# Patient Record
Sex: FEMALE | Race: BLACK OR AFRICAN AMERICAN | NOT HISPANIC OR LATINO | Employment: FULL TIME | URBAN - METROPOLITAN AREA
[De-identification: names, ages, dates, MRNs, and addresses within clinical notes are randomized per-mention and may not be internally consistent; named-entity substitution may affect disease eponyms.]

---

## 2017-01-26 ENCOUNTER — PATIENT MESSAGE (OUTPATIENT)
Dept: FAMILY MEDICINE | Facility: CLINIC | Age: 42
End: 2017-01-26

## 2017-01-27 ENCOUNTER — TELEPHONE (OUTPATIENT)
Dept: SPINE | Facility: CLINIC | Age: 42
End: 2017-01-27

## 2017-01-27 ENCOUNTER — OFFICE VISIT (OUTPATIENT)
Dept: FAMILY MEDICINE | Facility: CLINIC | Age: 42
End: 2017-01-27
Payer: COMMERCIAL

## 2017-01-27 ENCOUNTER — HOSPITAL ENCOUNTER (OUTPATIENT)
Dept: RADIOLOGY | Facility: OTHER | Age: 42
Discharge: HOME | End: 2017-01-27
Attending: ORTHOPAEDIC SURGERY
Payer: COMMERCIAL

## 2017-01-27 ENCOUNTER — OFFICE VISIT (OUTPATIENT)
Dept: SPINE | Facility: CLINIC | Age: 42
End: 2017-01-27
Payer: COMMERCIAL

## 2017-01-27 VITALS
SYSTOLIC BLOOD PRESSURE: 146 MMHG | WEIGHT: 185 LBS | DIASTOLIC BLOOD PRESSURE: 98 MMHG | HEART RATE: 100 BPM | HEIGHT: 64 IN | BODY MASS INDEX: 31.58 KG/M2

## 2017-01-27 VITALS
BODY MASS INDEX: 31.64 KG/M2 | SYSTOLIC BLOOD PRESSURE: 132 MMHG | HEIGHT: 64 IN | WEIGHT: 185.31 LBS | OXYGEN SATURATION: 99 % | DIASTOLIC BLOOD PRESSURE: 90 MMHG | TEMPERATURE: 99 F | HEART RATE: 81 BPM

## 2017-01-27 DIAGNOSIS — M54.50 LUMBAR SPINE PAIN: Primary | ICD-10-CM

## 2017-01-27 DIAGNOSIS — M54.50 LUMBAR SPINE PAIN: ICD-10-CM

## 2017-01-27 DIAGNOSIS — M51.37 DDD (DEGENERATIVE DISC DISEASE), LUMBOSACRAL: ICD-10-CM

## 2017-01-27 DIAGNOSIS — M54.14 THORACIC AND LUMBOSACRAL NEURITIS: ICD-10-CM

## 2017-01-27 DIAGNOSIS — M54.41 ACUTE BILATERAL LOW BACK PAIN WITH RIGHT-SIDED SCIATICA: ICD-10-CM

## 2017-01-27 DIAGNOSIS — M47.819 SPONDYLOSIS WITHOUT MYELOPATHY: ICD-10-CM

## 2017-01-27 DIAGNOSIS — M54.9 BACK PAIN, UNSPECIFIED BACK LOCATION, UNSPECIFIED BACK PAIN LATERALITY, UNSPECIFIED CHRONICITY: Primary | ICD-10-CM

## 2017-01-27 DIAGNOSIS — M54.17 THORACIC AND LUMBOSACRAL NEURITIS: ICD-10-CM

## 2017-01-27 PROCEDURE — 3080F DIAST BP >= 90 MM HG: CPT | Mod: S$GLB,,, | Performed by: NURSE PRACTITIONER

## 2017-01-27 PROCEDURE — 3075F SYST BP GE 130 - 139MM HG: CPT | Mod: S$GLB,,, | Performed by: NURSE PRACTITIONER

## 2017-01-27 PROCEDURE — 72100 X-RAY EXAM L-S SPINE 2/3 VWS: CPT | Mod: 26,,, | Performed by: RADIOLOGY

## 2017-01-27 PROCEDURE — 99999 PR PBB SHADOW E&M-EST. PATIENT-LVL IV: CPT | Mod: PBBFAC,,, | Performed by: PHYSICIAN ASSISTANT

## 2017-01-27 PROCEDURE — 1159F MED LIST DOCD IN RCRD: CPT | Mod: S$GLB,,, | Performed by: NURSE PRACTITIONER

## 2017-01-27 PROCEDURE — 99999 PR PBB SHADOW E&M-EST. PATIENT-LVL IV: CPT | Mod: PBBFAC,,, | Performed by: NURSE PRACTITIONER

## 2017-01-27 PROCEDURE — 99204 OFFICE O/P NEW MOD 45 MIN: CPT | Mod: S$GLB,,, | Performed by: PHYSICIAN ASSISTANT

## 2017-01-27 PROCEDURE — 72120 X-RAY BEND ONLY L-S SPINE: CPT | Mod: 26,,, | Performed by: RADIOLOGY

## 2017-01-27 PROCEDURE — 96372 THER/PROPH/DIAG INJ SC/IM: CPT | Mod: S$GLB,,, | Performed by: NURSE PRACTITIONER

## 2017-01-27 PROCEDURE — 72100 X-RAY EXAM L-S SPINE 2/3 VWS: CPT | Mod: TC

## 2017-01-27 PROCEDURE — 3074F SYST BP LT 130 MM HG: CPT | Mod: S$GLB,,, | Performed by: PHYSICIAN ASSISTANT

## 2017-01-27 PROCEDURE — 99214 OFFICE O/P EST MOD 30 MIN: CPT | Mod: 25,S$GLB,, | Performed by: NURSE PRACTITIONER

## 2017-01-27 PROCEDURE — 3080F DIAST BP >= 90 MM HG: CPT | Mod: S$GLB,,, | Performed by: PHYSICIAN ASSISTANT

## 2017-01-27 RX ORDER — METHYLPREDNISOLONE 4 MG/1
TABLET ORAL
Qty: 1 PACKAGE | Refills: 0 | Status: SHIPPED | OUTPATIENT
Start: 2017-01-27 | End: 2017-02-17

## 2017-01-27 RX ORDER — HYDROCODONE BITARTRATE AND ACETAMINOPHEN 10; 325 MG/1; MG/1
1 TABLET ORAL EVERY 12 HOURS PRN
Qty: 40 TABLET | Refills: 0 | Status: SHIPPED | OUTPATIENT
Start: 2017-01-27 | End: 2017-03-02 | Stop reason: SDUPTHER

## 2017-01-27 RX ORDER — KETOROLAC TROMETHAMINE 30 MG/ML
60 INJECTION, SOLUTION INTRAMUSCULAR; INTRAVENOUS ONCE
Status: COMPLETED | OUTPATIENT
Start: 2017-01-27 | End: 2017-01-27

## 2017-01-27 RX ORDER — DICLOFENAC SODIUM 75 MG/1
75 TABLET, DELAYED RELEASE ORAL 2 TIMES DAILY PRN
Qty: 180 TABLET | Refills: 0 | Status: SHIPPED | OUTPATIENT
Start: 2017-01-27 | End: 2017-10-30

## 2017-01-27 RX ORDER — CYCLOBENZAPRINE HCL 10 MG
TABLET ORAL
Refills: 0 | COMMUNITY
Start: 2017-01-24 | End: 2017-05-09 | Stop reason: ALTCHOICE

## 2017-01-27 RX ORDER — KETOROLAC TROMETHAMINE 10 MG/1
TABLET, FILM COATED ORAL
Refills: 0 | COMMUNITY
Start: 2017-01-24 | End: 2017-10-30

## 2017-01-27 RX ORDER — METHOCARBAMOL 750 MG/1
750 TABLET, FILM COATED ORAL 3 TIMES DAILY PRN
Qty: 270 TABLET | Refills: 0 | Status: SHIPPED | OUTPATIENT
Start: 2017-01-27 | End: 2017-05-02 | Stop reason: SDUPTHER

## 2017-01-27 RX ORDER — HYDROCODONE BITARTRATE AND ACETAMINOPHEN 5; 325 MG/1; MG/1
TABLET ORAL
Refills: 0 | COMMUNITY
Start: 2017-01-24 | End: 2017-02-01 | Stop reason: DRUGHIGH

## 2017-01-27 RX ADMIN — KETOROLAC TROMETHAMINE 60 MG: 30 INJECTION, SOLUTION INTRAMUSCULAR; INTRAVENOUS at 10:01

## 2017-01-27 NOTE — LETTER
January 27, 2017      Ira Marvin, FNP-C  [address]  [place] [address]           [facility] - Spine Services  [address]  [address]  [facility] [address]  Phone: [phone]  Fax: [phone]          Patient: Alberto Frye   MR Number: [id number]   YOB: 1975   Date of Visit: 1/27/2017       Dear Ira Marvin:    Thank you for referring Alberto Frye to me for evaluation. Attached you will find relevant portions of my assessment and plan of care.    If you have questions, please do not hesitate to call me. I look forward to following Alberto Frye along with you.    Sincerely,    Ester Sparks PA-C    Enclosure  CC:  No Recipients    If you would like to receive this communication electronically, please contact [email or web address] or [phone] to request more information on [employer] access.    For providers and/or their staff who would like to refer a patient to [facility], please contact us through our one-stop-shop provider referral line, [facility], at [phone].    If you feel you have received this communication in error or would no longer like to receive these types of communications, please e-mail [email or web address]

## 2017-01-27 NOTE — PROGRESS NOTES
Subjective:     Patient ID:  Alberto Frye is a 42 y.o. female.    Patient referred by Ms. Yon NP    Chief Complaint: Back and right leg pain    HPI    Alberto Frye is a 42 y.o. female who presents with the above CC.  Pain started about four days ago when she went to get from a sitting to standing position.    Pain is in the middle to right low back region that is constant rated 10/10 that is worse with sitting and better with laying down with a heating pad.  Pain radiates down the right posterior leg to the bottom of the foot.  Pain also is in the bottom of the left foot.  Leg pain is rated 10/10 and comes and goes.  No left leg pain or paresthesias.    The back pain bothers her the most at this time.    Patient had PT in the past which helped.  No ESIs.  No spine surgery.  Patient is currently taking Flexeril, Norco, and Toradol which she got from an Urgent Care.    Patient denies any recent accidents or trauma, no saddle anesthesias, and no bowel or bladder incontinence.      Review of Systems:  Please refer to page three of the spine center intake form for a complete review of systems.    Past Medical History   Diagnosis Date    Anxiety     Hypertension      Past Surgical History   Procedure Laterality Date    Hysterectomy       lapban surgery  2009     Current Outpatient Prescriptions on File Prior to Visit   Medication Sig Dispense Refill    clonazePAM (KLONOPIN) 0.5 MG tablet Take 1 tablet (0.5 mg total) by mouth 2 (two) times daily as needed. 60 tablet 0    cyclobenzaprine (FLEXERIL) 10 MG tablet TK 1 T PO Q 8 H PRN. NO DRIVING ON MEDICINE  0    hydrocodone-acetaminophen 5-325mg (NORCO) 5-325 mg per tablet TK 1 T PO Q 6 H PRN. NO DRIVING ON MEDICINE  0    hydrOXYzine HCl (ATARAX) 25 MG tablet Take 1 tablet (25 mg total) by mouth 3 (three) times daily as needed for Anxiety. 30 tablet 0    ketorolac (TORADOL) 10 mg tablet TK 1 T PO Q 6 H PRN. DO NOT EXCEED 3 DAYS. START ON  "01/25/2017  0    etodolac (LODINE) 400 MG tablet Take 1 tablet (400 mg total) by mouth 2 (two) times daily. 60 tablet 0    hydrocodone-acetaminophen 7.5-325mg (NORCO) 7.5-325 mg per tablet Take 1 tablet by mouth every 6 (six) hours as needed. 35 tablet 0    torsemide (DEMADEX) 10 MG Tab Take 2 tablets (20 mg total) by mouth once daily. 60 tablet 2    [DISCONTINUED] celecoxib (CELEBREX) 200 MG capsule Take 1 capsule (200 mg total) by mouth 2 (two) times daily. 60 capsule 0     Current Facility-Administered Medications on File Prior to Visit   Medication Dose Route Frequency Provider Last Rate Last Dose    [COMPLETED] ketorolac injection 60 mg  60 mg Intramuscular Once Ira Marvin FNP-C   60 mg at 01/27/17 1041     Review of patient's allergies indicates:   Allergen Reactions    Pcn [penicillins] Other (See Comments)     Was told from childhood she couldn't take it    Latex, natural rubber Rash     Social History     Social History    Marital status: Single     Spouse name: N/A    Number of children: N/A    Years of education: N/A     Occupational History    Not on file.     Social History Main Topics    Smoking status: Never Smoker    Smokeless tobacco: Not on file    Alcohol use 0.0 oz/week     0 Standard drinks or equivalent per week    Drug use: No    Sexual activity: Yes     Partners: Male     Other Topics Concern    Not on file     Social History Narrative     Family History   Problem Relation Age of Onset    Diabetes Mother     Cancer Mother     Hypertension Mother        Objective:      Vitals:    01/27/17 1356   BP: (!) 146/98   Pulse: 100   Weight: 83.9 kg (185 lb)   Height: 5' 4" (1.626 m)   PainSc:   8   PainLoc: Back         Physical Exam:    General:  Alberto Frye is well-developed, well-nourished, appears stated age, in no acute distress, alert and oriented to person, place, and time.    Musculoskeletal:    Patient arises from a sitting to standing position without " difficulty.  Patient walks to the door without evidence of limp, pain, or abnormality of gait.     Exam limited by pain and she could not get on the exam table.    Lumbar ROM:   Pain in lumbar flexion, extension, and left lateral bending.  No pain in right lateral bending.    Lumbar Spine Inspection:  Scar noted but not from spine surgery.    Lumbar Spine Palpation:  Severe tenderness to low back palpation.    SI Joint Palpation:  Severe tenderness to SI Joint palpation.    Straight Leg Raise:  Cannot assess    Neurological: (limited by pain)    Muscle strength against resistance:     Right Left   Hip flexion  5 / 5 5 / 5   Hip extension 5 / 5 5 / 5   Hip abduction 5 / 5 5 / 5   Hip adduction  5 / 5 5 / 5   Knee extension  5 / 5 5 / 5   Knee flexion 5 / 5 5 / 5   Dorsiflexion  5 / 5 5 / 5   EHL  5 / 5 5 / 5   Plantar flexion  5 / 5 5 / 5   Inversion of the feet 5 / 5 5 / 5   Eversion of the feet  5 / 5 5 / 5     Reflexes:     Right Left   Patellar 2+ 2+   Achilles 2+ 2+     Clonus:  Negative bilaterally    On gross examination of the bilateral upper extremities, patient has full painfree ROM with no signs of clubbing, cyanosis, edema, or weakness.     XRAY Interpretation:     Lumbar spine ap/lateral/flexion/extension xrays were personally reviewed today.  No fractures.  No movement on flexion and extension.      Assessment:          1. Spondylosis without myelopathy    2. DDD (degenerative disc disease), lumbosacral    3. Thoracic and lumbosacral neuritis    4. Acute bilateral low back pain with right-sided sciatica             Plan:          Orders Placed This Encounter    methylPREDNISolone (MEDROL, KIMBERLEE,) 4 mg tablet    methocarbamol (ROBAXIN) 750 MG Tab    diclofenac (VOLTAREN) 75 MG EC tablet    hydrocodone-acetaminophen 10-325mg (NORCO)  mg Tab     Patient describing an acute right S1 radiculopathy    -Medrol pack after the toradol is done with food  -Diclofenac PRN with food once the pack is  done  -Robaxin PRN now  -Norco PRN.  She was told that this is a one time prescription and she verbalized understading  -She will be off work for the next week due to her lumbar spine condition.  Go back on February 6th.  A letter was given for this.  -She was told to call back in 1-2 weeks if the pain gets worse and if so I will get a MRI lumbar spine      Follow-Up:  Return if symptoms worsen or fail to improve. If there are any questions prior to this, the patient was instructed to contact the office.       Ester Sparks [facility], PA-C  Neurosurgery  [facility] Sp[facility]  [facility]

## 2017-01-27 NOTE — MR AVS SNAPSHOT
[facility]  [address]  [place] [address]  Phone: [phone]  Fax: [phone]                  Alberto Frye   2017 10:40 AM   Office Visit    Description:  Female : 1975   Provider:  BONG Lowe   Department:  [facility]           Reason for Visit     Back Pain           Diagnoses this Visit        Comments    Back pain, unspecified back location, unspecified back pain laterality, unspecified chronicity    -  Primary            To Do List           Future Appointments        Provider Department Dept Phone    2017 11:30 AM Ester Sparks PA-C [facility] [phone]    2017 7:20 AM Candace Martin MD [facility] [phone]      Goals (5 Years of Data)     None      [facility]     [facility] Nurse Care Line -  Assistance  Registered nurses in the [facility] Center provide clinical advisement, health education, appointment booking, and other advisory services.  Call for this free service at [phone].             Medications           These medications were administered today        Dose Freq    ketorolac injection 60 mg 60 mg Once    Sig: Inject 2 mLs (60 mg total) into the muscle once.    Class: Normal    Route: Intramuscular      STOP taking these medications     celecoxib (CELEBREX) 200 MG capsule Take 1 capsule (200 mg total) by mouth 2 (two) times daily.           Verify that the below list of medications is an accurate representation of the medications you are currently taking.  If none reported, the list may be blank. If incorrect, please contact your healthcare provider. Carry this list with you in case of emergency.           Current Medications     clonazePAM (KLONOPIN) 0.5 MG tablet Take 1 tablet (0.5 mg total) by mouth 2 (two) times daily as needed.    cyclobenzaprine (FLEXERIL) 10 MG tablet TK 1 T PO Q 8 H PRN. NO DRIVING ON MEDICINE    etodolac  "(LODINE) 400 MG tablet Take 1 tablet (400 mg total) by mouth 2 (two) times daily.    hydrocodone-acetaminophen 5-325mg (NORCO) 5-325 mg per tablet TK 1 T PO Q 6 H PRN. NO DRIVING ON MEDICINE    hydrocodone-acetaminophen 7.5-325mg (NORCO) 7.5-325 mg per tablet Take 1 tablet by mouth every 6 (six) hours as needed.    hydrOXYzine HCl (ATARAX) 25 MG tablet Take 1 tablet (25 mg total) by mouth 3 (three) times daily as needed for Anxiety.    ketorolac (TORADOL) 10 mg tablet TK 1 T PO Q 6 H PRN. DO NOT EXCEED 3 DAYS. START ON 01/25/2017    torsemide (DEMADEX) 10 MG Tab Take 2 tablets (20 mg total) by mouth once daily.           Clinical Reference Information           Vital Signs - Last Recorded  Most recent update: 1/27/2017 10:19 AM by Priscilla Pacheco    BP Pulse Temp Ht Wt SpO2    (!) 132/90 (BP Location: Right arm, Patient Position: Sitting, BP Method: Manual) 81 98.6 °F (37 °C) (Oral) 5' 4" (1.626 m) 84.1 kg (185 lb 4.8 oz) 99%    BMI                31.81 kg/m2          Blood Pressure          Most Recent Value    BP  (!)  132/90      Allergies as of 1/27/2017     Pcn [Penicillins]    Latex, Natural Rubber      Immunizations Administered on Date of Encounter - 1/27/2017     None      Orders Placed During Today's Visit      Normal Orders This Visit    Ambulatory Consult to Back & Spine Clinic       "

## 2017-01-27 NOTE — PROGRESS NOTES
Subjective:       Patient ID: Alberto Frye is a 42 y.o. female.    Chief Complaint: Back Pain (lower back. patient went urgent care tuesday.)    HPI Comments: 42-year-old female presents to the clinic today with lower back pain that started on 1/24/2016.  She states it just started while she was at work went from a sitting to standing position.  She states she is having pain, weakness, numbness, and tingling to right leg.  She denies any urinary or bowel incontinence.  She was seen in urgent care on 1/24/2016.  She was prescribed Flexeril, Norco, and Toradol.  She states her medications just microsleep but does not control the pain.  She also has some mild left trapezius muscle pain.  She denies any left shoulder or left arm pain.  Presently her pain is a 10 out of 10.  She is crying and appears to be in a lot of discomfort.    Past Medical History   Diagnosis Date    Anxiety     Hypertension      Past Surgical History   Procedure Laterality Date    Hysterectomy       lapban surgery  2009      reports that she has never smoked. She does not have any smokeless tobacco history on file. She reports that she drinks alcohol. She reports that she does not use illicit drugs.  Review of Systems   Respiratory: Negative for cough, shortness of breath and wheezing.    Cardiovascular: Negative for chest pain, palpitations and leg swelling.   Gastrointestinal: Negative for abdominal pain, diarrhea, nausea and vomiting.   Musculoskeletal: Positive for back pain. Negative for gait problem.        Right leg pain and left trapezius muscle pain    Neurological: Positive for weakness and numbness. Negative for dizziness, light-headedness and headaches.        Pain, weakness, numbness and tingling to right leg        Objective:      Physical Exam   Constitutional: She is oriented to person, place, and time. She appears well-developed and well-nourished. No distress.   Crying and appears very uncomfortable    Eyes:  Conjunctivae and EOM are normal. Pupils are equal, round, and reactive to light. Right eye exhibits no discharge. Left eye exhibits no discharge. No scleral icterus.   Neck: Normal range of motion. Neck supple.   Neck non-tender to palpation tenderness over left trapezius muscle    Cardiovascular: Normal rate, regular rhythm and normal heart sounds.  Exam reveals no gallop and no friction rub.    No murmur heard.  Pulmonary/Chest: Effort normal and breath sounds normal. No respiratory distress. She has no wheezes. She has no rales.   Abdominal: Soft. Bowel sounds are normal. There is no tenderness.   Musculoskeletal: She exhibits tenderness.   Tenderness L1-L4 and right paraspinal muscles positive right leg raise    Neurological: She is alert and oriented to person, place, and time. She has normal reflexes.   Skin: Skin is warm and dry. She is not diaphoretic.   Psychiatric: She has a normal mood and affect.       Assessment:       1. Back pain, unspecified back location, unspecified back pain laterality, unspecified chronicity        Plan:         Back pain, unspecified back location, unspecified back pain laterality, unspecified chronicity  -     ketorolac injection 60 mg; Inject 2 mLs (60 mg total) into the muscle once.  -     Ambulatory Consult to Back & Spine Clinic      To Back and Spine now for further evaluation at 11:30 today       Patient was given to Toradol injection in the office and she took a Flexeril and Norco before she left

## 2017-02-01 ENCOUNTER — OFFICE VISIT (OUTPATIENT)
Dept: FAMILY MEDICINE | Facility: CLINIC | Age: 42
End: 2017-02-01
Payer: COMMERCIAL

## 2017-02-01 VITALS
BODY MASS INDEX: 31.99 KG/M2 | OXYGEN SATURATION: 97 % | TEMPERATURE: 98 F | HEART RATE: 91 BPM | DIASTOLIC BLOOD PRESSURE: 106 MMHG | WEIGHT: 187.38 LBS | SYSTOLIC BLOOD PRESSURE: 158 MMHG | HEIGHT: 64 IN

## 2017-02-01 DIAGNOSIS — M54.41 ACUTE RIGHT-SIDED LOW BACK PAIN WITH RIGHT-SIDED SCIATICA: Primary | ICD-10-CM

## 2017-02-01 DIAGNOSIS — F41.9 ANXIETY: ICD-10-CM

## 2017-02-01 PROCEDURE — 3080F DIAST BP >= 90 MM HG: CPT | Mod: S$GLB,,, | Performed by: FAMILY MEDICINE

## 2017-02-01 PROCEDURE — 99213 OFFICE O/P EST LOW 20 MIN: CPT | Mod: S$GLB,,, | Performed by: FAMILY MEDICINE

## 2017-02-01 PROCEDURE — 99999 PR PBB SHADOW E&M-EST. PATIENT-LVL III: CPT | Mod: PBBFAC,,, | Performed by: FAMILY MEDICINE

## 2017-02-01 PROCEDURE — 3077F SYST BP >= 140 MM HG: CPT | Mod: S$GLB,,, | Performed by: FAMILY MEDICINE

## 2017-02-01 RX ORDER — HYDROXYZINE HYDROCHLORIDE 25 MG/1
25 TABLET, FILM COATED ORAL 3 TIMES DAILY PRN
Qty: 30 TABLET | Refills: 3 | Status: SHIPPED | OUTPATIENT
Start: 2017-02-01 | End: 2017-05-02

## 2017-02-01 RX ORDER — CLONAZEPAM 0.5 MG/1
0.5 TABLET ORAL 2 TIMES DAILY PRN
Qty: 60 TABLET | Refills: 0 | Status: SHIPPED | OUTPATIENT
Start: 2017-02-01 | End: 2017-03-02 | Stop reason: SDUPTHER

## 2017-02-01 NOTE — PROGRESS NOTES
Office Visit    Patient Name: Alberto Frye    : 1975  MRN: [id number]    Subjective:  Alberto is a 42 y.o. female who presents today for:    Pain      This patient has multiple medical diagnoses as noted below.  This patient is known to me and to this clinic. She reports that she has had increased pain in her lower back.  Pain radiates to the leg and feet.  Her neck and shoulders had increased pain.  She was evaluated in an urgent care and prescribed several medications.  She was evaluated by ortho and NP on the same day.  She was diagnosed with a pinch nerve in her back.  She is trying to avoid pain medications, but the pain will return once the medication wears off.  She has quit smoking.  She has increased exercise.  She denies any recent injuries nor heavy lifting.  She reports that she has pain that was sitting on the location.        Active Problem List  Patient Active Problem List   Diagnosis    Essential hypertension    Seasonal allergies    Anxiety       Past Surgical History  Past Surgical History   Procedure Laterality Date    Hysterectomy       lapban surgery         Family History  Family History   Problem Relation Age of Onset    Diabetes Mother     Cancer Mother     Hypertension Mother        Social History  Social History     Social History    Marital status: Single     Spouse name: N/A    Number of children: N/A    Years of education: N/A     Occupational History    Not on file.     Social History Main Topics    Smoking status: Former Smoker    Smokeless tobacco: Not on file      Comment: quit in 2016    Alcohol use 0.0 oz/week     0 Standard drinks or equivalent per week    Drug use: No    Sexual activity: Yes     Partners: Male     Other Topics Concern    Not on file     Social History Narrative    No narrative on file       Current Medications  Medications reviewed and updated.     Allergies   Review of patient's allergies indicates:   Allergen  "Reactions    Pcn [penicillins] Other (See Comments)     Was told from childhood she couldn't take it    Latex, natural rubber Rash       Review of Systems (Pertinent positives)  Review of Systems   Constitutional: Negative for activity change, appetite change, fatigue, fever and unexpected weight change.   HENT: Negative.  Negative for ear discharge, ear pain, rhinorrhea and sore throat.    Eyes: Negative.    Respiratory: Negative for apnea, cough, chest tightness, shortness of breath and wheezing.    Cardiovascular: Negative for chest pain, palpitations and leg swelling.   Gastrointestinal: Negative for abdominal distention, abdominal pain, constipation, diarrhea and vomiting.   Endocrine: Negative for cold intolerance, heat intolerance, polydipsia and polyuria.   Genitourinary: Negative for decreased urine volume, menstrual problem, urgency, vaginal bleeding, vaginal discharge and vaginal pain.   Musculoskeletal: Positive for arthralgias, back pain, neck pain and neck stiffness.   Skin: Negative for rash.   Neurological: Negative for dizziness and headaches.   Hematological: Does not bruise/bleed easily.   Psychiatric/Behavioral: Negative for agitation, sleep disturbance and suicidal ideas.       Visit Vitals    BP (!) 158/106 (BP Location: Left arm, Patient Position: Sitting, BP Method: Manual)    Pulse 91    Temp 98.1 °F (36.7 °C) (Oral)    Ht 5' 4" (1.626 m)    Wt 85 kg (187 lb 6.3 oz)    SpO2 97%    BMI 32.17 kg/m2       Physical Exam   Constitutional: She is oriented to person, place, and time. She appears well-developed and well-nourished.   HENT:   Head: Normocephalic and atraumatic.   Eyes: Conjunctivae and EOM are normal. Pupils are equal, round, and reactive to light.   Neck: Normal range of motion. No JVD present. No thyromegaly present.   Musculoskeletal:        Back:         Legs:  Lymphadenopathy:     She has no cervical adenopathy.   Neurological: She is alert and oriented to person, place, " and time.   Skin: Skin is warm and dry.   Psychiatric: She has a normal mood and affect. Her behavior is normal.   Vitals reviewed.      Health Maintenance  Health Maintenance Topics with due status: Not Due       Topic Last Completion Date    TETANUS VACCINE 09/30/2015    Mammogram 12/28/2015    Pap Smear 06/13/2016       Assessment/Plan:  Alberto Frye is a 42 y.o. female who presents today for :    Alberto was seen today for pain.    Diagnoses and all orders for this visit:    Acute right-sided low back pain with right-sided sciatica  -  Consider referral to pain clinic   -  Conservative management   -     Anxiety  -     hydrOXYzine HCl (ATARAX) 25 MG tablet; Take 1 tablet (25 mg total) by mouth 3 (three) times daily as needed for Anxiety.  -     clonazePAM (KLONOPIN) 0.5 MG tablet; Take 1 tablet (0.5 mg total) by mouth 2 (two) times daily as needed.  -   I have discussed the common side effects of this medication with the patient and answered all of the questions they had at the time of this visit regarding this medication.  -   RTC if symptoms worsen or persist.        No Follow-up on file.

## 2017-03-02 ENCOUNTER — PATIENT MESSAGE (OUTPATIENT)
Dept: FAMILY MEDICINE | Facility: CLINIC | Age: 42
End: 2017-03-02

## 2017-03-02 DIAGNOSIS — F41.9 ANXIETY: ICD-10-CM

## 2017-03-02 DIAGNOSIS — M54.14 THORACIC AND LUMBOSACRAL NEURITIS: ICD-10-CM

## 2017-03-02 DIAGNOSIS — M47.819 SPONDYLOSIS WITHOUT MYELOPATHY: ICD-10-CM

## 2017-03-02 DIAGNOSIS — M54.41 ACUTE BILATERAL LOW BACK PAIN WITH RIGHT-SIDED SCIATICA: ICD-10-CM

## 2017-03-02 DIAGNOSIS — M54.17 THORACIC AND LUMBOSACRAL NEURITIS: ICD-10-CM

## 2017-03-02 DIAGNOSIS — M51.37 DDD (DEGENERATIVE DISC DISEASE), LUMBOSACRAL: ICD-10-CM

## 2017-03-02 RX ORDER — HYDROCODONE BITARTRATE AND ACETAMINOPHEN 10; 325 MG/1; MG/1
1 TABLET ORAL EVERY 12 HOURS PRN
Qty: 40 TABLET | Refills: 0 | Status: SHIPPED | OUTPATIENT
Start: 2017-03-02 | End: 2017-05-02 | Stop reason: SDUPTHER

## 2017-03-02 RX ORDER — CLONAZEPAM 0.5 MG/1
0.5 TABLET ORAL 2 TIMES DAILY PRN
Qty: 60 TABLET | Refills: 0 | Status: SHIPPED | OUTPATIENT
Start: 2017-03-02 | End: 2017-04-06 | Stop reason: SDUPTHER

## 2017-04-06 DIAGNOSIS — F41.9 ANXIETY: ICD-10-CM

## 2017-04-06 RX ORDER — CLONAZEPAM 0.5 MG/1
0.5 TABLET ORAL 2 TIMES DAILY PRN
Qty: 60 TABLET | Refills: 0 | Status: SHIPPED | OUTPATIENT
Start: 2017-04-06 | End: 2017-05-02 | Stop reason: SDUPTHER

## 2017-05-02 ENCOUNTER — OFFICE VISIT (OUTPATIENT)
Dept: FAMILY MEDICINE | Facility: CLINIC | Age: 42
End: 2017-05-02
Payer: COMMERCIAL

## 2017-05-02 ENCOUNTER — HOSPITAL ENCOUNTER (OUTPATIENT)
Dept: RADIOLOGY | Facility: HOSPITAL | Age: 42
Discharge: HOME | End: 2017-05-02
Attending: FAMILY MEDICINE
Payer: COMMERCIAL

## 2017-05-02 VITALS
SYSTOLIC BLOOD PRESSURE: 120 MMHG | TEMPERATURE: 99 F | HEART RATE: 84 BPM | BODY MASS INDEX: 31.99 KG/M2 | HEIGHT: 64 IN | WEIGHT: 187.38 LBS | OXYGEN SATURATION: 99 % | DIASTOLIC BLOOD PRESSURE: 90 MMHG

## 2017-05-02 DIAGNOSIS — M47.819 SPONDYLOSIS WITHOUT MYELOPATHY: ICD-10-CM

## 2017-05-02 DIAGNOSIS — S16.1XXA NECK STRAIN, INITIAL ENCOUNTER: ICD-10-CM

## 2017-05-02 DIAGNOSIS — M54.17 THORACIC AND LUMBOSACRAL NEURITIS: ICD-10-CM

## 2017-05-02 DIAGNOSIS — M54.14 THORACIC AND LUMBOSACRAL NEURITIS: ICD-10-CM

## 2017-05-02 DIAGNOSIS — F41.9 ANXIETY: ICD-10-CM

## 2017-05-02 DIAGNOSIS — S16.1XXA NECK STRAIN, INITIAL ENCOUNTER: Primary | ICD-10-CM

## 2017-05-02 PROCEDURE — 99999 PR PBB SHADOW E&M-EST. PATIENT-LVL IV: CPT | Mod: PBBFAC,,, | Performed by: FAMILY MEDICINE

## 2017-05-02 PROCEDURE — 72040 X-RAY EXAM NECK SPINE 2-3 VW: CPT | Mod: TC,PO

## 2017-05-02 PROCEDURE — 1160F RVW MEDS BY RX/DR IN RCRD: CPT | Mod: S$GLB,,, | Performed by: FAMILY MEDICINE

## 2017-05-02 PROCEDURE — 96372 THER/PROPH/DIAG INJ SC/IM: CPT | Mod: S$GLB,,, | Performed by: FAMILY MEDICINE

## 2017-05-02 PROCEDURE — 99214 OFFICE O/P EST MOD 30 MIN: CPT | Mod: 25,S$GLB,, | Performed by: FAMILY MEDICINE

## 2017-05-02 PROCEDURE — 3080F DIAST BP >= 90 MM HG: CPT | Mod: S$GLB,,, | Performed by: FAMILY MEDICINE

## 2017-05-02 PROCEDURE — 3074F SYST BP LT 130 MM HG: CPT | Mod: S$GLB,,, | Performed by: FAMILY MEDICINE

## 2017-05-02 PROCEDURE — 72040 X-RAY EXAM NECK SPINE 2-3 VW: CPT | Mod: 26,,, | Performed by: RADIOLOGY

## 2017-05-02 RX ORDER — METHOCARBAMOL 500 MG/1
500 TABLET, FILM COATED ORAL 3 TIMES DAILY PRN
Qty: 30 TABLET | Refills: 0 | Status: SHIPPED | OUTPATIENT
Start: 2017-05-02 | End: 2017-05-09 | Stop reason: ALTCHOICE

## 2017-05-02 RX ORDER — CYCLOBENZAPRINE HCL 10 MG
TABLET ORAL
Refills: 0 | Status: CANCELLED | OUTPATIENT
Start: 2017-05-02

## 2017-05-02 RX ORDER — TRIAMCINOLONE ACETONIDE 40 MG/ML
40 INJECTION, SUSPENSION INTRA-ARTICULAR; INTRAMUSCULAR
Status: COMPLETED | OUTPATIENT
Start: 2017-05-02 | End: 2017-05-02

## 2017-05-02 RX ORDER — CLONAZEPAM 0.5 MG/1
0.5 TABLET ORAL 2 TIMES DAILY PRN
Qty: 60 TABLET | Refills: 0 | Status: SHIPPED | OUTPATIENT
Start: 2017-05-02 | End: 2017-06-07 | Stop reason: SDUPTHER

## 2017-05-02 RX ORDER — HYDROCODONE BITARTRATE AND ACETAMINOPHEN 10; 325 MG/1; MG/1
1 TABLET ORAL EVERY 12 HOURS PRN
Qty: 40 TABLET | Refills: 0 | Status: SHIPPED | OUTPATIENT
Start: 2017-05-02 | End: 2017-07-07 | Stop reason: SDUPTHER

## 2017-05-02 RX ADMIN — TRIAMCINOLONE ACETONIDE 40 MG: 40 INJECTION, SUSPENSION INTRA-ARTICULAR; INTRAMUSCULAR at 03:05

## 2017-05-02 NOTE — PROGRESS NOTES
Office Visit    Patient Name: Alberto Frye    : 1975  MRN: [id number]    Subjective:  Alberto is a 42 y.o. female who presents today for:    Shoulder Pain (left) and Arm Pain (left)      This patient has multiple medical diagnoses as noted below.  This patient is known to me and to this clinic. She reports increased neck pain that radiates down her neck to her shoulder.  She did use medications for neck pain.  She reports a nerve that is jumping in the back.  She has used a heating pad.  The heat will improve the pain.  She had increased pressure in there neck.  Pain was severe enough.  She has used tylenol for arthritis.  She reports that she has used voltaren that did not help with her pain.  She denies any injuries to her neck.       Active Problem List  Patient Active Problem List   Diagnosis    Essential hypertension    Seasonal allergies    Anxiety       Past Surgical History  Past Surgical History:   Procedure Laterality Date     lapban surgery      HYSTERECTOMY         Family History  Family History   Problem Relation Age of Onset    Diabetes Mother     Cancer Mother     Hypertension Mother        Social History  Social History     Social History    Marital status: Single     Spouse name: N/A    Number of children: N/A    Years of education: N/A     Occupational History    Not on file.     Social History Main Topics    Smoking status: Former Smoker    Smokeless tobacco: Not on file      Comment: quit in 2016    Alcohol use 0.0 oz/week     0 Standard drinks or equivalent per week    Drug use: No    Sexual activity: Yes     Partners: Male     Other Topics Concern    Not on file     Social History Narrative       Current Medications  Medications reviewed and updated.     Allergies   Review of patient's allergies indicates:   Allergen Reactions    Pcn [penicillins] Other (See Comments)     Was told from childhood she couldn't take it    Latex, natural rubber Rash  "      Review of Systems (Pertinent positives)  Review of Systems   Constitutional: Negative for activity change, appetite change, fatigue, fever and unexpected weight change.   HENT: Negative.  Negative for ear discharge, ear pain, rhinorrhea and sore throat.    Eyes: Negative.    Respiratory: Negative for apnea, cough, chest tightness, shortness of breath and wheezing.    Cardiovascular: Negative for chest pain, palpitations and leg swelling.   Gastrointestinal: Negative for abdominal distention, abdominal pain, constipation, diarrhea and vomiting.   Endocrine: Negative for cold intolerance, heat intolerance, polydipsia and polyuria.   Genitourinary: Negative for decreased urine volume, menstrual problem, urgency, vaginal bleeding, vaginal discharge and vaginal pain.   Musculoskeletal: Positive for arthralgias and neck pain.   Skin: Negative for rash.   Neurological: Negative for dizziness and headaches.   Hematological: Does not bruise/bleed easily.   Psychiatric/Behavioral: Negative for agitation, sleep disturbance and suicidal ideas.       BP (!) 120/90 (BP Location: Right arm, Patient Position: Sitting, BP Method: Manual)  Pulse 84  Temp 98.5 °F (36.9 °C) (Oral)   Ht 5' 4" (1.626 m)  Wt 85 kg (187 lb 6.3 oz)  SpO2 99%  BMI 32.17 kg/m2    Physical Exam   Constitutional: She is oriented to person, place, and time. She appears well-developed and well-nourished.   HENT:   Head: Normocephalic and atraumatic.   Eyes: Conjunctivae and EOM are normal. Pupils are equal, round, and reactive to light.   Musculoskeletal:        Cervical back: She exhibits decreased range of motion and tenderness.        Back:    Neurological: She is alert and oriented to person, place, and time.   Skin: Skin is warm and dry.   Psychiatric: She has a normal mood and affect. Her behavior is normal.   Vitals reviewed.      Health Maintenance  Health Maintenance Topics with due status: Not Due       Topic Last Completion Date    TETANUS " VACCINE 09/30/2015    Mammogram 12/28/2015    Pap Smear 06/13/2016    Influenza Vaccine 12/13/2016       Assessment/Plan:  Alberto Frye is a 42 y.o. female who presents today for :    Alberto was seen today for shoulder pain and arm pain.    Diagnoses and all orders for this visit:    Neck strain, initial encounter  -     hydrocodone-acetaminophen 10-325mg (NORCO)  mg Tab; Take 1 tablet by mouth every 12 (twelve) hours as needed for Pain.  -     Discontinue: methocarbamol (ROBAXIN) 500 MG Tab; Take 1 tablet (500 mg total) by mouth 3 (three) times daily as needed.  -     Ambulatory Referral to [facility]  -     Ambulatory Referral to Physical/Occupational Therapy  -     X-Ray Cervical Spine AP And Lateral; Future  -     triamcinolone acetonide injection 40 mg; Inject 1 mL (40 mg total) into the muscle one time.    Anxiety  -     clonazePAM (KLONOPIN) 0.5 MG tablet; Take 1 tablet (0.5 mg total) by mouth 2 (two) times daily as needed.    Spondylosis without myelopathy  -     hydrocodone-acetaminophen 10-325mg (NORCO)  mg Tab; Take 1 tablet by mouth every 12 (twelve) hours as needed for Pain.  -     Discontinue: methocarbamol (ROBAXIN) 500 MG Tab; Take 1 tablet (500 mg total) by mouth 3 (three) times daily as needed.    Thoracic and lumbosacral neuritis  -     hydrocodone-acetaminophen 10-325mg (NORCO)  mg Tab; Take 1 tablet by mouth every 12 (twelve) hours as needed for Pain.  -     Discontinue: methocarbamol (ROBAXIN) 500 MG Tab; Take 1 tablet (500 mg total) by mouth 3 (three) times daily as needed.    Other orders  -     Cancel: cyclobenzaprine (FLEXERIL) 10 MG tablet; TK 1 T PO Q 8 H PRN. NO DRIVING ON MEDICINE        No Follow-up on file.

## 2017-05-05 ENCOUNTER — PATIENT MESSAGE (OUTPATIENT)
Dept: FAMILY MEDICINE | Facility: CLINIC | Age: 42
End: 2017-05-05

## 2017-05-05 ENCOUNTER — TELEPHONE (OUTPATIENT)
Dept: NEUROSURGERY | Facility: CLINIC | Age: 42
End: 2017-05-05

## 2017-05-05 NOTE — TELEPHONE ENCOUNTER
Left voicemail message for pt. Appointment scheduled for 5/15/2017 with Dr. Sharpe scheduled incorrectly. Pt is already an established pt with Ester Sparks PA-C, at the [facility]. Appointment rescheduled to 5/11/2017 with Ester Sparks. Advised pt to call back with any questions or concerns. Appointment slip in the mail. Clinic phone number provided.

## 2017-05-08 ENCOUNTER — TELEPHONE (OUTPATIENT)
Dept: SPINE | Facility: CLINIC | Age: 42
End: 2017-05-08

## 2017-05-08 DIAGNOSIS — M54.2 CERVICALGIA: Primary | ICD-10-CM

## 2017-05-09 ENCOUNTER — OFFICE VISIT (OUTPATIENT)
Dept: SPINE | Facility: CLINIC | Age: 42
End: 2017-05-09
Payer: COMMERCIAL

## 2017-05-09 ENCOUNTER — HOSPITAL ENCOUNTER (OUTPATIENT)
Dept: RADIOLOGY | Facility: OTHER | Age: 42
Discharge: HOME | End: 2017-05-09
Attending: ORTHOPAEDIC SURGERY
Payer: COMMERCIAL

## 2017-05-09 VITALS
HEIGHT: 64 IN | DIASTOLIC BLOOD PRESSURE: 96 MMHG | HEART RATE: 86 BPM | BODY MASS INDEX: 31.92 KG/M2 | SYSTOLIC BLOOD PRESSURE: 140 MMHG | WEIGHT: 187 LBS

## 2017-05-09 DIAGNOSIS — M47.812 CERVICAL SPONDYLOSIS WITHOUT MYELOPATHY: ICD-10-CM

## 2017-05-09 DIAGNOSIS — M54.2 NECK PAIN: ICD-10-CM

## 2017-05-09 DIAGNOSIS — M50.30 DEGENERATION OF CERVICAL INTERVERTEBRAL DISC: ICD-10-CM

## 2017-05-09 DIAGNOSIS — M54.2 CERVICALGIA: ICD-10-CM

## 2017-05-09 DIAGNOSIS — M54.12 CERVICAL RADICULITIS: ICD-10-CM

## 2017-05-09 PROCEDURE — 72040 X-RAY EXAM NECK SPINE 2-3 VW: CPT | Mod: 26,,, | Performed by: RADIOLOGY

## 2017-05-09 PROCEDURE — 99999 PR PBB SHADOW E&M-EST. PATIENT-LVL IV: CPT | Mod: PBBFAC,,, | Performed by: PHYSICIAN ASSISTANT

## 2017-05-09 PROCEDURE — 1160F RVW MEDS BY RX/DR IN RCRD: CPT | Mod: S$GLB,,, | Performed by: PHYSICIAN ASSISTANT

## 2017-05-09 PROCEDURE — 3077F SYST BP >= 140 MM HG: CPT | Mod: S$GLB,,, | Performed by: PHYSICIAN ASSISTANT

## 2017-05-09 PROCEDURE — 3080F DIAST BP >= 90 MM HG: CPT | Mod: S$GLB,,, | Performed by: PHYSICIAN ASSISTANT

## 2017-05-09 PROCEDURE — 99215 OFFICE O/P EST HI 40 MIN: CPT | Mod: S$GLB,,, | Performed by: PHYSICIAN ASSISTANT

## 2017-05-09 RX ORDER — CYCLOBENZAPRINE HCL 10 MG
10 TABLET ORAL 3 TIMES DAILY PRN
Qty: 90 TABLET | Refills: 1 | Status: SHIPPED | OUTPATIENT
Start: 2017-05-09 | End: 2017-05-19

## 2017-05-09 RX ORDER — GABAPENTIN 300 MG/1
CAPSULE ORAL
Qty: 90 CAPSULE | Refills: 2 | Status: SHIPPED | OUTPATIENT
Start: 2017-05-09 | End: 2017-10-30

## 2017-05-09 RX ORDER — METHYLPREDNISOLONE 4 MG/1
TABLET ORAL
Qty: 1 PACKAGE | Refills: 0 | Status: SHIPPED | OUTPATIENT
Start: 2017-05-09 | End: 2017-05-30

## 2017-05-09 NOTE — MR AVS SNAPSHOT
[facility]  [address], [address]  [facility] [address]  Phone: [phone]  Fax: [phone]                  Alberto Frye   2017 9:30 AM   Office Visit    Description:  Female : 1975   Provider:  Ester Sparks PA-C   Department:  [facility]           Reason for Visit     Follow-up           Diagnoses this Visit        Comments    Cervical radiculitis         Cervical spondylosis without myelopathy         Degeneration of cervical intervertebral disc         Neck pain                To Do List           Future Appointments        Provider Department Dept Phone    2017 8:00 AM Ester Sparks PA-C [facility] [phone]      Goals (5 Years of Data)     None      Follow-Up and Disposition     Return in about 2 months (around 2017).       These Medications        Disp Refills Start End    gabapentin (NEURONTIN) 300 MG capsule 90 capsule 2 2017     Take one cap PO QHS for 7 days, then one cap PO BID for the next 7 days, and then take one cap PO TID and continue    Pharmacy: [facility] [address] [place] [address] [address] Ph #: [phone]       methylPREDNISolone (MEDROL, KIMBERLEE,) 4 mg tablet 1 Package 0 2017    use as directed    Pharmacy: [facility] [address] [place] [address] [address] Ph #: [phone]       cyclobenzaprine (FLEXERIL) 10 MG tablet 90 tablet 1 2017    Take 1 tablet (10 mg total) by mouth 3 (three) times daily as needed for Muscle spasms. - Oral    Pharmacy: [facility] [address] [place] [address] [address] Ph #: [phone]         [facility] On Call     [facility] Nurse Care Line -  Assistance  Unless otherwise directed by your provider, please contact [facility], our nurse care line that is available for  assistance.     Registered nurses in the [facility] provide: appointment scheduling, clinical  advisement, health education, and other advisory services.  Call: [phone] (toll free)               Medications           START taking these NEW medications        Refills    gabapentin (NEURONTIN) 300 MG capsule 2    Sig: Take one cap PO QHS for 7 days, then one cap PO BID for the next 7 days, and then take one cap PO TID and continue    Class: Normal    methylPREDNISolone (MEDROL, KIMBERLEE,) 4 mg tablet 0    Sig: use as directed    Class: Normal    cyclobenzaprine (FLEXERIL) 10 MG tablet 1    Sig: Take 1 tablet (10 mg total) by mouth 3 (three) times daily as needed for Muscle spasms.    Class: Normal    Route: Oral      STOP taking these medications     methocarbamol (ROBAXIN) 500 MG Tab Take 1 tablet (500 mg total) by mouth 3 (three) times daily as needed.           Verify that the below list of medications is an accurate representation of the medications you are currently taking.  If none reported, the list may be blank. If incorrect, please contact your healthcare provider. Carry this list with you in case of emergency.           Current Medications     clonazePAM (KLONOPIN) 0.5 MG tablet Take 1 tablet (0.5 mg total) by mouth 2 (two) times daily as needed.    diclofenac (VOLTAREN) 75 MG EC tablet Take 1 tablet (75 mg total) by mouth 2 (two) times daily as needed.    etodolac (LODINE) 400 MG tablet Take 1 tablet (400 mg total) by mouth 2 (two) times daily.    hydrocodone-acetaminophen 10-325mg (NORCO)  mg Tab Take 1 tablet by mouth every 12 (twelve) hours as needed for Pain.    ketorolac (TORADOL) 10 mg tablet TK 1 T PO Q 6 H PRN. DO NOT EXCEED 3 DAYS. START ON 01/25/2017    torsemide (DEMADEX) 10 MG Tab Take 2 tablets (20 mg total) by mouth once daily.    cyclobenzaprine (FLEXERIL) 10 MG tablet Take 1 tablet (10 mg total) by mouth 3 (three) times daily as needed for Muscle spasms.    gabapentin (NEURONTIN) 300 MG capsule Take one cap PO QHS for 7 days, then one cap PO BID for the next 7 days, and then  "take one cap PO TID and continue    methylPREDNISolone (MEDROL, KIMBERLEE,) 4 mg tablet use as directed           Clinical Reference Information           Your Vitals Were     BP Pulse Height Weight BMI    140/96 86 5' 4" (1.626 m) 84.8 kg (187 lb) 32.1 kg/m2      Blood Pressure          Most Recent Value    BP  (!)  140/96      Allergies as of 5/9/2017     Pcn [Penicillins]    Latex, Natural Rubber      Immunizations Administered on Date of Encounter - 5/9/2017     None      Orders Placed During Today's Visit      Normal Orders This Visit    Ambulatory referral to Physical Therapy - Cervical       Language Assistance Services     ATTENTION: Language assistance services are available, free of charge. Please call [phone].      ATENCIÓN: Si vicla mira, tiene a chavarria disposición servicios gratuitos de asistencia lingüística. Llame al [phone].     CHÚ Ý: N?u b?n nói Ti?ng Vi?t, có các d?ch v? h? tr? ngôn ng? mi?n phí dành cho b?n. G?i s? [phone].         [facility] complies with applicable Federal civil rights laws and does not discriminate on the basis of race, color, national origin, age, disability, or sex.        "

## 2017-05-09 NOTE — PROGRESS NOTES
"Subjective:     Patient ID:  Alberto Frye is a 42 y.o. female.      Chief Complaint: Neck and left arm pain    HPI    Alberto Frye is a 42 y.o. female who presents for follow up.  I saw her previously for her lumbar spine and she is here today for a new complaint her neck and left arm.    Neck and left arm pain started one month ago that has gotten worse in the last week.  Pain is in the left side of the neck with radiation down the left arm to the whole hand.  Pain is constant and worse with neck flexion or with bending over to pick something up and better with keeping her neck straight.  No right arm pain.    Pain today rated 8/10.    Patient has not had PT or ESIs for her neck.  No spine surgery.  Patient is currently taking flexeril, robaxin, diclofenac, lodine, norco, toradol.    Family history of RA.  Tested positive for lupus but no symptoms and never any treatment.  States PCP following this.    Patient denies any recent accidents or trauma, no saddle anesthesias, and no bowel or bladder incontinence.    Patient denies any difficulty with balance or gait, no difficulty tying shoes or buttoning clothes, is not dropping things, does not have difficulty opening containers with the right hand but does with the left hand, and has had no change in handwriting.    Review of Systems:  Constitution: Negative for chills, fever, night sweats and weight loss.   Musculoskeletal: Negative for falls.   Gastrointestinal: Negative for bowel incontinence, nausea and vomiting.   Genitourinary: Negative for bladder incontinence.   Neurological: Negative for disturbances in coordination and loss of balance.     Objective:      Vitals:    05/09/17 0854   BP: (!) 140/96   Pulse: 86   Weight: 84.8 kg (187 lb)   Height: 5' 4" (1.626 m)   PainSc:   8   PainLoc: Neck         Physical Exam:    General:  Alberto Frye is well-developed, well-nourished, appears stated age, in no acute distress, alert and oriented " to person, place, and time.    Pulmonary/Chest:  Respiratory effort normal  Abdominal: Exhibits no distension  Psychiatric:  Normal mood and affect.  Behavior is normal.  Judgement and thought content normal    Musculoskeletal:    Patient arises from a sitting to standing position without difficulty.  Patient walks to the door without evidence of limp, pain, or abnormality of gait. Patient is able to walk heel to toe at a slow pace.    Cervical ROM:   No pain in cervical spine extension, left lateral bending, right rotation.  Pain in flexion, left rotation and right lateral bending.    Cervical Spine Inspection:  Normal with no surgical scars with no visible rashes.    Cervical Spine Palpation:  No tenderness to cervical spine palpation.    Neurological:    Muscle strength against resistance:     Right Left   Deltoid  5 / 5 5 / 5   Biceps  5 / 5 5 / 5        Triceps 5 / 5 4 / 5   Wrist flexion  5 / 5 5 / 5   Wrist extension 5 / 5 4 / 5   Finger abduction 4 / 5 4 / 5   Thumb opposition 5 / 5 5 / 5   Handgrip 5 / 5 4 / 5   Hip flexion  5 / 5 5 / 5   Hip extension 5 / 5 5 / 5   Hip abduction 5 / 5 5 / 5   Hip adduction 5/ 5 5 / 5   Knee extension  5 / 5 5 / 5   Knee flexion  5 / 5 5 / 5   Dorsiflexion  5 / 5 5 / 5   EHL  5 / 5 5 / 5   Plantar flexion  5 / 5 5 / 5   Inversion of the feet 5 / 5 5 / 5   Eversion of the feet 5 / 5 5 / 5     Reflexes:     Right Left   Triceps 2+ 1+   Biceps 2+ 1+   Brachioradialis 2+ 2+   Patellar 2+ 2+   Achilles 2+ 2+     Babinski: Negative bilaterally  Clonus:  Negative bilaterally  Mills: Negative bilaterally    On gross examination of the bilateral lower extremities, patient has full painfree ROM with no signs of clubbing, cyanosis, edema, and weakness.    XRAY Interpretation:     Cervical spine ap/lateral/flexion/extension xrays were personally reviewed today.  No fractures.  No movement on flexion and extension.    Assessment:          1. Cervical radiculitis    2. Cervical  spondylosis without myelopathy    3. Degeneration of cervical intervertebral disc    4. Neck pain             Plan:          Orders Placed This Encounter    Ambulatory referral to Physical Therapy - Cervical    gabapentin (NEURONTIN) 300 MG capsule    methylPREDNISolone (MEDROL, KIMBERLEE,) 4 mg tablet    cyclobenzaprine (FLEXERIL) 10 MG tablet       Patient with acute cervical radiculopathy of the left arm.  Not myelopathic    -PT outside of [facility] with cervical traction  -Medrol pack now with food  -Neurontin now  -Refilled Flexeril  -DC Robaxin  -Don't take diclofenac, toradol, and lodine the same time as medrol pack and only take one of those per day  -FU in two months and if no improvement would get MRI cervical spine to assess for spinal injections    Follow-Up:  Return in about 2 months (around 7/9/2017). If there are any questions prior to this, the patient was instructed to contact the office.       LAURA Cancino, PA-C  Neurosurgery  [facility]  [facility]

## 2017-05-09 NOTE — LETTER
May 9, 2017      Candace Martin MD  [address]  [place] [address]           [facility]  [address], [address]  [facility] [address]  Phone: [phone]  Fax: [phone]          Patient: Alberto Frye   MR Number: [id number]   YOB: 1975   Date of Visit: 5/9/2017       Dear Dr. Candace Martin:    Thank you for referring Alberto Frye to me for evaluation. Attached you will find relevant portions of my assessment and plan of care.    If you have questions, please do not hesitate to call me. I look forward to following Alberto Frye along with you.    Sincerely,    Ester Sparks PA-C    Enclosure  CC:  No Recipients    If you would like to receive this communication electronically, please contact [email or web address] or [phone] to request more information on [employer] Link access.    For providers and/or their staff who would like to refer a patient to [facility], please contact us through our one-stop-shop provider referral line, [facility], at [phone].    If you feel you have received this communication in error or would no longer like to receive these types of communications, please e-mail [email or web address]

## 2017-05-16 DIAGNOSIS — F41.9 ANXIETY: ICD-10-CM

## 2017-05-16 RX ORDER — CLONAZEPAM 0.5 MG/1
TABLET ORAL
Qty: 60 TABLET | Refills: 0 | OUTPATIENT
Start: 2017-05-16

## 2017-06-07 ENCOUNTER — PATIENT MESSAGE (OUTPATIENT)
Dept: FAMILY MEDICINE | Facility: CLINIC | Age: 42
End: 2017-06-07

## 2017-06-07 DIAGNOSIS — F41.9 ANXIETY: ICD-10-CM

## 2017-06-07 RX ORDER — HYDROXYZINE HYDROCHLORIDE 25 MG/1
25 TABLET, FILM COATED ORAL 3 TIMES DAILY PRN
Qty: 30 TABLET | Refills: 3 | Status: SHIPPED | OUTPATIENT
Start: 2017-06-07 | End: 2017-09-08 | Stop reason: SDUPTHER

## 2017-06-07 RX ORDER — CLONAZEPAM 0.5 MG/1
0.5 TABLET ORAL 2 TIMES DAILY PRN
Qty: 60 TABLET | Refills: 0 | Status: SHIPPED | OUTPATIENT
Start: 2017-06-07 | End: 2017-07-07 | Stop reason: SDUPTHER

## 2017-07-07 ENCOUNTER — PATIENT MESSAGE (OUTPATIENT)
Dept: FAMILY MEDICINE | Facility: CLINIC | Age: 42
End: 2017-07-07

## 2017-07-07 DIAGNOSIS — M54.14 THORACIC AND LUMBOSACRAL NEURITIS: ICD-10-CM

## 2017-07-07 DIAGNOSIS — M47.819 SPONDYLOSIS WITHOUT MYELOPATHY: ICD-10-CM

## 2017-07-07 DIAGNOSIS — F41.9 ANXIETY: ICD-10-CM

## 2017-07-07 DIAGNOSIS — S16.1XXA NECK STRAIN, INITIAL ENCOUNTER: ICD-10-CM

## 2017-07-07 DIAGNOSIS — M54.17 THORACIC AND LUMBOSACRAL NEURITIS: ICD-10-CM

## 2017-07-07 RX ORDER — CLONAZEPAM 0.5 MG/1
0.5 TABLET ORAL 2 TIMES DAILY PRN
Qty: 60 TABLET | Refills: 0 | Status: SHIPPED | OUTPATIENT
Start: 2017-07-07 | End: 2017-08-08 | Stop reason: SDUPTHER

## 2017-07-07 RX ORDER — HYDROCODONE BITARTRATE AND ACETAMINOPHEN 10; 325 MG/1; MG/1
1 TABLET ORAL EVERY 12 HOURS PRN
Qty: 40 TABLET | Refills: 0 | Status: SHIPPED | OUTPATIENT
Start: 2017-07-07 | End: 2017-09-18 | Stop reason: SDUPTHER

## 2017-08-08 DIAGNOSIS — F41.9 ANXIETY: ICD-10-CM

## 2017-08-08 RX ORDER — CLONAZEPAM 0.5 MG/1
0.5 TABLET ORAL 2 TIMES DAILY PRN
Qty: 60 TABLET | Refills: 0 | Status: SHIPPED | OUTPATIENT
Start: 2017-08-08 | End: 2017-09-08 | Stop reason: SDUPTHER

## 2017-08-09 ENCOUNTER — PATIENT MESSAGE (OUTPATIENT)
Dept: FAMILY MEDICINE | Facility: CLINIC | Age: 42
End: 2017-08-09

## 2017-09-08 DIAGNOSIS — F41.9 ANXIETY: ICD-10-CM

## 2017-09-08 RX ORDER — HYDROXYZINE HYDROCHLORIDE 25 MG/1
25 TABLET, FILM COATED ORAL 3 TIMES DAILY PRN
Qty: 30 TABLET | Refills: 3 | Status: SHIPPED | OUTPATIENT
Start: 2017-09-08 | End: 2017-10-09 | Stop reason: SDUPTHER

## 2017-09-08 RX ORDER — CLONAZEPAM 0.5 MG/1
0.5 TABLET ORAL 2 TIMES DAILY PRN
Qty: 60 TABLET | Refills: 0 | Status: SHIPPED | OUTPATIENT
Start: 2017-09-08 | End: 2017-10-09 | Stop reason: SDUPTHER

## 2017-09-18 ENCOUNTER — PATIENT MESSAGE (OUTPATIENT)
Dept: FAMILY MEDICINE | Facility: CLINIC | Age: 42
End: 2017-09-18

## 2017-09-18 DIAGNOSIS — M54.14 THORACIC AND LUMBOSACRAL NEURITIS: ICD-10-CM

## 2017-09-18 DIAGNOSIS — M47.819 SPONDYLOSIS WITHOUT MYELOPATHY: ICD-10-CM

## 2017-09-18 DIAGNOSIS — S16.1XXA NECK STRAIN, INITIAL ENCOUNTER: ICD-10-CM

## 2017-09-18 DIAGNOSIS — M54.17 THORACIC AND LUMBOSACRAL NEURITIS: ICD-10-CM

## 2017-09-18 RX ORDER — HYDROCODONE BITARTRATE AND ACETAMINOPHEN 10; 325 MG/1; MG/1
1 TABLET ORAL EVERY 12 HOURS PRN
Qty: 40 TABLET | Refills: 0 | Status: SHIPPED | OUTPATIENT
Start: 2017-09-18 | End: 2017-11-28 | Stop reason: SDUPTHER

## 2017-09-18 RX ORDER — HYDROCODONE BITARTRATE AND ACETAMINOPHEN 10; 325 MG/1; MG/1
1 TABLET ORAL EVERY 12 HOURS PRN
Qty: 40 TABLET | Refills: 0 | OUTPATIENT
Start: 2017-09-18

## 2017-10-09 DIAGNOSIS — F41.9 ANXIETY: ICD-10-CM

## 2017-10-09 RX ORDER — CLONAZEPAM 0.5 MG/1
0.5 TABLET ORAL 2 TIMES DAILY PRN
Qty: 60 TABLET | Refills: 0 | Status: SHIPPED | OUTPATIENT
Start: 2017-10-09 | End: 2017-11-08 | Stop reason: SDUPTHER

## 2017-10-09 RX ORDER — HYDROXYZINE HYDROCHLORIDE 25 MG/1
25 TABLET, FILM COATED ORAL 3 TIMES DAILY PRN
Qty: 30 TABLET | Refills: 3 | Status: SHIPPED | OUTPATIENT
Start: 2017-10-09 | End: 2018-03-05 | Stop reason: SDUPTHER

## 2017-10-10 ENCOUNTER — PATIENT MESSAGE (OUTPATIENT)
Dept: FAMILY MEDICINE | Facility: CLINIC | Age: 42
End: 2017-10-10

## 2017-10-10 DIAGNOSIS — Z00.00 ANNUAL PHYSICAL EXAM: Primary | ICD-10-CM

## 2017-10-10 DIAGNOSIS — I10 ESSENTIAL HYPERTENSION: ICD-10-CM

## 2017-10-13 ENCOUNTER — LAB VISIT (OUTPATIENT)
Dept: LAB | Facility: HOSPITAL | Age: 42
End: 2017-10-13
Attending: FAMILY MEDICINE
Payer: COMMERCIAL

## 2017-10-13 DIAGNOSIS — I10 ESSENTIAL HYPERTENSION: ICD-10-CM

## 2017-10-13 DIAGNOSIS — Z00.00 ANNUAL PHYSICAL EXAM: ICD-10-CM

## 2017-10-13 LAB
ALBUMIN SERPL BCP-MCNC: 3.7 G/DL
ALP SERPL-CCNC: 72 U/L
ALT SERPL W/O P-5'-P-CCNC: 18 U/L
ANION GAP SERPL CALC-SCNC: 9 MMOL/L
AST SERPL-CCNC: 27 U/L
BASOPHILS # BLD AUTO: 0.01 K/UL
BASOPHILS NFR BLD: 0.3 %
BILIRUB SERPL-MCNC: 0.8 MG/DL
BUN SERPL-MCNC: 6 MG/DL
CALCIUM SERPL-MCNC: 9.2 MG/DL
CHLORIDE SERPL-SCNC: 108 MMOL/L
CHOLEST SERPL-MCNC: 176 MG/DL
CHOLEST/HDLC SERPL: 3 {RATIO}
CO2 SERPL-SCNC: 29 MMOL/L
CREAT SERPL-MCNC: 0.8 MG/DL
CRP SERPL-MCNC: 5.7 MG/L
DIFFERENTIAL METHOD: ABNORMAL
EOSINOPHIL # BLD AUTO: 0.2 K/UL
EOSINOPHIL NFR BLD: 3.9 %
ERYTHROCYTE [DISTWIDTH] IN BLOOD BY AUTOMATED COUNT: 13.2 %
ERYTHROCYTE [SEDIMENTATION RATE] IN BLOOD BY WESTERGREN METHOD: 18 MM/HR
EST. GFR  (AFRICAN AMERICAN): >60 ML/MIN/1.73 M^2
EST. GFR  (NON AFRICAN AMERICAN): >60 ML/MIN/1.73 M^2
ESTIMATED AVG GLUCOSE: 91 MG/DL
GLUCOSE SERPL-MCNC: 94 MG/DL
HBA1C MFR BLD HPLC: 4.8 %
HCT VFR BLD AUTO: 40.7 %
HDLC SERPL-MCNC: 59 MG/DL
HDLC SERPL: 33.5 %
HGB BLD-MCNC: 13.6 G/DL
HIV 1+2 AB+HIV1 P24 AG SERPL QL IA: NEGATIVE
LDLC SERPL CALC-MCNC: 99.8 MG/DL
LYMPHOCYTES # BLD AUTO: 1.8 K/UL
LYMPHOCYTES NFR BLD: 48.2 %
MCH RBC QN AUTO: 30.8 PG
MCHC RBC AUTO-ENTMCNC: 33.4 G/DL
MCV RBC AUTO: 92 FL
MONOCYTES # BLD AUTO: 0.3 K/UL
MONOCYTES NFR BLD: 8.4 %
NEUTROPHILS # BLD AUTO: 1.5 K/UL
NEUTROPHILS NFR BLD: 39.2 %
NONHDLC SERPL-MCNC: 117 MG/DL
PLATELET # BLD AUTO: 232 K/UL
PMV BLD AUTO: 11.1 FL
POTASSIUM SERPL-SCNC: 3.2 MMOL/L
PROT SERPL-MCNC: 7.4 G/DL
RBC # BLD AUTO: 4.41 M/UL
SODIUM SERPL-SCNC: 146 MMOL/L
TRIGL SERPL-MCNC: 86 MG/DL
WBC # BLD AUTO: 3.82 K/UL

## 2017-10-13 PROCEDURE — 85651 RBC SED RATE NONAUTOMATED: CPT

## 2017-10-13 PROCEDURE — 36415 COLL VENOUS BLD VENIPUNCTURE: CPT | Mod: PO

## 2017-10-13 PROCEDURE — 80061 LIPID PANEL: CPT

## 2017-10-13 PROCEDURE — 85025 COMPLETE CBC W/AUTO DIFF WBC: CPT

## 2017-10-13 PROCEDURE — 86140 C-REACTIVE PROTEIN: CPT

## 2017-10-13 PROCEDURE — 86225 DNA ANTIBODY NATIVE: CPT

## 2017-10-13 PROCEDURE — 86703 HIV-1/HIV-2 1 RESULT ANTBDY: CPT

## 2017-10-13 PROCEDURE — 86235 NUCLEAR ANTIGEN ANTIBODY: CPT | Mod: 59

## 2017-10-13 PROCEDURE — 86592 SYPHILIS TEST NON-TREP QUAL: CPT

## 2017-10-13 PROCEDURE — 86039 ANTINUCLEAR ANTIBODIES (ANA): CPT

## 2017-10-13 PROCEDURE — 83036 HEMOGLOBIN GLYCOSYLATED A1C: CPT

## 2017-10-13 PROCEDURE — 86038 ANTINUCLEAR ANTIBODIES: CPT

## 2017-10-13 PROCEDURE — 80053 COMPREHEN METABOLIC PANEL: CPT

## 2017-10-14 LAB — RPR SER QL: NORMAL

## 2017-10-16 LAB
ANA SER QL IF: POSITIVE
ANA TITR SER IF: NORMAL {TITER}

## 2017-10-17 LAB
ANTI SM ANTIBODY: 0.96 EU
ANTI SM/RNP ANTIBODY: 3.96 EU
ANTI-SM INTERPRETATION: NEGATIVE
ANTI-SM/RNP INTERPRETATION: NEGATIVE
ANTI-SSA ANTIBODY: 55.4 EU
ANTI-SSA INTERPRETATION: POSITIVE
ANTI-SSB ANTIBODY: 3.31 EU
ANTI-SSB INTERPRETATION: NEGATIVE
DSDNA AB SER-ACNC: ABNORMAL [IU]/ML

## 2017-10-30 ENCOUNTER — OFFICE VISIT (OUTPATIENT)
Dept: FAMILY MEDICINE | Facility: CLINIC | Age: 42
End: 2017-10-30
Payer: COMMERCIAL

## 2017-10-30 VITALS
HEART RATE: 92 BPM | DIASTOLIC BLOOD PRESSURE: 90 MMHG | TEMPERATURE: 99 F | HEIGHT: 64 IN | BODY MASS INDEX: 31.69 KG/M2 | SYSTOLIC BLOOD PRESSURE: 110 MMHG | WEIGHT: 185.63 LBS | OXYGEN SATURATION: 98 %

## 2017-10-30 DIAGNOSIS — Z00.00 ANNUAL PHYSICAL EXAM: Primary | ICD-10-CM

## 2017-10-30 DIAGNOSIS — R76.8 POSITIVE ANA (ANTINUCLEAR ANTIBODY): ICD-10-CM

## 2017-10-30 DIAGNOSIS — R52 BODY ACHES: ICD-10-CM

## 2017-10-30 PROCEDURE — 99999 PR PBB SHADOW E&M-EST. PATIENT-LVL IV: CPT | Mod: PBBFAC,,, | Performed by: FAMILY MEDICINE

## 2017-10-30 PROCEDURE — 99396 PREV VISIT EST AGE 40-64: CPT | Mod: S$GLB,,, | Performed by: FAMILY MEDICINE

## 2017-10-30 RX ORDER — METRONIDAZOLE 500 MG/1
500 TABLET ORAL
COMMUNITY
End: 2018-03-05 | Stop reason: ALTCHOICE

## 2017-10-30 NOTE — PROGRESS NOTES
Office Visit    Patient Name: Alberto Frye    : 1975  MRN: [id number]    Subjective:  Alberto is a 42 y.o. female who presents today for:    Annual Exam      This patient has multiple medical diagnoses as noted below.  This patient is known to me and to this clinic. Patient denies any new symptoms including chest pain, SOB, blurry vision, N/V, diarrhea.   She has noted changes in mood.  She would like to get back to the gym and increase her exercise.  She would exercise 3 times a week previously.  She has increased back pain and her GIOVANNY has been positive in the past.  She has a sister with RA.  She has otherwise been symptom free.    She has noted low potassium, but will take potassium in an over the counter vitamin form.     Patient Active Problem List   Diagnosis    Essential hypertension    Seasonal allergies    Anxiety       Past Surgical History:   Procedure Laterality Date     lapban surgery  2009    HYSTERECTOMY         Family History   Problem Relation Age of Onset    Diabetes Mother     Cancer Mother     Hypertension Mother        Social History     Social History    Marital status: Single     Spouse name: N/A    Number of children: N/A    Years of education: N/A     Occupational History    Not on file.     Social History Main Topics    Smoking status: Former Smoker    Smokeless tobacco: Not on file      Comment: quit in 2016    Alcohol use 0.0 oz/week    Drug use: No    Sexual activity: Yes     Partners: Male     Other Topics Concern    Not on file     Social History Narrative    No narrative on file       Current Medications  Medications reviewed and updated.     Allergies   Review of patient's allergies indicates:   Allergen Reactions    Pcn [penicillins] Other (See Comments)     Was told from childhood she couldn't take it    Latex, natural rubber Rash         Labs  Lab Results   Component Value Date    HGBA1C 4.8 10/13/2017     Lab Results   Component Value  "Date     (H) 10/13/2017    K 3.2 (L) 10/13/2017     10/13/2017    CO2 29 10/13/2017    BUN 6 10/13/2017    CREATININE 0.8 10/13/2017    CALCIUM 9.2 10/13/2017    ANIONGAP 9 10/13/2017    ESTGFRAFRICA >60.0 10/13/2017    EGFRNONAA >60.0 10/13/2017     Lab Results   Component Value Date    CHOL 176 10/13/2017    CHOL 171 05/19/2016     Lab Results   Component Value Date    HDL 59 10/13/2017    HDL 76 (H) 05/19/2016     Lab Results   Component Value Date    LDLCALC 99.8 10/13/2017    LDLCALC 81.0 05/19/2016     Lab Results   Component Value Date    TRIG 86 10/13/2017    TRIG 70 05/19/2016     Lab Results   Component Value Date    CHOLHDL 33.5 10/13/2017    CHOLHDL 44.4 05/19/2016     Last set of blood work has been reviewed as noted above.    Review of Systems   Constitutional: Negative for activity change and unexpected weight change.   HENT: Negative for hearing loss, rhinorrhea and trouble swallowing.    Eyes: Negative for discharge and visual disturbance.   Respiratory: Negative for chest tightness and wheezing.    Cardiovascular: Positive for palpitations. Negative for chest pain.   Gastrointestinal: Positive for constipation. Negative for blood in stool, diarrhea and vomiting.   Endocrine: Negative for polydipsia and polyuria.   Genitourinary: Negative for difficulty urinating, dysuria, hematuria and menstrual problem.   Musculoskeletal: Positive for arthralgias. Negative for joint swelling and neck pain.   Neurological: Positive for headaches. Negative for weakness.   Psychiatric/Behavioral: Positive for dysphoric mood. Negative for confusion.       BP (!) 110/90 (BP Location: Left arm, Patient Position: Sitting, BP Method: Large (Manual))   Pulse 92   Temp 99 °F (37.2 °C) (Oral)   Ht 5' 4" (1.626 m)   Wt 84.2 kg (185 lb 10 oz)   SpO2 98%   BMI 31.86 kg/m²      Physical Exam   Constitutional: She is oriented to person, place, and time. She appears well-developed and well-nourished.   HENT: "   Head: Normocephalic and atraumatic.   Right Ear: External ear normal.   Left Ear: External ear normal.   Nose: Nose normal.   Mouth/Throat: Oropharynx is clear and moist.   Eyes: Conjunctivae and EOM are normal. Pupils are equal, round, and reactive to light.   Neck: Normal range of motion. No JVD present. No thyromegaly present.   Cardiovascular: Normal rate, regular rhythm and normal heart sounds.    Pulmonary/Chest: Effort normal and breath sounds normal. She has no wheezes.   Abdominal: Soft. Bowel sounds are normal. She exhibits no distension. There is no tenderness.   Musculoskeletal: Normal range of motion.   Lymphadenopathy:     She has no cervical adenopathy.   Neurological: She is alert and oriented to person, place, and time. She has normal reflexes.   Skin: Skin is warm and dry.   Psychiatric: She has a normal mood and affect. Her behavior is normal. Judgment and thought content normal.   Vitals reviewed.      Health Maintenance  Health Maintenance       Date Due Completion Date    Influenza Vaccine 08/01/2017 12/13/2016 (Done)    Override on 12/13/2016: Done (done at work)    Mammogram 12/28/2017 12/28/2015 (Done)    Override on 12/28/2015: Done ([facility] Claud[facility]- normal)    TETANUS VACCINE 09/30/2025 9/30/2015 (Done)    Override on 9/30/2015: Done ([facility])          Assessment/Plan:  Alberto Frye is a 42 y.o. female who presents today for :    1. Annual physical exam    2. Positive GIOVANNY (antinuclear antibody)    3. Body aches        Problem List Items Addressed This Visit     None      Visit Diagnoses     Annual physical exam    -  Primary  -   I addressed all major concerns as it related to health maintenance.  All were ordered and scheduled based on the patients wishes.  Any additional health maintenance will be readdressed at the next physical if declined or deferred by the patient.      Positive GIOVANNY (antinuclear antibody)        Relevant Orders     Ambulatory referral to Rheumatology    Body aches      -  Will need to see rheumatology   -            Return in about 1 year (around 10/30/2018).     This note was created by combination of typed  and Dragon dictation.  Transcription errors may be present.  If there are any questions, please contact me.

## 2017-10-30 NOTE — LETTER
October 30, 2017                   [facility]  Family Medicine  [address]  [place] [address]  Phone: [phone]  Fax: [phone]   October 30, 2017     Patient: Alberto Frye   YOB: 1975   Date of Visit: 10/30/2017       To Whom it May Concern:    Alberto Frye was seen in my clinic on 10/30/2017. She may return to work on 10/31/17.    If you have any questions or concerns, please don't hesitate to call.    Sincerely,         Candace Martin MD

## 2017-10-31 ENCOUNTER — TELEPHONE (OUTPATIENT)
Dept: FAMILY MEDICINE | Facility: CLINIC | Age: 42
End: 2017-10-31

## 2017-11-08 DIAGNOSIS — F41.9 ANXIETY: ICD-10-CM

## 2017-11-09 ENCOUNTER — PATIENT MESSAGE (OUTPATIENT)
Dept: FAMILY MEDICINE | Facility: CLINIC | Age: 42
End: 2017-11-09

## 2017-11-09 RX ORDER — CLONAZEPAM 0.5 MG/1
0.5 TABLET ORAL 2 TIMES DAILY PRN
Qty: 60 TABLET | Refills: 2 | Status: SHIPPED | OUTPATIENT
Start: 2017-11-09 | End: 2018-03-05 | Stop reason: SDUPTHER

## 2017-11-28 DIAGNOSIS — M54.17 THORACIC AND LUMBOSACRAL NEURITIS: ICD-10-CM

## 2017-11-28 DIAGNOSIS — S16.1XXA NECK STRAIN, INITIAL ENCOUNTER: ICD-10-CM

## 2017-11-28 DIAGNOSIS — M54.14 THORACIC AND LUMBOSACRAL NEURITIS: ICD-10-CM

## 2017-11-28 DIAGNOSIS — M47.819 SPONDYLOSIS WITHOUT MYELOPATHY: ICD-10-CM

## 2017-11-29 RX ORDER — HYDROCODONE BITARTRATE AND ACETAMINOPHEN 10; 325 MG/1; MG/1
1 TABLET ORAL EVERY 12 HOURS PRN
Qty: 40 TABLET | Refills: 0 | Status: SHIPPED | OUTPATIENT
Start: 2017-11-29 | End: 2018-03-26 | Stop reason: SDUPTHER

## 2017-12-11 ENCOUNTER — PATIENT MESSAGE (OUTPATIENT)
Dept: FAMILY MEDICINE | Facility: CLINIC | Age: 42
End: 2017-12-11

## 2017-12-11 ENCOUNTER — OFFICE VISIT (OUTPATIENT)
Dept: FAMILY MEDICINE | Facility: CLINIC | Age: 42
End: 2017-12-11
Payer: COMMERCIAL

## 2017-12-11 VITALS
TEMPERATURE: 98 F | WEIGHT: 186.5 LBS | OXYGEN SATURATION: 97 % | BODY MASS INDEX: 31.84 KG/M2 | HEART RATE: 92 BPM | HEIGHT: 64 IN | DIASTOLIC BLOOD PRESSURE: 96 MMHG | SYSTOLIC BLOOD PRESSURE: 148 MMHG

## 2017-12-11 DIAGNOSIS — J01.90 ACUTE SINUSITIS WITH SYMPTOMS GREATER THAN 10 DAYS: Primary | ICD-10-CM

## 2017-12-11 DIAGNOSIS — I10 ESSENTIAL HYPERTENSION: ICD-10-CM

## 2017-12-11 DIAGNOSIS — H66.001 ACUTE SUPPURATIVE OTITIS MEDIA OF RIGHT EAR WITHOUT SPONTANEOUS RUPTURE OF TYMPANIC MEMBRANE, RECURRENCE NOT SPECIFIED: ICD-10-CM

## 2017-12-11 PROCEDURE — 99214 OFFICE O/P EST MOD 30 MIN: CPT | Mod: S$GLB,,, | Performed by: NURSE PRACTITIONER

## 2017-12-11 PROCEDURE — 99999 PR PBB SHADOW E&M-EST. PATIENT-LVL IV: CPT | Mod: PBBFAC,,, | Performed by: NURSE PRACTITIONER

## 2017-12-11 RX ORDER — LEVOCETIRIZINE DIHYDROCHLORIDE 5 MG/1
5 TABLET, FILM COATED ORAL NIGHTLY
Qty: 30 TABLET | Refills: 0 | Status: SHIPPED | OUTPATIENT
Start: 2017-12-11 | End: 2017-12-29 | Stop reason: SDUPTHER

## 2017-12-11 RX ORDER — DOXYCYCLINE 100 MG/1
100 CAPSULE ORAL 2 TIMES DAILY
Qty: 20 CAPSULE | Refills: 0 | Status: SHIPPED | OUTPATIENT
Start: 2017-12-11 | End: 2017-12-21

## 2017-12-11 RX ORDER — FLUTICASONE PROPIONATE 50 MCG
1 SPRAY, SUSPENSION (ML) NASAL DAILY PRN
COMMUNITY
End: 2022-04-05 | Stop reason: ALTCHOICE

## 2017-12-11 RX ORDER — PROMETHAZINE HYDROCHLORIDE AND DEXTROMETHORPHAN HYDROBROMIDE 6.25; 15 MG/5ML; MG/5ML
5 SYRUP ORAL NIGHTLY
Qty: 180 ML | Refills: 0 | Status: SHIPPED | OUTPATIENT
Start: 2017-12-11 | End: 2017-12-21

## 2017-12-11 NOTE — PROGRESS NOTES
"History of Present Illness   Alberto Frye is a 42 y.o. woman with medical history as listed below who presents today for evaluation of sinusitis and ear pain for >1 week. She complains of nasal congestion, post nasal drip, and sore throat. She has sinus pressure, facial pain, and headaches. She has right ear pain that is constant and throbbing. She also has a dry hacking cough.She denies fever or chills. She has tried Claritin-D, Tylenol, Thera-flu, Mucinex-D, and hydrocodone with no relief. She has no additional complaints and is otherwise healthy on today's visit.    Past Medical History:   Diagnosis Date    Anxiety     Hypertension        Past Surgical History:   Procedure Laterality Date     lapban surgery  2009    HYSTERECTOMY         Social History     Social History    Marital status: Single     Spouse name: N/A    Number of children: N/A    Years of education: N/A     Social History Main Topics    Smoking status: Former Smoker    Smokeless tobacco: None      Comment: quit in october 2016    Alcohol use 0.0 oz/week    Drug use: No    Sexual activity: Yes     Partners: Male     Other Topics Concern    None     Social History Narrative    None       Family History   Problem Relation Age of Onset    Diabetes Mother     Cancer Mother     Hypertension Mother        Review of Systems  Review of Systems   Constitutional: Negative for fever.   HENT: Positive for congestion, ear pain, hearing loss, sinus pain and sore throat. Negative for ear discharge.    Respiratory: Positive for cough. Negative for sputum production.    Gastrointestinal: Negative for abdominal pain, diarrhea and vomiting.   Musculoskeletal: Positive for neck pain.   Neurological: Positive for headaches.     A complete review of systems was otherwise negative.    Physical Exam  BP (!) 148/96   Pulse 92   Temp 98.3 °F (36.8 °C)   Ht 5' 4" (1.626 m)   Wt 84.6 kg (186 lb 8.2 oz)   SpO2 97%   BMI 32.01 kg/m²   General " appearance: alert, appears stated age, cooperative and no distress  Eyes: negative findings: lids and lashes normal and conjunctivae and sclerae normal  Ears: normal TM and external ear canal left ear, abnormal external canal right ear - erythematous and abnormal TM right ear - erythematous, bulging and purulent middle ear fluid  Nose: green discharge, moderate congestion, turbinates red, swollen, sinus tenderness bilateral  Throat: lips, mucosa, and tongue normal; teeth and gums normal and moderate oropharyngeal erythema and edema with clear post nasal drainage  Lungs: clear to auscultation bilaterally  Heart: regular rate and rhythm, S1, S2 normal, no murmur, click, rub or gallop  Lymph nodes: Cervical adenopathy: anterior  Neurologic: Grossly normal    Assessment/Plan  Acute sinusitis with symptoms greater than 10 days  PCN allergy, treat with Doxycycline.  Nighttime cough syrup as needed.  Resume Flonase, add Xyzal.  Tylenol/Ibuprofen for fever and pain.  Warm compresses to ear.  Rest and increase fluid intake.  RTC PRN for persistent or worsening symptoms.  -     levocetirizine (XYZAL) 5 MG tablet; Take 1 tablet (5 mg total) by mouth every evening.  Dispense: 30 tablet; Refill: 0  -     doxycycline (VIBRAMYCIN) 100 MG Cap; Take 1 capsule (100 mg total) by mouth 2 (two) times daily.  Dispense: 20 capsule; Refill: 0  -     promethazine-dextromethorphan (PROMETHAZINE-DM) 6.25-15 mg/5 mL Syrp; Take 5 mLs by mouth every evening.  Dispense: 180 mL; Refill: 0    Acute suppurative otitis media of right ear without spontaneous rupture of tympanic membrane, recurrence not specified  As above.  -     doxycycline (VIBRAMYCIN) 100 MG Cap; Take 1 capsule (100 mg total) by mouth 2 (two) times daily.  Dispense: 20 capsule; Refill: 0    She has verbalized understanding and is in agreement with plan of care.    Return if symptoms worsen or fail to improve.  Answers for HPI/ROS submitted by the patient on 12/11/2017   Ear  pain  Affected ear: right  Chronicity: new  Onset: yesterday  Progression since onset: gradually worsening  Frequency: constantly  Fever: no fever  Pain - numeric: 9/10  rhinorrhea: Yes  drainage: Yes  Treatments tried: NSAIDs, acetaminophen  Improvement on treatment: mild  Pain severity: severe  chronic ear infection: No  hearing loss: No  tympanostomy tube: No

## 2017-12-11 NOTE — PATIENT INSTRUCTIONS

## 2017-12-11 NOTE — LETTER
December 11, 2017      Alberto Frye   [address]  [address]  [place] [address]             [facility]  [address]  [place] [address]  Phone: [phone]  Fax: [phone] Alberto Frye    Was treated here on 12/11/2017    May Return to work/school on 12/13/2017    No Restrictions              Esperanza Quevedo NP

## 2017-12-27 ENCOUNTER — PATIENT MESSAGE (OUTPATIENT)
Dept: FAMILY MEDICINE | Facility: CLINIC | Age: 42
End: 2017-12-27

## 2017-12-27 DIAGNOSIS — H60.311 ACUTE DIFFUSE OTITIS EXTERNA OF RIGHT EAR: Primary | ICD-10-CM

## 2017-12-27 DIAGNOSIS — H92.01 OTALGIA OF RIGHT EAR: Primary | ICD-10-CM

## 2017-12-27 RX ORDER — ETODOLAC 200 MG/1
200 CAPSULE ORAL 3 TIMES DAILY
Qty: 30 CAPSULE | Refills: 0 | Status: SHIPPED | OUTPATIENT
Start: 2017-12-27 | End: 2018-01-06

## 2017-12-27 RX ORDER — CIPROFLOXACIN AND DEXAMETHASONE 3; 1 MG/ML; MG/ML
4 SUSPENSION/ DROPS AURICULAR (OTIC) 2 TIMES DAILY
Qty: 7.5 ML | Refills: 0 | Status: SHIPPED | OUTPATIENT
Start: 2017-12-27 | End: 2018-03-05 | Stop reason: ALTCHOICE

## 2017-12-29 ENCOUNTER — OFFICE VISIT (OUTPATIENT)
Dept: FAMILY MEDICINE | Facility: CLINIC | Age: 42
End: 2017-12-29
Payer: COMMERCIAL

## 2017-12-29 VITALS
HEIGHT: 64 IN | BODY MASS INDEX: 31.76 KG/M2 | HEART RATE: 80 BPM | OXYGEN SATURATION: 99 % | DIASTOLIC BLOOD PRESSURE: 80 MMHG | SYSTOLIC BLOOD PRESSURE: 130 MMHG | TEMPERATURE: 98 F | WEIGHT: 186 LBS

## 2017-12-29 DIAGNOSIS — M62.838 CERVICAL PARASPINAL MUSCLE SPASM: ICD-10-CM

## 2017-12-29 DIAGNOSIS — M54.2 NECK PAIN, ACUTE: Primary | ICD-10-CM

## 2017-12-29 DIAGNOSIS — J30.1 CHRONIC SEASONAL ALLERGIC RHINITIS DUE TO POLLEN: ICD-10-CM

## 2017-12-29 PROCEDURE — 99999 PR PBB SHADOW E&M-EST. PATIENT-LVL IV: CPT | Mod: PBBFAC,,, | Performed by: NURSE PRACTITIONER

## 2017-12-29 PROCEDURE — 99214 OFFICE O/P EST MOD 30 MIN: CPT | Mod: S$GLB,,, | Performed by: NURSE PRACTITIONER

## 2017-12-29 RX ORDER — LEVOCETIRIZINE DIHYDROCHLORIDE 5 MG/1
5 TABLET, FILM COATED ORAL NIGHTLY
Qty: 30 TABLET | Refills: 2 | Status: ON HOLD | OUTPATIENT
Start: 2017-12-29 | End: 2020-08-12

## 2017-12-29 RX ORDER — CYCLOBENZAPRINE HCL 10 MG
10 TABLET ORAL 3 TIMES DAILY PRN
Qty: 30 TABLET | Refills: 0 | Status: SHIPPED | OUTPATIENT
Start: 2017-12-29 | End: 2018-01-08

## 2017-12-29 NOTE — PATIENT INSTRUCTIONS
Muscle Spasm  A muscle spasm is a sudden tightening of the muscle you cant control. This may be caused by strain, overworking the muscle, or injury. It can also be caused by dehydration, electrolyte imbalance, diabetes, alcohol use, and certain medicines. If it goes on long enough the muscle spasm causes pain. Common areas for muscle spasm are the legs, neck, and back.  Home care  · Heat, massage, and stretching will help relax muscle spasm.  · When the spasm is in your arm or leg, stretch the muscle passively. To do this, have someone bend or straighten the joint above or below the muscle until you feel the stretch on the sore muscle. You can stretch the muscle actively by moving the affected body part. This will stretch the muscle that is in spasm. For example, if the spasm is in your calf, bend the ankle so your toes point upward toward your knee. This will stretch your calf muscle.  · You may use over-the-counter pain medicine to control pain, unless another medicine was prescribed. If you have chronic liver or kidney disease or ever had a stomach ulcer or GI bleeding, talk with your healthcare provider before using these medicines.  Follow-up care  Follow up with your healthcare provider, or as advised.    When to seek medical advice  Call your healthcare provider right away if any of the following occur:  · Fingers or toes become swollen, cold, blue, numb, or tingly  · You develop weakness in the affected arm or leg  · Pain increases and is not controlled by the above measures  Date Last Reviewed: 11/21/2015  © 8552-9320 [employer]. [address] [place] [address], [place] [address]. All rights reserved. This information is not intended as a substitute for professional medical care. Always follow your healthcare professional's instructions.

## 2017-12-30 NOTE — PROGRESS NOTES
History of Present Illness   Alberto Frye is a 42 y.o. woman with medical history as listed below who presents today for evaluation of right sided ear/neck pain. She was seen by myself on 12/11/2017 for with sinusitis and ear pain. She was treated for otitis media with Doxycycline. She completed all of the antibiotics and is still taking Xyzal and Flonase. She is still having pain behind her ear. She has right sided neck pain that is worse with moving and with palpation of the area. Her sinusitis symptoms are resolving. She still has minimal congestion and sinus pressure. She has no fever or chills. She has no additional complaints and is otherwise healthy on today's visit.    Past Medical History:   Diagnosis Date    Anxiety     Hypertension        Past Surgical History:   Procedure Laterality Date     lapban surgery  2009    HYSTERECTOMY         Social History     Social History    Marital status: Single     Spouse name: N/A    Number of children: N/A    Years of education: N/A     Social History Main Topics    Smoking status: Former Smoker    Smokeless tobacco: Never Used      Comment: quit in october 2016    Alcohol use 0.0 oz/week    Drug use: No    Sexual activity: Yes     Partners: Male     Other Topics Concern    None     Social History Narrative    None       Family History   Problem Relation Age of Onset    Diabetes Mother     Cancer Mother     Hypertension Mother        Review of Systems  Review of Systems   Constitutional: Negative for chills and fever.   HENT: Positive for congestion and ear pain. Negative for ear discharge, sinus pain and sore throat.    Respiratory: Negative for cough.    Musculoskeletal: Positive for neck pain.   Skin: Negative for rash.   Neurological: Positive for headaches.     A complete review of systems was otherwise negative.    Physical Exam  /80 (BP Location: Right arm, Patient Position: Sitting, BP Method: X-Large (Manual))   Pulse 80   Temp  "98.3 °F (36.8 °C)   Ht 5' 4" (1.626 m)   Wt 84.4 kg (186 lb)   SpO2 99%   BMI 31.93 kg/m²   General appearance: alert, appears stated age, cooperative and no distress  Eyes: negative findings: lids and lashes normal and conjunctivae and sclerae normal  Ears: normal TM's and external ear canals both ears  Nose: clear discharge, mild congestion, turbinates red, swollen, no sinus tenderness  Throat: lips, mucosa, and tongue normal; teeth and gums normal  Neck: no adenopathy, supple, symmetrical, trachea midline and right sided paraspinal cervical TTP with muscle spasm  Lungs: clear to auscultation bilaterally  Heart: regular rate and rhythm, S1, S2 normal, no murmur, click, rub or gallop  Lymph nodes: Cervical, supraclavicular, and axillary nodes normal.  Neurologic: Grossly normal    Assessment/Plan  Neck pain, acute  Pain actually appears to be musculoskeletal.  Add on Flexeril at bedtime with Lodine during the day.  Try heat to the area.  Rest and avoid strenuous activity or heavy lifting.  -     cyclobenzaprine (FLEXERIL) 10 MG tablet; Take 1 tablet (10 mg total) by mouth 3 (three) times daily as needed for Muscle spasms.  Dispense: 30 tablet; Refill: 0    Cervical paraspinal muscle spasm  As above.  -     cyclobenzaprine (FLEXERIL) 10 MG tablet; Take 1 tablet (10 mg total) by mouth 3 (three) times daily as needed for Muscle spasms.  Dispense: 30 tablet; Refill: 0    Chronic seasonal allergic rhinitis due to pollen  The current medical regimen is effective;  continue present plan and medications.  Xyzal working well, refill today.  -     levocetirizine (XYZAL) 5 MG tablet; Take 1 tablet (5 mg total) by mouth every evening.  Dispense: 30 tablet; Refill: 2    She has verbalized understanding and is in agreement with plan of care.    Return if symptoms worsen or fail to improve.  "

## 2018-02-05 RX ORDER — TORSEMIDE 10 MG/1
20 TABLET ORAL DAILY
Qty: 60 TABLET | Refills: 2 | Status: SHIPPED | OUTPATIENT
Start: 2018-02-05 | End: 2019-01-29 | Stop reason: SDUPTHER

## 2018-03-05 ENCOUNTER — INITIAL CONSULT (OUTPATIENT)
Dept: RHEUMATOLOGY | Facility: CLINIC | Age: 43
End: 2018-03-05
Payer: COMMERCIAL

## 2018-03-05 ENCOUNTER — LAB VISIT (OUTPATIENT)
Dept: LAB | Facility: HOSPITAL | Age: 43
End: 2018-03-05
Attending: INTERNAL MEDICINE
Payer: COMMERCIAL

## 2018-03-05 VITALS
HEART RATE: 72 BPM | DIASTOLIC BLOOD PRESSURE: 88 MMHG | SYSTOLIC BLOOD PRESSURE: 123 MMHG | BODY MASS INDEX: 31.76 KG/M2 | WEIGHT: 185 LBS

## 2018-03-05 DIAGNOSIS — F41.9 ANXIETY: ICD-10-CM

## 2018-03-05 DIAGNOSIS — M35.01 SJOGREN'S SYNDROME WITH KERATOCONJUNCTIVITIS SICCA: ICD-10-CM

## 2018-03-05 DIAGNOSIS — M35.01 SJOGREN'S SYNDROME WITH KERATOCONJUNCTIVITIS SICCA: Primary | ICD-10-CM

## 2018-03-05 DIAGNOSIS — M47.817 SPONDYLOSIS OF LUMBOSACRAL REGION WITHOUT MYELOPATHY OR RADICULOPATHY: ICD-10-CM

## 2018-03-05 LAB
BACTERIA #/AREA URNS AUTO: NORMAL /HPF
BILIRUB UR QL STRIP: NEGATIVE
CLARITY UR REFRACT.AUTO: ABNORMAL
COLOR UR AUTO: ABNORMAL
GLUCOSE UR QL STRIP: NEGATIVE
HGB UR QL STRIP: NEGATIVE
HYALINE CASTS UR QL AUTO: 0 /LPF
KETONES UR QL STRIP: NEGATIVE
LEUKOCYTE ESTERASE UR QL STRIP: NEGATIVE
MICROSCOPIC COMMENT: NORMAL
NITRITE UR QL STRIP: NEGATIVE
PH UR STRIP: 5 [PH] (ref 5–8)
PROT UR QL STRIP: ABNORMAL
RBC #/AREA URNS AUTO: 1 /HPF (ref 0–4)
SP GR UR STRIP: 1.03 (ref 1–1.03)
SQUAMOUS #/AREA URNS AUTO: 10 /HPF
URN SPEC COLLECT METH UR: ABNORMAL
UROBILINOGEN UR STRIP-ACNC: 2 EU/DL
WBC #/AREA URNS AUTO: 5 /HPF (ref 0–5)

## 2018-03-05 PROCEDURE — 81001 URINALYSIS AUTO W/SCOPE: CPT

## 2018-03-05 PROCEDURE — 99244 OFF/OP CNSLTJ NEW/EST MOD 40: CPT | Mod: S$GLB,,, | Performed by: INTERNAL MEDICINE

## 2018-03-05 PROCEDURE — 99999 PR PBB SHADOW E&M-EST. PATIENT-LVL III: CPT | Mod: PBBFAC,,, | Performed by: INTERNAL MEDICINE

## 2018-03-05 RX ORDER — CLONAZEPAM 0.5 MG/1
0.5 TABLET ORAL 2 TIMES DAILY PRN
Qty: 60 TABLET | Refills: 2 | Status: SHIPPED | OUTPATIENT
Start: 2018-03-05 | End: 2018-06-12 | Stop reason: SDUPTHER

## 2018-03-05 RX ORDER — HYDROXYZINE HYDROCHLORIDE 25 MG/1
25 TABLET, FILM COATED ORAL 3 TIMES DAILY PRN
Qty: 30 TABLET | Refills: 3 | Status: SHIPPED | OUTPATIENT
Start: 2018-03-05 | End: 2018-09-17

## 2018-03-05 NOTE — PROGRESS NOTES
History of present illness: 43-year-old female has a seven-year history of aches and pains.  It started initially in the lower back.  That has been her worst area.  The pain is been intermittent.  It is worse for the past year.  More recently she's developed pain and swelling in her knees.  She had one episode of locking in the right second finger.  She has no history of antecedent trauma.  The pain is bad in the morning.  She has 30 minutes of morning stiffness.  She has some increased pain with activity.  It occasionally wakes her up at night.  As part of the workup she was found to have a positive GIOVANNY and is referred for that reason.  She had actually known about the positive GIOVANNY for several years but this is the first time she was referred to rheumatology.    She has been using heat with some relief.  She has not tried topical medications.  Aleve does not help much.  She takes hydrocodone on occasion.  She had had therapy in the past but this actually made her symptoms worse.    She has had no unexplained fevers.  She has frontal headaches which she relates to sinus problems.  She has had no rash, conjunctivitis, oral ulcers.  She complains of dry eye, mouth, nose.  She has no dysphagia.  She has been using artificial tears.  She has no Raynaud's phenomena, pleurisy, vaginal discharge or ulcers, chronic or bloody diarrhea.  She had postsurgical DVT and has a stent.  She is a  3 para 3.  She has a sister with rheumatoid arthritis.  Her mother had some type of arthritis.    Systems review:  Gen.: Weight has been stable after a 90 pound weight loss after gastric banding  GI: No abdominal pain or peptic ulcer disease.  No liver problems.  : No kidney or bladder problems    Physical examination:  Skin: No rashes  ENT: Decreased tears and saliva  Neck: No adenopathy  Chest: Clear to auscultation and percussion  Cardiac: No murmurs, gallops, rubs  Abdomen: No organomegaly or masses.  No tenderness to  palpation  Extremities: No sclerodactyly  Musculoskeletal: Cervical spine, shoulders, elbows, wrist are unremarkable.  She has no synovitis or tenderness in the hand.  She has no tendon nodules.  She has negative Tinel sign.  She is tender in the lower lumbar spine.  She has full range of motion the spine.  Hips have full range of motion without pain on range of motion.  She has tenderness in the right knee.  She has pain on range of motion of the left knee.  She has no effusion.  Ankles and feet are unremarkable.  Laboratory: GIOVANNY +1:160, speckled pattern with a positive SSA.  She had normal sedimentation rate and CRP.  Radiology: X-ray of the lumbar spine reveals early degenerative changes    Assessment:  1.  She seems to be developing primary Sjogren syndrome  2.  She has early osteoarthritis.  I find no evidence to suggest inflammatory arthritis.    Plans: I explained to her the nature of the problem.  I recommend she be evaluated by her ophthalmologist.  I told her the treatment is purely symptomatic.  I did order further laboratory studies to rule out other connective tissue disease.  If they are negative, I will see her in follow-up in 6 months.

## 2018-03-05 NOTE — LETTER
March 5, 2018      Candace Martin MD  [address]  [place] [address]           [facility] Rheumatology  [address]  [place] [address]  Phone: [phone]  Fax: [phone]          Patient: Alberto Frye   MR Number: [id number]   YOB: 1975   Date of Visit: 3/5/2018       Dear Dr. Candace Martin:    Thank you for referring Alberto Frye to me for evaluation. Attached you will find relevant portions of my assessment and plan of care.    If you have questions, please do not hesitate to call me. I look forward to following Alberto Frye along with you.    Sincerely,    Aayush Morin MD    Enclosure  CC:  No Recipients    If you would like to receive this communication electronically, please contact [email or web address] or [phone] to request more information on [employer] access.    For providers and/or their staff who would like to refer a patient to [facility], please contact us through our one-stop-shop provider referral line, [facility], at [phone].    If you feel you have received this communication in error or would no longer like to receive these types of communications, please e-mail [email or web address]

## 2018-03-06 ENCOUNTER — PATIENT MESSAGE (OUTPATIENT)
Dept: FAMILY MEDICINE | Facility: CLINIC | Age: 43
End: 2018-03-06

## 2018-03-11 ENCOUNTER — PATIENT MESSAGE (OUTPATIENT)
Dept: FAMILY MEDICINE | Facility: CLINIC | Age: 43
End: 2018-03-11

## 2018-03-13 ENCOUNTER — OFFICE VISIT (OUTPATIENT)
Dept: FAMILY MEDICINE | Facility: CLINIC | Age: 43
End: 2018-03-13
Payer: COMMERCIAL

## 2018-03-13 VITALS
WEIGHT: 187.19 LBS | OXYGEN SATURATION: 99 % | BODY MASS INDEX: 31.96 KG/M2 | DIASTOLIC BLOOD PRESSURE: 84 MMHG | TEMPERATURE: 98 F | SYSTOLIC BLOOD PRESSURE: 120 MMHG | HEART RATE: 88 BPM | HEIGHT: 64 IN

## 2018-03-13 DIAGNOSIS — N30.01 ACUTE CYSTITIS WITH HEMATURIA: Primary | ICD-10-CM

## 2018-03-13 LAB
BACTERIA #/AREA URNS AUTO: ABNORMAL /HPF
BILIRUB SERPL-MCNC: NORMAL MG/DL
BILIRUB UR QL STRIP: NEGATIVE
BLOOD URINE, POC: NORMAL
CLARITY UR REFRACT.AUTO: ABNORMAL
COLOR UR AUTO: YELLOW
COLOR, POC UA: NORMAL
GLUCOSE UR QL STRIP: 50
GLUCOSE UR QL STRIP: NEGATIVE
HGB UR QL STRIP: NEGATIVE
KETONES UR QL STRIP: NEGATIVE
KETONES UR QL STRIP: NORMAL
LEUKOCYTE ESTERASE UR QL STRIP: ABNORMAL
LEUKOCYTE ESTERASE URINE, POC: NORMAL
MICROSCOPIC COMMENT: ABNORMAL
NITRITE UR QL STRIP: POSITIVE
NITRITE, POC UA: NORMAL
PH UR STRIP: 5 [PH] (ref 5–8)
PH, POC UA: 5
PROT UR QL STRIP: NEGATIVE
PROTEIN, POC: NORMAL
RBC #/AREA URNS AUTO: 1 /HPF (ref 0–4)
SP GR UR STRIP: 1 (ref 1–1.03)
SPECIFIC GRAVITY, POC UA: 1005
SQUAMOUS #/AREA URNS AUTO: 1 /HPF
URN SPEC COLLECT METH UR: ABNORMAL
UROBILINOGEN UR STRIP-ACNC: NEGATIVE EU/DL
UROBILINOGEN, POC UA: NORMAL
WBC #/AREA URNS AUTO: 21 /HPF (ref 0–5)

## 2018-03-13 PROCEDURE — 87088 URINE BACTERIA CULTURE: CPT

## 2018-03-13 PROCEDURE — 99214 OFFICE O/P EST MOD 30 MIN: CPT | Mod: S$GLB,,, | Performed by: FAMILY MEDICINE

## 2018-03-13 PROCEDURE — 81001 URINALYSIS AUTO W/SCOPE: CPT

## 2018-03-13 PROCEDURE — 87086 URINE CULTURE/COLONY COUNT: CPT

## 2018-03-13 PROCEDURE — 99999 PR PBB SHADOW E&M-EST. PATIENT-LVL III: CPT | Mod: PBBFAC,,, | Performed by: FAMILY MEDICINE

## 2018-03-13 PROCEDURE — 87077 CULTURE AEROBIC IDENTIFY: CPT

## 2018-03-13 PROCEDURE — 81002 URINALYSIS NONAUTO W/O SCOPE: CPT | Mod: S$GLB,,, | Performed by: FAMILY MEDICINE

## 2018-03-13 PROCEDURE — 87186 SC STD MICRODIL/AGAR DIL: CPT

## 2018-03-13 RX ORDER — PHENAZOPYRIDINE HYDROCHLORIDE 100 MG/1
100 TABLET, FILM COATED ORAL 3 TIMES DAILY PRN
Qty: 9 TABLET | Refills: 0 | Status: SHIPPED | OUTPATIENT
Start: 2018-03-13 | End: 2018-03-23

## 2018-03-13 RX ORDER — CIPROFLOXACIN 500 MG/1
500 TABLET ORAL 2 TIMES DAILY
Qty: 6 TABLET | Refills: 0 | Status: SHIPPED | OUTPATIENT
Start: 2018-03-13 | End: 2018-03-16

## 2018-03-13 NOTE — LETTER
March 13, 2018                   [facility]  Family Medicine  [address]  [place] [address]  Phone: [phone]  Fax: [phone]   March 13, 2018     Patient: Alberto Frye   YOB: 1975   Date of Visit: 3/13/2018       To Whom it May Concern:    Alberto Frye was seen in my clinic on 3/13/2018. She  may return to work on 3/13/18.    If you have any questions or concerns, please don't hesitate to call.    Sincerely,         Candace Martin MD

## 2018-03-13 NOTE — PROGRESS NOTES
Office Visit    Patient Name: Alberto Frye    : 1975  MRN: [id number]    Subjective:  Alberto is a 43 y.o. female who presents today for:    Urinary Tract Infection      This patient has multiple medical diagnoses as noted below.  This patient is known to me and to this clinic. She reports increased urination and burning sensation.  She has used otc intervention with some relief of symptoms.  Patient denies any new symptoms including chest pain, SOB, blurry vision, N/V, diarrhea.        Patient Active Problem List   Diagnosis    Essential hypertension    Seasonal allergies    Anxiety       Past Surgical History:   Procedure Laterality Date     lapban surgery  2009    HYSTERECTOMY         Family History   Problem Relation Age of Onset    Diabetes Mother     Cancer Mother     Hypertension Mother        Social History     Social History    Marital status: Single     Spouse name: N/A    Number of children: N/A    Years of education: N/A     Occupational History    Not on file.     Social History Main Topics    Smoking status: Former Smoker    Smokeless tobacco: Never Used      Comment: quit in 2016    Alcohol use 0.0 oz/week    Drug use: No    Sexual activity: Yes     Partners: Male     Other Topics Concern    Not on file     Social History Narrative    No narrative on file       Current Medications  Medications reviewed and updated.     Allergies   Review of patient's allergies indicates:   Allergen Reactions    Pcn [penicillins] Other (See Comments)     Was told from childhood she couldn't take it    Latex, natural rubber Rash         Labs  Lab Results   Component Value Date    HGBA1C 4.8 10/13/2017     Lab Results   Component Value Date     (H) 10/13/2017    K 3.2 (L) 10/13/2017     10/13/2017    CO2 29 10/13/2017    BUN 6 10/13/2017    CREATININE 0.8 10/13/2017    CALCIUM 9.2 10/13/2017    ANIONGAP 9 10/13/2017    ESTGFRAFRICA >60.0 10/13/2017    EGFRNONAA  ">60.0 10/13/2017     Lab Results   Component Value Date    CHOL 176 10/13/2017    CHOL 171 05/19/2016     Lab Results   Component Value Date    HDL 59 10/13/2017    HDL 76 (H) 05/19/2016     Lab Results   Component Value Date    LDLCALC 99.8 10/13/2017    LDLCALC 81.0 05/19/2016     Lab Results   Component Value Date    TRIG 86 10/13/2017    TRIG 70 05/19/2016     Lab Results   Component Value Date    CHOLHDL 33.5 10/13/2017    CHOLHDL 44.4 05/19/2016     Last set of blood work has been reviewed as noted above.    Review of Systems   Constitutional: Negative for activity change, appetite change, fatigue, fever and unexpected weight change.   HENT: Negative.  Negative for ear discharge, ear pain, rhinorrhea and sore throat.    Eyes: Negative.    Respiratory: Negative for apnea, cough, chest tightness, shortness of breath and wheezing.    Cardiovascular: Negative for chest pain, palpitations and leg swelling.   Gastrointestinal: Negative for abdominal distention, abdominal pain, constipation, diarrhea and vomiting.   Endocrine: Negative for cold intolerance, heat intolerance, polydipsia and polyuria.   Genitourinary: Positive for frequency and urgency. Negative for decreased urine volume, menstrual problem, vaginal bleeding, vaginal discharge and vaginal pain.   Musculoskeletal: Negative.    Skin: Negative for rash.   Neurological: Negative for dizziness and headaches.   Hematological: Does not bruise/bleed easily.   Psychiatric/Behavioral: Negative for agitation, sleep disturbance and suicidal ideas.       /84 (BP Location: Left arm, Patient Position: Sitting, BP Method: Large (Manual))   Pulse 88   Temp 98.4 °F (36.9 °C) (Oral)   Ht 5' 4" (1.626 m)   Wt 84.9 kg (187 lb 2.7 oz)   SpO2 99%   BMI 32.13 kg/m²      Physical Exam   Constitutional: She is oriented to person, place, and time. She appears well-developed and well-nourished.   HENT:   Head: Normocephalic.   Right Ear: External ear normal.   Left " Ear: External ear normal.   Nose: Nose normal.   Mouth/Throat: Oropharynx is clear and moist.   Eyes: Conjunctivae and EOM are normal. Pupils are equal, round, and reactive to light.   Cardiovascular: Normal rate, regular rhythm and normal heart sounds.    Pulmonary/Chest: Effort normal and breath sounds normal.   Neurological: She is alert and oriented to person, place, and time.   Skin: Skin is warm and dry.   Vitals reviewed.      Health Maintenance  Health Maintenance       Date Due Completion Date    Influenza Vaccine 08/01/2017 12/13/2016 (Done)    Override on 12/13/2016: Done (done at work)    Mammogram 12/28/2017 12/28/2015 (Done)    Override on 12/28/2015: Done ([facility] Claudia [facility]- normal)    Lipid Panel 10/13/2022 10/13/2017    TETANUS VACCINE 09/30/2025 9/30/2015 (Done)    Override on 9/30/2015: Done ([facility])          Assessment/Plan:  Alberto Frye is a 43 y.o. female who presents today for :    1. Acute cystitis with hematuria        Problem List Items Addressed This Visit     None      Visit Diagnoses     Acute cystitis with hematuria    -  Primary    Relevant Medications    ciprofloxacin HCl (CIPRO) 500 MG tablet    phenazopyridine (PYRIDIUM) 100 MG tablet    Other Relevant Orders    Urine culture    Urinalysis       I have discussed the common side effects of this medication with the patient and answered all of the questions they had at the time of this visit regarding this medication.  -  RTC if symptoms worsen or persist.      No Follow-up on file.     This note was created by combination of typed  and Dragon dictation.  Transcription errors may be present.  If there are any questions, please contact me.

## 2018-03-15 LAB — BACTERIA UR CULT: NORMAL

## 2018-03-16 ENCOUNTER — PATIENT MESSAGE (OUTPATIENT)
Dept: FAMILY MEDICINE | Facility: CLINIC | Age: 43
End: 2018-03-16

## 2018-03-26 DIAGNOSIS — M47.819 SPONDYLOSIS WITHOUT MYELOPATHY: ICD-10-CM

## 2018-03-26 DIAGNOSIS — S16.1XXA NECK STRAIN, INITIAL ENCOUNTER: ICD-10-CM

## 2018-03-26 DIAGNOSIS — M54.17 THORACIC AND LUMBOSACRAL NEURITIS: ICD-10-CM

## 2018-03-26 DIAGNOSIS — M54.14 THORACIC AND LUMBOSACRAL NEURITIS: ICD-10-CM

## 2018-03-26 RX ORDER — HYDROCODONE BITARTRATE AND ACETAMINOPHEN 10; 325 MG/1; MG/1
1 TABLET ORAL EVERY 12 HOURS PRN
Qty: 40 TABLET | Refills: 0 | Status: SHIPPED | OUTPATIENT
Start: 2018-03-26 | End: 2018-06-12 | Stop reason: SDUPTHER

## 2018-06-11 ENCOUNTER — TELEPHONE (OUTPATIENT)
Dept: ADMINISTRATIVE | Facility: HOSPITAL | Age: 43
End: 2018-06-11

## 2018-06-12 DIAGNOSIS — F41.9 ANXIETY: ICD-10-CM

## 2018-06-12 DIAGNOSIS — M54.17 THORACIC AND LUMBOSACRAL NEURITIS: ICD-10-CM

## 2018-06-12 DIAGNOSIS — S16.1XXA NECK STRAIN, INITIAL ENCOUNTER: ICD-10-CM

## 2018-06-12 DIAGNOSIS — M54.14 THORACIC AND LUMBOSACRAL NEURITIS: ICD-10-CM

## 2018-06-12 DIAGNOSIS — M47.819 SPONDYLOSIS WITHOUT MYELOPATHY: ICD-10-CM

## 2018-06-13 ENCOUNTER — PATIENT MESSAGE (OUTPATIENT)
Dept: FAMILY MEDICINE | Facility: CLINIC | Age: 43
End: 2018-06-13

## 2018-06-13 RX ORDER — CLONAZEPAM 0.5 MG/1
0.5 TABLET ORAL 2 TIMES DAILY PRN
Qty: 60 TABLET | Refills: 2 | Status: SHIPPED | OUTPATIENT
Start: 2018-06-13 | End: 2018-10-11 | Stop reason: SDUPTHER

## 2018-06-13 RX ORDER — HYDROCODONE BITARTRATE AND ACETAMINOPHEN 10; 325 MG/1; MG/1
1 TABLET ORAL EVERY 12 HOURS PRN
Qty: 40 TABLET | Refills: 0 | Status: SHIPPED | OUTPATIENT
Start: 2018-06-13 | End: 2018-08-02 | Stop reason: SDUPTHER

## 2018-06-18 ENCOUNTER — TELEPHONE (OUTPATIENT)
Dept: FAMILY MEDICINE | Facility: CLINIC | Age: 43
End: 2018-06-18

## 2018-06-18 NOTE — TELEPHONE ENCOUNTER
Klonopin 0.5 mg,and hydrocodone-acetaminophen  prescriptions verified with [facility] pharmacy on 06/18/18.

## 2018-06-18 NOTE — TELEPHONE ENCOUNTER
----- Message from Tabby Perkins sent at 6/18/2018  8:10 AM CDT -----  Contact: [facility] [phone]  Carine calling to verify RX for Klonopin and Norcos. Says [facility]'s policy is changing which is why she needs to verify. Thank you.

## 2018-07-13 ENCOUNTER — PATIENT MESSAGE (OUTPATIENT)
Dept: FAMILY MEDICINE | Facility: CLINIC | Age: 43
End: 2018-07-13

## 2018-07-13 DIAGNOSIS — R30.0 DYSURIA: Primary | ICD-10-CM

## 2018-07-14 ENCOUNTER — PATIENT MESSAGE (OUTPATIENT)
Dept: FAMILY MEDICINE | Facility: CLINIC | Age: 43
End: 2018-07-14

## 2018-07-16 RX ORDER — PHENAZOPYRIDINE HYDROCHLORIDE 100 MG/1
100 TABLET, FILM COATED ORAL 3 TIMES DAILY PRN
Qty: 10 TABLET | Refills: 0 | Status: SHIPPED | OUTPATIENT
Start: 2018-07-16 | End: 2018-07-26

## 2018-07-17 ENCOUNTER — PATIENT MESSAGE (OUTPATIENT)
Dept: FAMILY MEDICINE | Facility: CLINIC | Age: 43
End: 2018-07-17

## 2018-07-17 ENCOUNTER — LAB VISIT (OUTPATIENT)
Dept: LAB | Facility: HOSPITAL | Age: 43
End: 2018-07-17
Attending: FAMILY MEDICINE
Payer: COMMERCIAL

## 2018-07-17 DIAGNOSIS — R30.0 DYSURIA: ICD-10-CM

## 2018-07-17 DIAGNOSIS — N30.01 ACUTE CYSTITIS WITH HEMATURIA: Primary | ICD-10-CM

## 2018-07-17 LAB
BACTERIA #/AREA URNS AUTO: ABNORMAL /HPF
BILIRUB UR QL STRIP: NEGATIVE
CLARITY UR REFRACT.AUTO: ABNORMAL
COLOR UR AUTO: ABNORMAL
GLUCOSE UR QL STRIP: NEGATIVE
HGB UR QL STRIP: ABNORMAL
KETONES UR QL STRIP: NEGATIVE
LEUKOCYTE ESTERASE UR QL STRIP: ABNORMAL
MICROSCOPIC COMMENT: ABNORMAL
NITRITE UR QL STRIP: ABNORMAL
NON-SQ EPI CELLS #/AREA URNS AUTO: 1 /HPF
PH UR STRIP: 5 [PH] (ref 5–8)
PROT UR QL STRIP: NEGATIVE
RBC #/AREA URNS AUTO: 5 /HPF (ref 0–4)
SP GR UR STRIP: 1.01 (ref 1–1.03)
SQUAMOUS #/AREA URNS AUTO: 6 /HPF
URN SPEC COLLECT METH UR: ABNORMAL
UROBILINOGEN UR STRIP-ACNC: ABNORMAL EU/DL
WBC #/AREA URNS AUTO: 79 /HPF (ref 0–5)
WBC CLUMPS UR QL AUTO: ABNORMAL

## 2018-07-17 PROCEDURE — 87086 URINE CULTURE/COLONY COUNT: CPT

## 2018-07-17 PROCEDURE — 87186 SC STD MICRODIL/AGAR DIL: CPT

## 2018-07-17 PROCEDURE — 87077 CULTURE AEROBIC IDENTIFY: CPT

## 2018-07-17 PROCEDURE — 81001 URINALYSIS AUTO W/SCOPE: CPT

## 2018-07-17 PROCEDURE — 87088 URINE BACTERIA CULTURE: CPT

## 2018-07-17 RX ORDER — SULFAMETHOXAZOLE AND TRIMETHOPRIM 800; 160 MG/1; MG/1
1 TABLET ORAL 2 TIMES DAILY
Qty: 6 TABLET | Refills: 0 | Status: SHIPPED | OUTPATIENT
Start: 2018-07-17 | End: 2018-07-20

## 2018-07-19 LAB — BACTERIA UR CULT: NORMAL

## 2018-08-02 DIAGNOSIS — M47.819 SPONDYLOSIS WITHOUT MYELOPATHY: ICD-10-CM

## 2018-08-02 DIAGNOSIS — S16.1XXA NECK STRAIN, INITIAL ENCOUNTER: ICD-10-CM

## 2018-08-02 DIAGNOSIS — M54.14 THORACIC AND LUMBOSACRAL NEURITIS: ICD-10-CM

## 2018-08-02 DIAGNOSIS — M54.17 THORACIC AND LUMBOSACRAL NEURITIS: ICD-10-CM

## 2018-08-02 RX ORDER — HYDROCODONE BITARTRATE AND ACETAMINOPHEN 10; 325 MG/1; MG/1
1 TABLET ORAL EVERY 12 HOURS PRN
Qty: 40 TABLET | Refills: 0 | Status: SHIPPED | OUTPATIENT
Start: 2018-08-02 | End: 2018-10-23 | Stop reason: SDUPTHER

## 2018-09-13 ENCOUNTER — OFFICE VISIT (OUTPATIENT)
Dept: FAMILY MEDICINE | Facility: CLINIC | Age: 43
End: 2018-09-13
Payer: COMMERCIAL

## 2018-09-13 VITALS
BODY MASS INDEX: 30.22 KG/M2 | DIASTOLIC BLOOD PRESSURE: 108 MMHG | HEART RATE: 81 BPM | TEMPERATURE: 98 F | SYSTOLIC BLOOD PRESSURE: 156 MMHG | HEIGHT: 64 IN | OXYGEN SATURATION: 98 % | WEIGHT: 177 LBS

## 2018-09-13 DIAGNOSIS — B96.89 ACUTE BACTERIAL SINUSITIS: Primary | ICD-10-CM

## 2018-09-13 DIAGNOSIS — R30.9 PAIN WITH URINATION: ICD-10-CM

## 2018-09-13 DIAGNOSIS — J01.90 ACUTE BACTERIAL SINUSITIS: Primary | ICD-10-CM

## 2018-09-13 PROCEDURE — 87086 URINE CULTURE/COLONY COUNT: CPT

## 2018-09-13 PROCEDURE — 87186 SC STD MICRODIL/AGAR DIL: CPT

## 2018-09-13 PROCEDURE — 87077 CULTURE AEROBIC IDENTIFY: CPT

## 2018-09-13 PROCEDURE — 87088 URINE BACTERIA CULTURE: CPT

## 2018-09-13 PROCEDURE — 99214 OFFICE O/P EST MOD 30 MIN: CPT | Mod: S$GLB,,, | Performed by: NURSE PRACTITIONER

## 2018-09-13 PROCEDURE — 99999 PR PBB SHADOW E&M-EST. PATIENT-LVL V: CPT | Mod: PBBFAC,,, | Performed by: NURSE PRACTITIONER

## 2018-09-13 RX ORDER — CEPHALEXIN 500 MG/1
500 CAPSULE ORAL EVERY 8 HOURS
Qty: 30 CAPSULE | Refills: 0 | Status: SHIPPED | OUTPATIENT
Start: 2018-09-13 | End: 2018-09-23

## 2018-09-13 NOTE — PATIENT INSTRUCTIONS

## 2018-09-13 NOTE — LETTER
September 13, 2018      [facility]  [address]  [place] [address]  Phone: [phone]  Fax: [phone]       Patient: Alberto Frye   YOB: 1975  Date of Visit: 09/13/2018    To Whom It May Concern:    Trixie Frye  was at [facility] on 09/13/2018. She may return to work/school on 09/17/2018 with no restrictions. If you have any questions or concerns, or if I can be of further assistance, please do not hesitate to contact me.    Sincerely,      Esperanza Quveedo NP

## 2018-09-13 NOTE — PROGRESS NOTES
History of Present Illness   Albetro Frye is a 43 y.o. woman with medical history as listed below who presents today for evaluation of sinusitis symptoms for five days and possible UTI for one day. She reports congestion, post nasal drip, ear fullness, sinus pressure, and facial pain. She has some hoarseness but denies sore throat, cough, fever or chills. She is taking Xyzal, Flonase, and Tylenol sinus with no relief; she also received a Solumedrol injection per one of her coworkers. She also reports urinary frequency and urgency that began last night with painful urination that began this morning. She has no flank pain, nausea, vomiting, or other urinary complaints. She has no additional complaints and is otherwise healthy on today's visit.      Past Medical History:   Diagnosis Date    Anxiety     Hypertension        Past Surgical History:   Procedure Laterality Date     lapban surgery  2009    HYSTERECTOMY         Social History     Socioeconomic History    Marital status: Single     Spouse name: None    Number of children: None    Years of education: None    Highest education level: None   Social Needs    Financial resource strain: None    Food insecurity - worry: None    Food insecurity - inability: None    Transportation needs - medical: None    Transportation needs - non-medical: None   Occupational History    None   Tobacco Use    Smoking status: Former Smoker    Smokeless tobacco: Never Used    Tobacco comment: quit in october 2016   Substance and Sexual Activity    Alcohol use: Yes     Alcohol/week: 0.0 oz    Drug use: No    Sexual activity: Yes     Partners: Male   Other Topics Concern    None   Social History Narrative    None       Family History   Problem Relation Age of Onset    Diabetes Mother     Cancer Mother     Hypertension Mother        Review of Systems  Review of Systems   Constitutional: Negative for chills and fever.   HENT: Positive for congestion, ear pain,  "hearing loss and sinus pain. Negative for sore throat.    Respiratory: Negative for cough.    Gastrointestinal: Positive for vomiting (post-tussive).   Genitourinary: Positive for dysuria and frequency. Negative for flank pain and hematuria.   Neurological: Positive for headaches. Negative for weakness.     A complete review of systems was otherwise negative.    Physical Exam  BP (!) 156/108 (BP Location: Right arm, Patient Position: Sitting, BP Method: Medium (Manual))   Pulse 81   Temp 98.1 °F (36.7 °C) (Oral)   Ht 5' 4" (1.626 m)   Wt 80.3 kg (177 lb)   SpO2 98%   BMI 30.38 kg/m²   General appearance: alert, appears stated age, cooperative and no distress  Eyes: negative findings: lids and lashes normal and conjunctivae and sclerae normal  Ears: normal TM's and external ear canals both ears and bilateral middle ear effusion  Nose: clear discharge, moderate congestion, turbinates red, swollen, grade 4 out of 4, sinus tenderness bilateral  Throat: lips, mucosa, and tongue normal; teeth and gums normal and moderate oropharyngeal erythema with clear post nasal drainage  Lungs: clear to auscultation bilaterally  Heart: regular rate and rhythm, S1, S2 normal, no murmur, click, rub or gallop  Lymph nodes: Cervical, supraclavicular, and axillary nodes normal.  Neurologic: Grossly normal    Assessment/Plan  Acute bacterial sinusitis  Continue the Xyzal and Flonase.  Add Keflex, take until complete.  Rest and drink plenty of fluids.  RTC PRN.  -     cephALEXin (KEFLEX) 500 MG capsule; Take 1 capsule (500 mg total) by mouth every 8 (eight) hours. for 10 days  Dispense: 30 capsule; Refill: 0    Pain with urination  Should culture return positive, likely covered with Keflex as above.  Increase water intake.  RTC PRN.  -     Urine culture    She has verbalized understanding and is in agreement with plan of care.    Follow-up if symptoms worsen or fail to improve.  "

## 2018-09-15 DIAGNOSIS — F41.9 ANXIETY: ICD-10-CM

## 2018-09-17 LAB — BACTERIA UR CULT: NORMAL

## 2018-09-17 RX ORDER — HYDROXYZINE HYDROCHLORIDE 25 MG/1
TABLET, FILM COATED ORAL
Qty: 30 TABLET | Refills: 0 | Status: SHIPPED | OUTPATIENT
Start: 2018-09-17 | End: 2018-10-23

## 2018-10-11 DIAGNOSIS — F41.9 ANXIETY: ICD-10-CM

## 2018-10-12 ENCOUNTER — PATIENT MESSAGE (OUTPATIENT)
Dept: FAMILY MEDICINE | Facility: CLINIC | Age: 43
End: 2018-10-12

## 2018-10-12 RX ORDER — CLONAZEPAM 0.5 MG/1
0.5 TABLET ORAL 2 TIMES DAILY PRN
Qty: 60 TABLET | Refills: 2 | Status: SHIPPED | OUTPATIENT
Start: 2018-10-12 | End: 2019-01-29 | Stop reason: SDUPTHER

## 2018-10-23 ENCOUNTER — OFFICE VISIT (OUTPATIENT)
Dept: FAMILY MEDICINE | Facility: CLINIC | Age: 43
End: 2018-10-23
Payer: COMMERCIAL

## 2018-10-23 ENCOUNTER — PATIENT MESSAGE (OUTPATIENT)
Dept: FAMILY MEDICINE | Facility: CLINIC | Age: 43
End: 2018-10-23

## 2018-10-23 ENCOUNTER — NURSE TRIAGE (OUTPATIENT)
Dept: ADMINISTRATIVE | Facility: CLINIC | Age: 43
End: 2018-10-23

## 2018-10-23 VITALS
SYSTOLIC BLOOD PRESSURE: 124 MMHG | OXYGEN SATURATION: 99 % | TEMPERATURE: 98 F | WEIGHT: 178 LBS | HEART RATE: 96 BPM | DIASTOLIC BLOOD PRESSURE: 82 MMHG | HEIGHT: 64 IN | BODY MASS INDEX: 30.39 KG/M2

## 2018-10-23 DIAGNOSIS — S16.1XXA NECK STRAIN, INITIAL ENCOUNTER: ICD-10-CM

## 2018-10-23 DIAGNOSIS — M54.17 THORACIC AND LUMBOSACRAL NEURITIS: ICD-10-CM
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
## 2018-10-23 DIAGNOSIS — M47.819 SPONDYLOSIS WITHOUT MYELOPATHY: ICD-10-CM

## 2018-10-23 DIAGNOSIS — M54.14 THORACIC AND LUMBOSACRAL NEURITIS: ICD-10-CM

## 2018-10-23 DIAGNOSIS — I10 ESSENTIAL HYPERTENSION: ICD-10-CM

## 2018-10-23 PROCEDURE — 99999 PR PBB SHADOW E&M-EST. PATIENT-LVL IV: CPT | Mod: PBBFAC,,, | Performed by: NURSE PRACTITIONER

## 2018-10-23 PROCEDURE — 99214 OFFICE O/P EST MOD 30 MIN: CPT | Mod: S$GLB,,, | Performed by: NURSE PRACTITIONER

## 2018-10-23 RX ORDER — HYDROCODONE BITARTRATE AND ACETAMINOPHEN 10; 325 MG/1; MG/1
1 TABLET ORAL EVERY 12 HOURS PRN
Qty: 40 TABLET | Refills: 0 | Status: SHIPPED | OUTPATIENT
Start: 2018-10-23 | End: 2019-01-29 | Stop reason: SDUPTHER
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
## 2018-10-23 NOTE — LETTER
October 23, 2018      [facility]  [address] [facility]  [place] [address]  Phone: [phone]  Fax: [phone]       Patient: Alberto Frye   YOB: 1975  Date of Visit: 10/23/2018    Please excuse 10/22/2018-10/26/2018    To Whom It May Concern:    Trixie Frye  was at [facility] on 10/23/2018. She may return to work/school on 10/26/2018 with no restrictions. If you have any questions or concerns, or if I can be of further assistance, please do not hesitate to contact me.    Sincerely,      Esperanza Quevedo NP
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
## 2018-10-24 ENCOUNTER — PATIENT MESSAGE (OUTPATIENT)
Dept: FAMILY MEDICINE | Facility: CLINIC | Age: 43
End: 2018-10-24

## 2018-10-26 ENCOUNTER — PATIENT MESSAGE (OUTPATIENT)
Dept: FAMILY MEDICINE | Facility: CLINIC | Age: 43
End: 2018-10-26

## 2018-10-26 NOTE — LETTER
"                                          October 26, 2018    Alberto Frye  [address]  [address]  [place] [address]      [facility]  [address] [facility] [address] [facility] [address]  Phone: [phone]  Fax: [phone] Alberto Frye       October 23, 2018       [facility]  [address] [facility] [address]  Phone: [phone]  Fax: [phone]         Patient: Alberto Frye   YOB: 1975  Date of Visit: 10/23/2018     Please excuse 10/22/2018-10/28/2018  "Trixie Frye  was at [facility] on 10/23/2018. She may return to work/school on 10/29/2018 with no restrictions. If you have any questions or concerns, or if I can be of further assistance, please do not hesitate to contact me.     Sincerely,         No Restrictions        "

## 2018-10-26 NOTE — TELEPHONE ENCOUNTER
Note was incorrect, needs to say return on Monday. Okay to reprint and leave for pick-up or fax if patient provides a number.    Thanks!  SHAKIR Moser

## 2018-10-31 ENCOUNTER — PATIENT MESSAGE (OUTPATIENT)
Dept: FAMILY MEDICINE | Facility: CLINIC | Age: 43
End: 2018-10-31

## 2018-11-15 DIAGNOSIS — F41.9 ANXIETY: ICD-10-CM

## 2018-11-15 RX ORDER — CLONAZEPAM 0.5 MG/1
0.5 TABLET ORAL 2 TIMES DAILY PRN
Qty: 60 TABLET | Refills: 2 | OUTPATIENT
Start: 2018-11-15

## 2019-01-15 ENCOUNTER — PATIENT OUTREACH (OUTPATIENT)
Dept: ADMINISTRATIVE | Facility: HOSPITAL | Age: 44
End: 2019-01-15

## 2019-01-29 ENCOUNTER — OFFICE VISIT (OUTPATIENT)
Dept: FAMILY MEDICINE | Facility: CLINIC | Age: 44
End: 2019-01-29
Payer: COMMERCIAL

## 2019-01-29 VITALS
WEIGHT: 181.44 LBS | TEMPERATURE: 99 F | HEIGHT: 64 IN | SYSTOLIC BLOOD PRESSURE: 112 MMHG | BODY MASS INDEX: 30.98 KG/M2 | HEART RATE: 98 BPM | DIASTOLIC BLOOD PRESSURE: 80 MMHG | OXYGEN SATURATION: 97 %

## 2019-01-29 DIAGNOSIS — M54.14 THORACIC AND LUMBOSACRAL NEURITIS: ICD-10-CM

## 2019-01-29 DIAGNOSIS — M54.17 THORACIC AND LUMBOSACRAL NEURITIS: ICD-10-CM

## 2019-01-29 DIAGNOSIS — F41.9 ANXIETY: ICD-10-CM

## 2019-01-29 DIAGNOSIS — R30.0 DYSURIA: ICD-10-CM

## 2019-01-29 DIAGNOSIS — S16.1XXA NECK STRAIN, INITIAL ENCOUNTER: ICD-10-CM

## 2019-01-29 DIAGNOSIS — M47.819 SPONDYLOSIS WITHOUT MYELOPATHY: ICD-10-CM

## 2019-01-29 DIAGNOSIS — M79.3 PANNICULITIS: Primary | ICD-10-CM

## 2019-01-29 LAB
BILIRUB UR QL STRIP: NEGATIVE
CLARITY UR REFRACT.AUTO: CLEAR
COLOR UR AUTO: YELLOW
GLUCOSE UR QL STRIP: NEGATIVE
HGB UR QL STRIP: NEGATIVE
KETONES UR QL STRIP: NEGATIVE
LEUKOCYTE ESTERASE UR QL STRIP: NEGATIVE
NITRITE UR QL STRIP: NEGATIVE
PH UR STRIP: 7 [PH] (ref 5–8)
PROT UR QL STRIP: NEGATIVE
SP GR UR STRIP: 1.01 (ref 1–1.03)
URN SPEC COLLECT METH UR: NORMAL

## 2019-01-29 PROCEDURE — 99214 PR OFFICE/OUTPT VISIT, EST, LEVL IV, 30-39 MIN: ICD-10-PCS | Mod: S$GLB,,, | Performed by: FAMILY MEDICINE

## 2019-01-29 PROCEDURE — 99999 PR PBB SHADOW E&M-EST. PATIENT-LVL V: CPT | Mod: PBBFAC,,, | Performed by: FAMILY MEDICINE

## 2019-01-29 PROCEDURE — 99999 PR PBB SHADOW E&M-EST. PATIENT-LVL V: ICD-10-PCS | Mod: PBBFAC,,, | Performed by: FAMILY MEDICINE

## 2019-01-29 PROCEDURE — 87086 URINE CULTURE/COLONY COUNT: CPT

## 2019-01-29 PROCEDURE — 87088 URINE BACTERIA CULTURE: CPT

## 2019-01-29 PROCEDURE — 87077 CULTURE AEROBIC IDENTIFY: CPT

## 2019-01-29 PROCEDURE — 99214 OFFICE O/P EST MOD 30 MIN: CPT | Mod: S$GLB,,, | Performed by: FAMILY MEDICINE

## 2019-01-29 PROCEDURE — 87186 SC STD MICRODIL/AGAR DIL: CPT

## 2019-01-29 PROCEDURE — 81003 URINALYSIS AUTO W/O SCOPE: CPT

## 2019-01-29 RX ORDER — CLONAZEPAM 0.5 MG/1
0.5 TABLET ORAL 2 TIMES DAILY PRN
Qty: 60 TABLET | Refills: 2 | Status: SHIPPED | OUTPATIENT
Start: 2019-01-29 | End: 2019-06-26

## 2019-01-29 RX ORDER — HYDROCODONE BITARTRATE AND ACETAMINOPHEN 10; 325 MG/1; MG/1
1 TABLET ORAL EVERY 12 HOURS PRN
Qty: 40 TABLET | Refills: 0 | Status: SHIPPED | OUTPATIENT
Start: 2019-01-29 | End: 2019-07-23 | Stop reason: SDUPTHER

## 2019-01-29 RX ORDER — TORSEMIDE 10 MG/1
20 TABLET ORAL DAILY
Qty: 60 TABLET | Refills: 2 | Status: SHIPPED | OUTPATIENT
Start: 2019-01-29 | End: 2019-07-16 | Stop reason: SDUPTHER

## 2019-01-29 NOTE — PROGRESS NOTES
Assessment & Plan  Problem List Items Addressed This Visit        Psychiatric    Anxiety    Relevant Medications    clonazePAM (KLONOPIN) 0.5 MG tablet    Other Relevant Orders    Ambulatory referral to Psychiatry      Other Visit Diagnoses     Panniculitis    -  Primary    Relevant Orders    Ambulatory referral to Plastic Surgery    Spondylosis without myelopathy        Relevant Medications    HYDROcodone-acetaminophen (NORCO)  mg per tablet    Thoracic and lumbosacral neuritis        Relevant Medications    HYDROcodone-acetaminophen (NORCO)  mg per tablet    Neck strain, initial encounter        Relevant Medications    HYDROcodone-acetaminophen (NORCO)  mg per tablet    Dysuria        Relevant Orders    Urinalysis    Urine culture            Health Maintenance reviewed.    Follow-up: No Follow-up on file.    ______________________________________________________________________    Chief Complaint  Chief Complaint   Patient presents with    Back Pain    Anxiety    Hearing Problem       HPI  Alberto Frye is a 44 y.o. female with multiple medical diagnoses as listed in the medical history and problem list that presents for anxiety, rashes and back pain.  Pt is known to me with last appointment 3/13/2018.      She reports frequent rashes in the area of her panus.  She states that it is frequent rashes that she has to use topical intervention with temporary relief.  She states that the excess skin will hold moisture and she will have frequent rashes with associated itching.  This has interfered with her quality of life due to the frequency of rashes.      She reports increased back pain and worsening anxiety.  She would like to see a psychiatrist.       PAST MEDICAL HISTORY:  Past Medical History:   Diagnosis Date    Anxiety     Hypertension        PAST SURGICAL HISTORY:  Past Surgical History:   Procedure Laterality Date     lapban surgery  2009    HYSTERECTOMY         SOCIAL  HISTORY:  Social History     Socioeconomic History    Marital status: Single     Spouse name: Not on file    Number of children: Not on file    Years of education: Not on file    Highest education level: Not on file   Social Needs    Financial resource strain: Not on file    Food insecurity - worry: Not on file    Food insecurity - inability: Not on file    Transportation needs - medical: Not on file    Transportation needs - non-medical: Not on file   Occupational History    Not on file   Tobacco Use    Smoking status: Former Smoker    Smokeless tobacco: Never Used    Tobacco comment: quit in october 2016   Substance and Sexual Activity    Alcohol use: Yes     Alcohol/week: 0.0 oz    Drug use: No    Sexual activity: Yes     Partners: Male   Other Topics Concern    Not on file   Social History Narrative    Not on file       FAMILY HISTORY:  Family History   Problem Relation Age of Onset    Diabetes Mother     Cancer Mother     Hypertension Mother        ALLERGIES AND MEDICATIONS: updated and reviewed.  Review of patient's allergies indicates:   Allergen Reactions    Pcn [penicillins] Other (See Comments)     Was told from childhood she couldn't take it    Sulfa (sulfonamide antibiotics) Nausea And Vomiting    Latex, natural rubber Rash     Current Outpatient Medications   Medication Sig Dispense Refill    clonazePAM (KLONOPIN) 0.5 MG tablet Take 1 tablet (0.5 mg total) by mouth 2 (two) times daily as needed. 60 tablet 2    fluticasone (FLONASE) 50 mcg/actuation nasal spray 1 spray by Each Nare route once daily.      HYDROcodone-acetaminophen (NORCO)  mg per tablet Take 1 tablet by mouth every 12 (twelve) hours as needed for Pain. 40 tablet 0    levocetirizine (XYZAL) 5 MG tablet Take 1 tablet (5 mg total) by mouth every evening. 30 tablet 2    torsemide (DEMADEX) 10 MG Tab Take 2 tablets (20 mg total) by mouth once daily. 60 tablet 2     No current facility-administered medications  "for this visit.          ROS  Review of Systems   Constitutional: Negative for activity change and unexpected weight change.   HENT: Positive for hearing loss. Negative for rhinorrhea and trouble swallowing.    Eyes: Positive for visual disturbance. Negative for discharge.   Respiratory: Negative for chest tightness and wheezing.    Cardiovascular: Positive for palpitations. Negative for chest pain.   Gastrointestinal: Negative for blood in stool, constipation, diarrhea and vomiting.   Endocrine: Negative for polydipsia and polyuria.   Genitourinary: Negative for difficulty urinating, dysuria, hematuria and menstrual problem.   Musculoskeletal: Positive for arthralgias. Negative for joint swelling and neck pain.   Neurological: Negative for weakness and headaches.   Psychiatric/Behavioral: Negative for confusion and dysphoric mood.           Physical Exam  Vitals:    01/29/19 0749 01/29/19 0814   BP: (!) 120/90 112/80   BP Location: Left arm    Patient Position: Sitting    BP Method: Medium (Manual)    Pulse: 98    Temp: 98.5 °F (36.9 °C)    TempSrc: Oral    SpO2: 97%    Weight: 82.3 kg (181 lb 7 oz)    Height: 5' 4" (1.626 m)     Body mass index is 31.14 kg/m².  Weight: 82.3 kg (181 lb 7 oz)   Height: 5' 4" (162.6 cm)   Physical Exam   Constitutional: She is oriented to person, place, and time. She appears well-developed and well-nourished.   HENT:   Head: Normocephalic.   Right Ear: External ear normal.   Left Ear: External ear normal.   Nose: Nose normal.   Mouth/Throat: Oropharynx is clear and moist.   Eyes: Conjunctivae and EOM are normal. Pupils are equal, round, and reactive to light.   Cardiovascular: Normal rate, regular rhythm and normal heart sounds.   Pulmonary/Chest: Effort normal and breath sounds normal.   Neurological: She is alert and oriented to person, place, and time.   Skin: Skin is warm and dry.   Vitals reviewed.    Health Maintenance       Date Due Completion Date    Mammogram 03/16/2020 " 3/16/2018    Override on 12/28/2015: Done (Dr. Claudia Harkins[facility]- normal)    Lipid Panel 10/13/2022 10/13/2017    TETANUS VACCINE 09/30/2025 9/30/2015 (Done)    Override on 9/30/2015: Done ([facility])              Patient note was created using [employer].  Any errors in syntax or even information may not have been identified and edited on initial review prior to signing this note.

## 2019-01-29 NOTE — LETTER
January 29, 2019                 [facility]  [facility]  [address]  [place] [address]  Phone: [phone]  Fax: [phone]   January 29, 2019     Patient: Alberto Frye   YOB: 1975   Date of Visit: 1/29/2019       To Whom it May Concern:    Alberto Frye was seen in my clinic on 1/29/2019. She may return with no restrictions.    If you have any questions or concerns, please don't hesitate to call.    Sincerely,         Candace Martin MD

## 2019-01-31 ENCOUNTER — PATIENT MESSAGE (OUTPATIENT)
Dept: FAMILY MEDICINE | Facility: CLINIC | Age: 44
End: 2019-01-31

## 2019-01-31 ENCOUNTER — TELEPHONE (OUTPATIENT)
Dept: FAMILY MEDICINE | Facility: CLINIC | Age: 44
End: 2019-01-31

## 2019-01-31 DIAGNOSIS — B96.89 ACUTE BACTERIAL SINUSITIS: Primary | ICD-10-CM

## 2019-01-31 DIAGNOSIS — N30.00 ACUTE CYSTITIS WITHOUT HEMATURIA: ICD-10-CM

## 2019-01-31 DIAGNOSIS — J01.90 ACUTE BACTERIAL SINUSITIS: Primary | ICD-10-CM

## 2019-01-31 LAB — BACTERIA UR CULT: NORMAL

## 2019-01-31 RX ORDER — CIPROFLOXACIN 500 MG/1
500 TABLET ORAL EVERY 12 HOURS
Qty: 20 TABLET | Refills: 0 | Status: SHIPPED | OUTPATIENT
Start: 2019-01-31 | End: 2019-02-10

## 2019-02-01 ENCOUNTER — TELEPHONE (OUTPATIENT)
Dept: PLASTIC SURGERY | Facility: CLINIC | Age: 44
End: 2019-02-01

## 2019-02-01 NOTE — TELEPHONE ENCOUNTER
Called pt to remind them of their scheduled appt on 2/4 . Pt didnt answer i left detailed vm for pt with our office number.

## 2019-02-04 ENCOUNTER — OFFICE VISIT (OUTPATIENT)
Dept: PLASTIC SURGERY | Facility: CLINIC | Age: 44
End: 2019-02-04
Payer: COMMERCIAL

## 2019-02-04 VITALS
SYSTOLIC BLOOD PRESSURE: 125 MMHG | WEIGHT: 182.63 LBS | HEART RATE: 97 BPM | BODY MASS INDEX: 31.35 KG/M2 | DIASTOLIC BLOOD PRESSURE: 89 MMHG

## 2019-02-04 DIAGNOSIS — N39.0 RECURRENT UTI: ICD-10-CM

## 2019-02-04 DIAGNOSIS — L29.8 CHRONIC PRURITIC RASH IN ADULT: ICD-10-CM

## 2019-02-04 DIAGNOSIS — E65 ABDOMINAL PANNUS: Primary | ICD-10-CM

## 2019-02-04 PROCEDURE — 99999 PR PBB SHADOW E&M-EST. PATIENT-LVL IV: CPT | Mod: PBBFAC,,, | Performed by: SURGERY

## 2019-02-04 PROCEDURE — 99204 PR OFFICE/OUTPT VISIT, NEW, LEVL IV, 45-59 MIN: ICD-10-PCS | Mod: S$GLB,,, | Performed by: SURGERY

## 2019-02-04 PROCEDURE — 99204 OFFICE O/P NEW MOD 45 MIN: CPT | Mod: S$GLB,,, | Performed by: SURGERY

## 2019-02-04 PROCEDURE — 99999 PR PBB SHADOW E&M-EST. PATIENT-LVL IV: ICD-10-PCS | Mod: PBBFAC,,, | Performed by: SURGERY

## 2019-02-04 NOTE — LETTER
February 5, 2019      Candace Martin MD  [address]  [place] [address]           [facility]  [address]  [facility] [address]  Phone: [phone]  Fax: [phone]          Patient: Alberto Frye   MR Number: [id number]   YOB: 1975   Date of Visit: 2/4/2019       Dear Dr. Candace Martin:    Thank you for referring Alberto Frye to me for evaluation. Attached you will find relevant portions of my assessment and plan of care.    If you have questions, please do not hesitate to call me. I look forward to following Alberto Frye along with you.    Sincerely,    Frankie Gibson MD    Enclosure  CC:  No Recipients    If you would like to receive this communication electronically, please contact [email or web address] or [phone] to request more information on [employer] access.    For providers and/or their staff who would like to refer a patient to [facility], please contact us through our one-stop-shop provider referral line, [facility], at [phone].    If you feel you have received this communication in error or would no longer like to receive these types of communications, please e-mail [email or web address]

## 2019-02-04 NOTE — LETTER
February 4, 2019      [facility]  [address]  [facility] [address]  Phone: [phone]  Fax: [phone]       Patient: Alberto Frye   YOB: 1975  Date of Visit: 02/04/2019    To Whom It May Concern:    Trixie Frye  was at [facility] on 02/04/2019. She may return to work/school on 2/4/19 with no restrictions. If you have any questions or concerns, or if I can be of further assistance, please do not hesitate to contact me.    Sincerely,    Cammy Lyon PA-C

## 2019-02-04 NOTE — PROGRESS NOTES
Plastic and Reconstructive Surgery  Referring: Candace Martin    CC: abdominal panniculus after massive weight loss    HPI: This is a 44 y.o. female with a history of pannus that has been symptomatic since . Patient reports she had a lap ban surgery in . She reports the pannus results in chronic rashes and dry skin. Patient uses cream, lotion and benadryl for the itching. Denies any improvement or relief with product use.  She will scratch the area so much the site will bleed since she is insensate around the area after having a  and hysterectomy. Patient reports she commonly experiences UTI's since she has difficulty keeping the area clean and dry after urinating.     History of asthma. Well controlled with albuterol emergency inhaler. Denies history of bleeding disorders, adverse reactions to anesthesia. Patient is allergic to PCN, sulfa, and latex.      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 for 10 days, Disp: 20 tablet, Rfl: 0    clonazePAM (KLONOPIN) 0.5 MG tablet, Take 1 tablet (0.5 mg total) by mouth 2 (two)  times daily as needed., Disp: 60 tablet, Rfl: 2    fluticasone (FLONASE) 50 mcg/actuation nasal spray, 1 spray by Each Nare route once daily., Disp: , Rfl:     HYDROcodone-acetaminophen (NORCO)  mg per tablet, Take 1 tablet by mouth every 12 (twelve) hours as needed for Pain., Disp: 40 tablet, Rfl: 0    torsemide (DEMADEX) 10 MG Tab, Take 2 tablets (20 mg total) by mouth once daily., Disp: 60 tablet, Rfl: 2    levocetirizine (XYZAL) 5 MG tablet, Take 1 tablet (5 mg total) by mouth every evening., Disp: 30 tablet, Rfl: 2    Review of patient's allergies indicates:   Allergen Reactions    Pcn [penicillins] Other (See Comments)     Was told from childhood she couldn't take it    Sulfa (sulfonamide antibiotics) Nausea And Vomiting    Latex, natural rubber Rash       Family History   Problem Relation Age of Onset    Diabetes Mother     Cancer Mother     Hypertension Mother        ROS  Review of Systems   Constitutional: Negative.  Negative for activity change, appetite change and unexpected weight change.   HENT: Negative.  Negative for congestion and facial swelling.    Eyes: Negative.  Negative for discharge, redness and itching.   Respiratory: Negative.  Negative for apnea, shortness of breath and wheezing.         No asthma or chronic disease    Cardiovascular: Positive for palpitations. Negative for chest pain.        Denies congenital abnormalities    Gastrointestinal: Negative for abdominal distention and abdominal pain.        Lap band sx in 2009   Genitourinary: Positive for dysuria. Vaginal discharge: anxiety related, well controlled with klonopin.        Hx c- section and hysterectomy  Recurrent UTI's due to pannus    Musculoskeletal: Negative.  Negative for arthralgias, gait problem, joint swelling and myalgias.   Skin: Positive for rash (Chronic rash: pannus ). Negative for color change and pallor.   Allergic/Immunologic: Negative.    Neurological: Negative for dizziness and facial asymmetry.    Psychiatric/Behavioral: Negative.          PE    Physical Exam   Constitutional: She is oriented to person, place, and time. She appears well-developed and well-nourished.   HENT:   Head: Normocephalic and atraumatic. Head is without abrasion, without contusion and without laceration.   Right Ear: External ear normal.   Left Ear: External ear normal.   Nose: Nose normal.   Mouth/Throat: Oropharynx is clear and moist.   Eyes: Conjunctivae, EOM and lids are normal. Pupils are equal, round, and reactive to light.   Neck: Trachea normal, full passive range of motion without pain and phonation normal. Neck supple.   Cardiovascular: Normal rate, regular rhythm and normal heart sounds.   Pulmonary/Chest: Effort normal and breath sounds normal. No stridor. No respiratory distress.   Musculoskeletal: Normal range of motion.   Neurological: She is alert and oriented to person, place, and time.   Skin: Skin is warm, dry and intact. Capillary refill takes less than 2 seconds. Rash noted. No abrasion, no bruising, no burn, no ecchymosis, no laceration and no lesion noted. No erythema.        Psychiatric: She has a normal mood and affect. Her speech is normal and behavior is normal. Judgment and thought content normal. Cognition and memory are normal.   Nursing note and vitals reviewed.    Lab Results   Component Value Date    WBC 3.82 (L) 10/13/2017    HGB 13.6 10/13/2017    HCT 40.7 10/13/2017    MCV 92 10/13/2017     10/13/2017     Lab Results   Component Value Date    HGBA1C 4.8 10/13/2017     CMP  Sodium   Date Value Ref Range Status   10/13/2017 146 (H) 136 - 145 mmol/L Final     Potassium   Date Value Ref Range Status   10/13/2017 3.2 (L) 3.5 - 5.1 mmol/L Final     Chloride   Date Value Ref Range Status   10/13/2017 108 95 - 110 mmol/L Final     CO2   Date Value Ref Range Status   10/13/2017 29 23 - 29 mmol/L Final     Glucose   Date Value Ref Range Status   10/13/2017 94 70 - 110 mg/dL Final     BUN, Bld   Date  Value Ref Range Status   10/13/2017 6 6 - 20 mg/dL Final     Creatinine   Date Value Ref Range Status   10/13/2017 0.8 0.5 - 1.4 mg/dL Final     Calcium   Date Value Ref Range Status   10/13/2017 9.2 8.7 - 10.5 mg/dL Final     Total Protein   Date Value Ref Range Status   10/13/2017 7.4 6.0 - 8.4 g/dL Final     Albumin   Date Value Ref Range Status   10/13/2017 3.7 3.5 - 5.2 g/dL Final     Total Bilirubin   Date Value Ref Range Status   10/13/2017 0.8 0.1 - 1.0 mg/dL Final     Comment:     For infants and newborns, interpretation of results should be based  on gestational age, weight and in agreement with clinical  observations.  Premature Infant recommended reference ranges:  Up to 24 hours.............<8.0 mg/dL  Up to 48 hours............<12.0 mg/dL  3-5 days..................<15.0 mg/dL  6-29 days.................<15.0 mg/dL       Alkaline Phosphatase   Date Value Ref Range Status   10/13/2017 72 55 - 135 U/L Final     AST   Date Value Ref Range Status   10/13/2017 27 10 - 40 U/L Final     ALT   Date Value Ref Range Status   10/13/2017 18 10 - 44 U/L Final     Anion Gap   Date Value Ref Range Status   10/13/2017 9 8 - 16 mmol/L Final     eGFR if    Date Value Ref Range Status   10/13/2017 >60.0 >60 mL/min/1.73 m^2 Final     eGFR if non    Date Value Ref Range Status   10/13/2017 >60.0 >60 mL/min/1.73 m^2 Final     Comment:     Calculation used to obtain the estimated glomerular filtration  rate (eGFR) is the CKD-EPI equation. Since race is unknown   in our information system, the eGFR values for   -American and Non--American patients are given   for each creatinine result.              Imaging Studies  None available    Assessment:  Assessment   1. Symptomatic pannus  2. Chronic rash: pannus  3. Recurrent urinary tract infections         Plan  Plan   1. Medical photography ordered. Will plan for panniculectomy on 3/22/19.  2. Continue medication management of chronic  rash  3. Continue to follow PCP medication recommendation for UTI treatment    Will submit for insurance verification.  History of pain, ulcerative rashes and over the counter creams and powders for rashes under her pannus.  Her only surgeries were a C section through an infraumbilical scar.  Her weight loss surgery was approximately 10 years ago.  She says she has been at a stable weight for the last 2 years.  Discussed umbilical transposition, possibility of umbilicus necrosis with her.  Discussed that she can lose her umbilicus after this surgery.  In some cases it is transected and she is ok with this possibility.  Other risks of the surgery include bleeding, infection, hematoma, seroma, paresthesias in and around her abdominal incisions, aesthetic deformity, hypertrophic scarring, delayed wound healing.      She will need PCP, PAT clearance.  Needs CBC and CMP and any other labs that are indicated.    Needs pre-op visit.    Tentative date of surgery    45 minutes of face to face time, of which greater than fifty percent of the total visit was  counseling/coordinating care      Plastic & Reconstructive Surgery  Microsurgery  [facility]  c/o Frankie Gibson M.D.  [facility]  [address]  [address] [place] [address]    Work [phone]  Toll free [phone]  If no answer [phone]

## 2019-02-05 ENCOUNTER — TELEPHONE (OUTPATIENT)
Dept: PLASTIC SURGERY | Facility: CLINIC | Age: 44
End: 2019-02-05

## 2019-02-05 NOTE — TELEPHONE ENCOUNTER
Spoke with pt and made her aware of her PCP appt and Labs appt . Pt verbalized understanding of times dates and locations of appts.           ----- Message from Frankie Gibson MD sent at 2/4/2019  6:29 PM CST -----  Tentative Date: 3/22  Needs PAT, PCP clearance  PCP is Candace Martin ([facility])  Needs CBC and CMP scheduled from my standpoint.  I ordered this.

## 2019-02-13 ENCOUNTER — PATIENT MESSAGE (OUTPATIENT)
Dept: SURGERY | Facility: HOSPITAL | Age: 44
End: 2019-02-13

## 2019-02-19 ENCOUNTER — OFFICE VISIT (OUTPATIENT)
Dept: PSYCHIATRY | Facility: CLINIC | Age: 44
End: 2019-02-19
Payer: COMMERCIAL

## 2019-02-19 ENCOUNTER — LAB VISIT (OUTPATIENT)
Dept: LAB | Facility: HOSPITAL | Age: 44
End: 2019-02-19
Attending: SURGERY
Payer: COMMERCIAL

## 2019-02-19 VITALS
WEIGHT: 187.19 LBS | SYSTOLIC BLOOD PRESSURE: 142 MMHG | HEIGHT: 64 IN | DIASTOLIC BLOOD PRESSURE: 94 MMHG | BODY MASS INDEX: 31.96 KG/M2 | HEART RATE: 74 BPM

## 2019-02-19 DIAGNOSIS — F39 MOOD INSOMNIA: ICD-10-CM

## 2019-02-19 DIAGNOSIS — F31.5 BIPOLAR I DISORDER, CURRENT OR MOST RECENT EPISODE DEPRESSED, WITH PSYCHOTIC FEATURES WITH ANXIOUS DISTRESS: Primary | ICD-10-CM

## 2019-02-19 DIAGNOSIS — Z79.899 ENCOUNTER FOR LONG-TERM (CURRENT) USE OF MEDICATIONS: ICD-10-CM

## 2019-02-19 DIAGNOSIS — E65 ABDOMINAL PANNUS: ICD-10-CM

## 2019-02-19 DIAGNOSIS — F51.05 MOOD INSOMNIA: ICD-10-CM

## 2019-02-19 LAB
ALBUMIN SERPL BCP-MCNC: 3.8 G/DL
ALP SERPL-CCNC: 89 U/L
ALT SERPL W/O P-5'-P-CCNC: 16 U/L
ANION GAP SERPL CALC-SCNC: 7 MMOL/L
AST SERPL-CCNC: 23 U/L
BASOPHILS # BLD AUTO: 0.01 K/UL
BASOPHILS NFR BLD: 0.3 %
BILIRUB SERPL-MCNC: 1.3 MG/DL
BUN SERPL-MCNC: 9 MG/DL
CALCIUM SERPL-MCNC: 9.1 MG/DL
CHLORIDE SERPL-SCNC: 107 MMOL/L
CO2 SERPL-SCNC: 30 MMOL/L
CREAT SERPL-MCNC: 0.8 MG/DL
DIFFERENTIAL METHOD: ABNORMAL
EOSINOPHIL # BLD AUTO: 0.1 K/UL
EOSINOPHIL NFR BLD: 3.6 %
ERYTHROCYTE [DISTWIDTH] IN BLOOD BY AUTOMATED COUNT: 13.1 %
EST. GFR  (AFRICAN AMERICAN): >60 ML/MIN/1.73 M^2
EST. GFR  (NON AFRICAN AMERICAN): >60 ML/MIN/1.73 M^2
GLUCOSE SERPL-MCNC: 90 MG/DL
HCT VFR BLD AUTO: 43 %
HGB BLD-MCNC: 14 G/DL
LYMPHOCYTES # BLD AUTO: 2.2 K/UL
LYMPHOCYTES NFR BLD: 55.3 %
MCH RBC QN AUTO: 30.6 PG
MCHC RBC AUTO-ENTMCNC: 32.6 G/DL
MCV RBC AUTO: 94 FL
MONOCYTES # BLD AUTO: 0.2 K/UL
MONOCYTES NFR BLD: 6.2 %
NEUTROPHILS # BLD AUTO: 1.4 K/UL
NEUTROPHILS NFR BLD: 34.6 %
PLATELET # BLD AUTO: 215 K/UL
PMV BLD AUTO: 9.9 FL
POTASSIUM SERPL-SCNC: 3.5 MMOL/L
PROT SERPL-MCNC: 7.3 G/DL
RBC # BLD AUTO: 4.57 M/UL
SODIUM SERPL-SCNC: 144 MMOL/L
WBC # BLD AUTO: 3.89 K/UL

## 2019-02-19 PROCEDURE — 80053 COMPREHEN METABOLIC PANEL: CPT

## 2019-02-19 PROCEDURE — 99204 PR OFFICE/OUTPT VISIT, NEW, LEVL IV, 45-59 MIN: ICD-10-PCS | Mod: S$GLB,,, | Performed by: NURSE PRACTITIONER

## 2019-02-19 PROCEDURE — 99999 PR PBB SHADOW E&M-EST. PATIENT-LVL III: ICD-10-PCS | Mod: PBBFAC,,, | Performed by: NURSE PRACTITIONER

## 2019-02-19 PROCEDURE — 99204 OFFICE O/P NEW MOD 45 MIN: CPT | Mod: S$GLB,,, | Performed by: NURSE PRACTITIONER

## 2019-02-19 PROCEDURE — 85025 COMPLETE CBC W/AUTO DIFF WBC: CPT

## 2019-02-19 PROCEDURE — 99999 PR PBB SHADOW E&M-EST. PATIENT-LVL III: CPT | Mod: PBBFAC,,, | Performed by: NURSE PRACTITIONER

## 2019-02-19 RX ORDER — HYDROXYZINE HYDROCHLORIDE 25 MG/1
25 TABLET, FILM COATED ORAL 2 TIMES DAILY PRN
Qty: 60 TABLET | Refills: 0 | Status: SHIPPED | OUTPATIENT
Start: 2019-02-19 | End: 2019-02-26 | Stop reason: SDUPTHER

## 2019-02-19 RX ORDER — OLANZAPINE 5 MG/1
5 TABLET ORAL NIGHTLY
Qty: 30 TABLET | Refills: 0 | Status: SHIPPED | OUTPATIENT
Start: 2019-02-19 | End: 2019-02-26 | Stop reason: SDUPTHER

## 2019-02-19 NOTE — Clinical Note
Good afternoon,Thank you for referring this patient to me.  She was seen by me today in the clinic.  During the visit it was noted that you are prescribing Klonopin 0.5 mg by mouth b.i.d. p.r.n..  The patient reported to me that she is actually taking this medication 3 times a day.  She is almost out of her prescription which was filled on January 30th.  I advised her to speak with you regarding continuing or discontinuing the medication.  However I did advised her that I would recommend that she be tapered off of this medication due to the addictive properties and rebound anxiety.  I did start her on hydroxyzine Hcl twice a day as needed and olanzapine at bedtime. Both of these medications should help with her anxiety.Once again thank you for this referral.Eligio Ramsey, PhD, APRN-BC, PMHNP-BCOchsner [facility] Department of [phone]
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
## 2019-02-19 NOTE — PATIENT INSTRUCTIONS
[facility] - DEPARTMENT OF PSYCHIATRY   NEW PATIENT ORIENTATION INFORMATION  OUTPATIENT SERVICES COUNSELING CONTRACT    We appreciate the opportunity to participate in your medical care and hope the following protocols will make it easier for you to receive quality treatment in our department.    1. PUNCTUALITY: Your appointment is scheduled for a fixed amount of time reserved especially for you.  To get the benefit of your appointment, please arrive early enough to allow time for parking and registration.  If you are late for your appointment, your clinician is not able to offer additional time.  Please make every effort to be on time.      2. PAYMENT FOR SERVICES:   Payments are expected at the time of service.  Please contact [phone] if you need to resolve issues involving your account at [facility] or to set up a payment plan.    3. CANCELLATION / MISSED APPOINTMENTS:   In order to receive quality care, all appointments must be kept.  Appointment may be cancelled, ONLY by talking with an  at phone number [phone], between 8:00 a.m. and 5:00 p.m., Monday through Friday, at least 24 hours before your appointment time.  Your clinician reserves this time specifically for you, and if you will be unable to use it, it is necessary that you cancel in a timely manner.  If you do not give at least 24-hour notice of cancellation a full fee will be assessed.  Please note that insurance does not cover no-show charges, so you will be billed directly.  If you are consistently late, cancel, or do not show for your appointments, our department reserves the right to terminate treatment    4. CALLING THE DEPARTMENT:   MESSAGES, SCHEDULE OR CANCEL APPOINTMENTS- In general you can reach the department by calling [phone], between 8:00 a.m. and 5:00 p.m., Monday through Friday, to schedule or cancel appointments or leave a message for your clinician.  It is advisable to schedule your visits  far in advance to obtain the most convenient times for your appointments.   AFTER HOURS, WEEKEND OR HOLIDAYS- For urgent questions after hours, weekends and holidays, calling the department number [phone] will connect you to the nursing staff on the Psychiatry Inpatient Unit.  The nursing staff will speak with you and contact the On Call psychiatrist if necessary.   EMERGENCY-  In case of a crisis when there is a concern of harm to self or others, call 911 or the office [phone] between 8:00 a.m. and 5:00 p.m., Monday through Friday.  After hours, weekends or holidays, please call 911 or go to the Emergency Department where you can be thoroughly evaluated by the On Call psychiatrist.  If possible, contact the psychiatrist on call at [phone] prior to leaving for the [facility] Emergency Department.    5. TEAM APPROACH:  Most patients receive therapy through our team system.  In the team system, your primary therapist will be a , psychologist, psychiatry resident or psychiatrist.  If your therapist is a , psychologist or psychiatry resident, please contact your primary therapist first in matters other than medications or acute medical problems.    6. PRESCRIPTION REFILLS:  Prescription refills must be done at your physician office visit.  You will be given a sufficient number of refills to last one extra month beyond your next appointment.  No additional refills will be approved beyond the original treatment plan.  After hours, sufficient medication may be approved to last until the next scheduled appointment. After hours requests for refills on controlled substances may be declined by the psychiatrist on call, as he or she may not be familiar with your case  Please work closely with your doctor so that you have sufficient medication until your next appointment.  Again, please note that no additional prescriptions will be approved per patient request over the phone.   No  "additional refills will be approved beyond the original treatment plan.   In certain exceptional situations, a phone consult appointment may be arranged, with appropriate charge, for the review and approval of prescription refills to last until the next scheduled appointment.    7. FOLLOW UP APPOINTMENTS:  Follow-up appointments can be made in person at the Psychiatry Appointment Desk, [place] [address], or by calling [phone], from 8am to 5pm, between Monday and Friday.  It is advisable to schedule your office visits far enough in advance to obtain the most convenient times for your appointments.    Revised Feb. 23, 2010 - F/OA1/Newpatient    You have been provided with a certain amount of medication with a specified number of refills.  Please follow up within an adequate time before you run out of medications.    REFILLS FOR CONTROLLED SUBSTANCES WILL NOT BE GIVEN WITHOUT AN APPOINTMENT.  I will not honor or fill automated refill requests from pharmacies.  You must come in for an appointment to get refills.    Please book your next appointment for myself or therapist by phone by calling our office at [phone].      PLEASE BE AT LEAST 15 MINUTES EARLY FOR YOUR NEXT APPOINTMENT.  PLEASE, DO NOT BE LATE OR YOU WILL BE TURNED AWAY AND ASKED TO RESCHEDULE.  YOU MUST COME EARLY TO ALLOW TIME FOR CHECK-IN AS WELL AS GET YOUR VITAL SIGNS AND GO OVER YOUR MEDICATIONS.  Tardiness is not fair to the patients who present after you and are on time for their appointments.  It causes a delay in the appointments for patients and staff.  IF YOU ARE LATE, THERE IS A POSSIBILITY THAT YOU WILL BE CHARGED FOR THE APPOINTMENT TIME PERSONALLY AND IT WILL NOT GO TO YOUR INSURANCE.  YOU MAY ALSO BE DISCHARGED FROM CLINIC with multiple "No Show" appointments.  -----------------------------------------------------------------------------------------------------------------  IF YOU FEEL SUICIDAL OR HAVING THOUGHTS OR PLANS TO HURT " YOURSELF OR OTHERS, CALL 911 OR REPORT TO THE NEAREST EMERGENCY ROOM.  YOU CAN ALSO ACCESS THE FOLLOWING HOTLINE(S):    National Suicide Hotline Number 6-312-416-TALK (7201)     ([facility] Mobile Crisis, [phone]'   [facility] [facility] Crisis Line, [phone]; [facility], 24 hours / 7 days, [phone] ([phone]), [phone] ([phone]))       INSTRUCTIONS:  Discuss Andres with Dr. Mratin.

## 2019-02-22 ENCOUNTER — PATIENT MESSAGE (OUTPATIENT)
Dept: SURGERY | Facility: HOSPITAL | Age: 44
End: 2019-02-22

## 2019-02-26 ENCOUNTER — OFFICE VISIT (OUTPATIENT)
Dept: PSYCHIATRY | Facility: CLINIC | Age: 44
End: 2019-02-26
Payer: COMMERCIAL

## 2019-02-26 VITALS
HEIGHT: 64 IN | DIASTOLIC BLOOD PRESSURE: 80 MMHG | WEIGHT: 184 LBS | BODY MASS INDEX: 31.41 KG/M2 | HEART RATE: 88 BPM | SYSTOLIC BLOOD PRESSURE: 132 MMHG

## 2019-02-26 DIAGNOSIS — F31.5 BIPOLAR I DISORDER, CURRENT OR MOST RECENT EPISODE DEPRESSED, WITH PSYCHOTIC FEATURES WITH ANXIOUS DISTRESS: Primary | ICD-10-CM

## 2019-02-26 DIAGNOSIS — F39 MOOD INSOMNIA: ICD-10-CM

## 2019-02-26 DIAGNOSIS — F51.05 MOOD INSOMNIA: ICD-10-CM

## 2019-02-26 DIAGNOSIS — F60.5 OBSESSIVE COMPULSIVE PERSONALITY DISORDER: ICD-10-CM

## 2019-02-26 PROCEDURE — 99214 OFFICE O/P EST MOD 30 MIN: CPT | Mod: S$GLB,,, | Performed by: NURSE PRACTITIONER

## 2019-02-26 PROCEDURE — 99999 PR PBB SHADOW E&M-EST. PATIENT-LVL III: ICD-10-PCS | Mod: PBBFAC,,, | Performed by: NURSE PRACTITIONER

## 2019-02-26 PROCEDURE — 99999 PR PBB SHADOW E&M-EST. PATIENT-LVL III: CPT | Mod: PBBFAC,,, | Performed by: NURSE PRACTITIONER

## 2019-02-26 PROCEDURE — 99214 PR OFFICE/OUTPT VISIT, EST, LEVL IV, 30-39 MIN: ICD-10-PCS | Mod: S$GLB,,, | Performed by: NURSE PRACTITIONER

## 2019-02-26 RX ORDER — OLANZAPINE 5 MG/1
5 TABLET ORAL NIGHTLY
Qty: 30 TABLET | Refills: 0 | Status: SHIPPED | OUTPATIENT
Start: 2019-02-26 | End: 2019-03-26 | Stop reason: SDUPTHER

## 2019-02-26 RX ORDER — FLUOXETINE 10 MG/1
10 CAPSULE ORAL DAILY
Qty: 30 CAPSULE | Refills: 0 | Status: SHIPPED | OUTPATIENT
Start: 2019-02-26 | End: 2019-03-26 | Stop reason: SDUPTHER

## 2019-02-26 RX ORDER — HYDROXYZINE HYDROCHLORIDE 25 MG/1
25 TABLET, FILM COATED ORAL 2 TIMES DAILY PRN
Qty: 60 TABLET | Refills: 0 | Status: SHIPPED | OUTPATIENT
Start: 2019-02-26 | End: 2019-03-26 | Stop reason: SDUPTHER

## 2019-02-26 NOTE — PROGRESS NOTES
"Outpatient Psychiatry Follow-Up Visit (MD/NP)    2/26/2019    Clinical Status of Patient:  Outpatient (Ambulatory)    Chief Complaint:  Alberto Frye is a 44 y.o. female who presents today for follow-up of mood disorder, anxiety, psychosis and insomnia.  Met with patient.      Interval History and Content of Current Session:  Interim Events/Subjective Report/Content of Current Session: Alberto  was last seen by me on 02/19/2019 for an initial psychiatric evaluation. Alberto was diagnosed with Bipolar I disorder, current or most recent episode depressed, with psychotic features with anxious distress and mood insomnia and a plan of care was devised to include:    1. Safety: Call 911 or Crisis Line or go to ER for suicidal ideation, adverse effects of medication or any other emergency  2. Labs: CMP, CBC (previously ordered by Dr. Martin), Lipid profile, TSH, Free T3, Free T4, Hemoglobin A1C, Urine Drug Screening.  3. Start Olanzapine 5 mg po qhs.  4. Start Hydroxyzine 25 mg po BID PRN anxiety/sleep.  5. Return to clinic in one week or sooner prn.   6. Discuss klonopin taper with Dr. Martin.    Today Alberto is seen face to face. Her mood has improved. Her psychotic features are gone. Her anxiety has decreased. It has improved "definitely" since the last time. She is a little anxious today about her job and work schedule. Her sleep is good and she is very pleased about this. Her appetite is okay but it his never high because of the lap band and her energy level is good. She did not discuss the klonopin taper with Dr. Martin. She plans to do this when she sees on on Friday. She thinks she is OCD. She says things have to be in a certain order. For example, things have to be set in a certain way. She was exhausted last night but could not go to bed until she cleaned her clean house. She keeps rechecking to make sure that the doors are locked.     Her labs are reviewed with her today.  Lab Visit on " 02/19/2019   Component Date Value Ref Range Status    TSH 02/19/2019 2.327  0.400 - 4.000 uIU/mL Final    T3, Free 02/19/2019 2.7  2.3 - 4.2 pg/mL Final    Free T4 02/19/2019 1.06  0.71 - 1.51 ng/dL Final    Alcohol, Urine 02/19/2019 <10  <10 mg/dL Final    Benzodiazepines 02/19/2019 Negative   Final    Methadone metabolites 02/19/2019 Negative   Final    Cocaine (Metab.) 02/19/2019 Negative   Final    Opiate Scrn, Ur 02/19/2019 Presumptive Positive   Final    Barbiturate Screen, Ur 02/19/2019 Negative   Final    Amphetamine Screen, Ur 02/19/2019 Negative   Final    THC 02/19/2019 Presumptive Positive   Final    Phencyclidine 02/19/2019 Negative   Final    Creatinine, Random Ur 02/19/2019 43.0  15.0 - 325.0 mg/dL Final    Comment: The random urine reference ranges provided were established   for 24 hour urine collections.  No reference ranges exist for  random urine specimens.  Correlate clinically.      Toxicology Information 02/19/2019 SEE COMMENT   Final    Comment: This screen includes the following classes of drugs at the   listed cut-off:  Benzodiazepines                  200 ng/ml  Methadone                        300 ng/ml  Cocaine metabolite               300 ng/ml  Opiates                          300 ng/ml  Barbiturates                     200 ng/ml  Amphetamines                    1000 ng/ml  Marijuana metabs (THC)            50 ng/ml  Phencyclidine (PCP)               25 ng/ml  High concentrations of Diphenhydramine may cross-react with  Phencyclidine PCP screening immunoassay giving a false   positive result.  High concentrations of Methylenedioxymethamphetamine (MDMA aka  Ectasy) and other structurally similar compounds may cross-   react with the Amphetamine/Methamphetamine screening   immunoassay giving a false positive result.  A metabolite of the anti-HIV drug Sustiva () may cause  false positive results in the Marijuana metabolite (THC)   screening assay.  Note: This  exception list includes only more common   interferants i                           n toxicology screen testing.  Because of many   cross-reactantspositive results on toxicology drug screens   should be confirmed whenever results do not correlate with   clinical presentation.  This report is intended for use in clinical monitoring and  management of patients. It is not intended for use in   employment related drug testing.  Because of any cross-reactants, positive results on toxicology  drug screens should be confirmed whenever results do not  correlate with clinical presentation.  Presumptive positive results are unconfirmed and may be used   only for medical purposes.      Cholesterol 02/19/2019 192  120 - 199 mg/dL Final    Comment: The National Cholesterol Education Program (NCEP) has set the  following guidelines (reference ranges) for Cholesterol:  Optimal.....................<200 mg/dL  Borderline High.............200-239 mg/dL  High........................> or = 240 mg/dL      Triglycerides 02/19/2019 93  30 - 150 mg/dL Final    Comment: The National Cholesterol Education Program (NCEP) has set the  following guidelines (reference values) for triglycerides:  Normal......................<150 mg/dL  Borderline High.............150-199 mg/dL  High........................200-499 mg/dL      HDL 02/19/2019 58  40 - 75 mg/dL Final    Comment: The National Cholesterol Education Program (NCEP) has set the  following guidelines (reference values) for HDL Cholesterol:  Low...............<40 mg/dL  Optimal...........>60 mg/dL      LDL Cholesterol 02/19/2019 115.4  63.0 - 159.0 mg/dL Final    Comment: The National Cholesterol Education Program (NCEP) has set the  following guidelines (reference values) for LDL Cholesterol:  Optimal.......................<130 mg/dL  Borderline High...............130-159 mg/dL  High..........................160-189 mg/dL  Very High.....................>190 mg/dL      HDL/Chol Ratio  02/19/2019 30.2  20.0 - 50.0 % Final    Total Cholesterol/HDL Ratio 02/19/2019 3.3  2.0 - 5.0 Final    Non-HDL Cholesterol 02/19/2019 134  mg/dL Final    Comment: Risk category and Non-HDL cholesterol goals:  Coronary heart disease (CHD)or equivalent (10-year risk of CHD >20%):  Non-HDL cholesterol goal     <130 mg/dL  Two or more CHD risk factors and 10-year risk of CHD <= 20%:  Non-HDL cholesterol goal     <160 mg/dL  0 to 1 CHD risk factor:  Non-HDL cholesterol goal     <190 mg/dL      Hemoglobin A1C 02/19/2019 5.0  4.0 - 5.6 % Final    Comment: ADA Screening Guidelines:  5.7-6.4%  Consistent with prediabetes  >or=6.5%  Consistent with diabetes  High levels of fetal hemoglobin interfere with the HbA1C  assay. Heterozygous hemoglobin variants (HbS, HgC, etc)do  not significantly interfere with this assay.   However, presence of multiple variants may affect accuracy.      Estimated Avg Glucose 02/19/2019 97  68 - 131 mg/dL Final   Lab Visit on 02/19/2019   Component Date Value Ref Range Status    WBC 02/19/2019 3.89* 3.90 - 12.70 K/uL Final    RBC 02/19/2019 4.57  4.00 - 5.40 M/uL Final    Hemoglobin 02/19/2019 14.0  12.0 - 16.0 g/dL Final    Hematocrit 02/19/2019 43.0  37.0 - 48.5 % Final    MCV 02/19/2019 94  82 - 98 fL Final    MCH 02/19/2019 30.6  27.0 - 31.0 pg Final    MCHC 02/19/2019 32.6  32.0 - 36.0 g/dL Final    RDW 02/19/2019 13.1  11.5 - 14.5 % Final    Platelets 02/19/2019 215  150 - 350 K/uL Final    MPV 02/19/2019 9.9  9.2 - 12.9 fL Final    Gran # (ANC) 02/19/2019 1.4* 1.8 - 7.7 K/uL Final    Lymph # 02/19/2019 2.2  1.0 - 4.8 K/uL Final    Mono # 02/19/2019 0.2* 0.3 - 1.0 K/uL Final    Eos # 02/19/2019 0.1  0.0 - 0.5 K/uL Final    Baso # 02/19/2019 0.01  0.00 - 0.20 K/uL Final    Gran% 02/19/2019 34.6* 38.0 - 73.0 % Final    Lymph% 02/19/2019 55.3* 18.0 - 48.0 % Final    Mono% 02/19/2019 6.2  4.0 - 15.0 % Final    Eosinophil% 02/19/2019 3.6  0.0 - 8.0 % Final    Basophil%  02/19/2019 0.3  0.0 - 1.9 % Final    Differential Method 02/19/2019 Automated   Final    Sodium 02/19/2019 144  136 - 145 mmol/L Final    Potassium 02/19/2019 3.5  3.5 - 5.1 mmol/L Final    Chloride 02/19/2019 107  95 - 110 mmol/L Final    CO2 02/19/2019 30* 23 - 29 mmol/L Final    Glucose 02/19/2019 90  70 - 110 mg/dL Final    BUN, Bld 02/19/2019 9  6 - 20 mg/dL Final    Creatinine 02/19/2019 0.8  0.5 - 1.4 mg/dL Final    Calcium 02/19/2019 9.1  8.7 - 10.5 mg/dL Final    Total Protein 02/19/2019 7.3  6.0 - 8.4 g/dL Final    Albumin 02/19/2019 3.8  3.5 - 5.2 g/dL Final    Total Bilirubin 02/19/2019 1.3* 0.1 - 1.0 mg/dL Final    Comment: For infants and newborns, interpretation of results should be based  on gestational age, weight and in agreement with clinical  observations.  Premature Infant recommended reference ranges:  Up to 24 hours.............<8.0 mg/dL  Up to 48 hours............<12.0 mg/dL  3-5 days..................<15.0 mg/dL  6-29 days.................<15.0 mg/dL      Alkaline Phosphatase 02/19/2019 89  55 - 135 U/L Final    AST 02/19/2019 23  10 - 40 U/L Final    ALT 02/19/2019 16  10 - 44 U/L Final    Anion Gap 02/19/2019 7* 8 - 16 mmol/L Final    eGFR if African American 02/19/2019 >60  >60 mL/min/1.73 m^2 Final    eGFR if non African American 02/19/2019 >60  >60 mL/min/1.73 m^2 Final    Comment: Calculation used to obtain the estimated glomerular filtration  rate (eGFR) is the CKD-EPI equation.      Office Visit on 01/29/2019   Component Date Value Ref Range Status    Specimen UA 01/29/2019 Urine, Clean Catch   Final    Color, UA 01/29/2019 Yellow  Yellow, Straw, Shameka Final    Appearance, UA 01/29/2019 Clear  Clear Final    pH, UA 01/29/2019 7.0  5.0 - 8.0 Final    Specific Gravity, UA 01/29/2019 1.010  1.005 - 1.030 Final    Protein, UA 01/29/2019 Negative  Negative Final    Comment: Recommend a 24 hour urine protein or a urine   protein/creatinine ratio if globulin induced  proteinuria is  clinically suspected.      Glucose, UA 01/29/2019 Negative  Negative Final    Ketones, UA 01/29/2019 Negative  Negative Final    Bilirubin (UA) 01/29/2019 Negative  Negative Final    Occult Blood UA 01/29/2019 Negative  Negative Final    Nitrite, UA 01/29/2019 Negative  Negative Final    Leukocytes, UA 01/29/2019 Negative  Negative Final    Urine Culture, Routine 01/29/2019    Final                    Value:ESCHERICHIA COLI ESBL  >100,000 cfu/ml     ]    Psychotherapy:  · Target symptoms: anxiety , mood disorder, psychosis, poor sleep  · Why chosen therapy is appropriate versus another modality: relevant to diagnosis, evidence based practice  · Outcome monitoring methods: self-report, observation  · Therapeutic intervention type: supportive psychotherapy  · Topics discussed/themes: work stress, avoiding isolation, improvement in symptoms  · The patient's response to the intervention is accepting. The patient's progress toward treatment goals is good.   · Duration of intervention: 19 minutes.    Review of Systems   PSYCHIATRIC: Pertinant items are noted in the narrative.   GENERAL:  Well developed   SKIN:  No rashes or lacerations  HEAD:  No headache  EYES:  No exophthalmos, jaundice or blindness  EARS:  No hearing loss diagnosed  MOUTH & THROAT:  No dyskinetic movements or obvious goiter  CHEST:  No shortness of breath  CARDIOVASCULAR:  No chest pain  ABDOMEN:  No nausea, vomiting, pain, constipation or diarrhea  URINARY:  No dysuria or sexual dysfunction  MUSCULOSKELETAL:  No tremor, no tic  NEUROLOGIC:  No abnormal movements    Past Medical, Family and Social History: The patient's past medical, family and social history have been reviewed and updated as appropriate within the electronic medical record - see encounter notes.    Compliance: yes    Side effects: None    Risk Parameters:  Patient reports no suicidal ideation  Patient reports no homicidal ideation  Patient reports no  "self-injurious behavior  Patient reports no violent behavior    Exam (detailed: at least 9 elements; comprehensive: all 15 elements)   Constitutional  Vitals:  Most recent vital signs, dated less than 90 days prior to this appointment, were reviewed.   Vitals:    02/26/19 0945   BP: 132/80   Pulse: 88   Weight: 83.4 kg (183 lb 15.6 oz)   Height: 5' 4" (1.626 m)        General:  unremarkable, age appropriate     Musculoskeletal  Muscle Strength/Tone:  no tremor, no tic   Gait & Station:  non-ataxic     Psychiatric  Speech:  no latency; no press   Mood & Affect:  "stable."  congruent and appropriate   Thought Process:  normal and logical   Associations:  intact   Thought Content:  normal, no suicidality, no homicidality, delusions, or paranoia   Insight:  has awareness of illness   Judgement: behavior is adequate to circumstances   Orientation:  grossly intact   Memory: intact for content of interview   Language: grossly intact   Attention Span & Concentration:  able to focus   Fund of Knowledge:  intact and appropriate to age and level of education     Assessment and Diagnosis   Status/Progress: Based on the examination today, the patient's problem(s) is/are improved.  New problems have been presented today.  Diagnosis of OCD personality is added. Lack of compliance are not complicating management of the primary condition.  There are no active rule-out diagnoses for this patient at this time.     General Impression: She has improved.       ICD-10-CM ICD-9-CM   1. Bipolar I disorder, current or most recent episode depressed, with psychotic features with anxious distress F31.5 296.54   2. Mood insomnia F51.05 [id number]    F39    3. 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  · The treatment plan and follow up plan were reviewed with the patient.   1. Safety: Call 911 or " Crisis Line or go to ER for suicidal ideation, adverse effects of medication or any other emergency        2. Olanzapine 5 mg po qhs.        3. Hydroxyzine 25 mg po BID PRN anxiety/sleep.        4. Return to clinic in one month or sooner prn.         5. Discuss klonopin taper with Dr. Martin. Has appt. On March 1, 2019.         6. Start Prozac 10 mg po qd mood disorder and OCD.         7. Add new diagnosis of OCD.       Patient agrees with POC.    INSTRUCTIONS  Instructed to call 911 or Crisis Line or go to ER for suicidal ideation, adverse effects of medication or any other emergency. Verbalizes understanding and plan to comply.    Instructed that THCA can negatively effect her symptoms. Verbalizes understanding.     Instructed on uses, effects, side effects, adverse reactions and benefits vs risks of Prozac with emphasis on risks for suicidality, aidan, serotonin syndrome and delay in feeling effects of medication and instructed on when to seek 911/ER. Verbalizes understanding and plans to comply.      Return to Clinic: 1 month, as needed

## 2019-02-26 NOTE — PATIENT INSTRUCTIONS
"You have been provided with a certain amount of medication with a specified number of refills.  Please follow up within an adequate time before you run out of medications.    REFILLS FOR CONTROLLED SUBSTANCES WILL NOT BE GIVEN WITHOUT AN APPOINTMENT.  I will not honor or fill automated refill requests from pharmacies.  You must come in for an appointment to get refills.    Please book your next appointment for myself or therapist by phone by calling our office at [phone].      PLEASE BE AT LEAST 15 MINUTES EARLY FOR YOUR NEXT APPOINTMENT.  PLEASE, DO NOT BE LATE OR YOU WILL BE TURNED AWAY AND ASKED TO RESCHEDULE.  YOU MUST COME EARLY TO ALLOW TIME FOR CHECK-IN AS WELL AS GET YOUR VITAL SIGNS AND GO OVER YOUR MEDICATIONS.  Tardiness is not fair to the patients who present after you and are on time for their appointments.  It causes a delay in the appointments for patients and staff.  IF YOU ARE LATE, THERE IS A POSSIBILITY THAT YOU WILL BE CHARGED FOR THE APPOINTMENT TIME PERSONALLY AND IT WILL NOT GO TO YOUR INSURANCE.  YOU MAY ALSO BE DISCHARGED FROM CLINIC with multiple "No Show" appointments.  -----------------------------------------------------------------------------------------------------------------  IF YOU FEEL SUICIDAL OR HAVING THOUGHTS OR PLANS TO HURT YOURSELF OR OTHERS, CALL 911 OR REPORT TO THE NEAREST EMERGENCY ROOM.  YOU CAN ALSO ACCESS THE FOLLOWING HOTLINE(S):    National Suicide Hotline Number 6-316-958-TALK (0105)     ([facility] Mobile Crisis, [phone]'   [facility], [phone]; [facility], 24 hours / 7 days, [phone] ([phone]), [phone] ([phone]))     "

## 2019-03-01 ENCOUNTER — OFFICE VISIT (OUTPATIENT)
Dept: FAMILY MEDICINE | Facility: CLINIC | Age: 44
End: 2019-03-01
Payer: COMMERCIAL

## 2019-03-01 ENCOUNTER — PATIENT MESSAGE (OUTPATIENT)
Dept: PLASTIC SURGERY | Facility: CLINIC | Age: 44
End: 2019-03-01

## 2019-03-01 VITALS
SYSTOLIC BLOOD PRESSURE: 128 MMHG | OXYGEN SATURATION: 97 % | HEART RATE: 73 BPM | DIASTOLIC BLOOD PRESSURE: 86 MMHG | HEIGHT: 64 IN | WEIGHT: 187.81 LBS | BODY MASS INDEX: 32.06 KG/M2 | TEMPERATURE: 99 F

## 2019-03-01 DIAGNOSIS — Z01.818 PRE-OP EXAMINATION: Primary | ICD-10-CM

## 2019-03-01 DIAGNOSIS — I10 ESSENTIAL HYPERTENSION: ICD-10-CM

## 2019-03-01 DIAGNOSIS — M79.3 PANNICULITIS: ICD-10-CM

## 2019-03-01 PROCEDURE — 99999 PR PBB SHADOW E&M-EST. PATIENT-LVL IV: ICD-10-PCS | Mod: PBBFAC,,, | Performed by: FAMILY MEDICINE

## 2019-03-01 PROCEDURE — 99214 OFFICE O/P EST MOD 30 MIN: CPT | Mod: S$GLB,,, | Performed by: FAMILY MEDICINE

## 2019-03-01 PROCEDURE — 99999 PR PBB SHADOW E&M-EST. PATIENT-LVL IV: CPT | Mod: PBBFAC,,, | Performed by: FAMILY MEDICINE

## 2019-03-01 PROCEDURE — 93000 ELECTROCARDIOGRAM COMPLETE: CPT | Mod: S$GLB,,, | Performed by: INTERNAL MEDICINE

## 2019-03-01 PROCEDURE — 99214 PR OFFICE/OUTPT VISIT, EST, LEVL IV, 30-39 MIN: ICD-10-PCS | Mod: S$GLB,,, | Performed by: FAMILY MEDICINE

## 2019-03-01 PROCEDURE — 93000 EKG 12-LEAD: ICD-10-PCS | Mod: S$GLB,,, | Performed by: INTERNAL MEDICINE

## 2019-03-01 NOTE — PROGRESS NOTES
Assessment & Plan  Problem List Items Addressed This Visit        Cardiac/Vascular    Essential hypertension    Current Assessment & Plan     Pt is currently stable on medication regimen.  Continue current therapy as scheduled.  Contact office with any questions about adjustments on medications.              Other Visit Diagnoses     Pre-op examination    -  Primary    Relevant Orders    EKG 12-lead    Panniculitis           Patient is medically optimized at this time in order to proceed with surgery.  She may proceed with her surgery.  Patient is stable on her medication regimen.  We will begin her weaning process from clonazepam. Discussed appropriate wean of benzo.  Patient expressed understanding.  Will be off the medication within the next 3 months.  Last two refills should cover the next 3 months.       Health Maintenance reviewed  Follow-up: No Follow-up on file.    ______________________________________________________________________    Chief Complaint  Chief Complaint   Patient presents with    Pre-op Exam       HPI  Albertojose miguel Frye is a 44 y.o. female with multiple medical diagnoses as listed in the medical history and problem list that presents for pre op examination.  Pt is known to me with last appointment 1/29/2019.      Patient denies any chest pain, shortness of breath, dizziness, blurry vision.  Patient presents in the office in order to get evaluated for surgery.  Patient will have a abdominoplasty.      We discussed the weaning process for clonazepam.  She is open to getting off of this medication.    She will begin weaning with her current refill.    PAST MEDICAL HISTORY:  Past Medical History:   Diagnosis Date    Alcohol abuse     stopped heavy drinking about 10 years ago; was drinking 3 glasses of vodka/tequilla,rum/whiskey per day    Anxiety     Depression     Hallucination     Hx of psychiatric care     Hypertension     Caro     unplanned trips, energy without sleep for 2  days (reading, cleaning), feelings that she can do multiple tasks at one time, feelings of overconfidence at times    Psychiatric problem     Sleep difficulties     Therapy     Withdrawal symptoms, drug or narcotic     racing heart, restlessness       PAST SURGICAL HISTORY:  Past Surgical History:   Procedure Laterality Date     lapban surgery  2009    HYSTERECTOMY         SOCIAL HISTORY:  Social History     Socioeconomic History    Marital status:      Spouse name: Not on file    Number of children: 3    Years of education: Not on file    Highest education level: Not on file   Social Needs    Financial resource strain: Not on file    Food insecurity - worry: Not on file    Food insecurity - inability: Not on file    Transportation needs - medical: Not on file    Transportation needs - non-medical: Not on file   Occupational History    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[employer]   Tobacco Use    Smoking status: Former Smoker    Smokeless tobacco: Never Used    Tobacco comment: quit in october 2016   Substance and Sexual Activity    Alcohol use: Yes     Alcohol/week: 0.0 oz     Comment: social presently, excessive 10 years ago    Drug use: Yes     Types: Marijuana     Comment: uses THCA weekly    Sexual activity: Yes     Partners: Male     Birth control/protection: See Surgical Hx   Other Topics Concern    Patient feels they ought to cut down on drinking/drug use No    Patient annoyed by others criticizing their drinking/drug use No    Patient has felt bad or guilty about drinking/drug use No    Patient has had a drink/used drugs as an eye opener in the AM No   Social History Narrative    Has 3 grown sons.    Lives alone.    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       FAMILY HISTORY:  Family History   Problem Relation Age of Onset    Diabetes Mother     Cancer Mother     Hypertension Mother        ALLERGIES AND MEDICATIONS: updated and reviewed.  Review of patient's  allergies indicates:   Allergen Reactions    Morphine Itching and Hallucinations    Pcn [penicillins] Other (See Comments)     Was told from childhood she couldn't take it    Sulfa (sulfonamide antibiotics) Nausea And Vomiting    Latex, natural rubber Rash     Current Outpatient Medications   Medication Sig Dispense Refill    clonazePAM (KLONOPIN) 0.5 MG tablet Take 1 tablet (0.5 mg total) by mouth 2 (two) times daily as needed. 60 tablet 2    FLUoxetine 10 MG capsule Take 1 capsule (10 mg total) by mouth once daily. 30 capsule 0    fluticasone (FLONASE) 50 mcg/actuation nasal spray 1 spray by Each Nare route once daily.      HYDROcodone-acetaminophen (NORCO)  mg per tablet Take 1 tablet by mouth every 12 (twelve) hours as needed for Pain. 40 tablet 0    hydrOXYzine HCl (ATARAX) 25 MG tablet Take 1 tablet (25 mg total) by mouth 2 (two) times daily as needed for Anxiety (sleep). 60 tablet 0    levocetirizine (XYZAL) 5 MG tablet Take 1 tablet (5 mg total) by mouth every evening. 30 tablet 2    OLANZapine (ZYPREXA) 5 MG tablet Take 1 tablet (5 mg total) by mouth every evening. 30 tablet 0    torsemide (DEMADEX) 10 MG Tab Take 2 tablets (20 mg total) by mouth once daily. 60 tablet 2     No current facility-administered medications for this visit.          ROS  Review of Systems   Constitutional: Negative for activity change and unexpected weight change.   HENT: Negative for hearing loss, rhinorrhea and trouble swallowing.    Eyes: Negative for discharge and visual disturbance.   Respiratory: Negative for chest tightness and wheezing.    Cardiovascular: Negative for chest pain and palpitations.   Gastrointestinal: Negative for blood in stool, constipation, diarrhea and vomiting.   Endocrine: Negative for polydipsia and polyuria.   Genitourinary: Negative for difficulty urinating, dysuria, hematuria and menstrual problem.   Musculoskeletal: Positive for arthralgias. Negative for joint swelling and neck  "pain.   Neurological: Negative for weakness and headaches.   Psychiatric/Behavioral: Positive for dysphoric mood. Negative for confusion.           Physical Exam  Vitals:    03/01/19 1116   BP: (!) 142/110   BP Location: Left arm   Patient Position: Sitting   BP Method: Medium (Manual)   Pulse: 73   Temp: 98.9 °F (37.2 °C)   TempSrc: Oral   SpO2: 97%   Weight: 85.2 kg (187 lb 13.3 oz)   Height: 5' 4" (1.626 m)    Body mass index is 32.24 kg/m².  Weight: 85.2 kg (187 lb 13.3 oz)   Height: 5' 4" (162.6 cm)   Physical Exam   Constitutional: She is oriented to person, place, and time. She appears well-developed and well-nourished.   HENT:   Head: Normocephalic and atraumatic.   Right Ear: External ear normal.   Left Ear: External ear normal.   Nose: Nose normal.   Mouth/Throat: Oropharynx is clear and moist.   Eyes: Conjunctivae and EOM are normal. Pupils are equal, round, and reactive to light.   Neck: Normal range of motion. No JVD present. No thyromegaly present.   Cardiovascular: Normal rate, regular rhythm and normal heart sounds.   Pulmonary/Chest: Effort normal and breath sounds normal. She has no wheezes.   Abdominal: Soft. Bowel sounds are normal. She exhibits no distension. There is no tenderness.   Musculoskeletal: Normal range of motion.   Lymphadenopathy:     She has no cervical adenopathy.   Neurological: She is alert and oriented to person, place, and time. She has normal reflexes.   Skin: Skin is warm and dry.   Psychiatric: She has a normal mood and affect. Her behavior is normal. Judgment and thought content normal.   Vitals reviewed.        Health Maintenance       Date Due Completion Date    Mammogram 03/16/2020 3/16/2018    Override on 12/28/2015: Done (Dr[facility] Claudia Harkins[facility]- normal)    Lipid Panel 02/19/2024 2/19/2019    TETANUS VACCINE 09/30/2025 9/30/2015 (Done)    Override on 9/30/2015: Done ([facility])              Patient note was created using MModal.  " Any errors in syntax or even information may not have been identified and edited on initial review prior to signing this note.

## 2019-03-04 ENCOUNTER — TELEPHONE (OUTPATIENT)
Dept: SURGERY | Facility: CLINIC | Age: 44
End: 2019-03-04

## 2019-03-04 NOTE — TELEPHONE ENCOUNTER
Left  for call back regarding her patient advice message.        Plastic & Reconstructive Surgery  Microsurgery  [facility]  c/o Frankie Gibson M.D.  [facility]  [address]  [address] [place] [address]    Work [phone]  Toll free [phone]  If no answer [phone]

## 2019-03-07 ENCOUNTER — TELEPHONE (OUTPATIENT)
Dept: SURGERY | Facility: CLINIC | Age: 44
End: 2019-03-07

## 2019-03-07 ENCOUNTER — PATIENT MESSAGE (OUTPATIENT)
Dept: PLASTIC SURGERY | Facility: CLINIC | Age: 44
End: 2019-03-07

## 2019-03-07 NOTE — TELEPHONE ENCOUNTER
Discussed with patient that we are awaiting insurance authorization.  Explained that her case can be scheduled once approval for panniculectomy is obtained. Her surgery date of 3/22 was cancelled.  I explained that her case will be booked after pre-determination is made.      Plastic & Reconstructive Surgery  Microsurgery  [facility]  c/o Frankie Gibson M.D.  [facility]  [address]  [address] [place] [address]    Work [phone]  Toll free [phone]  If no answer [phone]

## 2019-03-12 ENCOUNTER — TELEPHONE (OUTPATIENT)
Dept: PLASTIC SURGERY | Facility: CLINIC | Age: 44
End: 2019-03-12

## 2019-03-12 NOTE — TELEPHONE ENCOUNTER
spoke with patient and assured pt that as soon as we know something regarding insurance , we would let her know. pt verbalized understanding,

## 2019-03-14 ENCOUNTER — PATIENT MESSAGE (OUTPATIENT)
Dept: FAMILY MEDICINE | Facility: CLINIC | Age: 44
End: 2019-03-14

## 2019-03-14 DIAGNOSIS — M25.50 CHRONIC PAIN OF MULTIPLE JOINTS: Primary | ICD-10-CM

## 2019-03-14 DIAGNOSIS — G89.29 CHRONIC PAIN OF MULTIPLE JOINTS: Primary | ICD-10-CM

## 2019-03-26 ENCOUNTER — OFFICE VISIT (OUTPATIENT)
Dept: PSYCHIATRY | Facility: CLINIC | Age: 44
End: 2019-03-26
Payer: COMMERCIAL

## 2019-03-26 VITALS
HEIGHT: 64 IN | HEART RATE: 67 BPM | BODY MASS INDEX: 31.94 KG/M2 | SYSTOLIC BLOOD PRESSURE: 134 MMHG | WEIGHT: 187.06 LBS | DIASTOLIC BLOOD PRESSURE: 84 MMHG

## 2019-03-26 DIAGNOSIS — F51.05 MOOD INSOMNIA: ICD-10-CM

## 2019-03-26 DIAGNOSIS — F39 MOOD INSOMNIA: ICD-10-CM

## 2019-03-26 DIAGNOSIS — F60.5 OBSESSIVE COMPULSIVE PERSONALITY DISORDER: ICD-10-CM

## 2019-03-26 DIAGNOSIS — F31.5 BIPOLAR I DISORDER, CURRENT OR MOST RECENT EPISODE DEPRESSED, WITH PSYCHOTIC FEATURES WITH ANXIOUS DISTRESS: Primary | ICD-10-CM

## 2019-03-26 PROCEDURE — 99214 PR OFFICE/OUTPT VISIT, EST, LEVL IV, 30-39 MIN: ICD-10-PCS | Mod: S$GLB,,, | Performed by: NURSE PRACTITIONER

## 2019-03-26 PROCEDURE — 99999 PR PBB SHADOW E&M-EST. PATIENT-LVL III: CPT | Mod: PBBFAC,,, | Performed by: NURSE PRACTITIONER

## 2019-03-26 PROCEDURE — 99999 PR PBB SHADOW E&M-EST. PATIENT-LVL III: ICD-10-PCS | Mod: PBBFAC,,, | Performed by: NURSE PRACTITIONER

## 2019-03-26 PROCEDURE — 99214 OFFICE O/P EST MOD 30 MIN: CPT | Mod: S$GLB,,, | Performed by: NURSE PRACTITIONER

## 2019-03-26 RX ORDER — FLUOXETINE HYDROCHLORIDE 20 MG/1
20 CAPSULE ORAL DAILY
Qty: 90 CAPSULE | Refills: 0 | Status: SHIPPED | OUTPATIENT
Start: 2019-03-26 | End: 2019-06-26 | Stop reason: SDUPTHER

## 2019-03-26 RX ORDER — OLANZAPINE 5 MG/1
5 TABLET ORAL NIGHTLY
Qty: 90 TABLET | Refills: 0 | Status: SHIPPED | OUTPATIENT
Start: 2019-03-26 | End: 2019-08-06 | Stop reason: SDUPTHER

## 2019-03-26 RX ORDER — HYDROXYZINE HYDROCHLORIDE 25 MG/1
25 TABLET, FILM COATED ORAL 2 TIMES DAILY PRN
Qty: 180 TABLET | Refills: 0 | Status: SHIPPED | OUTPATIENT
Start: 2019-03-26 | End: 2019-06-26 | Stop reason: SDUPTHER

## 2019-03-26 NOTE — PATIENT INSTRUCTIONS
"You have been provided with a certain amount of medication with a specified number of refills.  Please follow up within an adequate time before you run out of medications.    REFILLS FOR CONTROLLED SUBSTANCES WILL NOT BE GIVEN WITHOUT AN APPOINTMENT.  I will not honor or fill automated refill requests from pharmacies.  You must come in for an appointment to get refills.    Please book your next appointment for myself or therapist by phone by calling our office at [phone].      PLEASE BE AT LEAST 15 MINUTES EARLY FOR YOUR NEXT APPOINTMENT.  PLEASE, DO NOT BE LATE OR YOU WILL BE TURNED AWAY AND ASKED TO RESCHEDULE.  YOU MUST COME EARLY TO ALLOW TIME FOR CHECK-IN AS WELL AS GET YOUR VITAL SIGNS AND GO OVER YOUR MEDICATIONS.  [facility]iness is not fair to the patients who present after you and are on time for their appointments.  It causes a delay in the appointments for patients and staff.  IF YOU ARE LATE, THERE IS A POSSIBILITY THAT YOU WILL BE CHARGED FOR THE APPOINTMENT TIME PERSONALLY AND IT WILL NOT GO TO YOUR INSURANCE.  YOU MAY ALSO BE DISCHARGED FROM CLINIC with multiple "No Show" appointments.  -----------------------------------------------------------------------------------------------------------------  IF YOU FEEL SUICIDAL OR HAVING THOUGHTS OR PLANS TO HURT YOURSELF OR OTHERS, CALL 911 OR REPORT TO THE NEAREST EMERGENCY ROOM.  YOU CAN ALSO ACCESS THE FOLLOWING HOTLINE(S):    National Suicide Hotline Number 1-230-896-TALK (3439)     ([facility] Mobile Crisis, 159.473.1476'   [facility], (251) 457-2402; [place], 24 hours / 7 days, (639) 185-COPE (3610), 1-523-330-COPE (8096))     "

## 2019-03-26 NOTE — PROGRESS NOTES
Outpatient Psychiatry Follow-Up Visit (MD/NP)    3/26/2019    Clinical Status of Patient:  Outpatient (Ambulatory)    Chief Complaint:  Alberto Frye is a 44 y.o. female who presents today for follow-up of mood disorder, anxiety, psychosis and insomnia.  Met with patient.      Interval History and Content of Current Session:  Interim Events/Subjective Report/Content of Current Session: Alberto  was last seen by me on 02/26/2019 for a follow up visit. Alberto was diagnosed with Bipolar I disorder, current or most recent episode depressed, with psychotic features with anxious distress, mood insomnia and OCD. A plan of care was devised to include:    1. Safety: Call 911 or Crisis Line or go to ER for suicidal ideation, adverse effects of medication or any other emergency        2. Olanzapine 5 mg po qhs.        3. Hydroxyzine 25 mg po BID PRN anxiety/sleep.        4. Return to clinic in one month or sooner prn.         5. Discuss klonopin taper with Dr. Martin. Has appt. On March 1, 2019.         6. Start Prozac 10 mg po qd mood disorder and OCD.         7. Add new diagnosis of OCD.     Today Alberto is seen face to face. Her mood has improved. Her psychotic features are gone. Her anxiety has lessened. She is still anxious about 3 days out of the week. She recognizes the places that gives her anxiety. She has identified that her mother's home is a place that makes her anxious. She has never had a close relationship with her mother. She goes on to discuss their complicated relationship.  Her sleep is good. Her appetite is okay but it is never high because of the lap band. Her energy level is good. Her symptoms of OCD have decreased. She states this has gotten much better. She did discuss the klonopin taper with Dr. Martin. She was told to take 1/2 a tab as needed and she will taper off this way. She has been taking the Klonopin about every other day at this point.      Psychotherapy:  · Target symptoms:  "anxiety , mood disorder, psychosis, poor sleep  · Why chosen therapy is appropriate versus another modality: relevant to diagnosis, evidence based practice  · Outcome monitoring methods: self-report, observation  · Therapeutic intervention type: supportive psychotherapy  · Topics discussed/themes: relationships difficulties with mother,   · The patient's response to the intervention is accepting. The patient's progress toward treatment goals is good.   · Duration of intervention: 21 minutes.    Review of Systems   PSYCHIATRIC: Pertinant items are noted in the narrative.   GENERAL:  Well developed   SKIN:  No rashes or lacerations  HEAD:  No headache  EYES:  No exophthalmos, jaundice or blindness  EARS:  No hearing loss diagnosed  MOUTH & THROAT:  No dyskinetic movements or obvious goiter  CHEST:  No shortness of breath  CARDIOVASCULAR:  No chest pain  ABDOMEN:  No nausea, vomiting, pain, constipation or diarrhea  URINARY:  No dysuria or sexual dysfunction  MUSCULOSKELETAL:  No tremor, no tic  NEUROLOGIC:  No abnormal movements    Past Medical, Family and Social History: The patient's past medical, family and social history have been reviewed and updated as appropriate within the electronic medical record - see encounter notes.    Compliance: yes    Side effects: None    Risk Parameters:  Patient reports no suicidal ideation  Patient reports no homicidal ideation  Patient reports no self-injurious behavior  Patient reports no violent behavior    Exam (detailed: at least 9 elements; comprehensive: all 15 elements)   Constitutional  Vitals:  Most recent vital signs, dated less than 90 days prior to this appointment, were reviewed.   Vitals:    03/26/19 0949   BP: 134/84   Pulse: 67   Weight: 84.8 kg (187 lb 1 oz)   Height: 5' 4" (1.626 m)        General:  unremarkable, age appropriate     Musculoskeletal  Muscle Strength/Tone:  no tremor, no tic   Gait & Station:  non-ataxic     Psychiatric  Speech:  no latency; no " "press   Mood & Affect:  "much better."  congruent and appropriate   Thought Process:  normal and logical   Associations:  intact   Thought Content:  normal, no suicidality, no homicidality, delusions, or paranoia   Insight:  has awareness of illness   Judgement: behavior is adequate to circumstances   Orientation:  grossly intact   Memory: intact for content of interview   Language: grossly intact   Attention Span & Concentration:  able to focus   Fund of Knowledge:  intact and appropriate to age and level of education     Assessment and Diagnosis   Status/Progress: Based on the examination today, the patient's problem(s) is/are improved.  New problems have not been presented today. Lack of compliance are not complicating management of the primary condition.  There are no active rule-out diagnoses for this patient at this time.     General Impression: She is doing well.       ICD-10-CM ICD-9-CM   1. Bipolar I disorder, current or most recent episode depressed, with psychotic features with anxious distress F31.5 296.54   2. Mood insomnia F51.05 [id number]    F39    3. 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  · The treatment plan and follow up plan were reviewed with the patient.   1. Safety: Call 911 or Crisis Line or go to ER for suicidal ideation, adverse effects of medication or any other emergency        2. Olanzapine 5 mg po qhs.        3. Hydroxyzine 25 mg po BID PRN anxiety/sleep.        4. Return to clinic in 3 months or sooner prn.         5. Increase Prozac to 20 mg po qd mood disorder, anxiety and OCD.     Patient agrees with POC.    INSTRUCTIONS  Instructed to call 911 or Crisis Line or go to ER for suicidal ideation, adverse effects of medication or any other emergency. Verbalizes understanding and plan to comply.      Return to Clinic: 3 months, as " needed

## 2019-03-27 ENCOUNTER — TELEPHONE (OUTPATIENT)
Dept: FAMILY MEDICINE | Facility: CLINIC | Age: 44
End: 2019-03-27

## 2019-03-27 NOTE — LETTER
March 27, 2019    Alberto Frye  [address]  [address]  [place] [address]             [facility]  [address]  [place] [address]  Phone: [phone]  Fax: [phone] Dear Ms. Frye:    I have been unable to reach you by phone for your appointment to Rheumatology .  Please call me at the clinic [phone] to book your appointment.      If you have any questions or concerns, please don't hesitate to call.    Sincerely,        Yamel Owens MA

## 2019-05-22 ENCOUNTER — PATIENT MESSAGE (OUTPATIENT)
Dept: PSYCHIATRY | Facility: CLINIC | Age: 44
End: 2019-05-22

## 2019-06-26 ENCOUNTER — OFFICE VISIT (OUTPATIENT)
Dept: PSYCHIATRY | Facility: CLINIC | Age: 44
End: 2019-06-26
Payer: COMMERCIAL

## 2019-06-26 VITALS
DIASTOLIC BLOOD PRESSURE: 80 MMHG | SYSTOLIC BLOOD PRESSURE: 132 MMHG | BODY MASS INDEX: 35.66 KG/M2 | WEIGHT: 208.88 LBS | HEART RATE: 86 BPM | HEIGHT: 64 IN

## 2019-06-26 DIAGNOSIS — F39 MOOD INSOMNIA: ICD-10-CM

## 2019-06-26 DIAGNOSIS — F51.05 MOOD INSOMNIA: ICD-10-CM

## 2019-06-26 DIAGNOSIS — F60.5 OBSESSIVE COMPULSIVE PERSONALITY DISORDER: ICD-10-CM

## 2019-06-26 DIAGNOSIS — F31.5 BIPOLAR I DISORDER, CURRENT OR MOST RECENT EPISODE DEPRESSED, WITH PSYCHOTIC FEATURES WITH ANXIOUS DISTRESS: Primary | ICD-10-CM

## 2019-06-26 DIAGNOSIS — Z91.148 NONCOMPLIANCE WITH MEDICATIONS: ICD-10-CM

## 2019-06-26 PROCEDURE — 90833 PR PSYCHOTHERAPY W/PATIENT W/E&M, 30 MIN (ADD ON): ICD-10-PCS | Mod: S$GLB,,, | Performed by: NURSE PRACTITIONER

## 2019-06-26 PROCEDURE — 90833 PSYTX W PT W E/M 30 MIN: CPT | Mod: S$GLB,,, | Performed by: NURSE PRACTITIONER

## 2019-06-26 PROCEDURE — 99999 PR PBB SHADOW E&M-EST. PATIENT-LVL III: ICD-10-PCS | Mod: PBBFAC,,, | Performed by: NURSE PRACTITIONER

## 2019-06-26 PROCEDURE — 99213 OFFICE O/P EST LOW 20 MIN: CPT | Mod: S$GLB,,, | Performed by: NURSE PRACTITIONER

## 2019-06-26 PROCEDURE — 99999 PR PBB SHADOW E&M-EST. PATIENT-LVL III: CPT | Mod: PBBFAC,,, | Performed by: NURSE PRACTITIONER

## 2019-06-26 PROCEDURE — 99213 PR OFFICE/OUTPT VISIT, EST, LEVL III, 20-29 MIN: ICD-10-PCS | Mod: S$GLB,,, | Performed by: NURSE PRACTITIONER

## 2019-06-26 RX ORDER — FLUOXETINE HYDROCHLORIDE 20 MG/1
20 CAPSULE ORAL DAILY
Qty: 30 CAPSULE | Refills: 0 | Status: SHIPPED | OUTPATIENT
Start: 2019-06-26 | End: 2019-08-06 | Stop reason: SDUPTHER

## 2019-06-26 RX ORDER — HYDROXYZINE HYDROCHLORIDE 25 MG/1
25 TABLET, FILM COATED ORAL 2 TIMES DAILY PRN
Qty: 60 TABLET | Refills: 0 | Status: SHIPPED | OUTPATIENT
Start: 2019-06-26 | End: 2019-08-06 | Stop reason: SDUPTHER
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
## 2019-06-26 NOTE — LETTER
June 26, 2019    [address], [address]  [place] [address]  Phone: [phone]  Fax: [phone]         Alberto Alberto Uday  [address]  [address]  [place] [address]        Alberto Frye    Was treated here on 06/26/2019    May Return to work/school on 06/26/2019    No Restrictions        Sincerely,        Eligio Ramsey, ADAMA-C

## 2019-06-26 NOTE — PATIENT INSTRUCTIONS
"You have been provided with a certain amount of medication with a specified number of refills.  Please follow up within an adequate time before you run out of medications.    REFILLS FOR CONTROLLED SUBSTANCES WILL NOT BE GIVEN WITHOUT AN APPOINTMENT.  I will not honor or fill automated refill requests from pharmacies.  You must come in for an appointment to get refills.    Please book your next appointment for myself or therapist by phone by calling our office at [phone].      PLEASE BE AT LEAST 15 MINUTES EARLY FOR YOUR NEXT APPOINTMENT.  PLEASE, DO NOT BE LATE OR YOU WILL BE TURNED AWAY AND ASKED TO RESCHEDULE.  YOU MUST COME EARLY TO ALLOW TIME FOR CHECK-IN AS WELL AS GET YOUR VITAL SIGNS AND GO OVER YOUR MEDICATIONS.  [facility] is not fair to the patients who present after you and are on time for their appointments.  It causes a delay in the appointments for patients and staff.  IF YOU ARE LATE, THERE IS A POSSIBILITY THAT YOU WILL BE CHARGED FOR THE APPOINTMENT TIME PERSONALLY AND IT WILL NOT GO TO YOUR INSURANCE.  YOU MAY ALSO BE DISCHARGED FROM CLINIC with multiple "No Show" appointments.  -----------------------------------------------------------------------------------------------------------------  IF YOU FEEL SUICIDAL OR HAVING THOUGHTS OR PLANS TO HURT YOURSELF OR OTHERS, CALL 911 OR REPORT TO THE NEAREST EMERGENCY ROOM.  YOU CAN ALSO ACCESS THE FOLLOWING HOTLINE(S):    National Suicide Hotline Number 1-818-845-TALK (0051)     ([facility] Mobile Crisis, [phone]'   [place] [facility] Crisis Line, [phone] [phone]; [place]/[facility], 24 hours / 7 days, (770) 853-COPE (4078), 9-918-666-COPE (3747))     TIPS FOR GETTING YOUR PRESCRIPTIONS:    You can always ask your pharmacist the cost of your medications without the use of your insurance. Sometimes the medication will be cheaper if you do not use your insurance.     If you decide you want to have your prescriptions filled at " a different pharmacy, you can always go to the new pharmacy of your choice and have them call the pharmacy where your prescription was sent and they can have your prescription transferred to the new pharmacy.

## 2019-07-16 RX ORDER — TORSEMIDE 10 MG/1
20 TABLET ORAL DAILY
Qty: 60 TABLET | Refills: 2 | Status: ON HOLD | OUTPATIENT
Start: 2019-07-16 | End: 2020-08-12

## 2019-07-23 ENCOUNTER — OFFICE VISIT (OUTPATIENT)
Dept: FAMILY MEDICINE | Facility: CLINIC | Age: 44
End: 2019-07-23
Payer: COMMERCIAL

## 2019-07-23 VITALS
SYSTOLIC BLOOD PRESSURE: 130 MMHG | OXYGEN SATURATION: 99 % | WEIGHT: 210.31 LBS | TEMPERATURE: 98 F | BODY MASS INDEX: 35.9 KG/M2 | HEART RATE: 92 BPM | HEIGHT: 64 IN | DIASTOLIC BLOOD PRESSURE: 82 MMHG

## 2019-07-23 DIAGNOSIS — M54.14 THORACIC AND LUMBOSACRAL NEURITIS: ICD-10-CM

## 2019-07-23 DIAGNOSIS — M54.17 THORACIC AND LUMBOSACRAL NEURITIS: ICD-10-CM

## 2019-07-23 DIAGNOSIS — M47.819 SPONDYLOSIS WITHOUT MYELOPATHY: ICD-10-CM

## 2019-07-23 DIAGNOSIS — S16.1XXA NECK STRAIN, INITIAL ENCOUNTER: ICD-10-CM

## 2019-07-23 PROCEDURE — 99214 OFFICE O/P EST MOD 30 MIN: CPT | Mod: S$GLB,,, | Performed by: FAMILY MEDICINE

## 2019-07-23 PROCEDURE — 99999 PR PBB SHADOW E&M-EST. PATIENT-LVL IV: CPT | Mod: PBBFAC,,, | Performed by: FAMILY MEDICINE

## 2019-07-23 PROCEDURE — 99999 PR PBB SHADOW E&M-EST. PATIENT-LVL IV: ICD-10-PCS | Mod: PBBFAC,,, | Performed by: FAMILY MEDICINE

## 2019-07-23 PROCEDURE — 99214 PR OFFICE/OUTPT VISIT, EST, LEVL IV, 30-39 MIN: ICD-10-PCS | Mod: S$GLB,,, | Performed by: FAMILY MEDICINE

## 2019-07-23 RX ORDER — HYDROCODONE BITARTRATE AND ACETAMINOPHEN 10; 325 MG/1; MG/1
1 TABLET ORAL EVERY 12 HOURS PRN
Qty: 40 TABLET | Refills: 0 | Status: SHIPPED | OUTPATIENT
Start: 2019-07-23 | End: 2019-08-16 | Stop reason: SDUPTHER

## 2019-07-23 RX ORDER — GABAPENTIN 600 MG/1
600 TABLET ORAL NIGHTLY PRN
Qty: 30 TABLET | Refills: 0 | Status: SHIPPED | OUTPATIENT
Start: 2019-07-23 | End: 2019-09-10 | Stop reason: SDUPTHER

## 2019-07-23 RX ORDER — CYCLOBENZAPRINE HCL 10 MG
10 TABLET ORAL 3 TIMES DAILY PRN
Qty: 30 TABLET | Refills: 0 | Status: SHIPPED | OUTPATIENT
Start: 2019-07-23 | End: 2019-08-02

## 2019-07-23 NOTE — PROGRESS NOTES
Assessment & Plan  Problem List Items Addressed This Visit     None      Visit Diagnoses     Spondylosis without myelopathy        Relevant Medications    HYDROcodone-acetaminophen (NORCO)  mg per tablet    gabapentin (NEURONTIN) 600 MG tablet    cyclobenzaprine (FLEXERIL) 10 MG tablet    Thoracic and lumbosacral neuritis        Relevant Medications    HYDROcodone-acetaminophen (NORCO)  mg per tablet    gabapentin (NEURONTIN) 600 MG tablet    cyclobenzaprine (FLEXERIL) 10 MG tablet    Neck strain, initial encounter        Relevant Medications    HYDROcodone-acetaminophen (NORCO)  mg per tablet            Health Maintenance reviewed.    Follow-up: Follow up in about 2 weeks (around 8/6/2019).    ______________________________________________________________________    Chief Complaint  Chief Complaint   Patient presents with    Back Pain     about three weeks       HPI  Alberto Frye is a 44 y.o. female with multiple medical diagnoses as listed in the medical history and problem list that presents for back pain for 3 weeks.  Pt is known to me with last appointment 3/1/2019.      Answers for HPI/ROS submitted by the patient on 7/22/2019   Back pain  Chronicity: recurrent  Onset: 1 to 4 weeks ago  Frequency: constantly  Progression since onset: waxing and waning  Pain location: gluteal, sacro-iliac  Pain quality: aching, burning, shooting, stabbing  Radiates to: left thigh, right thigh  Pain - numeric: 10/10  Pain is: the same all the time  Aggravated by: lying down, sitting, standing  Stiffness is present: all day  bladder incontinence: No  bowel incontinence: No  leg pain: Yes  paresis: No  paresthesias: Yes  perianal numbness: No  tingling: No  weight loss: No  genital pain: No  Pain severity: severe  Treatments tried: analgesics, NSAIDs, bed rest, heat, home exercises, muscle relaxant, walking  Improvement on treatment: mild  3 weeks ago that pain is located to the mid portion of her  back.  It does radiate down her leg.  She has used flexeril and acetaminophen.        PAST MEDICAL HISTORY:  Past Medical History:   Diagnosis Date    Alcohol abuse     stopped heavy drinking about 10 years ago; was drinking 3 glasses of vodka/tequilla,rum/whiskey per day    Anxiety     Depression     Hallucination     Hx of psychiatric care     Hypertension     Caro     unplanned trips, energy without sleep for 2 days (reading, cleaning), feelings that she can do multiple tasks at one time, feelings of overconfidence at times    Psychiatric problem     Sleep difficulties     Therapy     Withdrawal symptoms, drug or narcotic     racing heart, restlessness       PAST SURGICAL HISTORY:  Past Surgical History:   Procedure Laterality Date     lapban surgery  2009    HYSTERECTOMY         SOCIAL HISTORY:  Social History     Socioeconomic History    Marital status:      Spouse name: Not on file    Number of children: 3    Years of education: Not on file    Highest education level: Not on file   Occupational History    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[employer]   Social Needs    Financial resource strain: Not on file    Food insecurity:     Worry: Not on file     Inability: Not on file    Transportation needs:     Medical: Not on file     Non-medical: Not on file   Tobacco Use    Smoking status: Former Smoker    Smokeless tobacco: Never Used    Tobacco comment: quit in october 2016   Substance and Sexual Activity    Alcohol use: Yes     Alcohol/week: 0.0 oz     Comment: social presently, excessive 10 years ago    Drug use: Yes     Types: Marijuana     Comment: uses THCA weekly    Sexual activity: Yes     Partners: Male     Birth control/protection: See Surgical Hx   Lifestyle    Physical activity:     Days per week: 3 days     Minutes per session: 60 min    Stress: To some extent   Relationships    Social connections:     Talks on phone: Three times a week     Gets  together: Patient refused     Attends Gnosticism service: Not on file     Active member of club or organization: No     Attends meetings of clubs or organizations: Patient refused     Relationship status: Patient refused   Other Topics Concern    Patient feels they ought to cut down on drinking/drug use No    Patient annoyed by others criticizing their drinking/drug use No    Patient has felt bad or guilty about drinking/drug use No    Patient has had a drink/used drugs as an eye opener in the AM No   Social History Narrative    Has 3 grown sons.    Lives alone.    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       FAMILY HISTORY:  Family History   Problem Relation Age of Onset    Diabetes Mother     Cancer Mother     Hypertension Mother        ALLERGIES AND MEDICATIONS: updated and reviewed.  Review of patient's allergies indicates:   Allergen Reactions    Morphine Itching and Hallucinations    Pcn [penicillins] Other (See Comments)     Was told from childhood she couldn't take it    Sulfa (sulfonamide antibiotics) Nausea And Vomiting    Latex, natural rubber Rash     Current Outpatient Medications   Medication Sig Dispense Refill    cyclobenzaprine (FLEXERIL) 10 MG tablet Take 1 tablet (10 mg total) by mouth 3 (three) times daily as needed for Muscle spasms. 30 tablet 0    FLUoxetine 20 MG capsule Take 1 capsule (20 mg total) by mouth once daily. 30 capsule 0    fluticasone (FLONASE) 50 mcg/actuation nasal spray 1 spray by Each Nare route once daily.      gabapentin (NEURONTIN) 600 MG tablet Take 1 tablet (600 mg total) by mouth nightly as needed. 30 tablet 0    HYDROcodone-acetaminophen (NORCO)  mg per tablet Take 1 tablet by mouth every 12 (twelve) hours as needed for Pain. 40 tablet 0    hydrOXYzine HCl (ATARAX) 25 MG tablet Take 1 tablet (25 mg total) by mouth 2 (two) times daily as needed for Anxiety (sleep). 60 tablet 0    levocetirizine (XYZAL) 5 MG tablet Take 1 tablet (5 mg total) by mouth  "every evening. 30 tablet 2    OLANZapine (ZYPREXA) 5 MG tablet Take 1 tablet (5 mg total) by mouth every evening. 90 tablet 0    torsemide (DEMADEX) 10 MG Tab Take 2 tablets (20 mg total) by mouth once daily. 60 tablet 2     No current facility-administered medications for this visit.          ROS  Review of Systems   Constitutional: Negative for fever.   Cardiovascular: Negative for chest pain.   Gastrointestinal: Negative for abdominal pain.   Genitourinary: Negative for dysuria, hematuria and pelvic pain.   Musculoskeletal: Positive for back pain.   Neurological: Positive for weakness. Negative for numbness and headaches.           Physical Exam  Vitals:    07/23/19 1453   BP: 130/82   BP Location: Left arm   Patient Position: Sitting   BP Method: Large (Manual)   Pulse: 92   Temp: 98.1 °F (36.7 °C)   TempSrc: Oral   SpO2: 99%   Weight: 95.4 kg (210 lb 5.1 oz)   Height: 5' 4" (1.626 m)    Body mass index is 36.1 kg/m².  Weight: 95.4 kg (210 lb 5.1 oz)   Height: 5' 4" (162.6 cm)   Physical Exam   Constitutional: She is oriented to person, place, and time. She appears well-developed and well-nourished.   HENT:   Head: Normocephalic and atraumatic.   Right Ear: External ear normal.   Left Ear: External ear normal.   Nose: Nose normal.   Mouth/Throat: Oropharynx is clear and moist.   Eyes: Pupils are equal, round, and reactive to light. Conjunctivae and EOM are normal.   Musculoskeletal:   Straight leg negative bilaterally.  Pain with extension of the hip on left and right.     Neurological: She is alert and oriented to person, place, and time.   Skin: Skin is warm and dry.   Vitals reviewed.        Health Maintenance       Date Due Completion Date    Influenza Vaccine 08/01/2019 10/8/2018    Override on 12/13/2016: Done (done at work)    Mammogram 03/16/2020 3/16/2018    Override on 12/28/2015: Done (Dr[facility] Claudia H[facility]ins[facility]- normal)    Lipid Panel 02/19/2024 2/19/2019    TETANUS VACCINE " 09/30/2025 9/30/2015 (Done)    Override on 9/30/2015: Done ([facility])              Patient note was created using [employer].  Any errors in syntax or even information may not have been identified and edited on initial review prior to signing this note.

## 2019-07-24 ENCOUNTER — PATIENT MESSAGE (OUTPATIENT)
Dept: FAMILY MEDICINE | Facility: CLINIC | Age: 44
End: 2019-07-24

## 2019-07-24 NOTE — TELEPHONE ENCOUNTER
Pharmacist/ Soraya said that she needed to clarify that Patient was not still taking Clonazepam before she could refill her Norco.  I verified that the Clonazepam has been discontinued.  She verbalized understanding.

## 2019-07-29 DIAGNOSIS — F60.5 OBSESSIVE COMPULSIVE PERSONALITY DISORDER: ICD-10-CM

## 2019-07-29 DIAGNOSIS — F31.5 BIPOLAR I DISORDER, CURRENT OR MOST RECENT EPISODE DEPRESSED, WITH PSYCHOTIC FEATURES WITH ANXIOUS DISTRESS: ICD-10-CM

## 2019-07-29 RX ORDER — FLUOXETINE HYDROCHLORIDE 20 MG/1
CAPSULE ORAL
Qty: 30 CAPSULE | Refills: 0 | OUTPATIENT
Start: 2019-07-29

## 2019-08-06 ENCOUNTER — OFFICE VISIT (OUTPATIENT)
Dept: PSYCHIATRY | Facility: CLINIC | Age: 44
End: 2019-08-06
Payer: COMMERCIAL

## 2019-08-06 VITALS
DIASTOLIC BLOOD PRESSURE: 92 MMHG | HEIGHT: 64 IN | BODY MASS INDEX: 36.17 KG/M2 | HEART RATE: 82 BPM | WEIGHT: 211.88 LBS | SYSTOLIC BLOOD PRESSURE: 146 MMHG

## 2019-08-06 DIAGNOSIS — F31.5 BIPOLAR I DISORDER, CURRENT OR MOST RECENT EPISODE DEPRESSED, WITH PSYCHOTIC FEATURES WITH ANXIOUS DISTRESS: ICD-10-CM

## 2019-08-06 DIAGNOSIS — F39 MOOD INSOMNIA: ICD-10-CM

## 2019-08-06 DIAGNOSIS — F60.5 OBSESSIVE COMPULSIVE PERSONALITY DISORDER: ICD-10-CM

## 2019-08-06 DIAGNOSIS — F51.05 MOOD INSOMNIA: ICD-10-CM

## 2019-08-06 PROCEDURE — 99213 PR OFFICE/OUTPT VISIT, EST, LEVL III, 20-29 MIN: ICD-10-PCS | Mod: S$GLB,,, | Performed by: NURSE PRACTITIONER

## 2019-08-06 PROCEDURE — 99999 PR PBB SHADOW E&M-EST. PATIENT-LVL III: ICD-10-PCS | Mod: PBBFAC,,, | Performed by: NURSE PRACTITIONER

## 2019-08-06 PROCEDURE — 99213 OFFICE O/P EST LOW 20 MIN: CPT | Mod: S$GLB,,, | Performed by: NURSE PRACTITIONER

## 2019-08-06 PROCEDURE — 99999 PR PBB SHADOW E&M-EST. PATIENT-LVL III: CPT | Mod: PBBFAC,,, | Performed by: NURSE PRACTITIONER

## 2019-08-06 RX ORDER — HYDROXYZINE HYDROCHLORIDE 25 MG/1
25 TABLET, FILM COATED ORAL 2 TIMES DAILY PRN
Qty: 180 TABLET | Refills: 0 | Status: SHIPPED | OUTPATIENT
Start: 2019-08-06 | End: 2019-12-10 | Stop reason: SDUPTHER

## 2019-08-06 RX ORDER — FLUOXETINE HYDROCHLORIDE 20 MG/1
20 CAPSULE ORAL DAILY
Qty: 90 CAPSULE | Refills: 0 | Status: SHIPPED | OUTPATIENT
Start: 2019-08-06 | End: 2019-12-10 | Stop reason: SDUPTHER

## 2019-08-06 RX ORDER — OLANZAPINE 5 MG/1
5 TABLET ORAL NIGHTLY
Qty: 90 TABLET | Refills: 0 | Status: SHIPPED | OUTPATIENT
Start: 2019-08-06 | End: 2019-12-10 | Stop reason: SDUPTHER

## 2019-08-06 NOTE — PATIENT INSTRUCTIONS
"You have been provided with a certain amount of medication with a specified number of refills.  Please follow up within an adequate time before you run out of medications.    REFILLS FOR CONTROLLED SUBSTANCES WILL NOT BE GIVEN WITHOUT AN APPOINTMENT.  I will not honor or fill automated refill requests from pharmacies.  You must come in for an appointment to get refills.    Please book your next appointment for myself or therapist by phone by calling our office at [phone].      PLEASE BE AT LEAST 15 MINUTES EARLY FOR YOUR NEXT APPOINTMENT.  PLEASE, DO NOT BE LATE OR YOU WILL BE TURNED AWAY AND ASKED TO RESCHEDULE.  YOU MUST COME EARLY TO ALLOW TIME FOR CHECK-IN AS WELL AS GET YOUR VITAL SIGNS AND GO OVER YOUR MEDICATIONS.  [facility] is not fair to the patients who present after you and are on time for their appointments.  It causes a delay in the appointments for patients and staff.  IF YOU ARE LATE, THERE IS A POSSIBILITY THAT YOU WILL BE CHARGED FOR THE APPOINTMENT TIME PERSONALLY AND IT WILL NOT GO TO YOUR INSURANCE.  YOU MAY ALSO BE DISCHARGED FROM CLINIC with multiple "No Show" appointments.  -----------------------------------------------------------------------------------------------------------------  IF YOU FEEL SUICIDAL OR HAVING THOUGHTS OR PLANS TO HURT YOURSELF OR OTHERS, CALL 911 OR REPORT TO THE NEAREST EMERGENCY ROOM.  YOU CAN ALSO ACCESS THE FOLLOWING HOTLINE(S):    National Suicide Hotline Number 5-631-259-TALK (2657)     ([facility] Mobile Crisis, [phone]'   [place] [facility] Crisis Line, [phone]) [phone]; [place]/[facility], 24 hours / 7 days, (114) 733-COPE (0575), [phone]-COPE ([phone]))     TIPS FOR GETTING YOUR PRESCRIPTIONS:    You can always ask your pharmacist the cost of your medications without the use of your insurance. Sometimes the medication will be cheaper if you do not use your insurance.     If you decide you want to have your prescriptions filled at " a different pharmacy, you can always go to the new pharmacy of your choice and have them call the pharmacy where your prescription was sent and they can have your prescription transferred to the new pharmacy.

## 2019-08-06 NOTE — PROGRESS NOTES
Outpatient Psychiatry Follow-Up Visit (MD/NP)    8/6/2019    Clinical Status of Patient:  Outpatient (Ambulatory)    Chief Complaint:  Alberto Frye is a 44 y.o. female who presents today for follow-up of mood disorder, anxiety, psychosis and insomnia.  Met with patient.      Interval History and Content of Current Session:  Interim Events/Subjective Report/Content of Current Session: Alberto  was last seen by me on 06/26/2019 for a follow up visit. Alberto was diagnosed with Bipolar I disorder, current or most recent episode depressed, with psychotic features with anxious distress, mood insomnia and OCD. She was noncompliant and symptomatic. A plan of care was devised to include:    1. Safety: Call 911 or Crisis Line or go to ER for suicidal ideation, adverse effects of medication or any other emergency        2. Olanzapine 5 mg po qhs.         3. Hydroxyzine HCL 25 mg po BID PRN anxiety/sleep.        4. Return to clinic in 2 weeks or sooner prn.         5. Prozac 20 mg po qd mood disorder, anxiety and OCD.     Today Alberto is seen face to face. She states she feels better but her back is bothering her again. She did see her primary care and was put on gabapentin and flexeril and was told she might have to get an MRI if it doesn't feel better. She states that the pain is disrupting her sleep. Her appetite is okay but she has an appointment to have her lapband evaluated because she can keep more food down than she use to keep down. Her energy level has improved since she started exercising. She goes 4 times a week now. Her mood is negatively influenced by her back pain. It is better overall. She has a new car and loves it. She perseverates about her back pain during the entire interview. She denies psychotic features. She states she feels anxious at times but states she believes its from caffeine. She drinks one cup of coffee a day and feels nervous when she drinks it. She has stopped drinking the  "Sprite. She is drinking more water. Her OCD symptoms have improved.        Psychotherapy:  · Target symptoms: anxiety , mood disorder, psychosis, poor sleep  · Why chosen therapy is appropriate versus another modality: relevant to diagnosis, evidence based practice  · Outcome monitoring methods: self-report, observation  · Therapeutic intervention type: supportive psychotherapy  · Topics discussed/themes: diet, exercise, medication compliance, symptom recognition   · The patient's response to the intervention is accepting. The patient's progress toward treatment goals is good.  · Duration of intervention: 20 minutes.    Review of Systems   PSYCHIATRIC: Pertinant items are noted in the narrative.   GENERAL:  Well developed   SKIN:  No rashes or lacerations  HEAD:  No headache  EYES:  No exophthalmos, jaundice or blindness  EARS:  No hearing loss diagnosed  MOUTH & THROAT:  No dyskinetic movements or obvious goiter  CHEST:  No shortness of breath  CARDIOVASCULAR:  No chest pain  ABDOMEN:  No nausea, vomiting, pain, constipation or diarrhea  URINARY:  No dysuria   MUSCULOSKELETAL:  No tremor, no tic  NEUROLOGIC:  No abnormal movements    Past Medical, Family and Social History: The patient's past medical, family and social history have been reviewed and updated as appropriate within the electronic medical record - see encounter notes.    Compliance: yes    Side effects: None    Risk Parameters:  Patient reports no suicidal ideation  Patient reports no homicidal ideation  Patient reports no self-injurious behavior  Patient reports no violent behavior    Exam (detailed: at least 9 elements; comprehensive: all 15 elements)   Constitutional  Vitals:  Most recent vital signs, dated less than 90 days prior to this appointment, were reviewed.   Vitals:    08/06/19 1037   BP: (!) 146/92   Pulse: 82   Weight: 96.1 kg (211 lb 13.8 oz)   Height: 5' 4" (1.626 m)        General:  unremarkable, age appropriate " "    Musculoskeletal  Muscle Strength/Tone:  no tremor, no tic   Gait & Station:  non-ataxic     Psychiatric  Speech:  no latency; no press   Mood & Affect:  "better."  congruent and appropriate   Thought Process:  normal and logical   Associations:  intact   Thought Content:  normal, no suicidality, no homicidality, delusions, or paranoia   Insight:  has awareness of illness   Judgement: behavior is adequate to circumstances   Orientation:  grossly intact   Memory: intact for content of interview   Language: grossly intact   Attention Span & Concentration:  able to focus   Fund of Knowledge:  intact and appropriate to age and level of education     Assessment and Diagnosis   Status/Progress: Based on the examination today, the patient's problem(s) is/are improved.  New problems have not been presented today. Lack of compliance are not complicating management of the primary condition.  There are no active rule-out diagnoses for this patient at this time.     General Impression: She has improved.      ICD-10-CM ICD-9-CM   1. Bipolar I disorder, current or most recent episode depressed, with psychotic features with anxious distress F31.5 296.54   2. Mood insomnia F51.05 [id number]    F39    3. 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  · The treatment plan and follow up plan were reviewed with the patient.   1. Safety: Call 911 or Crisis Line or go to ER for suicidal ideation, adverse effects of medication or any other emergency        2. Olanzapine 5 mg po qhs.         3. Hydroxyzine HCL 25 mg po BID PRN anxiety/sleep.        4. Return to clinic in 3 months or sooner prn.         5. Prozac 20 mg po qd mood disorder, anxiety and OCD.     Patient agrees with POC.    INSTRUCTIONS  Instructed to call 911 or Crisis Line or go to ER for suicidal ideation, adverse effects of " medication or any other emergency. Verbalizes understanding and plan to comply.      Return to Clinic: 3 months, as needed

## 2019-08-14 ENCOUNTER — PATIENT MESSAGE (OUTPATIENT)
Dept: FAMILY MEDICINE | Facility: CLINIC | Age: 44
End: 2019-08-14

## 2019-08-14 DIAGNOSIS — M54.14 THORACIC AND LUMBOSACRAL NEURITIS: ICD-10-CM

## 2019-08-14 DIAGNOSIS — M47.819 SPONDYLOSIS WITHOUT MYELOPATHY: ICD-10-CM

## 2019-08-14 DIAGNOSIS — M54.17 THORACIC AND LUMBOSACRAL NEURITIS: ICD-10-CM

## 2019-08-14 DIAGNOSIS — S16.1XXA NECK STRAIN, INITIAL ENCOUNTER: ICD-10-CM

## 2019-08-14 NOTE — TELEPHONE ENCOUNTER
Patient last office visit was 07/23/2019     Please advise patient medication refill request.      Thanks,  Lara

## 2019-08-16 RX ORDER — HYDROCODONE BITARTRATE AND ACETAMINOPHEN 10; 325 MG/1; MG/1
1 TABLET ORAL EVERY 12 HOURS PRN
Qty: 40 TABLET | Refills: 0 | Status: SHIPPED | OUTPATIENT
Start: 2019-08-16 | End: 2020-04-22 | Stop reason: SDUPTHER

## 2019-08-16 NOTE — TELEPHONE ENCOUNTER
Alberto Frye Nichole T., MD 2 days ago         Good morning I am requesting a refill of my [place]. [facility] only filled the rx for 14 pills. I had explained their protocol to you. Even though you had written the rx for 40, they only could dispense 14 due to the rx not being filled for some time. Now I will be able to get however many you prescribe. Thank you.

## 2019-09-10 DIAGNOSIS — M54.17 THORACIC AND LUMBOSACRAL NEURITIS: ICD-10-CM

## 2019-09-10 DIAGNOSIS — M54.14 THORACIC AND LUMBOSACRAL NEURITIS: ICD-10-CM

## 2019-09-10 DIAGNOSIS — M47.819 SPONDYLOSIS WITHOUT MYELOPATHY: ICD-10-CM

## 2019-09-10 RX ORDER — GABAPENTIN 600 MG/1
600 TABLET ORAL NIGHTLY PRN
Qty: 30 TABLET | Refills: 0 | Status: SHIPPED | OUTPATIENT
Start: 2019-09-10 | End: 2020-01-16 | Stop reason: SDUPTHER

## 2019-09-20 ENCOUNTER — TELEPHONE (OUTPATIENT)
Dept: PSYCHIATRY | Facility: CLINIC | Age: 44
End: 2019-09-20

## 2019-09-20 NOTE — TELEPHONE ENCOUNTER
Called patient to reschedule her appointment with Eliigo Ramsey NP on 11-6-19 as provider will be out that day. LVM to let patient know that we will cancel the appointment, but asked that she please call us back to reschedule or if easier for her she can reschedule through patient portal. We have called patient several times and left messages regarding this appointment.

## 2019-11-05 ENCOUNTER — PATIENT MESSAGE (OUTPATIENT)
Dept: ADMINISTRATIVE | Facility: OTHER | Age: 44
End: 2019-11-05

## 2019-11-08 DIAGNOSIS — F60.5 OBSESSIVE COMPULSIVE PERSONALITY DISORDER: ICD-10-CM

## 2019-11-08 DIAGNOSIS — F31.5 BIPOLAR I DISORDER, CURRENT OR MOST RECENT EPISODE DEPRESSED, WITH PSYCHOTIC FEATURES WITH ANXIOUS DISTRESS: ICD-10-CM

## 2019-11-08 RX ORDER — FLUOXETINE HYDROCHLORIDE 20 MG/1
CAPSULE ORAL
Qty: 90 CAPSULE | Refills: 0 | OUTPATIENT
Start: 2019-11-08

## 2019-12-09 NOTE — PROGRESS NOTES
"Outpatient Psychiatry Follow-Up Visit (MD/NP)    12/10/2019    Clinical Status of Patient:  Outpatient (Ambulatory)    Chief Complaint:  Alberto Frye is a 44 y.o. female who presents today for follow-up of mood disorder, anxiety, psychosis and insomnia.  Met with patient.      Interval History and Content of Current Session:  Interim Events/Subjective Report/Content of Current Session: Alberto  was last seen by me on 08/06/2019 for a follow up visit. Alberto was diagnosed with Bipolar I disorder, current or most recent episode depressed, with psychotic features with anxious distress, mood insomnia and OCD. She had improved. A plan of care was devised to include:    1. Safety: Call 911 or Crisis Line or go to ER for suicidal ideation, adverse effects of medication or any other emergency        2. Olanzapine 5 mg po qhs.         3. Hydroxyzine HCL 25 mg po BID PRN anxiety/sleep.        4. Return to clinic in 3 months or sooner prn.         5. Prozac 20 mg po qd mood disorder, anxiety and OCD.      Today Alberto is seen face to face. Her mood is okay. She is busy at work today but she states she has been at peace. Her psychotic features are mostly gone. However three days ago she heard "hello" and she was alone. Her anxiety continues but she states she is out of her medication and attributes the anxiety to this as it was under control when she was taking her medication. She has been out of her medication for a week or week and a half now. Her OCD symptoms are under control. Her appetite is okay. She just got her lapband readjusted. She is not drinking sodas and is not eating chips anymore. She has a lapband follow up Wednesday. She is exercising three times a week on the treadmill for an hour each day. Her energy level is good. She has restarted her MVI and B12. Her sleep is good though she takes the hydroxyzine to help.       Psychotherapy:  · Target symptoms: anxiety , mood disorder, psychosis, poor " "sleep  · Why chosen therapy is appropriate versus another modality: relevant to diagnosis, evidence based practice  · Outcome monitoring methods: self-report, observation  · Therapeutic intervention type: supportive psychotherapy  · Topics discussed/themes: diet, exercise, medication compliance, symptom recognition and improvement  · The patient's response to the intervention is accepting. The patient's progress toward treatment goals is good  · Duration of intervention: 18minutes.    Review of Systems   PSYCHIATRIC: Pertinant items are noted in the narrative.   GENERAL:  Well developed   SKIN:  No rashes or lacerations  HEAD:  No headache  EYES:  No exophthalmos, jaundice or blindness  EARS:  No hearing loss diagnosed  MOUTH & THROAT:  No dyskinetic movements or obvious goiter  CHEST:  No shortness of breath  CARDIOVASCULAR:  No chest pain  ABDOMEN:  No nausea, vomiting, pain, constipation or diarrhea  URINARY:  No dysuria   MUSCULOSKELETAL:  No tremor, no tic  NEUROLOGIC:  No abnormal movements    Past Medical, Family and Social History: The patient's past medical, family and social history have been reviewed and updated as appropriate within the electronic medical record - see encounter notes.    Compliance: yes until she ran out of medication    Side effects: None    Risk Parameters:  Patient reports no suicidal ideation  Patient reports no homicidal ideation  Patient reports no self-injurious behavior  Patient reports no violent behavior    Exam (detailed: at least 9 elements; comprehensive: all 15 elements)   Constitutional  Vitals:  Most recent vital signs, dated less than 90 days prior to this appointment, were reviewed.   Vitals:    12/10/19 1017   BP: 126/78   Pulse: 78   Weight: 106.1 kg (233 lb 12.8 oz)   Height: 5' 4" (1.626 m)        General:  unremarkable, age appropriate     Musculoskeletal  Muscle Strength/Tone:  no tremor, no tic   Gait & Station:  non-ataxic     Psychiatric  Speech:  no latency; " "no press   Mood & Affect:  "good."  congruent and appropriate   Thought Process:  normal and logical   Associations:  intact   Thought Content:  normal, no suicidality, no homicidality, delusions, or paranoia, AH heard three days ago after being off of medication for a few days   Insight:  has awareness of illness   Judgement: behavior is adequate to circumstances   Orientation:  grossly intact   Memory: intact for content of interview   Language: grossly intact   Attention Span & Concentration:  able to focus   Fund of Knowledge:  intact and appropriate to age and level of education     Assessment and Diagnosis   Status/Progress: Based on the examination today, the patient's problem(s) is/are improved.  New problems have not been presented today. Lack of compliance due to running out of medication is complicating management of the primary condition.  There are no active rule-out diagnoses for this patient at this time.     General Impression: she is doing well but has been out of her medication for a week or so and is having some mild symptoms.       ICD-10-CM ICD-9-CM   1. Bipolar I disorder, current or most recent episode depressed, with psychotic features with anxious distress F31.5 296.54   2. Mood insomnia F51.05 [id number]    F39    3. 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  · The treatment plan and follow up plan were reviewed with the patient.   1. Safety: Call 911 or Crisis Line or go to ER for suicidal ideation, adverse effects of medication or any other emergency        2. Continue Olanzapine 5 mg po qhs to target symptoms of mood disorder, anxiety and poor sleep.         3. Continue Hydroxyzine HCL 25 mg po BID PRN anxiety/sleep.        4. Return to clinic in 6 months or sooner prn.         5. Continue Prozac 20 mg po qd mood disorder, anxiety and OCD. "     Patient agrees with POC.    INSTRUCTIONS  Instructed to call 911 or Crisis Line or go to ER for suicidal ideation, adverse effects of medication or any other emergency. Verbalizes understanding and plan to comply.    Instructed to contact provider either through her [facility] account or by calling [phone] prior to running out of her medication. Verbalizes understanding and plan to comply.    Return to Clinic: 6 months, as needed

## 2019-12-10 ENCOUNTER — OFFICE VISIT (OUTPATIENT)
Dept: PSYCHIATRY | Facility: CLINIC | Age: 44
End: 2019-12-10
Payer: COMMERCIAL

## 2019-12-10 VITALS
HEART RATE: 78 BPM | DIASTOLIC BLOOD PRESSURE: 78 MMHG | HEIGHT: 64 IN | SYSTOLIC BLOOD PRESSURE: 126 MMHG | WEIGHT: 233.81 LBS | BODY MASS INDEX: 39.91 KG/M2

## 2019-12-10 DIAGNOSIS — F60.5 OBSESSIVE COMPULSIVE PERSONALITY DISORDER: ICD-10-CM

## 2019-12-10 DIAGNOSIS — F39 MOOD INSOMNIA: ICD-10-CM

## 2019-12-10 DIAGNOSIS — F51.05 MOOD INSOMNIA: ICD-10-CM

## 2019-12-10 DIAGNOSIS — F31.5 BIPOLAR I DISORDER, CURRENT OR MOST RECENT EPISODE DEPRESSED, WITH PSYCHOTIC FEATURES WITH ANXIOUS DISTRESS: Primary | ICD-10-CM

## 2019-12-10 PROCEDURE — 99999 PR PBB SHADOW E&M-EST. PATIENT-LVL III: CPT | Mod: PBBFAC,,, | Performed by: NURSE PRACTITIONER

## 2019-12-10 PROCEDURE — 99999 PR PBB SHADOW E&M-EST. PATIENT-LVL III: ICD-10-PCS | Mod: PBBFAC,,, | Performed by: NURSE PRACTITIONER

## 2019-12-10 PROCEDURE — 99213 OFFICE O/P EST LOW 20 MIN: CPT | Mod: S$GLB,,, | Performed by: NURSE PRACTITIONER

## 2019-12-10 PROCEDURE — 99213 PR OFFICE/OUTPT VISIT, EST, LEVL III, 20-29 MIN: ICD-10-PCS | Mod: S$GLB,,, | Performed by: NURSE PRACTITIONER

## 2019-12-10 RX ORDER — OLANZAPINE 5 MG/1
5 TABLET ORAL NIGHTLY
Qty: 90 TABLET | Refills: 1 | Status: ON HOLD | OUTPATIENT
Start: 2019-12-10 | End: 2020-08-12

## 2019-12-10 RX ORDER — HYDROXYZINE HYDROCHLORIDE 50 MG/1
50 TABLET, FILM COATED ORAL DAILY PRN
Qty: 90 TABLET | Refills: 1 | Status: SHIPPED | OUTPATIENT
Start: 2019-12-10 | End: 2020-08-11 | Stop reason: SDUPTHER

## 2019-12-10 RX ORDER — FLUOXETINE HYDROCHLORIDE 20 MG/1
20 CAPSULE ORAL DAILY
Qty: 90 CAPSULE | Refills: 1 | Status: SHIPPED | OUTPATIENT
Start: 2019-12-10 | End: 2020-03-27 | Stop reason: SDUPTHER

## 2019-12-10 RX ORDER — LANOLIN ALCOHOL/MO/W.PET/CERES
100 CREAM (GRAM) TOPICAL DAILY
COMMUNITY
End: 2022-11-01

## 2019-12-10 NOTE — PATIENT INSTRUCTIONS
"You have been provided with a certain amount of medication with a specified number of refills.  Please follow up within an adequate time before you run out of medications.    REFILLS FOR CONTROLLED SUBSTANCES WILL NOT BE GIVEN WITHOUT AN APPOINTMENT.  I will not honor or fill automated refill requests from pharmacies.  You must come in for an appointment to get refills.    Please book your next appointment for myself or therapist by phone by calling our office at [phone].      PLEASE BE AT LEAST 15 MINUTES EARLY FOR YOUR NEXT APPOINTMENT.  PLEASE, DO NOT BE LATE OR YOU WILL BE TURNED AWAY AND ASKED TO RESCHEDULE.  YOU MUST COME EARLY TO ALLOW TIME FOR CHECK-IN AS WELL AS GET YOUR VITAL SIGNS AND GO OVER YOUR MEDICATIONS.  [facility] is not fair to the patients who present after you and are on time for their appointments.  It causes a delay in the appointments for patients and staff.  IF YOU ARE LATE, THERE IS A POSSIBILITY THAT YOU WILL BE CHARGED FOR THE APPOINTMENT TIME PERSONALLY AND IT WILL NOT GO TO YOUR INSURANCE.  YOU MAY ALSO BE DISCHARGED FROM CLINIC with multiple "No Show" appointments.  -----------------------------------------------------------------------------------------------------------------  IF YOU FEEL SUICIDAL OR HAVING THOUGHTS OR PLANS TO HURT YOURSELF OR OTHERS, CALL 911 OR REPORT TO THE NEAREST EMERGENCY ROOM.  YOU CAN ALSO ACCESS THE FOLLOWING HOTLINE(S):    National Suicide Hotline Number 8-672-749-TALK (4689)     ([facility] Mobile Crisis, [phone]'   [place] Crisis Line, [phone]) [phone]; [place], 24 hours / 7 days, [phone]) 531-COPE (2036), 9-460-941-COPE (4975))     TIPS FOR GETTING YOUR PRESCRIPTIONS:    You can always ask your pharmacist the cost of your medications without the use of your insurance. Sometimes the medication will be cheaper if you do not use your insurance.     If you decide you want to have your prescriptions filled at " a different pharmacy, you can always go to the new pharmacy of your choice and have them call the pharmacy where your prescription was sent and they can have your prescription transferred to the new pharmacy.

## 2020-01-10 ENCOUNTER — PATIENT MESSAGE (OUTPATIENT)
Dept: FAMILY MEDICINE | Facility: CLINIC | Age: 45
End: 2020-01-10

## 2020-01-10 DIAGNOSIS — Z00.00 ANNUAL PHYSICAL EXAM: Primary | ICD-10-CM

## 2020-01-10 DIAGNOSIS — I10 ESSENTIAL HYPERTENSION: ICD-10-CM

## 2020-01-16 ENCOUNTER — OFFICE VISIT (OUTPATIENT)
Dept: FAMILY MEDICINE | Facility: CLINIC | Age: 45
End: 2020-01-16
Payer: COMMERCIAL

## 2020-01-16 ENCOUNTER — LAB VISIT (OUTPATIENT)
Dept: LAB | Facility: HOSPITAL | Age: 45
End: 2020-01-16
Attending: FAMILY MEDICINE
Payer: COMMERCIAL

## 2020-01-16 VITALS
DIASTOLIC BLOOD PRESSURE: 75 MMHG | WEIGHT: 230.63 LBS | BODY MASS INDEX: 39.37 KG/M2 | SYSTOLIC BLOOD PRESSURE: 120 MMHG | OXYGEN SATURATION: 98 % | HEART RATE: 85 BPM | TEMPERATURE: 99 F | HEIGHT: 64 IN

## 2020-01-16 DIAGNOSIS — Z00.00 ANNUAL PHYSICAL EXAM: Primary | ICD-10-CM

## 2020-01-16 DIAGNOSIS — M54.14 THORACIC AND LUMBOSACRAL NEURITIS: ICD-10-CM

## 2020-01-16 DIAGNOSIS — Z00.00 ANNUAL PHYSICAL EXAM: ICD-10-CM

## 2020-01-16 DIAGNOSIS — I10 ESSENTIAL HYPERTENSION: ICD-10-CM

## 2020-01-16 DIAGNOSIS — M47.819 SPONDYLOSIS WITHOUT MYELOPATHY: ICD-10-CM

## 2020-01-16 DIAGNOSIS — M54.17 THORACIC AND LUMBOSACRAL NEURITIS: ICD-10-CM

## 2020-01-16 LAB
ALBUMIN SERPL BCP-MCNC: 3.6 G/DL (ref 3.5–5.2)
ALP SERPL-CCNC: 69 U/L (ref 55–135)
ALT SERPL W/O P-5'-P-CCNC: 15 U/L (ref 10–44)
ANION GAP SERPL CALC-SCNC: 10 MMOL/L (ref 8–16)
AST SERPL-CCNC: 24 U/L (ref 10–40)
BASOPHILS # BLD AUTO: 0.03 K/UL (ref 0–0.2)
BASOPHILS NFR BLD: 0.6 % (ref 0–1.9)
BILIRUB SERPL-MCNC: 0.6 MG/DL (ref 0.1–1)
BUN SERPL-MCNC: 14 MG/DL (ref 6–20)
CALCIUM SERPL-MCNC: 8.9 MG/DL (ref 8.7–10.5)
CHLORIDE SERPL-SCNC: 110 MMOL/L (ref 95–110)
CHOLEST SERPL-MCNC: 185 MG/DL (ref 120–199)
CHOLEST/HDLC SERPL: 3.7 {RATIO} (ref 2–5)
CO2 SERPL-SCNC: 22 MMOL/L (ref 23–29)
CREAT SERPL-MCNC: 0.8 MG/DL (ref 0.5–1.4)
DIFFERENTIAL METHOD: ABNORMAL
EOSINOPHIL # BLD AUTO: 0.2 K/UL (ref 0–0.5)
EOSINOPHIL NFR BLD: 3.5 % (ref 0–8)
ERYTHROCYTE [DISTWIDTH] IN BLOOD BY AUTOMATED COUNT: 13.6 % (ref 11.5–14.5)
EST. GFR  (AFRICAN AMERICAN): >60 ML/MIN/1.73 M^2
EST. GFR  (NON AFRICAN AMERICAN): >60 ML/MIN/1.73 M^2
ESTIMATED AVG GLUCOSE: 105 MG/DL (ref 68–131)
GLUCOSE SERPL-MCNC: 87 MG/DL (ref 70–110)
HBA1C MFR BLD HPLC: 5.3 % (ref 4–5.6)
HCT VFR BLD AUTO: 41.9 % (ref 37–48.5)
HDLC SERPL-MCNC: 50 MG/DL (ref 40–75)
HDLC SERPL: 27 % (ref 20–50)
HGB BLD-MCNC: 13.3 G/DL (ref 12–16)
IMM GRANULOCYTES # BLD AUTO: 0.01 K/UL (ref 0–0.04)
IMM GRANULOCYTES NFR BLD AUTO: 0.2 % (ref 0–0.5)
LDLC SERPL CALC-MCNC: 119.8 MG/DL (ref 63–159)
LYMPHOCYTES # BLD AUTO: 1.9 K/UL (ref 1–4.8)
LYMPHOCYTES NFR BLD: 40 % (ref 18–48)
MCH RBC QN AUTO: 29.2 PG (ref 27–31)
MCHC RBC AUTO-ENTMCNC: 31.7 G/DL (ref 32–36)
MCV RBC AUTO: 92 FL (ref 82–98)
MONOCYTES # BLD AUTO: 0.4 K/UL (ref 0.3–1)
MONOCYTES NFR BLD: 8.6 % (ref 4–15)
NEUTROPHILS # BLD AUTO: 2.2 K/UL (ref 1.8–7.7)
NEUTROPHILS NFR BLD: 47.1 % (ref 38–73)
NONHDLC SERPL-MCNC: 135 MG/DL
NRBC BLD-RTO: 0 /100 WBC
PLATELET # BLD AUTO: 201 K/UL (ref 150–350)
PMV BLD AUTO: 10.8 FL (ref 9.2–12.9)
POTASSIUM SERPL-SCNC: 3.8 MMOL/L (ref 3.5–5.1)
PROT SERPL-MCNC: 6.8 G/DL (ref 6–8.4)
RBC # BLD AUTO: 4.55 M/UL (ref 4–5.4)
SODIUM SERPL-SCNC: 142 MMOL/L (ref 136–145)
TRIGL SERPL-MCNC: 76 MG/DL (ref 30–150)
TSH SERPL DL<=0.005 MIU/L-ACNC: 3.09 UIU/ML (ref 0.4–4)
WBC # BLD AUTO: 4.63 K/UL (ref 3.9–12.7)

## 2020-01-16 PROCEDURE — 3074F SYST BP LT 130 MM HG: CPT | Mod: CPTII,S$GLB,, | Performed by: FAMILY MEDICINE

## 2020-01-16 PROCEDURE — 3078F DIAST BP <80 MM HG: CPT | Mod: CPTII,S$GLB,, | Performed by: FAMILY MEDICINE

## 2020-01-16 PROCEDURE — 99396 PREV VISIT EST AGE 40-64: CPT | Mod: S$GLB,,, | Performed by: FAMILY MEDICINE

## 2020-01-16 PROCEDURE — 99999 PR PBB SHADOW E&M-EST. PATIENT-LVL IV: ICD-10-PCS | Mod: PBBFAC,,, | Performed by: FAMILY MEDICINE

## 2020-01-16 PROCEDURE — 84443 ASSAY THYROID STIM HORMONE: CPT

## 2020-01-16 PROCEDURE — 3074F PR MOST RECENT SYSTOLIC BLOOD PRESSURE < 130 MM HG: ICD-10-PCS | Mod: CPTII,S$GLB,, | Performed by: FAMILY MEDICINE

## 2020-01-16 PROCEDURE — 99396 PR PREVENTIVE VISIT,EST,40-64: ICD-10-PCS | Mod: S$GLB,,, | Performed by: FAMILY MEDICINE

## 2020-01-16 PROCEDURE — 36415 COLL VENOUS BLD VENIPUNCTURE: CPT | Mod: PO

## 2020-01-16 PROCEDURE — 80061 LIPID PANEL: CPT

## 2020-01-16 PROCEDURE — 99999 PR PBB SHADOW E&M-EST. PATIENT-LVL IV: CPT | Mod: PBBFAC,,, | Performed by: FAMILY MEDICINE

## 2020-01-16 PROCEDURE — 3078F PR MOST RECENT DIASTOLIC BLOOD PRESSURE < 80 MM HG: ICD-10-PCS | Mod: CPTII,S$GLB,, | Performed by: FAMILY MEDICINE

## 2020-01-16 PROCEDURE — 85025 COMPLETE CBC W/AUTO DIFF WBC: CPT

## 2020-01-16 PROCEDURE — 80053 COMPREHEN METABOLIC PANEL: CPT

## 2020-01-16 PROCEDURE — 83036 HEMOGLOBIN GLYCOSYLATED A1C: CPT

## 2020-01-16 RX ORDER — GABAPENTIN 600 MG/1
600 TABLET ORAL NIGHTLY PRN
Qty: 30 TABLET | Refills: 4 | Status: SHIPPED | OUTPATIENT
Start: 2020-01-16 | End: 2020-08-03 | Stop reason: SDUPTHER

## 2020-01-16 NOTE — PROGRESS NOTES
Assessment & Plan  Problem List Items Addressed This Visit     None      Visit Diagnoses     Annual physical exam    -  Primary    Spondylosis without myelopathy        Relevant Medications    gabapentin (NEURONTIN) 600 MG tablet    Thoracic and lumbosacral neuritis        Relevant Medications    gabapentin (NEURONTIN) 600 MG tablet       I addressed all major concerns as it related to health maintenance.  All were ordered and scheduled based on the patients wishes.  Any additional health maintenance will be readdressed at the next physical if declined or deferred by the patient.      Health Maintenance reviewed.    Follow-up: Follow up in about 1 year (around 1/16/2021) for annual exam.    ______________________________________________________________________    Chief Complaint  Chief Complaint   Patient presents with    Annual Exam       HPI  Alberto Frye is a 45 y.o. female with multiple medical diagnoses as listed in the medical history and problem list that presents for annual exam.  Pt is known to me with last appointment 7/23/2019.    Patient denies any new symptoms including chest pain, SOB, blurry vision, N/V, diarrhea.        PAST MEDICAL HISTORY:  Past Medical History:   Diagnosis Date    Alcohol abuse     stopped heavy drinking about 10 years ago; was drinking 3 glasses of vodka/tequilla,rum/whiskey per day    Anxiety     Depression     Hallucination     Hx of psychiatric care     Hypertension     Caro     unplanned trips, energy without sleep for 2 days (reading, cleaning), feelings that she can do multiple tasks at one time, feelings of overconfidence at times    Psychiatric problem     Sleep difficulties     Therapy     Withdrawal symptoms, drug or narcotic     racing heart, restlessness       PAST SURGICAL HISTORY:  Past Surgical History:   Procedure Laterality Date     lapban surgery  2009    HYSTERECTOMY         SOCIAL HISTORY:  Social History     Socioeconomic History     Marital status:      Spouse name: Not on file    Number of children: 3    Years of education: Not on file    Highest education level: Not on file   Occupational History    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[employer]   Social Needs    Financial resource strain: Not on file    Food insecurity:     Worry: Not on file     Inability: Not on file    Transportation needs:     Medical: Not on file     Non-medical: Not on file   Tobacco Use    Smoking status: Former Smoker    Smokeless tobacco: Never Used    Tobacco comment: quit in october 2016   Substance and Sexual Activity    Alcohol use: Yes     Alcohol/week: 0.0 standard drinks     Comment: social presently, excessive 10 years ago    Drug use: Yes     Types: Marijuana     Comment: uses THCA weekly    Sexual activity: Yes     Partners: Male     Birth control/protection: See Surgical Hx   Lifestyle    Physical activity:     Days per week: 3 days     Minutes per session: 60 min    Stress: To some extent   Relationships    Social connections:     Talks on phone: Three times a week     Gets together: Patient refused     Attends Hoahaoism service: Not on file     Active member of club or organization: No     Attends meetings of clubs or organizations: Patient refused     Relationship status: Patient refused   Other Topics Concern    Patient feels they ought to cut down on drinking/drug use No    Patient annoyed by others criticizing their drinking/drug use No    Patient has felt bad or guilty about drinking/drug use No    Patient has had a drink/used drugs as an eye opener in the AM No   Social History Narrative    Has 3 grown sons.    Lives alone.    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       FAMILY HISTORY:  Family History   Problem Relation Age of Onset    Diabetes Mother     Cancer Mother     Hypertension Mother        ALLERGIES AND MEDICATIONS: updated and reviewed.  Review of patient's allergies indicates:   Allergen Reactions     Morphine Itching and Hallucinations    Pcn [penicillins] Other (See Comments)     Was told from childhood she couldn't take it    Sulfa (sulfonamide antibiotics) Nausea And Vomiting    Latex, natural rubber Rash     Current Outpatient Medications   Medication Sig Dispense Refill    cyanocobalamin (VITAMIN B-12) 1000 MCG tablet Take 100 mcg by mouth once daily.      FLUoxetine 20 MG capsule Take 1 capsule (20 mg total) by mouth once daily. 90 capsule 1    fluticasone (FLONASE) 50 mcg/actuation nasal spray 1 spray by Each Nare route once daily.      gabapentin (NEURONTIN) 600 MG tablet Take 1 tablet (600 mg total) by mouth nightly as needed. 30 tablet 4    HYDROcodone-acetaminophen (NORCO)  mg per tablet Take 1 tablet by mouth every 12 (twelve) hours as needed for Pain. MORE THAN A 7 DAY SUPPLY OF THE OPIATE IS MEDICALLY NECESSARY 40 tablet 0    hydrOXYzine HCl (ATARAX) 50 MG tablet Take 1 tablet (50 mg total) by mouth daily as needed for Anxiety (sleep). 90 tablet 1    levocetirizine (XYZAL) 5 MG tablet Take 1 tablet (5 mg total) by mouth every evening. 30 tablet 2    multivitamin with minerals tablet Take 1 tablet by mouth once daily.      OLANZapine (ZYPREXA) 5 MG tablet Take 1 tablet (5 mg total) by mouth every evening. 90 tablet 1    torsemide (DEMADEX) 10 MG Tab Take 2 tablets (20 mg total) by mouth once daily. 60 tablet 2     No current facility-administered medications for this visit.          ROS  Review of Systems   Constitutional: Positive for unexpected weight change. Negative for activity change.   HENT: Negative for hearing loss, rhinorrhea and trouble swallowing.    Eyes: Negative for discharge and visual disturbance.   Respiratory: Negative for chest tightness and wheezing.    Cardiovascular: Negative for chest pain and palpitations.   Gastrointestinal: Negative for blood in stool, constipation, diarrhea and vomiting.   Endocrine: Negative for polydipsia and polyuria.  "  Genitourinary: Negative for difficulty urinating, dysuria, hematuria and menstrual problem.   Musculoskeletal: Positive for arthralgias. Negative for joint swelling and neck pain.   Neurological: Negative for weakness and headaches.   Psychiatric/Behavioral: Negative for confusion and dysphoric mood.           Physical Exam  Vitals:    01/16/20 0752 01/16/20 0813   BP: (!) 120/90 120/75   BP Location: Left arm    Patient Position: Sitting    BP Method: Large (Manual)    Pulse: 85    Temp: 98.7 °F (37.1 °C)    TempSrc: Oral    SpO2: 98%    Weight: 104.6 kg (230 lb 9.6 oz)    Height: 5' 4" (1.626 m)     Body mass index is 39.58 kg/m².  Weight: 104.6 kg (230 lb 9.6 oz)   Height: 5' 4" (162.6 cm)   Physical Exam   Constitutional: She is oriented to person, place, and time. She appears well-developed and well-nourished.   HENT:   Head: Normocephalic and atraumatic.   Right Ear: External ear normal.   Left Ear: External ear normal.   Nose: Nose normal.   Mouth/Throat: Oropharynx is clear and moist.   Eyes: Pupils are equal, round, and reactive to light. Conjunctivae and EOM are normal.   Cardiovascular: Normal rate, regular rhythm and normal heart sounds.   Pulmonary/Chest: Effort normal and breath sounds normal.   Neurological: She is alert and oriented to person, place, and time.   Skin: Skin is warm and dry.   Vitals reviewed.        Health Maintenance       Date Due Completion Date    Mammogram 06/13/2021 6/13/2019    Override on 12/28/2015: Done (Dr[facility] Claudia Harkins[facility]- normal)    Lipid Panel 02/19/2024 2/19/2019    TETANUS VACCINE 09/30/2025 9/30/2015 (Done)    Override on 9/30/2015: Done ([facility])              Patient note was created using [email or web address].  Any errors in syntax or even information may not have been identified and edited on initial review prior to signing this note.  "

## 2020-01-17 NOTE — PROGRESS NOTES
Your blood work is excellent.  Keep up the great work.  Your kidney and liver function are normal.  There is no indication of diabetes.

## 2020-03-11 ENCOUNTER — PATIENT OUTREACH (OUTPATIENT)
Dept: ADMINISTRATIVE | Facility: HOSPITAL | Age: 45
End: 2020-03-11

## 2020-03-27 ENCOUNTER — PATIENT MESSAGE (OUTPATIENT)
Dept: FAMILY MEDICINE | Facility: CLINIC | Age: 45
End: 2020-03-27

## 2020-03-27 DIAGNOSIS — F31.5 BIPOLAR I DISORDER, CURRENT OR MOST RECENT EPISODE DEPRESSED, WITH PSYCHOTIC FEATURES WITH ANXIOUS DISTRESS: ICD-10-CM

## 2020-03-27 DIAGNOSIS — F60.5 OBSESSIVE COMPULSIVE PERSONALITY DISORDER: ICD-10-CM

## 2020-03-27 RX ORDER — FLUOXETINE HYDROCHLORIDE 40 MG/1
40 CAPSULE ORAL DAILY
Qty: 90 CAPSULE | Refills: 1 | Status: SHIPPED | OUTPATIENT
Start: 2020-03-27 | End: 2020-10-05 | Stop reason: SDUPTHER

## 2020-04-12 ENCOUNTER — PATIENT OUTREACH (OUTPATIENT)
Dept: ADMINISTRATIVE | Facility: HOSPITAL | Age: 45
End: 2020-04-12

## 2020-04-12 NOTE — PROGRESS NOTES
Immunizations reviewed. Legacy reviewed. Care Everywhere reviewed. Chart review completed.        [facility] report  Chart scrubbed 04.12.20

## 2020-04-19 ENCOUNTER — PATIENT MESSAGE (OUTPATIENT)
Dept: FAMILY MEDICINE | Facility: CLINIC | Age: 45
End: 2020-04-19

## 2020-04-20 NOTE — TELEPHONE ENCOUNTER
Please call patient to schedule an inperson appointment. If she does not want to come to the clinic, she will need to go to an ER

## 2020-04-22 ENCOUNTER — OFFICE VISIT (OUTPATIENT)
Dept: FAMILY MEDICINE | Facility: CLINIC | Age: 45
End: 2020-04-22
Payer: COMMERCIAL

## 2020-04-22 VITALS
OXYGEN SATURATION: 96 % | RESPIRATION RATE: 18 BRPM | HEIGHT: 64 IN | HEART RATE: 96 BPM | DIASTOLIC BLOOD PRESSURE: 87 MMHG | BODY MASS INDEX: 38.24 KG/M2 | TEMPERATURE: 98 F | WEIGHT: 224 LBS | SYSTOLIC BLOOD PRESSURE: 126 MMHG

## 2020-04-22 DIAGNOSIS — S16.1XXA NECK STRAIN, INITIAL ENCOUNTER: ICD-10-CM

## 2020-04-22 DIAGNOSIS — K57.92 DIVERTICULITIS: Primary | ICD-10-CM

## 2020-04-22 DIAGNOSIS — M54.14 THORACIC AND LUMBOSACRAL NEURITIS: ICD-10-CM

## 2020-04-22 DIAGNOSIS — M54.17 THORACIC AND LUMBOSACRAL NEURITIS: ICD-10-CM

## 2020-04-22 DIAGNOSIS — M47.819 SPONDYLOSIS WITHOUT MYELOPATHY: ICD-10-CM

## 2020-04-22 PROCEDURE — 99999 PR PBB SHADOW E&M-EST. PATIENT-LVL IV: ICD-10-PCS | Mod: PBBFAC,,, | Performed by: FAMILY MEDICINE

## 2020-04-22 PROCEDURE — 3079F PR MOST RECENT DIASTOLIC BLOOD PRESSURE 80-89 MM HG: ICD-10-PCS | Mod: CPTII,S$GLB,, | Performed by: FAMILY MEDICINE

## 2020-04-22 PROCEDURE — 3074F SYST BP LT 130 MM HG: CPT | Mod: CPTII,S$GLB,, | Performed by: FAMILY MEDICINE

## 2020-04-22 PROCEDURE — 3079F DIAST BP 80-89 MM HG: CPT | Mod: CPTII,S$GLB,, | Performed by: FAMILY MEDICINE

## 2020-04-22 PROCEDURE — 3074F PR MOST RECENT SYSTOLIC BLOOD PRESSURE < 130 MM HG: ICD-10-PCS | Mod: CPTII,S$GLB,, | Performed by: FAMILY MEDICINE

## 2020-04-22 PROCEDURE — 99214 OFFICE O/P EST MOD 30 MIN: CPT | Mod: S$GLB,,, | Performed by: FAMILY MEDICINE

## 2020-04-22 PROCEDURE — 3008F PR BODY MASS INDEX (BMI) DOCUMENTED: ICD-10-PCS | Mod: CPTII,S$GLB,, | Performed by: FAMILY MEDICINE

## 2020-04-22 PROCEDURE — 3008F BODY MASS INDEX DOCD: CPT | Mod: CPTII,S$GLB,, | Performed by: FAMILY MEDICINE

## 2020-04-22 PROCEDURE — 99214 PR OFFICE/OUTPT VISIT, EST, LEVL IV, 30-39 MIN: ICD-10-PCS | Mod: S$GLB,,, | Performed by: FAMILY MEDICINE

## 2020-04-22 PROCEDURE — 99999 PR PBB SHADOW E&M-EST. PATIENT-LVL IV: CPT | Mod: PBBFAC,,, | Performed by: FAMILY MEDICINE

## 2020-04-22 RX ORDER — METRONIDAZOLE 500 MG/1
500 TABLET ORAL EVERY 6 HOURS
Qty: 40 TABLET | Refills: 0 | Status: SHIPPED | OUTPATIENT
Start: 2020-04-22 | End: 2020-05-02

## 2020-04-22 RX ORDER — HYDROCODONE BITARTRATE AND ACETAMINOPHEN 10; 325 MG/1; MG/1
1 TABLET ORAL EVERY 12 HOURS PRN
Qty: 40 TABLET | Refills: 0 | Status: ON HOLD | OUTPATIENT
Start: 2020-04-22 | End: 2020-08-12

## 2020-04-22 RX ORDER — CIPROFLOXACIN 750 MG/1
750 TABLET, FILM COATED ORAL 2 TIMES DAILY
Qty: 20 TABLET | Refills: 0 | Status: SHIPPED | OUTPATIENT
Start: 2020-04-22 | End: 2020-05-02

## 2020-04-22 NOTE — PATIENT INSTRUCTIONS
"  [place] Diet  Your healthcare provider may recommend a bland diet if you have an upset stomach. It consists of foods that are mild and easy to digest. It is better to eat small frequent meals rather than 3 large meals a day.    Beverages  OK: Fruit juices, non-caffeinated teas and coffee, non-carbonated sanon  Avoid: Carbonated beverage, caffeinated tea and coffee, all alcoholic beverages  Bread  OK: Refined white, wheat or rye bread, rey or soda crackers, Delmy toast, plain rolls, bagels  Avoid: Whole-grain bread  Cereal  OK: Refined cereals: cooked or ready to eat  Avoid: Whole-grain cereals and granola, or those containing bran, seeds or nuts  Desserts  OK: Peanut butter and all others except those to "avoid"  Avoid: Chocolate, cocoa, coconut, popcorn, nuts, seeds, jam, marmalade  Fruits  OK: Canned, cooked, frozen or fresh fruits without seeds or tough skin  Avoid: Olives, skin and seeds of fruit  Meats  OK: All fresh or preserved meat, fish and fowl  Avoid: Any that are prepared with those spices to "avoid"  Cheese and eggs  OK: Eggs, cottage cheese, cream cheese, other cheeses  Avoid: All cheeses made with those spices to "avoid"  Potatoes and pasta  OK: Potato, rice, macaroni, noodles, spaghetti  Avoid: None  Soups  OK: All soups without heavy seasoning  Avoid: Soups made with those spices to "avoid"  Vegetables  OK: Canned, cooked, fresh or frozen mildly flavored vegetables without seeds, skins or coarse fiber  Avoid: Vegetables prepared with those spices to "avoid"; skin and seeds of vegetables and those with coarse fiber  Spices  OK: Salt, lemon and lime juice, vinegar, all extracts, maria del carmen, cinnamon, thyme, mace, allspice, paprika  Avoid: Chili powder, cloves, pepper, seed spices, garlic, gravy pickles, highly seasoned salad dressings  Date Last Reviewed: 11/20/2015  © 5793-9323 [employer]. [address] [place] [address]. All rights reserved. This information is not intended as " a substitute for professional medical care. Always follow your healthcare professional's instructions.

## 2020-04-22 NOTE — PROGRESS NOTES
Assessment & Plan  Problem List Items Addressed This Visit     None      Visit Diagnoses     Diverticulitis    -  Primary    Relevant Medications    metroNIDAZOLE (FLAGYL) 500 MG tablet    ciprofloxacin HCl (CIPRO) 750 MG tablet    HYDROcodone-acetaminophen (NORCO)  mg per tablet    Spondylosis without myelopathy        Relevant Medications    HYDROcodone-acetaminophen (NORCO)  mg per tablet    Thoracic and lumbosacral neuritis        Relevant Medications    HYDROcodone-acetaminophen (NORCO)  mg per tablet    Neck strain, initial encounter        Relevant Medications    HYDROcodone-acetaminophen (NORCO)  mg per tablet            Health Maintenance reviewed,.    Follow-up: No follow-ups on file.    ______________________________________________________________________    Chief Complaint  Chief Complaint   Patient presents with    Abdominal Pain       HPI  Alberto Frye is a 45 y.o. female with multiple medical diagnoses as listed in the medical history and problem list that presents for abdominal pain.  Pt is known to me with last appointment 1/16/2020.    Patient denies any new symptoms including chest pain, SOB, blurry vision, vomiting.    Answers for HPI/ROS submitted by the patient on 4/20/2020   Abdominal pain  Chronicity: new  Onset: 1 to 4 weeks ago  Onset quality: sudden  Frequency: constantly  Episode duration: 24 hours  Progression since onset: waxing and waning  Pain location: LUQ, left flank  Pain - numeric: 7/10  Pain quality: aching, sharp, tearing  anorexia: No  belching: No  flatus: No  hematochezia: No  melena: No  weight loss: No  Aggravated by: deep breathing, eating, movement  Relieved by: palpation, recumbency  Diagnostic workup: lower endoscopy  Pain severity: moderate  Treatments tried: acetaminophen  Improvement on treatment: no relief  abdominal surgery: No  colon cancer: No  Crohn's disease: No  gallstones: No  GERD: No  irritable bowel syndrome: No  kidney  stones: No  pancreatitis: No  PUD: No  ulcerative colitis: No  UTI: Yes  + nausea with some diarrhea.   Gabapentin and tylenol without relief.  No previous pain.  No fever.  No chills or night sweats.  Pain with eating.  May occur 20-30 minutes after eating.  Pain does not move.      PAST MEDICAL HISTORY:  Past Medical History:   Diagnosis Date    Alcohol abuse     stopped heavy drinking about 10 years ago; was drinking 3 glasses of vodka/tequilla,rum/whiskey per day    Anxiety     Depression     Hallucination     Hx of psychiatric care     Hypertension     Caro     unplanned trips, energy without sleep for 2 days (reading, cleaning), feelings that she can do multiple tasks at one time, feelings of overconfidence at times    Psychiatric problem     Sleep difficulties     Therapy     Withdrawal symptoms, drug or narcotic     racing heart, restlessness       PAST SURGICAL HISTORY:  Past Surgical History:   Procedure Laterality Date     lapban surgery  2009    HYSTERECTOMY         SOCIAL HISTORY:  Social History     Socioeconomic History    Marital status:      Spouse name: Not on file    Number of children: 3    Years of education: Not on file    Highest education level: Not on file   Occupational History    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[employer]   Social Needs    Financial resource strain: Not on file    Food insecurity:     Worry: Not on file     Inability: Not on file    Transportation needs:     Medical: Not on file     Non-medical: Not on file   Tobacco Use    Smoking status: Former Smoker    Smokeless tobacco: Never Used    Tobacco comment: quit in october 2016   Substance and Sexual Activity    Alcohol use: Yes     Alcohol/week: 0.0 standard drinks     Comment: social presently, excessive 10 years ago    Drug use: Yes     Types: Marijuana     Comment: uses THCA weekly    Sexual activity: Yes     Partners: Male     Birth control/protection: See Surgical Hx    Lifestyle    Physical activity:     Days per week: 3 days     Minutes per session: 60 min    Stress: To some extent   Relationships    Social connections:     Talks on phone: Three times a week     Gets together: Patient refused     Attends Yarsanism service: Not on file     Active member of club or organization: No     Attends meetings of clubs or organizations: Patient refused     Relationship status: Patient refused   Other Topics Concern    Patient feels they ought to cut down on drinking/drug use No    Patient annoyed by others criticizing their drinking/drug use No    Patient has felt bad or guilty about drinking/drug use No    Patient has had a drink/used drugs as an eye opener in the AM No   Social History Narrative    Has 3 grown sons.    Lives alone.    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       FAMILY HISTORY:  Family History   Problem Relation Age of Onset    Diabetes Mother     Cancer Mother     Hypertension Mother        ALLERGIES AND MEDICATIONS: updated and reviewed.  Review of patient's allergies indicates:   Allergen Reactions    Morphine Itching and Hallucinations    Pcn [penicillins] Other (See Comments)     Was told from childhood she couldn't take it    Sulfa (sulfonamide antibiotics) Nausea And Vomiting    Latex, natural rubber Rash     Current Outpatient Medications   Medication Sig Dispense Refill    cyanocobalamin (VITAMIN B-12) 1000 MCG tablet Take 100 mcg by mouth once daily.      FLUoxetine 40 MG capsule Take 1 capsule (40 mg total) by mouth once daily. 90 capsule 1    fluticasone (FLONASE) 50 mcg/actuation nasal spray 1 spray by Each Nare route once daily.      gabapentin (NEURONTIN) 600 MG tablet Take 1 tablet (600 mg total) by mouth nightly as needed. 30 tablet 4    HYDROcodone-acetaminophen (NORCO)  mg per tablet Take 1 tablet by mouth every 12 (twelve) hours as needed for Pain. MORE THAN A 7 DAY SUPPLY OF THE OPIATE IS MEDICALLY NECESSARY 40 tablet 0     hydrOXYzine HCl (ATARAX) 50 MG tablet Take 1 tablet (50 mg total) by mouth daily as needed for Anxiety (sleep). 90 tablet 1    multivitamin with minerals tablet Take 1 tablet by mouth once daily.      OLANZapine (ZYPREXA) 5 MG tablet Take 1 tablet (5 mg total) by mouth every evening. 90 tablet 1    torsemide (DEMADEX) 10 MG Tab Take 2 tablets (20 mg total) by mouth once daily. 60 tablet 2    ciprofloxacin HCl (CIPRO) 750 MG tablet Take 1 tablet (750 mg total) by mouth 2 (two) times daily. for 10 days 20 tablet 0    levocetirizine (XYZAL) 5 MG tablet Take 1 tablet (5 mg total) by mouth every evening. 30 tablet 2    metroNIDAZOLE (FLAGYL) 500 MG tablet Take 1 tablet (500 mg total) by mouth every 6 (six) hours. for 10 days 40 tablet 0     No current facility-administered medications for this visit.          ROS  Review of Systems   Constitutional: Negative for activity change, appetite change, fatigue, fever and unexpected weight change.   HENT: Negative.  Negative for ear discharge, ear pain, rhinorrhea and sore throat.    Eyes: Negative.    Respiratory: Negative for apnea, cough, chest tightness, shortness of breath and wheezing.    Cardiovascular: Negative for chest pain, palpitations and leg swelling.   Gastrointestinal: Positive for abdominal distention, abdominal pain, diarrhea and nausea. Negative for constipation and vomiting.   Endocrine: Negative for cold intolerance, heat intolerance, polydipsia and polyuria.   Genitourinary: Negative for decreased urine volume, dysuria, frequency, hematuria and urgency.   Musculoskeletal: Negative.  Negative for arthralgias and myalgias.   Skin: Negative for rash.   Neurological: Negative for dizziness and headaches.   Hematological: Does not bruise/bleed easily.   Psychiatric/Behavioral: Negative for agitation, sleep disturbance and suicidal ideas.           Physical Exam  Vitals:    04/22/20 0855   BP: 126/87   Pulse: 96   Resp: 18   Temp: 98.4 °F (36.9 °C)   SpO2:  "96%   Weight: 101.6 kg (224 lb)   Height: 5' 4" (1.626 m)    Body mass index is 38.45 kg/m².  Weight: 101.6 kg (224 lb)   Height: 5' 4" (162.6 cm)   Physical Exam   Constitutional: She is oriented to person, place, and time. She appears well-developed and well-nourished.   HENT:   Head: Normocephalic and atraumatic.   Right Ear: External ear normal.   Left Ear: External ear normal.   Nose: Nose normal.   Mouth/Throat: Oropharynx is clear and moist.   Eyes: Pupils are equal, round, and reactive to light. Conjunctivae and EOM are normal.   Cardiovascular: Normal rate, regular rhythm and normal heart sounds.   Pulmonary/Chest: Effort normal and breath sounds normal.   Abdominal: Soft. Bowel sounds are normal. There is no hepatosplenomegaly. There is tenderness in the left upper quadrant. There is rebound and guarding. There is no tenderness at McBurney's point and negative Talley's sign.   Neurological: She is alert and oriented to person, place, and time.   Skin: Skin is warm and dry.   Vitals reviewed.        Health Maintenance       Date Due Completion Date    Mammogram 06/13/2020 6/13/2019    Override on 12/28/2015: Done ([facility] Claudia [facility]- normal)    Lipid Panel 01/16/2025 1/16/2020    TETANUS VACCINE 09/30/2025 9/30/2015 (Done)    Override on 9/30/2015: Done ([facility])              Patient note was created using [employer].  Any errors in syntax or even information may not have been identified and edited on initial review prior to signing this note.  "

## 2020-04-22 NOTE — LETTER
April 22, 2020    Alberto Frye  [address]  [address]  [place] [address]             [facility]  [address]  [place] [address]  Phone: [phone]  Fax: [phone]   April 22, 2020     Patient: Alberto Frye   YOB: 1975   Date of Visit: 4/22/2020       To Whom it May Concern:    Alberto Frye was seen in my clinic on 4/22/2020. She may return to work on 4/29/2020.    Please excuse her from any classes or work missed.    If you have any questions or concerns, please don't hesitate to call.    Sincerely,         Candace Martin MD

## 2020-04-24 ENCOUNTER — PATIENT MESSAGE (OUTPATIENT)
Dept: FAMILY MEDICINE | Facility: CLINIC | Age: 45
End: 2020-04-24

## 2020-04-27 ENCOUNTER — PATIENT MESSAGE (OUTPATIENT)
Dept: FAMILY MEDICINE | Facility: CLINIC | Age: 45
End: 2020-04-27

## 2020-04-28 ENCOUNTER — PATIENT MESSAGE (OUTPATIENT)
Dept: FAMILY MEDICINE | Facility: CLINIC | Age: 45
End: 2020-04-28

## 2020-04-29 ENCOUNTER — PATIENT MESSAGE (OUTPATIENT)
Dept: FAMILY MEDICINE | Facility: CLINIC | Age: 45
End: 2020-04-29

## 2020-04-29 NOTE — TELEPHONE ENCOUNTER
Called and spoke with pharmacy and they state they were only able to provide a 7 day supply due to patient not being on this medication for over 6 months and also they stated that it also has something to do with her insurance. Please advise.

## 2020-04-29 NOTE — TELEPHONE ENCOUNTER
Please notify patient that you spoke with the pharmacy that this is related to her insurance and I cannot change this regulation.

## 2020-05-04 ENCOUNTER — PATIENT MESSAGE (OUTPATIENT)
Dept: FAMILY MEDICINE | Facility: CLINIC | Age: 45
End: 2020-05-04

## 2020-05-11 ENCOUNTER — PATIENT MESSAGE (OUTPATIENT)
Dept: FAMILY MEDICINE | Facility: CLINIC | Age: 45
End: 2020-05-11

## 2020-05-11 DIAGNOSIS — R10.9 ABDOMINAL PAIN, UNSPECIFIED ABDOMINAL LOCATION: Primary | ICD-10-CM

## 2020-05-26 ENCOUNTER — OFFICE VISIT (OUTPATIENT)
Dept: GASTROENTEROLOGY | Facility: CLINIC | Age: 45
End: 2020-05-26
Payer: COMMERCIAL

## 2020-05-26 ENCOUNTER — LAB VISIT (OUTPATIENT)
Dept: LAB | Facility: HOSPITAL | Age: 45
End: 2020-05-26
Attending: INTERNAL MEDICINE
Payer: COMMERCIAL

## 2020-05-26 VITALS
DIASTOLIC BLOOD PRESSURE: 106 MMHG | BODY MASS INDEX: 38.96 KG/M2 | SYSTOLIC BLOOD PRESSURE: 157 MMHG | HEIGHT: 64 IN | WEIGHT: 228.19 LBS | HEART RATE: 87 BPM

## 2020-05-26 DIAGNOSIS — Z98.84 LAP-BAND SURGERY STATUS: Primary | ICD-10-CM

## 2020-05-26 DIAGNOSIS — R10.9 ABDOMINAL PAIN, UNSPECIFIED ABDOMINAL LOCATION: ICD-10-CM

## 2020-05-26 DIAGNOSIS — R35.0 URINARY FREQUENCY: ICD-10-CM

## 2020-05-26 DIAGNOSIS — Z98.84 LAP-BAND SURGERY STATUS: ICD-10-CM

## 2020-05-26 DIAGNOSIS — R10.84 GENERALIZED ABDOMINAL PAIN: ICD-10-CM

## 2020-05-26 LAB
ALBUMIN SERPL BCP-MCNC: 3.8 G/DL (ref 3.5–5.2)
ALP SERPL-CCNC: 82 U/L (ref 55–135)
ALT SERPL W/O P-5'-P-CCNC: 21 U/L (ref 10–44)
ANION GAP SERPL CALC-SCNC: 9 MMOL/L (ref 8–16)
AST SERPL-CCNC: 24 U/L (ref 10–40)
BASOPHILS # BLD AUTO: 0.02 K/UL (ref 0–0.2)
BASOPHILS NFR BLD: 0.4 % (ref 0–1.9)
BILIRUB DIRECT SERPL-MCNC: 0.2 MG/DL (ref 0.1–0.3)
BILIRUB SERPL-MCNC: 0.6 MG/DL (ref 0.1–1)
BUN SERPL-MCNC: 13 MG/DL (ref 6–20)
CALCIUM SERPL-MCNC: 8.8 MG/DL (ref 8.7–10.5)
CHLORIDE SERPL-SCNC: 106 MMOL/L (ref 95–110)
CO2 SERPL-SCNC: 26 MMOL/L (ref 23–29)
CREAT SERPL-MCNC: 0.7 MG/DL (ref 0.5–1.4)
DIFFERENTIAL METHOD: NORMAL
EOSINOPHIL # BLD AUTO: 0.1 K/UL (ref 0–0.5)
EOSINOPHIL NFR BLD: 2.8 % (ref 0–8)
ERYTHROCYTE [DISTWIDTH] IN BLOOD BY AUTOMATED COUNT: 13.4 % (ref 11.5–14.5)
EST. GFR  (AFRICAN AMERICAN): >60 ML/MIN/1.73 M^2
EST. GFR  (NON AFRICAN AMERICAN): >60 ML/MIN/1.73 M^2
FERRITIN SERPL-MCNC: 152 NG/ML (ref 20–300)
GLUCOSE SERPL-MCNC: 82 MG/DL (ref 70–110)
HAV IGM SERPL QL IA: NEGATIVE
HBV CORE AB SERPL QL IA: NEGATIVE
HBV CORE IGM SERPL QL IA: NEGATIVE
HBV SURFACE AG SERPL QL IA: NEGATIVE
HCG INTACT+B SERPL-ACNC: 2.9 MIU/ML
HCT VFR BLD AUTO: 43.7 % (ref 37–48.5)
HCV AB SERPL QL IA: NEGATIVE
HEPATITIS A ANTIBODY, IGG: NEGATIVE
HGB BLD-MCNC: 14 G/DL (ref 12–16)
IGA SERPL-MCNC: 158 MG/DL (ref 40–350)
IMM GRANULOCYTES # BLD AUTO: 0.01 K/UL (ref 0–0.04)
IMM GRANULOCYTES NFR BLD AUTO: 0.2 % (ref 0–0.5)
IRON SERPL-MCNC: 59 UG/DL (ref 30–160)
LIPASE SERPL-CCNC: 14 U/L (ref 4–60)
LYMPHOCYTES # BLD AUTO: 2.1 K/UL (ref 1–4.8)
LYMPHOCYTES NFR BLD: 41.4 % (ref 18–48)
MCH RBC QN AUTO: 29.8 PG (ref 27–31)
MCHC RBC AUTO-ENTMCNC: 32 G/DL (ref 32–36)
MCV RBC AUTO: 93 FL (ref 82–98)
MONOCYTES # BLD AUTO: 0.3 K/UL (ref 0.3–1)
MONOCYTES NFR BLD: 6.6 % (ref 4–15)
NEUTROPHILS # BLD AUTO: 2.4 K/UL (ref 1.8–7.7)
NEUTROPHILS NFR BLD: 48.6 % (ref 38–73)
NRBC BLD-RTO: 0 /100 WBC
PLATELET # BLD AUTO: 194 K/UL (ref 150–350)
PMV BLD AUTO: 10.6 FL (ref 9.2–12.9)
POTASSIUM SERPL-SCNC: 3.5 MMOL/L (ref 3.5–5.1)
PROT SERPL-MCNC: 7.6 G/DL (ref 6–8.4)
RBC # BLD AUTO: 4.7 M/UL (ref 4–5.4)
SATURATED IRON: 24 % (ref 20–50)
SODIUM SERPL-SCNC: 141 MMOL/L (ref 136–145)
TOTAL IRON BINDING CAPACITY: 249 UG/DL (ref 250–450)
TRANSFERRIN SERPL-MCNC: 168 MG/DL (ref 200–375)
WBC # BLD AUTO: 5 K/UL (ref 3.9–12.7)

## 2020-05-26 PROCEDURE — 3008F PR BODY MASS INDEX (BMI) DOCUMENTED: ICD-10-PCS | Mod: CPTII,S$GLB,, | Performed by: INTERNAL MEDICINE

## 2020-05-26 PROCEDURE — 82784 ASSAY IGA/IGD/IGG/IGM EACH: CPT

## 2020-05-26 PROCEDURE — 86704 HEP B CORE ANTIBODY TOTAL: CPT

## 2020-05-26 PROCEDURE — 85025 COMPLETE CBC W/AUTO DIFF WBC: CPT

## 2020-05-26 PROCEDURE — 86790 VIRUS ANTIBODY NOS: CPT

## 2020-05-26 PROCEDURE — 3008F BODY MASS INDEX DOCD: CPT | Mod: CPTII,S$GLB,, | Performed by: INTERNAL MEDICINE

## 2020-05-26 PROCEDURE — 99999 PR PBB SHADOW E&M-EST. PATIENT-LVL V: CPT | Mod: PBBFAC,,, | Performed by: INTERNAL MEDICINE

## 2020-05-26 PROCEDURE — 80048 BASIC METABOLIC PNL TOTAL CA: CPT

## 2020-05-26 PROCEDURE — 84702 CHORIONIC GONADOTROPIN TEST: CPT

## 2020-05-26 PROCEDURE — 3077F SYST BP >= 140 MM HG: CPT | Mod: CPTII,S$GLB,, | Performed by: INTERNAL MEDICINE

## 2020-05-26 PROCEDURE — 82728 ASSAY OF FERRITIN: CPT

## 2020-05-26 PROCEDURE — 86706 HEP B SURFACE ANTIBODY: CPT

## 2020-05-26 PROCEDURE — 99204 PR OFFICE/OUTPT VISIT, NEW, LEVL IV, 45-59 MIN: ICD-10-PCS | Mod: S$GLB,,, | Performed by: INTERNAL MEDICINE

## 2020-05-26 PROCEDURE — 83540 ASSAY OF IRON: CPT

## 2020-05-26 PROCEDURE — 99204 OFFICE O/P NEW MOD 45 MIN: CPT | Mod: S$GLB,,, | Performed by: INTERNAL MEDICINE

## 2020-05-26 PROCEDURE — 80076 HEPATIC FUNCTION PANEL: CPT

## 2020-05-26 PROCEDURE — 83516 IMMUNOASSAY NONANTIBODY: CPT

## 2020-05-26 PROCEDURE — 80074 ACUTE HEPATITIS PANEL: CPT

## 2020-05-26 PROCEDURE — 83690 ASSAY OF LIPASE: CPT

## 2020-05-26 PROCEDURE — 99999 PR PBB SHADOW E&M-EST. PATIENT-LVL V: ICD-10-PCS | Mod: PBBFAC,,, | Performed by: INTERNAL MEDICINE

## 2020-05-26 PROCEDURE — 3080F DIAST BP >= 90 MM HG: CPT | Mod: CPTII,S$GLB,, | Performed by: INTERNAL MEDICINE

## 2020-05-26 PROCEDURE — 3077F PR MOST RECENT SYSTOLIC BLOOD PRESSURE >= 140 MM HG: ICD-10-PCS | Mod: CPTII,S$GLB,, | Performed by: INTERNAL MEDICINE

## 2020-05-26 PROCEDURE — 80321 ALCOHOLS BIOMARKERS 1OR 2: CPT

## 2020-05-26 PROCEDURE — 3080F PR MOST RECENT DIASTOLIC BLOOD PRESSURE >= 90 MM HG: ICD-10-PCS | Mod: CPTII,S$GLB,, | Performed by: INTERNAL MEDICINE

## 2020-05-26 NOTE — Clinical Note
No please schedule her for telemedicine video visit follow-up in 4 weeks.Fasting lab work today orders placedCT scan of the abdomen pelvis orders placedEGD orders placedPlease have her sign medical release for her colonoscopy report from [facility] which was done at the end of last year 2019.

## 2020-05-26 NOTE — LETTER
May 26, 2020      Candace Martin MD  [address]  [place] [address]           [facility]  [address]  [place] [address]  Phone: [phone]  Fax: [phone]          Patient: Alberto Frye   MR Number: [id number]   YOB: 1975   Date of Visit: 5/26/2020       Dear Dr. Candace Martin:    Thank you for referring Alberto Frye to me for evaluation. Attached you will find relevant portions of my assessment and plan of care.    If you have questions, please do not hesitate to call me. I look forward to following Alberto Frye along with you.    Sincerely,    Johan Grover MD    Enclosure  CC:  No Recipients    If you would like to receive this communication electronically, please contact [email or web address] or [phone] to request more information on [facility] access.    For providers and/or their staff who would like to refer a patient to [facility], please contact us through our one-stop-shop provider referral line, [facility], at [phone].    If you feel you have received this communication in error or would no longer like to receive these types of communications, please e-mail [email or web address]

## 2020-05-26 NOTE — PROGRESS NOTES
[facility] Consultation Note    Reason for Consult:  The primary encounter diagnosis was LAP-BAND surgery status. Diagnoses of Abdominal pain, unspecified abdominal location, Urinary frequency, and Generalized abdominal pain were also pertinent to this visit.    PCP:   Candace Martin   [address] / [place] [address]    Referring MD:  Candace Martin Md  [address] [place] [address]    Initial History of Present Illness (HPI):  This is a 45 y.o. female here for evaluation of chronic abdominal pain for the last 4 weeks.  Her pain is mid abdomen just to the left of her umbilicus.  No history of trauma no fevers no chills no nausea or vomiting no diarrhea no blood in her stools no change in bowel habits.  Patient states she had a gastric lap band placed in 2009 by Dr. Antonio Galindo.  She says it is disconnected now.  She states several weeks ago she will you saw her primary care doctor who treated her for suspected diverticulitis without a CT scan treated her with Cipro and Flagyl for 2 weeks.  Her said her pain got much better but still has it thought it would go away completely by now but it has not.  Patient has urinary frequency but no dysuria.  She states she has had her appendix out and a hysterectomy.  She says she rarely drinks alcohol maybe a 6 pack of beer will last her several weeks she only drinks on Fridays and Saturdays.  She still uses marijuana occasionally she says.  She has no family history of GI malignancies she said she had a colonoscopy at [facility] November 2019 it was complete no polyps just had some internal hemorrhoids she says she was told to have another 1 in 10 years she says.  No family history of colon cancer no family history of advanced colon polyps no FAP no attenuated FAP no MA P no Foster syndrome nobody with celiac sprue or inflammatory bowel disease nobody with Foster cancers she does have a maternal uncle who had alcoholic  cirrhosis.  And a female grandparent who had breast cancer.  Patient states she has 2 well-formed brown bowel movements a day no blood nothing makes her abdominal pain better other than laying down she says.  Food in bowel movements do not make it better or worse.  She is not taking any NSAIDs she says.  She says her pain is been constant since April.  She says her pain never goes away but had been made better with Cipro and Flagyl but has not gone away completely.  She says she is not follow-up with her bariatric surgeon and we do recommend she discuss her abdominal pain with him.  She has not had a CT scan for evaluation.  She says she is not allergic to contrast dye and she has 2 kidneys not a diabetic cannot on hypertensive meds she says.    Abdominal pain - as above  Reflux - no  Dysphagia - no   Bowel habits - normal  GI bleeding - none  NSAID usage - none    Interval HPI 05/26/2020:  The patient's last visit with me was on Visit date not found.      ROS:  Constitutional: No fevers, chills, No weight loss  ENT:  No heartburn no dysphagia no odynophagia no hoarseness  CV: No chest pain, no palpitation  Pulm: No cough, No shortness of breath, no wheezing  Ophtho: No vision changes  GI: see HPI  Derm: No rash, no itching  Heme: No lymphadenopathy, No easy bruising  MSK: No significant arthritis requiring NSAID she does have chronic back pain she says.  : No dysuria, No hematuria, she does have urinary frequency.  Endo: No hot or cold intolerance  Neuro: No syncope, No seizure, no strokes  Psych: No uncontrolled anxiety, No uncontrolled depression    Medical History:  has a past medical history of Alcohol abuse, Anxiety, Depression, Hallucination, psychiatric care, Hypertension, Caro, Psychiatric problem, Sleep difficulties, Therapy, and Withdrawal symptoms, drug or narcotic.    Surgical History:  has a past surgical history that includes Hysterectomy and  lapban surgery (2009).  She says she has had an  appendectomy.  And a lap band placed in 2009 but it has been disconnected.    Family History: family history includes Cancer in her mother; Cirrhosis in her maternal uncle; Diabetes in her mother; Hypertension in her mother..     Social History:  reports that she has quit smoking. She has never used smokeless tobacco. She reports that she drinks about 2.0 standard drinks of alcohol per week. She reports that she has current or past drug history. Drug: Marijuana.    Review of patient's allergies indicates:   Allergen Reactions    Morphine Itching and Hallucinations    Pcn [penicillins] Other (See Comments)     Was told from childhood she couldn't take it    Sulfa (sulfonamide antibiotics) Nausea And Vomiting    Latex, natural rubber Rash       Medication List with Changes/Refills   Current Medications    CYANOCOBALAMIN (VITAMIN B-12) 1000 MCG TABLET    Take 100 mcg by mouth once daily.    FLUOXETINE 40 MG CAPSULE    Take 1 capsule (40 mg total) by mouth once daily.    FLUTICASONE (FLONASE) 50 MCG/ACTUATION NASAL SPRAY    1 spray by Each Nare route once daily.    GABAPENTIN (NEURONTIN) 600 MG TABLET    Take 1 tablet (600 mg total) by mouth nightly as needed.    HYDROCODONE-ACETAMINOPHEN (NORCO)  MG PER TABLET    Take 1 tablet by mouth every 12 (twelve) hours as needed for Pain. MORE THAN A 7 DAY SUPPLY OF THE OPIATE IS MEDICALLY NECESSARY    HYDROXYZINE HCL (ATARAX) 50 MG TABLET    Take 1 tablet (50 mg total) by mouth daily as needed for Anxiety (sleep).    LEVOCETIRIZINE (XYZAL) 5 MG TABLET    Take 1 tablet (5 mg total) by mouth every evening.    MULTIVITAMIN WITH MINERALS TABLET    Take 1 tablet by mouth once daily.    OLANZAPINE (ZYPREXA) 5 MG TABLET    Take 1 tablet (5 mg total) by mouth every evening.    TORSEMIDE (DEMADEX) 10 MG TAB    Take 2 tablets (20 mg total) by mouth once daily.         Objective Findings:    Vital Signs:  BP (!) 157/106 (BP Location: Left arm, Patient Position: Sitting)   Pulse  "87   Ht 5' 4" (1.626 m)   Wt 103.5 kg (228 lb 2.8 oz)   BMI 39.17 kg/m²   Body mass index is 39.17 kg/m².    Physical Exam:  General Appearance: Well appearing in no acute distress  Eyes:    No scleral icterus  ENT:  No lesions or masses   Lungs: CTA bilaterally, no wheezes, no rhonchi, no rales  Heart:  S1, S2 normal, no murmurs heard  Abdomen:  Non distended, soft, no guarding, no rebound, left side periumbilical tenderness, no appreciated ascites, no bruits, no hepatosplenomegaly,  No CVA tenderness, no appreciated hernias  Musculoskeletal:  No major joint deformities  Skin: No petechiae or rash on exposed skin areas  Neurologic:  Alert and oriented x4  Psychiatric:  Normal speech mentation and affect    Labs:  Lab Results   Component Value Date    WBC 4.63 01/16/2020    HGB 13.3 01/16/2020    HCT 41.9 01/16/2020     01/16/2020    CHOL 185 01/16/2020    TRIG 76 01/16/2020    HDL 50 01/16/2020    ALT 15 01/16/2020    AST 24 01/16/2020     01/16/2020    K 3.8 01/16/2020     01/16/2020    CREATININE 0.8 01/16/2020    BUN 14 01/16/2020    CO2 22 (L) 01/16/2020    TSH 3.092 01/16/2020    INR 1.0 11/16/2016    HGBA1C 5.3 01/16/2020               Medical Decision Making:  Request patient give us a copy of her last colonoscopy will happy to review  Lab work talk given  Clean-catch mid stream UA talk given  Recommend she follow up with her bariatric surgeon for evaluation  Since her abdominal pain is close to her lap band port site  CT scan abdomen pelvis contrast talk given.  EGD talk given  Alcohol marijuana talk given  Patient appears to be on chronic pain meds  Assessment:  1. LAP-BAND surgery status    2. Abdominal pain, unspecified abdominal location    3. Urinary frequency    4. Generalized abdominal pain         Recommendations:  1.  Fasting lab work today CT scan of the abdomen pelvis with contrast for further evaluation of abdominal pain.  Recommend patient follow up with her bariatric " surgeon for evaluation of her new onset pain for last 4 weeks  Since her pain is very close to her port site of her gastric band.  Patient will call for CT scan results day after scan.  2.  EGD for further evaluation  3.  Patient to follow up with her bariatric surgeon soon as possible.  4.  Return GI clinic in 4 weeks okay for telemedicine video visit.      Follow up in about 4 weeks (around 6/23/2020).      Order summary:  Orders Placed This Encounter    Urine culture    CT Abdomen Pelvis W Wo Contrast    Lipase    Phosphatidylethanol (PETH)    CBC auto differential    Basic metabolic panel    Hepatic function panel    TISSUE TRANSGLUTAMINASE (TTG), IGA    IgA    Hepatitis Panel, Acute    Hepatitis B Surface Antibody, Qual/Quant    Hepatitis B Core Antibody, Total    HEPATITIS A ANTIBODY, IGG    Urinalysis    Iron and TIBC    Ferritin    hCG, quantitative    Case request GI: EGD (ESOPHAGOGASTRODUODENOSCOPY)         Thank you so much for allowing me to participate in the care of Alberto Grover MD

## 2020-05-27 ENCOUNTER — PATIENT MESSAGE (OUTPATIENT)
Dept: FAMILY MEDICINE | Facility: CLINIC | Age: 45
End: 2020-05-27

## 2020-05-27 DIAGNOSIS — Z01.818 PRE-OP TESTING: Primary | ICD-10-CM

## 2020-05-28 LAB
HBV SURFACE AB SER QL IA: NEGATIVE
HBV SURFACE AB SERPL IA-ACNC: 4 MIU/ML

## 2020-05-29 LAB — TTG IGA SER-ACNC: 3 UNITS

## 2020-05-31 DIAGNOSIS — R82.90 ABNORMAL URINE: Primary | ICD-10-CM

## 2020-05-31 DIAGNOSIS — B95.2 UTI (URINARY TRACT INFECTION) DUE TO ENTEROCOCCUS: Primary | ICD-10-CM

## 2020-05-31 DIAGNOSIS — N39.0 UTI (URINARY TRACT INFECTION) DUE TO ENTEROCOCCUS: Primary | ICD-10-CM

## 2020-05-31 RX ORDER — NITROFURANTOIN (MACROCRYSTALS) 100 MG/1
100 CAPSULE ORAL EVERY 6 HOURS
Qty: 20 CAPSULE | Refills: 0 | Status: SHIPPED | OUTPATIENT
Start: 2020-05-31 | End: 2020-06-05

## 2020-05-31 RX ORDER — NITROFURANTOIN (MACROCRYSTALS) 100 MG/1
100 CAPSULE ORAL EVERY 12 HOURS
Qty: 10 CAPSULE | Refills: 0 | Status: SHIPPED | OUTPATIENT
Start: 2020-05-31 | End: 2020-05-31 | Stop reason: CLARIF

## 2020-06-01 ENCOUNTER — PATIENT MESSAGE (OUTPATIENT)
Dept: ENDOSCOPY | Facility: HOSPITAL | Age: 45
End: 2020-06-01

## 2020-06-01 LAB — PHOSPHATIDYLETHANOL (PETH): 265 NG/ML

## 2020-06-02 ENCOUNTER — LAB VISIT (OUTPATIENT)
Dept: FAMILY MEDICINE | Facility: CLINIC | Age: 45
End: 2020-06-02
Payer: COMMERCIAL

## 2020-06-02 ENCOUNTER — HOSPITAL ENCOUNTER (OUTPATIENT)
Dept: RADIOLOGY | Facility: HOSPITAL | Age: 45
Discharge: HOME | End: 2020-06-02
Attending: INTERNAL MEDICINE
Payer: COMMERCIAL

## 2020-06-02 ENCOUNTER — TELEPHONE (OUTPATIENT)
Dept: GASTROENTEROLOGY | Facility: CLINIC | Age: 45
End: 2020-06-02

## 2020-06-02 DIAGNOSIS — R10.84 GENERALIZED ABDOMINAL PAIN: ICD-10-CM

## 2020-06-02 DIAGNOSIS — Z01.818 PRE-OP TESTING: ICD-10-CM

## 2020-06-02 PROCEDURE — 74177 CT ABDOMEN PELVIS WITH CONTRAST: ICD-10-PCS | Mod: 26,,, | Performed by: RADIOLOGY

## 2020-06-02 PROCEDURE — 74177 CT ABD & PELVIS W/CONTRAST: CPT | Mod: TC

## 2020-06-02 PROCEDURE — 25500020 PHARM REV CODE 255: Performed by: INTERNAL MEDICINE

## 2020-06-02 PROCEDURE — 74177 CT ABD & PELVIS W/CONTRAST: CPT | Mod: 26,,, | Performed by: RADIOLOGY

## 2020-06-02 PROCEDURE — U0003 INFECTIOUS AGENT DETECTION BY NUCLEIC ACID (DNA OR RNA); SEVERE ACUTE RESPIRATORY SYNDROME CORONAVIRUS 2 (SARS-COV-2) (CORONAVIRUS DISEASE [COVID-19]), AMPLIFIED PROBE TECHNIQUE, MAKING USE OF HIGH THROUGHPUT TECHNOLOGIES AS DESCRIBED BY CMS-2020-01-R: HCPCS

## 2020-06-02 RX ADMIN — IOHEXOL 15 ML: 300 INJECTION, SOLUTION INTRAVENOUS at 10:06

## 2020-06-02 RX ADMIN — IOHEXOL 85 ML: 350 INJECTION, SOLUTION INTRAVENOUS at 10:06

## 2020-06-02 NOTE — TELEPHONE ENCOUNTER
Called and spoke to Koko. Orders need to be changed and message sent to nurse to put through for orders.

## 2020-06-02 NOTE — TELEPHONE ENCOUNTER
----- Message from Jami Gaitan sent at 6/2/2020  9:28 AM CDT -----  [facility] is calling stated a nurse can not sign an order, they need a doctor signature.  Pt is waiting.  Call [facility]

## 2020-06-02 NOTE — TELEPHONE ENCOUNTER
----- Message from Jami Gaitan sent at 6/2/2020  8:21 AM CDT -----  Koko w/ [facility] called want to know if you can  change orders to CT ABD PEL w/contrast it will be covered by ins.phone # [phone]    Pt is waiting

## 2020-06-03 ENCOUNTER — HOSPITAL ENCOUNTER (OUTPATIENT)
Facility: HOSPITAL | Age: 45
Discharge: HOME | End: 2020-06-03
Attending: INTERNAL MEDICINE | Admitting: INTERNAL MEDICINE
Payer: COMMERCIAL

## 2020-06-03 ENCOUNTER — TELEPHONE (OUTPATIENT)
Dept: GASTROENTEROLOGY | Facility: CLINIC | Age: 45
End: 2020-06-03

## 2020-06-03 ENCOUNTER — ANESTHESIA (OUTPATIENT)
Dept: ENDOSCOPY | Facility: HOSPITAL | Age: 45
End: 2020-06-03
Payer: COMMERCIAL

## 2020-06-03 ENCOUNTER — ANESTHESIA EVENT (OUTPATIENT)
Dept: ENDOSCOPY | Facility: HOSPITAL | Age: 45
End: 2020-06-03
Payer: COMMERCIAL

## 2020-06-03 VITALS
WEIGHT: 227 LBS | HEIGHT: 64 IN | TEMPERATURE: 97 F | DIASTOLIC BLOOD PRESSURE: 82 MMHG | HEART RATE: 81 BPM | SYSTOLIC BLOOD PRESSURE: 146 MMHG | BODY MASS INDEX: 38.76 KG/M2 | OXYGEN SATURATION: 99 % | RESPIRATION RATE: 17 BRPM

## 2020-06-03 DIAGNOSIS — Z12.11 ENCOUNTER FOR SCREENING COLONOSCOPY: Primary | ICD-10-CM

## 2020-06-03 DIAGNOSIS — R10.9 ABDOMINAL PAIN, UNSPECIFIED ABDOMINAL LOCATION: ICD-10-CM

## 2020-06-03 LAB — SARS-COV-2 RNA RESP QL NAA+PROBE: NOT DETECTED

## 2020-06-03 PROCEDURE — E9220 PRA ENDO ANESTHESIA: ICD-10-PCS | Mod: ,,, | Performed by: NURSE ANESTHETIST, CERTIFIED REGISTERED

## 2020-06-03 PROCEDURE — 88305 TISSUE EXAM BY PATHOLOGIST: CPT | Mod: 26,,, | Performed by: PATHOLOGY

## 2020-06-03 PROCEDURE — E9220 PRA ENDO ANESTHESIA: HCPCS | Mod: ,,, | Performed by: NURSE ANESTHETIST, CERTIFIED REGISTERED

## 2020-06-03 PROCEDURE — 37000008 HC ANESTHESIA 1ST 15 MINUTES: Performed by: INTERNAL MEDICINE

## 2020-06-03 PROCEDURE — 43239 PR EGD, FLEX, W/BIOPSY, SGL/MULTI: ICD-10-PCS | Mod: ,,, | Performed by: INTERNAL MEDICINE

## 2020-06-03 PROCEDURE — 88305 TISSUE EXAM BY PATHOLOGIST: CPT | Performed by: PATHOLOGY

## 2020-06-03 PROCEDURE — 43239 EGD BIOPSY SINGLE/MULTIPLE: CPT | Mod: ,,, | Performed by: INTERNAL MEDICINE

## 2020-06-03 PROCEDURE — 27201012 HC FORCEPS, HOT/COLD, DISP: Performed by: INTERNAL MEDICINE

## 2020-06-03 PROCEDURE — 37000009 HC ANESTHESIA EA ADD 15 MINS: Performed by: INTERNAL MEDICINE

## 2020-06-03 PROCEDURE — 63600175 PHARM REV CODE 636 W HCPCS: Performed by: NURSE ANESTHETIST, CERTIFIED REGISTERED

## 2020-06-03 PROCEDURE — 43239 EGD BIOPSY SINGLE/MULTIPLE: CPT | Performed by: INTERNAL MEDICINE

## 2020-06-03 PROCEDURE — 25000003 PHARM REV CODE 250: Performed by: INTERNAL MEDICINE

## 2020-06-03 PROCEDURE — 25000003 PHARM REV CODE 250: Performed by: NURSE ANESTHETIST, CERTIFIED REGISTERED

## 2020-06-03 PROCEDURE — 88305 TISSUE EXAM BY PATHOLOGIST: ICD-10-PCS | Mod: 26,,, | Performed by: PATHOLOGY

## 2020-06-03 RX ORDER — PROPOFOL 10 MG/ML
VIAL (ML) INTRAVENOUS
Status: DISCONTINUED | OUTPATIENT
Start: 2020-06-03 | End: 2020-06-03

## 2020-06-03 RX ORDER — SODIUM CHLORIDE 9 MG/ML
INJECTION, SOLUTION INTRAVENOUS CONTINUOUS
Status: DISCONTINUED | OUTPATIENT
Start: 2020-06-03 | End: 2020-06-03 | Stop reason: HOSPADM

## 2020-06-03 RX ORDER — GLYCOPYRROLATE 0.2 MG/ML
INJECTION INTRAMUSCULAR; INTRAVENOUS
Status: DISCONTINUED | OUTPATIENT
Start: 2020-06-03 | End: 2020-06-03

## 2020-06-03 RX ORDER — LIDOCAINE HCL/PF 100 MG/5ML
SYRINGE (ML) INTRAVENOUS
Status: DISCONTINUED | OUTPATIENT
Start: 2020-06-03 | End: 2020-06-03

## 2020-06-03 RX ORDER — PROPOFOL 10 MG/ML
VIAL (ML) INTRAVENOUS CONTINUOUS PRN
Status: DISCONTINUED | OUTPATIENT
Start: 2020-06-03 | End: 2020-06-03

## 2020-06-03 RX ADMIN — Medication 100 MG: at 01:06

## 2020-06-03 RX ADMIN — SODIUM CHLORIDE: 0.9 INJECTION, SOLUTION INTRAVENOUS at 12:06

## 2020-06-03 RX ADMIN — GLYCOPYRROLATE 0.2 MG: 0.2 INJECTION, SOLUTION INTRAMUSCULAR; INTRAVENOUS at 01:06

## 2020-06-03 RX ADMIN — PROPOFOL 100 MG: 10 INJECTION, EMULSION INTRAVENOUS at 01:06

## 2020-06-03 RX ADMIN — PROPOFOL 200 MCG/KG/MIN: 10 INJECTION, EMULSION INTRAVENOUS at 01:06

## 2020-06-03 NOTE — ANESTHESIA POSTPROCEDURE EVALUATION
Anesthesia Post Evaluation    Patient: Alberto Frye    Procedure(s) Performed: Procedure(s) (LRB):  EGD (ESOPHAGOGASTRODUODENOSCOPY) (N/A)    Final Anesthesia Type: general    Patient location during evaluation: PACU  Patient participation: Yes- Able to Participate  Level of consciousness: awake and alert  Post-procedure vital signs: reviewed and stable  Pain management: adequate  Airway patency: patent    PONV status at discharge: No PONV  Anesthetic complications: no      Cardiovascular status: blood pressure returned to baseline  Respiratory status: spontaneous ventilation and room air  Hydration status: euvolemic  Follow-up not needed.          Vitals Value Taken Time   /82 6/3/2020  1:35 PM   Temp 36.2 °C (97.1 °F) 6/3/2020  1:20 PM   Pulse 81 6/3/2020  1:35 PM   Resp 17 6/3/2020  1:35 PM   SpO2 99 % 6/3/2020  1:35 PM         No case tracking events are documented in the log.      Pain/Natalya Score: Natalya Score: 10 (6/3/2020  1:20 PM)

## 2020-06-03 NOTE — TELEPHONE ENCOUNTER
----- Message from Johan Grover MD sent at 5/31/2020 11:02 AM CDT -----  No please refer patient to Urology for further evaluation of the abnormal urine.  Referral placed    Please tell patient I sent a prescription for 5 days of antibiotics for treatment of the bacteria growing out of her urine.    nitrofurantoin (MACRODANTIN) 100 MG capsule Take 1 capsule (100 mg total) by mouth every 6 hours hours. for 5 days

## 2020-06-03 NOTE — H&P
[facility]  History & Physical    Subjective:      Chief Complaint/Reason for Admission:    EGD    Alberto Frye is a 45 y.o. female.    Past Medical History:   Diagnosis Date    Alcohol abuse     stopped heavy drinking about 10 years ago; was drinking 3 glasses of vodka/tequilla,rum/whiskey per day    Anxiety     Depression     Hallucination     Hx of psychiatric care     Hypertension     Caro     unplanned trips, energy without sleep for 2 days (reading, cleaning), feelings that she can do multiple tasks at one time, feelings of overconfidence at times    Psychiatric problem     Sleep difficulties     Therapy     Withdrawal symptoms, drug or narcotic     racing heart, restlessness     Past Surgical History:   Procedure Laterality Date     lapban surgery  2009    HYSTERECTOMY       Family History   Problem Relation Age of Onset    Diabetes Mother     Cancer Mother     Hypertension Mother     Cirrhosis Maternal Uncle         ETOH    Celiac disease Neg Hx     Colon cancer Neg Hx     Colon polyps Neg Hx     Crohn's disease Neg Hx     Esophageal cancer Neg Hx     Inflammatory bowel disease Neg Hx     Liver cancer Neg Hx     Liver disease Neg Hx     Rectal cancer Neg Hx     Stomach cancer Neg Hx     Ulcerative colitis Neg Hx      Social History     Tobacco Use    Smoking status: Former Smoker    Smokeless tobacco: Never Used    Tobacco comment: quit in october 2016   Substance Use Topics    Alcohol use: Yes     Alcohol/week: 2.0 standard drinks     Types: 2 Cans of beer per week     Comment: social presently, excessive 10 years ago    Drug use: Yes     Types: Marijuana     Comment: uses THCA weekly       PTA Medications   Medication Sig    cyanocobalamin (VITAMIN B-12) 1000 MCG tablet Take 100 mcg by mouth once daily.    FLUoxetine 40 MG capsule Take 1 capsule (40 mg total) by mouth once daily.    hydrOXYzine HCl (ATARAX) 50 MG tablet Take 1 tablet (50 mg  total) by mouth daily as needed for Anxiety (sleep).    multivitamin with minerals tablet Take 1 tablet by mouth once daily.    nitrofurantoin (MACRODANTIN) 100 MG capsule Take 1 capsule (100 mg total) by mouth every 6 (six) hours. for 5 days    OLANZapine (ZYPREXA) 5 MG tablet Take 1 tablet (5 mg total) by mouth every evening.    fluticasone (FLONASE) 50 mcg/actuation nasal spray 1 spray by Each Nare route once daily.    gabapentin (NEURONTIN) 600 MG tablet Take 1 tablet (600 mg total) by mouth nightly as needed.    HYDROcodone-acetaminophen (NORCO)  mg per tablet Take 1 tablet by mouth every 12 (twelve) hours as needed for Pain. MORE THAN A 7 DAY SUPPLY OF THE OPIATE IS MEDICALLY NECESSARY    levocetirizine (XYZAL) 5 MG tablet Take 1 tablet (5 mg total) by mouth every evening.    torsemide (DEMADEX) 10 MG Tab Take 2 tablets (20 mg total) by mouth once daily.     Review of patient's allergies indicates:   Allergen Reactions    Morphine Itching and Hallucinations    Pcn [penicillins] Other (See Comments)     Was told from childhood she couldn't take it    Sulfa (sulfonamide antibiotics) Nausea And Vomiting    Latex, natural rubber Rash        Review of Systems   Constitutional: Negative for chills, fever and weight loss.   Respiratory: Negative for shortness of breath and wheezing.    Cardiovascular: Negative for chest pain.   Gastrointestinal: Positive for abdominal pain.       Objective:      Vital Signs (Most Recent)       Vital Signs Range (Last 24H):       Physical Exam   Constitutional: She is oriented to person, place, and time. She appears well-developed and well-nourished.   Cardiovascular: Normal rate.   Pulmonary/Chest: Effort normal.   Abdominal: Soft.   Neurological: She is alert and oriented to person, place, and time.   Skin: Skin is warm and dry.   Psychiatric: She has a normal mood and affect. Her behavior is normal. Judgment and thought content normal.       .     Assessment:       Active Hospital Problems    Diagnosis  POA    Abdominal pain [R10.9]  Yes      Resolved Hospital Problems   No resolved problems to display.       Plan:    EGD for abdominal pain

## 2020-06-03 NOTE — DISCHARGE INSTRUCTIONS

## 2020-06-03 NOTE — TRANSFER OF CARE
"Anesthesia Transfer of Care Note    Patient: Alberto Frye    Procedure(s) Performed: Procedure(s) (LRB):  EGD (ESOPHAGOGASTRODUODENOSCOPY) (N/A)    Patient location: GI    Anesthesia Type: general    Transport from OR: Transported from OR on room air with adequate spontaneous ventilation    Post pain: adequate analgesia    Post assessment: no apparent anesthetic complications and tolerated procedure well    Post vital signs: stable    Level of consciousness: awake and alert    Nausea/Vomiting: no nausea/vomiting    Complications: none    Transfer of care protocol was followed      Last vitals:   Visit Vitals  BP (!) 145/83   Pulse 99   Temp 36.2 °C (97.1 °F)   Resp 16   Ht 5' 4" (1.626 m)   Wt 103 kg (227 lb)   SpO2 99%   Breastfeeding? No   BMI 38.96 kg/m²     "

## 2020-06-03 NOTE — ANESTHESIA PREPROCEDURE EVALUATION
06/03/2020  Alberto Frye is a 45 y.o., female.    Anesthesia Evaluation    I have reviewed the Patient Summary Reports.      I have reviewed the Medications.     Review of Systems  Anesthesia Hx:  History of prior surgery of interest to airway management or planning:  Denies Personal Hx of Anesthesia complications.   Social:  Former Smoker    Cardiovascular:   Hypertension Pt denies   Hepatic/GI:   H/o lap band   Psych:   Psychiatric History depression          Physical Exam  General:  Well nourished    Airway/Jaw/Neck:  Airway Findings: Mouth Opening: Normal Tongue: Normal  General Airway Assessment: Adult  Mallampati: II  TM Distance: Normal, at least 6 cm  Jaw/Neck Findings:  Neck ROM: Normal ROM  Neck Findings:     Eyes/Ears/Nose:  EYES/EARS/NOSE FINDINGS: Normal   Dental:  Dental Findings: In tact   Chest/Lungs:  Chest/Lungs Findings: Clear to auscultation, Normal Respiratory Rate     Heart/Vascular:  Heart Findings: Rate: Normal  Rhythm: Regular Rhythm  Sounds: Normal        Mental Status:  Mental Status Findings:  Cooperative, Alert and Oriented         Anesthesia Plan  Type of Anesthesia, risks & benefits discussed:  Anesthesia Type:  general  Patient's Preference: GA  Intra-op Monitoring Plan: standard ASA monitors  Intra-op Monitoring Plan Comments:   Post Op Pain Control Plan: IV/PO Opioids PRN  Post Op Pain Control Plan Comments:   Induction:   IV  Beta Blocker:  Patient is not currently on a Beta-Blocker (No further documentation required).       Informed Consent: Patient understands risks and agrees with Anesthesia plan.  Questions answered. Anesthesia consent signed with patient.  ASA Score: 2     Day of Surgery Review of History & Physical: I have interviewed and examined the patient. I have reviewed the patient's H&P dated:  There are no significant changes.  H&P update referred to  the provider.         Ready For Surgery From Anesthesia Perspective.

## 2020-06-03 NOTE — [employer] PATIENT INSTRUCTIONS
Discharge Summary/Instructions after an Endoscopic Procedure  Patient Name: Alberto Frye  Patient MRN: [id number]  Patient YOB: 1975  Wednesday, Desire 3, 2020  Johan Grover MD  RESTRICTIONS:  During your procedure today, you received medications for sedation.  These   medications may affect your judgment, balance and coordination.  Therefore,   for 24 hours, you have the following restrictions:   - DO NOT drive a car, operate machinery, make legal/financial decisions,   sign important papers or drink alcohol.    ACTIVITY:  Today: no heavy lifting, straining or running due to procedural   sedation/anesthesia.  The following day: return to full activity including work.  DIET:  Eat and drink normally unless instructed otherwise.     TREATMENT FOR COMMON SIDE EFFECTS:  - Mild abdominal pain, nausea, belching, bloating or excessive gas:  rest,   eat lightly and use a heating pad.  - Sore Throat: treat with throat lozenges and/or gargle with warm salt   water.  - Because air was used during the procedure, expelling large amounts of air   from your rectum or belching is normal.  - If a bowel prep was taken, you may not have a bowel movement for 1-3 days.    This is normal.  SYMPTOMS TO WATCH FOR AND REPORT TO YOUR PHYSICIAN:  1. Abdominal pain or bloating, other than gas cramps.  2. Chest pain.  3. Back pain.  4. Signs of infection such as: chills or fever occurring within 24 hours   after the procedure.  5. Rectal bleeding, which would show as bright red, maroon, or black stools.   (A tablespoon of blood from the rectum is not serious, especially if   hemorrhoids are present.)  6. Vomiting.  7. Weakness or dizziness.  GO DIRECTLY TO THE NEAREST EMERGENCY ROOM IF YOU HAVE ANY OF THE FOLLOWING:      Difficulty breathing              Chills and/or fever over 101 F   Persistent vomiting and/or vomiting blood   Severe abdominal pain   Severe chest pain   Black, tarry stools   Bleeding- more than one  tablespoon   Any other symptom or condition that you feel may need urgent attention  Your doctor recommends these additional instructions:  If any biopsies were taken, your doctors clinic will contact you in 1 to 2   weeks with any results.  - Discharge patient to home.   - Follow an antireflux regimen.   - Await pathology results.   - Telephone endoscopist for pathology results in 2 weeks.   - Repeat upper endoscopy in 3 years for surveillance based on pathology   results.   - Return to GI clinic at the next available appointment.   - RECOMMEND YOU RETURN to /FLLOW-UP WITH YOUR BARIATRIC SURGEON ABOUT YOUR   ABDOMINAL PAIN AND LAPBAND.  - The findings and recommendations were discussed with the patient.  For questions, problems or results please call your physician - Johan Grover MD at Work:  [phone].  [facility], EMERGENCY ROOM PHONE NUMBER: [phone]  IF A COMPLICATION OR EMERGENCY SITUATION ARISES AND YOU ARE UNABLE TO REACH   YOUR PHYSICIAN - GO DIRECTLY TO THE EMERGENCY ROOM.  Johan Grover MD  6/3/2020 1:22:36 PM  This report has been verified and signed electronically.  [employer]

## 2020-06-03 NOTE — TELEPHONE ENCOUNTER
Called and spoke to pt.  Pt says she is talking to nurse now and asked to call her back.  Pt appreciated the call.

## 2020-06-03 NOTE — PLAN OF CARE
Pt verbalized understanding of pre-procedure instructions. Safety bands applied. No other complaints. BP elevated in pre-op, 175/111, Dr. Oneal aware.

## 2020-06-05 ENCOUNTER — TELEPHONE (OUTPATIENT)
Dept: GASTROENTEROLOGY | Facility: CLINIC | Age: 45
End: 2020-06-05

## 2020-06-05 DIAGNOSIS — Z79.899 ENCOUNTER FOR MONITORING LONG-TERM PROTON PUMP INHIBITOR THERAPY: ICD-10-CM

## 2020-06-05 DIAGNOSIS — Z51.81 ENCOUNTER FOR MONITORING LONG-TERM PROTON PUMP INHIBITOR THERAPY: ICD-10-CM

## 2020-06-05 DIAGNOSIS — K21.9 GASTROESOPHAGEAL REFLUX DISEASE, ESOPHAGITIS PRESENCE NOT SPECIFIED: Primary | ICD-10-CM

## 2020-06-05 DIAGNOSIS — R10.9 ABDOMINAL PAIN, UNSPECIFIED ABDOMINAL LOCATION: Primary | ICD-10-CM

## 2020-06-05 LAB
FINAL PATHOLOGIC DIAGNOSIS: NORMAL
GROSS: NORMAL

## 2020-06-05 RX ORDER — PANTOPRAZOLE SODIUM 40 MG/1
40 TABLET, DELAYED RELEASE ORAL
Qty: 90 TABLET | Refills: 3 | Status: SHIPPED | OUTPATIENT
Start: 2020-06-05 | End: 2021-10-11 | Stop reason: ALTCHOICE

## 2020-06-05 NOTE — TELEPHONE ENCOUNTER
Called and spoke to pt.  Reviewed message with pt and all has been take care of.  Pt appreciated the call.

## 2020-06-17 ENCOUNTER — PATIENT MESSAGE (OUTPATIENT)
Dept: GASTROENTEROLOGY | Facility: CLINIC | Age: 45
End: 2020-06-17

## 2020-06-18 ENCOUNTER — PATIENT MESSAGE (OUTPATIENT)
Dept: GASTROENTEROLOGY | Facility: CLINIC | Age: 45
End: 2020-06-18

## 2020-06-19 NOTE — TELEPHONE ENCOUNTER
Spoke with patient. Informed her that her Hepatitis A, B, and C labs were negative but that she has no immunity. Patient agreed to Hepatitis A and B injections, scheduled for 6/23/20. Informed patient that egd biopsies were benign but distal esophagus showed mild chronic reflux. PPI sent to pharmacy. Patient reports she has started the medication. PPI labs scheduled for 6/23/20. Patient verbalized understanding and had no questions.

## 2020-06-23 ENCOUNTER — CLINICAL SUPPORT (OUTPATIENT)
Dept: INFECTIOUS DISEASES | Facility: CLINIC | Age: 45
End: 2020-06-23
Payer: COMMERCIAL

## 2020-06-23 DIAGNOSIS — R10.9 ABDOMINAL PAIN, UNSPECIFIED ABDOMINAL LOCATION: ICD-10-CM

## 2020-06-23 PROCEDURE — 90471 IMMUNIZATION ADMIN: CPT | Mod: S$GLB,,, | Performed by: INTERNAL MEDICINE

## 2020-06-23 PROCEDURE — 99999 PR PBB SHADOW E&M-EST. PATIENT-LVL I: ICD-10-PCS | Mod: PBBFAC,,,

## 2020-06-23 PROCEDURE — 90739 HEPB VACC 2/4 DOSE ADULT IM: CPT | Mod: S$GLB,,, | Performed by: INTERNAL MEDICINE

## 2020-06-23 PROCEDURE — 99999 PR PBB SHADOW E&M-EST. PATIENT-LVL I: CPT | Mod: PBBFAC,,,

## 2020-06-23 PROCEDURE — 90632 HEPA VACCINE ADULT IM: CPT | Mod: S$GLB,,, | Performed by: INTERNAL MEDICINE

## 2020-06-23 PROCEDURE — 90472 IMMUNIZATION ADMIN EACH ADD: CPT | Mod: S$GLB,,, | Performed by: INTERNAL MEDICINE

## 2020-06-23 PROCEDURE — 90739 HEPATITIS B (RECOMBINANT) ADJUVANTED, 2 DOSE: ICD-10-PCS | Mod: S$GLB,,, | Performed by: INTERNAL MEDICINE

## 2020-06-23 PROCEDURE — 90471 HEPATITIS B (RECOMBINANT) ADJUVANTED, 2 DOSE: ICD-10-PCS | Mod: S$GLB,,, | Performed by: INTERNAL MEDICINE

## 2020-06-23 PROCEDURE — 90472 HEPATITIS A VACCINE ADULT IM: ICD-10-PCS | Mod: S$GLB,,, | Performed by: INTERNAL MEDICINE

## 2020-06-23 PROCEDURE — 90632 HEPATITIS A VACCINE ADULT IM: ICD-10-PCS | Mod: S$GLB,,, | Performed by: INTERNAL MEDICINE

## 2020-06-23 NOTE — PROGRESS NOTES
Ms. Frye received Hepatitis A and Heplisav B vaccine   Tolerated well  Left unit in NAD  Return appt scheduled

## 2020-07-05 DIAGNOSIS — E55.9 VITAMIN D INSUFFICIENCY: Primary | ICD-10-CM

## 2020-07-05 RX ORDER — ERGOCALCIFEROL 1.25 MG/1
50000 CAPSULE ORAL
Qty: 12 CAPSULE | Refills: 0 | Status: SHIPPED | OUTPATIENT
Start: 2020-07-05 | End: 2020-09-21

## 2020-07-05 RX ORDER — ACETAMINOPHEN 500 MG
1 TABLET ORAL DAILY
Qty: 90 CAPSULE | Refills: 3 | Status: SHIPPED | OUTPATIENT
Start: 2020-07-05 | End: 2021-07-05

## 2020-07-09 ENCOUNTER — HOSPITAL ENCOUNTER (EMERGENCY)
Facility: HOSPITAL | Age: 45
Discharge: HOME | End: 2020-07-09
Attending: EMERGENCY MEDICINE
Payer: COMMERCIAL

## 2020-07-09 ENCOUNTER — PATIENT MESSAGE (OUTPATIENT)
Dept: FAMILY MEDICINE | Facility: CLINIC | Age: 45
End: 2020-07-09

## 2020-07-09 VITALS
RESPIRATION RATE: 18 BRPM | HEIGHT: 64 IN | TEMPERATURE: 98 F | WEIGHT: 237 LBS | SYSTOLIC BLOOD PRESSURE: 153 MMHG | HEART RATE: 76 BPM | BODY MASS INDEX: 40.46 KG/M2 | DIASTOLIC BLOOD PRESSURE: 93 MMHG | OXYGEN SATURATION: 97 %

## 2020-07-09 DIAGNOSIS — I10 UNCONTROLLED HYPERTENSION: ICD-10-CM

## 2020-07-09 DIAGNOSIS — R51.9 GENERALIZED HEADACHE: Primary | ICD-10-CM

## 2020-07-09 LAB
ANION GAP SERPL CALC-SCNC: 16 MMOL/L (ref 8–16)
BUN SERPL-MCNC: 8 MG/DL (ref 6–30)
CHLORIDE SERPL-SCNC: 103 MMOL/L (ref 95–110)
CREAT SERPL-MCNC: 0.6 MG/DL (ref 0.5–1.4)
GLUCOSE SERPL-MCNC: 100 MG/DL (ref 70–110)
HCT VFR BLD CALC: 44 %PCV (ref 36–54)
POC IONIZED CALCIUM: 1.22 MMOL/L (ref 1.06–1.42)
POC TCO2 (MEASURED): 26 MMOL/L (ref 23–29)
POTASSIUM BLD-SCNC: 3.8 MMOL/L (ref 3.5–5.1)
SAMPLE: NORMAL
SODIUM BLD-SCNC: 141 MMOL/L (ref 136–145)

## 2020-07-09 PROCEDURE — 84295 ASSAY OF SERUM SODIUM: CPT

## 2020-07-09 PROCEDURE — 63600175 PHARM REV CODE 636 W HCPCS: Performed by: EMERGENCY MEDICINE

## 2020-07-09 PROCEDURE — 84132 ASSAY OF SERUM POTASSIUM: CPT

## 2020-07-09 PROCEDURE — 99284 EMERGENCY DEPT VISIT MOD MDM: CPT | Mod: 25

## 2020-07-09 PROCEDURE — 99900035 HC TECH TIME PER 15 MIN (STAT)

## 2020-07-09 PROCEDURE — 96375 TX/PRO/DX INJ NEW DRUG ADDON: CPT

## 2020-07-09 PROCEDURE — 80047 BASIC METABLC PNL IONIZED CA: CPT

## 2020-07-09 PROCEDURE — 96374 THER/PROPH/DIAG INJ IV PUSH: CPT

## 2020-07-09 PROCEDURE — 82565 ASSAY OF CREATININE: CPT

## 2020-07-09 PROCEDURE — 25000003 PHARM REV CODE 250: Performed by: EMERGENCY MEDICINE

## 2020-07-09 PROCEDURE — 85014 HEMATOCRIT: CPT

## 2020-07-09 PROCEDURE — 82330 ASSAY OF CALCIUM: CPT

## 2020-07-09 RX ORDER — IBUPROFEN 400 MG/1
400 TABLET ORAL EVERY 6 HOURS PRN
Qty: 20 TABLET | Refills: 0 | Status: SHIPPED | OUTPATIENT
Start: 2020-07-09 | End: 2022-02-01

## 2020-07-09 RX ORDER — BUTALBITAL, ACETAMINOPHEN AND CAFFEINE 50; 325; 40 MG/1; MG/1; MG/1
2 TABLET ORAL
Status: COMPLETED | OUTPATIENT
Start: 2020-07-09 | End: 2020-07-09

## 2020-07-09 RX ORDER — METHYLPREDNISOLONE SOD SUCC 125 MG
125 VIAL (EA) INJECTION
Status: COMPLETED | OUTPATIENT
Start: 2020-07-09 | End: 2020-07-09

## 2020-07-09 RX ORDER — DIPHENHYDRAMINE HYDROCHLORIDE 50 MG/ML
50 INJECTION INTRAMUSCULAR; INTRAVENOUS
Status: COMPLETED | OUTPATIENT
Start: 2020-07-09 | End: 2020-07-09

## 2020-07-09 RX ORDER — BUTALBITAL, ACETAMINOPHEN AND CAFFEINE 50; 325; 40 MG/1; MG/1; MG/1
1 TABLET ORAL EVERY 4 HOURS PRN
Qty: 20 TABLET | Refills: 0 | Status: SHIPPED | OUTPATIENT
Start: 2020-07-09 | End: 2020-08-08

## 2020-07-09 RX ORDER — CLONIDINE 0.2 MG/24H
1 PATCH, EXTENDED RELEASE TRANSDERMAL
Status: DISCONTINUED | OUTPATIENT
Start: 2020-07-09 | End: 2020-07-09

## 2020-07-09 RX ORDER — KETOROLAC TROMETHAMINE 30 MG/ML
30 INJECTION, SOLUTION INTRAMUSCULAR; INTRAVENOUS
Status: COMPLETED | OUTPATIENT
Start: 2020-07-09 | End: 2020-07-09

## 2020-07-09 RX ORDER — HYDRALAZINE HYDROCHLORIDE 20 MG/ML
10 INJECTION INTRAMUSCULAR; INTRAVENOUS
Status: COMPLETED | OUTPATIENT
Start: 2020-07-09 | End: 2020-07-09

## 2020-07-09 RX ORDER — CLONIDINE 0.2 MG/24H
1 PATCH, EXTENDED RELEASE TRANSDERMAL
Qty: 4 PATCH | Refills: 2 | Status: SHIPPED | OUTPATIENT
Start: 2020-07-09 | End: 2020-09-21 | Stop reason: SDUPTHER

## 2020-07-09 RX ORDER — LORAZEPAM 2 MG/ML
1 INJECTION INTRAMUSCULAR
Status: COMPLETED | OUTPATIENT
Start: 2020-07-09 | End: 2020-07-09

## 2020-07-09 RX ORDER — CLONIDINE HYDROCHLORIDE 0.1 MG/1
0.1 TABLET ORAL
Status: COMPLETED | OUTPATIENT
Start: 2020-07-09 | End: 2020-07-09

## 2020-07-09 RX ORDER — PROCHLORPERAZINE EDISYLATE 5 MG/ML
10 INJECTION INTRAMUSCULAR; INTRAVENOUS ONCE
Status: COMPLETED | OUTPATIENT
Start: 2020-07-09 | End: 2020-07-09

## 2020-07-09 RX ADMIN — DIPHENHYDRAMINE HYDROCHLORIDE 50 MG: 50 INJECTION INTRAMUSCULAR; INTRAVENOUS at 04:07

## 2020-07-09 RX ADMIN — METHYLPREDNISOLONE SODIUM SUCCINATE 125 MG: 125 INJECTION, POWDER, FOR SOLUTION INTRAMUSCULAR; INTRAVENOUS at 08:07

## 2020-07-09 RX ADMIN — HYDRALAZINE HYDROCHLORIDE 10 MG: 20 INJECTION INTRAMUSCULAR; INTRAVENOUS at 06:07

## 2020-07-09 RX ADMIN — CLONIDINE HYDROCHLORIDE 0.1 MG: 0.1 TABLET ORAL at 05:07

## 2020-07-09 RX ADMIN — BUTALBITAL, ACETAMINOPHEN, AND CAFFEINE 2 TABLET: 50; 325; 40 TABLET ORAL at 05:07

## 2020-07-09 RX ADMIN — PROCHLORPERAZINE EDISYLATE 10 MG: 5 INJECTION INTRAMUSCULAR; INTRAVENOUS at 04:07

## 2020-07-09 RX ADMIN — LORAZEPAM 1 MG: 2 INJECTION INTRAMUSCULAR; INTRAVENOUS at 05:07

## 2020-07-09 RX ADMIN — CLONIDINE 1 PATCH: 0.2 PATCH TRANSDERMAL at 06:07

## 2020-07-09 RX ADMIN — KETOROLAC TROMETHAMINE 30 MG: 30 INJECTION, SOLUTION INTRAMUSCULAR at 05:07

## 2020-07-09 NOTE — PROVIDER PROGRESS NOTES - EMERGENCY DEPT.
Emergency Department TeleTRIAGE Encounter Note      CHIEF COMPLAINT    Chief Complaint   Patient presents with    Hypertension     states pressure been running high since yesterday.    Headache     yesterday pressure behind left eye.  today whole head hurting, throbbing.   denies n/v/d or fever.       VITAL SIGNS   Initial Vitals [07/09/20 1432]   BP Pulse Resp Temp SpO2   (!) 186/129 92 18 98.1 °F (36.7 °C) 99 %      MAP       --            ALLERGIES    Review of patient's allergies indicates:   Allergen Reactions    Morphine Itching and Hallucinations    Pcn [penicillins] Other (See Comments)     Was told from childhood she couldn't take it    Sulfa (sulfonamide antibiotics) Nausea And Vomiting    Latex, natural rubber Rash       PROVIDER TRIAGE NOTE  Patient with past medical history hypertension presents to the ED for evaluation of headache.  Patient states yesterday she started feeling pressure behind her left eye.  She denies vision changes, numbness, tingling, weakness.  Today she states her headache is worse.  She denies nausea, vomiting, diarrhea, fever, neck pain.    Blood pressure yesterday while at work was 167/109.  Patient spoke with her PCP who called in a prescription for amlodipine.  She started that medication last night.      ORDERS  Labs Reviewed - No data to display    ED Orders (720h ago, onward)    None            Virtual Visit Note: The provider triage portion of this emergency department evaluation and documentation was performed via [employer], a HIPAA-compliant telemedicine application, in concert with a tele-presenter in the room. A face to face patient evaluation with one of my colleagues will occur once the patient is placed in an emergency department room.      DISCLAIMER: This note was prepared with [employer] voice recognition transcription software. Garbled syntax, mangled pronouns, and other bizarre constructions may be attributed to that software system.

## 2020-07-09 NOTE — ED TRIAGE NOTES
"Pt c/o of HA and HTN. Generalized HA x1 hour PTA. Pt not on HTN meds for 7 years now per MD order. PT also c/o "bilateral ears popping." Today, pt took amlodipine 5mg today prior to arrival. Pt AAOx4. In NAD. Anxious.   "

## 2020-07-09 NOTE — DISCHARGE INSTRUCTIONS
Please return immediately if you get worse or if new problems develop.  Medicines as directed.  Please follow-up with your primary care doctor this week.  Rest.

## 2020-07-09 NOTE — ED PROVIDER NOTES
Encounter Date: 7/9/2020    SCRIBE #1 NOTE: I, Ivania Mahoney, am scribing for, and in the presence of,  Evelio Pepe MD. I have scribed the following portions of the note - Other sections scribed: HPI, ROS.       History     Chief Complaint   Patient presents with    Hypertension     states pressure been running high since yesterday.    Headache     yesterday pressure behind left eye.  today whole head hurting, throbbing.   denies n/v/d or fever.     This is a 45 y.o female who has HTN, Anxiety, Depression presents to the ED for an evaluation for acute onset, constant generalized headache that began 1 hour ago.  She reports yesterday, she had headache in which resulted in her checking her blood pressure.  She reports receiving an elevated blood pressure reading of 165/100.  She denies fever, chills, cough, rhinorrhea, rash, back pain, or any other associated symptoms.  No prior tx.  No alleviating factors.    The history is provided by the patient.     Review of patient's allergies indicates:   Allergen Reactions    Morphine Itching and Hallucinations    Pcn [penicillins] Other (See Comments)     Was told from childhood she couldn't take it    Sulfa (sulfonamide antibiotics) Nausea And Vomiting    Latex, natural rubber Rash     Past Medical History:   Diagnosis Date    Alcohol abuse     stopped heavy drinking about 10 years ago; was drinking 3 glasses of vodka/tequilla,rum/whiskey per day    Anxiety     Depression     Hallucination     Hx of psychiatric care     Hypertension     Caro     unplanned trips, energy without sleep for 2 days (reading, cleaning), feelings that she can do multiple tasks at one time, feelings of overconfidence at times    Psychiatric problem     Sleep difficulties     Therapy     Withdrawal symptoms, drug or narcotic     racing heart, restlessness     Past Surgical History:   Procedure Laterality Date     lapban surgery  2009    ESOPHAGOGASTRODUODENOSCOPY N/A 6/3/2020     Procedure: EGD (ESOPHAGOGASTRODUODENOSCOPY);  Surgeon: Johan Grover MD;  Location: [facility] ([address] [place] [address]);  Service: Endoscopy;  Laterality: N/A;  covid 6/2-[facility]    HYSTERECTOMY       Family History   Problem Relation Age of Onset    Diabetes Mother     Cancer Mother     Hypertension Mother     Cirrhosis Maternal Uncle         ETOH    Celiac disease Neg Hx     Colon cancer Neg Hx     Colon polyps Neg Hx     Crohn's disease Neg Hx     Esophageal cancer Neg Hx     Inflammatory bowel disease Neg Hx     Liver cancer Neg Hx     Liver disease Neg Hx     Rectal cancer Neg Hx     Stomach cancer Neg Hx     Ulcerative colitis Neg Hx      Social History     Tobacco Use    Smoking status: Former Smoker    Smokeless tobacco: Never Used    Tobacco comment: quit in october 2016   Substance Use Topics    Alcohol use: Yes     Alcohol/week: 2.0 standard drinks     Types: 2 Cans of beer per week     Comment: social presently, excessive 10 years ago    Drug use: Yes     Types: Marijuana     Comment: uses THCA weekly     Review of Systems   Constitutional: Negative for chills and fever.   HENT: Negative for congestion, ear pain, rhinorrhea and sore throat.    Eyes: Negative for pain and visual disturbance.   Respiratory: Negative for cough and shortness of breath.    Cardiovascular: Negative for chest pain.   Gastrointestinal: Negative for abdominal pain, diarrhea, nausea and vomiting.   Genitourinary: Negative for dysuria.   Musculoskeletal: Negative for back pain and neck pain.   Skin: Negative for rash.   Neurological: Positive for headaches.       Physical Exam     Initial Vitals [07/09/20 1432]   BP Pulse Resp Temp SpO2   (!) 186/129 92 18 98.1 °F (36.7 °C) 99 %      MAP       --         Physical Exam  The patient was examined specifically for the following:   General:No significant distress, Good color, Warm and dry. Head and neck:Scalp atraumatic, Neck supple. Neurological:Appropriate  conversation, Gross motor deficits. Eyes:Conjugate gaze, Clear corneas. ENT: No epistaxis. Cardiac: Regular rate and rhythm, Grossly normal heart tones. Pulmonary: Wheezing, Rales. Gastrointestinal: Abdominal tenderness, Abdominal distention. Musculoskeletal: Extremity deformity, Apparent pain with range of motion of the joints. Skin: Rash.   The findings on examination were normal except for the following:  Patient's blood pressure was 186/129 are arrival.  Patient does have an exacerbation of her headache with anterior flexion of her neck.  Mental status examination, cranial nerves, motor and sensory examination are normal.  The lungs are clear.  The heart tones are normal.  The abdomen is soft.  The patient is moderately obese.  ED Course   Procedures  Labs Reviewed   ISTAT PROCEDURE   ISTAT CHEM8          Imaging Results          CT Head Without Contrast (Final result)  Result time 07/09/20 16:18:22    Final result by Tanvir Hutchinson MD (07/09/20 16:18:22)                 Impression:      No evidence of recent hemorrhage or other acute intracranial pathology.    Mild chronic microvascular ischemic change in supratentorial white matter.    Mild mucosal thickening throughout the partially visualized paranasal sinuses.      Electronically signed by: Tanvir Hutchinson MD  Date:    07/09/2020  Time:    16:18             Narrative:    EXAMINATION:  CT HEAD WITHOUT CONTRAST    CLINICAL HISTORY:  Headache, intracranial hemorrhage suspected; Essential (primary) hypertension    TECHNIQUE:  Low dose axial CT images obtained throughout the head without the use of intravenous contrast.  Axial, sagittal and coronal reconstructions were performed.    COMPARISON:  None.    FINDINGS:  Intracranial compartment:    Ventricles and sulci are normal in size for age without evidence of hydrocephalus.    Mild patchy hypoattenuation in the supratentorial white matter suggestive of microvascular ischemic disease..  No parenchymal mass,  hemorrhage, edema or major vascular distribution infarct.    No extra-axial blood or fluid collections.    Skull/extracranial contents (limited evaluation):    No fracture.    Mastoid air cells are clear. Mild patchy mucosal thickening in the paranasal sinuses.                              Medical decision making:  Given the above, this patient presents with a pounding generalized headache.  Her neck is also affected.  The headache began 2 hr ago.  CT is negative for subarachnoid hemorrhage in intracranial bleeding.  I doubt subarachnoid hemorrhage in meningitis.  Patient does have some uncontrolled blood pressure.  It is not very high.  I will treat her with clonidine.  I treated the patient with Benadryl and Compazine for head pain.  Her head pain persists.  I will try Toradol, Fioricet and Ativan.  I will sign this patient out to the next ER physician.  The patient has an improved headache at 5:40 p.m.  It still persists however.  Patient has no neurologic deficits fever.  Her blood pressure is high, but only slightly so.                Scribe Attestation:   Scribe #1: I performed the above scribed service and the documentation accurately describes the services I performed. I attest to the accuracy of the note.                          Clinical Impression:       ICD-10-CM ICD-9-CM   1. Generalized headache  R51 784.0   2. Uncontrolled hypertension  I10 401.9                       I personally performed the services described in this documentation.  All medical record  entries made by the scribe are at my direction and in my presence.  Signed, Dr. Afshin Pepe MD  07/09/20 [id number]

## 2020-07-10 ENCOUNTER — PATIENT MESSAGE (OUTPATIENT)
Dept: ADMINISTRATIVE | Facility: OTHER | Age: 45
End: 2020-07-10

## 2020-07-10 ENCOUNTER — PATIENT OUTREACH (OUTPATIENT)
Dept: ADMINISTRATIVE | Facility: OTHER | Age: 45
End: 2020-07-10

## 2020-07-10 DIAGNOSIS — Z12.31 BREAST CANCER SCREENING BY MAMMOGRAM: Primary | ICD-10-CM

## 2020-07-10 NOTE — ED NOTES
Report received from Samanta SCANLON. Pt asleep in [facility]. VSS. Will continue to monitor.

## 2020-07-10 NOTE — ED PROVIDER NOTES
Duong Malik  1800 - assumed care of patient from Dr. Pepe.  45-year-old female with history of headaches presenting with migrainous headache, intermittent the few days.  Denies nausea, vomiting, neck pain or stiffness.  CT head negative, atraumatic, gradual onset symptoms, not severe or thunderclap.  Denies fevers or chills.  On exam well-appearing in no acute distress.  Blood pressure improving.  Patient has received Compazine, Toradol.  Patient remains with headache though hypertension improved.  Patient given Solu-Medrol.  On re-evaluation, the patient reports her headache is substantially improved.  She is feeling better.  Her vitals are improved, systolic 164.  She has prescriptions for antihypertensives by Dr. Pepe.  I believe she is safe for discharge home.  Discussed return precautions as well as need for primary care follow-up.     Duong Malik MD  07/09/20 7899

## 2020-07-13 ENCOUNTER — TELEPHONE (OUTPATIENT)
Dept: FAMILY MEDICINE | Facility: CLINIC | Age: 45
End: 2020-07-13

## 2020-07-13 ENCOUNTER — PATIENT MESSAGE (OUTPATIENT)
Dept: ADMINISTRATIVE | Facility: OTHER | Age: 45
End: 2020-07-13

## 2020-07-13 NOTE — TELEPHONE ENCOUNTER
----- Message from Candace Martin MD sent at 7/13/2020  1:09 PM CDT -----  Regarding: FW: Mammogram order  Okay to fax order to [facility]     Dr. Martni   ----- Message -----  From: Ting Cheek MA  Sent: 7/10/2020   2:11 PM CDT  To: Candace Martin MD  Subject: FW: Mammogram order                                ----- Message -----  From: Rosina Bird MA  Sent: 7/10/2020   1:49 PM CDT  To: Veronica Garcia Staff  Subject: Mammogram order                                  Patient is requesting to have mammogram order faxed to [facility].

## 2020-07-14 ENCOUNTER — OFFICE VISIT (OUTPATIENT)
Dept: UROLOGY | Facility: CLINIC | Age: 45
End: 2020-07-14
Payer: COMMERCIAL

## 2020-07-14 VITALS — HEIGHT: 64 IN | BODY MASS INDEX: 40.39 KG/M2 | WEIGHT: 236.56 LBS | TEMPERATURE: 98 F

## 2020-07-14 DIAGNOSIS — R82.90 ABNORMAL URINE: ICD-10-CM

## 2020-07-14 DIAGNOSIS — R35.0 URINARY FREQUENCY: ICD-10-CM

## 2020-07-14 DIAGNOSIS — N39.0 RECURRENT UTI: Primary | ICD-10-CM

## 2020-07-14 LAB
BILIRUB SERPL-MCNC: NORMAL MG/DL
BLOOD URINE, POC: NORMAL
COLOR, POC UA: YELLOW
GLUCOSE UR QL STRIP: NORMAL
KETONES UR QL STRIP: NORMAL
LEUKOCYTE ESTERASE URINE, POC: NORMAL
NITRITE, POC UA: NORMAL
PH, POC UA: 7
PROTEIN, POC: NORMAL
SPECIFIC GRAVITY, POC UA: 1000
UROBILINOGEN, POC UA: NORMAL

## 2020-07-14 PROCEDURE — 99999 PR PBB SHADOW E&M-EST. PATIENT-LVL V: CPT | Mod: PBBFAC,,, | Performed by: UROLOGY

## 2020-07-14 PROCEDURE — 99214 OFFICE O/P EST MOD 30 MIN: CPT | Mod: 25,S$GLB,, | Performed by: UROLOGY

## 2020-07-14 PROCEDURE — 99214 PR OFFICE/OUTPT VISIT, EST, LEVL IV, 30-39 MIN: ICD-10-PCS | Mod: 25,S$GLB,, | Performed by: UROLOGY

## 2020-07-14 PROCEDURE — 99999 PR PBB SHADOW E&M-EST. PATIENT-LVL V: ICD-10-PCS | Mod: PBBFAC,,, | Performed by: UROLOGY

## 2020-07-14 PROCEDURE — 81001 URINALYSIS AUTO W/SCOPE: CPT | Mod: S$GLB,,, | Performed by: UROLOGY

## 2020-07-14 PROCEDURE — 87086 URINE CULTURE/COLONY COUNT: CPT

## 2020-07-14 PROCEDURE — 81001 POCT URINALYSIS, DIPSTICK OR TABLET REAGENT, AUTOMATED, WITH MICROSCOP: ICD-10-PCS | Mod: S$GLB,,, | Performed by: UROLOGY

## 2020-07-14 RX ORDER — CIPROFLOXACIN 500 MG/1
500 TABLET ORAL ONCE
Qty: 1 TABLET | Refills: 0 | Status: SHIPPED | OUTPATIENT
Start: 2020-07-14 | End: 2020-07-14

## 2020-07-14 NOTE — LETTER
July 14, 2020      Johan Grover MD  [address] [facility] [address]           [facility]  [address]  [place] [address]  Phone: [phone]  Fax: [phone]          Patient: Alberto Frye   MR Number: [id number]   YOB: 1975   Date of Visit: 7/14/2020       Dear Dr. Johan Grover:    Thank you for referring Alberto Frye to me for evaluation. Attached you will find relevant portions of my assessment and plan of care.    If you have questions, please do not hesitate to call me. I look forward to following Alberto Frye along with you.    Sincerely,    TIM Pierce MD    Enclosure  CC:  No Recipients    If you would like to receive this communication electronically, please contact [email or web address] or [phone] to request more information on [employer] Link access.    For providers and/or their staff who would like to refer a patient to [facility], please contact us through our one-stop-shop provider referral line, [facility], at [phone].    If you feel you have received this communication in error or would no longer like to receive these types of communications, please e-mail [email or web address]

## 2020-07-14 NOTE — PROGRESS NOTES
Subjective:       Patient ID: Alberto Frye is a 45 y.o. female who was referred by Johan Grover MD    Chief Complaint:   Chief Complaint   Patient presents with    Urinary Tract Infection     new pt coming in for recurrent uti        Recurrent UTI  Alberto Frye is a 45 y.o. woman with recurrent UTI.  She has had about 3 in the past year.  She describes them has frequency, pressure, and small volume voids.  She denies gross hematuria or pneumaturia.  She had a positive culture in May 2020 but was asymptomatic.  She is not sexually active.  She has 1-2 bowel movements a day, which is an improvement since her colonoscopy.  She had a hysterectomy in her 20's for benign reasons.  She does not have ovaries.  She developed a DVT after her hysterectomy.  She does not take blood thinners.    ACTIVE MEDICAL ISSUES:  Patient Active Problem List   Diagnosis    Essential hypertension    Seasonal allergies    Anxiety    Abdominal pain       PAST MEDICAL HISTORY  Past Medical History:   Diagnosis Date    Alcohol abuse     stopped heavy drinking about 10 years ago; was drinking 3 glasses of vodka/tequilla,rum/whiskey per day    Anxiety     Depression     Hallucination     Hx of psychiatric care     Hypertension     Caro     unplanned trips, energy without sleep for 2 days (reading, cleaning), feelings that she can do multiple tasks at one time, feelings of overconfidence at times    Psychiatric problem     Sleep difficulties     Therapy     Withdrawal symptoms, drug or narcotic     racing heart, restlessness       PAST SURGICAL HISTORY:  Past Surgical History:   Procedure Laterality Date     lapban surgery  2009    ESOPHAGOGASTRODUODENOSCOPY N/A 6/3/2020    Procedure: EGD (ESOPHAGOGASTRODUODENOSCOPY);  Surgeon: Johan Grover MD;  Location: [address];  Service: Endoscopy;  Laterality: N/A;  covid 6/2-[facility]    HYSTERECTOMY         SOCIAL HISTORY:  Social  History     Tobacco Use    Smoking status: Former Smoker    Smokeless tobacco: Never Used    Tobacco comment: quit in october 2016   Substance Use Topics    Alcohol use: Yes     Alcohol/week: 2.0 standard drinks     Types: 2 Cans of beer per week     Comment: social presently, excessive 10 years ago    Drug use: Yes     Types: Marijuana     Comment: uses [employer] weekly       FAMILY HISTORY:  Family History   Problem Relation Age of Onset    Diabetes Mother     Cancer Mother     Hypertension Mother     Cirrhosis Maternal Uncle         ETOH    Celiac disease Neg Hx     Colon cancer Neg Hx     Colon polyps Neg Hx     Crohn's disease Neg Hx     Esophageal cancer Neg Hx     Inflammatory bowel disease Neg Hx     Liver cancer Neg Hx     Liver disease Neg Hx     Rectal cancer Neg Hx     Stomach cancer Neg Hx     Ulcerative colitis Neg Hx        ALLERGIES AND MEDICATIONS: updated and reviewed.  Review of patient's allergies indicates:   Allergen Reactions    Morphine Itching and Hallucinations    Pcn [penicillins] Other (See Comments)     Was told from childhood she couldn't take it    Sulfa (sulfonamide antibiotics) Nausea And Vomiting    Latex, natural rubber Rash     Current Outpatient Medications   Medication Sig    amLODIPine (NORVASC) 5 MG tablet Take 1 tablet (5 mg total) by mouth once daily.    butalbital-acetaminophen-caffeine -40 mg (FIORICET, ESGIC) -40 mg per tablet Take 1 tablet by mouth every 4 (four) hours as needed for Pain.    cholecalciferol, vitamin D3, (VITAMIN D3) 50 mcg (2,000 unit) Cap Take 1 capsule (2,000 Units total) by mouth once daily.    cloNIDine 0.2 mg/24 hr td ptwk (CATAPRES) 0.2 mg/24 hr Place 1 patch onto the skin every 7 days.    cyanocobalamin (VITAMIN B-12) 1000 MCG tablet Take 100 mcg by mouth once daily.    ergocalciferol (ERGOCALCIFEROL) 50,000 unit Cap Take 1 capsule (50,000 Units total) by mouth every 7 days. for 12 doses    FLUoxetine 40 MG  "capsule Take 1 capsule (40 mg total) by mouth once daily.    fluticasone (FLONASE) 50 mcg/actuation nasal spray 1 spray by Each Nare route once daily.    gabapentin (NEURONTIN) 600 MG tablet Take 1 tablet (600 mg total) by mouth nightly as needed.    HYDROcodone-acetaminophen (NORCO)  mg per tablet Take 1 tablet by mouth every 12 (twelve) hours as needed for Pain. MORE THAN A 7 DAY SUPPLY OF THE OPIATE IS MEDICALLY NECESSARY    hydrOXYzine HCl (ATARAX) 50 MG tablet Take 1 tablet (50 mg total) by mouth daily as needed for Anxiety (sleep).    ibuprofen (ADVIL,MOTRIN) 400 MG tablet Take 1 tablet (400 mg total) by mouth every 6 (six) hours as needed.    multivitamin with minerals tablet Take 1 tablet by mouth once daily.    OLANZapine (ZYPREXA) 5 MG tablet Take 1 tablet (5 mg total) by mouth every evening.    pantoprazole (PROTONIX) 40 MG tablet Take 1 tablet (40 mg total) by mouth before breakfast. Take one pill every morning 45 minutes before breakfast in the morning.    torsemide (DEMADEX) 10 MG Tab Take 2 tablets (20 mg total) by mouth once daily.    ciprofloxacin HCl (CIPRO) 500 MG tablet Take 1 tablet (500 mg total) by mouth once. for 1 dose    levocetirizine (XYZAL) 5 MG tablet Take 1 tablet (5 mg total) by mouth every evening.     No current facility-administered medications for this visit.        Review of Systems    Objective:      Vitals:    07/14/20 1533   Temp: 97.8 °F (36.6 °C)   Weight: 107.3 kg (236 lb 8.9 oz)   Height: 5' 4" (1.626 m)     Physical Exam    Urine dipstick shows negative for all components.  Micro exam: negative for WBC's or RBC's.    CT Abdomen Pelvis With Contrast  Order: [id number]  Status:  Final result   Visible to patient:  Yes (Patient Portal)   Next appt:  07/21/2020 at 08:30 AM in Infectious Diseases (INJECTION, INFECTIOUS DISEASES)   Dx:  Generalized abdominal pain  Details    Reading Physician Reading Date Result Priority   Jose D Aguilar, " MD  [phone]  [phone] 6/2/2020 Routine      Narrative & Impression     EXAMINATION:  CT ABDOMEN PELVIS WITH CONTRAST     CLINICAL HISTORY:  Abdominal pain, unspecified; Generalized abdominal pain     TECHNIQUE:  Low dose axial images, sagittal and coronal reformations were obtained from the lung bases to the pubic symphysis following the IV administration of 85 mL of Omnipaque 350     FINDINGS:  The visualized portion of the base of the lungs, visualized portion of the heart, and spleen are unremarkable.  There is a gastric band in place.  The stomach is otherwise unremarkable.  The pancreas, liver, gallbladder, adrenal glands, and kidneys are unremarkable.  There is an IVC filter in place.  The visualized portion of the aorta is unremarkable.  The bladder is decompressed and unremarkable.  The uterus is absent or atrophic.  The bowel is unremarkable.  The appendix is not visualized.  The osseous structures demonstrate degenerative change.     Impression:     No acute findings.                     Assessment:       1. Recurrent UTI    2. Abnormal urine    3. Urinary frequency          Plan:       1. Recurrent UTI  We talked about preventative antibiotics or estrogen cream.  If she wants to try estrogen cream, I would want clearance from hematology given her DVT history.  More water  Void after intercourse  Do not douche    - POCT urinalysis, dipstick or tablet reag  - Urine culture  - Cystoscopy; Future  - ciprofloxacin HCl (CIPRO) 500 MG tablet; Take 1 tablet (500 mg total) by mouth once. for 1 dose  Dispense: 1 tablet; Refill: 0    2. Abnormal urine    - Ambulatory referral/consult to Urology    3. Urinary frequency  monitor            Follow up for cystoscopy.

## 2020-07-15 ENCOUNTER — PATIENT MESSAGE (OUTPATIENT)
Dept: FAMILY MEDICINE | Facility: CLINIC | Age: 45
End: 2020-07-15

## 2020-07-15 DIAGNOSIS — I10 ESSENTIAL HYPERTENSION: Primary | ICD-10-CM

## 2020-07-15 RX ORDER — VALSARTAN AND HYDROCHLOROTHIAZIDE 160; 25 MG/1; MG/1
1 TABLET ORAL DAILY
Qty: 90 TABLET | Refills: 3 | Status: ON HOLD | OUTPATIENT
Start: 2020-07-15 | End: 2020-08-15 | Stop reason: HOSPADM

## 2020-07-15 NOTE — TELEPHONE ENCOUNTER
Patient last office visit was 04/22/2020.    Please address the patient concerns.    Thanks,  Lara

## 2020-07-16 LAB — BACTERIA UR CULT: NORMAL

## 2020-07-21 ENCOUNTER — OFFICE VISIT (OUTPATIENT)
Dept: GASTROENTEROLOGY | Facility: CLINIC | Age: 45
End: 2020-07-21
Payer: COMMERCIAL

## 2020-07-21 ENCOUNTER — CLINICAL SUPPORT (OUTPATIENT)
Dept: INFECTIOUS DISEASES | Facility: CLINIC | Age: 45
End: 2020-07-21
Payer: COMMERCIAL

## 2020-07-21 VITALS — HEIGHT: 64 IN | WEIGHT: 235 LBS | BODY MASS INDEX: 40.12 KG/M2

## 2020-07-21 DIAGNOSIS — R22.2 SUBCUTANEOUS NODULE OF ABDOMINAL WALL: Primary | ICD-10-CM

## 2020-07-21 DIAGNOSIS — B95.2 UTI (URINARY TRACT INFECTION) DUE TO ENTEROCOCCUS: ICD-10-CM

## 2020-07-21 DIAGNOSIS — R10.9 ABDOMINAL PAIN, UNSPECIFIED ABDOMINAL LOCATION: ICD-10-CM

## 2020-07-21 DIAGNOSIS — N39.0 UTI (URINARY TRACT INFECTION) DUE TO ENTEROCOCCUS: ICD-10-CM

## 2020-07-21 DIAGNOSIS — Z98.84 LAP-BAND SURGERY STATUS: ICD-10-CM

## 2020-07-21 PROCEDURE — 3008F PR BODY MASS INDEX (BMI) DOCUMENTED: ICD-10-PCS | Mod: CPTII,,, | Performed by: INTERNAL MEDICINE

## 2020-07-21 PROCEDURE — 99999 PR PBB SHADOW E&M-EST. PATIENT-LVL I: ICD-10-PCS | Mod: PBBFAC,,,

## 2020-07-21 PROCEDURE — 90471 IMMUNIZATION ADMIN: CPT | Mod: S$GLB,,, | Performed by: INTERNAL MEDICINE

## 2020-07-21 PROCEDURE — 99999 PR PBB SHADOW E&M-EST. PATIENT-LVL I: CPT | Mod: PBBFAC,,,

## 2020-07-21 PROCEDURE — 90739 HEPB VACC 2/4 DOSE ADULT IM: CPT | Mod: S$GLB,,, | Performed by: INTERNAL MEDICINE

## 2020-07-21 PROCEDURE — 90739 HEPATITIS B (RECOMBINANT) ADJUVANTED, 2 DOSE: ICD-10-PCS | Mod: S$GLB,,, | Performed by: INTERNAL MEDICINE

## 2020-07-21 PROCEDURE — 3008F BODY MASS INDEX DOCD: CPT | Mod: CPTII,,, | Performed by: INTERNAL MEDICINE

## 2020-07-21 PROCEDURE — 99214 OFFICE O/P EST MOD 30 MIN: CPT | Mod: 95,,, | Performed by: INTERNAL MEDICINE

## 2020-07-21 PROCEDURE — 90471 HEPATITIS B (RECOMBINANT) ADJUVANTED, 2 DOSE: ICD-10-PCS | Mod: S$GLB,,, | Performed by: INTERNAL MEDICINE

## 2020-07-21 PROCEDURE — 99214 PR OFFICE/OUTPT VISIT, EST, LEVL IV, 30-39 MIN: ICD-10-PCS | Mod: 95,,, | Performed by: INTERNAL MEDICINE

## 2020-07-21 NOTE — Clinical Note
It looks like she has a general surgery apt tomorrow at 10am with Dr. Kyle if your can activate my referral.

## 2020-07-21 NOTE — PROGRESS NOTES
The patient location is:  at work  The chief complaint leading to consultation is:  Follow-up of abdominal pain urinary frequency and urinary tract infection    Visit type:  Telemedicine video visit    Face to Face time with patient:25 minutes of total time spent on the encounter, which includes face to face time and non-face to face time preparing to see the patient (eg, review of tests), Obtaining and/or reviewing separately obtained history, Documenting clinical information in the electronic or other health record, Independently interpreting results (not separately reported) and communicating results to the patient/family/caregiver, or Care coordination (not separately reported).         Each patient to whom he or she provides medical services by telemedicine is:  (1) informed of the relationship between the physician and patient and the respective role of any other health care provider with respect to management of the patient; and (2) notified that he or she may decline to receive medical services by telemedicine and may withdraw from such care at any time.    Notes:         [facility] Consultation Note    Reason for Consult:  The primary encounter diagnosis was Subcutaneous nodule of abdominal wall. Diagnoses of UTI (urinary tract infection) due to Enterococcus and LAP-BAND surgery status were also pertinent to this visit.    PCP:   Candace Martin       Referring MD:  No referring provider defined for this encounter.    Initial History of Present Illness (HPI):  This is a 45 y.o. female here for follow-up of urinary frequency urine grew out Enterococcus faecalis she was treated with antibiotics repeat urine culture has been negative she is followed up with Urology says they have cystoscope plan.  Patient's abdominal pain urinary frequency has resolved however Saturday July 18th 2020 on the right upper abdomen she started having this painful skin growth that started getting bigger and  bigger she has an appointment with her primary care doctor to evaluate that in for poorly controlled hypertension but has not seen anybody for this.  She denies any trauma no fever no chills no shortness of breath no drainage no crepitance necrosis she says it is painful it is similar to an abscess as she had when she was maybe a teenager.  She does not remember a bug by 6 or any skin trauma to that area.  She denies fever chills shortness of breath chest pain no change in bowel habits. Patient states she had a gastric lap band placed in 2009 by Dr. Antonio Galindo.  She says it is disconnected now. She has no family history of GI malignancies she said she had a colonoscopy at [facility] 2019 it was complete no polyps just had some internal hemorrhoids she says she was told to have another 1 in 10 years she says.  No family history of colon cancer no family history of advanced colon polyps no FAP no attenuated FAP no MA P no Foster syndrome nobody with celiac sprue or inflammatory bowel disease nobody with Foster cancers she does have a maternal uncle who had alcoholic cirrhosis.  And a female grandparent who had breast cancer.  Patient states she has 2 well-formed brown bowel movements a day no blood.  She had a recent EGD which is normal pathology shows no evidence of celiac sprue recent CT scan shows no acute findings.  But this was prior to this skin lesion appearing few days ago.  Patient has cut down on her alcohol intake.    Abdominal pain - as above  Reflux - no  Dysphagia - no   Bowel habits - normal  GI bleeding - none  NSAID usage - occasional    Interval HPI 07/21/2020:  The patient's last visit with me was on 5/26/2020.      ROS:  Constitutional: No fevers, chills, No weight loss  ENT:  No heartburn no dysphagia no odynophagia no hoarseness  CV: No chest pain, no palpitation  Pulm: No cough, No shortness of breath, no wheezing  Ophtho: No vision changes  GI: see HPI  Derm: No rash, no  itching.  Right upper abdomen skin lesion as above.  Since Saturday July 18th.  Heme: No lymphadenopathy, No easy bruising  MSK: No significant arthritis requiring NSAID she does have chronic back pain she says.  : No dysuria, No hematuria, she does have urinary frequency.  Endo: No hot or cold intolerance  Neuro: No syncope, No seizure, no strokes  Psych: No uncontrolled anxiety, No uncontrolled depression    Medical History:  has a past medical history of Alcohol abuse, Anxiety, Depression, Hallucination, psychiatric care, Hypertension, Caro, Psychiatric problem, Sleep difficulties, Therapy, and Withdrawal symptoms, drug or narcotic.    Surgical History:  has a past surgical history that includes Hysterectomy;  lapban surgery (2009); and Esophagogastroduodenoscopy (N/A, 6/3/2020).    Family History: family history includes Cancer in her mother; Cirrhosis in her maternal uncle; Diabetes in her mother; Hypertension in her mother..     Social History:  reports that she has quit smoking. She has never used smokeless tobacco. She reports current alcohol use of about 2.0 standard drinks of alcohol per week. She reports current drug use. Drug: Marijuana.    Review of patient's allergies indicates:   Allergen Reactions    Morphine Itching and Hallucinations    Pcn [penicillins] Other (See Comments)     Was told from childhood she couldn't take it    Sulfa (sulfonamide antibiotics) Nausea And Vomiting    Latex, natural rubber Rash       Medication List with Changes/Refills   Current Medications    BUTALBITAL-ACETAMINOPHEN-CAFFEINE -40 MG (FIORICET, ESGIC) -40 MG PER TABLET    Take 1 tablet by mouth every 4 (four) hours as needed for Pain.    CHOLECALCIFEROL, VITAMIN D3, (VITAMIN D3) 50 MCG (2,000 UNIT) CAP    Take 1 capsule (2,000 Units total) by mouth once daily.    CLONIDINE 0.2 MG/24 HR TD PTWK (CATAPRES) 0.2 MG/24 HR    Place 1 patch onto the skin every 7 days.    CYANOCOBALAMIN (VITAMIN B-12) 1000  "MCG TABLET    Take 100 mcg by mouth once daily.    ERGOCALCIFEROL (ERGOCALCIFEROL) 50,000 UNIT CAP    Take 1 capsule (50,000 Units total) by mouth every 7 days. for 12 doses    FLUOXETINE 40 MG CAPSULE    Take 1 capsule (40 mg total) by mouth once daily.    FLUTICASONE (FLONASE) 50 MCG/ACTUATION NASAL SPRAY    1 spray by Each Nare route once daily.    GABAPENTIN (NEURONTIN) 600 MG TABLET    Take 1 tablet (600 mg total) by mouth nightly as needed.    HYDROCODONE-ACETAMINOPHEN (NORCO)  MG PER TABLET    Take 1 tablet by mouth every 12 (twelve) hours as needed for Pain. MORE THAN A 7 DAY SUPPLY OF THE OPIATE IS MEDICALLY NECESSARY    HYDROXYZINE HCL (ATARAX) 50 MG TABLET    Take 1 tablet (50 mg total) by mouth daily as needed for Anxiety (sleep).    IBUPROFEN (ADVIL,MOTRIN) 400 MG TABLET    Take 1 tablet (400 mg total) by mouth every 6 (six) hours as needed.    LEVOCETIRIZINE (XYZAL) 5 MG TABLET    Take 1 tablet (5 mg total) by mouth every evening.    MULTIVITAMIN WITH MINERALS TABLET    Take 1 tablet by mouth once daily.    OLANZAPINE (ZYPREXA) 5 MG TABLET    Take 1 tablet (5 mg total) by mouth every evening.    PANTOPRAZOLE (PROTONIX) 40 MG TABLET    Take 1 tablet (40 mg total) by mouth before breakfast. Take one pill every morning 45 minutes before breakfast in the morning.    TORSEMIDE (DEMADEX) 10 MG TAB    Take 2 tablets (20 mg total) by mouth once daily.    VALSARTAN-HYDROCHLOROTHIAZIDE (DIOVAN-HCT) 160-25 MG PER TABLET    Take 1 tablet by mouth once daily.   Discontinued Medications    AMLODIPINE (NORVASC) 5 MG TABLET    Take 1 tablet (5 mg total) by mouth once daily.         Objective Findings:    Vital Signs:  Ht 5' 4" (1.626 m)   Wt 106.6 kg (235 lb)   BMI 40.34 kg/m²   Body mass index is 40.34 kg/m².    Physical Exam:  Telemedicine video visit  General Appearance: Well appearing in no acute distress  Skin:  Right upper abdomen around the rib cage area appears to have a skin erythema but no necrosis " no drainage resolution of the video optic is not good enough  To make a clinical diagnosis.  Neurologic:  Alert and oriented x4  Psychiatric:  Normal speech mentation and affect    Labs:  Lab Results   Component Value Date    WBC 5.00 05/26/2020    HGB 14.0 05/26/2020    HCT 44 07/09/2020     05/26/2020    CHOL 185 01/16/2020    TRIG 76 01/16/2020    HDL 50 01/16/2020    ALT 21 05/26/2020    AST 24 05/26/2020     05/26/2020    K 3.5 05/26/2020     05/26/2020    CREATININE 0.7 05/26/2020    BUN 13 05/26/2020    CO2 26 05/26/2020    TSH 3.092 01/16/2020    INR 1.0 11/16/2016    HGBA1C 5.3 01/16/2020             Medical Decision Making:  CT scan of the abdomen pelvis images personally reviewed by myself  EGD report and images and path reviewed labs reviewed  I call the general surgery clinic to arrange for general surgery clinic appointment tomorrow for patient for evaluate of her skin lesion  Specially to rule out abscess  Follow-up UA culture negative      Assessment:  1. Subcutaneous nodule of abdominal wall    2. UTI (urinary tract infection) due to Enterococcus    3. LAP-BAND surgery status         Recommendations:   1.  Abdominal pain and urinary frequency has resolved no acute findings on CT scan.  2.  A new onset since Saturday July 18, 2020 right upper abdomen skin lesion which patient thinks  Is very similar to an abscess she had when she was a teenager will refer to General surgery Clinic  Tomorrow at 10:00 a.m. with Dr. Salomon Kyle.  Patient knows show up and advance to not be late.  3.  History of enterococcus faecalis in her urine she is followed up with repeat urine culture it is now negative after antibiotic she is being evaluated  By Urology with the cystoscope.  4.  Status post lap band.  5.  GERD responded well to PPI  6.  Return GI clinic in 6 months for follow-up.    Follow up if symptoms worsen or fail to improve.      Order summary:  Orders Placed This Encounter     Ambulatory referral/consult to General Surgery         Thank you so much for allowing me to participate in the care of Alberto Grover MD

## 2020-07-21 NOTE — Clinical Note
Starla - Please see if patient can be schedule for General Surgery tomorrow for painful upper abdomen skin lesion suspected by patient to be an abscess. Referral placed.

## 2020-07-21 NOTE — PATIENT INSTRUCTIONS
MS. HARRIS  I was able to get you a general surgery appointment with Salomon Kyle Jr., MD, one of our best general surgeons, for tomorrow July 22nd Wednesday at 10:00 a.mBekah hopkins.  Please show up about 15-20 minutes early.    Thank you

## 2020-07-22 ENCOUNTER — DOCUMENTATION ONLY (OUTPATIENT)
Dept: GASTROENTEROLOGY | Facility: CLINIC | Age: 45
End: 2020-07-22

## 2020-07-22 ENCOUNTER — OFFICE VISIT (OUTPATIENT)
Dept: SURGERY | Facility: CLINIC | Age: 45
End: 2020-07-22
Payer: COMMERCIAL

## 2020-07-22 VITALS
WEIGHT: 224 LBS | SYSTOLIC BLOOD PRESSURE: 135 MMHG | DIASTOLIC BLOOD PRESSURE: 90 MMHG | HEIGHT: 64 IN | HEART RATE: 78 BPM | BODY MASS INDEX: 38.24 KG/M2

## 2020-07-22 DIAGNOSIS — L08.9 INFECTED SEBACEOUS CYST: ICD-10-CM

## 2020-07-22 DIAGNOSIS — L72.3 INFECTED SEBACEOUS CYST: ICD-10-CM

## 2020-07-22 PROCEDURE — 3008F PR BODY MASS INDEX (BMI) DOCUMENTED: ICD-10-PCS | Mod: CPTII,S$GLB,, | Performed by: SURGERY

## 2020-07-22 PROCEDURE — 3080F DIAST BP >= 90 MM HG: CPT | Mod: CPTII,S$GLB,, | Performed by: SURGERY

## 2020-07-22 PROCEDURE — 3080F PR MOST RECENT DIASTOLIC BLOOD PRESSURE >= 90 MM HG: ICD-10-PCS | Mod: CPTII,S$GLB,, | Performed by: SURGERY

## 2020-07-22 PROCEDURE — 99999 PR PBB SHADOW E&M-EST. PATIENT-LVL IV: CPT | Mod: PBBFAC,,, | Performed by: SURGERY

## 2020-07-22 PROCEDURE — 3075F PR MOST RECENT SYSTOLIC BLOOD PRESS GE 130-139MM HG: ICD-10-PCS | Mod: CPTII,S$GLB,, | Performed by: SURGERY

## 2020-07-22 PROCEDURE — 10060 PR DRAIN SKIN ABSCESS SIMPLE: ICD-10-PCS | Mod: S$GLB,,, | Performed by: SURGERY

## 2020-07-22 PROCEDURE — 99204 PR OFFICE/OUTPT VISIT, NEW, LEVL IV, 45-59 MIN: ICD-10-PCS | Mod: 25,S$GLB,, | Performed by: SURGERY

## 2020-07-22 PROCEDURE — 3008F BODY MASS INDEX DOCD: CPT | Mod: CPTII,S$GLB,, | Performed by: SURGERY

## 2020-07-22 PROCEDURE — 10060 I&D ABSCESS SIMPLE/SINGLE: CPT | Mod: S$GLB,,, | Performed by: SURGERY

## 2020-07-22 PROCEDURE — 99999 PR PBB SHADOW E&M-EST. PATIENT-LVL IV: ICD-10-PCS | Mod: PBBFAC,,, | Performed by: SURGERY

## 2020-07-22 PROCEDURE — 99204 OFFICE O/P NEW MOD 45 MIN: CPT | Mod: 25,S$GLB,, | Performed by: SURGERY

## 2020-07-22 PROCEDURE — 3075F SYST BP GE 130 - 139MM HG: CPT | Mod: CPTII,S$GLB,, | Performed by: SURGERY

## 2020-07-22 NOTE — LETTER
July 22, 2020      Johan Grover MD  [address]  [place] [address]           [facility] - General Surgery  [address]  [place] [address]  Phone: [phone]          Patient: Alberto Frye   MR Number: [id number]   YOB: 1975   Date of Visit: 7/22/2020       Dear Dr. Johan Grover:    Thank you for referring Alberto Frye to me for evaluation. Attached you will find relevant portions of my assessment and plan of care.    If you have questions, please do not hesitate to call me. I look forward to following Alberto Frye along with you.    Sincerely,    Salomon Kyle Jr., MD    Enclosure  CC:  No Recipients    If you would like to receive this communication electronically, please contact [email or web address] or [phone] to request more information on [employer] Link access.    For providers and/or their staff who would like to refer a patient to [facility], please contact us through our one-stop-shop provider referral line, [facility], at [phone].    If you feel you have received this communication in error or would no longer like to receive these types of communications, please e-mail [email or web address]

## 2020-07-22 NOTE — PROGRESS NOTES
"No Lacey  You 1 hour ago (2:22 PM)     FYI    Message text       Alberto Harris  You 2 hours ago (12:45 PM)        Good afternoon Dr. Grover, I just want to say "Thank you" for all you have done and continue to do for me. Dr. Kyle and his team were awesome this morning. I had a cyst that was drained and packed. The only painful part of the procedure was injecting the needle in the wound to deaden the skin. Other than that, all went well. I'm sort of sore but I'm feeling better than I had felt yesterday. Once again, Thank you for all you have done! In case you were not aware, I am a Referrals Coordinator, so needlessly to say, I will be sending you more patients! Take Care.            You  Alberto Harris 23 hours ago (3:55 PM)        MS. HARRIS  I was able to get you a general surgery appointment with Salomon Kyle Jr., MD, one of our best general surgeons, for tomorrow July 22nd Wednesday at 10:00 a.m. sandeep.  Please show up about 15-20 minutes early.     Thank you         "

## 2020-07-22 NOTE — PROGRESS NOTES
GENERAL SURGERY CLINIC  HISTORY AND PHYSICAL    CC:  Right costal margin mass    HPI:  Alberto Frye is a 45 y.o. female with Hx of HTN who presents to clinic for mass that is painful (10/10) and itchy. Not warm to the touch, but is erythematous. Patient denies discharge. Pain is exacerbated by bending over or turning on her left side while laying down. First noticed on Saturday night while resting. Patient notes it is similar to a boil that was removed from [facility] approximately 10 years ago because of the pain level and exacerbation by leaning over. Patient denies any nausea, vomiting, diarrhea, constipation, fever, or chills.    ROS:   Review of Systems   Constitutional: Negative for chills, fever and weight loss.   HENT: Negative for congestion, sinus pain and sore throat.    Eyes: Negative for blurred vision and double vision.   Respiratory: Negative for cough and shortness of breath.    Cardiovascular: Negative for chest pain and palpitations.   Gastrointestinal: Negative for constipation, diarrhea, heartburn, nausea and vomiting.   Genitourinary: Negative for dysuria, frequency and urgency.   Musculoskeletal: Positive for back pain. Negative for neck pain.   Neurological: Positive for headaches. Negative for dizziness, tremors and seizures.        Past Medical History:   Diagnosis Date    Alcohol abuse     stopped heavy drinking about 10 years ago; was drinking 3 glasses of vodka/tequilla,rum/whiskey per day    Anxiety     Depression     Hallucination     Hx of psychiatric care     Hypertension     Caro     unplanned trips, energy without sleep for 2 days (reading, cleaning), feelings that she can do multiple tasks at one time, feelings of overconfidence at times    Psychiatric problem     Sleep difficulties     Therapy     Withdrawal symptoms, drug or narcotic     racing heart, restlessness       Past Surgical History:   Procedure Laterality Date     lapban surgery  2009     ESOPHAGOGASTRODUODENOSCOPY N/A 6/3/2020    Procedure: EGD (ESOPHAGOGASTRODUODENOSCOPY);  Surgeon: Johan rGover MD;  Location: [address]);  Service: Endoscopy;  Laterality: N/A;  covid 6/2-[facility]    HYSTERECTOMY         Social History     Socioeconomic History    Marital status:      Spouse name: Not on file    Number of children: 3    Years of education: Not on file    Highest education level: Not on file   Occupational History    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[employer]   Social Needs    Financial resource strain: Not on file    Food insecurity     Worry: Not on file     Inability: Not on file    Transportation needs     Medical: Not on file     Non-medical: Not on file   Tobacco Use    Smoking status: Former Smoker    Smokeless tobacco: Never Used    Tobacco comment: quit in october 2016   Substance and Sexual Activity    Alcohol use: Yes     Alcohol/week: 2.0 standard drinks     Types: 2 Cans of beer per week     Comment: social presently, excessive 10 years ago    Drug use: Yes     Types: Marijuana     Comment: uses [employer]CA weekly    Sexual activity: Yes     Partners: Male     Birth control/protection: See Surgical Hx   Lifestyle    Physical activity     Days per week: 3 days     Minutes per session: 60 min    Stress: To some extent   Relationships    Social connections     Talks on phone: Three times a week     Gets together: Patient refused     Attends [employer] service: Not on file     Active member of club or organization: No     Attends meetings of clubs or organizations: Patient refused     Relationship status: Patient refused   Other Topics Concern    Patient feels they ought to cut down on drinking/drug use No    Patient annoyed by others criticizing their drinking/drug use No    Patient has felt bad or guilty about drinking/drug use No    Patient has had a drink/used drugs as an eye opener in the AM No   Social History Narrative     Has 3 healthy grown sons (1990, 1993, 1998) Lives alone.    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   in 2010.    Has Male partner since 2014 who is currently disabled.       Review of patient's allergies indicates:   Allergen Reactions    Morphine Itching and Hallucinations    Pcn [penicillins] Other (See Comments)     Was told from childhood she couldn't take it    Sulfa (sulfonamide antibiotics) Nausea And Vomiting    Latex, natural rubber Rash         PHYSICAL EXAM:  Vitals:    07/22/20 1007   BP: (!) 135/90   Pulse: 78       General: NAD  Neuro: AAOx3  Cardio: RRR  Resp: Breathing even and unlabored  Abd: Soft, ND, BS+  Ext: Warm and well perfused      PERTINENT LABS:  Reviewed.       PERTINENT IMAGING:  Reviewed.       ASSESSMENT/PLAN:  Alberto Frye is a 45 y.o. female who presents to clinic complaining of pain near the right costal margin.     Infected Sebaceous Cyst  Plan for I and D in clinic

## 2020-07-22 NOTE — PROCEDURES
"Incision & Drainage    Date/Time: 7/22/2020 10:00 AM  Performed by: Becca Reid MD  Authorized by: Becca Reid MD     Time out: Immediately prior to procedure a "time out" was called to verify the correct patient, procedure, equipment, support staff and site/side marked as required.    Consent Done?:  Yes (Verbal)    Type:  Abscess  Body area:  Trunk  Location details:  Chest  Anesthesia:  Local infiltration  Local anesthetic: lidocaine 1% with epinephrine  Anesthetic total (ml):  3  Scalpel size:  11  Incision type:  Single straight  Complexity:  Simple  Drainage:  Pus  Drainage amount:  Moderate  Wound treatment:  Wound left open  Packing material:  1/4 in gauze  Patient tolerance:  Patient tolerated the procedure well with no immediate complications      "

## 2020-07-23 ENCOUNTER — TELEPHONE (OUTPATIENT)
Dept: SURGERY | Facility: CLINIC | Age: 45
End: 2020-07-23

## 2020-07-23 DIAGNOSIS — L02.91 ABSCESS: Primary | ICD-10-CM

## 2020-07-23 RX ORDER — HYDROCODONE BITARTRATE AND ACETAMINOPHEN 5; 325 MG/1; MG/1
1 TABLET ORAL EVERY 6 HOURS PRN
Qty: 10 TABLET | Refills: 0 | Status: ON HOLD | OUTPATIENT
Start: 2020-07-23 | End: 2020-08-12

## 2020-07-23 NOTE — TELEPHONE ENCOUNTER
----- Message from Adelia Hatch sent at 7/23/2020  1:26 PM CDT -----  Pt is calling stating that she is in a lot of pain and can not change her dressing on the wound and would like for some pain medication. Pt is on the phone crying. Pt would like for the nurse to give her a call back please

## 2020-07-23 NOTE — TELEPHONE ENCOUNTER
Pt calling crying and states she is in pain and unable to do the dressing changes d/t the amount of pain.  She has taken ibuprofen up to 800mg without any relief.  Pt requesting pain medication to help with the pain during the dressing changes.  Informed pt that I would discuss with Dr. Kyle and would get back with her.

## 2020-08-05 ENCOUNTER — OFFICE VISIT (OUTPATIENT)
Dept: SURGERY | Facility: CLINIC | Age: 45
End: 2020-08-05
Payer: COMMERCIAL

## 2020-08-05 VITALS
HEIGHT: 64 IN | WEIGHT: 233.69 LBS | BODY MASS INDEX: 39.9 KG/M2 | HEART RATE: 77 BPM | DIASTOLIC BLOOD PRESSURE: 83 MMHG | SYSTOLIC BLOOD PRESSURE: 134 MMHG

## 2020-08-05 DIAGNOSIS — Z09 POSTOP CHECK: Primary | ICD-10-CM

## 2020-08-05 PROCEDURE — 99024 POSTOP FOLLOW-UP VISIT: CPT | Mod: S$GLB,,, | Performed by: SURGERY

## 2020-08-05 PROCEDURE — 99024 PR POST-OP FOLLOW-UP VISIT: ICD-10-PCS | Mod: S$GLB,,, | Performed by: SURGERY

## 2020-08-05 PROCEDURE — 99999 PR PBB SHADOW E&M-EST. PATIENT-LVL IV: CPT | Mod: PBBFAC,,, | Performed by: SURGERY

## 2020-08-05 PROCEDURE — 99999 PR PBB SHADOW E&M-EST. PATIENT-LVL IV: ICD-10-PCS | Mod: PBBFAC,,, | Performed by: SURGERY

## 2020-08-05 NOTE — PROGRESS NOTES
GENERAL SURGERY CLINIC  HISTORY AND PHYSICAL    CC:  Right costal margin mass    HPI:  Alberto Frye is a 45 y.o. female with Hx of HTN who presents to clinic for mass at her right costal margin that is painful (10/10) and itchy. Not warm to the touch, but is erythematous. Patient denies discharge. Pain is exacerbated by bending over or turning on her left side while laying down. First noticed on Saturday night while resting. Patient notes it is similar to a boil that was removed from [facility] approximately 10 years ago because of the pain level and exacerbation by leaning over. Patient denies any nausea, vomiting, diarrhea, constipation, fever, or chills.    Interval History 8/5/20:  2 week f/u s/p incision and drainage. She is doing well since her last visit and very happy with the result. She reports no issue with the incision site - it has closed nicely. Denies fevers, chills, SOB. No pain at all.     ROS:   Review of Systems   Constitutional: Negative for chills, fever and weight loss.   HENT: Negative for congestion, sinus pain and sore throat.    Eyes: Negative for blurred vision and double vision.   Respiratory: Negative for cough and shortness of breath.    Cardiovascular: Negative for chest pain and palpitations.   Gastrointestinal: Negative for constipation, diarrhea, heartburn, nausea and vomiting.   Genitourinary: Negative for dysuria, frequency and urgency.   Musculoskeletal: Positive for back pain. Negative for neck pain.   Neurological: Positive for headaches. Negative for dizziness, tremors and seizures.        Past Medical History:   Diagnosis Date    Alcohol abuse     stopped heavy drinking about 10 years ago; was drinking 3 glasses of vodka/tequilla,rum/whiskey per day    Anxiety     Depression     Hallucination     Hx of psychiatric care     Hypertension     Caro     unplanned trips, energy without sleep for 2 days (reading, cleaning), feelings that she can do multiple tasks at  one time, feelings of overconfidence at times    Psychiatric problem     Sleep difficulties     Therapy     Withdrawal symptoms, drug or narcotic     racing heart, restlessness       Past Surgical History:   Procedure Laterality Date     lapban surgery  2009    ESOPHAGOGASTRODUODENOSCOPY N/A 6/3/2020    Procedure: EGD (ESOPHAGOGASTRODUODENOSCOPY);  Surgeon: Johan Grover MD;  Location: [address]);  Service: Endoscopy;  Laterality: N/A;  covid 6/2-[facility]    HYSTERECTOMY         Social History     Socioeconomic History    Marital status:      Spouse name: Not on file    Number of children: 3    Years of education: Not on file    Highest education level: Not on file   Occupational History    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[employer] [place]   Social Needs    Financial resource strain: Not on file    Food insecurity     Worry: Not on file     Inability: Not on file    Transportation needs     Medical: Not on file     Non-medical: Not on file   Tobacco Use    Smoking status: Former Smoker    Smokeless tobacco: Never Used    Tobacco comment: quit in october 2016   Substance and Sexual Activity    Alcohol use: Yes     Alcohol/week: 2.0 standard drinks     Types: 2 Cans of beer per week     Comment: social presently, excessive 10 years ago    Drug use: Yes     Types: Marijuana     Comment: uses [employer] weekly    Sexual activity: Yes     Partners: Male     Birth control/protection: See Surgical Hx   Lifestyle    Physical activity     Days per week: 3 days     Minutes per session: 60 min    Stress: To some extent   Relationships    Social connections     Talks on phone: Three times a week     Gets together: Patient refused     Attends Quaker service: Not on file     Active member of club or organization: No     Attends meetings of clubs or organizations: Patient refused     Relationship status: Patient refused   Other Topics Concern    Patient feels they  ought to cut down on drinking/drug use No    Patient annoyed by others criticizing their drinking/drug use No    Patient has felt bad or guilty about drinking/drug use No    Patient has had a drink/used drugs as an eye opener in the AM No   Social History Narrative    Has 3 healthy grown sons (1990, 1993, 1998) Lives alone.    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   in 2010.    Has Male partner since 2014 who is currently disabled.       Review of patient's allergies indicates:   Allergen Reactions    Morphine Itching and Hallucinations    Pcn [penicillins] Other (See Comments)     Was told from childhood she couldn't take it    Sulfa (sulfonamide antibiotics) Nausea And Vomiting    Latex, natural rubber Rash         PHYSICAL EXAM:  Vitals:    08/05/20 0931   BP: 134/83   Pulse: 77       Physical Exam   Constitutional: She is oriented to person, place, and time and well-developed, well-nourished, and in no distress.   Cardiovascular: Normal rate.   Pulmonary/Chest: Effort normal. No respiratory distress.   Abdominal: Soft. Bowel sounds are normal. She exhibits no distension. There is no abdominal tenderness.       Incision at right costal margin well healed, soft, no erythema, swelling, drainage.    Neurological: She is alert and oriented to person, place, and time.   Skin: Skin is warm and dry.   Psychiatric: Affect and judgment normal.       PERTINENT LABS:  None      PERTINENT IMAGING:  None      ASSESSMENT/PLAN:  Alberto Frye is a 45 y.o. female who presents to clinic for 2 wk f/u after I&D of right costal margin cyst in clinic.   - Incision is healing well. May call with any further concerns.       I have personally taken the history and examined this patient and agree with the resident's note as stated above.         Salomon Kyle MD

## 2020-08-07 ENCOUNTER — PATIENT MESSAGE (OUTPATIENT)
Dept: FAMILY MEDICINE | Facility: CLINIC | Age: 45
End: 2020-08-07

## 2020-08-10 ENCOUNTER — PATIENT MESSAGE (OUTPATIENT)
Dept: FAMILY MEDICINE | Facility: CLINIC | Age: 45
End: 2020-08-10

## 2020-08-10 ENCOUNTER — PATIENT OUTREACH (OUTPATIENT)
Dept: ADMINISTRATIVE | Facility: HOSPITAL | Age: 45
End: 2020-08-10

## 2020-08-10 DIAGNOSIS — Z95.828 PRESENCE OF IVC FILTER: Primary | ICD-10-CM

## 2020-08-11 ENCOUNTER — PATIENT MESSAGE (OUTPATIENT)
Dept: GASTROENTEROLOGY | Facility: CLINIC | Age: 45
End: 2020-08-11

## 2020-08-11 ENCOUNTER — HOSPITAL ENCOUNTER (INPATIENT)
Facility: HOSPITAL | Age: 45
LOS: 4 days | Discharge: HOME | DRG: 271 | End: 2020-08-15
Attending: EMERGENCY MEDICINE | Admitting: HOSPITALIST
Payer: COMMERCIAL

## 2020-08-11 ENCOUNTER — ANESTHESIA EVENT (OUTPATIENT)
Dept: SURGERY | Facility: HOSPITAL | Age: 45
DRG: 271 | End: 2020-08-11
Payer: COMMERCIAL

## 2020-08-11 DIAGNOSIS — I82.422 ACUTE DEEP VEIN THROMBOSIS OF LEFT ILIAC VEIN: ICD-10-CM

## 2020-08-11 DIAGNOSIS — I82.4Y2 ACUTE DEEP VEIN THROMBOSIS (DVT) OF PROXIMAL VEIN OF LEFT LOWER EXTREMITY: ICD-10-CM

## 2020-08-11 DIAGNOSIS — I82.412 ACUTE DEEP VEIN THROMBOSIS (DVT) OF FEMORAL VEIN OF LEFT LOWER EXTREMITY: ICD-10-CM

## 2020-08-11 DIAGNOSIS — I82.422 ACUTE DEEP VEIN THROMBOSIS (DVT) OF ILIAC VEIN OF LEFT LOWER EXTREMITY: Primary | ICD-10-CM

## 2020-08-11 DIAGNOSIS — M79.89 LEG SWELLING: ICD-10-CM

## 2020-08-11 DIAGNOSIS — I50.9 HEART FAILURE: ICD-10-CM

## 2020-08-11 DIAGNOSIS — R60.9 SWELLING: ICD-10-CM

## 2020-08-11 DIAGNOSIS — I82.412 DVT OF DEEP FEMORAL VEIN, LEFT: Primary | ICD-10-CM

## 2020-08-11 PROBLEM — G62.9 NEUROPATHY: Status: ACTIVE | Noted: 2020-08-11

## 2020-08-11 LAB
ALBUMIN SERPL BCP-MCNC: 3.9 G/DL (ref 3.5–5.2)
ALP SERPL-CCNC: 74 U/L (ref 55–135)
ALT SERPL W/O P-5'-P-CCNC: 12 U/L (ref 10–44)
ANION GAP SERPL CALC-SCNC: 11 MMOL/L (ref 8–16)
AORTIC ROOT ANNULUS: 2.88 CM
AORTIC VALVE CUSP SEPERATION: 1.92 CM
APTT BLDCRRT: 127 SEC (ref 21–32)
APTT BLDCRRT: 26.8 SEC (ref 21–32)
APTT BLDCRRT: 29.8 SEC (ref 21–32)
ASCENDING AORTA: 2.61 CM
AST SERPL-CCNC: 17 U/L (ref 10–40)
AV INDEX (PROSTH): 0.77
AV MEAN GRADIENT: 3 MMHG
AV PEAK GRADIENT: 5 MMHG
AV VALVE AREA: 1.86 CM2
AV VELOCITY RATIO: 0.71
BASOPHILS # BLD AUTO: 0.03 K/UL (ref 0–0.2)
BASOPHILS NFR BLD: 0.4 % (ref 0–1.9)
BILIRUB SERPL-MCNC: 0.6 MG/DL (ref 0.1–1)
BILIRUB UR QL STRIP: NEGATIVE
BNP SERPL-MCNC: <10 PG/ML (ref 0–99)
BSA FOR ECHO PROCEDURE: 2.19 M2
BUN SERPL-MCNC: 20 MG/DL (ref 6–20)
CALCIUM SERPL-MCNC: 8.5 MG/DL (ref 8.7–10.5)
CHLORIDE SERPL-SCNC: 104 MMOL/L (ref 95–110)
CLARITY UR: CLEAR
CO2 SERPL-SCNC: 23 MMOL/L (ref 23–29)
COLOR UR: YELLOW
CREAT SERPL-MCNC: 0.9 MG/DL (ref 0.5–1.4)
CV ECHO LV RWT: 0.46 CM
DIFFERENTIAL METHOD: NORMAL
DOP CALC AO PEAK VEL: 1.17 M/S
DOP CALC AO VTI: 20.03 CM
DOP CALC LVOT AREA: 2.4 CM2
DOP CALC LVOT DIAMETER: 1.76 CM
DOP CALC LVOT PEAK VEL: 0.83 M/S
DOP CALC LVOT STROKE VOLUME: 37.3 CM3
DOP CALCLVOT PEAK VEL VTI: 15.34 CM
E WAVE DECELERATION TIME: 213.78 MSEC
E/A RATIO: 0.99
ECHO LV POSTERIOR WALL: 0.98 CM (ref 0.6–1.1)
EOSINOPHIL # BLD AUTO: 0.3 K/UL (ref 0–0.5)
EOSINOPHIL NFR BLD: 3.2 % (ref 0–8)
ERYTHROCYTE [DISTWIDTH] IN BLOOD BY AUTOMATED COUNT: 13 % (ref 11.5–14.5)
EST. GFR  (AFRICAN AMERICAN): >60 ML/MIN/1.73 M^2
EST. GFR  (NON AFRICAN AMERICAN): >60 ML/MIN/1.73 M^2
FRACTIONAL SHORTENING: 31 % (ref 28–44)
GLUCOSE SERPL-MCNC: 122 MG/DL (ref 70–110)
GLUCOSE UR QL STRIP: NEGATIVE
HCT VFR BLD AUTO: 41.8 % (ref 37–48.5)
HGB BLD-MCNC: 13.9 G/DL (ref 12–16)
HGB UR QL STRIP: NEGATIVE
IMM GRANULOCYTES # BLD AUTO: 0.03 K/UL (ref 0–0.04)
IMM GRANULOCYTES NFR BLD AUTO: 0.4 % (ref 0–0.5)
INR PPP: 0.9 (ref 0.8–1.2)
INTERVENTRICULAR SEPTUM: 0.99 CM (ref 0.6–1.1)
IVRT: 99.19 MSEC
KETONES UR QL STRIP: NEGATIVE
LA MAJOR: 4.56 CM
LA MINOR: 4.35 CM
LA WIDTH: 2.54 CM
LEFT ATRIUM SIZE: 3.24 CM
LEFT ATRIUM VOLUME INDEX: 14.9 ML/M2
LEFT ATRIUM VOLUME: 31.15 CM3
LEFT INTERNAL DIMENSION IN SYSTOLE: 2.95 CM (ref 2.1–4)
LEFT VENTRICLE DIASTOLIC VOLUME INDEX: 39.14 ML/M2
LEFT VENTRICLE DIASTOLIC VOLUME: 81.89 ML
LEFT VENTRICLE MASS INDEX: 66 G/M2
LEFT VENTRICLE SYSTOLIC VOLUME INDEX: 16 ML/M2
LEFT VENTRICLE SYSTOLIC VOLUME: 33.48 ML
LEFT VENTRICULAR INTERNAL DIMENSION IN DIASTOLE: 4.27 CM (ref 3.5–6)
LEFT VENTRICULAR MASS: 137.98 G
LEUKOCYTE ESTERASE UR QL STRIP: NEGATIVE
LYMPHOCYTES # BLD AUTO: 3 K/UL (ref 1–4.8)
LYMPHOCYTES NFR BLD: 36.8 % (ref 18–48)
MCH RBC QN AUTO: 30.1 PG (ref 27–31)
MCHC RBC AUTO-ENTMCNC: 33.3 G/DL (ref 32–36)
MCV RBC AUTO: 91 FL (ref 82–98)
MONOCYTES # BLD AUTO: 0.6 K/UL (ref 0.3–1)
MONOCYTES NFR BLD: 7.1 % (ref 4–15)
MV PEAK A VEL: 0.72 M/S
MV PEAK E VEL: 0.71 M/S
MV STENOSIS PRESSURE HALF TIME: 62 MS
MV VALVE AREA P 1/2 METHOD: 3.55 CM2
NEUTROPHILS # BLD AUTO: 4.2 K/UL (ref 1.8–7.7)
NEUTROPHILS NFR BLD: 52.1 % (ref 38–73)
NITRITE UR QL STRIP: NEGATIVE
NRBC BLD-RTO: 0 /100 WBC
PH UR STRIP: 5 [PH] (ref 5–8)
PISA TR MAX VEL: 2.21 M/S
PLATELET # BLD AUTO: 161 K/UL (ref 150–350)
PMV BLD AUTO: 10.1 FL (ref 9.2–12.9)
POTASSIUM SERPL-SCNC: 3.4 MMOL/L (ref 3.5–5.1)
PROT SERPL-MCNC: 7.7 G/DL (ref 6–8.4)
PROT UR QL STRIP: NEGATIVE
PROTHROMBIN TIME: 10.3 SEC (ref 9–12.5)
PULM VEIN S/D RATIO: 1.38
PV PEAK D VEL: 0.39 M/S
PV PEAK S VEL: 0.54 M/S
PV PEAK VELOCITY: 1.12 CM/S
RA MAJOR: 3.41 CM
RA PRESSURE: 3 MMHG
RA WIDTH: 2.63 CM
RBC # BLD AUTO: 4.62 M/UL (ref 4–5.4)
RIGHT VENTRICULAR END-DIASTOLIC DIMENSION: 3.12 CM
RV TISSUE DOPPLER FREE WALL SYSTOLIC VELOCITY 1 (APICAL 4 CHAMBER VIEW): 10.01 CM/S
SARS-COV-2 RDRP RESP QL NAA+PROBE: NEGATIVE
SINUS: 2.65 CM
SODIUM SERPL-SCNC: 138 MMOL/L (ref 136–145)
SP GR UR STRIP: 1.02 (ref 1–1.03)
STJ: 2.04 CM
TR MAX PG: 20 MMHG
TRICUSPID ANNULAR PLANE SYSTOLIC EXCURSION: 1.73 CM
TROPONIN I SERPL DL<=0.01 NG/ML-MCNC: <0.006 NG/ML (ref 0–0.03)
TV REST PULMONARY ARTERY PRESSURE: 23 MMHG
URN SPEC COLLECT METH UR: NORMAL
UROBILINOGEN UR STRIP-ACNC: NEGATIVE EU/DL
WBC # BLD AUTO: 8.04 K/UL (ref 3.9–12.7)

## 2020-08-11 PROCEDURE — 85730 THROMBOPLASTIN TIME PARTIAL: CPT | Mod: 91

## 2020-08-11 PROCEDURE — 25000003 PHARM REV CODE 250: Performed by: NURSE PRACTITIONER

## 2020-08-11 PROCEDURE — 63600175 PHARM REV CODE 636 W HCPCS: Performed by: NURSE PRACTITIONER

## 2020-08-11 PROCEDURE — 85025 COMPLETE CBC W/AUTO DIFF WBC: CPT

## 2020-08-11 PROCEDURE — 21400001 HC TELEMETRY ROOM

## 2020-08-11 PROCEDURE — 85730 THROMBOPLASTIN TIME PARTIAL: CPT

## 2020-08-11 PROCEDURE — 81003 URINALYSIS AUTO W/O SCOPE: CPT

## 2020-08-11 PROCEDURE — 93010 ELECTROCARDIOGRAM REPORT: CPT | Mod: ,,, | Performed by: INTERNAL MEDICINE

## 2020-08-11 PROCEDURE — 99223 1ST HOSP IP/OBS HIGH 75: CPT | Mod: ,,, | Performed by: SURGERY

## 2020-08-11 PROCEDURE — 36415 COLL VENOUS BLD VENIPUNCTURE: CPT

## 2020-08-11 PROCEDURE — 63600175 PHARM REV CODE 636 W HCPCS: Performed by: EMERGENCY MEDICINE

## 2020-08-11 PROCEDURE — U0002 COVID-19 LAB TEST NON-CDC: HCPCS

## 2020-08-11 PROCEDURE — 99223 PR INITIAL HOSPITAL CARE,LEVL III: ICD-10-PCS | Mod: ,,, | Performed by: SURGERY

## 2020-08-11 PROCEDURE — 96375 TX/PRO/DX INJ NEW DRUG ADDON: CPT

## 2020-08-11 PROCEDURE — 99285 EMERGENCY DEPT VISIT HI MDM: CPT | Mod: 25

## 2020-08-11 PROCEDURE — 93010 EKG 12-LEAD: ICD-10-PCS | Mod: ,,, | Performed by: INTERNAL MEDICINE

## 2020-08-11 PROCEDURE — 93005 ELECTROCARDIOGRAM TRACING: CPT

## 2020-08-11 PROCEDURE — 96374 THER/PROPH/DIAG INJ IV PUSH: CPT

## 2020-08-11 PROCEDURE — 25000003 PHARM REV CODE 250: Performed by: HOSPITALIST

## 2020-08-11 PROCEDURE — 84484 ASSAY OF TROPONIN QUANT: CPT

## 2020-08-11 PROCEDURE — 83880 ASSAY OF NATRIURETIC PEPTIDE: CPT

## 2020-08-11 PROCEDURE — 96372 THER/PROPH/DIAG INJ SC/IM: CPT

## 2020-08-11 PROCEDURE — 85610 PROTHROMBIN TIME: CPT

## 2020-08-11 PROCEDURE — 25000003 PHARM REV CODE 250: Performed by: EMERGENCY MEDICINE

## 2020-08-11 PROCEDURE — 80053 COMPREHEN METABOLIC PANEL: CPT

## 2020-08-11 RX ORDER — OXYCODONE AND ACETAMINOPHEN 5; 325 MG/1; MG/1
1 TABLET ORAL ONCE
Status: COMPLETED | OUTPATIENT
Start: 2020-08-11 | End: 2020-08-11

## 2020-08-11 RX ORDER — POTASSIUM CHLORIDE 1.5 G/1.58G
40 POWDER, FOR SOLUTION ORAL
Status: COMPLETED | OUTPATIENT
Start: 2020-08-11 | End: 2020-08-11

## 2020-08-11 RX ORDER — ENOXAPARIN SODIUM 100 MG/ML
1 INJECTION SUBCUTANEOUS
Status: DISCONTINUED | OUTPATIENT
Start: 2020-08-11 | End: 2020-08-11

## 2020-08-11 RX ORDER — SODIUM CHLORIDE 0.9 % (FLUSH) 0.9 %
10 SYRINGE (ML) INJECTION
Status: DISCONTINUED | OUTPATIENT
Start: 2020-08-11 | End: 2020-08-15 | Stop reason: HOSPADM

## 2020-08-11 RX ORDER — HEPARIN SODIUM,PORCINE/D5W 25000/250
18 INTRAVENOUS SOLUTION INTRAVENOUS CONTINUOUS
Status: DISCONTINUED | OUTPATIENT
Start: 2020-08-11 | End: 2020-08-12

## 2020-08-11 RX ORDER — LANOLIN ALCOHOL/MO/W.PET/CERES
400 CREAM (GRAM) TOPICAL ONCE
Status: COMPLETED | OUTPATIENT
Start: 2020-08-11 | End: 2020-08-11

## 2020-08-11 RX ORDER — POTASSIUM CHLORIDE 20 MEQ/1
40 TABLET, EXTENDED RELEASE ORAL
Status: COMPLETED | OUTPATIENT
Start: 2020-08-11 | End: 2020-08-11

## 2020-08-11 RX ORDER — KETOROLAC TROMETHAMINE 30 MG/ML
15 INJECTION, SOLUTION INTRAMUSCULAR; INTRAVENOUS
Status: COMPLETED | OUTPATIENT
Start: 2020-08-11 | End: 2020-08-11

## 2020-08-11 RX ADMIN — OXYCODONE HYDROCHLORIDE AND ACETAMINOPHEN 1 TABLET: 5; 325 TABLET ORAL at 08:08

## 2020-08-11 RX ADMIN — IBUPROFEN 600 MG: 600 TABLET, FILM COATED ORAL at 03:08

## 2020-08-11 RX ADMIN — ENOXAPARIN SODIUM 110 MG: 100 INJECTION SUBCUTANEOUS at 07:08

## 2020-08-11 RX ADMIN — Medication 400 MG: at 07:08

## 2020-08-11 RX ADMIN — HEPARIN SODIUM AND DEXTROSE 14 UNITS/KG/HR: 10000; 5 INJECTION INTRAVENOUS at 10:08

## 2020-08-11 RX ADMIN — POTASSIUM CHLORIDE 40 MEQ: 1500 TABLET, EXTENDED RELEASE ORAL at 07:08

## 2020-08-11 RX ADMIN — HEPARIN SODIUM AND DEXTROSE 18 UNITS/KG/HR: 10000; 5 INJECTION INTRAVENOUS at 04:08

## 2020-08-11 RX ADMIN — IBUPROFEN 600 MG: 600 TABLET, FILM COATED ORAL at 10:08

## 2020-08-11 RX ADMIN — KETOROLAC TROMETHAMINE 15 MG: 30 INJECTION, SOLUTION INTRAMUSCULAR at 06:08

## 2020-08-11 RX ADMIN — POTASSIUM CHLORIDE 40 MEQ: 1.5 POWDER, FOR SOLUTION ORAL at 07:08

## 2020-08-11 NOTE — ASSESSMENT & PLAN NOTE
US LE shows Left thigh vein occlusive thrombus involving the common femoral, popliteal, and upper greater saphenous veins with minimal flow  - Start full dose Lovenox - was on warfarin in the past (over 10 years ago)  -Consult to vascular neurology for opinion given minimal flow to extremity and the abruptness of the onset  -Pain control  -Evaluate for outpatient apixaban/xeralto/warfarin for discharge planning - case management    Patient has been counseled on the risk and benefits of anticoagulation therapy for DVT and given choices including no therapy at all, coumadin therapy, Eliquis, and Xarelto. He has been instructed on a) coumadin dosing, therapeutic ranges for INR, availability of reversal agent for emergencies, monitoring, and diet restrictions b) Eliquis availability of reversal agent - twice a day dosing and the need for close relationship and follow up with MD outpatient and c) Xarelto - availability of reversal agent - dosing explained as well as, the daily and need for close relationship and follow up with MD outpatient as well as, need to continue the medication until Following MD discontinues.

## 2020-08-11 NOTE — ASSESSMENT & PLAN NOTE
-L iliac US obtained showing extension of acute DVT into IVC distally and L iliac veins - Recommend pharmachomechanical thrombectomy with possible angioplasty/stenting.  Risks and benefits d/w pt who states understanding and desires to proceed with surgery to prevent her severity of post thrombotic syndrome.  No phlegmasia currently.  Cont anticoagulation with Heparin gtt.  Recommend compression with Rx stockings, elevation, dietary changes associated with water and sodium intake discussed at length with patient.  Will continue to follow closely.  NPO at midnight.  Hydrate appropriately to prevent contrast induced nephrotoxicity with normal saline.

## 2020-08-11 NOTE — CONSULTS
[facility] [place]  Vascular Surgery  Consult Note    Inpatient consult to Vascular Surgery  Consult performed by: Miguelangel Goldman MD  Consult ordered by: REAGAN Ortiz        Subjective:     Chief Complaint/Reason for Admission: LLE DVT    History of Present Illness:             Miguelangel Goldman MD RPVI                       [facility]                         08/11/2020    HPI:  Alberto Frye is a 45 y.o. female with   Patient Active Problem List   Diagnosis    Essential hypertension    Seasonal allergies    Anxiety    Abdominal pain    Acute deep vein thrombosis of left iliac vein    Neuropathy    Congestive heart failure    being managed by PCP and specialists who is here today for evaluation of LLE DVT.  Patient states location is LLE occurring for 1 day.  Has had a left lower extremity DVT in the past after her hysterectomy which was determined to be provoked she was managed with anticoagulation for a few months.  She also had a Mary Grace filter placed at [facility].  She has not had any other problems since that time 11 years ago.  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  Developed left lower extremity pain and swelling yesterday and took over-the-counter pain medication and this did not improve so she presented to the emergency room early this morning.  Associated signs and symptoms include edema and pain.  Quality is sharp and severity is 6/10.  Symptoms began yesterday.  Alleviating factors include elevation.  Worsening factors include dependency and pressure.  Denies trauma and any other long period of immobilization or recent surgery.  No family history or personal history of hypercoagulable disorders.    no MI  no Stroke  Tobacco use: denies    Past Medical History:   Diagnosis Date    Alcohol abuse     stopped heavy drinking about 10 years ago; was drinking 3  glasses of vodka/tequilla,rum/whiskey per day    Anxiety     Congestive heart failure 8/11/2020    Depression     Hallucination     Hx of psychiatric care     Hypertension     Caro     unplanned trips, energy without sleep for 2 days (reading, cleaning), feelings that she can do multiple tasks at one time, feelings of overconfidence at times    Psychiatric problem     Sleep difficulties     Therapy     Withdrawal symptoms, drug or narcotic     racing heart, restlessness     Past Surgical History:   Procedure Laterality Date     lapban surgery  2009    ESOPHAGOGASTRODUODENOSCOPY N/A 6/3/2020    Procedure: EGD (ESOPHAGOGASTRODUODENOSCOPY);  Surgeon: Johan Grover MD;  Location: [facility] ([address] [place] [address]);  Service: Endoscopy;  Laterality: N/A;  covid 6/2-[employer]-tb    HYSTERECTOMY       Family History   Problem Relation Age of Onset    Diabetes Mother     Cancer Mother     Hypertension Mother     Cirrhosis Maternal Uncle         ETOH    Celiac disease Neg Hx     Colon cancer Neg Hx     Colon polyps Neg Hx     Crohn's disease Neg Hx     Esophageal cancer Neg Hx     Inflammatory bowel disease Neg Hx     Liver cancer Neg Hx     Liver disease Neg Hx     Rectal cancer Neg Hx     Stomach cancer Neg Hx     Ulcerative colitis Neg Hx      Social History     Socioeconomic History    Marital status:      Spouse name: Not on file    Number of children: 3    Years of education: Not on file    Highest education level: Not on file   Occupational History    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[employer]   Social Needs    Financial resource strain: Not on file    Food insecurity     Worry: Not on file     Inability: Not on file    Transportation needs     Medical: Not on file     Non-medical: Not on file   Tobacco Use    Smoking status: Former Smoker    Smokeless tobacco: Never Used    Tobacco comment: quit in october 2016   Substance and Sexual Activity    Alcohol  use: Yes     Alcohol/week: 2.0 standard drinks     Types: 2 Cans of beer per week     Comment: social presently, excessive 10 years ago    Drug use: Yes     Types: Marijuana     Comment: uses THCA weekly    Sexual activity: Not Currently     Partners: Male     Birth control/protection: See Surgical Hx   Lifestyle    Physical activity     Days per week: 3 days     Minutes per session: 60 min    Stress: To some extent   Relationships    Social connections     Talks on phone: Three times a week     Gets together: Patient refused     Attends [employer] service: Not on file     Active member of club or organization: No     Attends meetings of clubs or organizations: Patient refused     Relationship status: Patient refused   Other Topics Concern    Patient feels they ought to cut down on drinking/drug use No    Patient annoyed by others criticizing their drinking/drug use No    Patient has felt bad or guilty about drinking/drug use No    Patient has had a drink/used drugs as an eye opener in the AM No   Social History Narrative    Has 3 healthy grown sons (1990, 1993, 1998) Lives alone.    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   in 2010.    Has Male partner since 2014 who is currently disabled.       Current Facility-Administered Medications:     heparin 25,000 units in dextrose 5% (100 units/ml) IV bolus from bag - ADDITIONAL PRN BOLUS - 30 units/kg, 30 Units/kg (Adjusted), Intravenous, PRN, Deepti V. Guzman, FNP    heparin 25,000 units in dextrose 5% (100 units/ml) IV bolus from bag - ADDITIONAL PRN BOLUS - 60 units/kg, 60 Units/kg (Adjusted), Intravenous, PRN, Deepti V. Guzman, FNP    heparin 25,000 units in dextrose 5% (100 units/ml) IV bolus from bag INITIAL BOLUS, 80 Units/kg (Adjusted), Intravenous, Once, Deepti V. Guzman, FNP    heparin 25,000 units in dextrose 5% 250 mL (100 units/mL) infusion HIGH INTENSITY nomogram -  [facility], 18 Units/kg/hr (Adjusted), Intravenous, Continuous, Deepti GERA Guzman, FNP    ibuprofen tablet 600 mg, 600 mg, Oral, Q6H PRN, Deepti GERA Guzman, FNP, 600 mg at 08/11/20 1542    sodium chloride 0.9% flush 10 mL, 10 mL, Intravenous, PRN, Deepti GERA Guzman, FNP      Medications Prior to Admission   Medication Sig Dispense Refill Last Dose    cholecalciferol, vitamin D3, (VITAMIN D3) 50 mcg (2,000 unit) Cap Take 1 capsule (2,000 Units total) by mouth once daily. 90 capsule 3 8/10/2020 at Unknown time    cloNIDine 0.2 mg/24 hr td ptwk (CATAPRES) 0.2 mg/24 hr Place 1 patch onto the skin every 7 days. 4 patch 2 8/10/2020 at Unknown time    cyanocobalamin (VITAMIN B-12) 1000 MCG tablet Take 100 mcg by mouth once daily.   8/10/2020 at Unknown time    FLUoxetine 40 MG capsule Take 1 capsule (40 mg total) by mouth once daily. 90 capsule 1 8/10/2020 at Unknown time    fluticasone (FLONASE) 50 mcg/actuation nasal spray 1 spray by Each Nare route once daily.   Past Month at Unknown time    gabapentin (NEURONTIN) 600 MG tablet Take 1 tablet (600 mg total) by mouth nightly as needed. 30 tablet 4 Past Week at Unknown time    hydrOXYzine (ATARAX) 50 MG tablet Take 1 tablet (50 mg total) by mouth daily as needed for Anxiety (sleep). 90 tablet 1 8/10/2020 at Unknown time    ibuprofen (ADVIL,MOTRIN) 400 MG tablet Take 1 tablet (400 mg total) by mouth every 6 (six) hours as needed. 20 tablet 0 8/10/2020 at Unknown time    multivitamin with minerals tablet Take 1 tablet by mouth once daily.   8/10/2020 at Unknown time    OLANZapine (ZYPREXA) 5 MG tablet Take 1 tablet (5 mg total) by mouth every evening. 90 tablet 1 8/10/2020 at Unknown time    pantoprazole (PROTONIX) 40 MG tablet Take 1 tablet (40 mg total) by mouth before breakfast. Take one pill every morning 45 minutes before breakfast in the morning. 90 tablet 3 8/10/2020 at Unknown time    torsemide (DEMADEX) 10 MG Tab Take 2 tablets (20 mg total) by mouth once daily.  60 tablet 2 Past Month at Unknown time    valsartan-hydrochlorothiazide (DIOVAN-HCT) 160-25 mg per tablet Take 1 tablet by mouth once daily. 90 tablet 3 8/11/2020 at Unknown time    ergocalciferol (ERGOCALCIFEROL) 50,000 unit Cap Take 1 capsule (50,000 Units total) by mouth every 7 days. for 12 doses 12 capsule 0     HYDROcodone-acetaminophen (NORCO)  mg per tablet Take 1 tablet by mouth every 12 (twelve) hours as needed for Pain. MORE THAN A 7 DAY SUPPLY OF THE OPIATE IS MEDICALLY NECESSARY 40 tablet 0 More than a month at Unknown time    HYDROcodone-acetaminophen (NORCO) 5-325 mg per tablet Take 1 tablet by mouth every 6 (six) hours as needed for Pain. 10 tablet 0 More than a month at Unknown time    levocetirizine (XYZAL) 5 MG tablet Take 1 tablet (5 mg total) by mouth every evening. 30 tablet 2        Review of patient's allergies indicates:   Allergen Reactions    Morphine Itching and Hallucinations    Pcn [penicillins] Other (See Comments)     Was told from childhood she couldn't take it    Sulfa (sulfonamide antibiotics) Nausea And Vomiting    Latex, natural rubber Rash       Past Medical History:   Diagnosis Date    Alcohol abuse     stopped heavy drinking about 10 years ago; was drinking 3 glasses of vodka/tequilla,rum/whiskey per day    Anxiety     Congestive heart failure 8/11/2020    Depression     Hallucination     Hx of psychiatric care     Hypertension     Caro     unplanned trips, energy without sleep for 2 days (reading, cleaning), feelings that she can do multiple tasks at one time, feelings of overconfidence at times    Psychiatric problem     Sleep difficulties     Therapy     Withdrawal symptoms, drug or narcotic     racing heart, restlessness     Past Surgical History:   Procedure Laterality Date     lapban surgery  2009    ESOPHAGOGASTRODUODENOSCOPY N/A 6/3/2020    Procedure: EGD (ESOPHAGOGASTRODUODENOSCOPY);  Surgeon: Johan Grover MD;  Location: [facility]  (4TH FLR);  Service: Endoscopy;  Laterality: N/A;  covid 6/2-[employer]-tb    HYSTERECTOMY       Family History     Problem Relation (Age of Onset)    Cancer Mother    Cirrhosis Maternal Uncle    Diabetes Mother    Hypertension Mother        Tobacco Use    Smoking status: Former Smoker    Smokeless tobacco: Never Used    Tobacco comment: quit in october 2016   Substance and Sexual Activity    Alcohol use: Yes     Alcohol/week: 2.0 standard drinks     Types: 2 Cans of beer per week     Comment: social presently, excessive 10 years ago    Drug use: Yes     Types: Marijuana     Comment: uses THCA weekly    Sexual activity: Not Currently     Partners: Male     Birth control/protection: See Surgical Hx     Review of Systems   Constitutional: Negative for chills and fever.   HENT: Negative for congestion.    Eyes: Negative for visual disturbance.   Respiratory: Negative for chest tightness and shortness of breath.    Cardiovascular: Negative for chest pain.   Gastrointestinal: Negative for abdominal distention.   Endocrine: Negative for cold intolerance.   Genitourinary: Negative for flank pain.   Musculoskeletal: Negative for back pain.   Skin: Negative for color change, pallor, rash and wound.   Allergic/Immunologic: Negative for immunocompromised state.   Neurological: Positive for weakness. Negative for dizziness, tremors, seizures, syncope, numbness and headaches.   Hematological: Does not bruise/bleed easily.   Psychiatric/Behavioral: Negative for agitation.     Objective:     Vital Signs (Most Recent):  Temp: 97.7 °F (36.5 °C) (08/11/20 1535)  Pulse: 93 (08/11/20 1535)  Resp: 16 (08/11/20 1535)  BP: 117/72 (08/11/20 1535)  SpO2: 99 % (08/11/20 1535) Vital Signs (24h Range):  Temp:  [97.7 °F (36.5 °C)-98.2 °F (36.8 °C)] 97.7 °F (36.5 °C)  Pulse:  [] 93  Resp:  [15-20] 16  SpO2:  [97 %-100 %] 99 %  BP: (117-137)/(72-88) 117/72     Weight: 106.3 kg (234 lb 5.6 oz)  Body mass index is 40.23  kg/m².    Physical Exam  Vitals signs reviewed.   Constitutional:       General: She is not in acute distress.     Appearance: She is well-developed. She is not diaphoretic.   HENT:      Head: Normocephalic and atraumatic.   Eyes:      Conjunctiva/sclera: Conjunctivae normal.   Neck:      Musculoskeletal: Neck supple.   Cardiovascular:      Rate and Rhythm: Normal rate.      Pulses:           Dorsalis pedis pulses are 2+ on the right side and 1+ on the left side.   Pulmonary:      Effort: Pulmonary effort is normal. No respiratory distress.   Abdominal:      General: There is no distension.      Palpations: Abdomen is soft. There is no mass.      Tenderness: There is no abdominal tenderness. There is no guarding or rebound.      Hernia: No hernia is present.   Musculoskeletal: Normal range of motion.         General: No deformity.      Right lower leg: No edema.      Left lower leg: Edema present.   Skin:     General: Skin is warm and dry.      Capillary Refill: Capillary refill takes less than 2 seconds.      Coloration: Skin is not pale.      Findings: No erythema or rash.   Neurological:      General: No focal deficit present.      Mental Status: She is alert and oriented to person, place, and time.   Psychiatric:         Mood and Affect: Mood normal.         Significant Labs:  All pertinent labs from the last 24 hours have been reviewed.    Significant Diagnostics:  I have reviewed all pertinent imaging results/findings within the past 24 hours.    Assessment/Plan:     * Acute deep vein thrombosis of left iliac vein  -L iliac US obtained showing extension of acute DVT into IVC distally and L iliac veins - Recommend pharmachomechanical thrombectomy with possible angioplasty/stenting.  Risks and benefits d/w pt who states understanding and desires to proceed with surgery to prevent her severity of post thrombotic syndrome.  No phlegmasia currently.  Cont anticoagulation with Heparin gtt.  Recommend compression  with Rx stockings, elevation, dietary changes associated with water and sodium intake discussed at length with patient.  Will continue to follow closely.  NPO at midnight.  Hydrate appropriately to prevent contrast induced nephrotoxicity with normal saline.          Thank you for your consult. I will follow-up with patient. Please contact us if you have any additional questions.    Miguelangel Goldman MD  Vascular Surgery  [facility]

## 2020-08-11 NOTE — CARE UPDATE
"45 y.o. AAF patient with history of DVT in LLE (10years ago/took Warfarin) presented with complaint of acute onset severe LLE pain that began in the evening prior to presentation 8/11/20. LE US revealed "LLE DVT occlusive thrombus involving the common femoral, femoral, popliteal, and upper greater saphenous veins, also noting that the veins are not compressible and have minimal flow". Patient was started on full dose Lovenox and Vascular Neurology consulted for opinion given pain and extensive clot burden. LE US was repeated by vascular to evaluate concern for the Left iliac vein. Repeat imaging showed that the the "left iliac vein was totally occluded, measuring 1.5 cm" and suggestive of "extensive thrombus in the inferior vena cava distally"    Vascular recommending high risk throbolectomy - request transfer to [facility] with vascular surgery consult Dr. Christianson    -Vascular neurology following  -Start heparin infusion  -Pain support  -BP support    Present on Admission:   Acute deep vein thrombosis of left iliac vein   Essential hypertension   Neuropathy   Anxiety   Congestive heart failure          ANILA Ortiz, FNP-C  Hospitalist - Department of Hospital Medicine  [address], [place] [address]  Office [phone]; Pager [phone]    "

## 2020-08-11 NOTE — PLAN OF CARE
08/11/20 1313   Discharge Assessment   Assessment Type Discharge Planning Assessment   Assessment information obtained from? Medical Record   Prior to hospitilization cognitive status: Alert/Oriented   Prior to hospitalization functional status: Independent   Current cognitive status: Alert/Oriented   Current Functional Status: Independent   Facility Arrived From: home   Lives With other (see comments)   Able to Return to Prior Arrangements yes   Is patient able to care for self after discharge? Yes   Who are your caregiver(s) and their phone number(s)? Reema- [phone]   Patient's perception of discharge disposition home or selfcare   Readmission Within the Last 30 Days no previous admission in last 30 days   Patient currently being followed by outpatient case management? No   Patient currently receives any other outside agency services? No   Equipment Currently Used at Home none   Do you have any problems affording any of your prescribed medications? TBD   Is the patient taking medications as prescribed? yes   Does the patient have transportation home? Yes   Transportation Anticipated family or friend will provide   Does the patient receive services at the Coumadin Clinic? No   Discharge Plan A Home with family  (with follow up)   DME Needed Upon Discharge  none   Patient/Family in Agreement with Plan yes     [id number] - [place] - [address]  [address]  [place] [address]  Phone: [phone] Fax: [phone]

## 2020-08-11 NOTE — ED TRIAGE NOTES
Pt with a hx of DVTs is reporting L lower extremity swelling since last night. Pt says she spends most of her days sitting down with legs in dependent position. Denies chest pain, sob, fever

## 2020-08-11 NOTE — ASSESSMENT & PLAN NOTE
Unclear if patient has heart failure however she is on Demadex, HCTZ, valsartan which appears to be the treatment for heart failure.  Will obtain a 2D echo and monitor closely, no evidence of acute decompensation although there is significant swelling lower extremity likely related to DVT  Strict I&Os  Daily weight

## 2020-08-11 NOTE — SUBJECTIVE & OBJECTIVE
Past Medical History:   Diagnosis Date    Alcohol abuse     stopped heavy drinking about 10 years ago; was drinking 3 glasses of vodka/tequilla,rum/whiskey per day    Anxiety     Depression     Hallucination     Hx of psychiatric care     Hypertension     Caro     unplanned trips, energy without sleep for 2 days (reading, cleaning), feelings that she can do multiple tasks at one time, feelings of overconfidence at times    Psychiatric problem     Sleep difficulties     Therapy     Withdrawal symptoms, drug or narcotic     racing heart, restlessness       Past Surgical History:   Procedure Laterality Date     lapban surgery  2009    ESOPHAGOGASTRODUODENOSCOPY N/A 6/3/2020    Procedure: EGD (ESOPHAGOGASTRODUODENOSCOPY);  Surgeon: Johan Grover MD;  Location: [facility] ([address] [place] [address]);  Service: Endoscopy;  Laterality: N/A;  covid 6/2-[facility]-tb    HYSTERECTOMY         Review of patient's allergies indicates:   Allergen Reactions    Morphine Itching and Hallucinations    Pcn [penicillins] Other (See Comments)     Was told from childhood she couldn't take it    Sulfa (sulfonamide antibiotics) Nausea And Vomiting    Latex, natural rubber Rash       No current facility-administered medications on file prior to encounter.      Current Outpatient Medications on File Prior to Encounter   Medication Sig    cholecalciferol, vitamin D3, (VITAMIN D3) 50 mcg (2,000 unit) Cap Take 1 capsule (2,000 Units total) by mouth once daily.    cloNIDine 0.2 mg/24 hr td ptwk (CATAPRES) 0.2 mg/24 hr Place 1 patch onto the skin every 7 days.    cyanocobalamin (VITAMIN B-12) 1000 MCG tablet Take 100 mcg by mouth once daily.    ergocalciferol (ERGOCALCIFEROL) 50,000 unit Cap Take 1 capsule (50,000 Units total) by mouth every 7 days. for 12 doses    FLUoxetine 40 MG capsule Take 1 capsule (40 mg total) by mouth once daily.    fluticasone (FLONASE) 50 mcg/actuation nasal spray 1 spray by Each Nare route once  daily.    gabapentin (NEURONTIN) 600 MG tablet Take 1 tablet (600 mg total) by mouth nightly as needed.    HYDROcodone-acetaminophen (NORCO)  mg per tablet Take 1 tablet by mouth every 12 (twelve) hours as needed for Pain. MORE THAN A 7 DAY SUPPLY OF THE OPIATE IS MEDICALLY NECESSARY    HYDROcodone-acetaminophen (NORCO) 5-325 mg per tablet Take 1 tablet by mouth every 6 (six) hours as needed for Pain.    hydrOXYzine (ATARAX) 50 MG tablet Take 1 tablet (50 mg total) by mouth daily as needed for Anxiety (sleep).    ibuprofen (ADVIL,MOTRIN) 400 MG tablet Take 1 tablet (400 mg total) by mouth every 6 (six) hours as needed.    levocetirizine (XYZAL) 5 MG tablet Take 1 tablet (5 mg total) by mouth every evening.    multivitamin with minerals tablet Take 1 tablet by mouth once daily.    OLANZapine (ZYPREXA) 5 MG tablet Take 1 tablet (5 mg total) by mouth every evening.    pantoprazole (PROTONIX) 40 MG tablet Take 1 tablet (40 mg total) by mouth before breakfast. Take one pill every morning 45 minutes before breakfast in the morning.    torsemide (DEMADEX) 10 MG Tab Take 2 tablets (20 mg total) by mouth once daily.    valsartan-hydrochlorothiazide (DIOVAN-HCT) 160-25 mg per tablet Take 1 tablet by mouth once daily.    [DISCONTINUED] hydrOXYzine HCl (ATARAX) 50 MG tablet Take 1 tablet (50 mg total) by mouth daily as needed for Anxiety (sleep).     Family History     Problem Relation (Age of Onset)    Cancer Mother    Cirrhosis Maternal Uncle    Diabetes Mother    Hypertension Mother        Tobacco Use    Smoking status: Former Smoker    Smokeless tobacco: Never Used    Tobacco comment: quit in october 2016   Substance and Sexual Activity    Alcohol use: Yes     Alcohol/week: 2.0 standard drinks     Types: 2 Cans of beer per week     Comment: social presently, excessive 10 years ago    Drug use: Yes     Types: Marijuana     Comment: uses THCA weekly    Sexual activity: Yes     Partners: Male     Birth  control/protection: See Surgical Hx     Review of Systems   Constitutional: Negative for appetite change, chills, diaphoresis and fever.   HENT: Negative for congestion, hearing loss, sore throat, tinnitus and trouble swallowing.    Eyes: Negative for photophobia, discharge, itching and visual disturbance.   Respiratory: Negative for apnea, cough, wheezing and stridor.    Cardiovascular: Negative for chest pain, palpitations and leg swelling.   Gastrointestinal: Negative for abdominal distention, abdominal pain, blood in stool, constipation, diarrhea and nausea.   Endocrine: Negative for polydipsia, polyphagia and polyuria.   Genitourinary: Negative for difficulty urinating, dysuria, flank pain and frequency.   Musculoskeletal: Positive for gait problem (leg swelling). Negative for arthralgias, joint swelling and neck stiffness.   Skin: Negative for color change, rash and wound.   Neurological: Negative for dizziness, tremors, seizures, light-headedness, numbness and headaches.   Hematological: Negative for adenopathy.   Psychiatric/Behavioral: Negative for hallucinations and self-injury.     Objective:     Vital Signs (Most Recent):  Temp: 97.8 °F (36.6 °C) (08/11/20 0537)  Pulse: 97 (08/11/20 0831)  Resp: 17 (08/11/20 0743)  BP: 137/87 (08/11/20 0831)  SpO2: 98 % (08/11/20 0831) Vital Signs (24h Range):  Temp:  [97.8 °F (36.6 °C)] 97.8 °F (36.6 °C)  Pulse:  [] 97  Resp:  [15-20] 17  SpO2:  [97 %-100 %] 98 %  BP: (121-137)/(73-87) 137/87     Weight: 105.7 kg (233 lb)  Body mass index is 39.99 kg/m².    Physical Exam  Constitutional:       Appearance: She is well-developed.   HENT:      Head: Normocephalic and atraumatic.   Eyes:      General: Lids are normal.      Conjunctiva/sclera: Conjunctivae normal.   Neck:      Musculoskeletal: Full passive range of motion without pain and neck supple. No edema.      Thyroid: No thyroid mass.      Vascular: No JVD.   Cardiovascular:      Heart sounds: S1 normal and S2  normal. No murmur.   Abdominal:      General: Bowel sounds are normal. There is no distension or abdominal bruit.      Palpations: Abdomen is soft. There is no hepatomegaly or splenomegaly.      Tenderness: There is no abdominal tenderness.   Musculoskeletal:         General: Swelling and tenderness present.   Lymphadenopathy:      Cervical: No cervical adenopathy.   Skin:     General: Skin is warm and dry.   Neurological:      Mental Status: She is alert.      Motor: No tremor or seizure activity.      Deep Tendon Reflexes: Reflexes are normal and symmetric.   Psychiatric:         Speech: Speech normal.         Behavior: Behavior is cooperative.         Thought Content: Thought content normal.             Significant Labs:   CBC:   Recent Labs   Lab 08/11/20  0601   WBC 8.04   HGB 13.9   HCT 41.8        CMP:   Recent Labs   Lab 08/11/20  0601      K 3.4*      CO2 23   *   BUN 20   CREATININE 0.9   CALCIUM 8.5*   PROT 7.7   ALBUMIN 3.9   BILITOT 0.6   ALKPHOS 74   AST 17   ALT 12   ANIONGAP 11   EGFRNONAA >60       Significant Imaging:   Imaging Results          US Lower Extremity Veins Bilateral (Final result)  Result time 08/11/20 07:25:14    Final result by George Pablo MD (08/11/20 07:25:14)                 Impression:      1.  Extensive acute thrombosis involving the left thigh and calf veins, as described.    2.  No evidence of right lower extremity deep venous thrombosis.    Red Alert: Acute left thigh and calf DVT    This critical information above was relayed by myself by telephone to Dr. Sevilla On 8/11/2020 at 7:24.    CRITICAL FINDINGS:    RECOMMENDATIONS:      Electronically signed by: George Pablo  Date:    08/11/2020  Time:    07:25             Narrative:    EXAMINATION:  US LOWER EXTREMITY VEINS BILATERAL    CLINICAL HISTORY:  Edema, unspecified    TECHNIQUE:  Duplex and color flow Doppler and dynamic compression was performed of the bilateral lower extremity veins  was performed.    COMPARISON:  None    FINDINGS:  Right thigh veins: The common femoral, femoral, popliteal, upper greater saphenous, and deep femoral veins are patent and free of thrombus. The veins are normally compressible and have normal phasic flow and augmentation response.    Right calf veins: The visualized calf veins are patent.    Left thigh veins: There is occlusive thrombus involving the common femoral, femoral, popliteal, and upper greater saphenous veins.  Veins are not compressible and have minimal flow.    Left calf veins: The posterior tibial and peroneal veins are thrombosed as well.  The anterior tibial vein appears patent.    Miscellaneous: None

## 2020-08-11 NOTE — H&P
[facility] Hospital Medicine  History & Physical    Patient Name: Alberto Frye  MRN: [id number]  Admission Date: 8/11/2020  Attending Physician: Kelly Wade MD   Primary Care Provider: Candace Martin MD         Patient information was obtained from patient and ER records.     Subjective:     Principal Problem:Acute deep vein thrombosis (DVT) of proximal vein of left lower extremity    Chief Complaint:   Chief Complaint   Patient presents with    Leg Pain     since yesterday after getting off work, from L inner thigh to ankle, swelling present per pt, hx of blood clots        HPI: Alberto Frye is a 45 y.o. AAF with PMHx including Alcohol abuse, HTN, Psych, and thrombus (10 years ago/IVC filter). She presented for evaluation of 1Day history of L inner thigh to ankle swelling. Per patient she has sedentary lifestyle and sits all day for clerical work. She tells me that she was diagnosed with a DVT in the same leg years ago after a hysterectomy. She was placed on Warfarin at that time and has since been discontinued. No recent long travel, no trauma, denies hormone use, CP, SOB, HA, focal deficits or numbness other than LLE swelling and pain.    In ED was found to have occlusive thrombus involving the common femoral, femoral, popliteal, and upper greater saphenous veins, also noteing that the veins are not compressible and have minimal flow. Started on full dose Lovenox for anticoagulation and placed in observation for further evaluation and treatment.    Past Medical History:   Diagnosis Date    Alcohol abuse     stopped heavy drinking about 10 years ago; was drinking 3 glasses of vodka/tequilla,rum/whiskey per day    Anxiety     Depression     Hallucination     Hx of psychiatric care     Hypertension     Caro     unplanned trips, energy without sleep for 2 days (reading, cleaning), feelings that she can do multiple tasks at one time, feelings of overconfidence at times     Psychiatric problem     Sleep difficulties     Therapy     Withdrawal symptoms, drug or narcotic     racing heart, restlessness       Past Surgical History:   Procedure Laterality Date     lapban surgery  2009    ESOPHAGOGASTRODUODENOSCOPY N/A 6/3/2020    Procedure: EGD (ESOPHAGOGASTRODUODENOSCOPY);  Surgeon: Johan Grover MD;  Location: [facility] ([address] [place] [address]);  Service: Endoscopy;  Laterality: N/A;  covid 6/2-[facility]-tb    HYSTERECTOMY         Review of patient's allergies indicates:   Allergen Reactions    Morphine Itching and Hallucinations    Pcn [penicillins] Other (See Comments)     Was told from childhood she couldn't take it    Sulfa (sulfonamide antibiotics) Nausea And Vomiting    Latex, natural rubber Rash       No current facility-administered medications on file prior to encounter.      Current Outpatient Medications on File Prior to Encounter   Medication Sig    cholecalciferol, vitamin D3, (VITAMIN D3) 50 mcg (2,000 unit) Cap Take 1 capsule (2,000 Units total) by mouth once daily.    cloNIDine 0.2 mg/24 hr td ptwk (CATAPRES) 0.2 mg/24 hr Place 1 patch onto the skin every 7 days.    cyanocobalamin (VITAMIN B-12) 1000 MCG tablet Take 100 mcg by mouth once daily.    ergocalciferol (ERGOCALCIFEROL) 50,000 unit Cap Take 1 capsule (50,000 Units total) by mouth every 7 days. for 12 doses    FLUoxetine 40 MG capsule Take 1 capsule (40 mg total) by mouth once daily.    fluticasone (FLONASE) 50 mcg/actuation nasal spray 1 spray by Each Nare route once daily.    gabapentin (NEURONTIN) 600 MG tablet Take 1 tablet (600 mg total) by mouth nightly as needed.    HYDROcodone-acetaminophen (NORCO)  mg per tablet Take 1 tablet by mouth every 12 (twelve) hours as needed for Pain. MORE THAN A 7 DAY SUPPLY OF THE OPIATE IS MEDICALLY NECESSARY    HYDROcodone-acetaminophen (NORCO) 5-325 mg per tablet Take 1 tablet by mouth every 6 (six) hours as needed for Pain.    hydrOXYzine  (ATARAX) 50 MG tablet Take 1 tablet (50 mg total) by mouth daily as needed for Anxiety (sleep).    ibuprofen (ADVIL,MOTRIN) 400 MG tablet Take 1 tablet (400 mg total) by mouth every 6 (six) hours as needed.    levocetirizine (XYZAL) 5 MG tablet Take 1 tablet (5 mg total) by mouth every evening.    multivitamin with minerals tablet Take 1 tablet by mouth once daily.    OLANZapine (ZYPREXA) 5 MG tablet Take 1 tablet (5 mg total) by mouth every evening.    pantoprazole (PROTONIX) 40 MG tablet Take 1 tablet (40 mg total) by mouth before breakfast. Take one pill every morning 45 minutes before breakfast in the morning.    torsemide (DEMADEX) 10 MG Tab Take 2 tablets (20 mg total) by mouth once daily.    valsartan-hydrochlorothiazide (DIOVAN-HCT) 160-25 mg per tablet Take 1 tablet by mouth once daily.    [DISCONTINUED] hydrOXYzine HCl (ATARAX) 50 MG tablet Take 1 tablet (50 mg total) by mouth daily as needed for Anxiety (sleep).     Family History     Problem Relation (Age of Onset)    Cancer Mother    Cirrhosis Maternal Uncle    Diabetes Mother    Hypertension Mother        Tobacco Use    Smoking status: Former Smoker    Smokeless tobacco: Never Used    Tobacco comment: quit in october 2016   Substance and Sexual Activity    Alcohol use: Yes     Alcohol/week: 2.0 standard drinks     Types: 2 Cans of beer per week     Comment: social presently, excessive 10 years ago    Drug use: Yes     Types: Marijuana     Comment: uses THCA weekly    Sexual activity: Yes     Partners: Male     Birth control/protection: See Surgical Hx     Review of Systems   Constitutional: Negative for appetite change, chills, diaphoresis and fever.   HENT: Negative for congestion, hearing loss, sore throat, tinnitus and trouble swallowing.    Eyes: Negative for photophobia, discharge, itching and visual disturbance.   Respiratory: Negative for apnea, cough, wheezing and stridor.    Cardiovascular: Negative for chest pain, palpitations  and leg swelling.   Gastrointestinal: Negative for abdominal distention, abdominal pain, blood in stool, constipation, diarrhea and nausea.   Endocrine: Negative for polydipsia, polyphagia and polyuria.   Genitourinary: Negative for difficulty urinating, dysuria, flank pain and frequency.   Musculoskeletal: Positive for gait problem (leg swelling). Negative for arthralgias, joint swelling and neck stiffness.   Skin: Negative for color change, rash and wound.   Neurological: Negative for dizziness, tremors, seizures, light-headedness, numbness and headaches.   Hematological: Negative for adenopathy.   Psychiatric/Behavioral: Negative for hallucinations and self-injury.     Objective:     Vital Signs (Most Recent):  Temp: 97.8 °F (36.6 °C) (08/11/20 0537)  Pulse: 97 (08/11/20 0831)  Resp: 17 (08/11/20 0743)  BP: 137/87 (08/11/20 0831)  SpO2: 98 % (08/11/20 0831) Vital Signs (24h Range):  Temp:  [97.8 °F (36.6 °C)] 97.8 °F (36.6 °C)  Pulse:  [] 97  Resp:  [15-20] 17  SpO2:  [97 %-100 %] 98 %  BP: (121-137)/(73-87) 137/87     Weight: 105.7 kg (233 lb)  Body mass index is 39.99 kg/m².    Physical Exam  Constitutional:       Appearance: She is well-developed.   HENT:      Head: Normocephalic and atraumatic.   Eyes:      General: Lids are normal.      Conjunctiva/sclera: Conjunctivae normal.   Neck:      Musculoskeletal: Full passive range of motion without pain and neck supple. No edema.      Thyroid: No thyroid mass.      Vascular: No JVD.   Cardiovascular:      Heart sounds: S1 normal and S2 normal. No murmur.   Abdominal:      General: Bowel sounds are normal. There is no distension or abdominal bruit.      Palpations: Abdomen is soft. There is no hepatomegaly or splenomegaly.      Tenderness: There is no abdominal tenderness.   Musculoskeletal:         General: Swelling and tenderness present.   Lymphadenopathy:      Cervical: No cervical adenopathy.   Skin:     General: Skin is warm and dry.   Neurological:       Mental Status: She is alert.      Motor: No tremor or seizure activity.      Deep Tendon Reflexes: Reflexes are normal and symmetric.   Psychiatric:         Speech: Speech normal.         Behavior: Behavior is cooperative.         Thought Content: Thought content normal.             Significant Labs:   CBC:   Recent Labs   Lab 08/11/20  0601   WBC 8.04   HGB 13.9   HCT 41.8        CMP:   Recent Labs   Lab 08/11/20  0601      K 3.4*      CO2 23   *   BUN 20   CREATININE 0.9   CALCIUM 8.5*   PROT 7.7   ALBUMIN 3.9   BILITOT 0.6   ALKPHOS 74   AST 17   ALT 12   ANIONGAP 11   EGFRNONAA >60       Significant Imaging:   Imaging Results          US Lower Extremity Veins Bilateral (Final result)  Result time 08/11/20 07:25:14    Final result by George Pablo MD (08/11/20 07:25:14)                 Impression:      1.  Extensive acute thrombosis involving the left thigh and calf veins, as described.    2.  No evidence of right lower extremity deep venous thrombosis.    Red Alert: Acute left thigh and calf DVT    This critical information above was relayed by myself by telephone to Dr. Sevilla On 8/11/2020 at 7:24.    CRITICAL FINDINGS:    RECOMMENDATIONS:      Electronically signed by: George Pablo  Date:    08/11/2020  Time:    07:25             Narrative:    EXAMINATION:  US LOWER EXTREMITY VEINS BILATERAL    CLINICAL HISTORY:  Edema, unspecified    TECHNIQUE:  Duplex and color flow Doppler and dynamic compression was performed of the bilateral lower extremity veins was performed.    COMPARISON:  None    FINDINGS:  Right thigh veins: The common femoral, femoral, popliteal, upper greater saphenous, and deep femoral veins are patent and free of thrombus. The veins are normally compressible and have normal phasic flow and augmentation response.    Right calf veins: The visualized calf veins are patent.    Left thigh veins: There is occlusive thrombus involving the common femoral, femoral,  popliteal, and upper greater saphenous veins.  Veins are not compressible and have minimal flow.    Left calf veins: The posterior tibial and peroneal veins are thrombosed as well.  The anterior tibial vein appears patent.    Miscellaneous: None                                  Assessment/Plan:     * Acute deep vein thrombosis (DVT) of proximal vein of left lower extremity  US LE shows Left thigh vein occlusive thrombus involving the common femoral, popliteal, and upper greater saphenous veins with minimal flow  - Start full dose Lovenox - was on warfarin in the past (over 10 years ago)  -Consult to vascular neurology for opinion given minimal flow to extremity and the abruptness of the onset  -Pain control  -Evaluate for outpatient apixaban/xeralto/warfarin for discharge planning - case management    Patient has been counseled on the risk and benefits of anticoagulation therapy for DVT and given choices including no therapy at all, coumadin therapy, Eliquis, and Xarelto. He has been instructed on a) coumadin dosing, therapeutic ranges for INR, availability of reversal agent for emergencies, monitoring, and diet restrictions b) Eliquis availability of reversal agent - twice a day dosing and the need for close relationship and follow up with MD outpatient and c) Xarelto - availability of reversal agent - dosing explained as well as, the daily and need for close relationship and follow up with MD outpatient as well as, need to continue the medication until Following MD discontinues.        Neuropathy  Appears that the patient has some chronicity of neuropathy on gabapentin at home - will monitor for now; vascular following      Essential hypertension  Decent control she is noted to be On multiple blood pressure meds at home including clonidine 0.2 acute 24 hr patch, dowel van (valsartan/HCTZ) 160-25 mg daily, Demadex 20 mg daily--monitor and reintroduce as warranted    Congestive heart failure  Unclear if patient has  heart failure however she is on Demadex, HCTZ, valsartan which appears to be the treatment for heart failure.  Will obtain a 2D echo and monitor closely, no evidence of acute decompensation although there is significant swelling lower extremity likely related to DVT  Strict I&Os  Daily weight      Anxiety  Zyprexa 5 mg, fluoxetine 40 mg daily and Atarax p.r.n. continue     Continue full-dose Lovenox until after evaluated by vascular apixaban at discharge  VTE Risk Mitigation (From admission, onward)         Ordered     apixaban tablet 10 mg  2 times daily      08/11/20 0906     enoxaparin injection 110 mg  Every 12 hours (non-standard times)      08/11/20 0657                   ANILA Ortiz, FNP-C  Hospitalist - Department of Hospital Medicine  [address], [place] [address]  Office [phone]; Pager [phone]

## 2020-08-11 NOTE — ASSESSMENT & PLAN NOTE
Appears that the patient has some chronicity of neuropathy on gabapentin at home - will monitor for now; vascular following     98

## 2020-08-11 NOTE — ASSESSMENT & PLAN NOTE
Decent control she is noted to be On multiple blood pressure meds at home including clonidine 0.2 acute 24 hr patch, dowel van (valsartan/HCTZ) 160-25 mg daily, Demadex 20 mg daily--monitor and reintroduce as warranted

## 2020-08-11 NOTE — HPI
Miguelangel Goldman MD VI                       [facility]                         08/11/2020    HPI:  Alberto Frye is a 45 y.o. female with   Patient Active Problem List   Diagnosis    Essential hypertension    Seasonal allergies    Anxiety    Abdominal pain    Acute deep vein thrombosis of left iliac vein    Neuropathy    Congestive heart failure    being managed by PCP and specialists who is here today for evaluation of LLE DVT.  Patient states location is LLE occurring for 1 day.  Has had a left lower extremity DVT in the past after her hysterectomy which was determined to be provoked she was managed with anticoagulation for a few months.  She also had a [place] filter placed at [facility].  She has not had any other problems since that time 11 years ago.  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  Developed left lower extremity pain and swelling yesterday and took over-the-counter pain medication and this did not improve so she presented to the emergency room early this morning.  Associated signs and symptoms include edema and pain.  Quality is sharp and severity is 6/10.  Symptoms began yesterday.  Alleviating factors include elevation.  Worsening factors include dependency and pressure.  Denies trauma and any other long period of immobilization or recent surgery.  No family history or personal history of hypercoagulable disorders.    no MI  no Stroke  Tobacco use: denies    Past Medical History:   Diagnosis Date    Alcohol abuse     stopped heavy drinking about 10 years ago; was drinking 3 glasses of vodka/tequilla,rum/whiskey per day    Anxiety     Congestive heart failure 8/11/2020    Depression     Hallucination     Hx of psychiatric care     Hypertension     Caro     unplanned trips, energy without sleep for 2 days (reading, cleaning), feelings that she can do multiple  tasks at one time, feelings of overconfidence at times    Psychiatric problem     Sleep difficulties     Therapy     Withdrawal symptoms, drug or narcotic     racing heart, restlessness     Past Surgical History:   Procedure Laterality Date     lapban surgery  2009    ESOPHAGOGASTRODUODENOSCOPY N/A 6/3/2020    Procedure: EGD (ESOPHAGOGASTRODUODENOSCOPY);  Surgeon: Johan Grover MD;  Location: [address]);  Service: Endoscopy;  Laterality: N/A;  covid 6/2-[facility]    HYSTERECTOMY       Family History   Problem Relation Age of Onset    Diabetes Mother     Cancer Mother     Hypertension Mother     Cirrhosis Maternal Uncle         ETOH    Celiac disease Neg Hx     Colon cancer Neg Hx     Colon polyps Neg Hx     Crohn's disease Neg Hx     Esophageal cancer Neg Hx     Inflammatory bowel disease Neg Hx     Liver cancer Neg Hx     Liver disease Neg Hx     Rectal cancer Neg Hx     Stomach cancer Neg Hx     Ulcerative colitis Neg Hx      Social History     Socioeconomic History    Marital status:      Spouse name: Not on file    Number of children: 3    Years of education: Not on file    Highest education level: Not on file   Occupational History    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[employer]   Social Needs    Financial resource strain: Not on file    Food insecurity     Worry: Not on file     Inability: Not on file    Transportation needs     Medical: Not on file     Non-medical: Not on file   Tobacco Use    Smoking status: Former Smoker    Smokeless tobacco: Never Used    Tobacco comment: quit in october 2016   Substance and Sexual Activity    Alcohol use: Yes     Alcohol/week: 2.0 standard drinks     Types: 2 Cans of beer per week     Comment: social presently, excessive 10 years ago    Drug use: Yes     Types: Marijuana     Comment: uses THCA weekly    Sexual activity: Not Currently     Partners: Male     Birth control/protection: See  Surgical Hx   Lifestyle    Physical activity     Days per week: 3 days     Minutes per session: 60 min    Stress: To some extent   Relationships    Social connections     Talks on phone: Three times a week     Gets together: Patient refused     Attends [employer] service: Not on file     Active member of club or organization: No     Attends meetings of clubs or organizations: Patient refused     Relationship status: Patient refused   Other Topics Concern    Patient feels they ought to cut down on drinking/drug use No    Patient annoyed by others criticizing their drinking/drug use No    Patient has felt bad or guilty about drinking/drug use No    Patient has had a drink/used drugs as an eye opener in the AM No   Social History Narrative    Has 3 healthy grown sons (1990, 1993, 1998) Lives alone.    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   in 2010.    Has Male partner since 2014 who is currently disabled.       Current Facility-Administered Medications:     heparin 25,000 units in dextrose 5% (100 units/ml) IV bolus from bag - ADDITIONAL PRN BOLUS - 30 units/kg, 30 Units/kg (Adjusted), Intravenous, PRN, Deepti V. Guzman, FNP    heparin 25,000 units in dextrose 5% (100 units/ml) IV bolus from bag - ADDITIONAL PRN BOLUS - 60 units/kg, 60 Units/kg (Adjusted), Intravenous, PRN, Deepti V. Guzman, FNP    heparin 25,000 units in dextrose 5% (100 units/ml) IV bolus from bag INITIAL BOLUS, 80 Units/kg (Adjusted), Intravenous, Once, Deepti V. Guzman, FNP    heparin 25,000 units in dextrose 5% 250 mL (100 units/mL) infusion HIGH INTENSITY nomogram - [facility], 18 Units/kg/hr (Adjusted), Intravenous, Continuous, Deepti V. Guzman, FNP    ibuprofen tablet 600 mg, 600 mg, Oral, Q6H PRN, Deepti V. Guzman, FNP, 600 mg at 08/11/20 1542    sodium chloride 0.9% flush 10 mL, 10 mL, Intravenous, PRN, Deepti V. Guzman, FNP

## 2020-08-11 NOTE — ED PROVIDER NOTES
Encounter Date: 8/11/2020       History     Chief Complaint   Patient presents with    Leg Pain     since yesterday after getting off work, from L inner thigh to ankle, swelling present per pt, hx of blood clots     45 y.o. female. Past Medical History:  No date: Alcohol abuse      Comment:  stopped heavy drinking about 10 years ago; was drinking                3 glasses of vodka/tequilla,rum/whiskey per day  No date: Anxiety  No date: Depression  No date: Hallucination  No date: Hx of psychiatric care  No date: Hypertension  No date: Caro      Comment:  unplanned trips, energy without sleep for 2 days                (reading, cleaning), feelings that she can do multiple                tasks at one time, feelings of overconfidence at times  No date: Psychiatric problem  No date: Sleep difficulties  No date: Therapy  No date: Withdrawal symptoms, drug or narcotic      Comment:  racing heart, restlessness       Notes hx of prior dvt 10 years ago after hysterectomy, not on anticoag, has ivc filter in place, here for evaluation of spontaneous LLE swelling/pain, notes that she sits for approx 8 hours a day at work, does get up and move around, noted swelling in last hour of shift yesterday. Is concerned for clot recurrence.        Review of patient's allergies indicates:   Allergen Reactions    Morphine Itching and Hallucinations    Pcn [penicillins] Other (See Comments)     Was told from childhood she couldn't take it    Sulfa (sulfonamide antibiotics) Nausea And Vomiting    Latex, natural rubber Rash     Past Medical History:   Diagnosis Date    Alcohol abuse     stopped heavy drinking about 10 years ago; was drinking 3 glasses of vodka/tequilla,rum/whiskey per day    Anxiety     Depression     Hallucination     Hx of psychiatric care     Hypertension     Caro     unplanned trips, energy without sleep for 2 days (reading, cleaning), feelings that she can do multiple tasks at one time, feelings of  overconfidence at times    Psychiatric problem     Sleep difficulties     Therapy     Withdrawal symptoms, drug or narcotic     racing heart, restlessness     Past Surgical History:   Procedure Laterality Date     lapban surgery  2009    ESOPHAGOGASTRODUODENOSCOPY N/A 6/3/2020    Procedure: EGD (ESOPHAGOGASTRODUODENOSCOPY);  Surgeon: Johan Grover MD;  Location: [address]);  Service: Endoscopy;  Laterality: N/A;  covid 6/2-[facility]    HYSTERECTOMY       Family History   Problem Relation Age of Onset    Diabetes Mother     Cancer Mother     Hypertension Mother     Cirrhosis Maternal Uncle         ETOH    Celiac disease Neg Hx     Colon cancer Neg Hx     Colon polyps Neg Hx     Crohn's disease Neg Hx     Esophageal cancer Neg Hx     Inflammatory bowel disease Neg Hx     Liver cancer Neg Hx     Liver disease Neg Hx     Rectal cancer Neg Hx     Stomach cancer Neg Hx     Ulcerative colitis Neg Hx      Social History     Tobacco Use    Smoking status: Former Smoker    Smokeless tobacco: Never Used    Tobacco comment: quit in october 2016   Substance Use Topics    Alcohol use: Yes     Alcohol/week: 2.0 standard drinks     Types: 2 Cans of beer per week     Comment: social presently, excessive 10 years ago    Drug use: Yes     Types: Marijuana     Comment: uses THCA weekly     Review of Systems   Constitutional: Negative.    HENT: Negative.    Eyes: Negative.    Respiratory: Negative.    Cardiovascular: Negative.    Gastrointestinal: Negative.    Endocrine: Negative.    Genitourinary: Negative.    Musculoskeletal: Negative.    Skin: Negative.    Allergic/Immunologic: Negative.    Hematological: Negative.    Psychiatric/Behavioral: Negative.    All other systems reviewed and are negative.      Physical Exam     Initial Vitals [08/11/20 0537]   BP Pulse Resp Temp SpO2   123/73 (!) 124 20 97.8 °F (36.6 °C) 100 %      MAP       --         Physical Exam    Nursing note and  vitals reviewed.  Constitutional: She appears well-developed and well-nourished.   HENT:   Head: Normocephalic and atraumatic.   Eyes: Conjunctivae and EOM are normal. Pupils are equal, round, and reactive to light.   Neck: Normal range of motion.   Cardiovascular: Regular rhythm.   Pulmonary/Chest: Breath sounds normal. No respiratory distress.   Abdominal: She exhibits no distension.   Musculoskeletal: Normal range of motion.   Neurological: She is alert. No cranial nerve deficit. GCS score is 15. GCS eye subscore is 4. GCS verbal subscore is 5. GCS motor subscore is 6.   Skin: Skin is warm and dry.   Psychiatric: She has a normal mood and affect. Thought content normal.       LLE 2+ dp pulse  L>R LE edema  ED Course   Procedures  Labs Reviewed   CBC W/ AUTO DIFFERENTIAL   COMPREHENSIVE METABOLIC PANEL   PROTIME-INR   APTT     EKG Readings: (Independently Interpreted)   Hr 94, sinus, short pr, no kirill, twi AVF, v3-6 which are new compared to prior ekg 3/1/19 twi III unchanged from prior.       Imaging Results    None                               I have ordered screening labs, ua, ekg, trop and US      ekg with non specific st changes    Labs Reviewed   COMPREHENSIVE METABOLIC PANEL - Abnormal; Notable for the following components:       Result Value    Potassium 3.4 (*)     Glucose 122 (*)     Calcium 8.5 (*)     All other components within normal limits   CBC W/ AUTO DIFFERENTIAL   PROTIME-INR   APTT   URINALYSIS, REFLEX TO URINE CULTURE    Narrative:     Specimen Source->Urine   SARS-COV-2 RNA AMPLIFICATION, QUAL   B-TYPE NATRIURETIC PEPTIDE   TROPONIN I       US Lower Extremity Veins Bilateral   Final Result      1.  Extensive acute thrombosis involving the left thigh and calf veins, as described.      2.  No evidence of right lower extremity deep venous thrombosis.      Red Alert: Acute left thigh and calf DVT      This critical information above was relayed by myself by telephone to Dr. Sevilla On 8/11/2020 at  7:24.      CRITICAL FINDINGS:      RECOMMENDATIONS:         Electronically signed by: George Pablo   Date:    08/11/2020   Time:    07:25          Will make pt obs status to facilitate anticoagulation      Clinical Impression:       ICD-10-CM ICD-9-CM   1. Acute deep vein thrombosis (DVT) of proximal vein of left lower extremity  I82.4Y2 453.41   2. Leg swelling  M79.89 729.81   3. Swelling  R60.9 782.3                                Karly Sevilla MD  08/11/20 0859

## 2020-08-11 NOTE — HPI
Ms. Alberto Frye is a 45 y.o. female with HTN, anxiety, alcohol abuse, DVT (10 years ago, treated with IVC filter, warfarin) who was transferred to [facility] for thrombectomy/stent placement of left lower limb thrombus.  Patient originally presented to [facility] yesterday with one day of left thigh pain and leg swelling.  Patient states that this pain is in the same leg and pain is same as previous DVT diagnosed 10 years ago while in hospital for hysterectomy.  DVT at that time was treated with IVC filter and pt was on warfarin for a few months.  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  Denies any recent long distance travel, trauma, OCP/hormone replacement use.  Patient denies any fevers, chills, night sweats, chest pain, SOB, palpitations.  Patient is a former smoker, 10-15y of ~1pack/month, quit in 2016.  Patient states that she drinks socially.      Left lower limb ultrasound on 8/11 showed occlusive thrombus involving left common femoral, popliteal, upper greater saphenous, left iliac vein and distal inferior vena cava.  Patient started on lovenox.  Patient was seen by vascular surgery (Dr Goldman) who decided to transfer patient to [facility] for thrombectomy/stent placement.

## 2020-08-11 NOTE — PLAN OF CARE
Patient seen and examined.  Left iliocaval and iliofemoral acute DVT.  Recommend pharmachomechanical thrombectomy with possible angioplasty/stenting at [facility] for higher level of care.  No phlegmasia currently.  Recommend compression with Rx stockings, elevation, dietary changes associated with water and sodium intake discussed at length with patient.  Will continue to follow closely.  Full consult note to follow.

## 2020-08-11 NOTE — SUBJECTIVE & OBJECTIVE
Medications Prior to Admission   Medication Sig Dispense Refill Last Dose    cholecalciferol, vitamin D3, (VITAMIN D3) 50 mcg (2,000 unit) Cap Take 1 capsule (2,000 Units total) by mouth once daily. 90 capsule 3 8/10/2020 at Unknown time    cloNIDine 0.2 mg/24 hr td ptwk (CATAPRES) 0.2 mg/24 hr Place 1 patch onto the skin every 7 days. 4 patch 2 8/10/2020 at Unknown time    cyanocobalamin (VITAMIN B-12) 1000 MCG tablet Take 100 mcg by mouth once daily.   8/10/2020 at Unknown time    FLUoxetine 40 MG capsule Take 1 capsule (40 mg total) by mouth once daily. 90 capsule 1 8/10/2020 at Unknown time    fluticasone (FLONASE) 50 mcg/actuation nasal spray 1 spray by Each Nare route once daily.   Past Month at Unknown time    gabapentin (NEURONTIN) 600 MG tablet Take 1 tablet (600 mg total) by mouth nightly as needed. 30 tablet 4 Past Week at Unknown time    hydrOXYzine (ATARAX) 50 MG tablet Take 1 tablet (50 mg total) by mouth daily as needed for Anxiety (sleep). 90 tablet 1 8/10/2020 at Unknown time    ibuprofen (ADVIL,MOTRIN) 400 MG tablet Take 1 tablet (400 mg total) by mouth every 6 (six) hours as needed. 20 tablet 0 8/10/2020 at Unknown time    multivitamin with minerals tablet Take 1 tablet by mouth once daily.   8/10/2020 at Unknown time    OLANZapine (ZYPREXA) 5 MG tablet Take 1 tablet (5 mg total) by mouth every evening. 90 tablet 1 8/10/2020 at Unknown time    pantoprazole (PROTONIX) 40 MG tablet Take 1 tablet (40 mg total) by mouth before breakfast. Take one pill every morning 45 minutes before breakfast in the morning. 90 tablet 3 8/10/2020 at Unknown time    torsemide (DEMADEX) 10 MG Tab Take 2 tablets (20 mg total) by mouth once daily. 60 tablet 2 Past Month at Unknown time    valsartan-hydrochlorothiazide (DIOVAN-HCT) 160-25 mg per tablet Take 1 tablet by mouth once daily. 90 tablet 3 8/11/2020 at Unknown time    ergocalciferol (ERGOCALCIFEROL) 50,000 unit Cap Take 1 capsule (50,000 Units total)  by mouth every 7 days. for 12 doses 12 capsule 0     HYDROcodone-acetaminophen (NORCO)  mg per tablet Take 1 tablet by mouth every 12 (twelve) hours as needed for Pain. MORE THAN A 7 DAY SUPPLY OF THE OPIATE IS MEDICALLY NECESSARY 40 tablet 0 More than a month at Unknown time    HYDROcodone-acetaminophen (NORCO) 5-325 mg per tablet Take 1 tablet by mouth every 6 (six) hours as needed for Pain. 10 tablet 0 More than a month at Unknown time    levocetirizine (XYZAL) 5 MG tablet Take 1 tablet (5 mg total) by mouth every evening. 30 tablet 2        Review of patient's allergies indicates:   Allergen Reactions    Morphine Itching and Hallucinations    Pcn [penicillins] Other (See Comments)     Was told from childhood she couldn't take it    Sulfa (sulfonamide antibiotics) Nausea And Vomiting    Latex, natural rubber Rash       Past Medical History:   Diagnosis Date    Alcohol abuse     stopped heavy drinking about 10 years ago; was drinking 3 glasses of vodka/tequilla,rum/whiskey per day    Anxiety     Congestive heart failure 8/11/2020    Depression     Hallucination     Hx of psychiatric care     Hypertension     Caro     unplanned trips, energy without sleep for 2 days (reading, cleaning), feelings that she can do multiple tasks at one time, feelings of overconfidence at times    Psychiatric problem     Sleep difficulties     Therapy     Withdrawal symptoms, drug or narcotic     racing heart, restlessness     Past Surgical History:   Procedure Laterality Date     lapban surgery  2009    ESOPHAGOGASTRODUODENOSCOPY N/A 6/3/2020    Procedure: EGD (ESOPHAGOGASTRODUODENOSCOPY);  Surgeon: Johan Grover MD;  Location: [address];  Service: Endoscopy;  Laterality: N/A;  [place]    HYSTERECTOMY       Family History     Problem Relation (Age of Onset)    Cancer Mother    Cirrhosis Maternal Uncle    Diabetes Mother    Hypertension Mother        Tobacco Use     Smoking status: Former Smoker    Smokeless tobacco: Never Used    Tobacco comment: quit in october 2016   Substance and Sexual Activity    Alcohol use: Yes     Alcohol/week: 2.0 standard drinks     Types: 2 Cans of beer per week     Comment: social presently, excessive 10 years ago    Drug use: Yes     Types: Marijuana     Comment: uses THCA weekly    Sexual activity: Not Currently     Partners: Male     Birth control/protection: See Surgical Hx     Review of Systems   Constitutional: Negative for chills and fever.   HENT: Negative for congestion.    Eyes: Negative for visual disturbance.   Respiratory: Negative for chest tightness and shortness of breath.    Cardiovascular: Negative for chest pain.   Gastrointestinal: Negative for abdominal distention.   Endocrine: Negative for cold intolerance.   Genitourinary: Negative for flank pain.   Musculoskeletal: Negative for back pain.   Skin: Negative for color change, pallor, rash and wound.   Allergic/Immunologic: Negative for immunocompromised state.   Neurological: Positive for weakness. Negative for dizziness, tremors, seizures, syncope, numbness and headaches.   Hematological: Does not bruise/bleed easily.   Psychiatric/Behavioral: Negative for agitation.     Objective:     Vital Signs (Most Recent):  Temp: 97.7 °F (36.5 °C) (08/11/20 1535)  Pulse: 93 (08/11/20 1535)  Resp: 16 (08/11/20 1535)  BP: 117/72 (08/11/20 1535)  SpO2: 99 % (08/11/20 1535) Vital Signs (24h Range):  Temp:  [97.7 °F (36.5 °C)-98.2 °F (36.8 °C)] 97.7 °F (36.5 °C)  Pulse:  [] 93  Resp:  [15-20] 16  SpO2:  [97 %-100 %] 99 %  BP: (117-137)/(72-88) 117/72     Weight: 106.3 kg (234 lb 5.6 oz)  Body mass index is 40.23 kg/m².    Physical Exam  Vitals signs reviewed.   Constitutional:       General: She is not in acute distress.     Appearance: She is well-developed. She is not diaphoretic.   HENT:      Head: Normocephalic and atraumatic.   Eyes:      Conjunctiva/sclera: Conjunctivae  normal.   Neck:      Musculoskeletal: Neck supple.   Cardiovascular:      Rate and Rhythm: Normal rate.      Pulses:           Dorsalis pedis pulses are 2+ on the right side and 1+ on the left side.   Pulmonary:      Effort: Pulmonary effort is normal. No respiratory distress.   Abdominal:      General: There is no distension.      Palpations: Abdomen is soft. There is no mass.      Tenderness: There is no abdominal tenderness. There is no guarding or rebound.      Hernia: No hernia is present.   Musculoskeletal: Normal range of motion.         General: No deformity.      Right lower leg: No edema.      Left lower leg: Edema present.   Skin:     General: Skin is warm and dry.      Capillary Refill: Capillary refill takes less than 2 seconds.      Coloration: Skin is not pale.      Findings: No erythema or rash.   Neurological:      General: No focal deficit present.      Mental Status: She is alert and oriented to person, place, and time.   Psychiatric:         Mood and Affect: Mood normal.         Significant Labs:  All pertinent labs from the last 24 hours have been reviewed.    Significant Diagnostics:  I have reviewed all pertinent imaging results/findings within the past 24 hours.

## 2020-08-12 ENCOUNTER — TELEPHONE (OUTPATIENT)
Dept: FAMILY MEDICINE | Facility: CLINIC | Age: 45
End: 2020-08-12

## 2020-08-12 ENCOUNTER — ANESTHESIA (OUTPATIENT)
Dept: SURGERY | Facility: HOSPITAL | Age: 45
DRG: 271 | End: 2020-08-12
Payer: COMMERCIAL

## 2020-08-12 VITALS — DIASTOLIC BLOOD PRESSURE: 53 MMHG | SYSTOLIC BLOOD PRESSURE: 79 MMHG

## 2020-08-12 LAB
APTT BLDCRRT: 64.1 SEC (ref 21–32)
BASOPHILS # BLD AUTO: 0.03 K/UL (ref 0–0.2)
BASOPHILS NFR BLD: 0.4 % (ref 0–1.9)
DIFFERENTIAL METHOD: ABNORMAL
EOSINOPHIL # BLD AUTO: 0.4 K/UL (ref 0–0.5)
EOSINOPHIL NFR BLD: 5.2 % (ref 0–8)
ERYTHROCYTE [DISTWIDTH] IN BLOOD BY AUTOMATED COUNT: 13.3 % (ref 11.5–14.5)
HCT VFR BLD AUTO: 39.3 % (ref 37–48.5)
HGB BLD-MCNC: 12.7 G/DL (ref 12–16)
IMM GRANULOCYTES # BLD AUTO: 0.04 K/UL (ref 0–0.04)
IMM GRANULOCYTES NFR BLD AUTO: 0.6 % (ref 0–0.5)
LYMPHOCYTES # BLD AUTO: 2.8 K/UL (ref 1–4.8)
LYMPHOCYTES NFR BLD: 41 % (ref 18–48)
MCH RBC QN AUTO: 30.5 PG (ref 27–31)
MCHC RBC AUTO-ENTMCNC: 32.3 G/DL (ref 32–36)
MCV RBC AUTO: 94 FL (ref 82–98)
MONOCYTES # BLD AUTO: 0.5 K/UL (ref 0.3–1)
MONOCYTES NFR BLD: 7.1 % (ref 4–15)
NEUTROPHILS # BLD AUTO: 3.1 K/UL (ref 1.8–7.7)
NEUTROPHILS NFR BLD: 45.7 % (ref 38–73)
NRBC BLD-RTO: 0 /100 WBC
PLATELET # BLD AUTO: 140 K/UL (ref 150–350)
PMV BLD AUTO: 11 FL (ref 9.2–12.9)
RBC # BLD AUTO: 4.17 M/UL (ref 4–5.4)
WBC # BLD AUTO: 6.75 K/UL (ref 3.9–12.7)

## 2020-08-12 PROCEDURE — 25500020 PHARM REV CODE 255: Performed by: SURGERY

## 2020-08-12 PROCEDURE — C1894 INTRO/SHEATH, NON-LASER: HCPCS | Performed by: SURGERY

## 2020-08-12 PROCEDURE — 99223 PR INITIAL HOSPITAL CARE,LEVL III: ICD-10-PCS | Mod: ,,, | Performed by: HOSPITALIST

## 2020-08-12 PROCEDURE — 25000003 PHARM REV CODE 250: Performed by: STUDENT IN AN ORGANIZED HEALTH CARE EDUCATION/TRAINING PROGRAM

## 2020-08-12 PROCEDURE — 63600175 PHARM REV CODE 636 W HCPCS: Performed by: NURSE PRACTITIONER

## 2020-08-12 PROCEDURE — 94761 N-INVAS EAR/PLS OXIMETRY MLT: CPT

## 2020-08-12 PROCEDURE — 63600175 PHARM REV CODE 636 W HCPCS: Performed by: STUDENT IN AN ORGANIZED HEALTH CARE EDUCATION/TRAINING PROGRAM

## 2020-08-12 PROCEDURE — 37248 TRLUML BALO ANGIOP 1ST VEIN: CPT | Mod: 51,LT,, | Performed by: SURGERY

## 2020-08-12 PROCEDURE — 37000008 HC ANESTHESIA 1ST 15 MINUTES: Performed by: SURGERY

## 2020-08-12 PROCEDURE — 36415 COLL VENOUS BLD VENIPUNCTURE: CPT

## 2020-08-12 PROCEDURE — 63600175 PHARM REV CODE 636 W HCPCS: Performed by: ANESTHESIOLOGY

## 2020-08-12 PROCEDURE — 99223 1ST HOSP IP/OBS HIGH 75: CPT | Mod: ,,, | Performed by: HOSPITALIST

## 2020-08-12 PROCEDURE — C1725 CATH, TRANSLUMIN NON-LASER: HCPCS | Performed by: SURGERY

## 2020-08-12 PROCEDURE — D9220A PRA ANESTHESIA: ICD-10-PCS | Mod: ANES,,, | Performed by: ANESTHESIOLOGY

## 2020-08-12 PROCEDURE — 99233 PR SUBSEQUENT HOSPITAL CARE,LEVL III: ICD-10-PCS | Mod: ,,, | Performed by: SURGERY

## 2020-08-12 PROCEDURE — 11000001 HC ACUTE MED/SURG PRIVATE ROOM

## 2020-08-12 PROCEDURE — 36000706: Performed by: SURGERY

## 2020-08-12 PROCEDURE — 37249 TRLUML BALO ANGIOP ADDL VEIN: CPT | Mod: LT,,, | Performed by: SURGERY

## 2020-08-12 PROCEDURE — 37249 PR TRANSLML BALLOON ANGIO, OPEN/PERC, EA ADDTL VEIN: ICD-10-PCS | Mod: LT,,, | Performed by: SURGERY

## 2020-08-12 PROCEDURE — 71000033 HC RECOVERY, INTIAL HOUR: Performed by: SURGERY

## 2020-08-12 PROCEDURE — D9220A PRA ANESTHESIA: Mod: CRNA,,, | Performed by: STUDENT IN AN ORGANIZED HEALTH CARE EDUCATION/TRAINING PROGRAM

## 2020-08-12 PROCEDURE — 63600175 PHARM REV CODE 636 W HCPCS: Performed by: NURSE ANESTHETIST, CERTIFIED REGISTERED

## 2020-08-12 PROCEDURE — 36010 PLACE CATHETER IN VEIN: CPT | Mod: 51,,, | Performed by: SURGERY

## 2020-08-12 PROCEDURE — 36000707: Performed by: SURGERY

## 2020-08-12 PROCEDURE — 25000003 PHARM REV CODE 250: Performed by: NURSE ANESTHETIST, CERTIFIED REGISTERED

## 2020-08-12 PROCEDURE — 37000009 HC ANESTHESIA EA ADD 15 MINS: Performed by: SURGERY

## 2020-08-12 PROCEDURE — D9220A PRA ANESTHESIA: ICD-10-PCS | Mod: CRNA,,, | Performed by: STUDENT IN AN ORGANIZED HEALTH CARE EDUCATION/TRAINING PROGRAM

## 2020-08-12 PROCEDURE — 27201423 OPTIME MED/SURG SUP & DEVICES STERILE SUPPLY: Performed by: SURGERY

## 2020-08-12 PROCEDURE — 63600175 PHARM REV CODE 636 W HCPCS: Mod: JG | Performed by: SURGERY

## 2020-08-12 PROCEDURE — 99233 SBSQ HOSP IP/OBS HIGH 50: CPT | Mod: ,,, | Performed by: SURGERY

## 2020-08-12 PROCEDURE — 37248 PR TRANSLML BALLOON ANGIO, OPEN/PERC, INITIAL VEIN: ICD-10-PCS | Mod: 51,LT,, | Performed by: SURGERY

## 2020-08-12 PROCEDURE — C1769 GUIDE WIRE: HCPCS | Performed by: SURGERY

## 2020-08-12 PROCEDURE — 36010 PR PLACE CATH IN VEIN,SVC,IVC: ICD-10-PCS | Mod: 51,,, | Performed by: SURGERY

## 2020-08-12 PROCEDURE — D9220A PRA ANESTHESIA: Mod: ANES,,, | Performed by: ANESTHESIOLOGY

## 2020-08-12 PROCEDURE — 85730 THROMBOPLASTIN TIME PARTIAL: CPT

## 2020-08-12 PROCEDURE — 37187 PR THROMBECTOMY, VENOUS: ICD-10-PCS | Mod: LT,,, | Performed by: SURGERY

## 2020-08-12 PROCEDURE — 71000039 HC RECOVERY, EACH ADD'L HOUR: Performed by: SURGERY

## 2020-08-12 PROCEDURE — 71000015 HC POSTOP RECOV 1ST HR: Performed by: SURGERY

## 2020-08-12 PROCEDURE — 37187 VENOUS MECH THROMBECTOMY: CPT | Mod: LT,,, | Performed by: SURGERY

## 2020-08-12 PROCEDURE — 85025 COMPLETE CBC W/AUTO DIFF WBC: CPT

## 2020-08-12 PROCEDURE — C1757 CATH, THROMBECTOMY/EMBOLECT: HCPCS | Performed by: SURGERY

## 2020-08-12 RX ORDER — HEPARIN SODIUM 1000 [USP'U]/ML
INJECTION, SOLUTION INTRAVENOUS; SUBCUTANEOUS
Status: DISCONTINUED | OUTPATIENT
Start: 2020-08-12 | End: 2020-08-12 | Stop reason: HOSPADM

## 2020-08-12 RX ORDER — ACETAMINOPHEN 325 MG/1
650 TABLET ORAL EVERY 4 HOURS PRN
Status: DISCONTINUED | OUTPATIENT
Start: 2020-08-12 | End: 2020-08-15 | Stop reason: HOSPADM

## 2020-08-12 RX ORDER — SODIUM CHLORIDE 9 MG/ML
INJECTION, SOLUTION INTRAVENOUS CONTINUOUS PRN
Status: DISCONTINUED | OUTPATIENT
Start: 2020-08-12 | End: 2020-08-12

## 2020-08-12 RX ORDER — SODIUM CHLORIDE 0.9 % (FLUSH) 0.9 %
3 SYRINGE (ML) INJECTION
Status: DISCONTINUED | OUTPATIENT
Start: 2020-08-12 | End: 2020-08-15 | Stop reason: HOSPADM

## 2020-08-12 RX ORDER — MIDAZOLAM HYDROCHLORIDE 1 MG/ML
INJECTION, SOLUTION INTRAMUSCULAR; INTRAVENOUS
Status: DISCONTINUED | OUTPATIENT
Start: 2020-08-12 | End: 2020-08-12

## 2020-08-12 RX ORDER — ONDANSETRON 2 MG/ML
INJECTION INTRAMUSCULAR; INTRAVENOUS
Status: DISCONTINUED | OUTPATIENT
Start: 2020-08-12 | End: 2020-08-12

## 2020-08-12 RX ORDER — ROCURONIUM BROMIDE 10 MG/ML
INJECTION, SOLUTION INTRAVENOUS
Status: DISCONTINUED | OUTPATIENT
Start: 2020-08-12 | End: 2020-08-12

## 2020-08-12 RX ORDER — IBUPROFEN 200 MG
16 TABLET ORAL
Status: DISCONTINUED | OUTPATIENT
Start: 2020-08-12 | End: 2020-08-15 | Stop reason: HOSPADM

## 2020-08-12 RX ORDER — KETAMINE HCL IN 0.9 % NACL 50 MG/5 ML
SYRINGE (ML) INTRAVENOUS
Status: DISCONTINUED | OUTPATIENT
Start: 2020-08-12 | End: 2020-08-12

## 2020-08-12 RX ORDER — HYDROXYZINE HYDROCHLORIDE 25 MG/1
50 TABLET, FILM COATED ORAL DAILY PRN
Status: DISCONTINUED | OUTPATIENT
Start: 2020-08-12 | End: 2020-08-15 | Stop reason: HOSPADM

## 2020-08-12 RX ORDER — HEPARIN SODIUM,PORCINE/D5W 25000/250
12 INTRAVENOUS SOLUTION INTRAVENOUS CONTINUOUS
Status: DISCONTINUED | OUTPATIENT
Start: 2020-08-12 | End: 2020-08-13

## 2020-08-12 RX ORDER — GLUCAGON 1 MG
1 KIT INJECTION
Status: DISCONTINUED | OUTPATIENT
Start: 2020-08-12 | End: 2020-08-15 | Stop reason: HOSPADM

## 2020-08-12 RX ORDER — FENTANYL CITRATE 50 UG/ML
INJECTION, SOLUTION INTRAMUSCULAR; INTRAVENOUS
Status: DISCONTINUED | OUTPATIENT
Start: 2020-08-12 | End: 2020-08-12

## 2020-08-12 RX ORDER — PANTOPRAZOLE SODIUM 40 MG/1
40 TABLET, DELAYED RELEASE ORAL
Status: DISCONTINUED | OUTPATIENT
Start: 2020-08-12 | End: 2020-08-15 | Stop reason: HOSPADM

## 2020-08-12 RX ORDER — GLYCOPYRROLATE 0.2 MG/ML
INJECTION INTRAMUSCULAR; INTRAVENOUS
Status: DISCONTINUED | OUTPATIENT
Start: 2020-08-12 | End: 2020-08-12

## 2020-08-12 RX ORDER — IODIXANOL 320 MG/ML
INJECTION, SOLUTION INTRAVASCULAR
Status: DISCONTINUED | OUTPATIENT
Start: 2020-08-12 | End: 2020-08-12 | Stop reason: HOSPADM

## 2020-08-12 RX ORDER — DOCUSATE SODIUM 100 MG/1
100 CAPSULE, LIQUID FILLED ORAL 2 TIMES DAILY
Status: DISCONTINUED | OUTPATIENT
Start: 2020-08-12 | End: 2020-08-15 | Stop reason: HOSPADM

## 2020-08-12 RX ORDER — PROPOFOL 10 MG/ML
VIAL (ML) INTRAVENOUS
Status: DISCONTINUED | OUTPATIENT
Start: 2020-08-12 | End: 2020-08-12

## 2020-08-12 RX ORDER — PROTAMINE SULFATE 10 MG/ML
INJECTION, SOLUTION INTRAVENOUS
Status: DISCONTINUED | OUTPATIENT
Start: 2020-08-12 | End: 2020-08-12

## 2020-08-12 RX ORDER — FLUOXETINE HYDROCHLORIDE 20 MG/1
40 CAPSULE ORAL DAILY
Status: DISCONTINUED | OUTPATIENT
Start: 2020-08-12 | End: 2020-08-15 | Stop reason: HOSPADM

## 2020-08-12 RX ORDER — HYDROMORPHONE HYDROCHLORIDE 1 MG/ML
0.2 INJECTION, SOLUTION INTRAMUSCULAR; INTRAVENOUS; SUBCUTANEOUS EVERY 5 MIN PRN
Status: DISCONTINUED | OUTPATIENT
Start: 2020-08-12 | End: 2020-08-13

## 2020-08-12 RX ORDER — ACETAMINOPHEN 325 MG/1
650 TABLET ORAL EVERY 8 HOURS PRN
Status: DISCONTINUED | OUTPATIENT
Start: 2020-08-12 | End: 2020-08-15 | Stop reason: HOSPADM

## 2020-08-12 RX ORDER — HYDROCODONE BITARTRATE AND ACETAMINOPHEN 5; 325 MG/1; MG/1
1 TABLET ORAL EVERY 6 HOURS PRN
Status: DISCONTINUED | OUTPATIENT
Start: 2020-08-12 | End: 2020-08-15 | Stop reason: HOSPADM

## 2020-08-12 RX ORDER — PHENYLEPHRINE HYDROCHLORIDE 10 MG/ML
INJECTION INTRAVENOUS
Status: DISCONTINUED | OUTPATIENT
Start: 2020-08-12 | End: 2020-08-12

## 2020-08-12 RX ORDER — HYDROMORPHONE HYDROCHLORIDE 1 MG/ML
0.5 INJECTION, SOLUTION INTRAMUSCULAR; INTRAVENOUS; SUBCUTANEOUS EVERY 4 HOURS PRN
Status: DISCONTINUED | OUTPATIENT
Start: 2020-08-12 | End: 2020-08-13

## 2020-08-12 RX ORDER — GABAPENTIN 300 MG/1
300 CAPSULE ORAL NIGHTLY
Status: DISCONTINUED | OUTPATIENT
Start: 2020-08-12 | End: 2020-08-12

## 2020-08-12 RX ORDER — IBUPROFEN 200 MG
24 TABLET ORAL
Status: DISCONTINUED | OUTPATIENT
Start: 2020-08-12 | End: 2020-08-15 | Stop reason: HOSPADM

## 2020-08-12 RX ORDER — HEPARIN SODIUM 1000 [USP'U]/ML
INJECTION, SOLUTION INTRAVENOUS; SUBCUTANEOUS
Status: DISCONTINUED | OUTPATIENT
Start: 2020-08-12 | End: 2020-08-12

## 2020-08-12 RX ORDER — ONDANSETRON 2 MG/ML
4 INJECTION INTRAMUSCULAR; INTRAVENOUS EVERY 12 HOURS PRN
Status: DISCONTINUED | OUTPATIENT
Start: 2020-08-12 | End: 2020-08-15 | Stop reason: HOSPADM

## 2020-08-12 RX ORDER — MUPIROCIN 20 MG/G
1 OINTMENT TOPICAL 2 TIMES DAILY
Status: DISCONTINUED | OUTPATIENT
Start: 2020-08-12 | End: 2020-08-15 | Stop reason: HOSPADM

## 2020-08-12 RX ORDER — LIDOCAINE HCL/PF 100 MG/5ML
SYRINGE (ML) INTRAVENOUS
Status: DISCONTINUED | OUTPATIENT
Start: 2020-08-12 | End: 2020-08-12

## 2020-08-12 RX ADMIN — HEPARIN SODIUM AND DEXTROSE 14 UNITS/KG/HR: 10000; 5 INJECTION INTRAVENOUS at 04:08

## 2020-08-12 RX ADMIN — ROCURONIUM BROMIDE 50 MG: 10 INJECTION, SOLUTION INTRAVENOUS at 05:08

## 2020-08-12 RX ADMIN — HYDROMORPHONE HYDROCHLORIDE 0.2 MG: 1 INJECTION, SOLUTION INTRAMUSCULAR; INTRAVENOUS; SUBCUTANEOUS at 08:08

## 2020-08-12 RX ADMIN — MUPIROCIN 1 G: 20 OINTMENT TOPICAL at 08:08

## 2020-08-12 RX ADMIN — PHENYLEPHRINE HYDROCHLORIDE 100 MCG: 10 INJECTION INTRAVENOUS at 05:08

## 2020-08-12 RX ADMIN — PROTAMINE SULFATE 5 MG: 10 INJECTION, SOLUTION INTRAVENOUS at 07:08

## 2020-08-12 RX ADMIN — FLUOXETINE 40 MG: 20 CAPSULE ORAL at 09:08

## 2020-08-12 RX ADMIN — HYDROCODONE BITARTRATE AND ACETAMINOPHEN 1 TABLET: 5; 325 TABLET ORAL at 08:08

## 2020-08-12 RX ADMIN — PHENYLEPHRINE HYDROCHLORIDE 100 MCG: 10 INJECTION INTRAVENOUS at 06:08

## 2020-08-12 RX ADMIN — VANCOMYCIN HYDROCHLORIDE 1 G: 1 INJECTION, POWDER, LYOPHILIZED, FOR SOLUTION INTRAVENOUS at 05:08

## 2020-08-12 RX ADMIN — PROPOFOL 200 MG: 10 INJECTION, EMULSION INTRAVENOUS at 05:08

## 2020-08-12 RX ADMIN — HYDROCODONE BITARTRATE AND ACETAMINOPHEN 1 TABLET: 5; 325 TABLET ORAL at 01:08

## 2020-08-12 RX ADMIN — HYDROCODONE BITARTRATE AND ACETAMINOPHEN 1 TABLET: 5; 325 TABLET ORAL at 05:08

## 2020-08-12 RX ADMIN — LIDOCAINE HYDROCHLORIDE 80 MG: 20 INJECTION, SOLUTION INTRAVENOUS at 05:08

## 2020-08-12 RX ADMIN — MIDAZOLAM HYDROCHLORIDE 2 MG: 1 INJECTION, SOLUTION INTRAMUSCULAR; INTRAVENOUS at 04:08

## 2020-08-12 RX ADMIN — FENTANYL CITRATE 100 MCG: 50 INJECTION, SOLUTION INTRAMUSCULAR; INTRAVENOUS at 05:08

## 2020-08-12 RX ADMIN — Medication 10 MG: at 06:08

## 2020-08-12 RX ADMIN — SODIUM CHLORIDE: 0.9 INJECTION, SOLUTION INTRAVENOUS at 04:08

## 2020-08-12 RX ADMIN — DOCUSATE SODIUM 100 MG: 100 CAPSULE, LIQUID FILLED ORAL at 09:08

## 2020-08-12 RX ADMIN — ONDANSETRON 4 MG: 2 INJECTION, SOLUTION INTRAMUSCULAR; INTRAVENOUS at 06:08

## 2020-08-12 RX ADMIN — ONDANSETRON 4 MG: 2 INJECTION INTRAMUSCULAR; INTRAVENOUS at 10:08

## 2020-08-12 RX ADMIN — SUGAMMADEX 200 MG: 100 INJECTION, SOLUTION INTRAVENOUS at 07:08

## 2020-08-12 RX ADMIN — ROCURONIUM BROMIDE 10 MG: 10 INJECTION, SOLUTION INTRAVENOUS at 06:08

## 2020-08-12 RX ADMIN — HEPARIN SODIUM 8000 UNITS: 1000 INJECTION, SOLUTION INTRAVENOUS; SUBCUTANEOUS at 05:08

## 2020-08-12 RX ADMIN — GLYCOPYRROLATE 0.2 MG: 0.2 INJECTION, SOLUTION INTRAMUSCULAR; INTRAVENOUS at 06:08

## 2020-08-12 RX ADMIN — PANTOPRAZOLE SODIUM 40 MG: 40 TABLET, DELAYED RELEASE ORAL at 05:08

## 2020-08-12 NOTE — PHARMACY MED REC
"Admission Medication Reconciliation - Pharmacy Consult Note    The home medication history was taken by Jessica Hooper.  Based on information gathered and subsequent review by the clinical pharmacist, the items below may need attention.     You may go to "Admission" then "Reconcile Home Medications" tabs to review and/or act upon these items.     Potentially problematic discrepancies with current MAR  o Patient IS NOT taking the following which was ordered upon admit  o Hydrocodone/acetaminophen 5/325 mg PO q6h PRN pain - patient is out and has not been taking.    Please address this information as you see fit.  Feel free to contact us if you have any questions or require assistance.    Carmen Das  [phone]              .    .            "

## 2020-08-12 NOTE — H&P
[facility] Hospital Medicine  History & Physical    Patient Name: Alberto Frye  MRN: [id number]  Admission Date: 8/11/2020  Attending Physician: Kelly Wade MD   Primary Care Provider: Candace Martin MD    [facility] Medicine Team: JEZ Britt MD     Patient information was obtained from patient and ER records.     Subjective:     Principal Problem:Acute deep vein thrombosis of left iliac vein    Chief Complaint:   Chief Complaint   Patient presents with    Leg Pain     since yesterday after getting off work, from L inner thigh to ankle, swelling present per pt, hx of blood clots        HPI: Ms. Alberto Frye is a 45 y.o. female with HTN, anxiety, alcohol abuse, DVT (10 years ago, treated with IVC filter, warfarin) who was transferred to [facility] [place]/stent placement of left lower limb thrombus.  Patient originally presented to [facility] yesterday with one day of left thigh pain and leg swelling.  Patient states that this pain is in the same leg and pain is same as previous DVT diagnosed 10 years ago while in hospital for hysterectomy.  DVT at that time was treated with IVC filter and pt was on warfarin for a few months.  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  Denies any recent long distance travel, trauma, OCP/hormone replacement use.  Patient denies any fevers, chills, night sweats, chest pain, SOB, palpitations.  Patient is a former smoker, 10-15y of ~1pack/month, quit in 2016.  Patient states that she drinks socially.      Left lower limb ultrasound on 8/11 showed occlusive thrombus involving left common femoral, popliteal, upper greater saphenous, left iliac vein and distal inferior vena cava.  Patient started on lovenox.  Patient was seen by vascular surgery (Dr Goldman) who decided to transfer patient to [facility] for [place]ectomy/stent placement.       Past Medical History:   Diagnosis Date    Alcohol abuse     stopped  heavy drinking about 10 years ago; was drinking 3 glasses of vodka/tequilla,rum/whiskey per day    Anxiety     Congestive heart failure 8/11/2020    Depression     Hallucination     Hx of psychiatric care     Hypertension     Caro     unplanned trips, energy without sleep for 2 days (reading, cleaning), feelings that she can do multiple tasks at one time, feelings of overconfidence at times    Psychiatric problem     Sleep difficulties     Therapy     Withdrawal symptoms, drug or narcotic     racing heart, restlessness       Past Surgical History:   Procedure Laterality Date     lapban surgery  2009    ESOPHAGOGASTRODUODENOSCOPY N/A 6/3/2020    Procedure: EGD (ESOPHAGOGASTRODUODENOSCOPY);  Surgeon: Johan Grover MD;  Location: [facility] ([address] [place] [address]);  Service: Endoscopy;  Laterality: N/A;  covid 6/2-[facility]-tb    HYSTERECTOMY         Review of patient's allergies indicates:   Allergen Reactions    Morphine Itching and Hallucinations    Pcn [penicillins] Other (See Comments)     Was told from childhood she couldn't take it    Sulfa (sulfonamide antibiotics) Nausea And Vomiting    Latex, natural rubber Rash       No current facility-administered medications on file prior to encounter.      Current Outpatient Medications on File Prior to Encounter   Medication Sig    cholecalciferol, vitamin D3, (VITAMIN D3) 50 mcg (2,000 unit) Cap Take 1 capsule (2,000 Units total) by mouth once daily.    cloNIDine 0.2 mg/24 hr td ptwk (CATAPRES) 0.2 mg/24 hr Place 1 patch onto the skin every 7 days.    cyanocobalamin (VITAMIN B-12) 1000 MCG tablet Take 100 mcg by mouth once daily.    FLUoxetine 40 MG capsule Take 1 capsule (40 mg total) by mouth once daily.    fluticasone (FLONASE) 50 mcg/actuation nasal spray 1 spray by Each Nare route once daily.    gabapentin (NEURONTIN) 600 MG tablet Take 1 tablet (600 mg total) by mouth nightly as needed.    hydrOXYzine (ATARAX) 50 MG tablet Take 1  tablet (50 mg total) by mouth daily as needed for Anxiety (sleep).    ibuprofen (ADVIL,MOTRIN) 400 MG tablet Take 1 tablet (400 mg total) by mouth every 6 (six) hours as needed.    multivitamin with minerals tablet Take 1 tablet by mouth once daily.    OLANZapine (ZYPREXA) 5 MG tablet Take 1 tablet (5 mg total) by mouth every evening.    pantoprazole (PROTONIX) 40 MG tablet Take 1 tablet (40 mg total) by mouth before breakfast. Take one pill every morning 45 minutes before breakfast in the morning.    torsemide (DEMADEX) 10 MG Tab Take 2 tablets (20 mg total) by mouth once daily.    valsartan-hydrochlorothiazide (DIOVAN-HCT) 160-25 mg per tablet Take 1 tablet by mouth once daily.    ergocalciferol (ERGOCALCIFEROL) 50,000 unit Cap Take 1 capsule (50,000 Units total) by mouth every 7 days. for 12 doses    HYDROcodone-acetaminophen (NORCO)  mg per tablet Take 1 tablet by mouth every 12 (twelve) hours as needed for Pain. MORE THAN A 7 DAY SUPPLY OF THE OPIATE IS MEDICALLY NECESSARY    HYDROcodone-acetaminophen (NORCO) 5-325 mg per tablet Take 1 tablet by mouth every 6 (six) hours as needed for Pain.    levocetirizine (XYZAL) 5 MG tablet Take 1 tablet (5 mg total) by mouth every evening.     Family History     Problem Relation (Age of Onset)    Cancer Mother    Cirrhosis Maternal Uncle    Diabetes Mother    Hypertension Mother        Tobacco Use    Smoking status: Former Smoker    Smokeless tobacco: Never Used    Tobacco comment: quit in october 2016   Substance and Sexual Activity    Alcohol use: Yes     Alcohol/week: 2.0 standard drinks     Types: 2 Cans of beer per week     Comment: social presently, excessive 10 years ago    Drug use: Yes     Types: Marijuana     Comment: uses THCA weekly    Sexual activity: Not Currently     Partners: Male     Birth control/protection: See Surgical Hx     Review of Systems   Constitutional: Negative for fatigue, fever and unexpected weight change.   HENT:  Negative for sore throat.    Eyes: Negative for visual disturbance.   Respiratory: Negative for cough, shortness of breath, wheezing and stridor.    Cardiovascular: Positive for leg swelling. Negative for chest pain and palpitations.   Gastrointestinal: Negative for abdominal pain, anal bleeding, blood in stool, constipation, diarrhea, nausea and vomiting.   Genitourinary: Negative for dysuria.   Musculoskeletal: Negative for arthralgias.   Neurological: Negative for dizziness and headaches.   Psychiatric/Behavioral: Negative for agitation.     Objective:     Vital Signs (Most Recent):  Temp: 98 °F (36.7 °C) (08/12/20 0347)  Pulse: 78 (08/12/20 0308)  Resp: 18 (08/12/20 0308)  BP: (!) 100/56 (08/12/20 0308)  SpO2: 100 % (08/12/20 0308) Vital Signs (24h Range):  Temp:  [97.6 °F (36.4 °C)-98.2 °F (36.8 °C)] 98 °F (36.7 °C)  Pulse:  [] 78  Resp:  [15-20] 18  SpO2:  [97 %-100 %] 100 %  BP: (100-137)/(56-88) 100/56     Weight: 106.3 kg (234 lb 5.6 oz)  Body mass index is 40.23 kg/m².    Physical Exam  Constitutional:       General: She is not in acute distress.     Appearance: Normal appearance. She is obese. She is not ill-appearing.   HENT:      Head: Normocephalic and atraumatic.   Cardiovascular:      Rate and Rhythm: Normal rate and regular rhythm.      Pulses: Normal pulses.      Heart sounds: Normal heart sounds. No murmur. No friction rub. No gallop.    Pulmonary:      Effort: Pulmonary effort is normal.      Breath sounds: Normal breath sounds. No stridor. No wheezing or rales.   Abdominal:      General: Bowel sounds are normal.      Palpations: Abdomen is soft. There is no mass.      Tenderness: There is no abdominal tenderness.   Musculoskeletal:         General: Swelling (left leg) and tenderness (left leg) present.   Skin:     General: Skin is warm and dry.      Capillary Refill: Capillary refill takes less than 2 seconds.   Neurological:      General: No focal deficit present.      Mental Status: She  is alert and oriented to person, place, and time.   Psychiatric:         Mood and Affect: Mood normal.         Behavior: Behavior normal.             Significant Labs:   CBC:   Recent Labs   Lab 08/11/20  0601   WBC 8.04   HGB 13.9   HCT 41.8        CMP:   Recent Labs   Lab 08/11/20  0601      K 3.4*      CO2 23   *   BUN 20   CREATININE 0.9   CALCIUM 8.5*   PROT 7.7   ALBUMIN 3.9   BILITOT 0.6   ALKPHOS 74   AST 17   ALT 12   ANIONGAP 11   EGFRNONAA >60       Significant Imaging:   Imaging Results          US Lower Extremity Veins Bilateral (Final result)  Result time 08/11/20 07:25:14    Final result by George Pablo MD (08/11/20 07:25:14)                 Impression:      1.  Extensive acute thrombosis involving the left thigh and calf veins, as described.    2.  No evidence of right lower extremity deep venous thrombosis.    Red Alert: Acute left thigh and calf DVT    This critical information above was relayed by myself by telephone to Dr. Sevilla On 8/11/2020 at 7:24.    CRITICAL FINDINGS:    RECOMMENDATIONS:      Electronically signed by: George Pablo  Date:    08/11/2020  Time:    07:25             Narrative:    EXAMINATION:  US LOWER EXTREMITY VEINS BILATERAL    CLINICAL HISTORY:  Edema, unspecified    TECHNIQUE:  Duplex and color flow Doppler and dynamic compression was performed of the bilateral lower extremity veins was performed.    COMPARISON:  None    FINDINGS:  Right thigh veins: The common femoral, femoral, popliteal, upper greater saphenous, and deep femoral veins are patent and free of thrombus. The veins are normally compressible and have normal phasic flow and augmentation response.    Right calf veins: The visualized calf veins are patent.    Left thigh veins: There is occlusive thrombus involving the common femoral, femoral, popliteal, and upper greater saphenous veins.  Veins are not compressible and have minimal flow.    Left calf veins: The posterior tibial  and peroneal veins are thrombosed as well.  The anterior tibial vein appears patent.    Miscellaneous: None                                  Assessment/Plan:     * Acute deep vein thrombosis of left iliac vein  US LE shows Left thigh vein occlusive thrombus involving the common femoral, popliteal, and upper greater saphenous veins with minimal flow.  Patient had previous DVT in same leg ~10 years ago, treated with IVC filter and warfarin for a few months.  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      - vascular surgery consulted, plan to go to OR today  - NPO on admission  - on heparin ggt, will d/c per vascular surgery    Congestive heart failure  Echo 8/11/2020 showed EF 60%    Neuropathy  - hold home gabapentin 600 mg qhs      Anxiety  - continue home fluoxetine 40 mg qd, atarax 50 mg qhs  - pt has not been taking zyprexa for a few weeks, d/c home zyprexa      Essential hypertension  BP controlled in 120s/70s on admission.    - patient currently has clonidine patch on, hold new patch for now  - hold home valsartan/hctz 160/25 mg, demadex 20 mg, can restart as clinically appropriate      VTE Risk Mitigation (From admission, onward)         Ordered     IP VTE HIGH RISK PATIENT  Once      08/12/20 0421     Place sequential compression device  Until discontinued      08/12/20 0421     heparin 25,000 units in dextrose 5% 250 mL (100 units/mL) infusion HIGH INTENSITY nomogram - [facility]  Continuous     Question:  Heparin Infusion Adjustment (DO NOT MODIFY ANSWER)  Answer:  [email or web address]\epic\Images\Pharmacy\HeparinInfusions\heparin HIGH INTENSITY nomogram for [facility] [id number].pdf    08/11/20 1432     heparin 25,000 units in dextrose 5% (100 units/ml) IV bolus from bag - ADDITIONAL PRN BOLUS - 60 units/kg  As needed (PRN)     Question:  Heparin Infusion Adjustment (DO NOT MODIFY ANSWER)  Answer:   [email or web address]\epic\Images\Pharmacy\HeparinInfusions\heparin HIGH INTENSITY nomogram for [facility] [id number].pdf    08/11/20 1432     heparin 25,000 units in dextrose 5% (100 units/ml) IV bolus from bag - ADDITIONAL PRN BOLUS - 30 units/kg  As needed (PRN)     Question:  Heparin Infusion Adjustment (DO NOT MODIFY ANSWER)  Answer:  [email or web address]\epic\Images\Pharmacy\HeparinInfusions\heparin HIGH INTENSITY nomogram for [facility] [id number].pdf    08/11/20 1432                   Ovidio Britt MD  Department of Hospital Medicine   [facility]

## 2020-08-12 NOTE — PLAN OF CARE
Problem: Adult Inpatient Plan of Care  Goal: Plan of Care Review  Outcome: Ongoing, Progressing   Patient on heparin infusion, awaiting transfer to main [facility].

## 2020-08-12 NOTE — ANESTHESIA PREPROCEDURE EVALUATION
08/12/2020  [facility]  Anesthesia Pre-Operative Evaluation         Patient Name: Alberto Frye  YOB: 1975  MRN: [id number]    SUBJECTIVE:     08/12/2020    Pre-operative evaluation for Procedure(s) (LRB):  Venogram, pharmacomechanical thrombectomy, possible angioplasty, possible stenting, left lower extremity, left popliteal vein access (Left)    Alberto Frye is a 45 y.o. female with hypertension, anxiety, history of alcohol abuse, and DVT previously on coumadin who was found to have a left lower extremity DVT.  Patient was transferred from [facility].      Patient now presents for the above procedure(s).      LDA:        Peripheral IV - Single Lumen 08/11/20 0604 20 G Left Forearm (Active)   Site Assessment Clean;Dry;Intact;No redness;No swelling 08/12/20 0000   Line Status Infusing 08/11/20 2015   Dressing Status Clean;Dry;Intact 08/11/20 2015   Number of days: 0       Previous airway:   None documented      Drips:    heparin (porcine) in D5W 14 Units/kg/hr (08/12/20 0010)       Patient Active Problem List   Diagnosis    Essential hypertension    Seasonal allergies    Anxiety    Abdominal pain    Acute deep vein thrombosis of left iliac vein    Neuropathy    Congestive heart failure       Review of patient's allergies indicates:   Allergen Reactions    Morphine Itching and Hallucinations    Pcn [penicillins] Other (See Comments)     Was told from childhood she couldn't take it    Sulfa (sulfonamide antibiotics) Nausea And Vomiting    Latex, natural rubber Rash       Current Inpatient Medications:      No current facility-administered medications on file prior to encounter.      Current Outpatient Medications on File Prior to Encounter   Medication Sig Dispense Refill    cholecalciferol, vitamin D3, (VITAMIN D3) 50 mcg (2,000 unit) Cap Take 1  capsule (2,000 Units total) by mouth once daily. 90 capsule 3    cloNIDine 0.2 mg/24 hr td ptwk (CATAPRES) 0.2 mg/24 hr Place 1 patch onto the skin every 7 days. 4 patch 2    cyanocobalamin (VITAMIN B-12) 1000 MCG tablet Take 100 mcg by mouth once daily.      FLUoxetine 40 MG capsule Take 1 capsule (40 mg total) by mouth once daily. 90 capsule 1    fluticasone (FLONASE) 50 mcg/actuation nasal spray 1 spray by Each Nare route once daily.      gabapentin (NEURONTIN) 600 MG tablet Take 1 tablet (600 mg total) by mouth nightly as needed. 30 tablet 4    hydrOXYzine (ATARAX) 50 MG tablet Take 1 tablet (50 mg total) by mouth daily as needed for Anxiety (sleep). 90 tablet 1    ibuprofen (ADVIL,MOTRIN) 400 MG tablet Take 1 tablet (400 mg total) by mouth every 6 (six) hours as needed. 20 tablet 0    multivitamin with minerals tablet Take 1 tablet by mouth once daily.      OLANZapine (ZYPREXA) 5 MG tablet Take 1 tablet (5 mg total) by mouth every evening. 90 tablet 1    pantoprazole (PROTONIX) 40 MG tablet Take 1 tablet (40 mg total) by mouth before breakfast. Take one pill every morning 45 minutes before breakfast in the morning. 90 tablet 3    torsemide (DEMADEX) 10 MG Tab Take 2 tablets (20 mg total) by mouth once daily. 60 tablet 2    valsartan-hydrochlorothiazide (DIOVAN-HCT) 160-25 mg per tablet Take 1 tablet by mouth once daily. 90 tablet 3    ergocalciferol (ERGOCALCIFEROL) 50,000 unit Cap Take 1 capsule (50,000 Units total) by mouth every 7 days. for 12 doses 12 capsule 0    HYDROcodone-acetaminophen (NORCO)  mg per tablet Take 1 tablet by mouth every 12 (twelve) hours as needed for Pain. MORE THAN A 7 DAY SUPPLY OF THE OPIATE IS MEDICALLY NECESSARY 40 tablet 0    HYDROcodone-acetaminophen (NORCO) 5-325 mg per tablet Take 1 tablet by mouth every 6 (six) hours as needed for Pain. 10 tablet 0    levocetirizine (XYZAL) 5 MG tablet Take 1 tablet (5 mg total) by mouth every evening. 30 tablet 2        Past Surgical History:   Procedure Laterality Date     lapban surgery  2009    ESOPHAGOGASTRODUODENOSCOPY N/A 6/3/2020    Procedure: EGD (ESOPHAGOGASTRODUODENOSCOPY);  Surgeon: Johan Grover MD;  Location: [address];  Service: Endoscopy;  Laterality: N/A;  covid 6/2-[facility]-tb    HYSTERECTOMY         Social History     Socioeconomic History    Marital status:      Spouse name: Not on file    Number of children: 3    Years of education: Not on file    Highest education level: Not on file   Occupational History    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[employer]   Social Needs    Financial resource strain: Not on file    Food insecurity     Worry: Not on file     Inability: Not on file    Transportation needs     Medical: Not on file     Non-medical: Not on file   Tobacco Use    Smoking status: Former Smoker    Smokeless tobacco: Never Used    Tobacco comment: quit in october 2016   Substance and Sexual Activity    Alcohol use: Yes     Alcohol/week: 2.0 standard drinks     Types: 2 Cans of beer per week     Comment: social presently, excessive 10 years ago    Drug use: Yes     Types: Marijuana     Comment: uses THCA weekly    Sexual activity: Not Currently     Partners: Male     Birth control/protection: See Surgical Hx   Lifestyle    Physical activity     Days per week: 3 days     Minutes per session: 60 min    Stress: To some extent   Relationships    Social connections     Talks on phone: Three times a week     Gets together: Patient refused     Attends [employer] service: Not on file     Active member of club or organization: No     Attends meetings of clubs or organizations: Patient refused     Relationship status: Patient refused   Other Topics Concern    Patient feels they ought to cut down on drinking/drug use No    Patient annoyed by others criticizing their drinking/drug use No    Patient has felt bad or guilty about drinking/drug use No     Patient has had a drink/used drugs as an eye opener in the AM No   Social History Narrative    Has 3 healthy grown sons (1990, 1993, 1998) Lives alone.    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   in 2010.    Has Male partner since 2014 who is currently disabled.       OBJECTIVE:     Vital Signs Range (Last 24H):  Temp:  [36.4 °C (97.6 °F)-36.8 °C (98.2 °F)]   Pulse:  []   Resp:  [15-20]   BP: (100-137)/(56-88)   SpO2:  [97 %-100 %]       Significant Labs:  Lab Results   Component Value Date    WBC 8.04 08/11/2020    HGB 13.9 08/11/2020    HCT 41.8 08/11/2020     08/11/2020    CHOL 185 01/16/2020    TRIG 76 01/16/2020    HDL 50 01/16/2020    ALT 12 08/11/2020    AST 17 08/11/2020     08/11/2020    K 3.4 (L) 08/11/2020     08/11/2020    CREATININE 0.9 08/11/2020    BUN 20 08/11/2020    CO2 23 08/11/2020    TSH 3.092 01/16/2020    INR 0.9 08/11/2020    HGBA1C 5.3 01/16/2020         Diagnostic Studies:    Cardiac Studies:  EKG:   Vent. Rate : 094 BPM     Atrial Rate : 094 BPM      P-R Int : 108 ms          QRS Dur : 074 ms       QT Int : 362 ms       P-R-T Axes : 060 011 -39 degrees      QTc Int : 452 ms     Sinus rhythm with short IL   Moderate voltage criteria for LVH, may be normal variant   ST and T wave abnormality, consider inferior ischemia   ST and T wave abnormality, consider anterior ischemia   Abnormal ECG   When compared with ECG of 01-MAR-2019 11:47,   Significant changes have occurred   Confirmed by Chandler Bates MD ([id number]) on 8/11/2020 7:40:46 PM    2D Echo:  · Normal left ventricular systolic function. The estimated ejection fraction is 60%.  · Normal LV diastolic function.  · Normal right ventricular systolic function.  · The estimated PA systolic pressure is 23 mmHg.    ASSESSMENT/PLAN:       Anesthesia Evaluation    I have reviewed the Patient Summary Reports.       I have reviewed the  Medications.     Review of Systems  Anesthesia Hx:  History of prior surgery of interest to airway management or planning:  Denies Personal Hx of Anesthesia complications.   Social:  Former Smoker History of alcohol abuse   Hematology/Oncology:         -- Denies Anemia:   EENT/Dental:EENT/Dental Normal   Cardiovascular:   Hypertension DVT   Pulmonary:  Pulmonary Normal    Renal/:  Renal/ Normal     Hepatic/GI:  Hepatic/GI Normal H/o lap band   Neurological:  Neurology Normal    Psych:   Psychiatric History depression          Physical Exam  General:  Well nourished, Obesity    Airway/Jaw/Neck:  Airway Findings: Mouth Opening: Normal Tongue: Normal  General Airway Assessment: Adult  Mallampati: II  TM Distance: Normal, at least 6 cm  Jaw/Neck Findings:  Neck ROM: Normal ROM  Neck Findings:     Eyes/Ears/Nose:  EYES/EARS/NOSE FINDINGS: Normal   Dental:  Dental Findings: In tact   Chest/Lungs:  Chest/Lungs Findings: Clear to auscultation, Normal Respiratory Rate     Heart/Vascular:  Heart Findings: Rate: Normal  Rhythm: Regular Rhythm        Mental Status:  Mental Status Findings:  Cooperative, Alert and Oriented         Anesthesia Plan  Type of Anesthesia, risks & benefits discussed:  Anesthesia Type:  general  Patient's Preference:   Intra-op Monitoring Plan: standard ASA monitors  Intra-op Monitoring Plan Comments:   Post Op Pain Control Plan: multimodal analgesia, IV/PO Opioids PRN and per primary service following discharge from PACU  Post Op Pain Control Plan Comments:   Induction:   IV  Beta Blocker:  Patient is not currently on a Beta-Blocker (No further documentation required).       Informed Consent: Patient understands risks and agrees with Anesthesia plan.  Questions answered. Anesthesia consent signed with patient.  ASA Score: 3     Day of Surgery Review of History & Physical:    H&P update referred to the surgeon.     Anesthesia Plan Notes: Patient with clonidine patch         Ready For Surgery From  Anesthesia Perspective.

## 2020-08-12 NOTE — PROGRESS NOTES
[facility]  Vascular Surgery  Progress Note    Patient Name: Alberto Frye  MRN: [id number]  Admission Date: 8/11/2020  Primary Care Provider: Candace Martin MD    Subjective:     Interval History: naeo    Post-Op Info:  Procedure(s) (LRB):  Venogram, pharmacomechanical thrombectomy, possible angioplasty, possible stenting, left lower extremity, left popliteal vein access (Left)           Medications:  Continuous Infusions:   heparin (porcine) in D5W 14 Units/kg/hr (08/12/20 0402)     Scheduled Meds:   FLUoxetine  40 mg Oral Daily    pantoprazole  40 mg Oral Before breakfast     PRN Meds:acetaminophen, acetaminophen, dextrose 50%, dextrose 50%, glucagon (human recombinant), glucose, glucose, heparin (PORCINE), heparin (PORCINE), HYDROcodone-acetaminophen, hydrOXYzine, sodium chloride 0.9%     Objective:     Vital Signs (Most Recent):  Temp: 98 °F (36.7 °C) (08/12/20 0347)  Pulse: 85 (08/12/20 0944)  Resp: 20 (08/12/20 0944)  BP: 102/69 (08/12/20 0944)  SpO2: 97 % (08/12/20 0944) Vital Signs (24h Range):  Temp:  [97.6 °F (36.4 °C)-98 °F (36.7 °C)] 98 °F (36.7 °C)  Pulse:  [78-93] 85  Resp:  [16-20] 20  SpO2:  [97 %-100 %] 97 %  BP: (100-117)/(56-72) 102/69         Physical Exam  Vitals signs and nursing note reviewed.   Cardiovascular:      Rate and Rhythm: Normal rate.      Pulses:           Dorsalis pedis pulses are 2+ on the right side and 1+ on the left side.   Pulmonary:      Effort: Pulmonary effort is normal.   Musculoskeletal: Normal range of motion.         General: No deformity.      Right lower leg: No edema.      Left lower leg: Edema present.   Skin:     General: Skin is warm and dry.      Capillary Refill: Capillary refill takes less than 2 seconds.      Coloration: Skin is not pale.      Findings: No erythema or rash.   Neurological:      General: No focal deficit present.      Mental Status: She is alert.         Significant Labs:  CBC:   Recent Labs   Lab  08/12/20  0506   WBC 6.75   RBC 4.17   HGB 12.7   HCT 39.3   *   MCV 94   MCH 30.5   MCHC 32.3     CMP:   Recent Labs   Lab 08/11/20  0601   *   CALCIUM 8.5*   ALBUMIN 3.9   PROT 7.7      K 3.4*   CO2 23      BUN 20   CREATININE 0.9   ALKPHOS 74   ALT 12   AST 17   BILITOT 0.6       Significant Diagnostics:  none    Assessment/Plan:     * Acute deep vein thrombosis of left iliac vein  L iliac US obtained showing extension of acute DVT into IVC distally and L iliac veins    -NPO  -will try to get on schedule for pharmacomechanical thrombectomy with possible angiplasty and stenting today  -continue heparin gtt  -continue compression and elevation        Jose Manuel Fraser MD  Vascular Surgery  [facility]

## 2020-08-12 NOTE — CARE UPDATE
Spoke with vascular surgery, they are aware that patient is still on heparin gtt. Communicated that they will continue to run it.

## 2020-08-12 NOTE — NURSING
End of shift bedside report given to CAPO Yarbrough. Patient in no apparent distress.     12 hour chart check complete.

## 2020-08-12 NOTE — NURSING
Report given EMS. Patient assisted to stretcher, connected to BP, pulse ox, and continuous Heparin gtt infusing at 14u/kg/hr. Patient to be transported to [facility] Rm 603.

## 2020-08-12 NOTE — ASSESSMENT & PLAN NOTE
- continue home fluoxetine 40 mg qd, atarax 50 mg qhs  - pt has not been taking zyprexa for a few weeks, d/c home zyprexa

## 2020-08-12 NOTE — SUBJECTIVE & OBJECTIVE
Past Medical History:   Diagnosis Date    Alcohol abuse     stopped heavy drinking about 10 years ago; was drinking 3 glasses of vodka/tequilla,rum/whiskey per day    Anxiety     Congestive heart failure 8/11/2020    Depression     Hallucination     Hx of psychiatric care     Hypertension     Caro     unplanned trips, energy without sleep for 2 days (reading, cleaning), feelings that she can do multiple tasks at one time, feelings of overconfidence at times    Psychiatric problem     Sleep difficulties     Therapy     Withdrawal symptoms, drug or narcotic     racing heart, restlessness       Past Surgical History:   Procedure Laterality Date     lapban surgery  2009    ESOPHAGOGASTRODUODENOSCOPY N/A 6/3/2020    Procedure: EGD (ESOPHAGOGASTRODUODENOSCOPY);  Surgeon: Johan Grover MD;  Location: [facility] ([address] [place] [address]);  Service: Endoscopy;  Laterality: N/A;  covid 6/2-[facility]    HYSTERECTOMY         Review of patient's allergies indicates:   Allergen Reactions    Morphine Itching and Hallucinations    Pcn [penicillins] Other (See Comments)     Was told from childhood she couldn't take it    Sulfa (sulfonamide antibiotics) Nausea And Vomiting    Latex, natural rubber Rash       No current facility-administered medications on file prior to encounter.      Current Outpatient Medications on File Prior to Encounter   Medication Sig    cholecalciferol, vitamin D3, (VITAMIN D3) 50 mcg (2,000 unit) Cap Take 1 capsule (2,000 Units total) by mouth once daily.    cloNIDine 0.2 mg/24 hr td ptwk (CATAPRES) 0.2 mg/24 hr Place 1 patch onto the skin every 7 days.    cyanocobalamin (VITAMIN B-12) 1000 MCG tablet Take 100 mcg by mouth once daily.    FLUoxetine 40 MG capsule Take 1 capsule (40 mg total) by mouth once daily.    fluticasone (FLONASE) 50 mcg/actuation nasal spray 1 spray by Each Nare route once daily.    gabapentin (NEURONTIN) 600 MG tablet Take 1 tablet (600 mg total) by mouth  nightly as needed.    hydrOXYzine (ATARAX) 50 MG tablet Take 1 tablet (50 mg total) by mouth daily as needed for Anxiety (sleep).    ibuprofen (ADVIL,MOTRIN) 400 MG tablet Take 1 tablet (400 mg total) by mouth every 6 (six) hours as needed.    multivitamin with minerals tablet Take 1 tablet by mouth once daily.    OLANZapine (ZYPREXA) 5 MG tablet Take 1 tablet (5 mg total) by mouth every evening.    pantoprazole (PROTONIX) 40 MG tablet Take 1 tablet (40 mg total) by mouth before breakfast. Take one pill every morning 45 minutes before breakfast in the morning.    torsemide (DEMADEX) 10 MG Tab Take 2 tablets (20 mg total) by mouth once daily.    valsartan-hydrochlorothiazide (DIOVAN-HCT) 160-25 mg per tablet Take 1 tablet by mouth once daily.    ergocalciferol (ERGOCALCIFEROL) 50,000 unit Cap Take 1 capsule (50,000 Units total) by mouth every 7 days. for 12 doses    HYDROcodone-acetaminophen (NORCO)  mg per tablet Take 1 tablet by mouth every 12 (twelve) hours as needed for Pain. MORE THAN A 7 DAY SUPPLY OF THE OPIATE IS MEDICALLY NECESSARY    HYDROcodone-acetaminophen (NORCO) 5-325 mg per tablet Take 1 tablet by mouth every 6 (six) hours as needed for Pain.    levocetirizine (XYZAL) 5 MG tablet Take 1 tablet (5 mg total) by mouth every evening.     Family History     Problem Relation (Age of Onset)    Cancer Mother    Cirrhosis Maternal Uncle    Diabetes Mother    Hypertension Mother        Tobacco Use    Smoking status: Former Smoker    Smokeless tobacco: Never Used    Tobacco comment: quit in october 2016   Substance and Sexual Activity    Alcohol use: Yes     Alcohol/week: 2.0 standard drinks     Types: 2 Cans of beer per week     Comment: social presently, excessive 10 years ago    Drug use: Yes     Types: Marijuana     Comment: uses THCA weekly    Sexual activity: Not Currently     Partners: Male     Birth control/protection: See Surgical Hx     Review of Systems   Constitutional:  Negative for fatigue, fever and unexpected weight change.   HENT: Negative for sore throat.    Eyes: Negative for visual disturbance.   Respiratory: Negative for cough, shortness of breath, wheezing and stridor.    Cardiovascular: Positive for leg swelling. Negative for chest pain and palpitations.   Gastrointestinal: Negative for abdominal pain, anal bleeding, blood in stool, constipation, diarrhea, nausea and vomiting.   Genitourinary: Negative for dysuria.   Musculoskeletal: Negative for arthralgias.   Neurological: Negative for dizziness and headaches.   Psychiatric/Behavioral: Negative for agitation.     Objective:     Vital Signs (Most Recent):  Temp: 98 °F (36.7 °C) (08/12/20 0347)  Pulse: 78 (08/12/20 0308)  Resp: 18 (08/12/20 0308)  BP: (!) 100/56 (08/12/20 0308)  SpO2: 100 % (08/12/20 0308) Vital Signs (24h Range):  Temp:  [97.6 °F (36.4 °C)-98.2 °F (36.8 °C)] 98 °F (36.7 °C)  Pulse:  [] 78  Resp:  [15-20] 18  SpO2:  [97 %-100 %] 100 %  BP: (100-137)/(56-88) 100/56     Weight: 106.3 kg (234 lb 5.6 oz)  Body mass index is 40.23 kg/m².    Physical Exam  Constitutional:       General: She is not in acute distress.     Appearance: Normal appearance. She is obese. She is not ill-appearing.   HENT:      Head: Normocephalic and atraumatic.   Cardiovascular:      Rate and Rhythm: Normal rate and regular rhythm.      Pulses: Normal pulses.      Heart sounds: Normal heart sounds. No murmur. No friction rub. No gallop.    Pulmonary:      Effort: Pulmonary effort is normal.      Breath sounds: Normal breath sounds. No stridor. No wheezing or rales.   Abdominal:      General: Bowel sounds are normal.      Palpations: Abdomen is soft. There is no mass.      Tenderness: There is no abdominal tenderness.   Musculoskeletal:         General: Swelling (left leg) and tenderness (left leg) present.   Skin:     General: Skin is warm and dry.      Capillary Refill: Capillary refill takes less than 2 seconds.    Neurological:      General: No focal deficit present.      Mental Status: She is alert and oriented to person, place, and time.   Psychiatric:         Mood and Affect: Mood normal.         Behavior: Behavior normal.             Significant Labs:   CBC:   Recent Labs   Lab 08/11/20  0601   WBC 8.04   HGB 13.9   HCT 41.8        CMP:   Recent Labs   Lab 08/11/20  0601      K 3.4*      CO2 23   *   BUN 20   CREATININE 0.9   CALCIUM 8.5*   PROT 7.7   ALBUMIN 3.9   BILITOT 0.6   ALKPHOS 74   AST 17   ALT 12   ANIONGAP 11   EGFRNONAA >60       Significant Imaging:   Imaging Results          US Lower Extremity Veins Bilateral (Final result)  Result time 08/11/20 07:25:14    Final result by George Pablo MD (08/11/20 07:25:14)                 Impression:      1.  Extensive acute thrombosis involving the left thigh and calf veins, as described.    2.  No evidence of right lower extremity deep venous thrombosis.    Red Alert: Acute left thigh and calf DVT    This critical information above was relayed by myself by telephone to Dr. Sevilla On 8/11/2020 at 7:24.    CRITICAL FINDINGS:    RECOMMENDATIONS:      Electronically signed by: George Pablo  Date:    08/11/2020  Time:    07:25             Narrative:    EXAMINATION:  US LOWER EXTREMITY VEINS BILATERAL    CLINICAL HISTORY:  Edema, unspecified    TECHNIQUE:  Duplex and color flow Doppler and dynamic compression was performed of the bilateral lower extremity veins was performed.    COMPARISON:  None    FINDINGS:  Right thigh veins: The common femoral, femoral, popliteal, upper greater saphenous, and deep femoral veins are patent and free of thrombus. The veins are normally compressible and have normal phasic flow and augmentation response.    Right calf veins: The visualized calf veins are patent.    Left thigh veins: There is occlusive thrombus involving the common femoral, femoral, popliteal, and upper greater saphenous veins.  Veins are not  compressible and have minimal flow.    Left calf veins: The posterior tibial and peroneal veins are thrombosed as well.  The anterior tibial vein appears patent.    Miscellaneous: None

## 2020-08-12 NOTE — NURSING
Report called to CAPO Martinez at [facility]. Patient to be transported to room 603.

## 2020-08-12 NOTE — ASSESSMENT & PLAN NOTE
L iliac US obtained showing extension of acute DVT into IVC distally and L iliac veins    -NPO  -will try to get on schedule for pharmacomechanical thrombectomy with possible angiplasty and stenting today  -continue heparin gtt  -continue compression and elevation

## 2020-08-12 NOTE — NURSING
Patient with complaints of pain post Ibuprofen administration. Timothy White NP notified and new orders placed for one time dose of Percocet. Will administer and monitor for effectiveness.

## 2020-08-12 NOTE — ASSESSMENT & PLAN NOTE
US LE shows Left thigh vein occlusive thrombus involving the common femoral, popliteal, and upper greater saphenous veins with minimal flow.  Patient had previous DVT in same leg ~10 years ago, treated with IVC filter and warfarin for a few months.  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      - vascular surgery consulted, plan to go to OR today  - NPO on admission  - on heparin ggt, will d/c per vascular surgery

## 2020-08-12 NOTE — ANESTHESIA PROCEDURE NOTES
Intubation  Performed by: Keturah Alcantar CRNA  Authorized by: Arash Drummond MD     Intubation:     Induction:  Intravenous    Intubated:  Postinduction    Mask Ventilation:  Easy mask    Attempts:  3    Attempted By:  Student    Method of Intubation:  Direct    Blade:  Mensah 2    Laryngeal View Grade: Grade III - only epiglottis visible      Attempted By (2nd Attempt):  Student    Method of Intubation (2nd Attempt):  Direct    Blade (2nd Attempt):  Kenrick 4    Laryngeal View Grade (2nd Attempt): Grade III - only epiglottis visible      Attempted By (3rd Attempt):  CRNA    Method of Intubation (3rd Attempt):  Direct    Blade (3rd Attempt):  Kenrick 4    Laryngeal View Grade (3rd Attempt): Grade IIb - only arytenoids and epiglottis seen      Difficult Airway Encountered?: Yes      Complications:  None    Airway Device:  Oral endotracheal tube    Airway Device Size:  7.0    Style/Cuff Inflation:  Cuffed (inflated to minimal occlusive pressure)    Tube secured:  22    Secured at:  The lips    Placement Verified By:  Capnometry    Complicating Factors:  Anterior larynx    Findings Post-Intubation:  BS equal bilateral and atraumatic/condition of teeth unchanged

## 2020-08-12 NOTE — ASSESSMENT & PLAN NOTE
BP controlled in 120s/70s on admission.    - patient currently has clonidine patch on, hold new patch for now  - hold home valsartan/hctz 160/25 mg, demadex 20 mg, can restart as clinically appropriate

## 2020-08-12 NOTE — NURSING
Ref. Range 8/11/2020 21:00   aPTT Latest Ref Range: 21.0 - 32.0 sec 127.0 (H)     Patients aPTT as seen above. Heparin held and nurse to follow nomogram. Patient updated on plan and verbalized understanding. Patient stable and will continue to be monitored.

## 2020-08-12 NOTE — PROGRESS NOTES
Report received from Evette KAUFFMAN RN. Patient care assumed. Patient observed lying in bed with cardiac monitor in progress with Heparin infusing at 18u/kg/hr. Complaints of left leg pain noted and to be treated but NAD noted at this time. Plan to continue anticoagulation, monitor and manage pain, and maintain npo status after midnight for procedure in the am. Patient updated on plan of care and verbalized understanding.

## 2020-08-12 NOTE — SUBJECTIVE & OBJECTIVE
Medications:  Continuous Infusions:   heparin (porcine) in D5W 14 Units/kg/hr (08/12/20 0402)     Scheduled Meds:   FLUoxetine  40 mg Oral Daily    pantoprazole  40 mg Oral Before breakfast     PRN Meds:acetaminophen, acetaminophen, dextrose 50%, dextrose 50%, glucagon (human recombinant), glucose, glucose, heparin (PORCINE), heparin (PORCINE), HYDROcodone-acetaminophen, hydrOXYzine, sodium chloride 0.9%     Objective:     Vital Signs (Most Recent):  Temp: 98 °F (36.7 °C) (08/12/20 0347)  Pulse: 85 (08/12/20 0944)  Resp: 20 (08/12/20 0944)  BP: 102/69 (08/12/20 0944)  SpO2: 97 % (08/12/20 0944) Vital Signs (24h Range):  Temp:  [97.6 °F (36.4 °C)-98 °F (36.7 °C)] 98 °F (36.7 °C)  Pulse:  [78-93] 85  Resp:  [16-20] 20  SpO2:  [97 %-100 %] 97 %  BP: (100-117)/(56-72) 102/69         Physical Exam  Vitals signs and nursing note reviewed.   Cardiovascular:      Rate and Rhythm: Normal rate.      Pulses:           Dorsalis pedis pulses are 2+ on the right side and 1+ on the left side.   Pulmonary:      Effort: Pulmonary effort is normal.   Musculoskeletal: Normal range of motion.         General: No deformity.      Right lower leg: No edema.      Left lower leg: Edema present.   Skin:     General: Skin is warm and dry.      Capillary Refill: Capillary refill takes less than 2 seconds.      Coloration: Skin is not pale.      Findings: No erythema or rash.   Neurological:      General: No focal deficit present.      Mental Status: She is alert.         Significant Labs:  CBC:   Recent Labs   Lab 08/12/20  0506   WBC 6.75   RBC 4.17   HGB 12.7   HCT 39.3   *   MCV 94   MCH 30.5   MCHC 32.3     CMP:   Recent Labs   Lab 08/11/20  0601   *   CALCIUM 8.5*   ALBUMIN 3.9   PROT 7.7      K 3.4*   CO2 23      BUN 20   CREATININE 0.9   ALKPHOS 74   ALT 12   AST 17   BILITOT 0.6       Significant Diagnostics:  none

## 2020-08-13 LAB
ALBUMIN SERPL BCP-MCNC: 3.1 G/DL (ref 3.5–5.2)
ALP SERPL-CCNC: 52 U/L (ref 55–135)
ALT SERPL W/O P-5'-P-CCNC: 17 U/L (ref 10–44)
ANION GAP SERPL CALC-SCNC: 7 MMOL/L (ref 8–16)
APTT BLDCRRT: 33.3 SEC (ref 21–32)
APTT BLDCRRT: 37 SEC (ref 21–32)
AST SERPL-CCNC: 79 U/L (ref 10–40)
BACTERIA #/AREA URNS AUTO: ABNORMAL /HPF
BASOPHILS # BLD AUTO: 0.02 K/UL (ref 0–0.2)
BASOPHILS # BLD AUTO: 0.02 K/UL (ref 0–0.2)
BASOPHILS NFR BLD: 0.3 % (ref 0–1.9)
BASOPHILS NFR BLD: 0.7 % (ref 0–1.9)
BILIRUB SERPL-MCNC: 1.5 MG/DL (ref 0.1–1)
BILIRUB UR QL STRIP: NEGATIVE
BUN SERPL-MCNC: 23 MG/DL (ref 6–20)
CALCIUM SERPL-MCNC: 7.8 MG/DL (ref 8.7–10.5)
CHLORIDE SERPL-SCNC: 109 MMOL/L (ref 95–110)
CLARITY UR REFRACT.AUTO: ABNORMAL
CO2 SERPL-SCNC: 22 MMOL/L (ref 23–29)
COLOR UR AUTO: YELLOW
CREAT SERPL-MCNC: 1.4 MG/DL (ref 0.5–1.4)
DIFFERENTIAL METHOD: ABNORMAL
DIFFERENTIAL METHOD: ABNORMAL
EOSINOPHIL # BLD AUTO: 0.1 K/UL (ref 0–0.5)
EOSINOPHIL # BLD AUTO: 0.1 K/UL (ref 0–0.5)
EOSINOPHIL NFR BLD: 1.7 % (ref 0–8)
EOSINOPHIL NFR BLD: 3.9 % (ref 0–8)
ERYTHROCYTE [DISTWIDTH] IN BLOOD BY AUTOMATED COUNT: 13 % (ref 11.5–14.5)
ERYTHROCYTE [DISTWIDTH] IN BLOOD BY AUTOMATED COUNT: 13 % (ref 11.5–14.5)
EST. GFR  (AFRICAN AMERICAN): 51.6 ML/MIN/1.73 M^2
EST. GFR  (NON AFRICAN AMERICAN): 44.8 ML/MIN/1.73 M^2
GLUCOSE SERPL-MCNC: 107 MG/DL (ref 70–110)
GLUCOSE UR QL STRIP: NEGATIVE
HCT VFR BLD AUTO: 33.8 % (ref 37–48.5)
HCT VFR BLD AUTO: 50.6 % (ref 37–48.5)
HGB BLD-MCNC: 11 G/DL (ref 12–16)
HGB BLD-MCNC: 16.9 G/DL (ref 12–16)
HGB UR QL STRIP: ABNORMAL
IMM GRANULOCYTES # BLD AUTO: 0.02 K/UL (ref 0–0.04)
IMM GRANULOCYTES # BLD AUTO: 0.07 K/UL (ref 0–0.04)
IMM GRANULOCYTES NFR BLD AUTO: 0.3 % (ref 0–0.5)
IMM GRANULOCYTES NFR BLD AUTO: 2.3 % (ref 0–0.5)
INR PPP: 1 (ref 0.8–1.2)
KETONES UR QL STRIP: NEGATIVE
LEUKOCYTE ESTERASE UR QL STRIP: ABNORMAL
LYMPHOCYTES # BLD AUTO: 0.8 K/UL (ref 1–4.8)
LYMPHOCYTES # BLD AUTO: 1.2 K/UL (ref 1–4.8)
LYMPHOCYTES NFR BLD: 17.8 % (ref 18–48)
LYMPHOCYTES NFR BLD: 26.3 % (ref 18–48)
MCH RBC QN AUTO: 30.3 PG (ref 27–31)
MCH RBC QN AUTO: 30.7 PG (ref 27–31)
MCHC RBC AUTO-ENTMCNC: 32.5 G/DL (ref 32–36)
MCHC RBC AUTO-ENTMCNC: 33.4 G/DL (ref 32–36)
MCV RBC AUTO: 91 FL (ref 82–98)
MCV RBC AUTO: 94 FL (ref 82–98)
MICROSCOPIC COMMENT: ABNORMAL
MONOCYTES # BLD AUTO: 0.4 K/UL (ref 0.3–1)
MONOCYTES # BLD AUTO: 0.6 K/UL (ref 0.3–1)
MONOCYTES NFR BLD: 12.2 % (ref 4–15)
MONOCYTES NFR BLD: 8.7 % (ref 4–15)
NEUTROPHILS # BLD AUTO: 1.7 K/UL (ref 1.8–7.7)
NEUTROPHILS # BLD AUTO: 4.6 K/UL (ref 1.8–7.7)
NEUTROPHILS NFR BLD: 54.6 % (ref 38–73)
NEUTROPHILS NFR BLD: 71.2 % (ref 38–73)
NITRITE UR QL STRIP: NEGATIVE
NRBC BLD-RTO: 0 /100 WBC
NRBC BLD-RTO: 0 /100 WBC
PH UR STRIP: 5 [PH] (ref 5–8)
PLATELET # BLD AUTO: 120 K/UL (ref 150–350)
PLATELET # BLD AUTO: 65 K/UL (ref 150–350)
PMV BLD AUTO: 10.3 FL (ref 9.2–12.9)
PMV BLD AUTO: 11.6 FL (ref 9.2–12.9)
POTASSIUM SERPL-SCNC: 3.9 MMOL/L (ref 3.5–5.1)
PROT SERPL-MCNC: 6.8 G/DL (ref 6–8.4)
PROT UR QL STRIP: NEGATIVE
PROTHROMBIN TIME: 10.9 SEC (ref 9–12.5)
RBC # BLD AUTO: 3.58 M/UL (ref 4–5.4)
RBC # BLD AUTO: 5.58 M/UL (ref 4–5.4)
RBC #/AREA URNS AUTO: 3 /HPF (ref 0–4)
SODIUM SERPL-SCNC: 138 MMOL/L (ref 136–145)
SP GR UR STRIP: 1 (ref 1–1.03)
SQUAMOUS #/AREA URNS AUTO: 3 /HPF
URN SPEC COLLECT METH UR: ABNORMAL
WBC # BLD AUTO: 3.04 K/UL (ref 3.9–12.7)
WBC # BLD AUTO: 6.45 K/UL (ref 3.9–12.7)
WBC #/AREA URNS AUTO: 9 /HPF (ref 0–5)

## 2020-08-13 PROCEDURE — 81001 URINALYSIS AUTO W/SCOPE: CPT

## 2020-08-13 PROCEDURE — 99232 SBSQ HOSP IP/OBS MODERATE 35: CPT | Mod: ,,, | Performed by: SURGERY

## 2020-08-13 PROCEDURE — 85025 COMPLETE CBC W/AUTO DIFF WBC: CPT

## 2020-08-13 PROCEDURE — 11000001 HC ACUTE MED/SURG PRIVATE ROOM

## 2020-08-13 PROCEDURE — 85730 THROMBOPLASTIN TIME PARTIAL: CPT

## 2020-08-13 PROCEDURE — 85730 THROMBOPLASTIN TIME PARTIAL: CPT | Mod: 91

## 2020-08-13 PROCEDURE — 63600175 PHARM REV CODE 636 W HCPCS: Performed by: STUDENT IN AN ORGANIZED HEALTH CARE EDUCATION/TRAINING PROGRAM

## 2020-08-13 PROCEDURE — 80053 COMPREHEN METABOLIC PANEL: CPT

## 2020-08-13 PROCEDURE — 85610 PROTHROMBIN TIME: CPT

## 2020-08-13 PROCEDURE — 25000003 PHARM REV CODE 250: Performed by: STUDENT IN AN ORGANIZED HEALTH CARE EDUCATION/TRAINING PROGRAM

## 2020-08-13 PROCEDURE — 99232 PR SUBSEQUENT HOSPITAL CARE,LEVL II: ICD-10-PCS | Mod: ,,, | Performed by: SURGERY

## 2020-08-13 PROCEDURE — 99233 SBSQ HOSP IP/OBS HIGH 50: CPT | Mod: ,,, | Performed by: INTERNAL MEDICINE

## 2020-08-13 PROCEDURE — 36415 COLL VENOUS BLD VENIPUNCTURE: CPT

## 2020-08-13 PROCEDURE — 99233 PR SUBSEQUENT HOSPITAL CARE,LEVL III: ICD-10-PCS | Mod: ,,, | Performed by: INTERNAL MEDICINE

## 2020-08-13 RX ADMIN — HYDROMORPHONE HYDROCHLORIDE 0.5 MG: 1 INJECTION, SOLUTION INTRAMUSCULAR; INTRAVENOUS; SUBCUTANEOUS at 12:08

## 2020-08-13 RX ADMIN — PANTOPRAZOLE SODIUM 40 MG: 40 TABLET, DELAYED RELEASE ORAL at 05:08

## 2020-08-13 RX ADMIN — FLUOXETINE 40 MG: 20 CAPSULE ORAL at 08:08

## 2020-08-13 RX ADMIN — APIXABAN 10 MG: 5 TABLET, FILM COATED ORAL at 08:08

## 2020-08-13 RX ADMIN — MUPIROCIN 1 G: 20 OINTMENT TOPICAL at 08:08

## 2020-08-13 RX ADMIN — DOCUSATE SODIUM 100 MG: 100 CAPSULE, LIQUID FILLED ORAL at 08:08

## 2020-08-13 RX ADMIN — HEPARIN SODIUM 12 UNITS/KG/HR: 5000 INJECTION INTRAVENOUS; SUBCUTANEOUS at 01:08

## 2020-08-13 RX ADMIN — HEPARIN SODIUM 13.02 UNITS/KG/HR: 5000 INJECTION INTRAVENOUS; SUBCUTANEOUS at 07:08

## 2020-08-13 RX ADMIN — APIXABAN 10 MG: 5 TABLET, FILM COATED ORAL at 12:08

## 2020-08-13 RX ADMIN — HYDROCODONE BITARTRATE AND ACETAMINOPHEN 1 TABLET: 5; 325 TABLET ORAL at 08:08

## 2020-08-13 NOTE — ASSESSMENT & PLAN NOTE
46 yo female with h/o provoked DVT presented to ED 8/11 with pain and swelling, found to have extension of acute DVT into IVC distally and L iliac veins on  L iliac US. Now POD 1 s/p LLE venogram with pharmacomechanical thrombectomy of L iliofemoral and iliocaval venous systems PTA of the L common iliac vein and PTA of distal inferior vena cava. She is doing well this morning, with some pain of her LLE mostly localized to her incision.     - Will start eliquis today, DC heparin drip   - D/C guzman  - PT/OT consulted, OOB ambulation with assistance is encouraged  - Continue compression and elevation of LLE  - BARBARA hose for compression ordered  - Multimodal pain regimen in place  - Regular diet   - Consider PM discharge if patient is doing well, ambulating with adequate pain control

## 2020-08-13 NOTE — TRANSFER OF CARE
"Anesthesia Transfer of Care Note    Patient: Alberto Frye    Procedure(s) Performed: Procedure(s) (LRB):  Venogram, pharmacomechanical thrombectomy (Left)  ANGIOPLASTY, VEIN (Left)    Patient location: PACU    Anesthesia Type: general    Transport from OR: Transported from OR on 6-10 L/min O2 by face mask with adequate spontaneous ventilation    Post pain: adequate analgesia    Post assessment: no apparent anesthetic complications and tolerated procedure well    Post vital signs: stable    Level of consciousness: awake and alert    Nausea/Vomiting: no nausea/vomiting    Complications: none    Transfer of care protocol was followed      Last vitals:   Visit Vitals  /76   Pulse 81   Temp 37 °C (98.6 °F) (Oral)   Resp 16   Ht 5' 4" (1.626 m)   Wt 106.3 kg (234 lb 5.6 oz)   SpO2 99%   Breastfeeding No   BMI 40.23 kg/m²     "

## 2020-08-13 NOTE — PROGRESS NOTES
"VASCULAR SURGERY  Afternoon Progress Note    Assessment/Plan:  45 y.o. female with DVTs s/p pharmacomechanical thrombolysis yesterday. Did not work with PT today. Still having pain but feels improved.   - keep here overnight  - plan to have PT see in AM  - d/c tomorrow if feeling well    Subjective:  Swelling to leg and thigh relatively unchanged.     Objective:  /74   Pulse 83   Temp 96.3 °F (35.7 °C) (Oral)   Resp 16   Ht 5' 4" (1.626 m)   Wt 106.3 kg (234 lb 5.6 oz)   SpO2 98%   Breastfeeding No   BMI 40.23 kg/m²   Edema to leg with some tenderness    Jose Manuel Fraser MD  PGY III  Vascular Surgery  "

## 2020-08-13 NOTE — PROGRESS NOTES
[facility] Hospital Medicine  Progress Note    Patient Name: Alberto Frye  MRN: [id number]  Patient Class: IP- Inpatient   Admission Date: 8/11/2020  Length of Stay: 2 days  Attending Physician: Kayce Maurer MD  Primary Care Provider: Candace Martin MD    [facility] Medicine Team: JEZ Rios MD    Subjective:     Principal Problem:Acute deep vein thrombosis of left iliac vein        HPI:  Ms. Alberto Frye is a 45 y.o. female with HTN, anxiety, alcohol abuse, DVT (10 years ago, treated with IVC filter, warfarin) who was [place] [facility] [place]/stent placement of left lower limb thrombus.  Patient originally presented to [facility] yesterday with one day of left thigh pain and leg swelling.  Patient states that this pain is in the same leg and pain is same as previous DVT diagnosed 10 years ago while in hospital for hysterectomy.  DVT at that time was treated with IVC filter and pt was on warfarin for a few months.  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  Denies any recent long distance travel, trauma, OCP/hormone replacement use.  Patient denies any fevers, chills, night sweats, chest pain, SOB, palpitations.  Patient is a former smoker, 10-15y of ~1pack/month, quit in 2016.  Patient states that she drinks socially.      Left lower limb ultrasound on 8/11 showed occlusive thrombus involving left common femoral, popliteal, upper greater saphenous, left iliac vein and distal inferior vena cava.  Patient started on lovenox.  Patient was seen by vascular surgery (Dr Goldman) who decided to transfer [place] [facility] [place]/stent placement.       Overview/Hospital Course:  Patient underwent thrombectomy with vascular surgery 8/12/20, tolerating post-procedural symptoms well. Has remained on heparing gtt throughout hospitalization, Vascular managing heparin gtt. On 8/13/20, one day post-op vascular surgery  recommending changing heparin gtt to apixaban. Patient with mild hematuria, UA pending. And will w/u OP /IP for hypercoagulable etiologies to patient's presentation.    Interval History: NAEON. Patient tolerating heparin gtt throughout the day. Hematuria noted in the guzman, UA and Renal U/S pending. Patient transitioned to apixaban per Vascular surgery. Will need OP hematology/pcp f/u for hypercoagulable w/u and f/u studies. PT/OT to assess patient tomorrow    Review of Systems   Constitutional: Negative for fatigue, fever and unexpected weight change.   HENT: Negative for sore throat.    Eyes: Negative for visual disturbance.   Respiratory: Negative for cough, shortness of breath, wheezing and stridor.    Cardiovascular: Positive for leg swelling. Negative for chest pain and palpitations.   Gastrointestinal: Negative for abdominal pain, anal bleeding, blood in stool, constipation, diarrhea, nausea and vomiting.   Genitourinary: Negative for dysuria.   Musculoskeletal: Negative for arthralgias.   Neurological: Negative for dizziness and headaches.   Psychiatric/Behavioral: Negative for agitation.     Objective:     Vital Signs (Most Recent):  Temp: 96.3 °F (35.7 °C) (08/13/20 1210)  Pulse: 83 (08/13/20 1542)  Resp: 16 (08/13/20 1542)  BP: 112/74 (08/13/20 1542)  SpO2: 98 % (08/13/20 1542) Vital Signs (24h Range):  Temp:  [96.3 °F (35.7 °C)-98.6 °F (37 °C)] 96.3 °F (35.7 °C)  Pulse:  [78-98] 83  Resp:  [10-24] 16  SpO2:  [91 %-100 %] 98 %  BP: (106-185)/() 112/74     Weight: 106.3 kg (234 lb 5.6 oz)  Body mass index is 40.23 kg/m².    Intake/Output Summary (Last 24 hours) at 8/13/2020 1800  Last data filed at 8/13/2020 0400  Gross per 24 hour   Intake --   Output 410 ml   Net -410 ml      Physical Exam  Vitals signs and nursing note reviewed.   HENT:      Head: Normocephalic and atraumatic.      Nose: Nose normal.      Mouth/Throat:      Mouth: Mucous membranes are moist.      Comments: aphthous ulcer presented  on right upper lip. Not infected appearing, no drainage  Eyes:      Conjunctiva/sclera: Conjunctivae normal.   Neck:      Musculoskeletal: Normal range of motion.   Cardiovascular:      Rate and Rhythm: Normal rate and regular rhythm.      Pulses:           Dorsalis pedis pulses are 2+ on the right side and 1+ on the left side.      Heart sounds: Normal heart sounds.   Pulmonary:      Effort: Pulmonary effort is normal.      Breath sounds: No wheezing or rales.   Chest:      Chest wall: No tenderness.   Abdominal:      General: Bowel sounds are normal. There is no distension.      Palpations: Abdomen is soft.      Tenderness: There is no abdominal tenderness. There is no right CVA tenderness or left CVA tenderness.   Musculoskeletal: Normal range of motion.         General: No deformity.      Right lower leg: No edema.      Left lower leg: Edema present.   Skin:     General: Skin is warm and dry.      Capillary Refill: Capillary refill takes less than 2 seconds.      Coloration: Skin is not pale.      Findings: No erythema or rash.   Neurological:      General: No focal deficit present.      Mental Status: She is alert.      Cranial Nerves: No cranial nerve deficit.   Psychiatric:         Mood and Affect: Mood normal.         Behavior: Behavior normal.         Thought Content: Thought content normal.         Judgment: Judgment normal.         Significant Labs: All pertinent labs within the past 24 hours have been reviewed.    Significant Imaging: I have reviewed all pertinent imaging results/findings within the past 24 hours.      Assessment/Plan:      * Acute deep vein thrombosis of left iliac vein  US LE shows Left thigh vein occlusive thrombus involving the common femoral, popliteal, and upper greater saphenous veins with minimal flow.  Patient had previous DVT in same leg ~10 years ago, treated with IVC filter and warfarin for a few months.  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      - vascular surgery following. Patient s/p thrombectomy on 8/13/20. Heparin gtt transitioned to apixaban 10mg BID  - patient with hematuria today in guzman bag, will continue to monitor Hgb and start w/u for hematuria with UA and Renal U/S to ensure no other etiology for bleeding.   - Patient with down trending platelets will continue to monitor. Low 4t's score. Patient transitioned to apixaban    Congestive heart failure  Echo 8/11/2020 showed EF 60%    Neuropathy  - Can restart home gabapentin 600 mg qhs, once patient's BPs stablized. Will continue to hold at present      Anxiety  - continue home fluoxetine 40 mg qd  - pt has not been taking zyprexa for a few weeks, d/c home zyprexa      Essential hypertension  BP controlled in 120s/70s on admission. Home meds include clonidine patch and valsartan-hctz    - Clonodine patch which was started 2-3 wks ago, after an ED visit for HTN removed prior to surgery  - patient has been normotensive during hospitalization  - Can plan to restart home antihtn as necessary  - will continue to monitor BP and symptoms        VTE Risk Mitigation (From admission, onward)         Ordered     apixaban tablet 10 mg  2 times daily      08/13/20 0815     Place BARBARA hose  Until discontinued      08/13/20 0815     IP VTE HIGH RISK PATIENT  Once      08/12/20 1927     Place sequential compression device  Until discontinued      08/12/20 0421                Discharge Planning   MASON: 8/16/2020     Code Status: Full Code   Is the patient medically ready for discharge?:     Reason for patient still in hospital (select all that apply): Patient trending condition, Laboratory test and PT / OT recommendations  Discharge Plan A: Home   Discharge Delays: None known at this time              Ana Rios MD  Department of Hospital Medicine   [facility]

## 2020-08-13 NOTE — OP NOTE
Date: 8/12/2020     Surgeon(s) and Role:   Miguelangel Goldman MD    Assistant:   1. Scott Nelson MD  2. Franky Serrano MS3    Pre-op Diagnosis:   1. T82.858A: Stenosis of vascular prosthetic devices, implants and grafts, initial encounter  2. Final diagnoses:  [M79.89] Leg swelling  [R60.9] Swelling  [I82.4Y2] Acute deep vein thrombosis (DVT) of proximal vein of left lower extremity (Primary)  3. Obesity  4. Symptomatic L iliofemoral DVT  5. Symptomatic iliocaval DVT    Post-op Diagnosis: Same     Procedure(s):   1. US guided L popliteal vein access  2. LLE venogram  3. Inferior cava venogram  4. Pharmacomechanical thrombectomy of L iliofemoral and iliocaval venous systems  5. Balloon angioplasty of the L common iliac vein with a 14x60 mm [place] balloon  6. Balloon angioplasty of the distal inferior vena cava with a 14x60 mm [place] balloon    Anesthesia: Local MAC     Findings/Key Components:   Successful treatment of DVT    EBL: Minimal    PROCEDURE IN DETAIL:After an informed consent was obtained the patient was taken to the OR and intubated in a supine position.  This was uneventful and we subsequently placed a Cai.  She was then turned to the prone position without any issues at all pressure points were protected with foam cushions.  She was then prepped and draped in a sterile fashion.  Time-out was performed.  We then used ultrasound access the left popliteal vein with a micropuncture technique.  This was upsized to a 8 German sheath and a venogram was performed showing minimal flow in the left femoral vein.  We then advanced a stiff angled Glidewire into the inferior vena caval just distal to the filter followed by a Glide catheter.  Venogram of the IVC was performed confirming no thrombus just distal to the filter and a patent IVC.  We then replaced our wire and performed pharmacomechanical thrombectomy with 2 mg of tPA in 100 cc of normal saline and allowed to instill for 15 min.  We then performed a  thrombectomy feature with aspiration thrombectomy and 2 total passes.  We followed this up with a follow-up venogram which showed persistent obstruction of flow up the level the common iliac vein of the distal IVC.  We performed this further pass of the thrombectomy catheter.  Follow-up venogram showed persistent thrombus in the common iliac vein and distal IVC.  We performed balloon angioplasty listed above and a follow-up venogram showed restoration of flow without flow-limiting stenosis or compression of the iliac vein.  There was good flow through the filter.  Our wire was removed followed by the she and manual pressure was held.  5 mg of protamine was infused slowly due to a venous ooze and a sterile dressing was applied after hemostasis was achieved.  A the 3-0 nylon U-stitch was placed with good hemostasis.

## 2020-08-13 NOTE — PLAN OF CARE
08/13/20 0825   Post-Acute Status   Post-Acute Authorization Other   Other Status No Post-Acute Service Needs

## 2020-08-13 NOTE — SUBJECTIVE & OBJECTIVE
Interval History: NAEON. Patient tolerating heparin gtt throughout the day. Hematuria noted in the guzman, UA and Renal U/S pending. Patient transitioned to apixaban per Vascular surgery. Will need OP hematology/pcp f/u for hypercoagulable w/u and f/u studies. PT/OT to assess patient tomorrow    Review of Systems   Constitutional: Negative for fatigue, fever and unexpected weight change.   HENT: Negative for sore throat.    Eyes: Negative for visual disturbance.   Respiratory: Negative for cough, shortness of breath, wheezing and stridor.    Cardiovascular: Positive for leg swelling. Negative for chest pain and palpitations.   Gastrointestinal: Negative for abdominal pain, anal bleeding, blood in stool, constipation, diarrhea, nausea and vomiting.   Genitourinary: Negative for dysuria.   Musculoskeletal: Negative for arthralgias.   Neurological: Negative for dizziness and headaches.   Psychiatric/Behavioral: Negative for agitation.     Objective:     Vital Signs (Most Recent):  Temp: 96.3 °F (35.7 °C) (08/13/20 1210)  Pulse: 83 (08/13/20 1542)  Resp: 16 (08/13/20 1542)  BP: 112/74 (08/13/20 1542)  SpO2: 98 % (08/13/20 1542) Vital Signs (24h Range):  Temp:  [96.3 °F (35.7 °C)-98.6 °F (37 °C)] 96.3 °F (35.7 °C)  Pulse:  [78-98] 83  Resp:  [10-24] 16  SpO2:  [91 %-100 %] 98 %  BP: (106-185)/() 112/74     Weight: 106.3 kg (234 lb 5.6 oz)  Body mass index is 40.23 kg/m².    Intake/Output Summary (Last 24 hours) at 8/13/2020 1800  Last data filed at 8/13/2020 0400  Gross per 24 hour   Intake --   Output 410 ml   Net -410 ml      Physical Exam  Vitals signs and nursing note reviewed.   HENT:      Head: Normocephalic and atraumatic.      Nose: Nose normal.      Mouth/Throat:      Mouth: Mucous membranes are moist.      Comments: aphthous ulcer presented on right upper lip. Not infected appearing, no drainage  Eyes:      Conjunctiva/sclera: Conjunctivae normal.   Neck:      Musculoskeletal: Normal range of motion.    Cardiovascular:      Rate and Rhythm: Normal rate and regular rhythm.      Pulses:           Dorsalis pedis pulses are 2+ on the right side and 1+ on the left side.      Heart sounds: Normal heart sounds.   Pulmonary:      Effort: Pulmonary effort is normal.      Breath sounds: No wheezing or rales.   Chest:      Chest wall: No tenderness.   Abdominal:      General: Bowel sounds are normal. There is no distension.      Palpations: Abdomen is soft.      Tenderness: There is no abdominal tenderness. There is no right CVA tenderness or left CVA tenderness.   Musculoskeletal: Normal range of motion.         General: No deformity.      Right lower leg: No edema.      Left lower leg: Edema present.   Skin:     General: Skin is warm and dry.      Capillary Refill: Capillary refill takes less than 2 seconds.      Coloration: Skin is not pale.      Findings: No erythema or rash.   Neurological:      General: No focal deficit present.      Mental Status: She is alert.      Cranial Nerves: No cranial nerve deficit.   Psychiatric:         Mood and Affect: Mood normal.         Behavior: Behavior normal.         Thought Content: Thought content normal.         Judgment: Judgment normal.         Significant Labs: All pertinent labs within the past 24 hours have been reviewed.    Significant Imaging: I have reviewed all pertinent imaging results/findings within the past 24 hours.

## 2020-08-13 NOTE — ASSESSMENT & PLAN NOTE
BP controlled in 120s/70s on admission. Home meds include clonidine patch and valsartan-hctz    - Clonodine patch which was started 2-3 wks ago, after an ED visit for HTN removed prior to surgery  - patient has been normotensive during hospitalization  - Can plan to restart home antihtn as necessary  - will continue to monitor BP and symptoms

## 2020-08-13 NOTE — ASSESSMENT & PLAN NOTE
- continue home fluoxetine 40 mg qd  - pt has not been taking zyprexa for a few weeks, d/c home zyprexa

## 2020-08-13 NOTE — ANESTHESIA POSTPROCEDURE EVALUATION
Anesthesia Post Evaluation    Patient: Alberto Frye    Procedure(s) Performed: Procedure(s) (LRB):  Venogram, pharmacomechanical thrombectomy (Left)  ANGIOPLASTY, VEIN (Left)    Final Anesthesia Type: general    Patient location during evaluation: PACU  Patient participation: Yes- Able to Participate  Level of consciousness: awake and alert  Post-procedure vital signs: reviewed and stable  Pain management: adequate  Airway patency: patent    PONV status at discharge: No PONV  Anesthetic complications: no      Cardiovascular status: blood pressure returned to baseline and stable  Respiratory status: unassisted  Hydration status: euvolemic  Follow-up not needed.          Vitals Value Taken Time   /83 08/13/20 0815   Temp 35.9 °C (96.7 °F) 08/13/20 0715   Pulse 92 08/13/20 0815   Resp 14 08/13/20 0815   SpO2 96 % 08/13/20 0815   Vitals shown include unvalidated device data.      Event Time   Out of Recovery 08/12/2020 20:41:49         Pain/Natalay Score: Pain Rating Prior to Med Admin: 9 (8/13/2020 12:24 AM)  Pain Rating Post Med Admin: 6 (8/12/2020  8:45 PM)  Natalya Score: 9 (8/12/2020  8:45 PM)

## 2020-08-13 NOTE — PROGRESS NOTES
[facility]  Vascular Surgery  Progress Note    Patient Name: Alberto Frye  MRN: [id number]  Admission Date: 8/11/2020  Primary Care Provider: Candace Martin MD    Subjective:     Interval History: NAEO.    Post-Op Info:  Procedure(s) (LRB):  Venogram, pharmacomechanical thrombectomy (Left)  ANGIOPLASTY, VEIN (Left)   1 Day Post-Op       Medications:  Continuous Infusions:  Scheduled Meds:   apixaban  10 mg Oral BID    docusate sodium  100 mg Oral BID    FLUoxetine  40 mg Oral Daily    mupirocin  1 g Nasal BID    pantoprazole  40 mg Oral Before breakfast     PRN Meds:acetaminophen, acetaminophen, dextrose 50%, dextrose 50%, glucagon (human recombinant), glucose, glucose, HYDROcodone-acetaminophen, HYDROmorphone, HYDROmorphone, hydrOXYzine, ondansetron, promethazine (PHENERGAN) IVPB, sodium chloride 0.9%, sodium chloride 0.9%     Objective:     Vital Signs (Most Recent):  Temp: 96.7 °F (35.9 °C) (08/13/20 0715)  Pulse: 91 (08/13/20 0715)  Resp: 18 (08/13/20 0715)  BP: 120/82 (08/13/20 0715)  SpO2: 99 % (08/13/20 0715) Vital Signs (24h Range):  Temp:  [96.3 °F (35.7 °C)-98.6 °F (37 °C)] 96.7 °F (35.9 °C)  Pulse:  [78-98] 91  Resp:  [10-24] 18  SpO2:  [91 %-100 %] 99 %  BP: ()/() 120/82         Physical Exam  Vitals signs and nursing note reviewed.   Constitutional:       Appearance: Normal appearance.   Cardiovascular:      Pulses: Normal pulses.   Musculoskeletal:      Comments: LLE with swelling, mild tenderness   Incision at popliteal fossa c/d/i with dressing overlying, appropriately tender  BLE elevated in bed   Skin:     General: Skin is warm.   Neurological:      General: No focal deficit present.      Mental Status: She is alert and oriented to person, place, and time.         Significant Labs:  CBC:   Recent Labs   Lab 08/13/20  0334   WBC 6.45   RBC 3.58*   HGB 11.0*   HCT 33.8*   *   MCV 94   MCH 30.7   MCHC 32.5     CMP: No results for input(s): GLU,  CALCIUM, ALBUMIN, PROT, NA, K, CO2, CL, BUN, CREATININE, ALKPHOS, ALT, AST, BILITOT in the last 48 hours.    Significant Diagnostics:  I have reviewed all pertinent imaging results/findings within the past 24 hours.    Assessment/Plan:     * Acute deep vein thrombosis of left iliac vein  44 yo female with h/o provoked DVT presented to ED 8/11 with pain and swelling, found to have extension of acute DVT into IVC distally and L iliac veins on  L iliac US. Now POD 1 s/p LLE venogram with pharmacomechanical thrombectomy of L iliofemoral and iliocaval venous systems PTA of the L common iliac vein and PTA of distal inferior vena cava. She is doing well this morning, with some pain of her LLE mostly localized to her incision.     - Will start eliquis today, DC heparin drip   - D/C guzman  - PT/OT consulted, OOB ambulation with assistance is encouraged  - Continue compression and elevation of LLE  - BARBARA hose for compression ordered  - Multimodal pain regimen in place  - Regular diet   - Consider PM discharge if patient is doing well, ambulating with adequate pain control             Elke Menard MD  Vascular Surgery  [facility]

## 2020-08-13 NOTE — BRIEF OP NOTE
[facility]  Brief Operative Note    SUMMARY     Surgery Date: 8/12/2020     Surgeon(s) and Role:     * Miguelangel Goldman MD - Primary     * Klever Nelson MD - Fellow    Assisting Surgeon: None    Pre-op Diagnosis:  Acute deep vein thrombosis (DVT) of iliac vein of left lower extremity [I82.422]  Acute deep vein thrombosis (DVT) of femoral vein of left lower extremity [I82.412]    Post-op Diagnosis:  Post-Op Diagnosis Codes:     * Acute deep vein thrombosis (DVT) of iliac vein of left lower extremity [I82.422]     * Acute deep vein thrombosis (DVT) of femoral vein of left lower extremity [I82.412]    Procedure:  1. US guided L popliteal vein access  2. LLE venogram  3. Inferior cava venogram  4. Pharmacomechanical thrombectomy of L iliofemoral and iliocaval venous systems  5. Balloon angioplasty of the L common iliac vein with a 14x60 mm [place] balloon  6. Balloon angioplasty of the distal inferior vena cava with a 14x60 mm [place] balloon    Anesthesia: General    Description of Procedure: adequate vein for access    Description of the findings of the procedure: restoration of venous outflow after intervention    Estimated Blood Loss: <5 cc         Specimens:   Specimen (12h ago, onward)    None         Render Post-Care Instructions In Note?: no Detail Level: Detailed Duration Of Freeze Thaw-Cycle (Seconds): 0

## 2020-08-13 NOTE — ASSESSMENT & PLAN NOTE
- Can restart home gabapentin 600 mg qhs, once patient's BPs stablized. Will continue to hold at present

## 2020-08-13 NOTE — PLAN OF CARE
Patient complained of chest pain soreness 5/10 when trying to breathe, med team notified. Patient heparin titrated per order. Neurovascular assessments completed per order. 325 ml of red drainage per guzman. Patient on 1.5L of O2. Free from falls/injuries. Safety measures maintained. Will continue to monitor.  Problem: Adult Inpatient Plan of Care  Goal: Plan of Care Review  Outcome: Ongoing, Progressing  Goal: Patient-Specific Goal (Individualization)  Outcome: Ongoing, Progressing  Goal: Absence of Hospital-Acquired Illness or Injury  Outcome: Ongoing, Progressing  Goal: Optimal Comfort and Wellbeing  Outcome: Ongoing, Progressing  Goal: Readiness for Transition of Care  Outcome: Ongoing, Progressing  Goal: Rounds/Family Conference  Outcome: Ongoing, Progressing     Problem: Bariatric Environmental Safety  Goal: Safety Maintained with Care  Outcome: Ongoing, Progressing     Problem: Fall Injury Risk  Goal: Absence of Fall and Fall-Related Injury  Outcome: Ongoing, Progressing     Problem: Infection  Goal: Infection Symptom Resolution  Outcome: Ongoing, Progressing     Problem: Skin Injury Risk Increased  Goal: Skin Health and Integrity  Outcome: Ongoing, Progressing

## 2020-08-13 NOTE — SUBJECTIVE & OBJECTIVE
Medications:  Continuous Infusions:  Scheduled Meds:   apixaban  10 mg Oral BID    docusate sodium  100 mg Oral BID    FLUoxetine  40 mg Oral Daily    mupirocin  1 g Nasal BID    pantoprazole  40 mg Oral Before breakfast     PRN Meds:acetaminophen, acetaminophen, dextrose 50%, dextrose 50%, glucagon (human recombinant), glucose, glucose, HYDROcodone-acetaminophen, HYDROmorphone, HYDROmorphone, hydrOXYzine, ondansetron, promethazine (PHENERGAN) IVPB, sodium chloride 0.9%, sodium chloride 0.9%     Objective:     Vital Signs (Most Recent):  Temp: 96.7 °F (35.9 °C) (08/13/20 0715)  Pulse: 91 (08/13/20 0715)  Resp: 18 (08/13/20 0715)  BP: 120/82 (08/13/20 0715)  SpO2: 99 % (08/13/20 0715) Vital Signs (24h Range):  Temp:  [96.3 °F (35.7 °C)-98.6 °F (37 °C)] 96.7 °F (35.9 °C)  Pulse:  [78-98] 91  Resp:  [10-24] 18  SpO2:  [91 %-100 %] 99 %  BP: ()/() 120/82         Physical Exam  Vitals signs and nursing note reviewed.   Constitutional:       Appearance: Normal appearance.   Cardiovascular:      Pulses: Normal pulses.   Musculoskeletal:      Comments: LLE with swelling, mild tenderness   Incision at popliteal fossa c/d/i with dressing overlying, appropriately tender  BLE elevated in bed   Skin:     General: Skin is warm.   Neurological:      General: No focal deficit present.      Mental Status: She is alert and oriented to person, place, and time.         Significant Labs:  CBC:   Recent Labs   Lab 08/13/20  0334   WBC 6.45   RBC 3.58*   HGB 11.0*   HCT 33.8*   *   MCV 94   MCH 30.7   MCHC 32.5     CMP: No results for input(s): GLU, CALCIUM, ALBUMIN, PROT, NA, K, CO2, CL, BUN, CREATININE, ALKPHOS, ALT, AST, BILITOT in the last 48 hours.    Significant Diagnostics:  I have reviewed all pertinent imaging results/findings within the past 24 hours.

## 2020-08-13 NOTE — PLAN OF CARE
MD to d/c hep gtt today, began eloquis possible d/c this evening will cont to follow.     08/13/20 1103   Discharge Reassessment   Assessment Type Discharge Planning Reassessment   Provided patient/caregiver education on the expected discharge date and the discharge plan Yes   Do you have any problems affording any of your prescribed medications? No   Discharge Plan A Home   Discharge Plan B Home   DME Needed Upon Discharge  none   Anticipated Discharge Disposition Home   Can the patient/caregiver answer the patient profile reliably? Yes, cognitively intact   How does the patient rate their overall health at the present time? Good   How often would a person be available to care for the patient? Whenever needed   Number of comorbid conditions (as recorded on the chart) Three   Post-Acute Status   Post-Acute Authorization HME   Other Status No Post-Acute Service Needs   Discharge Delays None known at this time   Odalis Hartman, MSN  Case Management  Ext [phone]

## 2020-08-13 NOTE — NURSING TRANSFER
Nursing Transfer Note      8/12/2020     Transfer To: 603    Transfer via bed    Transfer with cardiac monitoring    Transported by PCT    Medicines sent: none    Chart send with patient: Yes    Notified: RN    Patient reassessed at: 9/12/20

## 2020-08-13 NOTE — ASSESSMENT & PLAN NOTE
US LE shows Left thigh vein occlusive thrombus involving the common femoral, popliteal, and upper greater saphenous veins with minimal flow.  Patient had previous DVT in same leg ~10 years ago, treated with IVC filter and warfarin for a few months.  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      - vascular surgery following. Patient s/p thrombectomy on 8/13/20. Heparin gtt transitioned to apixaban 10mg BID  - patient with hematuria today in guzman bag, will continue to monitor Hgb and start w/u for hematuria with UA and Renal U/S to ensure no other etiology for bleeding.   - Patient with down trending platelets will continue to monitor. Low 4t's score. Patient transitioned to apixaban

## 2020-08-14 LAB
ALBUMIN SERPL BCP-MCNC: 3.2 G/DL (ref 3.5–5.2)
ALP SERPL-CCNC: 58 U/L (ref 55–135)
ALT SERPL W/O P-5'-P-CCNC: 13 U/L (ref 10–44)
ANION GAP SERPL CALC-SCNC: 8 MMOL/L (ref 8–16)
AST SERPL-CCNC: 44 U/L (ref 10–40)
BASOPHILS # BLD AUTO: 0.02 K/UL (ref 0–0.2)
BASOPHILS NFR BLD: 0.4 % (ref 0–1.9)
BILIRUB SERPL-MCNC: 0.9 MG/DL (ref 0.1–1)
BUN SERPL-MCNC: 19 MG/DL (ref 6–20)
CALCIUM SERPL-MCNC: 8.1 MG/DL (ref 8.7–10.5)
CHLORIDE SERPL-SCNC: 110 MMOL/L (ref 95–110)
CO2 SERPL-SCNC: 22 MMOL/L (ref 23–29)
CREAT SERPL-MCNC: 1.2 MG/DL (ref 0.5–1.4)
DIFFERENTIAL METHOD: ABNORMAL
EOSINOPHIL # BLD AUTO: 0.2 K/UL (ref 0–0.5)
EOSINOPHIL NFR BLD: 4.8 % (ref 0–8)
ERYTHROCYTE [DISTWIDTH] IN BLOOD BY AUTOMATED COUNT: 13.3 % (ref 11.5–14.5)
EST. GFR  (AFRICAN AMERICAN): >60 ML/MIN/1.73 M^2
EST. GFR  (NON AFRICAN AMERICAN): 53.9 ML/MIN/1.73 M^2
GLUCOSE SERPL-MCNC: 86 MG/DL (ref 70–110)
HCT VFR BLD AUTO: 32 % (ref 37–48.5)
HEPARIN INDUCED THROMBOCYTOPENIA: 0.28 OD (ref 0–0.4)
HGB BLD-MCNC: 10.5 G/DL (ref 12–16)
IMM GRANULOCYTES # BLD AUTO: 0.02 K/UL (ref 0–0.04)
IMM GRANULOCYTES NFR BLD AUTO: 0.4 % (ref 0–0.5)
LYMPHOCYTES # BLD AUTO: 1.5 K/UL (ref 1–4.8)
LYMPHOCYTES NFR BLD: 32.5 % (ref 18–48)
MCH RBC QN AUTO: 30.5 PG (ref 27–31)
MCHC RBC AUTO-ENTMCNC: 32.8 G/DL (ref 32–36)
MCV RBC AUTO: 93 FL (ref 82–98)
MONOCYTES # BLD AUTO: 0.5 K/UL (ref 0.3–1)
MONOCYTES NFR BLD: 11.9 % (ref 4–15)
NEUTROPHILS # BLD AUTO: 2.3 K/UL (ref 1.8–7.7)
NEUTROPHILS NFR BLD: 50 % (ref 38–73)
NRBC BLD-RTO: 0 /100 WBC
PLATELET # BLD AUTO: 128 K/UL (ref 150–350)
PMV BLD AUTO: 10.6 FL (ref 9.2–12.9)
POTASSIUM SERPL-SCNC: 3.8 MMOL/L (ref 3.5–5.1)
PROT SERPL-MCNC: 6.8 G/DL (ref 6–8.4)
RBC # BLD AUTO: 3.44 M/UL (ref 4–5.4)
SODIUM SERPL-SCNC: 140 MMOL/L (ref 136–145)
WBC # BLD AUTO: 4.55 K/UL (ref 3.9–12.7)

## 2020-08-14 PROCEDURE — 99233 PR SUBSEQUENT HOSPITAL CARE,LEVL III: ICD-10-PCS | Mod: ,,, | Performed by: INTERNAL MEDICINE

## 2020-08-14 PROCEDURE — 97161 PT EVAL LOW COMPLEX 20 MIN: CPT

## 2020-08-14 PROCEDURE — 25000003 PHARM REV CODE 250: Performed by: STUDENT IN AN ORGANIZED HEALTH CARE EDUCATION/TRAINING PROGRAM

## 2020-08-14 PROCEDURE — 11000001 HC ACUTE MED/SURG PRIVATE ROOM

## 2020-08-14 PROCEDURE — 94761 N-INVAS EAR/PLS OXIMETRY MLT: CPT

## 2020-08-14 PROCEDURE — 80053 COMPREHEN METABOLIC PANEL: CPT

## 2020-08-14 PROCEDURE — 85025 COMPLETE CBC W/AUTO DIFF WBC: CPT

## 2020-08-14 PROCEDURE — 86022 PLATELET ANTIBODIES: CPT

## 2020-08-14 PROCEDURE — 36415 COLL VENOUS BLD VENIPUNCTURE: CPT

## 2020-08-14 PROCEDURE — 99232 SBSQ HOSP IP/OBS MODERATE 35: CPT | Mod: ,,, | Performed by: SURGERY

## 2020-08-14 PROCEDURE — 99232 PR SUBSEQUENT HOSPITAL CARE,LEVL II: ICD-10-PCS | Mod: ,,, | Performed by: SURGERY

## 2020-08-14 PROCEDURE — 97165 OT EVAL LOW COMPLEX 30 MIN: CPT

## 2020-08-14 PROCEDURE — 99233 SBSQ HOSP IP/OBS HIGH 50: CPT | Mod: ,,, | Performed by: INTERNAL MEDICINE

## 2020-08-14 PROCEDURE — 25000003 PHARM REV CODE 250: Performed by: SURGERY

## 2020-08-14 RX ORDER — HYDROCORTISONE 1 %
CREAM (GRAM) TOPICAL 2 TIMES DAILY
Status: DISCONTINUED | OUTPATIENT
Start: 2020-08-14 | End: 2020-08-15 | Stop reason: HOSPADM

## 2020-08-14 RX ORDER — HYDROCORTISONE 1 %
CREAM (GRAM) TOPICAL 2 TIMES DAILY
Qty: 14.2 G | Refills: 0 | Status: CANCELLED | OUTPATIENT
Start: 2020-08-14

## 2020-08-14 RX ADMIN — HYDROCORTISONE: 10 CREAM TOPICAL at 01:08

## 2020-08-14 RX ADMIN — DOCUSATE SODIUM 100 MG: 100 CAPSULE, LIQUID FILLED ORAL at 09:08

## 2020-08-14 RX ADMIN — APIXABAN 10 MG: 5 TABLET, FILM COATED ORAL at 10:08

## 2020-08-14 RX ADMIN — FLUOXETINE 40 MG: 20 CAPSULE ORAL at 10:08

## 2020-08-14 RX ADMIN — HYDROCODONE BITARTRATE AND ACETAMINOPHEN 1 TABLET: 5; 325 TABLET ORAL at 11:08

## 2020-08-14 RX ADMIN — DOCUSATE SODIUM 100 MG: 100 CAPSULE, LIQUID FILLED ORAL at 10:08

## 2020-08-14 RX ADMIN — APIXABAN 10 MG: 5 TABLET, FILM COATED ORAL at 09:08

## 2020-08-14 RX ADMIN — MUPIROCIN 1 G: 20 OINTMENT TOPICAL at 09:08

## 2020-08-14 RX ADMIN — HYDROCORTISONE: 10 CREAM TOPICAL at 09:08

## 2020-08-14 RX ADMIN — HYDROCODONE BITARTRATE AND ACETAMINOPHEN 1 TABLET: 5; 325 TABLET ORAL at 09:08

## 2020-08-14 RX ADMIN — MUPIROCIN 1 G: 20 OINTMENT TOPICAL at 10:08

## 2020-08-14 RX ADMIN — HYDROCODONE BITARTRATE AND ACETAMINOPHEN 1 TABLET: 5; 325 TABLET ORAL at 05:08

## 2020-08-14 RX ADMIN — PANTOPRAZOLE SODIUM 40 MG: 40 TABLET, DELAYED RELEASE ORAL at 05:08

## 2020-08-14 NOTE — ASSESSMENT & PLAN NOTE
44 yo female with h/o provoked DVT presented to ED 8/11 with pain and swelling, found to have extension of acute DVT into IVC distally and L iliac veins on  L iliac US. Now s/p LLE venogram with pharmacomechanical thrombectomy of L iliofemoral and iliocaval venous systems PTA of the L common iliac vein and PTA of distal inferior vena cava. She is doing well this morning, with improved pain and mobility. She has had some oral sores develop - possibly due to intubation.     - Continue eliquis  - anesthesia contacted in regards to oral lesions, will appreciate their recommendations   - PT/OT consulted, OOB ambulation encourage - pt. reports she has been ambulating to restroom and has improved pain while mobilizing the left leg.   - Continue compression and elevation of LLE - will provide thigh high compression stocking rx upon DC   - BARBARA hose for compression ordered  - Multimodal pain regimen in place  - Regular diet   - Tentative PM discharge after PT sees patient

## 2020-08-14 NOTE — PLAN OF CARE
Pt remains free from falls and injury. Provided with PRN analgesic with positive results. Bed low and locked, Call light within reach.

## 2020-08-14 NOTE — CARE UPDATE
Vascular surgery requested evaluation of new mucosal ulcers following anesthesia on 6/12. Patient reports painful ulcers over the right upper lip that developed following anesthesia. On exam, three white ulcerated lesions over the mucosal surface of the right upper lip. These are likely due to ETT compression of the lip while in the prone position during her procedure. These ulcers will spontaneously resolve. This complication was explained to the patient and she was very reasonable.     Recommendations:  Oragel PRN  Topical 1% hydrocortisone PRN

## 2020-08-14 NOTE — SUBJECTIVE & OBJECTIVE
Medications:  Continuous Infusions:  Scheduled Meds:   apixaban  10 mg Oral BID    docusate sodium  100 mg Oral BID    FLUoxetine  40 mg Oral Daily    mupirocin  1 g Nasal BID    pantoprazole  40 mg Oral Before breakfast     PRN Meds:acetaminophen, acetaminophen, dextrose 50%, dextrose 50%, glucagon (human recombinant), glucose, glucose, HYDROcodone-acetaminophen, hydrOXYzine, ondansetron, promethazine (PHENERGAN) IVPB, sodium chloride 0.9%, sodium chloride 0.9%     Objective:     Vital Signs (Most Recent):  Temp: 98.3 °F (36.8 °C) (08/14/20 0735)  Pulse: 88 (08/14/20 0746)  Resp: 16 (08/14/20 0735)  BP: 117/69 (08/14/20 0735)  SpO2: 95 % (08/14/20 0735) Vital Signs (24h Range):  Temp:  [96.3 °F (35.7 °C)-99.1 °F (37.3 °C)] 98.3 °F (36.8 °C)  Pulse:  [] 88  Resp:  [12-24] 16  SpO2:  [94 %-99 %] 95 %  BP: (104-136)/(61-93) 117/69         Physical Exam  Vitals signs and nursing note reviewed.   Constitutional:       Appearance: Normal appearance.   Musculoskeletal:      Comments: Left leg with mild swelling, improved  BARBARA hose up to knee     Neurological:      Mental Status: She is alert.         Significant Labs:  CBC:   Recent Labs   Lab 08/13/20  1849   WBC 3.04*   RBC 5.58*   HGB 16.9*   HCT 50.6*   PLT 65*   MCV 91   MCH 30.3   MCHC 33.4     CMP:   Recent Labs   Lab 08/13/20  1102      CALCIUM 7.8*   ALBUMIN 3.1*   PROT 6.8      K 3.9   CO2 22*      BUN 23*   CREATININE 1.4   ALKPHOS 52*   ALT 17   AST 79*   BILITOT 1.5*       Significant Diagnostics:  None

## 2020-08-14 NOTE — PROGRESS NOTES
[facility] Hospital Medicine  Progress Note    Patient Name: Alberto Frye  MRN: [id number]  Patient Class: IP- Inpatient   Admission Date: 8/11/2020  Length of Stay: 3 days  Attending Physician: Kayce Maurer MD  Primary Care Provider: Candace Martin MD    [facility] Medicine Team: JEZ Rios MD    Subjective:     Principal Problem:Acute deep vein thrombosis of left iliac vein        HPI:  Ms. Alberto Frye is a 45 y.o. female with HTN, anxiety, alcohol abuse, DVT (10 years ago, treated with IVC filter, warfarin) who was transferred to [facility] for thrombectomy/stent placement of left lower limb thrombus.  Patient originally presented to [facility] yesterday with one day of left thigh pain and leg swelling.  Patient states that this pain is in the same leg and pain is same as previous DVT diagnosed 10 years ago while in hospital for hysterectomy.  DVT at that time was treated with IVC filter and pt was on warfarin for a few months.  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  Denies any recent long distance travel, trauma, OCP/hormone replacement use.  Patient denies any fevers, chills, night sweats, chest pain, SOB, palpitations.  Patient is a former smoker, 10-15y of ~1pack/month, quit in 2016.  Patient states that she drinks socially.      Left lower limb ultrasound on 8/11 showed occlusive thrombus involving left common femoral, popliteal, upper greater saphenous, left iliac vein and distal inferior vena cava.  Patient started on lovenox.  Patient was seen by vascular surgery (Dr Goldman) who decided to transfer patient to [facility] for thrombectomy/stent placement.       Overview/Hospital Course:  Patient underwent thrombectomy with vascular surgery 8/12/20, tolerating post-procedural symptoms well. Has remained on heparing gtt throughout hospitalization, Vascular managing heparin gtt. On 8/13/20, one day post-op vascular surgery  recommending changing heparin gtt to apixaban. Patient with mild hematuria, UA showing no signs of infection, 3+ blood. And will w/u OP /IP for hypercoagulable etiologies to patient's presentation. HIT panel negative.     Interval History: NAEON. Patient reporting no headache or bleeding episodes at present. States she is feeling better since admission. Improving leg pain.      Review of Systems   Constitutional: Negative for fatigue, fever and unexpected weight change.   HENT: Negative for sore throat.    Eyes: Negative for visual disturbance.   Respiratory: Negative for cough, shortness of breath, wheezing and stridor.    Cardiovascular: Positive for leg swelling. Negative for chest pain and palpitations.   Gastrointestinal: Negative for abdominal pain, anal bleeding, blood in stool, constipation, diarrhea, nausea and vomiting.   Genitourinary: Negative for dysuria.   Musculoskeletal: Negative for arthralgias.   Neurological: Negative for dizziness and headaches.   Psychiatric/Behavioral: Negative for agitation.     Objective:     Vital Signs (Most Recent):  Temp: 97.4 °F (36.3 °C) (08/14/20 1515)  Pulse: 90 (08/14/20 1515)  Resp: 15 (08/14/20 1257)  BP: 112/66 (08/14/20 1515)  SpO2: 98 % (08/14/20 1515) Vital Signs (24h Range):  Temp:  [97.4 °F (36.3 °C)-99.1 °F (37.3 °C)] 97.4 °F (36.3 °C)  Pulse:  [] 90  Resp:  [12-24] 15  SpO2:  [94 %-99 %] 98 %  BP: (101-136)/(60-93) 112/66     Weight: 106.3 kg (234 lb 5.6 oz)  Body mass index is 40.23 kg/m².  No intake or output data in the 24 hours ending 08/14/20 1823   Physical Exam  Vitals signs and nursing note reviewed.   HENT:      Head: Normocephalic and atraumatic.      Nose: Nose normal.      Mouth/Throat:      Mouth: Mucous membranes are moist.      Comments: aphthous ulcer presented on right upper lip. Not infected appearing, no drainage  Eyes:      Conjunctiva/sclera: Conjunctivae normal.   Neck:      Musculoskeletal: Normal range of motion.    Cardiovascular:      Rate and Rhythm: Normal rate and regular rhythm.      Pulses:           Dorsalis pedis pulses are 2+ on the right side and 1+ on the left side.      Heart sounds: Normal heart sounds.   Pulmonary:      Effort: Pulmonary effort is normal.      Breath sounds: No wheezing or rales.   Chest:      Chest wall: No tenderness.   Abdominal:      General: Bowel sounds are normal. There is no distension.      Palpations: Abdomen is soft.      Tenderness: There is no abdominal tenderness. There is no right CVA tenderness or left CVA tenderness.   Musculoskeletal: Normal range of motion.         General: No deformity.      Right lower leg: No edema.      Left lower leg: Edema present.   Skin:     General: Skin is warm and dry.      Capillary Refill: Capillary refill takes less than 2 seconds.      Coloration: Skin is not pale.      Findings: No erythema or rash.   Neurological:      General: No focal deficit present.      Mental Status: She is alert.      Cranial Nerves: No cranial nerve deficit.   Psychiatric:         Mood and Affect: Mood normal.         Behavior: Behavior normal.         Thought Content: Thought content normal.         Judgment: Judgment normal.         Significant Labs: All pertinent labs within the past 24 hours have been reviewed.    Significant Imaging: I have reviewed all pertinent imaging results/findings within the past 24 hours.      Assessment/Plan:      * Acute deep vein thrombosis of left iliac vein  US LE shows Left thigh vein occlusive thrombus involving the common femoral, popliteal, and upper greater saphenous veins with minimal flow.  Patient had previous DVT in same leg ~10 years ago, treated with IVC filter and warfarin for a few months.  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      - vascular surgery following. Patient s/p thrombectomy on 8/13/20. Heparin gtt  transitioned to apixaban 10mg BID  - patient with hematuria in guzman bag post op, will continue to monitor Hgb.  - UA positive for blood but may be related to traumatic guzman insertion in setting of anticoagulation. Renal U/S unrevealing.   - Patient on protonix 40mg daily  - Patient with down trending platelets will continue to monitor. High 4t's score this AM, negative HIT antibody. Uptrending pts this PM. Patient transitioned to apixaban.  - Patient with hematuria yesterday; no evidence of bleeding or hematuria today. Plts <50k and bleeding will transfuse.    Congestive heart failure  Echo 8/11/2020 showed EF 60%    Neuropathy  - Can restart home gabapentin 600 mg qhs, once patient's BPs stablized. Will continue to hold at present      Anxiety  - continue home fluoxetine 40 mg qd  - pt has not been taking zyprexa for a few weeks, d/c home zyprexa      Essential hypertension  BP controlled in 120s/70s on admission. Home meds include clonidine patch and valsartan-hctz    - Clonodine patch which was started 2-3 wks ago, after an ED visit for HTN removed prior to surgery  - patient has been normotensive during hospitalization  - Can plan to restart home antihtn as necessary  - will continue to monitor BP and symptoms        VTE Risk Mitigation (From admission, onward)           Ordered     apixaban tablet 10 mg  2 times daily      08/13/20 0815     Place BARBARA hose  Until discontinued      08/13/20 0815     IP VTE HIGH RISK PATIENT  Once      08/12/20 1927     Place sequential compression device  Until discontinued      08/12/20 0421                    Discharge Planning   MASON: 8/16/2020     Code Status: Full Code   Is the patient medically ready for discharge?:     Reason for patient still in hospital (select all that apply): Patient trending condition and Laboratory test  Discharge Plan A: Home   Discharge Delays: None known at this time              Ana Rios MD  Department of Hospital Medicine   [facility]

## 2020-08-14 NOTE — MEDICAL/APP STUDENT
[facility] Hospital Medicine                                                                     Progress Note     Team: JEZ Morfin   Admit Date: 8/11/2020   Hospital Day: 3  MASON: 8/16/2020   Code status: { Code Status:43022}   Principal Problem: Acute deep vein thrombosis of left iliac vein       SUMMARY:     Alberto Frye is a 45 y.o female  with PMHx of DVT in 2011 who [place] [facility] [place] for thrombectomy/ stent placement for left thigh pain and leg swelling which was determined to be an occlusive thrombus involving left common femoral, popliteal, upper greater saphenous, left iliac vein and distal IVC.      SUBJECTIVE:     Pt was seen and examined at bedside. Patient states that she thinks she was having a panic attack last night, she mentions difficulty breathing and chest pain, and states she had to go to the bathroom 4-5x. She tried washing her face and noticed she started having a nose bleed and a headache. She reports it wasn't a stream of blood but more like trickling, and mentions that it was only the second time she's had a headache. She notes having a slight cough. At the current moment pt denies any fevers, chills, malaise,  chest pain, SOB, cough. Pt has been able to ambulate without any distress.     ROS (Positive in Bold, otherwise negative)  Pain Scale: - /10   Constitutional:no fever, chills, night sweats  CV: no chest pain, edema, palpitations  Resp:no SOB, some cough, sputum production  GI: no changes in appetite, NVDC, pain, melena, hematochezia, GERD, hematemesis. Reports constipation  : no dysuria, hematuria, urinary urgency, frequency  MSK: no arthralgia/myalgia, joint swelling  SKIN:no rashes, pruritis, petechiae   Neuro/Psych, no FNDm anxiety, depression      OBJECTIVE:     Vitals:  Temp:  [96.3 °F (35.7  °C)-99.1 °F (37.3 °C)]   Pulse:  []   Resp:  [12-24]   BP: (104-136)/(61-93)   SpO2:  [94 %-99 %]      I & O (Last 24H):   No intake or output data in the 24 hours ending 08/14/20 0757      GEN: AA female  in no acute distress. Nontoxic. Resting in bed. Cooperative.  HEENT: NCAT. PERRL. EOMI. Conjunctivae/corneas clear, sclera Anicteric.  CVS: RRR. Normal s1 s2 no murmur, click, rub or gallop  LUNG: CTAB. Normal respiratory effort. No wheezes, rhonchi, or crackles.  ABD: Normoactive BS, soft, NT, ND, no masses or organomegaly.  EXT: No edema. No cyanosis. Full ROM. Sore in left leg  SKIN: color, texture, turgor normal. No rashes or lesions  NEURO: Alert, oriented x 4, Spont mvt of all extremities with no focal deficits noted.      All recent labs and imaging has been reviewed.     Recent Results (from the past 24 hour(s))   Comprehensive metabolic panel    Collection Time: 08/13/20 11:02 AM   Result Value Ref Range    Sodium 138 136 - 145 mmol/L    Potassium 3.9 3.5 - 5.1 mmol/L    Chloride 109 95 - 110 mmol/L    CO2 22 (L) 23 - 29 mmol/L    Glucose 107 70 - 110 mg/dL    BUN, Bld 23 (H) 6 - 20 mg/dL    Creatinine 1.4 0.5 - 1.4 mg/dL    Calcium 7.8 (L) 8.7 - 10.5 mg/dL    Total Protein 6.8 6.0 - 8.4 g/dL    Albumin 3.1 (L) 3.5 - 5.2 g/dL    Total Bilirubin 1.5 (H) 0.1 - 1.0 mg/dL    Alkaline Phosphatase 52 (L) 55 - 135 U/L    AST 79 (H) 10 - 40 U/L    ALT 17 10 - 44 U/L    Anion Gap 7 (L) 8 - 16 mmol/L    eGFR if African American 51.6 (A) >60 mL/min/1.73 m^2    eGFR if non  44.8 (A) >60 mL/min/1.73 m^2   APTT    Collection Time: 08/13/20 11:02 AM   Result Value Ref Range    aPTT 37.0 (H) 21.0 - 32.0 sec   Urinalysis, Reflex to Urine Culture Urine, Clean Catch    Collection Time: 08/13/20  6:36 PM    Specimen: Urine   Result Value Ref Range    Specimen UA Urine, Clean Catch     Color, UA Yellow Yellow, Straw, Shameka    Appearance, UA Hazy (A) Clear    pH, UA 5.0 5.0 - 8.0    Specific Gravity, UA  1.005 1.005 - 1.030    Protein, UA Negative Negative    Glucose, UA Negative Negative    Ketones, UA Negative Negative    Bilirubin (UA) Negative Negative    Occult Blood UA 3+ (A) Negative    Nitrite, UA Negative Negative    Leukocytes, UA Trace (A) Negative   Urinalysis Microscopic    Collection Time: 08/13/20  6:36 PM   Result Value Ref Range    RBC, UA 3 0 - 4 /hpf    WBC, UA 9 (H) 0 - 5 /hpf    Bacteria Occasional None-Occ /hpf    Squam Epithel, UA 3 /hpf    Microscopic Comment SEE COMMENT    CBC auto differential    Collection Time: 08/13/20  6:49 PM   Result Value Ref Range    WBC 3.04 (L) 3.90 - 12.70 K/uL    RBC 5.58 (H) 4.00 - 5.40 M/uL    Hemoglobin 16.9 (H) 12.0 - 16.0 g/dL    Hematocrit 50.6 (H) 37.0 - 48.5 %    Mean Corpuscular Volume 91 82 - 98 fL    Mean Corpuscular Hemoglobin 30.3 27.0 - 31.0 pg    Mean Corpuscular Hemoglobin Conc 33.4 32.0 - 36.0 g/dL    RDW 13.0 11.5 - 14.5 %    Platelets 65 (L) 150 - 350 K/uL    MPV 11.6 9.2 - 12.9 fL    Immature Granulocytes 2.3 (H) 0.0 - 0.5 %    Gran # (ANC) 1.7 (L) 1.8 - 7.7 K/uL    Immature Grans (Abs) 0.07 (H) 0.00 - 0.04 K/uL    Lymph # 0.8 (L) 1.0 - 4.8 K/uL    Mono # 0.4 0.3 - 1.0 K/uL    Eos # 0.1 0.0 - 0.5 K/uL    Baso # 0.02 0.00 - 0.20 K/uL    nRBC 0 0 /100 WBC    Gran% 54.6 38.0 - 73.0 %    Lymph% 26.3 18.0 - 48.0 %    Mono% 12.2 4.0 - 15.0 %    Eosinophil% 3.9 0.0 - 8.0 %    Basophil% 0.7 0.0 - 1.9 %    Differential Method Automated        No results for input(s): POCTGLUCOSE in the last 168 hours.    Hemoglobin A1C   Date Value Ref Range Status   01/16/2020 5.3 4.0 - 5.6 % Final     Comment:     ADA Screening Guidelines:  5.7-6.4%  Consistent with prediabetes  >or=6.5%  Consistent with diabetes  High levels of fetal hemoglobin interfere with the HbA1C  assay. Heterozygous hemoglobin variants (HbS, HgC, etc)do  not significantly interfere with this assay.   However, presence of multiple variants may affect accuracy.     02/19/2019 5.0 4.0 - 5.6 %  Final     Comment:     ADA Screening Guidelines:  5.7-6.4%  Consistent with prediabetes  >or=6.5%  Consistent with diabetes  High levels of fetal hemoglobin interfere with the HbA1C  assay. Heterozygous hemoglobin variants (HbS, HgC, etc)do  not significantly interfere with this assay.   However, presence of multiple variants may affect accuracy.     10/13/2017 4.8 4.0 - 5.6 % Final     Comment:     According to ADA guidelines, hemoglobin A1c <7.0% represents  optimal control in non-pregnant diabetic patients. Different  metrics may apply to specific patient populations.   Standards of Medical Care in Diabetes-2016.  For the purpose of screening for the presence of diabetes:  <5.7%     Consistent with the absence of diabetes  5.7-6.4%  Consistent with increasing risk for diabetes   (prediabetes)  >or=6.5%  Consistent with diabetes  Currently, no consensus exists for use of hemoglobin A1c  for diagnosis of diabetes for children.  This Hemoglobin A1c assay has significant interference with fetal   hemoglobin   (HbF). The results are invalid for patients with abnormal amounts of   HbF,   including those with known Hereditary Persistence   of Fetal Hemoglobin. Heterozygous hemoglobin variants (HbAS, HbAC,   HbAD, HbAE, HbA2) do not significantly interfere with this assay;   however, presence of multiple variants in a sample may impact the %   interference.          Active Hospital Problems    Diagnosis  POA    *Acute deep vein thrombosis of left iliac vein [I82.422]  Yes    Neuropathy [G62.9]  Yes    Congestive heart failure [I50.9]  Yes    Essential hypertension [I10]  Yes    Anxiety [F41.9]  Yes      Resolved Hospital Problems   No resolved problems to display.          ASSESSMENT AND PLAN:

## 2020-08-14 NOTE — PROGRESS NOTES
[facility]  Vascular Surgery  Progress Note    Patient Name: Alberto Frye  MRN: [id number]  Admission Date: 8/11/2020  Primary Care Provider: Candace Martin MD    Subjective:     Interval History: NAEO. She has some new lesions on her mouth, will request anesthesia to see the patient. She reports ambulating well to restroom and around room with improved pain. She was amendable to afternoon discharge after PT sees her.    Post-Op Info:  Procedure(s) (LRB):  Venogram, pharmacomechanical thrombectomy (Left)  ANGIOPLASTY, VEIN (Left)   2 Days Post-Op       Medications:  Continuous Infusions:  Scheduled Meds:   apixaban  10 mg Oral BID    docusate sodium  100 mg Oral BID    FLUoxetine  40 mg Oral Daily    mupirocin  1 g Nasal BID    pantoprazole  40 mg Oral Before breakfast     PRN Meds:acetaminophen, acetaminophen, dextrose 50%, dextrose 50%, glucagon (human recombinant), glucose, glucose, HYDROcodone-acetaminophen, hydrOXYzine, ondansetron, promethazine (PHENERGAN) IVPB, sodium chloride 0.9%, sodium chloride 0.9%     Objective:     Vital Signs (Most Recent):  Temp: 98.3 °F (36.8 °C) (08/14/20 0735)  Pulse: 88 (08/14/20 0746)  Resp: 16 (08/14/20 0735)  BP: 117/69 (08/14/20 0735)  SpO2: 95 % (08/14/20 0735) Vital Signs (24h Range):  Temp:  [96.3 °F (35.7 °C)-99.1 °F (37.3 °C)] 98.3 °F (36.8 °C)  Pulse:  [] 88  Resp:  [12-24] 16  SpO2:  [94 %-99 %] 95 %  BP: (104-136)/(61-93) 117/69         Physical Exam  Vitals signs and nursing note reviewed.   Constitutional:       Appearance: Normal appearance.   Musculoskeletal:      Comments: Left leg with mild swelling, improved  BARBARA hose up to knee     Neurological:      Mental Status: She is alert.         Significant Labs:  CBC:   Recent Labs   Lab 08/13/20  1849   WBC 3.04*   RBC 5.58*   HGB 16.9*   HCT 50.6*   PLT 65*   MCV 91   MCH 30.3   MCHC 33.4     CMP:   Recent Labs   Lab 08/13/20  1102      CALCIUM 7.8*   ALBUMIN 3.1*    PROT 6.8      K 3.9   CO2 22*      BUN 23*   CREATININE 1.4   ALKPHOS 52*   ALT 17   AST 79*   BILITOT 1.5*       Significant Diagnostics:  None    Assessment/Plan:     * Acute deep vein thrombosis of left iliac vein  44 yo female with h/o provoked DVT presented to ED 8/11 with pain and swelling, found to have extension of acute DVT into IVC distally and L iliac veins on  L iliac US. Now s/p LLE venogram with pharmacomechanical thrombectomy of L iliofemoral and iliocaval venous systems PTA of the L common iliac vein and PTA of distal inferior vena cava. She is doing well this morning, with improved pain and mobility. She has had some oral sores develop - possibly due to intubation.     - Continue eliquis  - anesthesia contacted in regards to oral lesions, will appreciate their recommendations   - PT/OT consulted, OOB ambulation encourage - pt. reports she has been ambulating to restroom and has improved pain while mobilizing the left leg.   - Continue compression and elevation of LLE - will provide thigh high compression stocking rx upon DC   - BARBARA hose for compression ordered  - Multimodal pain regimen in place  - Regular diet   - Tentative PM discharge after PT sees patient             Elke Menard MD  Vascular Surgery  [facility]

## 2020-08-14 NOTE — SUBJECTIVE & OBJECTIVE
Interval History: NAEON. Patient reporting no headache or bleeding episodes at present. States she is feeling better since admission. Improving leg pain.      Review of Systems   Constitutional: Negative for fatigue, fever and unexpected weight change.   HENT: Negative for sore throat.    Eyes: Negative for visual disturbance.   Respiratory: Negative for cough, shortness of breath, wheezing and stridor.    Cardiovascular: Positive for leg swelling. Negative for chest pain and palpitations.   Gastrointestinal: Negative for abdominal pain, anal bleeding, blood in stool, constipation, diarrhea, nausea and vomiting.   Genitourinary: Negative for dysuria.   Musculoskeletal: Negative for arthralgias.   Neurological: Negative for dizziness and headaches.   Psychiatric/Behavioral: Negative for agitation.     Objective:     Vital Signs (Most Recent):  Temp: 97.4 °F (36.3 °C) (08/14/20 1515)  Pulse: 90 (08/14/20 1515)  Resp: 15 (08/14/20 1257)  BP: 112/66 (08/14/20 1515)  SpO2: 98 % (08/14/20 1515) Vital Signs (24h Range):  Temp:  [97.4 °F (36.3 °C)-99.1 °F (37.3 °C)] 97.4 °F (36.3 °C)  Pulse:  [] 90  Resp:  [12-24] 15  SpO2:  [94 %-99 %] 98 %  BP: (101-136)/(60-93) 112/66     Weight: 106.3 kg (234 lb 5.6 oz)  Body mass index is 40.23 kg/m².  No intake or output data in the 24 hours ending 08/14/20 1823   Physical Exam  Vitals signs and nursing note reviewed.   HENT:      Head: Normocephalic and atraumatic.      Nose: Nose normal.      Mouth/Throat:      Mouth: Mucous membranes are moist.      Comments: aphthous ulcer presented on right upper lip. Not infected appearing, no drainage  Eyes:      Conjunctiva/sclera: Conjunctivae normal.   Neck:      Musculoskeletal: Normal range of motion.   Cardiovascular:      Rate and Rhythm: Normal rate and regular rhythm.      Pulses:           Dorsalis pedis pulses are 2+ on the right side and 1+ on the left side.      Heart sounds: Normal heart sounds.   Pulmonary:      Effort:  Pulmonary effort is normal.      Breath sounds: No wheezing or rales.   Chest:      Chest wall: No tenderness.   Abdominal:      General: Bowel sounds are normal. There is no distension.      Palpations: Abdomen is soft.      Tenderness: There is no abdominal tenderness. There is no right CVA tenderness or left CVA tenderness.   Musculoskeletal: Normal range of motion.         General: No deformity.      Right lower leg: No edema.      Left lower leg: Edema present.   Skin:     General: Skin is warm and dry.      Capillary Refill: Capillary refill takes less than 2 seconds.      Coloration: Skin is not pale.      Findings: No erythema or rash.   Neurological:      General: No focal deficit present.      Mental Status: She is alert.      Cranial Nerves: No cranial nerve deficit.   Psychiatric:         Mood and Affect: Mood normal.         Behavior: Behavior normal.         Thought Content: Thought content normal.         Judgment: Judgment normal.         Significant Labs: All pertinent labs within the past 24 hours have been reviewed.    Significant Imaging: I have reviewed all pertinent imaging results/findings within the past 24 hours.

## 2020-08-14 NOTE — PLAN OF CARE
AAOx4, VSS, SpO2 >92% on RA.  RFA 20g PIV saline locked. Dressing CDI.   S/P thrombectomy on 8/12. LLE incision miguel with sutures. CDI. Approximated. Pain well controlled on current regimen. BARBARA hose in place.   Plt increased from 65 to 128. HIIT panel negative.   Tele NSR.   Mucosal ulcers on right upper lip evaluated by anesthesia. Topical 1% hydrocortisone ordered BID.  Pt saving urine for team to look at since UA was positive for hematuria.   No other issues this shift.   Ambulated to bathroom independently. Wears non slip socks when oob. Free from falls/injuries thus far this shift.   Bed in lowest, locked position. Bed rails up x3. Call light and personal belongings within reach.   Will continue to monitor.

## 2020-08-14 NOTE — PLAN OF CARE
Aimeeal completed and POC established     Jose Manuel Funk, PT, DPT  2020     Problem: Physical Therapy Goal  Goal: Physical Therapy Goal  Description: Goals to be met by: 2020    Patient will increase functional independence with mobility by performin. Supine to sit with Modified [place]  2. Sit to supine with Modified [place]  3. Sit to stand transfer with Modified [place]  4. Gait  x 50 feet with Modified [place] using LRAD  5. Lower extremity exercise program x15 reps, with independence     Outcome: Ongoing, Progressing

## 2020-08-14 NOTE — ASSESSMENT & PLAN NOTE
US LE shows Left thigh vein occlusive thrombus involving the common femoral, popliteal, and upper greater saphenous veins with minimal flow.  Patient had previous DVT in same leg ~10 years ago, treated with IVC filter and warfarin for a few months.  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      - vascular surgery following. Patient s/p thrombectomy on 8/13/20. Heparin gtt transitioned to apixaban 10mg BID  - patient with hematuria today in guzman bag, will continue to monitor Hgb and start w/u for hematuria with UA and Renal U/S to ensure no other etiology for bleeding.   - Patient on protonix 40mg daily  - Patient with down trending platelets will continue to monitor. High 4t's score this AM, negative HIT. Uptrending plts thie PM. Patient transitioned to apixaban.  - Patient with hematuria yesterday; no evidence of bleeding or hematuria today. Plts <50k and bleeding will transfuse.

## 2020-08-14 NOTE — PT/OT/SLP EVAL
Physical Therapy Evaluation    Patient Name:  Alberto Frye   MRN:  [id number]    Recommendations:     Discharge Recommendations:  home   Discharge Equipment Recommendations: none   Barriers to discharge: None    Assessment:     Alberto Frye is a 45 y.o. female admitted with a medical diagnosis of Acute deep vein thrombosis of left iliac vein.  She presents with the following impairments/functional limitations:  weakness, gait instability, impaired endurance, impaired balance, decreased lower extremity function, pain, impaired self care skills, impaired functional mobilty. Pt evaluated on this date reporting near functional baseline mobility. Pt primarily limited by LLE pain demonstrating antalgic gait pattern. pt safe to mobilize with nursing at this time to prevent further debility. Pt would benefit from continued skilled acute PT 3x/wk to improve functional mobility.        Rehab Prognosis: Fair; patient would benefit from acute skilled PT services to address these deficits and reach maximum level of function.    Recent Surgery: Procedure(s) (LRB):  Venogram, pharmacomechanical thrombectomy (Left)  ANGIOPLASTY, VEIN (Left) 2 Days Post-Op    Plan:     During this hospitalization, patient to be seen 3 x/week to address the identified rehab impairments via gait training, therapeutic activities, therapeutic exercises, neuromuscular re-education and progress toward the following goals:    · Plan of Care Expires:  09/13/20    Subjective     Chief Complaint: LLE discomfort   Patient/Family Comments/goals: Pt pleasant and willing to participate with therapy on this date.    Pain/Comfort:  · Pain Rating 1: (did not rate)  · Location - Side 1: Left  · Location 1: leg    Patients cultural, spiritual, Yazidism conflicts given the current situation: no    Living Environment:  Pt lives alone in a ground floor apartment w/0 ZARA.   Prior to admission, patients level of function was (I), driving, and  working(Clerical work).  Equipment used at home: none.  DME owned (not currently used): none.  Upon discharge, patient will have good support.    Objective:     Communicated with NSG prior to session.  Patient found HOB elevated with pulse ox (continuous), telemetry  upon PT entry to room.    General Precautions: Standard, fall   Orthopedic Precautions:N/A   Braces: N/A     Exams:  · Cognitive Exam:  Patient is oriented to Person, Place, Time and Situation  · RLE ROM: WFL  · RLE Strength: WFL  · LLE ROM: WFL  · LLE Strength: not tested d/t pain     Functional Mobility:  · Bed Mobility:     · Scooting: stand by assistance  · Supine to Sit: stand by assistance  · Transfers:     · Sit to Stand:  stand by assistance with no AD  · Toilet Transfer: stand by assistance with  no AD  using  Step Transfer  · Gait: 17ft x2 SBA without AD demonstrating antalgic gait pattern but no LOB  · Balance: SBA    Therapeutic Activities and Exercises:   - Pt educated on:   -PT roles, expectations, and POC    -Safety with mobility   -Benefits of OOB activities to increase strength and functional mobility    -Performing ther ex for increasing LE ROM and strength   -Discharge recommendations     AM-PAC 6 CLICK MOBILITY  Total Score:18     Patient left up in chair with call button in reach.    GOALS:   Multidisciplinary Problems     Physical Therapy Goals        Problem: Physical Therapy Goal    Goal Priority Disciplines Outcome Goal Variances Interventions   Physical Therapy Goal     PT, PT/OT Ongoing, Progressing     Description: Goals to be met by: 2020    Patient will increase functional independence with mobility by performin. Supine to sit with Modified [place]  2. Sit to supine with Modified [place]  3. Sit to stand transfer with Modified [place]  4. Gait  x 50 feet with Modified [place] using LRAD  5. Lower extremity exercise program x15 reps, with independence                      History:     Past  Medical History:   Diagnosis Date    Alcohol abuse     stopped heavy drinking about 10 years ago; was drinking 3 glasses of vodka/tequilla,rum/whiskey per day    Anxiety     Congestive heart failure 8/11/2020    Depression     Hallucination     Hx of psychiatric care     Hypertension     Caro     unplanned trips, energy without sleep for 2 days (reading, cleaning), feelings that she can do multiple tasks at one time, feelings of overconfidence at times    Psychiatric problem     Sleep difficulties     Therapy     Withdrawal symptoms, drug or narcotic     racing heart, restlessness       Past Surgical History:   Procedure Laterality Date     lapban surgery  2009    ESOPHAGOGASTRODUODENOSCOPY N/A 6/3/2020    Procedure: EGD (ESOPHAGOGASTRODUODENOSCOPY);  Surgeon: Johan Grover MD;  Location: [facility] (4TH FLR);  Service: Endoscopy;  Laterality: N/A;  covid 6/2-[facility]-tb    HYSTERECTOMY      PHLEBOGRAPHY Left 8/12/2020    Procedure: Venogram, pharmacomechanical thrombectomy;  Surgeon: Miguelangel Goldman MD;  Location: [facility] OR [address] [place] [address];  Service: Vascular;  Laterality: Left;  2336.43 mGy  [id number]  17.8 minutes  37 ml of contrast    VENOPLASTY Left 8/12/2020    Procedure: ANGIOPLASTY, VEIN;  Surgeon: Miguelangel Goldman MD;  Location: [facility] OR [address] [place] [address];  Service: Vascular;  Laterality: Left;       Time Tracking:     PT Received On: 08/14/20  PT Start Time: 0959     PT Stop Time: 1012  PT Total Time (min): 13 min     Billable Minutes: Evaluation 13      Nain Funk, PT  08/14/2020

## 2020-08-14 NOTE — ASSESSMENT & PLAN NOTE
US LE shows Left thigh vein occlusive thrombus involving the common femoral, popliteal, and upper greater saphenous veins with minimal flow.  Patient had previous DVT in same leg ~10 years ago, treated with IVC filter and warfarin for a few months.  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      - vascular surgery took patient to OR s/p thrombectomy on 8/13/20. Heparin gtt transitioned to apixaban 10mg BID  - Patient on protonix 40mg daily  - Patient with down trending platelets will continue to monitor. High 4t's score this AM, negative HIT. Uptrending plts thie PM. Patient transitioned to apixaban.  - Patient with hematuria yesterday; no evidence of bleeding or hematuria today. Plts <50k and bleeding will transfuse.

## 2020-08-14 NOTE — PLAN OF CARE
CM on rounds this am , spoke with pt at bedside,,hep gtt d/c and transitioned to apaxiban 10 mg 2X daily. States she's getting around slowly but improving with little pain. Will cont to monitor. POC: home with f/u pending.    Odalis Hartman, MSN  Case Management  Ext [phone]

## 2020-08-14 NOTE — PT/OT/SLP EVAL
Occupational Therapy   Evaluation & D/C    Name: Alberto Frye  MRN: [id number]  Admitting Diagnosis:  Acute deep vein thrombosis of left iliac vein 2 Days Post-Op    Recommendations:     Discharge Recommendations: home  Discharge Equipment Recommendations:  none  Barriers to discharge:  None    Assessment:     Alberto Frye is a 45 y.o. female with a medical diagnosis of Acute deep vein thrombosis of left iliac vein.  She presents with impairments listed below. Pt did well to tolerate and participate in the session. Pt is functioning close to baseline and appears limited mainly d/t pain. Pt displayed global deconditioning requiring increased assist for ADLs and mobility at this time. Pt is not currently displaying a need for acute OT services. D/C acute OT services and recommend pt D/C home.    Performance deficits affecting function: impaired functional mobilty, decreased lower extremity function, impaired cardiopulmonary response to activity.      Rehab Prognosis: Good; patient would benefit from acute skilled OT services to address these deficits and reach maximum level of function.       Plan:     Patient to be seen (D/C acute OT services) to address the above listed problems via    · Plan of Care Expires:    · Plan of Care Reviewed with: patient    Subjective     Chief Complaint: LLE pain  Patient/Family Comments/goals: return home.    Occupational Profile:  Living Environment: Pt lives in a 1st floor apt.   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  Patient found HOB elevated with pulse ox (continuous), telemetry upon OT entry to room.    General Precautions: Standard, fall    Orthopedic Precautions:N/A   Braces: N/A     Occupational Performance:    Bed Mobility:    · Patient completed Scooting/Bridging with supervision  · Patient completed Supine to Sit with supervision  · Patient completed Sit to Supine with supervision    Functional Mobility/Transfers:  · Patient completed Sit <> Stand Transfer with supervision  with  no assistive device   · Patient completed Toilet Transfer Step Transfer technique with supervision with  no AD  · Functional Mobility: Pt ambulated ~30 ft at sba w/o AD. Noted LLE limp 2/2 pain from procedure.     Activities of Daily Living:  · Upper Body Dressing: supervision donned gown as robe  · Lower Body Dressing: independence donned socks seated in bed    Cognitive/Visual Perceptual:  Cognitive/Psychosocial Skills:     -       Oriented to: Person, Place, Time and Situation   -       Follows Commands/attention:Follows multistep  commands  -       Communication: clear/fluent  -       Memory: No Deficits noted  -       Safety awareness/insight to disability: intact   -       Mood/Affect/Coping skills/emotional control: Appropriate to situation  Visual/Perceptual:      -Intact      Physical Exam:  Balance:    -       spv for balance   Postural examination/scapula alignment:    -       Rounded shoulders  Skin integrity: Visible skin intact  Upper Extremity Range of Motion:     -       Right Upper Extremity: WFL  -       Left Upper Extremity: WFL  Upper Extremity Strength:    -       Right Upper Extremity: WFL  -       Left Upper Extremity: WFL   Strength:    -       Right Upper Extremity: WFL  -       Left Upper Extremity: WFL  Fine Motor Coordination:    -       Intact  Gross motor coordination:   WFL    AMPAC 6 Click ADL:  AMPAC Total Score: 24    Treatment & Education:  Pt educated on POC.   Education:    Patient left HOB elevated with all lines intact and call button in reach    GOALS:   Multidisciplinary Problems     Occupational Therapy Goals        Problem:  Occupational Therapy Goal    Goal Priority Disciplines Outcome Interventions   Occupational Therapy Goal     OT, PT/OT Ongoing, Progressing    Description: Pt is not currently displaying a need for acute OT services. D/C acute OT services and recommend pt D/C home.                   History:     Past Medical History:   Diagnosis Date    Alcohol abuse     stopped heavy drinking about 10 years ago; was drinking 3 glasses of vodka/tequilla,rum/whiskey per day    Anxiety     Congestive heart failure 8/11/2020    Depression     Hallucination     Hx of psychiatric care     Hypertension     Caro     unplanned trips, energy without sleep for 2 days (reading, cleaning), feelings that she can do multiple tasks at one time, feelings of overconfidence at times    Psychiatric problem     Sleep difficulties     Therapy     Withdrawal symptoms, drug or narcotic     racing heart, restlessness       Past Surgical History:   Procedure Laterality Date     lapban surgery  2009    ESOPHAGOGASTRODUODENOSCOPY N/A 6/3/2020    Procedure: EGD (ESOPHAGOGASTRODUODENOSCOPY);  Surgeon: Johan Grover MD;  Location: [facility] (4TH FLR);  Service: Endoscopy;  Laterality: N/A;  covid 6/2-[facility]ERGY-tb    HYSTERECTOMY      PHLEBOGRAPHY Left 8/12/2020    Procedure: Venogram, pharmacomechanical thrombectomy;  Surgeon: Miguelangel Goldman MD;  Location: [facility] OR [address] [place] [address];  Service: Vascular;  Laterality: Left;  2336.43 mGy  [id number]  17.8 minutes  37 ml of contrast    VENOPLASTY Left 8/12/2020    Procedure: ANGIOPLASTY, VEIN;  Surgeon: Miguelangel Goldman MD;  Location: [facility] OR [address] [place] [address];  Service: Vascular;  Laterality: Left;       Time Tracking:     OT Date of Treatment: 08/14/20  OT Start Time: 1000  OT Stop Time: 1013  OT Total Time (min): 13 min    Billable Minutes:Evaluation 13 mintues    Maximo Stone OT  8/14/2020

## 2020-08-14 NOTE — PHYSICIAN QUERY
"PT Name: Alberto Frye  MR #: [id number]    Physician Query Form - Heart  Condition Clarification     CDS/: Magnolia Shanks RN, CCDS             Contact information: [email or web address]  This form is a permanent document in the medical record.     Query Date: August 14, 2020    By submitting this query, we are merely seeking further clarification of documentation. Please utilize your independent clinical judgment when addressing the question(s) below.    The medical record contains the following   Indicators     Supporting Clinical Findings Location in Medical Record   X BNP <10 8/11 lab   X EF 60% 8/11 echo    Radiology findings     X Echo Results · Normal left ventricular systolic function. The estimated ejection fraction is 60%.  · Normal LV diastolic function.  · Normal right ventricular systolic function.  · The estimated PA systolic pressure is 23 mmHg. 8/11 echo    "Ascites" documented      "SOB" or "LEON" documented      "Hypoxia" documented     X Heart Failure documented Congestive Heart Failure  Unclear if patient has heart failure however she is on Demadex, HCTZ, valsartan which appears to be the treatment for heart failure    Congestive Heart Failure  Echo 8/11/2020 showed EF 60% 8/11 h/p            8/12 h/p, 8/13 prog note    "Edema" documented      Diuretics/Meds      Treatment:      Other:      Heart failure (HF) can be acute, chronic or both. It is generally further specificed as systolic, diastolic, or combined. Lastly, it is important to identify an underlying etiology if known or suspected.     Common clues to acute exacerbation:  Rapidly progressive symptoms (w/in 2 weeks of presentation), using IV diuretics to treat, using supplemental O2, pulmonary edema on Xray, MI w/in 4 weeks, and/or BNP >500    Systolic Heart Failure: is defined as chart documentation of a left ventricular ejection fraction (LVEF) less than 40%     Diastolic Heart Failure: is defined as a left ventricular ejection " fraction (LVEF) greater than 40%   +      Evidence of diastolic dysfunction on echocardiography OR    Right heart catheterization wedge pressure above 12 mm Hg OR    Left heart catheterization left ventricular end diastolic pressure 18 mm Hg or above.    References: *American Heart Association    The clinical guidelines noted below are only system guidelines, and do not replace the providers clinical judgment.     Provider, please specify the type and acuity of CHF.  [   ] Chronic Diastolic Heart Failure - Pre-existing diastolic HF diagnosis.  EF > 40%  without  acute HF symptoms documented   [   ] Other Type of Heart Failure (please specify type):   [ x  ] Heart Failure Ruled Out   [   ] Other (please specify):   [  ] Clinically Undetermined                           Please document in your progress notes daily for the duration of treatment until resolved and include in your discharge summary.

## 2020-08-14 NOTE — PLAN OF CARE
Problem: Occupational Therapy Goal  Goal: Occupational Therapy Goal  Description: Pt is not currently displaying a need for acute OT services. D/C acute OT services and recommend pt D/C home.  Outcome: Ongoing, Progressing    Maximo Stone OTR/L  8/14/2020

## 2020-08-15 VITALS
HEART RATE: 85 BPM | WEIGHT: 234.38 LBS | HEIGHT: 64 IN | SYSTOLIC BLOOD PRESSURE: 121 MMHG | BODY MASS INDEX: 40.01 KG/M2 | RESPIRATION RATE: 18 BRPM | DIASTOLIC BLOOD PRESSURE: 70 MMHG | TEMPERATURE: 98 F | OXYGEN SATURATION: 100 %

## 2020-08-15 PROBLEM — I82.412 ACUTE DEEP VEIN THROMBOSIS (DVT) OF FEMORAL VEIN OF LEFT LOWER EXTREMITY: Status: RESOLVED | Noted: 2020-08-15 | Resolved: 2020-08-15

## 2020-08-15 PROBLEM — I82.412 ACUTE DEEP VEIN THROMBOSIS (DVT) OF FEMORAL VEIN OF LEFT LOWER EXTREMITY: Status: ACTIVE | Noted: 2020-08-15

## 2020-08-15 PROBLEM — I82.422 ACUTE DEEP VEIN THROMBOSIS (DVT) OF ILIAC VEIN OF LEFT LOWER EXTREMITY: Status: RESOLVED | Noted: 2020-08-11 | Resolved: 2020-08-15

## 2020-08-15 LAB
ALBUMIN SERPL BCP-MCNC: 2.8 G/DL (ref 3.5–5.2)
ALP SERPL-CCNC: 46 U/L (ref 55–135)
ALT SERPL W/O P-5'-P-CCNC: 15 U/L (ref 10–44)
ANION GAP SERPL CALC-SCNC: 7 MMOL/L (ref 8–16)
AST SERPL-CCNC: 32 U/L (ref 10–40)
BASOPHILS # BLD AUTO: 0.02 K/UL (ref 0–0.2)
BASOPHILS NFR BLD: 0.5 % (ref 0–1.9)
BILIRUB SERPL-MCNC: 0.5 MG/DL (ref 0.1–1)
BUN SERPL-MCNC: 15 MG/DL (ref 6–20)
CALCIUM SERPL-MCNC: 8.3 MG/DL (ref 8.7–10.5)
CHLORIDE SERPL-SCNC: 111 MMOL/L (ref 95–110)
CO2 SERPL-SCNC: 24 MMOL/L (ref 23–29)
CREAT SERPL-MCNC: 0.8 MG/DL (ref 0.5–1.4)
DIFFERENTIAL METHOD: ABNORMAL
EOSINOPHIL # BLD AUTO: 0.2 K/UL (ref 0–0.5)
EOSINOPHIL NFR BLD: 4.8 % (ref 0–8)
ERYTHROCYTE [DISTWIDTH] IN BLOOD BY AUTOMATED COUNT: 13.2 % (ref 11.5–14.5)
EST. GFR  (AFRICAN AMERICAN): >60 ML/MIN/1.73 M^2
EST. GFR  (NON AFRICAN AMERICAN): >60 ML/MIN/1.73 M^2
GLUCOSE SERPL-MCNC: 98 MG/DL (ref 70–110)
HCT VFR BLD AUTO: 30 % (ref 37–48.5)
HGB BLD-MCNC: 9.6 G/DL (ref 12–16)
IMM GRANULOCYTES # BLD AUTO: 0.02 K/UL (ref 0–0.04)
IMM GRANULOCYTES NFR BLD AUTO: 0.5 % (ref 0–0.5)
LYMPHOCYTES # BLD AUTO: 1.8 K/UL (ref 1–4.8)
LYMPHOCYTES NFR BLD: 42.2 % (ref 18–48)
MCH RBC QN AUTO: 30.6 PG (ref 27–31)
MCHC RBC AUTO-ENTMCNC: 32 G/DL (ref 32–36)
MCV RBC AUTO: 96 FL (ref 82–98)
MONOCYTES # BLD AUTO: 0.5 K/UL (ref 0.3–1)
MONOCYTES NFR BLD: 12.7 % (ref 4–15)
NEUTROPHILS # BLD AUTO: 1.6 K/UL (ref 1.8–7.7)
NEUTROPHILS NFR BLD: 39.3 % (ref 38–73)
NRBC BLD-RTO: 0 /100 WBC
PLATELET # BLD AUTO: 127 K/UL (ref 150–350)
PMV BLD AUTO: 10.5 FL (ref 9.2–12.9)
POTASSIUM SERPL-SCNC: 4.3 MMOL/L (ref 3.5–5.1)
PROT SERPL-MCNC: 6.1 G/DL (ref 6–8.4)
RBC # BLD AUTO: 3.14 M/UL (ref 4–5.4)
SODIUM SERPL-SCNC: 142 MMOL/L (ref 136–145)
WBC # BLD AUTO: 4.17 K/UL (ref 3.9–12.7)

## 2020-08-15 PROCEDURE — 99239 HOSP IP/OBS DSCHRG MGMT >30: CPT | Mod: ,,, | Performed by: INTERNAL MEDICINE

## 2020-08-15 PROCEDURE — 36415 COLL VENOUS BLD VENIPUNCTURE: CPT

## 2020-08-15 PROCEDURE — 25000003 PHARM REV CODE 250: Performed by: STUDENT IN AN ORGANIZED HEALTH CARE EDUCATION/TRAINING PROGRAM

## 2020-08-15 PROCEDURE — 80053 COMPREHEN METABOLIC PANEL: CPT

## 2020-08-15 PROCEDURE — 99239 PR HOSPITAL DISCHARGE DAY,>30 MIN: ICD-10-PCS | Mod: ,,, | Performed by: INTERNAL MEDICINE

## 2020-08-15 PROCEDURE — 85025 COMPLETE CBC W/AUTO DIFF WBC: CPT

## 2020-08-15 PROCEDURE — 94761 N-INVAS EAR/PLS OXIMETRY MLT: CPT

## 2020-08-15 RX ORDER — HYDROCODONE BITARTRATE AND ACETAMINOPHEN 5; 325 MG/1; MG/1
1 TABLET ORAL EVERY 6 HOURS PRN
Qty: 24 TABLET | Refills: 0 | Status: SHIPPED | OUTPATIENT
Start: 2020-08-15 | End: 2020-09-21 | Stop reason: SDUPTHER

## 2020-08-15 RX ORDER — HYDROCORTISONE 1 %
CREAM (GRAM) TOPICAL 2 TIMES DAILY
Qty: 28.35 G | Refills: 0 | Status: SHIPPED | OUTPATIENT
Start: 2020-08-15 | End: 2020-08-15

## 2020-08-15 RX ORDER — HYDROCODONE BITARTRATE AND ACETAMINOPHEN 5; 325 MG/1; MG/1
1 TABLET ORAL EVERY 6 HOURS PRN
Qty: 20 TABLET | Refills: 0 | Status: SHIPPED | OUTPATIENT
Start: 2020-08-15 | End: 2020-08-15

## 2020-08-15 RX ORDER — HYDROCORTISONE 1 %
CREAM (GRAM) TOPICAL 2 TIMES DAILY
Qty: 28.4 G | Refills: 0 | Status: SHIPPED | OUTPATIENT
Start: 2020-08-15 | End: 2022-05-17

## 2020-08-15 RX ADMIN — APIXABAN 10 MG: 5 TABLET, FILM COATED ORAL at 08:08

## 2020-08-15 RX ADMIN — DOCUSATE SODIUM 100 MG: 100 CAPSULE, LIQUID FILLED ORAL at 08:08

## 2020-08-15 RX ADMIN — FLUOXETINE 40 MG: 20 CAPSULE ORAL at 08:08

## 2020-08-15 RX ADMIN — HYDROCORTISONE: 10 CREAM TOPICAL at 08:08

## 2020-08-15 RX ADMIN — MUPIROCIN 1 G: 20 OINTMENT TOPICAL at 08:08

## 2020-08-15 RX ADMIN — PANTOPRAZOLE SODIUM 40 MG: 40 TABLET, DELAYED RELEASE ORAL at 06:08

## 2020-08-15 NOTE — PROGRESS NOTES
Patient received discharge instructions and verbalized understanding. Prescriptions delivered to bedside. IV discontinued with catheter tip intact. Patient in room waiting for wheelchair transport. Will continue to monitor.

## 2020-08-15 NOTE — PLAN OF CARE
Problem: Adult Inpatient Plan of Care  Goal: Plan of Care Review  Outcome: Ongoing, Progressing  Goal: Patient-Specific Goal (Individualization)  Outcome: Ongoing, Progressing  Goal: Absence of Hospital-Acquired Illness or Injury  Outcome: Ongoing, Progressing  Goal: Optimal Comfort and Wellbeing  Outcome: Ongoing, Progressing  Goal: Readiness for Transition of Care  Outcome: Ongoing, Progressing  Goal: Rounds/Family Conference  Outcome: Ongoing, Progressing     Problem: Bariatric Environmental Safety  Goal: Safety Maintained with Care  Outcome: Ongoing, Progressing     Problem: Fall Injury Risk  Goal: Absence of Fall and Fall-Related Injury  Outcome: Ongoing, Progressing     Problem: Infection  Goal: Infection Symptom Resolution  Outcome: Ongoing, Progressing     Problem: Skin Injury Risk Increased  Goal: Skin Health and Integrity  Outcome: Ongoing, Progressing      Plan of care reviewed with patient. Pt verbalized understanding.  VS remains stable. Pt reported no hematuria. Tele monitoring remains in place. Neuro check to lt lower limb wnl. Pain managed to lt. Lower extremity with PRN pain meds. Pt is free of falls and injuries. Safety Maintained. I will continue to monitor.

## 2020-08-15 NOTE — DISCHARGE SUMMARY
[facility] Hospital Medicine  Discharge Summary      Patient Name: Alberto Frye  MRN: [id number]  Admission Date: 8/11/2020  Hospital Length of Stay: 4 days  Discharge Date and Time:  08/15/2020 3:05 PM  Attending Physician: Elizabeth att. providers found   Discharging Provider: Connor M Gillies, MD  Primary Care Provider: Candace Martin MD  [facility] Medicine Team: GLORIA PETERS Connor M Gillies, MD    HPI:   Ms. Alberto Frye is a 45 y.o. female with HTN, anxiety, alcohol abuse, DVT (10 years ago, treated with IVC filter, warfarin) who was transferred to [facility] for thrombectomy/stent placement of left lower limb thrombus.  Patient originally presented to [facility] yesterday with one day of left thigh pain and leg swelling.  Patient states that this pain is in the same leg and pain is same as previous DVT diagnosed 10 years ago while in hospital for hysterectomy.  DVT at that time was treated with IVC filter and pt was on warfarin for a few months.  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  Denies any recent long distance travel, trauma, OCP/hormone replacement use.  Patient denies any fevers, chills, night sweats, chest pain, SOB, palpitations.  Patient is a former smoker, 10-15y of ~1pack/month, quit in 2016.  Patient states that she drinks socially.      Left lower limb ultrasound on 8/11 showed occlusive thrombus involving left common femoral, popliteal, upper greater saphenous, left iliac vein and distal inferior vena cava.  Patient started on lovenox.  Patient was seen by vascular surgery (Dr Goldman) who decided to transfer patient to [facility] for thrombectomy/stent placement.       Procedure(s) (LRB):  Venogram, pharmacomechanical thrombectomy (Left)  ANGIOPLASTY, VEIN (Left)      Hospital Course:   Patient underwent thrombectomy with vascular surgery 8/12/20, tolerating post-procedural symptoms well. Vascular initially managing heparin gtt. On 8/13/20,  one day post-op vascular surgery recommending changing heparin gtt to apixaban. Patient with mild hematuria, UA showing no signs of infection, 3+ blood. And will w/u OP /IP for hypercoagulable etiologies to patient's presentation. HIT panel negative with low platelets, but platelet counts improved and stabilized at slightly low levels before discharge. The patient's blood pressure was normal in the hospital with her clonidine patch so her other home blood pressure meds were not re-started, and she will be followed up with out-patient within 1 week with her PCP for a new CBC to look at her H/h, along with her platelet counts, and for her blood pressure monitoring. Pt will also follow up with vascular.     Consults:   Consults (From admission, onward)          Status Ordering Provider     Inpatient consult to Vascular Surgery  Once     Provider:  Miguelangel Goldman MD    Completed JEZ FUNG     Inpatient consult to Vascular Surgery  Once     Provider:  (Not yet assigned)    Acknowledged DANIELA IVORY            Vitals:    08/15/20 0439 08/15/20 0745 08/15/20 1056 08/15/20 1138   BP:  121/70  121/70   BP Location:  Right arm  Right arm   Patient Position:  Lying  Lying   Pulse:  85 79 85   Resp:  18  18   Temp: 98 °F (36.7 °C) 97.3 °F (36.3 °C)  98.1 °F (36.7 °C)   TempSrc: Oral Oral  Oral   SpO2:  96%  100%   Weight:       Height:         Physical Exam   Constitutional: She is oriented to person, place, and time and well-developed, well-nourished, and in no distress.   HENT:   Head: Normocephalic and atraumatic.   Eyes: Pupils are equal, round, and reactive to light.   Cardiovascular: Normal rate, regular rhythm, normal heart sounds and intact distal pulses.   Pulmonary/Chest: Effort normal and breath sounds normal. No respiratory distress. She has no rales.   Abdominal: Soft. Bowel sounds are normal. There is no abdominal tenderness. There is no guarding.   Musculoskeletal:      Comments: Trace edema left  lower extremity   Neurological: She is alert and oriented to person, place, and time.   No focal deficits   Skin: Skin is warm and dry.   Non-erythematous, non-edematous around incision site in left popliteal fossa   Vitals reviewed.      * Acute deep vein thrombosis (DVT) of iliac vein of left lower extremity-resolved as of 8/15/2020  US LE shows Left thigh vein occlusive thrombus involving the common femoral, popliteal, and upper greater saphenous veins with minimal flow.  Patient had previous DVT in same leg ~10 years ago, treated with IVC filter and warfarin for a few months.  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      - vascular surgery took patient to OR s/p thrombectomy on 8/13/20. Heparin gtt transitioned to apixaban 10mg BID  - Patient on protonix 40mg daily  - Patient with down trending platelets will continue to monitor. High 4t's score this AM, negative HIT. Uptrending plts thie PM. Patient transitioned to apixaban.  - Patient with hematuria yesterday; no evidence of bleeding or hematuria today. Plts <50k and bleeding will transfuse.      Final Active Diagnoses:    Diagnosis Date Noted POA    Neuropathy [G62.9] 08/11/2020 Yes    Congestive heart failure [I50.9] 08/11/2020 Yes    Essential hypertension [I10] 01/22/2016 Yes    Anxiety [F41.9] 01/22/2016 Yes      Problems Resolved During this Admission:    Diagnosis Date Noted Date Resolved POA    PRINCIPAL PROBLEM:  Acute deep vein thrombosis (DVT) of iliac vein of left lower extremity [I82.422] 08/11/2020 08/15/2020 Yes       Discharged Condition: stable    Disposition: Home or Self Care    Follow Up:  Follow-up Information       Chandler Bates MD.    Specialty: Cardiology  Contact information:  [address]  [address]  [facility] [address] [phone]               Miguelangel Goldman MD In 2 weeks.    Specialties: Vascular Surgery, Surgery  Why: For  wound re-check, For suture removal  Contact information:  [address]  [address]  [place] [address]  [phone]               Candace Martin MD In 1 week.    Specialties: Family Medicine, Wound Care  Contact information:  [address] [place] [id number] [phone]                   Patient Instructions:      CBC W/ AUTO DIFFERENTIAL   Standing Status: Future Standing Exp. Date: 10/14/21     Ambulatory referral/consult to Hematology / Oncology   Standing Status: Future   Referral Priority: Routine Referral Type: Consultation   Referral Reason: Specialty Services Required   Requested Specialty: Hematology and Oncology   Number of Visits Requested: 1     Notify your health care provider if you experience any of the following:  difficulty breathing or increased cough     Notify your health care provider if you experience any of the following:  severe uncontrolled pain     Notify your health care provider if you experience any of the following:  temperature >100.4     Activity as tolerated     Shower on day dressing removed (No bath)   Order Comments: You may shower 8/14. Allow water to run over you wound. Do not vigorously scrub it or submerge it. No swimming or bathing for 2 weeks.       Significant Diagnostic Studies: Labs: All labs within the past 24 hours have been reviewed    Pending Diagnostic Studies:       None           Medications:  Reconciled Home Medications:      Medication List        START taking these medications      * apixaban 5 mg Tab  Commonly known as: ELIQUIS  Take 2 tablets (10 mg total) by mouth 2 (two) times daily. for 5 days     * apixaban 5 mg Tab  Commonly known as: ELIQUIS  After finishing the first prescription: Take 1 tablet (5 mg total) by mouth 2 (two) times daily.  Start taking on: August 18, 2020     benzocaine 10 % mucosal gel  Commonly known as: ORAJEL  Use as directed in the mouth or throat as needed for Pain.     HYDROcodone-acetaminophen 5-325 mg per  tablet  Commonly known as: NORCO  Take 1 tablet by mouth every 6 (six) hours as needed for Pain.     hydrocortisone 1 % cream  Apply topically 2 (two) times daily.           * This list has 2 medication(s) that are the same as other medications prescribed for you. Read the directions carefully, and ask your doctor or other care provider to review them with you.                CONTINUE taking these medications      cholecalciferol (vitamin D3) 50 mcg (2,000 unit) Cap  Commonly known as: VITAMIN D3  Take 1 capsule (2,000 Units total) by mouth once daily.     cloNIDine 0.2 mg/24 hr td ptwk 0.2 mg/24 hr  Commonly known as: CATAPRES  Place 1 patch onto the skin every 7 days.     cyanocobalamin 1000 MCG tablet  Commonly known as: VITAMIN B-12  Take 100 mcg by mouth once daily.     ergocalciferol 50,000 unit Cap  Commonly known as: ERGOCALCIFEROL  Take 1 capsule (50,000 Units total) by mouth every 7 days. for 12 doses     FLUoxetine 40 MG capsule  Take 1 capsule (40 mg total) by mouth once daily.     fluticasone propionate 50 mcg/actuation nasal spray  Commonly known as: FLONASE  1 spray by Each Nostril route daily as needed for Rhinitis or Allergies.     gabapentin 600 MG tablet  Commonly known as: NEURONTIN  Take 1 tablet (600 mg total) by mouth nightly as needed.     hydrOXYzine 50 MG tablet  Commonly known as: ATARAX  Take 1 tablet (50 mg total) by mouth daily as needed for Anxiety (sleep).     ibuprofen 400 MG tablet  Commonly known as: ADVIL,MOTRIN  Take 1 tablet (400 mg total) by mouth every 6 (six) hours as needed.     multivitamin with minerals tablet  Take 1 tablet by mouth once daily.     pantoprazole 40 MG tablet  Commonly known as: PROTONIX  Take 1 tablet (40 mg total) by mouth before breakfast. Take one pill every morning 45 minutes before breakfast in the morning.            STOP taking these medications      valsartan-hydrochlorothiazide 160-25 mg per tablet  Commonly known as: DIOVAN-HCT               Indwelling Lines/Drains at time of discharge:   Lines/Drains/Airways       None                   Time spent on the discharge of patient: 25 minutes  Patient was seen and examined on the date of discharge and determined to be suitable for discharge.         Connor M Gillies, MD  Department of Hospital Medicine  [facility]

## 2020-08-16 ENCOUNTER — PATIENT MESSAGE (OUTPATIENT)
Dept: GASTROENTEROLOGY | Facility: CLINIC | Age: 45
End: 2020-08-16

## 2020-08-18 ENCOUNTER — PATIENT OUTREACH (OUTPATIENT)
Dept: ADMINISTRATIVE | Facility: CLINIC | Age: 45
End: 2020-08-18

## 2020-08-18 NOTE — PATIENT INSTRUCTIONS
Discharge Instructions for Deep Vein Thrombosis (DVT)  A blood clot or thrombus that forms in a large, deep vein is called a deep vein thrombosis (DVT). If a DVT is not treated, part of the clot (embolus) can break off and travel to your lungs. This is called a pulmonary embolus (PE). This can cut off the flow of blood to part or all of the lung. PE is a medical emergency and may cause death.   Healthcare providers use the term venous thromboembolism (VTE) to describe these two conditions: DVT and PE. They use the term VTE because the two conditions are very closely related. And because their prevention and treatment are also closely related.   Make sure you follow all instructions for taking your medicine, follow-up care, and diet and lifestyle changes.  Medicine  Your healthcare provider will usually prescribe an anticoagulant medicine. This medicine is a blood thinner that helps to prevent new blood clots. Anticoagulants can be given by mouth (oral), by injection, or into your vein (intravenous or IV). Commonly used anticoagulants include warfarin and heparin. Newer anticoagulants may also be used. They include rivaroxaban, apixaban, dabigatran, and enoxaparin. Your healthcare provider will provide specific instructions on how to take your anticoagulant. You may take more than one type for a period of time.  Make sure to take your anticoagulant exactly as directed. If you miss a dose, call your healthcare provider to find out what you should do. These medicines increase the chance of bleeding. So it is very important to take them correctly. Be sure to tell all of your healthcare providers, including dentists, that you are taking an anticoagulant.  Follow-up monitoring  If you are taking warfarin, you will need regular blood tests to see how it is working. These blood tests include a prothrombin time (PT), also reported as an international normalized ratio (INR). Your dose of warfarin may be changed based on the  test results. It is very important that you keep your testing appointments after you leave the hospital.  Be sure you understand the following information before you go home:   My goal PT/INR is between _____ and _____.   My next PT/INR blood draw will be on ______________ (date) at _______________ (time) at __________________________________________ (name of healthcare provider or clinic and address).   The name of the healthcare provider who will monitor my anticoagulation is ________________________ and the phone number is _________________________.  Depending on which anticoagulant you are prescribed, you may need other blood tests. Before you are discharged, your healthcare provider will review what testing is needed in detail.  Diet and warfarin  Vitamin K can interact with warfarin and reduce its ability to thin your blood. Vitamin K helps your blood clot. So sudden changes in vitamin K intake can affect the way warfarin works. You dont need to avoid foods with vitamin K. Instead, keep the amount you eat about the same each day. Foods high in vitamin K include:  · Leafy green vegetables like spinach, cabbage, and kale  · Avocado  · Asparagus  · Egg yolks  · Oils like canola, olive, and soybean  When taking warfarin, don't change your diet without first checking with your healthcare provider.  The other anticoagulants do not have the same interaction with vitamin K that warfarin does.   Medicines and your anticoagulant  Some medicines may cause problems with anticoagulant. Check with your healthcare provider before making any changes to your medicines. And don't take over-the-counter (OTC) medicines without checking with your provider. Some medicines interact with your anticoagulant and make your blood too thin. This increases your risk of bleeding. Others may stop your anticoagulant from doing its job, making your blood too thick. So it is very important to tell your healthcare provider about all of the  medicines you take, including OTCs and herbal supplements. Don't start or stop taking any medicine, including OTCs, unless your healthcare provider tells you to.  Medicines that may cause problems with your anticoagulant include:  · Some antibiotic medicines  · Some heart medicines  · Cimetidine  · Aspirin or other nonsteroidal anti-inflammatory drugs (NSAIDs) like ibuprofen, naproxen.  · Some medicines for depression, cancer, HIV infection, diabetes, seizures, gout, high cholesterol, or thyroid disease  · Vitamins with vitamin K  · Some herbal products like Orin's wort, garlic, coenzyme Q10, tumeric, and ginkgo biloba  Home care  To help prevent blood clots, you might do the following:  · Wiggle your toes and move your ankles while sitting or lying down.  · When traveling by car, make frequent stops to get up and move around.  · On long airplane rides, get up and move around when possible. If you cant get up, wiggle your toes, move your ankles and tighten your calves to keep your blood moving.  · If you have to stay in bed, do leg exercises.  · Wear support or compression stockings, if prescribed by your healthcare provider.  · Rest and put your legs up whenever they feel swollen or heavy.  · Raise the foot of your mattress 5 to 6 inches, using a foam wedge.  Lifestyle changes  To help you stay healthy, especially your heart and blood vessels, you should:  · Begin an exercise program, if you are not exercising. Ask your healthcare provider how to get started. Try walking, inside or out.  · Stay at a healthy weight. Get help to lose any extra pounds.  · Keep blood pressure in a healthy range  · If you smoke, make a plan to quit. Ask your healthcare provider about stop-smoking programs to help you quit.  Call 911  Call 911 right away if you have the following symptoms. They may mean a blood clot in your lungs:  · Chest pain  · Trouble breathing  · Fast heartbeat  · Sweating  · Fainting  · Coughing (may cough up  blood)  · Heavy or uncontrolled bleeding  When to call your healthcare provider  Call your healthcare provider if you have pain, swelling, or redness in your leg, arm, or other area. These symptoms may mean a blood clot.  If you take blood thinners and are bleeding, you may have:  · Blood in the urine   · Bleeding with bowel movements  · Very dark or tar-like stool  · Vomiting with blood  · Coughing with blood  · Bleeding from the nose  · Bleeding from the gums  · A cut that will not stop bleeding  · Bleeding from the vagina    © 1942-8640 [employer]. [address] [place] [address]. All rights reserved. This information is not intended as a substitute for professional medical care. Always follow your healthcare professional's instructions.

## 2020-08-19 ENCOUNTER — TELEPHONE (OUTPATIENT)
Dept: FAMILY MEDICINE | Facility: CLINIC | Age: 45
End: 2020-08-19

## 2020-08-19 ENCOUNTER — HOSPITAL ENCOUNTER (OUTPATIENT)
Dept: RADIOLOGY | Facility: HOSPITAL | Age: 45
Discharge: HOME | End: 2020-08-19
Attending: NURSE PRACTITIONER
Payer: COMMERCIAL

## 2020-08-19 ENCOUNTER — OFFICE VISIT (OUTPATIENT)
Dept: CARDIOLOGY | Facility: CLINIC | Age: 45
End: 2020-08-19
Payer: COMMERCIAL

## 2020-08-19 ENCOUNTER — OFFICE VISIT (OUTPATIENT)
Dept: FAMILY MEDICINE | Facility: CLINIC | Age: 45
End: 2020-08-19
Payer: COMMERCIAL

## 2020-08-19 VITALS
BODY MASS INDEX: 39.13 KG/M2 | DIASTOLIC BLOOD PRESSURE: 88 MMHG | HEART RATE: 107 BPM | SYSTOLIC BLOOD PRESSURE: 130 MMHG | TEMPERATURE: 98 F | HEIGHT: 64 IN | OXYGEN SATURATION: 98 % | WEIGHT: 229.19 LBS

## 2020-08-19 VITALS
HEART RATE: 89 BPM | BODY MASS INDEX: 39.14 KG/M2 | WEIGHT: 229.25 LBS | HEIGHT: 64 IN | SYSTOLIC BLOOD PRESSURE: 118 MMHG | DIASTOLIC BLOOD PRESSURE: 81 MMHG | OXYGEN SATURATION: 98 %

## 2020-08-19 DIAGNOSIS — Z09 HOSPITAL DISCHARGE FOLLOW-UP: Primary | ICD-10-CM

## 2020-08-19 DIAGNOSIS — R06.09 DOE (DYSPNEA ON EXERTION): ICD-10-CM

## 2020-08-19 DIAGNOSIS — I10 ESSENTIAL HYPERTENSION: ICD-10-CM

## 2020-08-19 DIAGNOSIS — I50.21 ACUTE SYSTOLIC CONGESTIVE HEART FAILURE: Primary | ICD-10-CM

## 2020-08-19 DIAGNOSIS — I82.412 DVT OF DEEP FEMORAL VEIN, LEFT: ICD-10-CM

## 2020-08-19 DIAGNOSIS — R21 GENERALIZED MACULOPAPULAR RASH: ICD-10-CM

## 2020-08-19 DIAGNOSIS — Z09 HOSPITAL DISCHARGE FOLLOW-UP: ICD-10-CM

## 2020-08-19 PROCEDURE — 3008F BODY MASS INDEX DOCD: CPT | Mod: CPTII,S$GLB,, | Performed by: INTERNAL MEDICINE

## 2020-08-19 PROCEDURE — 99999 PR PBB SHADOW E&M-EST. PATIENT-LVL V: ICD-10-PCS | Mod: PBBFAC,,, | Performed by: NURSE PRACTITIONER

## 2020-08-19 PROCEDURE — 96372 PR INJECTION,THERAP/PROPH/DIAG2ST, IM OR SUBCUT: ICD-10-PCS | Mod: S$GLB,,, | Performed by: NURSE PRACTITIONER

## 2020-08-19 PROCEDURE — 99999 PR PBB SHADOW E&M-EST. PATIENT-LVL V: CPT | Mod: PBBFAC,,, | Performed by: INTERNAL MEDICINE

## 2020-08-19 PROCEDURE — 3079F DIAST BP 80-89 MM HG: CPT | Mod: CPTII,S$GLB,, | Performed by: INTERNAL MEDICINE

## 2020-08-19 PROCEDURE — 3079F PR MOST RECENT DIASTOLIC BLOOD PRESSURE 80-89 MM HG: ICD-10-PCS | Mod: CPTII,S$GLB,, | Performed by: INTERNAL MEDICINE

## 2020-08-19 PROCEDURE — 71046 X-RAY EXAM CHEST 2 VIEWS: CPT | Mod: TC,FY

## 2020-08-19 PROCEDURE — 3074F SYST BP LT 130 MM HG: CPT | Mod: CPTII,S$GLB,, | Performed by: INTERNAL MEDICINE

## 2020-08-19 PROCEDURE — 3008F PR BODY MASS INDEX (BMI) DOCUMENTED: ICD-10-PCS | Mod: CPTII,S$GLB,, | Performed by: INTERNAL MEDICINE

## 2020-08-19 PROCEDURE — 99999 PR PBB SHADOW E&M-EST. PATIENT-LVL V: ICD-10-PCS | Mod: PBBFAC,,, | Performed by: INTERNAL MEDICINE

## 2020-08-19 PROCEDURE — 99495 TCM SERVICES (MODERATE COMPLEXITY): ICD-10-PCS | Mod: 25,S$GLB,, | Performed by: NURSE PRACTITIONER

## 2020-08-19 PROCEDURE — 96372 THER/PROPH/DIAG INJ SC/IM: CPT | Mod: S$GLB,,, | Performed by: NURSE PRACTITIONER

## 2020-08-19 PROCEDURE — 3074F PR MOST RECENT SYSTOLIC BLOOD PRESSURE < 130 MM HG: ICD-10-PCS | Mod: CPTII,S$GLB,, | Performed by: INTERNAL MEDICINE

## 2020-08-19 PROCEDURE — 99495 TRANSJ CARE MGMT MOD F2F 14D: CPT | Mod: 25,S$GLB,, | Performed by: NURSE PRACTITIONER

## 2020-08-19 PROCEDURE — 71046 X-RAY EXAM CHEST 2 VIEWS: CPT | Mod: 26,,, | Performed by: RADIOLOGY

## 2020-08-19 PROCEDURE — 99999 PR PBB SHADOW E&M-EST. PATIENT-LVL V: CPT | Mod: PBBFAC,,, | Performed by: NURSE PRACTITIONER

## 2020-08-19 PROCEDURE — 99204 OFFICE O/P NEW MOD 45 MIN: CPT | Mod: S$GLB,,, | Performed by: INTERNAL MEDICINE

## 2020-08-19 PROCEDURE — 71046 XR CHEST PA AND LATERAL: ICD-10-PCS | Mod: 26,,, | Performed by: RADIOLOGY

## 2020-08-19 PROCEDURE — 99204 PR OFFICE/OUTPT VISIT, NEW, LEVL IV, 45-59 MIN: ICD-10-PCS | Mod: S$GLB,,, | Performed by: INTERNAL MEDICINE

## 2020-08-19 RX ORDER — VALSARTAN AND HYDROCHLOROTHIAZIDE 160; 25 MG/1; MG/1
1 TABLET ORAL DAILY
Qty: 90 TABLET | Refills: 1 | Status: SHIPPED | OUTPATIENT
Start: 2020-08-19 | End: 2021-04-14 | Stop reason: SDUPTHER

## 2020-08-19 NOTE — PATIENT INSTRUCTIONS
I will call you with your lab results and x-ray results.  Keep your follow-up with cardiology, hematology, and vascular.  Return in about 1 month to establish care with PCP.

## 2020-08-19 NOTE — TELEPHONE ENCOUNTER
Please let the patient know that her chest x-ray is normal, no acute abnormality.    Thanks!  PADMAJA MoserC

## 2020-08-19 NOTE — PROGRESS NOTES
Subjective:    Patient ID:  Alberto Frye is a 45 y.o. female who presents for follow-up of Hospital Follow Up      HPI     HTN, recurrent DVT - s/p thrombectomy 8/13/20, IVC filter - on eliquis    Admitted 8/11/20  Ms. Alberto Frye is a 45 y.o. female with HTN, anxiety, alcohol abuse, DVT (10 years ago, treated with IVC filter, warfarin) who was transferred to [facility] for thrombectomy/stent placement of left lower limb thrombus.  Patient originally presented to [facility] yesterday with one day of left thigh pain and leg swelling.  Patient states that this pain is in the same leg and pain is same as previous DVT diagnosed 10 years ago while in hospital for hysterectomy.  DVT at that time was treated with IVC filter and pt was on warfarin for a few months.  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  Denies any recent long distance travel, trauma, OCP/hormone replacement use.  Patient denies any fevers, chills, night sweats, chest pain, SOB, palpitations.  Patient is a former smoker, 10-15y of ~1pack/month, quit in 2016.  Patient states that she drinks socially.       Left lower limb ultrasound on 8/11 showed occlusive thrombus involving left common femoral, popliteal, upper greater saphenous, left iliac vein and distal inferior vena cava.  Patient started on lovenox.  Patient was seen by vascular surgery (Dr Goldman) who decided to transfer patient to [facility] for thrombectomy/stent placement.      Patient underwent thrombectomy with vascular surgery 8/12/20, tolerating post-procedural symptoms well. Vascular initially managing heparin gtt. On 8/13/20, one day post-op vascular surgery recommending changing heparin gtt to apixaban. Patient with mild hematuria, UA showing no signs of infection, 3+ blood. And will w/u OP /IP for hypercoagulable etiologies to patient's presentation. HIT panel negative with low platelets, but platelet counts improved and stabilized at slightly low  levels before discharge. The patient's blood pressure was normal in the hospital with her clonidine patch so her other home blood pressure meds were not re-started, and she will be followed up with out-patient within 1 week with her PCP for a new CBC to look at her H/h, along with her platelet counts, and for her blood pressure monitoring. Pt will also follow up with vascular.     * Acute deep vein thrombosis (DVT) of iliac vein of left lower extremity-resolved as of 8/15/2020  US LE shows Left thigh vein occlusive thrombus involving the common femoral, popliteal, and upper greater saphenous veins with minimal flow.  Patient had previous DVT in same leg ~10 years ago, treated with IVC filter and warfarin for a few months.  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       - vascular surgery took patient to OR s/p thrombectomy on 8/13/20. Heparin gtt transitioned to apixaban 10mg BID  - Patient on protonix 40mg daily  - Patient with down trending platelets will continue to monitor. High 4t's score this AM, negative HIT. Uptrending plts thie PM. Patient transitioned to apixaban.  - Patient with hematuria yesterday; no evidence of bleeding or hematuria today. Plts <50k and bleeding will transfuse    Echo 8/11/20  · Normal left ventricular systolic function. The estimated ejection fraction is 60%.  · Normal LV diastolic function.  · Normal right ventricular systolic function.  · The estimated PA systolic pressure is 23 mmHg.     8/19/20 Denies CP or SOB. Started with a mild rash since being placed on eliquis  Scheduled to f[place] [facility] and see hematology tomorrow  BP well controlled      Review of Systems   Constitution: Negative for decreased appetite.   HENT: Negative for ear discharge.    Eyes: Negative for blurred vision.   Respiratory: Negative for hemoptysis.    Endocrine: Negative for polyphagia.    Hematologic/Lymphatic: Negative for adenopathy.   Skin: Negative for color change.   Musculoskeletal: Negative for joint swelling.   Genitourinary: Negative for bladder incontinence.   Neurological: Negative for brief paralysis.   Psychiatric/Behavioral: Negative for hallucinations.   Allergic/Immunologic: Negative for hives.        Objective:    Physical Exam   Constitutional: She is oriented to person, place, and time. She appears well-developed and well-nourished.   HENT:   Head: Normocephalic and atraumatic.   Eyes: Pupils are equal, round, and reactive to light. Conjunctivae are normal.   Neck: Normal range of motion. Neck supple.   Cardiovascular: Normal rate, normal heart sounds and intact distal pulses.   Pulmonary/Chest: Effort normal and breath sounds normal.   Abdominal: Soft. Bowel sounds are normal.   Musculoskeletal: Normal range of motion.   Neurological: She is alert and oriented to person, place, and time.   Skin: Skin is warm and dry.         Assessment:       1. Acute systolic congestive heart failure    2. Essential hypertension         Plan:       Recurrent DVT per hematology and vascular surgery  ? Allergic reaction to eliquis - will observe  Echo with good EF - no evidence of CHF  HTN under control  Will f/u 1 year

## 2020-08-19 NOTE — PROGRESS NOTES
History of Present Illness   Alberto Frye is a 45 y.o. woman with medical history as listed below who presents today for hospital discharge follow-up, DVT of left femoral vein. Please see HPI and hospital course below per discharge summary.    HPI:   Ms. Alberto Frye is a 45 y.o. female with HTN, anxiety, alcohol abuse, DVT (10 years ago, treated with IVC filter, warfarin) who was transferred to [facility] for thrombectomy/stent placement of left lower limb thrombus.  Patient originally presented to [facility] yesterday with one day of left thigh pain and leg swelling.  Patient states that this pain is in the same leg and pain is same as previous DVT diagnosed 10 years ago while in hospital for hysterectomy.  DVT at that time was treated with IVC filter and pt was on warfarin for a few months.  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  Denies any recent long distance travel, trauma, OCP/hormone replacement use.  Patient denies any fevers, chills, night sweats, chest pain, SOB, palpitations.  Patient is a former smoker, 10-15y of ~1pack/month, quit in 2016.  Patient states that she drinks socially.       Left lower limb ultrasound on 8/11 showed occlusive thrombus involving left common femoral, popliteal, upper greater saphenous, left iliac vein and distal inferior vena cava.  Patient started on lovenox.  Patient was seen by vascular surgery (Dr Goldman) who decided to transfer patient to [facility] for thrombectomy/stent placement.     Procedure(s) (LRB):  Venogram, pharmacomechanical thrombectomy (Left)  ANGIOPLASTY, VEIN (Left)       Hospital Course:   Patient underwent thrombectomy with vascular surgery 8/12/20, tolerating post-procedural symptoms well. Vascular initially managing heparin gtt. On 8/13/20, one day post-op vascular surgery recommending changing heparin gtt to apixaban. Patient with mild hematuria, UA showing no signs of infection, 3+ blood. And will w/u OP /IP for  hypercoagulable etiologies to patient's presentation. HIT panel negative with low platelets, but platelet counts improved and stabilized at slightly low levels before discharge. The patient's blood pressure was normal in the hospital with her clonidine patch so her other home blood pressure meds were not re-started, and she will be followed up with out-patient within 1 week with her PCP for a new CBC to look at her H/h, along with her platelet counts, and for her blood pressure monitoring. Pt will also follow up with vascular.     Post Discharge:  Today, she reports overall fatigue and shortness of breath on exertion. She also reports a tightness in her chest associated with shortness of breath. Her symptoms are relieved with rest. She denies dizziness, LOC, orthopnea, or PND. She reports improvement in swelling to LLE, although she still has tightness in the calf. She reports compliance with medications. She resumed her Valsartan-HCTZ since hospitalization, and her BP is WNL today. Of note, she developed a rash last night that is red, raised, and intensely pruritic. Rash is generalized to trunk, neck, upper extremities, and lower extremities. She denies new medications, products or foods with the exception of the Eliquis. However, she has had no SE from the Eliquis since it was started during her hospitalization. She denies abnormal bleeding or bruising. She has upcoming appointment with cardiology today, hematology tomorrow, and vascular surgery in one week. She will require [facility] paperwork for her job which she will bring to the clinic to have completed. She would like to establish with a new PCP, and we will set up that appointment for her. She has no additional complaints and is otherwise healthy on today's visit.    Transitional Care Note    Family and/or Caretaker present at visit?  No.  Diagnostic tests reviewed/disposition: No diagnosic tests pending after this hospitalization.  Disease/illness education:  Discussed DVT etiology and ER precautions.  Home health/community services discussion/referrals: Patient does not have home health established from hospital visit.  They do not need home health.  If needed, we will set up home health for the patient.   Establishment or re-establishment of referral orders for community resources: No other necessary community resources.   Discussion with other health care providers: Will discuss with Dr. Cespedes as patient will be returning to establish care with him in one month.       Past Medical History:   Diagnosis Date    Alcohol abuse     stopped heavy drinking about 10 years ago; was drinking 3 glasses of vodka/tequilla,rum/whiskey per day    Anxiety     Congestive heart failure 8/11/2020    Depression     Hallucination     Hx of psychiatric care     Hypertension     Caro     unplanned trips, energy without sleep for 2 days (reading, cleaning), feelings that she can do multiple tasks at one time, feelings of overconfidence at times    Psychiatric problem     Sleep difficulties     Therapy     Withdrawal symptoms, drug or narcotic     racing heart, restlessness       Past Surgical History:   Procedure Laterality Date     lapban surgery  2009    ESOPHAGOGASTRODUODENOSCOPY N/A 6/3/2020    Procedure: EGD (ESOPHAGOGASTRODUODENOSCOPY);  Surgeon: Johan Grover MD;  [facility]  Service: Endoscopy;  Laterality: N/A;  covid 6/2-[facility]-tb    HYSTERECTOMY      PHLEBOGRAPHY Left 8/12/2020    Procedure: Venogram, pharmacomechanical thrombectomy;  Surgeon: Miguelangel Goldman MD;  Location: [address];  Service: Vascular;  Laterality: Left;  2336.43 mGy  494.38lspz5  17.8 minutes  37 ml of contrast    VENOPLASTY Left 8/12/2020    Procedure: ANGIOPLASTY, VEIN;  Surgeon: Miguelangel Goldman MD;  Location: [facility] OR [address] [place] [address];  Service: Vascular;  Laterality: Left;       Social History     Socioeconomic History    Marital status:       Spouse name: Not on file    Number of children: 3    Years of education: Not on file    Highest education level: Not on file   Occupational History    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[employer]   Social Needs    Financial resource strain: Not hard at all    Food insecurity     Worry: Never true     Inability: Never true    Transportation needs     Medical: No     Non-medical: No   Tobacco Use    Smoking status: Former Smoker    Smokeless tobacco: Never Used    Tobacco comment: quit in october 2016   Substance and Sexual Activity    Alcohol use: Yes     Alcohol/week: 2.0 standard drinks     Types: 2 Cans of beer per week     Frequency: 2-4 times a month     Drinks per session: 1 or 2     Binge frequency: Never     Comment: social presently, excessive 10 years ago    Drug use: Yes     Types: Marijuana     Comment: uses [employer] weekly    Sexual activity: Not Currently     Partners: Male     Birth control/protection: See Surgical Hx   Lifestyle    Physical activity     Days per week: 2 days     Minutes per session: 40 min    Stress: To some extent   Relationships    Social connections     Talks on phone: More than three times a week     Gets together: Once a week     Attends Scientology service: Not on file     Active member of club or organization: No     Attends meetings of clubs or organizations: Never     Relationship status:    Other Topics Concern    Patient feels they ought to cut down on drinking/drug use No    Patient annoyed by others criticizing their drinking/drug use No    Patient has felt bad or guilty about drinking/drug use No    Patient has had a drink/used drugs as an eye opener in the AM No   Social History Narrative    Has 3 healthy grown sons (1990, 1993, 1998) Lives alone.    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   in 2010.    Has Male partner since 2014 who is currently  "disabled.       Family History   Problem Relation Age of Onset    Diabetes Mother     Cancer Mother     Hypertension Mother     Cirrhosis Maternal Uncle         ETOH    Celiac disease Neg Hx     Colon cancer Neg Hx     Colon polyps Neg Hx     Crohn's disease Neg Hx     Esophageal cancer Neg Hx     Inflammatory bowel disease Neg Hx     Liver cancer Neg Hx     Liver disease Neg Hx     Rectal cancer Neg Hx     Stomach cancer Neg Hx     Ulcerative colitis Neg Hx        Review of Systems  Review of Systems   Constitutional: Positive for malaise/fatigue. Negative for chills, fever and weight loss.   Eyes: Negative for blurred vision and double vision.   Respiratory: Positive for shortness of breath (on exertion). Negative for cough and wheezing.    Cardiovascular: Positive for chest pain (heaviness) and leg swelling (LLE, improving). Negative for palpitations, orthopnea, claudication and PND.   Gastrointestinal: Negative for blood in stool, constipation, diarrhea and melena.   Genitourinary: Negative for dysuria, flank pain and hematuria.   Musculoskeletal: Negative for back pain, joint pain and neck pain.   Skin: Positive for itching and rash.   Neurological: Negative for dizziness, loss of consciousness and headaches.   Endo/Heme/Allergies: Negative for polydipsia.     A complete review of systems was otherwise negative.    Physical Exam  /88   Pulse 107   Temp 97.9 °F (36.6 °C) (Temporal)   Ht 5' 4" (1.626 m)   Wt 103.9 kg (229 lb 2.7 oz)   SpO2 98%   BMI 39.34 kg/m²   General appearance: alert, appears stated age, cooperative, no distress and appears uncomfortable  Neck: no carotid bruit, no JVD and supple, symmetrical, trachea midline  Back: symmetric, no curvature. ROM normal. No CVA tenderness.  Lungs: diminished breath sounds to LLL, otherwise breath sounds clear to auscultation, normal rate with increased effort  Heart: regular rate and rhythm, S1, S2 normal, no murmur, click, rub or " gallop  Extremities: edema 1+ non-pitting to LLE and positive Jazzmine's sign LLE; RLE unremarkable  Pulses: 2+ and symmetric  Skin: generalized maculopapular rash that is erythematous and pruritic  Neurologic: Grossly normal    Assessment/Plan  Hospital discharge follow-up  See disease specific recommendations below.  Labs and chest x-ray today.  Keep follow-up with cardiology, hematology, and vascular surgery as scheduled.  Return as scheduled within one month to establish with Dr. Cespedes.  -     valsartan-hydrochlorothiazide (DIOVAN-HCT) 160-25 mg per tablet; Take 1 tablet by mouth once daily.  Dispense: 90 tablet; Refill: 1  -     CBC auto differential; Future; Expected date: 08/19/2020  -     Comprehensive metabolic panel; Future; Expected date: 08/19/2020  -     X-Ray Chest PA And Lateral; Future; Expected date: 08/19/2020  -     methylPREDNISolone sod suc(PF) injection 40 mg    DVT of deep femoral vein, left  Repeat CBC.  Keep follow-up with hematology for coagulopathy work-up and vascular surgery for DVT follow-up.  -     CBC auto differential; Future; Expected date: 08/19/2020    Essential hypertension  The current medical regimen is effective;  continue present plan and medications.  BP well controlled on Diovan-HCTZ.  -     valsartan-hydrochlorothiazide (DIOVAN-HCT) 160-25 mg per tablet; Take 1 tablet by mouth once daily.  Dispense: 90 tablet; Refill: 1  -     Comprehensive metabolic panel; Future; Expected date: 08/19/2020    LEON (dyspnea on exertion)  Check chest x-ray and labs as listed.  She will proceed from office today to cardiology appointment.  Discussed that she should discuss her chest tightness and LEON with cardiology.  If x-ray normal and cardiac work-up unremarkable, would consider CTA to r/o PE.  -     X-Ray Chest PA And Lateral; Future; Expected date: 08/19/2020    Generalized maculopapular rash  Possible drug reaction from Eliquis, although this would be a delayed reaction.  One time IM dose  of Solumedrol provided in clinic.  Recommend that she take hydroxyzine once home for the pruritis.  Recommend she discuss this with cardiology as we may need to change her anticoagulant therapy. May also need to consult with hematology and vascular surgery for recommendations.  -     methylPREDNISolone sod suc(PF) injection 40 mg    Patient has verbalized understanding and is in agreement with plan of care.    Follow up in about 1 month (around 9/19/2020) for to establish with Dr. Cespedes.      Answers for HPI/ROS submitted by the patient on 8/17/2020   activity change: Yes  unexpected weight change: No  rhinorrhea: No  trouble swallowing: No  visual disturbance: No  chest tightness: No  polyuria: Yes  difficulty urinating: No  menstrual problem: No  joint swelling: No  arthralgias: No  confusion: No  dysphoric mood: No

## 2020-08-20 ENCOUNTER — OFFICE VISIT (OUTPATIENT)
Dept: HEMATOLOGY/ONCOLOGY | Facility: CLINIC | Age: 45
End: 2020-08-20
Payer: COMMERCIAL

## 2020-08-20 ENCOUNTER — TELEPHONE (OUTPATIENT)
Dept: HEMATOLOGY/ONCOLOGY | Facility: CLINIC | Age: 45
End: 2020-08-20

## 2020-08-20 VITALS
TEMPERATURE: 99 F | SYSTOLIC BLOOD PRESSURE: 134 MMHG | HEIGHT: 64 IN | WEIGHT: 228.38 LBS | HEART RATE: 94 BPM | OXYGEN SATURATION: 96 % | BODY MASS INDEX: 38.99 KG/M2 | DIASTOLIC BLOOD PRESSURE: 95 MMHG

## 2020-08-20 DIAGNOSIS — I82.402 DEEP VEIN THROMBOSIS (DVT) OF LEFT LOWER EXTREMITY, UNSPECIFIED CHRONICITY, UNSPECIFIED VEIN: Primary | ICD-10-CM

## 2020-08-20 PROCEDURE — 3075F SYST BP GE 130 - 139MM HG: CPT | Mod: CPTII,S$GLB,, | Performed by: INTERNAL MEDICINE

## 2020-08-20 PROCEDURE — 99999 PR PBB SHADOW E&M-EST. PATIENT-LVL IV: ICD-10-PCS | Mod: PBBFAC,,, | Performed by: INTERNAL MEDICINE

## 2020-08-20 PROCEDURE — 99205 PR OFFICE/OUTPT VISIT, NEW, LEVL V, 60-74 MIN: ICD-10-PCS | Mod: S$GLB,,, | Performed by: INTERNAL MEDICINE

## 2020-08-20 PROCEDURE — 3008F BODY MASS INDEX DOCD: CPT | Mod: CPTII,S$GLB,, | Performed by: INTERNAL MEDICINE

## 2020-08-20 PROCEDURE — 3080F DIAST BP >= 90 MM HG: CPT | Mod: CPTII,S$GLB,, | Performed by: INTERNAL MEDICINE

## 2020-08-20 PROCEDURE — 3075F PR MOST RECENT SYSTOLIC BLOOD PRESS GE 130-139MM HG: ICD-10-PCS | Mod: CPTII,S$GLB,, | Performed by: INTERNAL MEDICINE

## 2020-08-20 PROCEDURE — 3008F PR BODY MASS INDEX (BMI) DOCUMENTED: ICD-10-PCS | Mod: CPTII,S$GLB,, | Performed by: INTERNAL MEDICINE

## 2020-08-20 PROCEDURE — 99205 OFFICE O/P NEW HI 60 MIN: CPT | Mod: S$GLB,,, | Performed by: INTERNAL MEDICINE

## 2020-08-20 PROCEDURE — 99999 PR PBB SHADOW E&M-EST. PATIENT-LVL IV: CPT | Mod: PBBFAC,,, | Performed by: INTERNAL MEDICINE

## 2020-08-20 PROCEDURE — 3080F PR MOST RECENT DIASTOLIC BLOOD PRESSURE >= 90 MM HG: ICD-10-PCS | Mod: CPTII,S$GLB,, | Performed by: INTERNAL MEDICINE

## 2020-08-20 NOTE — LETTER
August 20, 2020      Candace Martin MD  [address]  [place] [address]           [facility]  [address]   [place] [address]  Phone: [phone]          Patient: Alberto Frye   MR Number: [id number]   YOB: 1975   Date of Visit: 8/20/2020       Dear Dr. Candace Martin:    Thank you for referring Alberto Frye to me for evaluation. Attached you will find relevant portions of my assessment and plan of care.    If you have questions, please do not hesitate to call me. I look forward to following Alberto Frye along with you.    Sincerely,    Kari Tuttle MD    Enclosure  CC:  No Recipients    If you would like to receive this communication electronically, please contact [email or web address] or [phone] to request more information on [employer] Link access.    For providers and/or their staff who would like to refer a patient to [facility], please contact us through our one-stop-shop provider referral line, [facility] , at [phone].    If you feel you have received this communication in error or would no longer like to receive these types of communications, please e-mail [email or web address]

## 2020-08-20 NOTE — TELEPHONE ENCOUNTER
Patient notified and verbalized understanding.----- Message from Kari Tuttle MD sent at 8/20/2020 12:28 PM CDT -----  Notify pt to take dose of Benadryl at same time she takes her blood thinners  ( to help her itching)

## 2020-08-20 NOTE — PROGRESS NOTES
Subjective:       Patient ID: Alberto Frye is a 45 y.o. female.    Chief Complaint: Consult  Reason For Consultation: LLE DVT   HPI     45-year-old female with past medical history of left lower extremity DVT around 2009 status post IVC filter, and obesity who presented with extensive left lower extremity DVT (extending from distal IVC and left iliac vein to distal IVC to the posterior tibial and peroneal veins.  Vascular surgery was consulted on 08/11, status post thrombectomy 8/12.  Initially anticoagulated with heparin drip, transitioned to Eliquis 8/13.   Thrombocytopenia noted 8/14, but patient already transitioned to Eliquis on 8/13. Platelet counts improved (from 65 on 8/14 to 127 on 8/15). Patient with hematuria in guzman bag post op day 1, but denies any hematuria since guzman removed on 8/13. She reports small clots from her sinus tract but no overt epistaxis. No other clinically significant bleeding reported. Hgb did trend down to 9.6 from 12.7 on admission, thought to be secondary to post-operative status with reported hematuria as above. 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 No family history of clots. No recent surgeries or infections. No prolonged periods of immobility. No trauma. She remains on Eliquis. She completed 10mg bid as prescribed. She now reports taking 10mg qd?. She reports pain LLE improved. LLE swelling improved  No SOB/CP . She was diagnosed with LLE DVT [facility] 11 yrs ago 2009 ? Which she developed postop following hysterctomy.She underwent IVC filter placement. She was prescribed anticoagulation with Lovenox and then transitioned to coumadin x 6mos. She reports Last Mammogram diagnostic imaging 8/2020 benign. Colonoscopy 2019 Nov [facility] - hemorrhoids, diverticulosis. Her only complaint today is of minor rash on chest and back with some mild assoc pruritus which started yesterday.  No tongue swelling/CP/SOB.       Sochx: Hx of ETOH abuse quit heavy drinking. She reports now drinking 1 16 oz beer 3x/week. Smokes marijuana. 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    Past Medical History:   Diagnosis Date    Alcohol abuse     stopped heavy drinking about 10 years ago; was drinking 3 glasses of vodka/tequilla,rum/whiskey per day    Anxiety     Congestive heart failure 8/11/2020    Depression     Hallucination     Hx of psychiatric care     Hypertension     Caro     unplanned trips, energy without sleep for 2 days (reading, cleaning), feelings that she can do multiple tasks at one time, feelings of overconfidence at times    Psychiatric problem     Sleep difficulties     Therapy     Withdrawal symptoms, drug or narcotic     racing heart, restlessness         Past Surgical History:   Procedure Laterality Date     lapban surgery  2009    ESOPHAGOGASTRODUODENOSCOPY N/A 6/3/2020    Procedure: EGD (ESOPHAGOGASTRODUODENOSCOPY);  Surgeon: Johan Grover MD;  Location: [facility] (4TH FLR);  Service: Endoscopy;  Laterality: N/A;  covid 6/2-[employer]-tb    HYSTERECTOMY      PHLEBOGRAPHY Left 8/12/2020    Procedure: Venogram, pharmacomechanical thrombectomy;  Surgeon: Miguelangel Goldman MD;  Location: [facility] OR [address] [place] [address];  Service: Vascular;  Laterality: Left;  2336.43 mGy  [id number]  17.8 minutes  37 ml of contrast    VENOPLASTY Left 8/12/2020    Procedure: ANGIOPLASTY, VEIN;  Surgeon: Miguelangel Goldman MD;  Location: [facility] OR [address] [place] [address];  Service: Vascular;  Laterality: Left;     Review of Systems   Constitutional: Negative for appetite change, fatigue, fever and unexpected weight change.   HENT: Negative for mouth sores.    Eyes: Negative for visual disturbance.   Respiratory: Negative for cough and shortness of breath.    Cardiovascular: Negative for chest pain.   Gastrointestinal: Negative for abdominal pain, constipation, diarrhea and nausea.  "  Genitourinary: Negative for frequency.   Musculoskeletal: Negative for back pain.   Integumentary:  Negative for rash.   Neurological: Negative for headaches.   Hematological: Negative for adenopathy.   Psychiatric/Behavioral: The patient is not nervous/anxious.          Objective:       Vitals:    08/20/20 0857   BP: (!) 134/95   BP Location: Right arm   Patient Position: Sitting   BP Method: Medium (Automatic)   Pulse: 94   Temp: 99.1 °F (37.3 °C)   TempSrc: Oral   SpO2: 96%   Weight: 103.6 kg (228 lb 6.3 oz)   Height: 5' 4" (1.626 m)       Physical Exam  Constitutional:       Appearance: She is well-developed.   HENT:      Head: Normocephalic.      Mouth/Throat:      Pharynx: No oropharyngeal exudate.   Eyes:      General: Lids are normal. No scleral icterus.     Conjunctiva/sclera: Conjunctivae normal.   Neck:      Musculoskeletal: Normal range of motion and neck supple.      Thyroid: No thyromegaly.   Cardiovascular:      Rate and Rhythm: Normal rate and regular rhythm.      Heart sounds: Normal heart sounds. No murmur.   Pulmonary:      Breath sounds: Normal breath sounds. No wheezing or rales.   Abdominal:      General: Bowel sounds are normal.      Palpations: Abdomen is soft.      Tenderness: There is no abdominal tenderness. There is no guarding or rebound.   Musculoskeletal: Normal range of motion.         General: No swelling or tenderness.      Right lower leg: No edema.      Left lower leg: No edema.   Lymphadenopathy:      Cervical: No cervical adenopathy.      Upper Body:      Right upper body: No supraclavicular adenopathy.      Left upper body: No supraclavicular adenopathy.   Skin:     General: Skin is warm and dry.      Findings: Rash present. No ecchymosis or petechiae.   Neurological:      Mental Status: She is alert and oriented to person, place, and time.      Cranial Nerves: No cranial nerve deficit.      Coordination: Coordination normal.         Lab Results   Component Value Date    WBC " 6.39 08/19/2020    HGB 12.2 08/19/2020    HCT 37.1 08/19/2020    MCV 91 08/19/2020     08/19/2020     US 8/11/2020   FINDINGS:  The left iliac vein is totally occluded.  It measures approximately 1.5 cm in its diameter.  There is suggestion of extension of the thrombus also in the inferior vena cava distally that appears to be partially occluded..  The proximal portion of the inferior vena cava in the upper abdomen is not evaluated well due to presence of bowel gas in the abdomen.     Impression:     Findings representing left iliac vein and distal inferior vena cava thrombosis.  The rest of the IVC was not evaluated well on this study.  Assessment:       1. Deep vein thrombosis (DVT) of left lower extremity, unspecified chronicity, unspecified vein        Plan:       46 y/o with recurrent DVT - s/p thrombectomy 8/13/20, IVC filter  She has been prescribed Eliquis  She will need to remain on lifelong anticoagulation at time of her Eliquis    It is unclear if skin rash related to DOAC  Skin rash and prurtius rare SE ( 1-2 % range?)  It is recommended the patient take over-the-counter dose of Benadryl at the time of her dose of Eliquis    Pt also advised on proper admin of medication and to  take Eliquis as prescribed 5 mg 1 p.o. b.i.d. she patient was taking medication incorrectly.    Patient notified office if the rash no improvement in rash.  Patient has no other symptoms    Follow-up in 3 months with labs

## 2020-08-27 ENCOUNTER — OFFICE VISIT (OUTPATIENT)
Dept: VASCULAR SURGERY | Facility: CLINIC | Age: 45
End: 2020-08-27
Payer: COMMERCIAL

## 2020-08-27 VITALS
HEIGHT: 64 IN | DIASTOLIC BLOOD PRESSURE: 80 MMHG | WEIGHT: 230.38 LBS | BODY MASS INDEX: 39.33 KG/M2 | SYSTOLIC BLOOD PRESSURE: 150 MMHG

## 2020-08-27 DIAGNOSIS — I82.422 ACUTE DEEP VEIN THROMBOSIS (DVT) OF ILIAC VEIN OF LEFT LOWER EXTREMITY: ICD-10-CM

## 2020-08-27 DIAGNOSIS — I82.412 ACUTE DEEP VEIN THROMBOSIS (DVT) OF FEMORAL VEIN OF LEFT LOWER EXTREMITY: Primary | ICD-10-CM

## 2020-08-27 PROCEDURE — 3077F SYST BP >= 140 MM HG: CPT | Mod: CPTII,S$GLB,, | Performed by: SURGERY

## 2020-08-27 PROCEDURE — 99214 PR OFFICE/OUTPT VISIT, EST, LEVL IV, 30-39 MIN: ICD-10-PCS | Mod: S$GLB,,, | Performed by: SURGERY

## 2020-08-27 PROCEDURE — 3008F BODY MASS INDEX DOCD: CPT | Mod: CPTII,S$GLB,, | Performed by: SURGERY

## 2020-08-27 PROCEDURE — 3079F PR MOST RECENT DIASTOLIC BLOOD PRESSURE 80-89 MM HG: ICD-10-PCS | Mod: CPTII,S$GLB,, | Performed by: SURGERY

## 2020-08-27 PROCEDURE — 99214 OFFICE O/P EST MOD 30 MIN: CPT | Mod: S$GLB,,, | Performed by: SURGERY

## 2020-08-27 PROCEDURE — 3077F PR MOST RECENT SYSTOLIC BLOOD PRESSURE >= 140 MM HG: ICD-10-PCS | Mod: CPTII,S$GLB,, | Performed by: SURGERY

## 2020-08-27 PROCEDURE — 3008F PR BODY MASS INDEX (BMI) DOCUMENTED: ICD-10-PCS | Mod: CPTII,S$GLB,, | Performed by: SURGERY

## 2020-08-27 PROCEDURE — 3079F DIAST BP 80-89 MM HG: CPT | Mod: CPTII,S$GLB,, | Performed by: SURGERY

## 2020-08-27 PROCEDURE — 99999 PR PBB SHADOW E&M-EST. PATIENT-LVL IV: CPT | Mod: PBBFAC,,, | Performed by: SURGERY

## 2020-08-27 PROCEDURE — 99999 PR PBB SHADOW E&M-EST. PATIENT-LVL IV: ICD-10-PCS | Mod: PBBFAC,,, | Performed by: SURGERY

## 2020-08-27 NOTE — PROGRESS NOTES
Miguelangel Goldman MD, RPVI                                 [facility]                                                08/27/2020    HPI:  Alberto Frye is a 45 y.o. female with   Patient Active Problem List   Diagnosis    Essential hypertension    Seasonal allergies    Anxiety    Abdominal pain    Neuropathy    Congestive heart failure    being managed by PCP and specialists who is here today for evaluation of LLE DVT.  Patient states location is LLE occurring for 1 day.  Has had a left lower extremity DVT in the past after her hysterectomy which was determined to be provoked she was managed with anticoagulation for a few months.  She also had a [place] filter placed at [facility].  She has not had any other problems since that time 11 years ago.  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  Developed left lower extremity pain and swelling yesterday and took over-the-counter pain medication and this did not improve so she presented to the emergency room early this morning.  Associated signs and symptoms include edema and pain.  Quality is sharp and severity is 6/10.  Symptoms began yesterday.  Alleviating factors include elevation.  Worsening factors include dependency and pressure.  Denies trauma and any other long period of immobilization or recent surgery.  No family history or personal history of hypercoagulable disorders.     no MI  no Stroke  Tobacco use: denies     8/2020:  Feels much better.    Past Medical History:   Diagnosis Date    Alcohol abuse     stopped heavy drinking about 10 years ago; was drinking 3 glasses of vodka/tequilla,rum/whiskey per day    Anxiety     Congestive heart failure 8/11/2020    Depression     Hallucination     Hx of psychiatric care     Hypertension     Caro     unplanned trips, energy without sleep for 2 days (reading, cleaning), feelings that she  can do multiple tasks at one time, feelings of overconfidence at times    Psychiatric problem     Sleep difficulties     Therapy     Withdrawal symptoms, drug or narcotic     racing heart, restlessness     Past Surgical History:   Procedure Laterality Date     lapban surgery  2009    ESOPHAGOGASTRODUODENOSCOPY N/A 6/3/2020    Procedure: EGD (ESOPHAGOGASTRODUODENOSCOPY);  Surgeon: Johan Grovre MD;  Location: [facility] (4TH FLR);  Service: Endoscopy;  Laterality: N/A;  covid 6/2-[employer] [facility]-tb    HYSTERECTOMY      PHLEBOGRAPHY Left 8/12/2020    Procedure: Venogram, pharmacomechanical thrombectomy;  Surgeon: Miguelangel Goldman MD;  Location: [facility] OR 2ND FLR;  Service: Vascular;  Laterality: Left;  2336.43 mGy  [id number]  17.8 minutes  37 ml of contrast    VENOPLASTY Left 8/12/2020    Procedure: ANGIOPLASTY, VEIN;  Surgeon: Miguelangel Goldman MD;  Location: [facility] OR 2ND FLR;  Service: Vascular;  Laterality: Left;     Family History   Problem Relation Age of Onset    Diabetes Mother     Cancer Mother     Hypertension Mother     Cirrhosis Maternal Uncle         ETOH    Celiac disease Neg Hx     Colon cancer Neg Hx     Colon polyps Neg Hx     Crohn's disease Neg Hx     Esophageal cancer Neg Hx     Inflammatory bowel disease Neg Hx     Liver cancer Neg Hx     Liver disease Neg Hx     Rectal cancer Neg Hx     Stomach cancer Neg Hx     Ulcerative colitis Neg Hx      Social History     Socioeconomic History    Marital status:      Spouse name: Not on file    Number of children: 3    Years of education: Not on file    Highest education level: Not on file   Occupational History    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[employer]   Social Needs    Financial resource strain: Not hard at all    Food insecurity     Worry: Never true     Inability: Never true    Transportation needs     Medical: No     Non-medical: No   Tobacco Use    Smoking status: Former Smoker     Smokeless tobacco: Never Used    Tobacco comment: quit in october 2016   Substance and Sexual Activity    Alcohol use: Yes     Alcohol/week: 2.0 standard drinks     Types: 2 Cans of beer per week     Frequency: 2-4 times a month     Drinks per session: 1 or 2     Binge frequency: Never     Comment: social presently, excessive 10 years ago    Drug use: Yes     Types: Marijuana     Comment: uses THCA weekly    Sexual activity: Not Currently     Partners: Male     Birth control/protection: See Surgical Hx   Lifestyle    Physical activity     Days per week: 2 days     Minutes per session: 40 min    Stress: To some extent   Relationships    Social connections     Talks on phone: More than three times a week     Gets together: Once a week     Attends [employer] service: Not on file     Active member of club or organization: No     Attends meetings of clubs or organizations: Never     Relationship status:    Other Topics Concern    Patient feels they ought to cut down on drinking/drug use No    Patient annoyed by others criticizing their drinking/drug use No    Patient has felt bad or guilty about drinking/drug use No    Patient has had a drink/used drugs as an eye opener in the AM No   Social History Narrative    Has 3 healthy grown sons (1990, 1993, 1998) Lives alone.    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   in 2010.    Has Male partner since 2014 who is currently disabled.       Current Outpatient Medications:     apixaban (ELIQUIS) 5 mg Tab, After finishing the first prescription: Take 1 tablet (5 mg total) by mouth 2 (two) times daily., Disp: 60 tablet, Rfl: 3    cholecalciferol, vitamin D3, (VITAMIN D3) 50 mcg (2,000 unit) Cap, Take 1 capsule (2,000 Units total) by mouth once daily., Disp: 90 capsule, Rfl: 3    cloNIDine 0.2 mg/24 hr td ptwk (CATAPRES) 0.2 mg/24 hr, Place 1 patch onto the skin every 7 days.,  Disp: 4 patch, Rfl: 2    cyanocobalamin (VITAMIN B-12) 1000 MCG tablet, Take 100 mcg by mouth once daily., Disp: , Rfl:     ergocalciferol (ERGOCALCIFEROL) 50,000 unit Cap, Take 1 capsule (50,000 Units total) by mouth every 7 days. for 12 doses, Disp: 12 capsule, Rfl: 0    FLUoxetine 40 MG capsule, Take 1 capsule (40 mg total) by mouth once daily., Disp: 90 capsule, Rfl: 1    fluticasone (FLONASE) 50 mcg/actuation nasal spray, 1 spray by Each Nostril route daily as needed for Rhinitis or Allergies. , Disp: , Rfl:     gabapentin (NEURONTIN) 600 MG tablet, Take 1 tablet (600 mg total) by mouth nightly as needed., Disp: 30 tablet, Rfl: 4    HYDROcodone-acetaminophen (NORCO) 5-325 mg per tablet, Take 1 tablet by mouth every 6 (six) hours as needed for Pain., Disp: 24 tablet, Rfl: 0    hydrOXYzine (ATARAX) 50 MG tablet, Take 1 tablet (50 mg total) by mouth daily as needed for Anxiety (sleep)., Disp: 90 tablet, Rfl: 1    multivitamin with minerals tablet, Take 1 tablet by mouth once daily., Disp: , Rfl:     pantoprazole (PROTONIX) 40 MG tablet, Take 1 tablet (40 mg total) by mouth before breakfast. Take one pill every morning 45 minutes before breakfast in the morning., Disp: 90 tablet, Rfl: 3    valsartan-hydrochlorothiazide (DIOVAN-HCT) 160-25 mg per tablet, Take 1 tablet by mouth once daily., Disp: 90 tablet, Rfl: 1    benzocaine (ORAJEL) 10 % mucosal gel, Use as directed in the mouth or throat as needed for Pain., Disp: , Rfl: 0    hydrocortisone 1 % cream, Apply topically 2 (two) times daily. (Patient not taking: Reported on 8/27/2020), Disp: 28.4 g, Rfl: 0    ibuprofen (ADVIL,MOTRIN) 400 MG tablet, Take 1 tablet (400 mg total) by mouth every 6 (six) hours as needed. (Patient not taking: Reported on 8/27/2020), Disp: 20 tablet, Rfl: 0    REVIEW OF SYSTEMS:  General: No fevers or chills; ENT: No sore throat; Allergy and Immunology: no persistent infections; Hematological and Lymphatic: No history of  bleeding or easy bruising; Endocrine: negative; Respiratory: no cough, shortness of breath, or wheezing; Cardiovascular: no chest pain or dyspnea on exertion; Gastrointestinal: no abdominal pain/back, change in bowel habits, or bloody stools; Genito-Urinary: no dysuria, trouble voiding, or hematuria; Musculoskeletal: edema; Neurological: no TIA or stroke symptoms; Psychiatric: no nervousness, anxiety or depression.    PHYSICAL EXAM:                General appearance:  Alert, well-appearing, and in no distress.  Oriented to person, place, and time                    Neurological: Normal speech, no focal findings noted; CN II - XII grossly intact. RLE with sensation to light touch, LLE with sensation to light touch.            Musculoskeletal: Digits/nail without cyanosis/clubbing.  Strength 5/5 BLE.                    Neck: Supple, no significant adenopathy                  Chest:  No wheezes, symmetric air entry. No use of accessory muscles               Cardiac: Normal rate and regular rhythm            Abdomen: Soft, nontender, nondistended      Extremities:   2+ R DP pulse, 2+ L DP pulse      1+ RLE edema, 1+ LLE edema    Skin:  RLE no ulcer; LLE no ulcer      RLE no spider veins, LLE no spider veins      RLE no varicose veins, LLE no varicose veins    CEAP 3/3    LAB RESULTS:  No results found for: CBC  Lab Results   Component Value Date    LABPROT 10.9 08/13/2020    INR 1.0 08/13/2020     Lab Results   Component Value Date     08/15/2020    K 4.3 08/15/2020     (H) 08/15/2020    CO2 24 08/15/2020    GLU 98 08/15/2020    BUN 15 08/15/2020    CREATININE 0.8 08/15/2020    CALCIUM 8.3 (L) 08/15/2020    ANIONGAP 7 (L) 08/15/2020    EGFRNONAA >60.0 08/15/2020     Lab Results   Component Value Date    WBC 6.39 08/19/2020    RBC 4.06 08/19/2020    HGB 12.2 08/19/2020    HCT 37.1 08/19/2020    MCV 91 08/19/2020    MCH 30.0 08/19/2020    MCHC 32.9 08/19/2020    RDW 13.4 08/19/2020     08/19/2020    MPV  9.3 08/19/2020    GRAN 4.0 08/19/2020    GRAN 63.3 08/19/2020    LYMPH 1.6 08/19/2020    LYMPH 24.4 08/19/2020    MONO 0.5 08/19/2020    MONO 7.5 08/19/2020    EOS 0.2 08/19/2020    BASO 0.02 08/19/2020    EOSINOPHIL 3.6 08/19/2020    BASOPHIL 0.3 08/19/2020    DIFFMETHOD Automated 08/19/2020     .  Lab Results   Component Value Date    HGBA1C 5.3 01/16/2020       IMAGING:  All pertinent imaging has been reviewed and interpreted independently.    IMP/PLAN:  45 y.o. female with   Patient Active Problem List   Diagnosis    Essential hypertension    Seasonal allergies    Anxiety    Abdominal pain    Neuropathy    Congestive heart failure    being managed by PCP and specialists who is here today for postoperative evaluation s/p LLE iliofemoral pharmacomechanical thrombectomy 8/12/20 for iliofemoral DVT.    -Recovering well - cont Heme eval and anticoagulation for unprovoked DVT  -recommend compression with Rx stockings, elevation, dietary changes associated with water and sodium intake discussed at length with patient  -Exercise   -RTC 3 months for further evaluation    I spent 14 minutes evaluating this patient and greater than 50% of the time was spent counseling, coordinator care and discussing the plan of care.  All questions were answered and patient stated understanding with agreement with the above treatment plan.    Miguelangel Goldman MD [facility]  Vascular and Endovascular Surgery

## 2020-08-27 NOTE — PATIENT INSTRUCTIONS
Putting on Compression Stockings     Turn the stocking inside-out, then fit it over your toes and heel.          Roll the stocking up your leg.            Once stockings are on, make sure the top of the stocking is about two fingers width below the crease of the knee (or the groin if you wear thigh-high stockings).          Use equipment, such as a stocking james, or wear rubber gloves to make it easier to put on compression stockings.         Elastic compression stockings are prescribed to treat many vein problems. Wearing them may be the most important thing you do to manage your symptoms. The stockings fit tightly around your ankle, gradually reducing in pressure as they go up your legs. This helps keep blood flowing to your heart. As a result, swelling is reduced. Your healthcare provider will prescribe stockings at a safe pressure for you. He or she will also tell you how often to wear and remove the stockings. Follow these instructions closely. Also, do not buy or wear compression stockings without first seeing your healthcare provider.  Tips for wear and care  To wear stockings safely and to get the most benefit:  · Wear the length prescribed by your healthcare provider.  · Pull them to the designated height and no farther. Dont let them bunch at the top. This can restrict blood flow and increase swelling.  · Wear the stockings for the amount of time your healthcare provider recommends. Replace them when they start to feel loose. This will likely be every 3 to 6 months.  · Remove them as your healthcare provider directs. When removed, wash your legs. Then check your legs and feet for sores. Call your healthcare provider if you find a sore. Dont put the stockings back on unless your healthcare provider directs.   · Wash the stockings as instructed. They may need to be hand-washed.  Date Last Reviewed: 5/1/2016  © 0380-5996 [employer]. [address] [place] [address], [place] [address]. All rights  reserved. This information is not intended as a substitute for professional medical care. Always follow your healthcare professional's instructions.        Tips for Using Less Salt    Most people with heart problems need to eat less salt (sodium). Reducing the amount of salt you eat may help control your blood pressure. The higher your blood pressure, the greater your risk for heart disease, stroke, blindness, and kidney problems.  At the store  · Make low-salt choices by reading labels carefully. Look for the total amount of sodium per serving.  · Use more fresh food. Buy more fruits and vegetables. Select lean meats, fish, and poultry.  · Use fewer frozen, canned, and packaged foods which often contain a lot of sodium.  · Use plain frozen vegetables without sauces or toppings. These products are often low- or no-sodium.  · Opt for reduced-sodium or no-salt-added versions of canned vegetables and soups.  In the kitchen  · Don't add salt to food when you're cooking. Season with flavorings such as onion, garlic, pepper, salt-free herbal blends, and lemon or lime juice.  · Use a cookbook containing low-salt recipes. It can give you ideas for tasty meals that are healthy for your heart.  · Sprinkle salt-free herbal blends on vegetables and meat.  · Drain and rinse canned foods, such as canned beans and vegetables, before cooking or eating.  Eating out  · Tell the  you're on a low-salt diet. Ask questions about the menu.  · Order fish, chicken, and meat broiled, baked, poached, or grilled without salt, butter, or breading.  · Use lemon, pepper, and salt-free herb mixes to add flavor.  · Choose plain steamed rice, boiled noodles, and baked or boiled potatoes. Top potatoes with chives and a little sour cream.     Beware! Salt goes by many other names. Limit foods with these words listed as ingredients: salt, sodium, soy sauce, baking soda, baking powder, MSG, monosodium, Na (the chemical symbol for sodium). Some  antacids are also high in salt.   Date Last Reviewed: 6/19/2015  © 0472-5217 [employer]. [address] [place] [address], [place] [address]. All rights reserved. This information is not intended as a substitute for professional medical care. Always follow your healthcare professional's instructions.        Low-Salt Diet  This diet removes foods that are high in salt. It also limits the amount of salt you use when cooking. It is most often used for people with high blood pressure, edema (fluid retention), and kidney, liver, or heart disease.  Table salt contains the mineral sodium. Your body needs sodium to work normally. But too much sodium can make your health problems worse. Your healthcare provider is recommending a low-salt (also called low-sodium) diet for you. Your total daily allowance of salt is 1,500 to 2,300 milligrams (mg). It is less than 1 teaspoon of table salt. This means you can have only about 500 to 700 mg of sodium at each meal. People with certain health problems should limit salt intake to the lower end of the recommended range.    When you cook, dont add much salt. If you can cook without using salt, even better. Dont add salt to your food at the table.  When shopping, read food labels. Salt is often called sodium on the label. Choose foods that are salt-free, low salt, or very low salt. Note that foods with reduced salt may not lower your salt intake enough.    Beans, potatoes, and pasta  Ok: Dry beans, split peas, lentils, potatoes, rice, macaroni, pasta, spaghetti without added salt  Avoid: Potato chips, tortilla chips, and similar products  Breads and cereals  Ok: Low-sodium breads, rolls, cereals, and cakes; low-salt crackers, matzo crackers  Avoid: Salted crackers, pretzels, popcorn, Paraguayan toast, pancakes, muffins  Dairy  Ok: Milk, chocolate milk, hot chocolate mix, low-salt cheeses, and yogurt  Avoid: Processed cheese and cheese spreads; Roquefort, Camembert, and cottage cheese;  buttermilk, instant breakfast drink  Desserts  Ok: Ice cream, frozen yogurt, juice bars, gelatin, cookies and pies, sugar, honey, jelly, hard candy  Avoid: Most pies, cakes and cookies prepared or processed with salt; instant pudding  Drinks  Ok: Tea, coffee, fizzy (carbonated) drinks, juices  Avoid: Flavored coffees, electrolyte replacement drinks, sports drinks  Meats  Ok: All fresh meat, fish, poultry, low-salt tuna, eggs, egg substitute  Avoid: Smoked, pickled, brine-cured, or salted meats and fish. This includes bangura, chipped beef, corned beef, hot dogs, deli meats, ham, kosher meats, salt pork, sausage, canned tuna, salted codfish, smoked salmon, herring, sardines, or anchovies.  Seasonings and spices  Ok: Most seasonings are okay. Good substitutes for salt include: fresh herb blends, hot sauce, lemon, garlic, bolton, vinegar, dry mustard, parsley, cilantro, horseradish, tomato paste, regular margarine, mayonnaise, unsalted butter, cream cheese, vegetable oil, cream, low-salt salad dressing and gravy.  Avoid: Regular ketchup, relishes, pickles, soy sauce, teriyaki sauce, Worcestershire sauce, BBQ sauce, tartar sauce, meat tenderizer, chili sauce, regular gravy, regular salad dressing, salted butter  Soups  Ok: Low-salt soups and broths made with allowed foods  Avoid: Bouillon cubes, soups with smoked or salted meats, regular soup and broth  Vegetables  Ok: Most vegetables are okay; also low-salt tomato and vegetable juices  Avoid: Sauerkraut and other brine-soaked vegetables; pickles and other pickled vegetables; tomato juice, olives  Date Last Reviewed: 8/1/2016  © 4236-0883 [employer] [address] [place] [address]. All rights reserved. This information is not intended as a substitute for professional medical care. Always follow your healthcare professional's instructions.        Low-Salt Choices  Eating salt (sodium) can make your body retain too much water. Excess water makes  your heart work harder. Canned, packaged, and frozen foods are easy to prepare, but they are often high in sodium. Here are some ideas for low-salt foods you can easily prepare yourself.    For breakfast  · Fruit or 100% fruit juice  · Whole-wheat bread or an English muffin. Compare sodium content on labels.  · Low-fat milk or yogurt  · Unsalted eggs  · Shredded wheat  · Corn tortillas  · Unsalted steamed rice  · Regular (not instant) hot cereal, made without salt  Stay away from:  · Sausage, bangura, and ham  · Flour tortillas  · Packaged muffins, pancakes, and biscuits  · Instant hot cereals  · Cottage cheese  For lunch and dinner  · Fresh fish, chicken, turkey, or meat--baked, broiled, or roasted without salt  · Dry beans, cooked without salt  · Tofu, stir-fried without salt  · Unsalted fresh fruit and vegetables, or frozen or canned fruit and vegetables with no added salt  Stay away from:  · Lunch or deli meat that is cured or smoked  · Cheese  · Tomato juice and catsup  · Canned vegetables, soups, and fish not labeled as no-salt-added or reduced sodium  · Packaged gravies and sauces  · Olives, pickles, and relish  · Bottled salad dressings  For snacks and desserts  · Yogurt  · Unsalted, air popped popcorn  · Unsalted nuts or seeds  Stay away from:  · Pies and cakes  · Packaged dessert mixes  · Pizza  · Canned and packaged puddings  · Pretzels, chips, crackers, and nuts--unless the label says unsalted  Date Last Reviewed: 6/17/2015  © 5753-4104 [employer]. [address] [place] [address], [place] [address]. All rights reserved. This information is not intended as a substitute for professional medical care. Always follow your healthcare professional's instructions.

## 2020-08-27 NOTE — LETTER
August 27, 2020        Candace Martin MD  [address] [facility]  [place] [address]             [facility]  [address]  [facility] [address]  Phone: [phone]  Fax: [phone]   Patient: Alberto Frye   MR Number: [id number]   YOB: 1975   Date of Visit: 8/27/2020       Dear Dr. Martin:    Thank you for referring Alberto Frye to me for evaluation. Below are the relevant portions of my assessment and plan of care.            If you have questions, please do not hesitate to call me. I look forward to following Alberto along with you.    Sincerely,      Miguelangel Goldman MD           CC  No Recipients

## 2020-08-28 ENCOUNTER — PROCEDURE VISIT (OUTPATIENT)
Dept: UROLOGY | Facility: CLINIC | Age: 45
End: 2020-08-28
Payer: COMMERCIAL

## 2020-08-28 VITALS — HEIGHT: 64 IN | WEIGHT: 230.38 LBS | BODY MASS INDEX: 39.33 KG/M2 | RESPIRATION RATE: 18 BRPM

## 2020-08-28 DIAGNOSIS — N39.0 RECURRENT UTI: ICD-10-CM

## 2020-08-28 PROCEDURE — 52000 CYSTOURETHROSCOPY: CPT | Mod: S$GLB,,, | Performed by: UROLOGY

## 2020-08-28 PROCEDURE — 52000 CYSTOSCOPY: ICD-10-PCS | Mod: S$GLB,,, | Performed by: UROLOGY

## 2020-08-28 NOTE — PROCEDURES
"Cystoscopy    Date/Time: 8/28/2020 2:30 PM  Performed by: TIM Pierce MD  Authorized by: TIM Pierce MD     Consent Done?:  Yes (Written)  Time out: Immediately prior to procedure a "time out" was called to verify the correct patient, procedure, equipment, support staff and site/side marked as required.    Indications: recurrent UTIs    Position:  Dorsal lithotomy  Anesthesia:  Lidocaine jelly  Patient sedated?: No    Preparation: Patient was prepped and draped in usual sterile fashion      Scope type:  Flexible cystoscope  Stent inserted: No    Stent removed: No    External exam normal: Yes    Digital exam performed: No    Urethra normal: Yes  Bladder neck normal: Bladder neck normal   Bladder normal: Yes      Patient tolerance:  Patient tolerated the procedure well with no immediate complications      "

## 2020-09-18 NOTE — PROGRESS NOTES
This note was created by combination of typed  and M-Modal dictation.  Transcription errors may be present.  If there are any questions, please contact me.    Assessment and Plan:   Strain of lumbar region, initial encounter  -gets periodic flares.  Hydrocodone prn use  And takes monserrat as well for this  Hx of rheum evaluation felt not to have inflammatory arthritis  Refilled hydrocodone  Referral to healthy back program.  -     HYDROcodone-acetaminophen (NORCO) 5-325 mg per tablet; Take 1 tablet by mouth every 6 (six) hours as needed for Pain.  Dispense: 14 tablet; Refill: 0  -     Ambulatory referral/consult to [facility]; Future; Expected date: 09/28/2020    Essential hypertension  -hx of torsemide  7/2020 ER put her on clonidine.  And then placed on amlodipine  But did not find it controlled  So started on diovan hct  And stayed on clonidine throughout.  Stable, denies SE, no changes, refilled clonidine. Stay on diovan hctz  -     cloNIDine 0.2 mg/24 hr td ptwk (CATAPRES) 0.2 mg/24 hr; Place 1 patch onto the skin every 7 days.  Dispense: 4 patch; Refill: 11    Recurrent deep vein thrombosis (DVT)  Leg DVT (deep venous thromboembolism), chronic, left  -followed by heme, recurrent dvt, anticipate lifelong anticoagulation. Factor V and prothrombin mutation to be drawn in the future    Screening for colon cancer 11/2019 colonoscopy hemorrhoids and diverticulosis; [facility]    Needs flu shot  -     Influenza - Quadrivalent *Preferred* (6 months+) (PF)    Bipolar  -needs to f/u with psychiatry. On prozac.  Hx of olanzapine. Had self DCd 8/2020      Medications Discontinued During This Encounter   Medication Reason    cloNIDine 0.2 mg/24 hr td ptwk (CATAPRES) 0.2 mg/24 hr Reorder    HYDROcodone-acetaminophen (NORCO) 5-325 mg per tablet Reorder       meds sent this encounter:  Medications Ordered This Encounter   Medications    cloNIDine 0.2 mg/24 hr td ptwk (CATAPRES) 0.2 mg/24 hr     Sig: Place 1  patch onto the skin every 7 days.     Dispense:  4 patch     Refill:  11     .    HYDROcodone-acetaminophen (NORCO) 5-325 mg per tablet     Sig: Take 1 tablet by mouth every 6 (six) hours as needed for Pain.     Dispense:  14 tablet     Refill:  0     Quantity prescribed more than 7 day supply? Yes, quantity medically necessary       Follow Up: No follow-ups on file.      Subjective:     Chief Complaint   Patient presents with    Establish Care    Back Pain       STEFFANY Dominguez is a 45 y.o. female, last appointment with this clinic was 8/19/2020.    No LMP recorded. Patient has had a hysterectomy.    Patient of Dr. Martin.  Hypertension  08/11/2020 TTE normal LV systolic function LVEF 60%.  Normal diastolic function.  Normal RV systolic function.  PA pressure 23.  History of empiric treatment for diverticulitis 04/22/2020, no imaging done at that time.  History of lumbar spondylosis  Status post lap band surgery reportedly disconnected.  08/11/2020 DVT left leg underwent pharmacomechanical thrombectomy 8/12 for iliofemoral DVT.  had had a prior DVT 10 years prior in the setting of surgery for hysterectomy at the time which she had IVC filter placed.  Possible allergic reaction to Eliquis.  Cardiology opted to observe.  Up-to-date on mammogram  11/2019 colonoscopy hemorrhoids and diverticulosis.  06/03/2020 EGD normal duodenum.  Erythematous mucosa of the gastric body and antrum and pre-pyloric region.  2 cm hiatal hernia.  Path negative.  No celiac.  Recurring UTI seen by Urology in July plan was cystoscopy.  08/28/2020 cystoscopy normal  08/13/2020 renal ultrasound normal  06/02/2020 CT abdomen pelvis unremarkable.  IVC present.    Hx of cervical radiculopathy and spondylosis seen by spine specialists 2017    History of alcohol use disorder    Seen by Hematology.  Unclear if her rash was related to the Eliquis.  Plan was to stay on Eliquis and take Benadryl p.r.n..  Prothrombin mutation and factor 5 Leiden  were ordered.  1/2017 L spine XR:        5 views lumbar spine.  Vena cava filter right common L2-L3, and lap band apparatus left upper quadrant.  Mild levoscoliosis, lordosis lumbar spine.  Elevation of right pelvis.  Facet arthropathy L4-L5 levels.  No fracture subluxation.     Notes that her back is triggered in the past week, flares worse at night. Recalls a hx of DJD of the back.  Hx of steroid injections into the spine, remote. Lower back. And in the hips as well. Notes radiation down the back of the legs to the knees.  Both legs.   Previously had been helped with pain meds and muscle relaxers.  Estimates last flare was about 6 months ago.   Hx of gabapentin; hydrocodone, flexeril.  Has recently been doubling up the monserrat - 2 x 600 mg pills, BID. With some modest relief - took the edge off. Let her sleep.   Between flares, no pain.   No hx of formal PT.     HTN; hx of torsemide  7/2020 ER put her on clonidine.  And then placed on amlodipine  But did not find it controlled  So started on diovan hct  And stayed on clonidine throughout.  Today BP is controlled.   Denies SE. No sedation.    Patient Care Team:  Candace Martin MD as PCP - General (Family Medicine)  Funmilayo Roblero LPN as Licensed Practical Nurse    Patient Active Problem List    Diagnosis Date Noted    Neuropathy 08/11/2020    Congestive heart failure 08/11/2020    Abdominal pain 06/03/2020    Essential hypertension 01/22/2016    Seasonal allergies 01/22/2016    Anxiety 01/22/2016       PAST MEDICAL HISTORY:  Past Medical History:   Diagnosis Date    Alcohol abuse     stopped heavy drinking about 10 years ago; was drinking 3 glasses of vodka/tequilla,rum/whiskey per day    Anxiety     Congestive heart failure 8/11/2020    Depression     Hallucination     Hx of psychiatric care     Hypertension     Caro     unplanned trips, energy without sleep for 2 days (reading, cleaning), feelings that she can do multiple tasks at one  time, feelings of overconfidence at times    Psychiatric problem     Sleep difficulties     Therapy     Withdrawal symptoms, drug or narcotic     racing heart, restlessness       PAST SURGICAL HISTORY:  Past Surgical History:   Procedure Laterality Date     lapban surgery  2009    ESOPHAGOGASTRODUODENOSCOPY N/A 6/3/2020    Procedure: EGD (ESOPHAGOGASTRODUODENOSCOPY);  Surgeon: Johan Grover MD;  Location: [facility] (4TH FLR);  Service: Endoscopy;  Laterality: N/A;  covid 6/2-[facility]-tb    HYSTERECTOMY      PHLEBOGRAPHY Left 8/12/2020    Procedure: Venogram, pharmacomechanical thrombectomy;  Surgeon: Miguelangel Goldman MD;  Location: [facility] OR 2ND FLR;  Service: Vascular;  Laterality: Left;  2336.43 mGy  [id number]  17.8 minutes  37 ml of contrast    VENOPLASTY Left 8/12/2020    Procedure: ANGIOPLASTY, VEIN;  Surgeon: Miguelangel Goldman MD;  [facility];  Service: Vascular;  Laterality: Left;       SOCIAL HISTORY:  Social History     Socioeconomic History    Marital status:      Spouse name: Not on file    Number of children: 3    Years of education: Not on file    Highest education level: Not on file   Occupational History    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[employer]   Social Needs    Financial resource strain: Not hard at all    Food insecurity     Worry: Never true     Inability: Never true    Transportation needs     Medical: No     Non-medical: No   Tobacco Use    Smoking status: Former Smoker    Smokeless tobacco: Never Used    Tobacco comment: quit in october 2016   Substance and Sexual Activity    Alcohol use: Yes     Alcohol/week: 2.0 standard drinks     Types: 2 Cans of beer per week     Frequency: 2-4 times a month     Drinks per session: 1 or 2     Binge frequency: Never     Comment: social presently, excessive 10 years ago    Drug use: Yes     Types: Marijuana     Comment: uses THCA weekly    Sexual activity: Not Currently      Partners: Male     Birth control/protection: See Surgical Hx   Lifestyle    Physical activity     Days per week: 2 days     Minutes per session: 40 min    Stress: To some extent   Relationships    Social connections     Talks on phone: More than three times a week     Gets together: Once a week     Attends Congregational service: Not on file     Active member of club or organization: No     Attends meetings of clubs or organizations: Never     Relationship status:    Other Topics Concern    Patient feels they ought to cut down on drinking/drug use No    Patient annoyed by others criticizing their drinking/drug use No    Patient has felt bad or guilty about drinking/drug use No    Patient has had a drink/used drugs as an eye opener in the AM No   Social History Narrative    Has 3 healthy grown sons (1990, 1993, 1998) Lives alone.    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 in [place], [place]     once for 10 years.   in 2010.    Has Male partner since 2014 who is currently disabled.        ALLERGIES AND MEDICATIONS: updated and reviewed.  Review of patient's allergies indicates:   Allergen Reactions    Morphine Itching and Hallucinations    Pcn [penicillins] Other (See Comments)     Was told from childhood she couldn't take it    Sulfa (sulfonamide antibiotics) Nausea And Vomiting    Latex, natural rubber Rash       Medication List with Changes/Refills   Current Medications    APIXABAN (ELIQUIS) 5 MG TAB    After finishing the first prescription: Take 1 tablet (5 mg total) by mouth 2 (two) times daily.    BENZOCAINE (ORAJEL) 10 % MUCOSAL GEL    Use as directed in the mouth or throat as needed for Pain.    CHOLECALCIFEROL, VITAMIN D3, (VITAMIN D3) 50 MCG (2,000 UNIT) CAP    Take 1 capsule (2,000 Units total) by mouth once daily.    CLONIDINE 0.2 MG/24 HR TD PTWK (CATAPRES) 0.2 MG/24 HR    Place 1 patch onto the skin every 7 days.    CYANOCOBALAMIN (VITAMIN B-12) 1000  "MCG TABLET    Take 100 mcg by mouth once daily.    ERGOCALCIFEROL (ERGOCALCIFEROL) 50,000 UNIT CAP    Take 1 capsule (50,000 Units total) by mouth every 7 days. for 12 doses    FLUOXETINE 40 MG CAPSULE    Take 1 capsule (40 mg total) by mouth once daily.    FLUTICASONE (FLONASE) 50 MCG/ACTUATION NASAL SPRAY    1 spray by Each Nostril route daily as needed for Rhinitis or Allergies.     GABAPENTIN (NEURONTIN) 600 MG TABLET    Take 1 tablet (600 mg total) by mouth nightly as needed.    HYDROCODONE-ACETAMINOPHEN (NORCO) 5-325 MG PER TABLET    Take 1 tablet by mouth every 6 (six) hours as needed for Pain.    HYDROCORTISONE 1 % CREAM    Apply topically 2 (two) times daily.    HYDROXYZINE (ATARAX) 50 MG TABLET    Take 1 tablet (50 mg total) by mouth daily as needed for Anxiety (sleep).    IBUPROFEN (ADVIL,MOTRIN) 400 MG TABLET    Take 1 tablet (400 mg total) by mouth every 6 (six) hours as needed.    MULTIVITAMIN WITH MINERALS TABLET    Take 1 tablet by mouth once daily.    PANTOPRAZOLE (PROTONIX) 40 MG TABLET    Take 1 tablet (40 mg total) by mouth before breakfast. Take one pill every morning 45 minutes before breakfast in the morning.    VALSARTAN-HYDROCHLOROTHIAZIDE (DIOVAN-HCT) 160-25 MG PER TABLET    Take 1 tablet by mouth once daily.       Review of Systems   Constitutional: Negative for chills and fever.   Gastrointestinal: Negative for abdominal pain, constipation and diarrhea.   Genitourinary: Negative for dysuria.   All other systems reviewed and are negative.      Objective:   Physical Exam   Vitals:    09/21/20 1303   BP: 112/80   BP Location: Right arm   Patient Position: Sitting   BP Method: Large (Manual)   Pulse: 84   Temp: 97.7 °F (36.5 °C)   TempSrc: Temporal   SpO2: 97%   Weight: 105 kg (231 lb 7.7 oz)   Height: 5' 4" (1.626 m)    Body mass index is 39.73 kg/m².  Weight: 105 kg (231 lb 7.7 oz)   Height: 5' 4" (162.6 cm)     Physical Exam  Constitutional:       General: She is not in acute distress.     " Appearance: She is well-developed.   HENT:      Head: Normocephalic and atraumatic.   Eyes:      General: No scleral icterus.  Pulmonary:      Effort: Pulmonary effort is normal.   Musculoskeletal:      Comments: L-spine tender to palpation without deformity or swelling or induration.  Straight leg raise with pain bilaterally.  Plantar flexion 5/5, toe dorsiflexion 5/5, foot inversion and eversion 5/5 against resistance both feet.  Hip flexion 5/5 bilaterally  Also some tenderness over the greater trochanters bilaterally  Hip inversion and eversion without pain, but pain with hip abduction bilaterally   Skin:     General: Skin is warm and dry.   Neurological:      Mental Status: She is alert and oriented to person, place, and time.      Comments: Patellar DTR 3+ symmetric  Achilles DTR 2+ brisk symmetric   Psychiatric:         Behavior: Behavior normal.         Thought Content: Thought content normal.

## 2020-09-21 ENCOUNTER — OFFICE VISIT (OUTPATIENT)
Dept: FAMILY MEDICINE | Facility: CLINIC | Age: 45
End: 2020-09-21
Payer: COMMERCIAL

## 2020-09-21 VITALS
DIASTOLIC BLOOD PRESSURE: 80 MMHG | BODY MASS INDEX: 39.52 KG/M2 | HEIGHT: 64 IN | OXYGEN SATURATION: 97 % | SYSTOLIC BLOOD PRESSURE: 112 MMHG | TEMPERATURE: 98 F | HEART RATE: 84 BPM | WEIGHT: 231.5 LBS

## 2020-09-21 DIAGNOSIS — Z12.11 SCREENING FOR COLON CANCER: ICD-10-CM

## 2020-09-21 DIAGNOSIS — I82.502 LEG DVT (DEEP VENOUS THROMBOEMBOLISM), CHRONIC, LEFT: ICD-10-CM

## 2020-09-21 DIAGNOSIS — M54.50 CHRONIC MIDLINE LOW BACK PAIN WITHOUT SCIATICA: ICD-10-CM

## 2020-09-21 DIAGNOSIS — S39.012A STRAIN OF LUMBAR REGION, INITIAL ENCOUNTER: Primary | ICD-10-CM

## 2020-09-21 DIAGNOSIS — I82.409 RECURRENT DEEP VEIN THROMBOSIS (DVT): ICD-10-CM

## 2020-09-21 DIAGNOSIS — G89.29 CHRONIC MIDLINE LOW BACK PAIN WITHOUT SCIATICA: ICD-10-CM

## 2020-09-21 DIAGNOSIS — Z23 NEEDS FLU SHOT: ICD-10-CM

## 2020-09-21 DIAGNOSIS — Z79.01 CHRONIC ANTICOAGULATION: ICD-10-CM

## 2020-09-21 DIAGNOSIS — R10.9 ABDOMINAL PAIN, UNSPECIFIED ABDOMINAL LOCATION: ICD-10-CM

## 2020-09-21 DIAGNOSIS — I10 ESSENTIAL HYPERTENSION: ICD-10-CM

## 2020-09-21 PROBLEM — N39.0 RECURRENT UTI: Status: ACTIVE | Noted: 2020-09-21

## 2020-09-21 PROBLEM — I50.9 CONGESTIVE HEART FAILURE: Status: RESOLVED | Noted: 2020-08-11 | Resolved: 2020-09-21

## 2020-09-21 PROCEDURE — 3074F PR MOST RECENT SYSTOLIC BLOOD PRESSURE < 130 MM HG: ICD-10-PCS | Mod: CPTII,S$GLB,, | Performed by: INTERNAL MEDICINE

## 2020-09-21 PROCEDURE — 99999 PR PBB SHADOW E&M-EST. PATIENT-LVL V: ICD-10-PCS | Mod: PBBFAC,,, | Performed by: INTERNAL MEDICINE

## 2020-09-21 PROCEDURE — 3079F PR MOST RECENT DIASTOLIC BLOOD PRESSURE 80-89 MM HG: ICD-10-PCS | Mod: CPTII,S$GLB,, | Performed by: INTERNAL MEDICINE

## 2020-09-21 PROCEDURE — 99999 PR PBB SHADOW E&M-EST. PATIENT-LVL V: CPT | Mod: PBBFAC,,, | Performed by: INTERNAL MEDICINE

## 2020-09-21 PROCEDURE — 3079F DIAST BP 80-89 MM HG: CPT | Mod: CPTII,S$GLB,, | Performed by: INTERNAL MEDICINE

## 2020-09-21 PROCEDURE — 90686 FLU VACCINE (QUAD) GREATER THAN OR EQUAL TO 3YO PRESERVATIVE FREE IM: ICD-10-PCS | Mod: S$GLB,,, | Performed by: INTERNAL MEDICINE

## 2020-09-21 PROCEDURE — 3008F PR BODY MASS INDEX (BMI) DOCUMENTED: ICD-10-PCS | Mod: CPTII,S$GLB,, | Performed by: INTERNAL MEDICINE

## 2020-09-21 PROCEDURE — 3074F SYST BP LT 130 MM HG: CPT | Mod: CPTII,S$GLB,, | Performed by: INTERNAL MEDICINE

## 2020-09-21 PROCEDURE — 90471 IMMUNIZATION ADMIN: CPT | Mod: S$GLB,,, | Performed by: INTERNAL MEDICINE

## 2020-09-21 PROCEDURE — 99214 PR OFFICE/OUTPT VISIT, EST, LEVL IV, 30-39 MIN: ICD-10-PCS | Mod: 25,S$GLB,, | Performed by: INTERNAL MEDICINE

## 2020-09-21 PROCEDURE — 90686 IIV4 VACC NO PRSV 0.5 ML IM: CPT | Mod: S$GLB,,, | Performed by: INTERNAL MEDICINE

## 2020-09-21 PROCEDURE — 90471 FLU VACCINE (QUAD) GREATER THAN OR EQUAL TO 3YO PRESERVATIVE FREE IM: ICD-10-PCS | Mod: S$GLB,,, | Performed by: INTERNAL MEDICINE

## 2020-09-21 PROCEDURE — 99214 OFFICE O/P EST MOD 30 MIN: CPT | Mod: 25,S$GLB,, | Performed by: INTERNAL MEDICINE

## 2020-09-21 PROCEDURE — 3008F BODY MASS INDEX DOCD: CPT | Mod: CPTII,S$GLB,, | Performed by: INTERNAL MEDICINE

## 2020-09-21 RX ORDER — CLONIDINE 0.2 MG/24H
1 PATCH, EXTENDED RELEASE TRANSDERMAL
Qty: 4 PATCH | Refills: 11 | Status: SHIPPED | OUTPATIENT
Start: 2020-09-21 | End: 2021-10-11 | Stop reason: SDUPTHER

## 2020-09-21 RX ORDER — HYDROCODONE BITARTRATE AND ACETAMINOPHEN 5; 325 MG/1; MG/1
1 TABLET ORAL EVERY 6 HOURS PRN
Qty: 14 TABLET | Refills: 0 | Status: SHIPPED | OUTPATIENT
Start: 2020-09-21 | End: 2020-11-04 | Stop reason: SDUPTHER

## 2020-10-05 ENCOUNTER — PATIENT MESSAGE (OUTPATIENT)
Dept: FAMILY MEDICINE | Facility: CLINIC | Age: 45
End: 2020-10-05

## 2020-10-05 DIAGNOSIS — F43.20 ACUTE ADJUSTMENT DISORDER: Primary | ICD-10-CM

## 2020-10-05 DIAGNOSIS — F31.5 BIPOLAR I DISORDER, CURRENT OR MOST RECENT EPISODE DEPRESSED, WITH PSYCHOTIC FEATURES WITH ANXIOUS DISTRESS: ICD-10-CM

## 2020-10-05 DIAGNOSIS — F60.5 OBSESSIVE COMPULSIVE PERSONALITY DISORDER: ICD-10-CM

## 2020-10-05 RX ORDER — FLUOXETINE HYDROCHLORIDE 40 MG/1
40 CAPSULE ORAL DAILY
Qty: 90 CAPSULE | Refills: 1 | Status: SHIPPED | OUTPATIENT
Start: 2020-10-05 | End: 2020-10-22 | Stop reason: SDUPTHER

## 2020-10-05 RX ORDER — CLONAZEPAM 0.25 MG/1
0.25 TABLET, ORALLY DISINTEGRATING ORAL DAILY
Qty: 14 TABLET | Refills: 0 | Status: SHIPPED | OUTPATIENT
Start: 2020-10-05 | End: 2021-08-13

## 2020-10-19 ENCOUNTER — OFFICE VISIT (OUTPATIENT)
Dept: UROLOGY | Facility: CLINIC | Age: 45
End: 2020-10-19
Payer: COMMERCIAL

## 2020-10-19 VITALS — BODY MASS INDEX: 40.31 KG/M2 | WEIGHT: 236.13 LBS | HEIGHT: 64 IN

## 2020-10-19 DIAGNOSIS — N39.0 RECURRENT UTI: Primary | ICD-10-CM

## 2020-10-19 DIAGNOSIS — R35.0 URINARY FREQUENCY: ICD-10-CM

## 2020-10-19 PROCEDURE — 99213 OFFICE O/P EST LOW 20 MIN: CPT | Mod: S$GLB,,, | Performed by: UROLOGY

## 2020-10-19 PROCEDURE — 3008F PR BODY MASS INDEX (BMI) DOCUMENTED: ICD-10-PCS | Mod: CPTII,S$GLB,, | Performed by: UROLOGY

## 2020-10-19 PROCEDURE — 99999 PR PBB SHADOW E&M-EST. PATIENT-LVL IV: ICD-10-PCS | Mod: PBBFAC,,, | Performed by: UROLOGY

## 2020-10-19 PROCEDURE — 3008F BODY MASS INDEX DOCD: CPT | Mod: CPTII,S$GLB,, | Performed by: UROLOGY

## 2020-10-19 PROCEDURE — 99999 PR PBB SHADOW E&M-EST. PATIENT-LVL IV: CPT | Mod: PBBFAC,,, | Performed by: UROLOGY

## 2020-10-19 PROCEDURE — 99213 PR OFFICE/OUTPT VISIT, EST, LEVL III, 20-29 MIN: ICD-10-PCS | Mod: S$GLB,,, | Performed by: UROLOGY

## 2020-10-19 RX ORDER — D-MANNOSE
POWDER (GRAM) ORAL
Qty: 100 G | Refills: 11
Start: 2020-10-19 | End: 2022-05-17 | Stop reason: ALTCHOICE

## 2020-10-19 RX ORDER — LACTOBACILLUS RHAMNOSUS GG 10B CELL
1 CAPSULE ORAL DAILY
Start: 2020-10-19 | End: 2022-05-17 | Stop reason: ALTCHOICE

## 2020-10-19 NOTE — PROGRESS NOTES
Subjective:       Patient ID: Alberto Frye is a 45 y.o. female who was last seen in this office 8/28/2020    Chief Complaint:   Chief Complaint   Patient presents with    Urinary Tract Infection     4 week f/u pt states she is doing well      Recurrent UTI  Alberto Frye is a 45 y.o. woman with recurrent UTI.  She has had about 3 in the past year.  She describes them has frequency, pressure, and small volume voids.  She denies gross hematuria or pneumaturia.  She had a positive culture in May 2020 but was asymptomatic.  She is not sexually active.  She has 1-2 bowel movements a day, which is an improvement since her colonoscopy.  She had a hysterectomy in her 20's for benign reasons.  She does not have ovaries.  She developed a DVT after her hysterectomy.      She has done well since her last visit.  No more infections.  ACTIVE MEDICAL ISSUES:  Patient Active Problem List   Diagnosis    Essential hypertension    Seasonal allergies    Anxiety    Abdominal pain    Neuropathy    Recurrent deep vein thrombosis (DVT) with hx of IVC filter; indefinite anticoagulation    Recurrent UTI    Screening for colon cancer 11/2019 colonoscopy hemorrhoids and diverticulosis; [facility]    Chronic anticoagulation    Chronic midline low back pain without sciatica       ALLERGIES AND MEDICATIONS: updated and reviewed.  Review of patient's allergies indicates:   Allergen Reactions    Morphine Itching and Hallucinations    Pcn [penicillins] Other (See Comments)     Was told from childhood she couldn't take it    Sulfa (sulfonamide antibiotics) Nausea And Vomiting    Latex, natural rubber Rash     Current Outpatient Medications   Medication Sig    apixaban (ELIQUIS) 5 mg Tab After finishing the first prescription: Take 1 tablet (5 mg total) by mouth 2 (two) times daily.    benzocaine (ORAJEL) 10 % mucosal gel Use as directed in the mouth or throat as needed for Pain.    cholecalciferol, vitamin D3, (VITAMIN  D3) 50 mcg (2,000 unit) Cap Take 1 capsule (2,000 Units total) by mouth once daily.    clonazePAM (KLONOPIN) 0.25 MG TbDL Take 1 tablet (0.25 mg total) by mouth once daily. for 14 days    cloNIDine 0.2 mg/24 hr td ptwk (CATAPRES) 0.2 mg/24 hr Place 1 patch onto the skin every 7 days.    cyanocobalamin (VITAMIN B-12) 1000 MCG tablet Take 100 mcg by mouth once daily.    FLUoxetine 40 MG capsule Take 1 capsule (40 mg total) by mouth once daily.    fluticasone (FLONASE) 50 mcg/actuation nasal spray 1 spray by Each Nostril route daily as needed for Rhinitis or Allergies.     gabapentin (NEURONTIN) 600 MG tablet Take 1 tablet (600 mg total) by mouth nightly as needed.    HYDROcodone-acetaminophen (NORCO) 5-325 mg per tablet Take 1 tablet by mouth every 6 (six) hours as needed for Pain.    hydrocortisone 1 % cream Apply topically 2 (two) times daily.    hydrOXYzine (ATARAX) 50 MG tablet Take 1 tablet (50 mg total) by mouth daily as needed for Anxiety (sleep).    ibuprofen (ADVIL,MOTRIN) 400 MG tablet Take 1 tablet (400 mg total) by mouth every 6 (six) hours as needed.    multivitamin with minerals tablet Take 1 tablet by mouth once daily.    pantoprazole (PROTONIX) 40 MG tablet Take 1 tablet (40 mg total) by mouth before breakfast. Take one pill every morning 45 minutes before breakfast in the morning.    valsartan-hydrochlorothiazide (DIOVAN-HCT) 160-25 mg per tablet Take 1 tablet by mouth once daily.    d-mannose Powd 1 scoop daily    Lactobacillus rhamnosus GG (CULTURELLE) 10 billion cell capsule Take 1 capsule by mouth once daily.     No current facility-administered medications for this visit.        Review of Systems   Constitutional: Negative for activity change, fatigue, fever and unexpected weight change.   Eyes: Negative for redness and visual disturbance.   Respiratory: Negative for chest tightness and shortness of breath.    Cardiovascular: Negative for chest pain and leg swelling.  "  Gastrointestinal: Negative for abdominal distention, abdominal pain, constipation, diarrhea, nausea and vomiting.   Genitourinary: Negative for difficulty urinating, dysuria, flank pain, frequency, hematuria, pelvic pain, urgency and vaginal bleeding.   Musculoskeletal: Negative for arthralgias and joint swelling.   Neurological: Negative for dizziness, weakness and headaches.   Psychiatric/Behavioral: Negative for confusion. The patient is not nervous/anxious.    All other systems reviewed and are negative.      Objective:      Vitals:    10/19/20 1608   Weight: 107.1 kg (236 lb 1.8 oz)   Height: 5' 4" (1.626 m)     Physical Exam   Nursing note and vitals reviewed.  Constitutional: She is oriented to person, place, and time. She appears well-developed.   HENT:   Head: Normocephalic.   Eyes: Conjunctivae are normal.   Neck: Normal range of motion. No tracheal deviation present. No thyromegaly present.   Cardiovascular: Normal rate, normal heart sounds and normal pulses.    Pulmonary/Chest: Effort normal and breath sounds normal. No respiratory distress. She has no wheezes.   Abdominal: Soft. She exhibits no distension and no mass. There is no abdominal tenderness. There is no rebound and no guarding. No hernia.   Musculoskeletal: Normal range of motion. No tenderness.   Lymphadenopathy:     She has no cervical adenopathy.   Neurological: She is alert and oriented to person, place, and time.   Skin: Skin is warm and dry. No rash noted. No erythema.     Psychiatric: Her behavior is normal. Judgment and thought content normal.           Assessment:       1. Recurrent UTI    2. Urinary frequency          Plan:       1. Recurrent UTI  Consider daily antibiotic if needed  Will avoid estrogen due to history of DVT    - Lactobacillus rhamnosus GG (CULTURELLE) 10 billion cell capsule; Take 1 capsule by mouth once daily.  Dispense:    - d-mannose Powd; 1 scoop daily  Dispense: 100 g; Refill: 11    2. Urinary frequency       "        Follow up in about 6 months (around 4/19/2021) for Follow up.

## 2020-10-22 ENCOUNTER — OFFICE VISIT (OUTPATIENT)
Dept: PSYCHIATRY | Facility: CLINIC | Age: 45
End: 2020-10-22
Payer: COMMERCIAL

## 2020-10-22 VITALS
BODY MASS INDEX: 39.81 KG/M2 | WEIGHT: 231.94 LBS | HEART RATE: 84 BPM | DIASTOLIC BLOOD PRESSURE: 85 MMHG | SYSTOLIC BLOOD PRESSURE: 140 MMHG

## 2020-10-22 DIAGNOSIS — F31.5 BIPOLAR I DISORDER, CURRENT OR MOST RECENT EPISODE DEPRESSED, WITH PSYCHOTIC FEATURES WITH ANXIOUS DISTRESS: Primary | ICD-10-CM

## 2020-10-22 DIAGNOSIS — F51.05 MOOD INSOMNIA: ICD-10-CM

## 2020-10-22 DIAGNOSIS — F60.5 OBSESSIVE COMPULSIVE PERSONALITY DISORDER: ICD-10-CM

## 2020-10-22 DIAGNOSIS — F39 MOOD INSOMNIA: ICD-10-CM

## 2020-10-22 PROCEDURE — 3008F BODY MASS INDEX DOCD: CPT | Mod: CPTII,S$GLB,, | Performed by: NURSE PRACTITIONER

## 2020-10-22 PROCEDURE — 3008F PR BODY MASS INDEX (BMI) DOCUMENTED: ICD-10-PCS | Mod: CPTII,S$GLB,, | Performed by: NURSE PRACTITIONER

## 2020-10-22 PROCEDURE — 90833 PR PSYCHOTHERAPY W/PATIENT W/E&M, 30 MIN (ADD ON): ICD-10-PCS | Mod: S$GLB,,, | Performed by: NURSE PRACTITIONER

## 2020-10-22 PROCEDURE — 3079F PR MOST RECENT DIASTOLIC BLOOD PRESSURE 80-89 MM HG: ICD-10-PCS | Mod: CPTII,S$GLB,, | Performed by: NURSE PRACTITIONER

## 2020-10-22 PROCEDURE — 3077F SYST BP >= 140 MM HG: CPT | Mod: CPTII,S$GLB,, | Performed by: NURSE PRACTITIONER

## 2020-10-22 PROCEDURE — 99214 OFFICE O/P EST MOD 30 MIN: CPT | Mod: S$GLB,,, | Performed by: NURSE PRACTITIONER

## 2020-10-22 PROCEDURE — 3077F PR MOST RECENT SYSTOLIC BLOOD PRESSURE >= 140 MM HG: ICD-10-PCS | Mod: CPTII,S$GLB,, | Performed by: NURSE PRACTITIONER

## 2020-10-22 PROCEDURE — 99214 PR OFFICE/OUTPT VISIT, EST, LEVL IV, 30-39 MIN: ICD-10-PCS | Mod: S$GLB,,, | Performed by: NURSE PRACTITIONER

## 2020-10-22 PROCEDURE — 90833 PSYTX W PT W E/M 30 MIN: CPT | Mod: S$GLB,,, | Performed by: NURSE PRACTITIONER

## 2020-10-22 PROCEDURE — 99999 PR PBB SHADOW E&M-EST. PATIENT-LVL III: CPT | Mod: PBBFAC,,, | Performed by: NURSE PRACTITIONER

## 2020-10-22 PROCEDURE — 99999 PR PBB SHADOW E&M-EST. PATIENT-LVL III: ICD-10-PCS | Mod: PBBFAC,,, | Performed by: NURSE PRACTITIONER

## 2020-10-22 PROCEDURE — 3079F DIAST BP 80-89 MM HG: CPT | Mod: CPTII,S$GLB,, | Performed by: NURSE PRACTITIONER

## 2020-10-22 RX ORDER — OLANZAPINE 5 MG/1
5 TABLET ORAL NIGHTLY
Qty: 90 TABLET | Refills: 3 | Status: SHIPPED | OUTPATIENT
Start: 2020-10-22 | End: 2021-08-13

## 2020-10-22 RX ORDER — FLUOXETINE HYDROCHLORIDE 40 MG/1
40 CAPSULE ORAL DAILY
Qty: 90 CAPSULE | Refills: 3 | Status: SHIPPED | OUTPATIENT
Start: 2020-10-22 | End: 2021-08-13 | Stop reason: SDUPTHER

## 2020-10-22 NOTE — PROGRESS NOTES
"Outpatient Psychiatry Follow-Up Visit (MD/NP)    10/22/2020    Clinical Status of Patient:  Outpatient (Ambulatory)    Chief Complaint:  Alberto Frye is a 45 y.o. female who presents today for follow-up of mood disorder, anxiety, psychosis and insomnia.  Met with patient.  Pt new to me.    Last Visit: With Dr. Chahal on 12/10/19. Chart and  reviewed.     Interval History and Content of Current Session:  Medications per last visit:   ·  Olanzapine 5 mg po qhs.   ·  Hydroxyzine HCL 25 mg po BID PRN anxiety/sleep.  ·  Prozac 20 mg po qd mood disorder, anxiety and OCD.        Established therapeutic rapport and transition of care from previous provider.  Pt stated, "I was doing well on medication but my mood has been up and down".  Discussed medical stressors and life stressors.  Also grieving her Aunt.  Recommended pt establish care with a therapist.  PCP increased Prozac to 40 mg daily. Pt had stopped taking Zyprexa "it wasn't making me sleep".  Educated pt is was for mood. Will restart. Denies SI/HI/AVH. Reports OCD symptoms under control with medications.        Psychotherapy:  · Target symptoms: anxiety , mood disorder, psychosis, poor sleep  · Why chosen therapy is appropriate versus another modality: relevant to diagnosis, evidence based practice  · Outcome monitoring methods: self-report, observation  · Therapeutic intervention type: supportive psychotherapy  · Topics discussed/themes: diet, exercise, medication compliance, symptom recognition and improvement  · The patient's response to the intervention is accepting. The patient's progress toward treatment goals is good  · Duration of intervention: 18 minutes.    Review of Systems   PSYCHIATRIC: Pertinant items are noted in the narrative.  CONSTITUTIONAL: No weight gain or loss.   MUSCULOSKELETAL: No pain or stiffness of the joints.  NEUROLOGIC: No weakness, sensory changes, seizures, confusion, memory loss, tremor or other abnormal " "movements.  ENDOCRINE: No polydipsia or polyuria.  INTEGUMENTARY: No rashes or lacerations.  EYES: No exophthalmos, jaundice or blindness.  ENT: No dizziness, tinnitus or hearing loss.  RESPIRATORY: No shortness of breath.  CARDIOVASCULAR: No tachycardia or chest pain.  GASTROINTESTINAL: No nausea, vomiting, pain, constipation or diarrhea.  GENITOURINARY: No frequency, dysuria or sexual dysfunction.  HEMATOLOGIC/LYMPHATIC: No excessive bleeding, prolonged or excessive bleeding after dental extraction/injury.  ALLERGIC/IMMUNOLOGIC: No allergic response to materials, foods or animals at this time.     Past Medical, Family and Social History: The patient's past medical, family and social history have been reviewed and updated as appropriate within the electronic medical record - see encounter notes.    Compliance: yes until she ran out of medication    Side effects: None    Risk Parameters:  Patient reports no suicidal ideation  Patient reports no homicidal ideation  Patient reports no self-injurious behavior  Patient reports no violent behavior    Exam (detailed: at least 9 elements; comprehensive: all 15 elements)   Constitutional  Vitals:  Most recent vital signs, dated greater than 90 days prior to this appointment, were reviewed.   Vitals:    10/22/20 0801   BP: (!) 140/85   Pulse: 84   Weight: 105.2 kg (231 lb 14.8 oz)        General:  unremarkable, age appropriate     Musculoskeletal  Muscle Strength/Tone:  no tremor, no tic   Gait & Station:  non-ataxic     Psychiatric  Speech:  no latency; no press   Mood & Affect:  "good."  congruent and appropriate   Thought Process:  normal and logical   Associations:  intact   Thought Content:  normal, no suicidality, no homicidality, delusions, or paranoia,    Insight:  has awareness of illness   Judgement: behavior is adequate to circumstances   Orientation:  grossly intact   Memory: intact for content of interview   Language: grossly intact   Attention Span & " Concentration:  able to focus   Fund of Knowledge:  intact and appropriate to age and level of education     Assessment and Diagnosis   Status/Progress: Based on the examination today, the patient's problem(s) is/are inadequately controlled.  New problems have not not been presented today. Lack of compliance due to running out of medication is complicating management of the primary condition.  There are no active rule-out diagnoses for this patient at this time.     General Impression: she is doing well but has been out of her medication for a week or so and is having some mild symptoms.       ICD-10-CM ICD-9-CM   1. Bipolar I disorder, current or most recent episode depressed, with psychotic features with anxious distress  F31.5 296.54   2. Obsessive compulsive personality disorder  F60.5 301.4   3. Mood insomnia  F51.05 [id number]    F39        Intervention/Counseling/Treatment Plan   · Medication Management: The risks and benefits of medication were discussed with the patient.  · The treatment plan and follow up plan were reviewed with the patient.   · Safety: Call 911 or Crisis Line or go to ER for suicidal ideation, adverse effects of medication or any other emergency  ·       Continue Olanzapine 5 mg po qhs to target symptoms of mood disorder, anxiety and poor sleep.   ·       Continue Hydroxyzine HCL 25 mg po BID PRN anxiety/sleep.  ·       Continue Prozac 40 mg po qd mood disorder, anxiety and OCD.     Patient agrees with POC.    INSTRUCTIONS  Instructed to call 911 or Crisis Line or go to ER for suicidal ideation, adverse effects of medication or any other emergency. Verbalizes understanding and plan to comply.    Instructed to contact provider either through her [facility] account or by calling [phone] prior to running out of her medication. Verbalizes understanding and plan to comply.    Return to Clinic: 6 months, as needed

## 2020-10-26 ENCOUNTER — PATIENT MESSAGE (OUTPATIENT)
Dept: FAMILY MEDICINE | Facility: CLINIC | Age: 45
End: 2020-10-26

## 2020-10-26 DIAGNOSIS — G89.29 CHRONIC MIDLINE LOW BACK PAIN WITHOUT SCIATICA: Primary | ICD-10-CM

## 2020-10-26 DIAGNOSIS — M54.50 CHRONIC MIDLINE LOW BACK PAIN WITHOUT SCIATICA: Primary | ICD-10-CM

## 2020-10-27 ENCOUNTER — TELEPHONE (OUTPATIENT)
Dept: FAMILY MEDICINE | Facility: CLINIC | Age: 45
End: 2020-10-27

## 2020-10-27 ENCOUNTER — PATIENT MESSAGE (OUTPATIENT)
Dept: FAMILY MEDICINE | Facility: CLINIC | Age: 45
End: 2020-10-27

## 2020-11-04 DIAGNOSIS — S39.012A STRAIN OF LUMBAR REGION, INITIAL ENCOUNTER: ICD-10-CM

## 2020-11-04 RX ORDER — HYDROCODONE BITARTRATE AND ACETAMINOPHEN 5; 325 MG/1; MG/1
1 TABLET ORAL EVERY 6 HOURS PRN
Qty: 14 TABLET | Refills: 0 | Status: SHIPPED | OUTPATIENT
Start: 2020-11-04 | End: 2020-11-19 | Stop reason: SDUPTHER

## 2020-11-05 ENCOUNTER — PATIENT MESSAGE (OUTPATIENT)
Dept: FAMILY MEDICINE | Facility: CLINIC | Age: 45
End: 2020-11-05

## 2020-11-05 ENCOUNTER — TELEPHONE (OUTPATIENT)
Dept: FAMILY MEDICINE | Facility: CLINIC | Age: 45
End: 2020-11-05

## 2020-11-05 NOTE — TELEPHONE ENCOUNTER
----- Message from Rosetta Finney sent at 11/4/2020  2:25 PM CST -----  Type: Patient Call Back    Who called:Carine    What is the request in detail:has issues with hydrocodone script    Can the clinic reply by [facility]?no    Would the patient rather a call back or a response via [facility]? call    Best call back number:[phone]

## 2020-11-05 NOTE — TELEPHONE ENCOUNTER
Called pt she states when she went to  rx she was informed it was not ready it was a issue with her rx.  Called pt pharmacy listed on file they was close and will open at 9;00am

## 2020-11-05 NOTE — TELEPHONE ENCOUNTER
[facility] pharmacy was informed that patient is no longer taking klonopin. Okay to fill hydrocodone.

## 2020-11-05 NOTE — TELEPHONE ENCOUNTER
Spoke with pharmacist Maureen  at [facility] she stated she would like  to know  pt hydrocodone is a drug interaction with her klonopin 0.25mg it will cause raspatory depression. pharmacist maureen would like to know is it ok to still fill her hydrocodone

## 2020-11-16 ENCOUNTER — PATIENT MESSAGE (OUTPATIENT)
Dept: PAIN MEDICINE | Facility: CLINIC | Age: 45
End: 2020-11-16

## 2020-11-16 ENCOUNTER — OFFICE VISIT (OUTPATIENT)
Dept: PAIN MEDICINE | Facility: CLINIC | Age: 45
End: 2020-11-16
Payer: COMMERCIAL

## 2020-11-16 ENCOUNTER — LAB VISIT (OUTPATIENT)
Dept: LAB | Facility: HOSPITAL | Age: 45
End: 2020-11-16
Attending: INTERNAL MEDICINE
Payer: COMMERCIAL

## 2020-11-16 VITALS
BODY MASS INDEX: 41.37 KG/M2 | DIASTOLIC BLOOD PRESSURE: 70 MMHG | TEMPERATURE: 99 F | WEIGHT: 242.31 LBS | SYSTOLIC BLOOD PRESSURE: 122 MMHG | HEIGHT: 64 IN

## 2020-11-16 DIAGNOSIS — I82.402 DEEP VEIN THROMBOSIS (DVT) OF LEFT LOWER EXTREMITY, UNSPECIFIED CHRONICITY, UNSPECIFIED VEIN: ICD-10-CM

## 2020-11-16 DIAGNOSIS — M79.642 PAIN IN BOTH HANDS: ICD-10-CM

## 2020-11-16 DIAGNOSIS — M51.36 DDD (DEGENERATIVE DISC DISEASE), LUMBAR: Primary | ICD-10-CM

## 2020-11-16 DIAGNOSIS — G89.29 CHRONIC MIDLINE LOW BACK PAIN WITHOUT SCIATICA: ICD-10-CM

## 2020-11-16 DIAGNOSIS — M47.816 LUMBAR SPONDYLOSIS: ICD-10-CM

## 2020-11-16 DIAGNOSIS — M54.50 CHRONIC MIDLINE LOW BACK PAIN WITHOUT SCIATICA: ICD-10-CM

## 2020-11-16 DIAGNOSIS — M79.641 PAIN IN BOTH HANDS: ICD-10-CM

## 2020-11-16 PROBLEM — M51.369 DDD (DEGENERATIVE DISC DISEASE), LUMBAR: Status: ACTIVE | Noted: 2020-11-16

## 2020-11-16 LAB
ALBUMIN SERPL BCP-MCNC: 3.4 G/DL (ref 3.5–5.2)
ALP SERPL-CCNC: 81 U/L (ref 55–135)
ALT SERPL W/O P-5'-P-CCNC: 32 U/L (ref 10–44)
ANION GAP SERPL CALC-SCNC: 4 MMOL/L (ref 8–16)
AST SERPL-CCNC: 32 U/L (ref 10–40)
BASOPHILS # BLD AUTO: 0.02 K/UL (ref 0–0.2)
BASOPHILS NFR BLD: 0.5 % (ref 0–1.9)
BILIRUB SERPL-MCNC: 0.4 MG/DL (ref 0.1–1)
BUN SERPL-MCNC: 16 MG/DL (ref 6–20)
CALCIUM SERPL-MCNC: 8.3 MG/DL (ref 8.7–10.5)
CHLORIDE SERPL-SCNC: 111 MMOL/L (ref 95–110)
CO2 SERPL-SCNC: 25 MMOL/L (ref 23–29)
CREAT SERPL-MCNC: 0.9 MG/DL (ref 0.5–1.4)
DIFFERENTIAL METHOD: ABNORMAL
EOSINOPHIL # BLD AUTO: 0.2 K/UL (ref 0–0.5)
EOSINOPHIL NFR BLD: 4.4 % (ref 0–8)
ERYTHROCYTE [DISTWIDTH] IN BLOOD BY AUTOMATED COUNT: 13.2 % (ref 11.5–14.5)
EST. GFR  (AFRICAN AMERICAN): >60 ML/MIN/1.73 M^2
EST. GFR  (NON AFRICAN AMERICAN): >60 ML/MIN/1.73 M^2
GLUCOSE SERPL-MCNC: 106 MG/DL (ref 70–110)
HCT VFR BLD AUTO: 35.9 % (ref 37–48.5)
HGB BLD-MCNC: 12.3 G/DL (ref 12–16)
IMM GRANULOCYTES # BLD AUTO: 0.02 K/UL (ref 0–0.04)
IMM GRANULOCYTES NFR BLD AUTO: 0.5 % (ref 0–0.5)
LYMPHOCYTES # BLD AUTO: 1.7 K/UL (ref 1–4.8)
LYMPHOCYTES NFR BLD: 42.9 % (ref 18–48)
MCH RBC QN AUTO: 30.8 PG (ref 27–31)
MCHC RBC AUTO-ENTMCNC: 34.3 G/DL (ref 32–36)
MCV RBC AUTO: 90 FL (ref 82–98)
MONOCYTES # BLD AUTO: 0.5 K/UL (ref 0.3–1)
MONOCYTES NFR BLD: 12.4 % (ref 4–15)
NEUTROPHILS # BLD AUTO: 1.5 K/UL (ref 1.8–7.7)
NEUTROPHILS NFR BLD: 39.3 % (ref 38–73)
NRBC BLD-RTO: 0 /100 WBC
PLATELET # BLD AUTO: 189 K/UL (ref 150–350)
PMV BLD AUTO: 10.1 FL (ref 9.2–12.9)
POTASSIUM SERPL-SCNC: 3.7 MMOL/L (ref 3.5–5.1)
PROT SERPL-MCNC: 7 G/DL (ref 6–8.4)
RBC # BLD AUTO: 4 M/UL (ref 4–5.4)
SODIUM SERPL-SCNC: 140 MMOL/L (ref 136–145)
WBC # BLD AUTO: 3.87 K/UL (ref 3.9–12.7)

## 2020-11-16 PROCEDURE — 1125F AMNT PAIN NOTED PAIN PRSNT: CPT | Mod: S$GLB,,, | Performed by: PAIN MEDICINE

## 2020-11-16 PROCEDURE — 99999 PR PBB SHADOW E&M-EST. PATIENT-LVL V: ICD-10-PCS | Mod: PBBFAC,,, | Performed by: PAIN MEDICINE

## 2020-11-16 PROCEDURE — 3008F BODY MASS INDEX DOCD: CPT | Mod: CPTII,S$GLB,, | Performed by: PAIN MEDICINE

## 2020-11-16 PROCEDURE — 1125F PR PAIN SEVERITY QUANTIFIED, PAIN PRESENT: ICD-10-PCS | Mod: S$GLB,,, | Performed by: PAIN MEDICINE

## 2020-11-16 PROCEDURE — 80053 COMPREHEN METABOLIC PANEL: CPT

## 2020-11-16 PROCEDURE — 3008F PR BODY MASS INDEX (BMI) DOCUMENTED: ICD-10-PCS | Mod: CPTII,S$GLB,, | Performed by: PAIN MEDICINE

## 2020-11-16 PROCEDURE — 99204 PR OFFICE/OUTPT VISIT, NEW, LEVL IV, 45-59 MIN: ICD-10-PCS | Mod: S$GLB,,, | Performed by: PAIN MEDICINE

## 2020-11-16 PROCEDURE — 85025 COMPLETE CBC W/AUTO DIFF WBC: CPT

## 2020-11-16 PROCEDURE — 99999 PR PBB SHADOW E&M-EST. PATIENT-LVL V: CPT | Mod: PBBFAC,,, | Performed by: PAIN MEDICINE

## 2020-11-16 PROCEDURE — 99204 OFFICE O/P NEW MOD 45 MIN: CPT | Mod: S$GLB,,, | Performed by: PAIN MEDICINE

## 2020-11-16 NOTE — PROGRESS NOTES
Subjective:     Patient ID: Alberto Frye is a 45 y.o. female    Chief Complaint: Low-back Pain (pt takes medication and had PT . Had injections), Hand Pain, and Knee Pain      Referred by: Ian Cespedes MD      HPI:    Initial Encounter (11/16/20):  Alberto Frye is a 45 y.o. female who presents today with chronic bilateral low back pain.  This pain has been present for at least 10 years.  No specific inciting event or injury noted.  The pain initially was intermittent and has significantly worsened over time.  She states the pain has been essentially constant recently.  The pain is located across the lower lumbar region.  The pain radiates to the bilateral buttocks to the bilateral posterior thighs.  The pain does not radiate past the knees.  Patient denies any associated numbness, tingling, weakness, bowel bladder dysfunction.  The pain is worse with activity.  Patient also reports having pain of the left middle finger PIP joint.  She cannot fully extend this joint and the joint itself is very tender.  Patient states that in the past she did have a positive rheumatologic workup was not having any rheumatologic symptoms so no treatment was undertaken.   This pain is described in detail below.    Physical Therapy:  Yes.  Not recently.    Non-pharmacologic Treatment:  Rest helps         · TENS?  No    Pain Medications:         · Currently taking:  Norco p.r.n., gabapentin    · Has tried in the past:  NSAIDs, Tylenol    · Has not tried:  Muscle relaxants, TCAs, SNRIs, topical creams    Blood thinners:  Eliquis    Interventional Therapies:  None    Relevant Surgeries:  None    Affecting sleep?  Yes    Affecting daily activities? yes    Depressive symptoms? no          · SI/HI? No    Work status: Employed    Pain Scores:    Best:       2/10  Worst:     10/10  Usually:   8/10  Today:    8/10    Review of Systems   Constitutional: Negative for activity change, appetite change, chills, fatigue, fever and  unexpected weight change.   HENT: Negative for hearing loss.    Eyes: Negative for visual disturbance.   Respiratory: Negative for chest tightness and shortness of breath.    Cardiovascular: Negative for chest pain.   Gastrointestinal: Negative for abdominal pain, constipation, diarrhea, nausea and vomiting.   Genitourinary: Negative for difficulty urinating.   Musculoskeletal: Positive for arthralgias, back pain, gait problem and myalgias. Negative for neck pain.   Skin: Negative for rash.   Neurological: Negative for dizziness, weakness, light-headedness, numbness and headaches.   Psychiatric/Behavioral: Positive for sleep disturbance. Negative for hallucinations and suicidal ideas. The patient is not nervous/anxious.        Past Medical History:   Diagnosis Date    Alcohol abuse     stopped heavy drinking about 10 years ago; was drinking 3 glasses of vodka/tequilla,rum/whiskey per day    Anxiety     Congestive heart failure 8/11/2020    Depression     Hallucination     Hx of psychiatric care     Hypertension     Caro     unplanned trips, energy without sleep for 2 days (reading, cleaning), feelings that she can do multiple tasks at one time, feelings of overconfidence at times    Psychiatric problem     Sleep difficulties     Therapy     Withdrawal symptoms, drug or narcotic     racing heart, restlessness       Past Surgical History:   Procedure Laterality Date     lapban surgery  2009    ESOPHAGOGASTRODUODENOSCOPY N/A 6/3/2020    Procedure: EGD (ESOPHAGOGASTRODUODENOSCOPY);  Surgeon: Johan Grover MD;  [facility] ([facility]);  Service: Endoscopy;  Laterality: N/A;  covid 6/2-[facility]    HYSTERECTOMY      PHLEBOGRAPHY Left 8/12/2020    Procedure: Venogram, pharmacomechanical thrombectomy;  Surgeon: Miguelangel Goldman MD;  Location: [address];  Service: Vascular;  Laterality: Left;  2336.43 mGy  494.80vvdx5  17.8 minutes  37 ml of contrast    VENOPLASTY Left  8/12/2020    Procedure: ANGIOPLASTY, VEIN;  Surgeon: Miguelangel Goldman MD;  Location: [facility] [address] [place] [address];  Service: Vascular;  Laterality: Left;       Social History     Socioeconomic History    Marital status:      Spouse name: Not on file    Number of children: 3    Years of education: Not on file    Highest education level: Not on file   Occupational History    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[employer]   Social Needs    Financial resource strain: Not hard at all    Food insecurity     Worry: Never true     Inability: Never true    Transportation needs     Medical: No     Non-medical: No   Tobacco Use    Smoking status: Former Smoker    Smokeless tobacco: Never Used    Tobacco comment: quit in october 2016   Substance and Sexual Activity    Alcohol use: Yes     Alcohol/week: 2.0 standard drinks     Types: 2 Cans of beer per week     Frequency: 2-4 times a month     Drinks per session: 1 or 2     Binge frequency: Never     Comment: social presently, excessive 10 years ago    Drug use: Yes     Types: Marijuana     Comment: uses THCA weekly    Sexual activity: Not Currently     Partners: Male     Birth control/protection: See Surgical Hx   Lifestyle    Physical activity     Days per week: 2 days     Minutes per session: 40 min    Stress: To some extent   Relationships    Social connections     Talks on phone: More than three times a week     Gets together: Once a week     Attends Denominational service: Not on file     Active member of club or organization: No     Attends meetings of clubs or organizations: Never     Relationship status:    Other Topics Concern    Patient feels they ought to cut down on drinking/drug use No    Patient annoyed by others criticizing their drinking/drug use No    Patient has felt bad or guilty about drinking/drug use No    Patient has had a drink/used drugs as an eye opener in the AM No   Social History Narrative    Has 3 healthy grown sons  (1990, 1993, 1998) Lives alone.    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   in 2010.    Has Male partner since 2014 who is currently disabled.       Review of patient's allergies indicates:   Allergen Reactions    Morphine Itching and Hallucinations    Pcn [penicillins] Other (See Comments)     Was told from childhood she couldn't take it    Sulfa (sulfonamide antibiotics) Nausea And Vomiting    Latex, natural rubber Rash       Current Outpatient Medications on File Prior to Visit   Medication Sig Dispense Refill    apixaban (ELIQUIS) 5 mg Tab After finishing the first prescription: Take 1 tablet (5 mg total) by mouth 2 (two) times daily. 60 tablet 3    benzocaine (ORAJEL) 10 % mucosal gel Use as directed in the mouth or throat as needed for Pain.  0    cholecalciferol, vitamin D3, (VITAMIN D3) 50 mcg (2,000 unit) Cap Take 1 capsule (2,000 Units total) by mouth once daily. 90 capsule 3    cloNIDine 0.2 mg/24 hr td ptwk (CATAPRES) 0.2 mg/24 hr Place 1 patch onto the skin every 7 days. 4 patch 11    cyanocobalamin (VITAMIN B-12) 1000 MCG tablet Take 100 mcg by mouth once daily.      d-mannose Powd 1 scoop daily 100 g 11    FLUoxetine 40 MG capsule Take 1 capsule (40 mg total) by mouth once daily. 90 capsule 3    fluticasone (FLONASE) 50 mcg/actuation nasal spray 1 spray by Each Nostril route daily as needed for Rhinitis or Allergies.       gabapentin (NEURONTIN) 600 MG tablet Take 1 tablet (600 mg total) by mouth nightly as needed. 30 tablet 4    HYDROcodone-acetaminophen (NORCO) 5-325 mg per tablet Take 1 tablet by mouth every 6 (six) hours as needed for Pain. 14 tablet 0    hydrocortisone 1 % cream Apply topically 2 (two) times daily. 28.4 g 0    hydrOXYzine (ATARAX) 50 MG tablet Take 1 tablet (50 mg total) by mouth daily as needed for Anxiety (sleep). 90 tablet 1    ibuprofen (ADVIL,MOTRIN) 400 MG tablet Take 1 tablet  "(400 mg total) by mouth every 6 (six) hours as needed. 20 tablet 0    Lactobacillus rhamnosus GG (CULTURELLE) 10 billion cell capsule Take 1 capsule by mouth once daily.      multivitamin with minerals tablet Take 1 tablet by mouth once daily.      OLANZapine (ZYPREXA) 5 MG tablet Take 1 tablet (5 mg total) by mouth every evening. 90 tablet 3    pantoprazole (PROTONIX) 40 MG tablet Take 1 tablet (40 mg total) by mouth before breakfast. Take one pill every morning 45 minutes before breakfast in the morning. 90 tablet 3    valsartan-hydrochlorothiazide (DIOVAN-HCT) 160-25 mg per tablet Take 1 tablet by mouth once daily. 90 tablet 1    clonazePAM (KLONOPIN) 0.25 MG TbDL Take 1 tablet (0.25 mg total) by mouth once daily. for 14 days 14 tablet 0     No current facility-administered medications on file prior to visit.        Objective:      /70 (BP Location: Left arm, Patient Position: Sitting, BP Method: Large (Automatic))   Temp 99 °F (37.2 °C) (Oral)   Ht 5' 4" (1.626 m)   Wt 109.9 kg (242 lb 4.8 oz)   BMI 41.59 kg/m²     Exam:  GEN:  Well developed, well nourished.  No acute distress.  Normal pain behavior.  HEENT:  No trauma.  Mucous membranes moist.  Nares patent bilaterally.  PSYCH: Normal affect. Thought content appropriate.  CHEST:  Breathing symmetric.  No audible wheezing.  ABD: Soft, non-distended.  SKIN:  Warm, pink, dry.  No rash on exposed areas.    EXT:  No cyanosis, clubbing, or edema.  No color change or changes in nail or hair growth.  NEURO/MUSCULOSKELETAL:  Fully alert, oriented, and appropriate. Speech normal juan. No cranial nerve deficits.   Gait:  Normal.  No trendelenburg sign bilaterally.   Motor Strength:  5/5 motor strength throughout lower extremities.   Sensory:  No sensory deficit in the lower extremities.   Reflexes:  2 + and symmetric throughout.  Downgoing Babinski's bilaterally.  No clonus or spasticity.  L-Spine:  Limited ROM with pain on all movements.  Positive " pain with axial/facet loading bilaterally.  Negative SLR bilaterally.   Diffusely TTP over lumbar paraspinals, bilateral SI joints    Imaging:    Narrative & Impression    5 views lumbar spine.  Vena cava filter right common L2-L3, and lap band apparatus left upper quadrant.  Mild levoscoliosis, lordosis lumbar spine.  Elevation of right pelvis.  Facet arthropathy L4-L5 levels.  No fracture subluxation.  IMPRESSION:    See results above.        Electronically signed by: CITLALLI KELLEY MD  Date:                                            01/27/17  Time:                                           14:36          Assessment:       Encounter Diagnoses   Name Primary?    Chronic midline low back pain without sciatica     DDD (degenerative disc disease), lumbar Yes    Lumbar spondylosis     Pain in both hands          Plan:       Alberto was seen today for low-back pain, hand pain and knee pain.    Diagnoses and all orders for this visit:    DDD (degenerative disc disease), lumbar  -     X-Ray Hand 3 View Bilateral; Future  -     X-Ray Lumbar Spine Ap Lateral w/Flex Ext; Future    Chronic midline low back pain without sciatica  -     Ambulatory referral/consult to Pain Clinic    Lumbar spondylosis  -     X-Ray Hand 3 View Bilateral; Future  -     X-Ray Lumbar Spine Ap Lateral w/Flex Ext; Future    Pain in both hands  -     MRI Lumbar Spine Without Contrast; Future  -     Ambulatory referral/consult to Rheumatology; Future        Alberto BRITTANY Frye is a 45 y.o. female with chronic bilateral low back pain.  Exact etiology of pain is somewhat unclear though appears to be axial.  Low suspicion for radiculopathy at this time.  May have lumbar facet pain and possibly bilateral sacroiliac joint pain.  Patient also with pain and tenderness of the left middle finger PIP joint.  Subjectively reports history of positive rheumatologic workup in the past.    1.  Pertinent imaging studies reviewed by me. Imaging results were  discussed with patient.  2.  Lumbar x-rays to get updated imaging of the lumbar spine.  3.  Lumbar MRI to evaluate for intraspinal pathology and to better evaluate lumbar facet joints.  4.  Hand x-rays to evaluate for any joint erosion or other process.  5.  Refer to rheumatology.  6.  Return to clinic after MRI to review results.

## 2020-11-16 NOTE — LETTER
November 16, 2020      Ian Cespedes MD  [address]  [place] [address]           [facility]  [address], [place] [address]  Phone: [phone]  Fax: [phone]          Patient: Alberto Frye   MR Number: [id number]   YOB: 1975   Date of Visit: 11/16/2020       Dear Dr. Ian Cespedes:    Thank you for referring Alberto Frye to me for evaluation. Attached you will find relevant portions of my assessment and plan of care.    If you have questions, please do not hesitate to call me. I look forward to following Alberto Frye along with you.    Sincerely,    Eduard Farah Jr., MD    Enclosure  CC:  No Recipients    If you would like to receive this communication electronically, please contact [email or web address] or [phone] to request more information on [employer] Link access.    For providers and/or their staff who would like to refer a patient to [facility], please contact us through our one-stop-shop provider referral line, [facility], at [phone].    If you feel you have received this communication in error or would no longer like to receive these types of communications, please e-mail [email or web address]

## 2020-11-17 ENCOUNTER — APPOINTMENT (OUTPATIENT)
Dept: RADIOLOGY | Facility: HOSPITAL | Age: 45
End: 2020-11-17
Attending: PAIN MEDICINE
Payer: COMMERCIAL

## 2020-11-17 DIAGNOSIS — M47.816 LUMBAR SPONDYLOSIS: ICD-10-CM

## 2020-11-17 DIAGNOSIS — M51.36 DDD (DEGENERATIVE DISC DISEASE), LUMBAR: ICD-10-CM

## 2020-11-17 PROCEDURE — 72120 XR LUMBAR SPINE AP AND LAT WITH FLEX/EXT: ICD-10-PCS | Mod: 26,,, | Performed by: RADIOLOGY

## 2020-11-17 PROCEDURE — 72100 XR LUMBAR SPINE AP AND LAT WITH FLEX/EXT: ICD-10-PCS | Mod: 26,,, | Performed by: RADIOLOGY

## 2020-11-17 PROCEDURE — 72120 X-RAY BEND ONLY L-S SPINE: CPT | Mod: TC,FY,PN

## 2020-11-17 PROCEDURE — 72100 X-RAY EXAM L-S SPINE 2/3 VWS: CPT | Mod: 26,,, | Performed by: RADIOLOGY

## 2020-11-17 PROCEDURE — 72120 X-RAY BEND ONLY L-S SPINE: CPT | Mod: 26,,, | Performed by: RADIOLOGY

## 2020-11-18 ENCOUNTER — OFFICE VISIT (OUTPATIENT)
Dept: RHEUMATOLOGY | Facility: CLINIC | Age: 45
End: 2020-11-18
Payer: COMMERCIAL

## 2020-11-18 VITALS
WEIGHT: 241.38 LBS | BODY MASS INDEX: 41.21 KG/M2 | HEART RATE: 108 BPM | SYSTOLIC BLOOD PRESSURE: 134 MMHG | HEIGHT: 64 IN | OXYGEN SATURATION: 98 % | TEMPERATURE: 99 F | DIASTOLIC BLOOD PRESSURE: 92 MMHG | RESPIRATION RATE: 18 BRPM

## 2020-11-18 DIAGNOSIS — M79.641 PAIN IN BOTH HANDS: ICD-10-CM

## 2020-11-18 DIAGNOSIS — Z71.89 COUNSELING AND COORDINATION OF CARE: ICD-10-CM

## 2020-11-18 DIAGNOSIS — M54.14 THORACIC AND LUMBOSACRAL NEURITIS: ICD-10-CM

## 2020-11-18 DIAGNOSIS — M54.17 THORACIC AND LUMBOSACRAL NEURITIS: ICD-10-CM

## 2020-11-18 DIAGNOSIS — M79.642 PAIN IN BOTH HANDS: ICD-10-CM

## 2020-11-18 DIAGNOSIS — M35.01 SJOGREN'S SYNDROME WITH KERATOCONJUNCTIVITIS SICCA: Primary | ICD-10-CM

## 2020-11-18 DIAGNOSIS — M47.819 SPONDYLOSIS WITHOUT MYELOPATHY: ICD-10-CM

## 2020-11-18 DIAGNOSIS — Z79.899 ENCOUNTER FOR LONG-TERM (CURRENT) USE OF OTHER MEDICATIONS: ICD-10-CM

## 2020-11-18 PROCEDURE — 1125F AMNT PAIN NOTED PAIN PRSNT: CPT | Mod: S$GLB,,, | Performed by: INTERNAL MEDICINE

## 2020-11-18 PROCEDURE — 3080F DIAST BP >= 90 MM HG: CPT | Mod: CPTII,S$GLB,, | Performed by: INTERNAL MEDICINE

## 2020-11-18 PROCEDURE — 3075F SYST BP GE 130 - 139MM HG: CPT | Mod: CPTII,S$GLB,, | Performed by: INTERNAL MEDICINE

## 2020-11-18 PROCEDURE — 3008F BODY MASS INDEX DOCD: CPT | Mod: CPTII,S$GLB,, | Performed by: INTERNAL MEDICINE

## 2020-11-18 PROCEDURE — 99215 OFFICE O/P EST HI 40 MIN: CPT | Mod: S$GLB,,, | Performed by: INTERNAL MEDICINE

## 2020-11-18 PROCEDURE — 1125F PR PAIN SEVERITY QUANTIFIED, PAIN PRESENT: ICD-10-PCS | Mod: S$GLB,,, | Performed by: INTERNAL MEDICINE

## 2020-11-18 PROCEDURE — 99215 PR OFFICE/OUTPT VISIT, EST, LEVL V, 40-54 MIN: ICD-10-PCS | Mod: S$GLB,,, | Performed by: INTERNAL MEDICINE

## 2020-11-18 PROCEDURE — 3080F PR MOST RECENT DIASTOLIC BLOOD PRESSURE >= 90 MM HG: ICD-10-PCS | Mod: CPTII,S$GLB,, | Performed by: INTERNAL MEDICINE

## 2020-11-18 PROCEDURE — 99999 PR PBB SHADOW E&M-EST. PATIENT-LVL V: CPT | Mod: PBBFAC,,, | Performed by: INTERNAL MEDICINE

## 2020-11-18 PROCEDURE — 3075F PR MOST RECENT SYSTOLIC BLOOD PRESS GE 130-139MM HG: ICD-10-PCS | Mod: CPTII,S$GLB,, | Performed by: INTERNAL MEDICINE

## 2020-11-18 PROCEDURE — 99999 PR PBB SHADOW E&M-EST. PATIENT-LVL V: ICD-10-PCS | Mod: PBBFAC,,, | Performed by: INTERNAL MEDICINE

## 2020-11-18 PROCEDURE — 3008F PR BODY MASS INDEX (BMI) DOCUMENTED: ICD-10-PCS | Mod: CPTII,S$GLB,, | Performed by: INTERNAL MEDICINE

## 2020-11-18 RX ORDER — CYCLOBENZAPRINE HCL 10 MG
10 TABLET ORAL NIGHTLY
Qty: 30 TABLET | Refills: 4 | Status: SHIPPED | OUTPATIENT
Start: 2020-11-18 | End: 2021-02-18 | Stop reason: SDUPTHER

## 2020-11-18 RX ORDER — GABAPENTIN 600 MG/1
600 TABLET ORAL 2 TIMES DAILY
Qty: 60 TABLET | Refills: 4 | Status: SHIPPED | OUTPATIENT
Start: 2020-11-18 | End: 2021-02-18 | Stop reason: SDUPTHER

## 2020-11-18 RX ORDER — HYDROXYCHLOROQUINE SULFATE 200 MG/1
200 TABLET, FILM COATED ORAL 2 TIMES DAILY
Qty: 60 TABLET | Refills: 6 | Status: SHIPPED | OUTPATIENT
Start: 2020-11-18 | End: 2021-02-18 | Stop reason: SDUPTHER

## 2020-11-18 NOTE — LETTER
November 18, 2020      Eduard Farah Jr., MD  [address]  [address]  [place] [address]           [facility]  [address] [address]  [place] [address]  Fax: [phone]          Patient: Alberto Frye   MR Number: [id number]   YOB: 1975   Date of Visit: 11/18/2020       Dear Dr. Eduard Farah Jr.:    Thank you for referring Alberto Frye to me for evaluation. Attached you will find relevant portions of my assessment and plan of care.    If you have questions, please do not hesitate to call me. I look forward to following Alberto Frye along with you.    Sincerely,    Ross Hughes MD    Enclosure  CC:  No Recipients    If you would like to receive this communication electronically, please contact [email or web address] or [phone] to request more information on [employer] Link access.    For providers and/or their staff who would like to refer a patient to [facility], please contact us through our one-stop-shop provider referral line, [facility] [place], at [phone].    If you feel you have received this communication in error or would no longer like to receive these types of communications, please e-mail [email or web address]

## 2020-11-18 NOTE — PROGRESS NOTES
RHEUMATOLOGY OUTPATIENT CLINIC NOTE    11/18/2020    Attending Rheumatologist: Ross Hughes  Primary Care Provider: Ian Cespedes MD   Physician Requesting Consultation: Eduard Farah Jr., MD  [address]   [address]  [place] [address]  Chief Complaint/Reason For Consultation:  No chief complaint on file.      Subjective:       HPI  Alberto Frye is a 45 y.o. Black or  female with medical history noted below who comes for evaluation of arthralgias.     She reports knowing of abnormal GIOVANNY at least ten years and when symptoms worsened she saw Rheumatology. Seen by Rheumatology at [facility] in 2018, noted to have +GIOVANNY & SSA, and likely beginning of Primary Sjogrens and OA.   Patient reports that she has been dealing with chronic back pain for many years but over then last 3 months has acutely worsened. She also notes pain in her hands, elbows, shoulder, knees, hips which has been progressing over the last year. Pain is worsened by all movement, improves only when laying still. Minimal relief with meds. Though she reports if she stays still to long then she is stiff. Reports about 20 minutes of morning stiffness. Has noted swelling in her hands. Also c/o numbness and tingling in her feet. Terrible sleep. + Dry eyes and dry mouth, easy bruising due to being on AC, +Raynaud's.  Had DVT post surgery and another DVT in iliac artery in August 2020.    No Alopecia, Oral/Nasal Ulcers, Rash, Photosensitivity, Pleuritis/serositis, LAD, Miscarriages, Skin tightening, SOB.     Review of Systems   Constitutional: Negative for appetite change, chills, fatigue, fever and unexpected weight change.   HENT: Negative for nasal congestion, ear discharge, ear pain, hearing loss, mouth sores, nosebleeds, sneezing, sore throat, tinnitus and trouble swallowing.    Eyes: Negative for photophobia, pain, discharge, redness, itching and visual disturbance.   Respiratory: Negative for  cough, chest tightness, shortness of breath and wheezing.    Cardiovascular: Negative for chest pain, palpitations and leg swelling.   Gastrointestinal: Negative for abdominal distention, abdominal pain, blood in stool, constipation, diarrhea, nausea and vomiting.   Endocrine: Negative for cold intolerance, heat intolerance, polydipsia, polyphagia and polyuria.   Genitourinary: Negative for difficulty urinating, dyspareunia, dysuria, flank pain, frequency, genital sores, hematuria, menstrual problem, pelvic pain, urgency, vaginal bleeding, vaginal discharge, vaginal pain and vaginal dryness.   Musculoskeletal: Positive for arthralgias, back pain, joint swelling, leg pain and myalgias. Negative for gait problem, neck pain, neck stiffness and joint deformity.   Integumentary:  Negative for pallor and rash.   Neurological: Negative for dizziness, seizures, weakness, light-headedness, numbness and headaches.   Hematological: Negative for adenopathy. Does not bruise/bleed easily.   Psychiatric/Behavioral: Negative for confusion, decreased concentration and sleep disturbance. The patient is not nervous/anxious.    All other systems reviewed and are negative.       Chronic comorbid conditions affecting medical decision making today:  Past Medical History:   Diagnosis Date    Alcohol abuse     stopped heavy drinking about 10 years ago; was drinking 3 glasses of vodka/tequilla,rum/whiskey per day    Anxiety     Congestive heart failure 8/11/2020    Depression     Hallucination     Hx of psychiatric care     Hypertension     Caro     unplanned trips, energy without sleep for 2 days (reading, cleaning), feelings that she can do multiple tasks at one time, feelings of overconfidence at times    Psychiatric problem     Sleep difficulties     Therapy     Withdrawal symptoms, drug or narcotic     racing heart, restlessness     Past Surgical History:   Procedure Laterality Date     lapban surgery  2009     ESOPHAGOGASTRODUODENOSCOPY N/A 6/3/2020    Procedure: EGD (ESOPHAGOGASTRODUODENOSCOPY);  Surgeon: Johan Grover MD;  Location: [facility] (4TH FLR);  Service: Endoscopy;  Laterality: N/A;  covid 6/2-[facility]    HYSTERECTOMY      PHLEBOGRAPHY Left 8/12/2020    Procedure: Venogram, pharmacomechanical thrombectomy;  Surgeon: Miguelangel Goldman MD;  Location: [facility] OR 2ND FLR;  Service: Vascular;  Laterality: Left;  2336.43 mGy  [id number]  17.8 minutes  37 ml of contrast    VENOPLASTY Left 8/12/2020    Procedure: ANGIOPLASTY, VEIN;  Surgeon: Miguelangel Goldman MD;  Location: [facility] OR 2ND FLR;  Service: Vascular;  Laterality: Left;     Family History   Problem Relation Age of Onset    Diabetes Mother     Cancer Mother     Hypertension Mother     Cirrhosis Maternal Uncle         ETOH    Celiac disease Neg Hx     Colon cancer Neg Hx     Colon polyps Neg Hx     Crohn's disease Neg Hx     Esophageal cancer Neg Hx     Inflammatory bowel disease Neg Hx     Liver cancer Neg Hx     Liver disease Neg Hx     Rectal cancer Neg Hx     Stomach cancer Neg Hx     Ulcerative colitis Neg Hx      Social History     Substance and Sexual Activity   Alcohol Use Yes    Alcohol/week: 2.0 standard drinks    Types: 2 Cans of beer per week    Frequency: 2-4 times a month    Drinks per session: 1 or 2    Binge frequency: Never    Comment: social presently, excessive 10 years ago     Social History     Tobacco Use   Smoking Status Former Smoker   Smokeless Tobacco Never Used   Tobacco Comment    quit in october 2016     Social History     Substance and Sexual Activity   Drug Use Yes    Types: Marijuana    Comment: uses THCA weekly       Current Outpatient Medications:     apixaban (ELIQUIS) 5 mg Tab, After finishing the first prescription: Take 1 tablet (5 mg total) by mouth 2 (two) times daily., Disp: 60 tablet, Rfl: 3    benzocaine (ORAJEL) 10 % mucosal gel, Use as directed in the mouth or throat  as needed for Pain., Disp: , Rfl: 0    cholecalciferol, vitamin D3, (VITAMIN D3) 50 mcg (2,000 unit) Cap, Take 1 capsule (2,000 Units total) by mouth once daily., Disp: 90 capsule, Rfl: 3    cloNIDine 0.2 mg/24 hr td ptwk (CATAPRES) 0.2 mg/24 hr, Place 1 patch onto the skin every 7 days., Disp: 4 patch, Rfl: 11    cyanocobalamin (VITAMIN B-12) 1000 MCG tablet, Take 100 mcg by mouth once daily., Disp: , Rfl:     d-mannose Powd, 1 scoop daily, Disp: 100 g, Rfl: 11    FLUoxetine 40 MG capsule, Take 1 capsule (40 mg total) by mouth once daily., Disp: 90 capsule, Rfl: 3    fluticasone (FLONASE) 50 mcg/actuation nasal spray, 1 spray by Each Nostril route daily as needed for Rhinitis or Allergies. , Disp: , Rfl:     gabapentin (NEURONTIN) 600 MG tablet, Take 1 tablet (600 mg total) by mouth 2 (two) times daily., Disp: 60 tablet, Rfl: 4    HYDROcodone-acetaminophen (NORCO) 5-325 mg per tablet, Take 1 tablet by mouth every 6 (six) hours as needed for Pain., Disp: 14 tablet, Rfl: 0    hydrocortisone 1 % cream, Apply topically 2 (two) times daily., Disp: 28.4 g, Rfl: 0    hydrOXYzine (ATARAX) 50 MG tablet, Take 1 tablet (50 mg total) by mouth daily as needed for Anxiety (sleep)., Disp: 90 tablet, Rfl: 1    ibuprofen (ADVIL,MOTRIN) 400 MG tablet, Take 1 tablet (400 mg total) by mouth every 6 (six) hours as needed., Disp: 20 tablet, Rfl: 0    Lactobacillus rhamnosus GG (CULTURELLE) 10 billion cell capsule, Take 1 capsule by mouth once daily., Disp:  , Rfl:     multivitamin with minerals tablet, Take 1 tablet by mouth once daily., Disp: , Rfl:     OLANZapine (ZYPREXA) 5 MG tablet, Take 1 tablet (5 mg total) by mouth every evening., Disp: 90 tablet, Rfl: 3    pantoprazole (PROTONIX) 40 MG tablet, Take 1 tablet (40 mg total) by mouth before breakfast. Take one pill every morning 45 minutes before breakfast in the morning., Disp: 90 tablet, Rfl: 3    valsartan-hydrochlorothiazide (DIOVAN-HCT) 160-25 mg per tablet,  Take 1 tablet by mouth once daily., Disp: 90 tablet, Rfl: 1    clonazePAM (KLONOPIN) 0.25 MG TbDL, Take 1 tablet (0.25 mg total) by mouth once daily. for 14 days, Disp: 14 tablet, Rfl: 0    cyclobenzaprine (FLEXERIL) 10 MG tablet, Take 1 tablet (10 mg total) by mouth every evening., Disp: 30 tablet, Rfl: 4    hydrOXYchloroQUINE (PLAQUENIL) 200 mg tablet, Take 1 tablet (200 mg total) by mouth 2 (two) times daily., Disp: 60 tablet, Rfl: 6     Objective:         Vitals:    11/18/20 1532   BP: (!) 134/92   Pulse: 108   Resp: 18   Temp: 98.5 °F (36.9 °C)     Physical Exam   Constitutional: She is oriented to person, place, and time and well-developed, well-nourished, and in no distress.   HENT:   Head: Normocephalic and atraumatic.   Right Ear: External ear normal.   Left Ear: External ear normal.   Nose: Nose normal.   Mouth/Throat: Oropharynx is clear and moist.   Eyes: Conjunctivae and EOM are normal. Pupils are equal, round, and reactive to light.   Neck: Normal range of motion. Neck supple.   Cardiovascular: Normal rate, regular rhythm and intact distal pulses.    Pulmonary/Chest: Effort normal and breath sounds normal.   Abdominal: Soft. Bowel sounds are normal.       Right Side Rheumatological Exam     Examination finds the shoulder, elbow, wrist, knee, 1st PIP, 1st MCP, 2nd PIP, 2nd MCP, 3rd MCP, 4th PIP, 4th MCP, 5th PIP and 5th MCP normal.    The patient is tender to palpation of the 3rd PIP    She has swelling of the 3rd PIP    Hip Exam   Tenderness Location: greater trochanter    Left Side Rheumatological Exam     Examination finds the shoulder, elbow, wrist, knee, 1st PIP, 1st MCP, 2nd PIP, 2nd MCP, 3rd MCP, 4th PIP, 4th MCP, 5th PIP and 5th MCP normal.    The patient is tender to palpation of the 3rd PIP.    She has swelling of the 3rd PIP    Joint Exam Comments   3rd PIP: DIP TTP     Hip Exam   Tenderness Location: greater trochanter      Back/Neck Exam     Comments:  -SLT     Neurological: She is alert  and oriented to person, place, and time.   Skin: No rash noted. No erythema.     Psychiatric: Mood and affect normal.       Reviewed old and all outside pertinent medical records available.    All lab results personally reviewed and interpreted by me.  Lab Results   Component Value Date    WBC 3.87 (L) 11/16/2020    HGB 12.3 11/16/2020    HCT 35.9 (L) 11/16/2020    MCV 90 11/16/2020    MCH 30.8 11/16/2020    MCHC 34.3 11/16/2020    RDW 13.2 11/16/2020     11/16/2020    MPV 10.1 11/16/2020       Lab Results   Component Value Date     11/16/2020    K 3.7 11/16/2020     (H) 11/16/2020    CO2 25 11/16/2020     11/16/2020    BUN 16 11/16/2020    CALCIUM 8.3 (L) 11/16/2020    PROT 7.0 11/16/2020    ALBUMIN 3.4 (L) 11/16/2020    BILITOT 0.4 11/16/2020    AST 32 11/16/2020    ALKPHOS 81 11/16/2020    ALT 32 11/16/2020       Lab Results   Component Value Date    COLORU Yellow 08/13/2020    APPEARANCEUA Hazy (A) 08/13/2020    SPECGRAV 1.005 08/13/2020    PHUR 5.0 08/13/2020    PROTEINUA Negative 08/13/2020    KETONESU Negative 08/13/2020    LEUKOCYTESUR Trace (A) 08/13/2020    NITRITE Negative 08/13/2020    UROBILINOGEN Negative 08/11/2020       Lab Results   Component Value Date    CRP 5.7 10/13/2017       Lab Results   Component Value Date    SEDRATE 18 10/13/2017       Lab Results   Component Value Date    RF <10.0 06/14/2016    SEDRATE 18 10/13/2017       No components found for: 25OHVITDTOT, 52XSCSEA7, 57FTKIRE0, METHODNOTE    No results found for: URICACID    No components found for: TSPOTTB    Imaging:  All imaging reviewed and independently interpreted by me.         ASSESSMENT / PLAN:     Alberto Frye is a 45 y.o. Black or  female with:      1. Sjogren's syndrome with keratoconjunctivitis sicca  - Patients has s/s consistent with SjS, she has sicca, wide spread pain, arthritis, and possible small fiber neuropathy   - discussed disease diagnosis and management   - at  this point I will refer to DERM for skin biopsy to R/O small fiber neuropathy   - will refer to EYE to evaluate for Dry eyes and HCQ screening   - reinforced artificial tears and oral hydration   - she is having small joint arthritis and would benefit from addition of HCQ 400mg daily, discussed SE and will start med   - will get work up as below   - Sjogrens syndrome-A extractable nuclear antibody; Future  - Sjogrens syndrome-B extractable nuclear antibody; Future  - CBC Auto Differential; Future  - Comprehensive Metabolic Panel; Future  - C-Reactive Protein; Future  - Protein/Creatinine Ratio, Urine; Future  - C4 Complement; Future  - C3 Complement; Future  - Urinalysis; Standing  - Vitamin D; Future  - TSH; Future  - Ambulatory referral/consult to Physical/Occupational Therapy; Future  - Sedimentation rate; Standing  - IgG 1, 2, 3, and 4; Standing  - Immunoglobulins (IgG, IgA, IgM) Quantitative; Standing  - Protein Electrophoresis, Serum; Standing  - Amylase; Standing  - Immunofixation Electrophoresis; Standing  - gabapentin (NEURONTIN) 600 MG tablet; Take 1 tablet (600 mg total) by mouth 2 (two) times daily.  Dispense: 60 tablet; Refill: 4  - cyclobenzaprine (FLEXERIL) 10 MG tablet; Take 1 tablet (10 mg total) by mouth every evening.  Dispense: 30 tablet; Refill: 4  - hydrOXYchloroQUINE (PLAQUENIL) 200 mg tablet; Take 1 tablet (200 mg total) by mouth 2 (two) times daily.  Dispense: 60 tablet; Refill: 6  - Ambulatory referral/consult to Ophthalmology; Future    2. Chronic Pain likely related to OA   - patient has chronic pain worse in lower back without symptoms of radiculopathy at this time and currently pending MRI of back   - she is following with pain management  - she reports doubling up her Gabapentin from one a day to BID, I have increased her medication and add Flexeril for the time being   - Will benefit from PT   - Reassurance and Exercise     3. Other specified counseling  - over 10 minutes spent  regarding below topics:  - Immunization counseling done.  - Weight loss counseling done.  - Nutrition and exercise counseling.  - Limitation of alcohol consumption.  - Regular exercise:  Aerobic and resistance.  - Medication counseling provided.    4. Morbid Obesity  - would benefit from decreasing at least 10% of body weight.  - recommended goal of losing 1 lb per week.  - consider nutritionist evaluation.  - would consider screening for TAQUERIA per PMD.    5. DMARD Toxicity Monitoring  - EYE referral     Follow up in about 3 months (around 2/18/2021).    Method of contact with patient concerns: [facility]    Disclaimer:  This note is prepared using voice recognition software and as such is likely to have errors and has not been proof read. Please contact me for questions.     Time spent: 45 minutes in face to face discussion concerning diagnosis, prognosis, review of lab and test results, benefits of treatment as well as management of disease, counseling of patient and coordination of care between various health care providers.  Greater than half the time spent was used for coordination of care and counseling of patient.    Ross Hughes M.D.  Rheumatology Department   [facility]

## 2020-11-19 ENCOUNTER — PATIENT MESSAGE (OUTPATIENT)
Dept: FAMILY MEDICINE | Facility: CLINIC | Age: 45
End: 2020-11-19

## 2020-11-19 ENCOUNTER — OFFICE VISIT (OUTPATIENT)
Dept: HEMATOLOGY/ONCOLOGY | Facility: CLINIC | Age: 45
End: 2020-11-19
Payer: COMMERCIAL

## 2020-11-19 ENCOUNTER — LAB VISIT (OUTPATIENT)
Dept: LAB | Facility: HOSPITAL | Age: 45
End: 2020-11-19
Attending: INTERNAL MEDICINE
Payer: COMMERCIAL

## 2020-11-19 VITALS
DIASTOLIC BLOOD PRESSURE: 94 MMHG | HEART RATE: 99 BPM | WEIGHT: 232.56 LBS | TEMPERATURE: 99 F | SYSTOLIC BLOOD PRESSURE: 139 MMHG | BODY MASS INDEX: 39.7 KG/M2 | HEIGHT: 64 IN | OXYGEN SATURATION: 99 %

## 2020-11-19 DIAGNOSIS — Z79.01 CHRONIC ANTICOAGULATION: ICD-10-CM

## 2020-11-19 DIAGNOSIS — I82.402 DEEP VEIN THROMBOSIS (DVT) OF LEFT LOWER EXTREMITY, UNSPECIFIED CHRONICITY, UNSPECIFIED VEIN: Primary | ICD-10-CM

## 2020-11-19 DIAGNOSIS — S39.012A STRAIN OF LUMBAR REGION, INITIAL ENCOUNTER: ICD-10-CM

## 2020-11-19 DIAGNOSIS — M35.00 SJOGREN'S SYNDROME, WITH UNSPECIFIED ORGAN INVOLVEMENT: ICD-10-CM

## 2020-11-19 DIAGNOSIS — M35.01 SJOGREN'S SYNDROME WITH KERATOCONJUNCTIVITIS SICCA: ICD-10-CM

## 2020-11-19 DIAGNOSIS — G89.29 CHRONIC MIDLINE LOW BACK PAIN WITHOUT SCIATICA: ICD-10-CM

## 2020-11-19 DIAGNOSIS — M54.50 CHRONIC MIDLINE LOW BACK PAIN WITHOUT SCIATICA: ICD-10-CM

## 2020-11-19 DIAGNOSIS — Z95.828 PRESENCE OF IVC FILTER: ICD-10-CM

## 2020-11-19 LAB
25(OH)D3+25(OH)D2 SERPL-MCNC: 22 NG/ML (ref 30–96)
ALBUMIN SERPL BCP-MCNC: 3.7 G/DL (ref 3.5–5.2)
ALP SERPL-CCNC: 97 U/L (ref 55–135)
ALT SERPL W/O P-5'-P-CCNC: 39 U/L (ref 10–44)
AMYLASE SERPL-CCNC: 78 U/L (ref 20–110)
ANION GAP SERPL CALC-SCNC: 10 MMOL/L (ref 8–16)
AST SERPL-CCNC: 39 U/L (ref 10–40)
BASOPHILS # BLD AUTO: 0.03 K/UL (ref 0–0.2)
BASOPHILS NFR BLD: 0.6 % (ref 0–1.9)
BILIRUB SERPL-MCNC: 0.4 MG/DL (ref 0.1–1)
BUN SERPL-MCNC: 16 MG/DL (ref 6–20)
C3 SERPL-MCNC: 143 MG/DL (ref 50–180)
C4 SERPL-MCNC: 41 MG/DL (ref 11–44)
CALCIUM SERPL-MCNC: 9.4 MG/DL (ref 8.7–10.5)
CHLORIDE SERPL-SCNC: 105 MMOL/L (ref 95–110)
CO2 SERPL-SCNC: 26 MMOL/L (ref 23–29)
CREAT SERPL-MCNC: 0.9 MG/DL (ref 0.5–1.4)
CRP SERPL-MCNC: 19.1 MG/L (ref 0–8.2)
DIFFERENTIAL METHOD: NORMAL
EOSINOPHIL # BLD AUTO: 0.2 K/UL (ref 0–0.5)
EOSINOPHIL NFR BLD: 3.5 % (ref 0–8)
ERYTHROCYTE [DISTWIDTH] IN BLOOD BY AUTOMATED COUNT: 13.2 % (ref 11.5–14.5)
ERYTHROCYTE [SEDIMENTATION RATE] IN BLOOD BY WESTERGREN METHOD: 40 MM/HR (ref 0–20)
EST. GFR  (AFRICAN AMERICAN): >60 ML/MIN/1.73 M^2
EST. GFR  (NON AFRICAN AMERICAN): >60 ML/MIN/1.73 M^2
GLUCOSE SERPL-MCNC: 92 MG/DL (ref 70–110)
HCT VFR BLD AUTO: 37.2 % (ref 37–48.5)
HGB BLD-MCNC: 13 G/DL (ref 12–16)
IGA SERPL-MCNC: 163 MG/DL (ref 40–350)
IGG SERPL-MCNC: 1702 MG/DL (ref 650–1600)
IGM SERPL-MCNC: 108 MG/DL (ref 50–300)
IMM GRANULOCYTES # BLD AUTO: 0.02 K/UL (ref 0–0.04)
IMM GRANULOCYTES NFR BLD AUTO: 0.4 % (ref 0–0.5)
LYMPHOCYTES # BLD AUTO: 2.5 K/UL (ref 1–4.8)
LYMPHOCYTES NFR BLD: 45.5 % (ref 18–48)
MCH RBC QN AUTO: 30.2 PG (ref 27–31)
MCHC RBC AUTO-ENTMCNC: 34.9 G/DL (ref 32–36)
MCV RBC AUTO: 86 FL (ref 82–98)
MONOCYTES # BLD AUTO: 0.6 K/UL (ref 0.3–1)
MONOCYTES NFR BLD: 10.4 % (ref 4–15)
NEUTROPHILS # BLD AUTO: 2.1 K/UL (ref 1.8–7.7)
NEUTROPHILS NFR BLD: 39.6 % (ref 38–73)
NRBC BLD-RTO: 0 /100 WBC
PLATELET # BLD AUTO: 226 K/UL (ref 150–350)
PMV BLD AUTO: 9.7 FL (ref 9.2–12.9)
POTASSIUM SERPL-SCNC: 3.7 MMOL/L (ref 3.5–5.1)
PROT SERPL-MCNC: 7.7 G/DL (ref 6–8.4)
RBC # BLD AUTO: 4.31 M/UL (ref 4–5.4)
SODIUM SERPL-SCNC: 141 MMOL/L (ref 136–145)
TSH SERPL DL<=0.005 MIU/L-ACNC: 3.1 UIU/ML (ref 0.4–4)
WBC # BLD AUTO: 5.39 K/UL (ref 3.9–12.7)

## 2020-11-19 PROCEDURE — 86160 COMPLEMENT ANTIGEN: CPT

## 2020-11-19 PROCEDURE — 1126F AMNT PAIN NOTED NONE PRSNT: CPT | Mod: S$GLB,,, | Performed by: INTERNAL MEDICINE

## 2020-11-19 PROCEDURE — 86334 PATHOLOGIST INTERPRETATION IFE: ICD-10-PCS | Mod: 26,,, | Performed by: PATHOLOGY

## 2020-11-19 PROCEDURE — 3075F SYST BP GE 130 - 139MM HG: CPT | Mod: CPTII,S$GLB,, | Performed by: INTERNAL MEDICINE

## 2020-11-19 PROCEDURE — 3080F DIAST BP >= 90 MM HG: CPT | Mod: CPTII,S$GLB,, | Performed by: INTERNAL MEDICINE

## 2020-11-19 PROCEDURE — 86235 NUCLEAR ANTIGEN ANTIBODY: CPT

## 2020-11-19 PROCEDURE — 99214 PR OFFICE/OUTPT VISIT, EST, LEVL IV, 30-39 MIN: ICD-10-PCS | Mod: S$GLB,,, | Performed by: INTERNAL MEDICINE

## 2020-11-19 PROCEDURE — 80053 COMPREHEN METABOLIC PANEL: CPT

## 2020-11-19 PROCEDURE — 3008F BODY MASS INDEX DOCD: CPT | Mod: CPTII,S$GLB,, | Performed by: INTERNAL MEDICINE

## 2020-11-19 PROCEDURE — 86334 IMMUNOFIX E-PHORESIS SERUM: CPT

## 2020-11-19 PROCEDURE — 82787 IGG 1 2 3 OR 4 EACH: CPT | Mod: 59

## 2020-11-19 PROCEDURE — 85025 COMPLETE CBC W/AUTO DIFF WBC: CPT

## 2020-11-19 PROCEDURE — 84165 PROTEIN E-PHORESIS SERUM: CPT | Mod: 26,,, | Performed by: PATHOLOGY

## 2020-11-19 PROCEDURE — 84165 PROTEIN E-PHORESIS SERUM: CPT

## 2020-11-19 PROCEDURE — 99999 PR PBB SHADOW E&M-EST. PATIENT-LVL V: CPT | Mod: PBBFAC,,, | Performed by: INTERNAL MEDICINE

## 2020-11-19 PROCEDURE — 86334 IMMUNOFIX E-PHORESIS SERUM: CPT | Mod: 26,,, | Performed by: PATHOLOGY

## 2020-11-19 PROCEDURE — 82150 ASSAY OF AMYLASE: CPT

## 2020-11-19 PROCEDURE — 3008F PR BODY MASS INDEX (BMI) DOCUMENTED: ICD-10-PCS | Mod: CPTII,S$GLB,, | Performed by: INTERNAL MEDICINE

## 2020-11-19 PROCEDURE — 3075F PR MOST RECENT SYSTOLIC BLOOD PRESS GE 130-139MM HG: ICD-10-PCS | Mod: CPTII,S$GLB,, | Performed by: INTERNAL MEDICINE

## 2020-11-19 PROCEDURE — 99999 PR PBB SHADOW E&M-EST. PATIENT-LVL V: ICD-10-PCS | Mod: PBBFAC,,, | Performed by: INTERNAL MEDICINE

## 2020-11-19 PROCEDURE — 36415 COLL VENOUS BLD VENIPUNCTURE: CPT

## 2020-11-19 PROCEDURE — 86160 COMPLEMENT ANTIGEN: CPT | Mod: 59

## 2020-11-19 PROCEDURE — 99214 OFFICE O/P EST MOD 30 MIN: CPT | Mod: S$GLB,,, | Performed by: INTERNAL MEDICINE

## 2020-11-19 PROCEDURE — 82784 ASSAY IGA/IGD/IGG/IGM EACH: CPT | Mod: 59

## 2020-11-19 PROCEDURE — 85652 RBC SED RATE AUTOMATED: CPT

## 2020-11-19 PROCEDURE — 3080F PR MOST RECENT DIASTOLIC BLOOD PRESSURE >= 90 MM HG: ICD-10-PCS | Mod: CPTII,S$GLB,, | Performed by: INTERNAL MEDICINE

## 2020-11-19 PROCEDURE — 86235 NUCLEAR ANTIGEN ANTIBODY: CPT | Mod: 59

## 2020-11-19 PROCEDURE — 86140 C-REACTIVE PROTEIN: CPT

## 2020-11-19 PROCEDURE — 84443 ASSAY THYROID STIM HORMONE: CPT

## 2020-11-19 PROCEDURE — 1126F PR PAIN SEVERITY QUANTIFIED, NO PAIN PRESENT: ICD-10-PCS | Mod: S$GLB,,, | Performed by: INTERNAL MEDICINE

## 2020-11-19 PROCEDURE — 84165 PATHOLOGIST INTERPRETATION SPE: ICD-10-PCS | Mod: 26,,, | Performed by: PATHOLOGY

## 2020-11-19 PROCEDURE — 82306 VITAMIN D 25 HYDROXY: CPT

## 2020-11-19 RX ORDER — HYDROCODONE BITARTRATE AND ACETAMINOPHEN 5; 325 MG/1; MG/1
1 TABLET ORAL EVERY 6 HOURS PRN
Qty: 14 TABLET | Refills: 0 | Status: SHIPPED | OUTPATIENT
Start: 2020-11-19 | End: 2020-12-07 | Stop reason: SDUPTHER

## 2020-11-19 NOTE — TELEPHONE ENCOUNTER
Started on HCQ  And increased monserrat  MRI L spine pending  Will send in temporary RF on norco but long term work to get away from it

## 2020-11-19 NOTE — PROGRESS NOTES
Subjective:       Patient ID: Alberto Frye is a 45 y.o. female.    Chief Complaint: Deep Vein Thrombosis (test results)  Diagnosis: LLE DVT   HPI     45-year-old female with past medical history of left lower extremity DVT around 2009 status post IVC filter, and obesity who presented with extensive left lower extremity DVT (extending from distal IVC and left iliac vein to distal IVC to the posterior tibial and peroneal veins.  Vascular surgery was consulted on 08/11, status post thrombectomy 8/12.  Initially anticoagulated with heparin drip, transitioned to Eliquis 8/13.   Thrombocytopenia noted 8/14, but patient already transitioned to Eliquis on 8/13. Platelet counts improved (from 65 on 8/14 to 127 on 8/15). Patient with hematuria in guzman bag post op day 1, but denies any hematuria since guzman removed on 8/13. She reports small clots from her sinus tract but no overt epistaxis. No other clinically significant bleeding reported. Hgb did trend down to 9.6 from 12.7 on admission, thought to be secondary to post-operative status with reported hematuria as above. 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 No family history of clots. No recent surgeries or infections. No prolonged periods of immobility. No trauma. She remains on Eliquis. She completed 10mg bid as prescribed. She now reports taking 10mg qd?. She reports pain LLE improved. LLE swelling improved  No SOB/CP . She was diagnosed with LLE DVT [facility] 11 yrs ago 2009 ? Which she developed postop following hysterctomy.She underwent IVC filter placement. She was prescribed anticoagulation with Lovenox and then transitioned to coumadin x 6mos. She reports Last Mammogram diagnostic imaging 8/2020 benign. Colonoscopy 2019 Nov [facility] - hemorrhoids, diverticulosis. Her only complaint today is of minor rash on chest and back with some mild assoc pruritus which  started yesterday. No tongue swelling/CP/SOB.          She is followed by Rheumatology for Sjogren's syndrome  She has dry eyes and dry mouth  She has diffuse arthralgias  She has chronic pain worse in lower back   MRI spine planned in near future  She is taking gabapentin and recently prescribed plaquenil   She remains on chronic anticoagulation  No longer having pruritus, rash  No leg swelling  No SOB/CP    Sochx: Hx of ETOH abuse quit heavy drinking. She reports now drinking 1 16 oz beer 3x/week. Smokes marijuana. 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    Past Medical History:   Diagnosis Date    Alcohol abuse     stopped heavy drinking about 10 years ago; was drinking 3 glasses of vodka/tequilla,rum/whiskey per day    Anxiety     Congestive heart failure 8/11/2020    Depression     Hallucination     Hx of psychiatric care     Hypertension     Caro     unplanned trips, energy without sleep for 2 days (reading, cleaning), feelings that she can do multiple tasks at one time, feelings of overconfidence at times    Psychiatric problem     Sleep difficulties     Therapy     Withdrawal symptoms, drug or narcotic     racing heart, restlessness         Past Surgical History:   Procedure Laterality Date     lapban surgery  2009    ESOPHAGOGASTRODUODENOSCOPY N/A 6/3/2020    Procedure: EGD (ESOPHAGOGASTRODUODENOSCOPY);  Surgeon: Johan Grover MD;  Location: [facility] (4TH FLR);  Service: Endoscopy;  Laterality: N/A;  [place]    HYSTERECTOMY      PHLEBOGRAPHY Left 8/12/2020    Procedure: Venogram, pharmacomechanical thrombectomy;  Surgeon: Miguelangel Goldman MD;  [facility];  Service: Vascular;  Laterality: Left;  2336.43 mGy  [id number]  17.8 minutes  37 ml of contrast    VENOPLASTY Left 8/12/2020    Procedure: ANGIOPLASTY, VEIN;  Surgeon: Miguelangel Goldman MD;  [facility];  Service: Vascular;  Laterality: Left;     Review of  "Systems   Constitutional: Negative for appetite change, fatigue, fever and unexpected weight change.   HENT: Negative for mouth sores.         Dry eyes, dry mouth   Eyes: Negative for visual disturbance.   Respiratory: Negative for cough and shortness of breath.    Cardiovascular: Negative for chest pain.   Gastrointestinal: Negative for abdominal pain, constipation, diarrhea and nausea.   Genitourinary: Negative for frequency.   Musculoskeletal: Positive for arthralgias and back pain.   Integumentary:  Negative for rash.   Neurological: Negative for headaches.   Hematological: Negative for adenopathy.   Psychiatric/Behavioral: The patient is not nervous/anxious.          Objective:       Vitals:    11/19/20 1025   BP: (!) 139/94   BP Location: Left arm   Patient Position: Sitting   BP Method: Large (Automatic)   Pulse: 99   Temp: 98.5 °F (36.9 °C)   TempSrc: Oral   SpO2: 99%   Weight: 105.5 kg (232 lb 9.4 oz)   Height: 5' 4" (1.626 m)       Physical Exam  Constitutional:       Appearance: She is well-developed.   HENT:      Head: Normocephalic.      Mouth/Throat:      Pharynx: No oropharyngeal exudate.   Eyes:      General: Lids are normal. No scleral icterus.     Conjunctiva/sclera: Conjunctivae normal.   Neck:      Musculoskeletal: Normal range of motion and neck supple.      Thyroid: No thyromegaly.   Cardiovascular:      Rate and Rhythm: Normal rate and regular rhythm.      Heart sounds: Normal heart sounds. No murmur.   Pulmonary:      Breath sounds: Normal breath sounds. No wheezing or rales.   Abdominal:      General: Bowel sounds are normal.      Palpations: Abdomen is soft.      Tenderness: There is no abdominal tenderness. There is no guarding or rebound.   Musculoskeletal: Normal range of motion.         General: No swelling or tenderness.      Right lower leg: No edema.      Left lower leg: No edema.   Lymphadenopathy:      Upper Body:      Right upper body: No axillary adenopathy.      Left upper body: " No axillary adenopathy.   Skin:     General: Skin is warm and dry.      Findings: No ecchymosis, petechiae or rash.   Neurological:      Mental Status: She is alert and oriented to person, place, and time.      Cranial Nerves: No cranial nerve deficit.      Coordination: Coordination normal.         Lab Results   Component Value Date    WBC 5.39 11/19/2020    HGB 13.0 11/19/2020    HCT 37.2 11/19/2020    MCV 86 11/19/2020     11/19/2020     US 8/11/2020   FINDINGS:  The left iliac vein is totally occluded.  It measures approximately 1.5 cm in its diameter.  There is suggestion of extension of the thrombus also in the inferior vena cava distally that appears to be partially occluded..  The proximal portion of the inferior vena cava in the upper abdomen is not evaluated well due to presence of bowel gas in the abdomen.     Impression:     Findings representing left iliac vein and distal inferior vena cava thrombosis.  The rest of the IVC was not evaluated well on this study.  Assessment:       1. Deep vein thrombosis (DVT) of left lower extremity, unspecified chronicity, unspecified vein    2. Presence of IVC filter    3. Chronic anticoagulation    4. Sjogren's syndrome, with unspecified organ involvement    5. Chronic midline low back pain without sciatica        Plan:       1-3 44 y/o with recurrent DVT - s/p thrombectomy 8/13/20, IVC filter  She has been prescribed Eliquis  She will need to remain on lifelong anticoagulation at time of her Eliquis  Previous skin rash, pruritus, resolved  Pt also advised on proper admin of medication and to  take Eliquis as prescribed 5 mg 1 p.o. b.i.d. she patient was taking medication incorrectly.    4. Follow-up with Rheumatology  5. MRI planned     Follow-upwith Cbc,cmp, Ferritin, TIBC and Fe prior to f/u 6mos

## 2020-11-20 LAB
ALBUMIN SERPL ELPH-MCNC: 3.84 G/DL (ref 3.35–5.55)
ALPHA1 GLOB SERPL ELPH-MCNC: 0.65 G/DL (ref 0.17–0.41)
ALPHA2 GLOB SERPL ELPH-MCNC: 0.66 G/DL (ref 0.43–0.99)
B-GLOBULIN SERPL ELPH-MCNC: 0.75 G/DL (ref 0.5–1.1)
GAMMA GLOB SERPL ELPH-MCNC: 1.6 G/DL (ref 0.67–1.58)
INTERPRETATION SERPL IFE-IMP: NORMAL
PROT SERPL-MCNC: 7.5 G/DL (ref 6–8.4)

## 2020-11-23 ENCOUNTER — PATIENT MESSAGE (OUTPATIENT)
Dept: PSYCHIATRY | Facility: CLINIC | Age: 45
End: 2020-11-23

## 2020-11-23 LAB
ANTI-SSA ANTIBODY: 1.7 RATIO (ref 0–0.99)
ANTI-SSA INTERPRETATION: POSITIVE
ANTI-SSB ANTIBODY: 0.14 RATIO (ref 0–0.99)
ANTI-SSB INTERPRETATION: NEGATIVE
IGG1 SER-MCNC: ABNORMAL MG/DL (ref 382–929)
IGG2 SER-MCNC: 547 MG/DL (ref 242–700)
IGG3 SER-MCNC: 185 MG/DL (ref 22–176)
IGG4 SER-MCNC: 56 MG/DL (ref 4–86)
PATHOLOGIST INTERPRETATION IFE: NORMAL
PATHOLOGIST INTERPRETATION SPE: NORMAL

## 2020-11-24 ENCOUNTER — PATIENT MESSAGE (OUTPATIENT)
Dept: PAIN MEDICINE | Facility: CLINIC | Age: 45
End: 2020-11-24

## 2020-11-25 NOTE — PROGRESS NOTES
Called patient and discussed results. Vit D prescribed. Tolerating new meds. Keep follow up appointments.

## 2020-12-04 ENCOUNTER — OFFICE VISIT (OUTPATIENT)
Dept: PAIN MEDICINE | Facility: CLINIC | Age: 45
End: 2020-12-04
Payer: COMMERCIAL

## 2020-12-04 VITALS
SYSTOLIC BLOOD PRESSURE: 132 MMHG | HEART RATE: 87 BPM | WEIGHT: 247.88 LBS | DIASTOLIC BLOOD PRESSURE: 86 MMHG | OXYGEN SATURATION: 98 % | TEMPERATURE: 98 F | HEIGHT: 64 IN | BODY MASS INDEX: 42.32 KG/M2

## 2020-12-04 DIAGNOSIS — M51.36 DDD (DEGENERATIVE DISC DISEASE), LUMBAR: ICD-10-CM

## 2020-12-04 DIAGNOSIS — M47.816 LUMBAR SPONDYLOSIS: Primary | ICD-10-CM

## 2020-12-04 PROCEDURE — 3079F DIAST BP 80-89 MM HG: CPT | Mod: CPTII,S$GLB,, | Performed by: PAIN MEDICINE

## 2020-12-04 PROCEDURE — 99213 PR OFFICE/OUTPT VISIT, EST, LEVL III, 20-29 MIN: ICD-10-PCS | Mod: S$GLB,,, | Performed by: PAIN MEDICINE

## 2020-12-04 PROCEDURE — 3079F PR MOST RECENT DIASTOLIC BLOOD PRESSURE 80-89 MM HG: ICD-10-PCS | Mod: CPTII,S$GLB,, | Performed by: PAIN MEDICINE

## 2020-12-04 PROCEDURE — 99999 PR PBB SHADOW E&M-EST. PATIENT-LVL V: ICD-10-PCS | Mod: PBBFAC,,, | Performed by: PAIN MEDICINE

## 2020-12-04 PROCEDURE — 3075F SYST BP GE 130 - 139MM HG: CPT | Mod: CPTII,S$GLB,, | Performed by: PAIN MEDICINE

## 2020-12-04 PROCEDURE — 3008F PR BODY MASS INDEX (BMI) DOCUMENTED: ICD-10-PCS | Mod: CPTII,S$GLB,, | Performed by: PAIN MEDICINE

## 2020-12-04 PROCEDURE — 3075F PR MOST RECENT SYSTOLIC BLOOD PRESS GE 130-139MM HG: ICD-10-PCS | Mod: CPTII,S$GLB,, | Performed by: PAIN MEDICINE

## 2020-12-04 PROCEDURE — 1125F PR PAIN SEVERITY QUANTIFIED, PAIN PRESENT: ICD-10-PCS | Mod: S$GLB,,, | Performed by: PAIN MEDICINE

## 2020-12-04 PROCEDURE — 99213 OFFICE O/P EST LOW 20 MIN: CPT | Mod: S$GLB,,, | Performed by: PAIN MEDICINE

## 2020-12-04 PROCEDURE — 3008F BODY MASS INDEX DOCD: CPT | Mod: CPTII,S$GLB,, | Performed by: PAIN MEDICINE

## 2020-12-04 PROCEDURE — 1125F AMNT PAIN NOTED PAIN PRSNT: CPT | Mod: S$GLB,,, | Performed by: PAIN MEDICINE

## 2020-12-04 PROCEDURE — 99999 PR PBB SHADOW E&M-EST. PATIENT-LVL V: CPT | Mod: PBBFAC,,, | Performed by: PAIN MEDICINE

## 2020-12-04 NOTE — PROGRESS NOTES
Subjective:     Patient ID: Alberto Frye is a 45 y.o. female    Chief Complaint: Back Pain      Referred by: No ref. provider found      HPI:    Interval History (12/4/20):  She returns today for follow up.  She reports that she continues to have low back pain.  She denies any changes in the quality or location of pain since last encounter.  She has been evaluated by Rheumatology was diagnosed her with Sjogren syndrome.  Further workup and treatment has been initiated.  Lumbar MRI denied by insurance.      Initial Encounter (11/16/20):  Alberto Frye is a 45 y.o. female who presents today with chronic bilateral low back pain.  This pain has been present for at least 10 years.  No specific inciting event or injury noted.  The pain initially was intermittent and has significantly worsened over time.  She states the pain has been essentially constant recently.  The pain is located across the lower lumbar region.  The pain radiates to the bilateral buttocks to the bilateral posterior thighs.  The pain does not radiate past the knees.  Patient denies any associated numbness, tingling, weakness, bowel bladder dysfunction.  The pain is worse with activity.  Patient also reports having pain of the left middle finger PIP joint.  She cannot fully extend this joint and the joint itself is very tender.  Patient states that in the past she did have a positive rheumatologic workup was not having any rheumatologic symptoms so no treatment was undertaken.   This pain is described in detail below.    Physical Therapy:  No.    Non-pharmacologic Treatment:  Rest helps         · TENS?  No    Pain Medications:         · Currently taking:  Norco p.r.n., gabapentin    · Has tried in the past:  NSAIDs, Tylenol    · Has not tried:  Muscle relaxants, TCAs, SNRIs, topical creams    Blood thinners:  Eliquis    Interventional Therapies:  None    Relevant Surgeries:  None    Affecting sleep?  Yes    Affecting daily activities?  yes    Depressive symptoms? no          · SI/HI? No    Work status: Employed    Pain Scores:    Best:       2/10  Worst:     10/10  Usually:   8/10  Today:    7/10    Review of Systems   Constitutional: Negative for activity change, appetite change, chills, fatigue, fever and unexpected weight change.   HENT: Negative for hearing loss.    Eyes: Negative for visual disturbance.   Respiratory: Negative for chest tightness and shortness of breath.    Cardiovascular: Negative for chest pain.   Gastrointestinal: Negative for abdominal pain, constipation, diarrhea, nausea and vomiting.   Genitourinary: Negative for difficulty urinating.   Musculoskeletal: Positive for arthralgias, back pain, gait problem and myalgias. Negative for neck pain.   Skin: Negative for rash.   Neurological: Negative for dizziness, weakness, light-headedness, numbness and headaches.   Psychiatric/Behavioral: Positive for sleep disturbance. Negative for hallucinations and suicidal ideas. The patient is not nervous/anxious.        Past Medical History:   Diagnosis Date    Alcohol abuse     stopped heavy drinking about 10 years ago; was drinking 3 glasses of vodka/tequilla,rum/whiskey per day    Anxiety     Congestive heart failure 8/11/2020    Depression     Hallucination     Hx of psychiatric care     Hypertension     Caro     unplanned trips, energy without sleep for 2 days (reading, cleaning), feelings that she can do multiple tasks at one time, feelings of overconfidence at times    Psychiatric problem     Sleep difficulties     Therapy     Withdrawal symptoms, drug or narcotic     racing heart, restlessness       Past Surgical History:   Procedure Laterality Date     lapban surgery  2009    ESOPHAGOGASTRODUODENOSCOPY N/A 6/3/2020    Procedure: EGD (ESOPHAGOGASTRODUODENOSCOPY);  Surgeon: Johan Grover MD;  Location: [address];  Service: Endoscopy;  Laterality: N/A;  covid 6/2-[facility]     HYSTERECTOMY      PHLEBOGRAPHY Left 8/12/2020    Procedure: Venogram, pharmacomechanical thrombectomy;  Surgeon: Miguelangel Goldman MD;  [facility];  Service: Vascular;  Laterality: Left;  2336.43 mGy  494.43kako1  17.8 minutes  37 ml of contrast    VENOPLASTY Left 8/12/2020    Procedure: ANGIOPLASTY, VEIN;  Surgeon: Miguelangel Goldman MD;  [facility];  Service: Vascular;  Laterality: Left;       Social History     Socioeconomic History    Marital status:      Spouse name: Not on file    Number of children: 3    Years of education: Not on file    Highest education level: Not on file   Occupational History    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[employer]   Social Needs    Financial resource strain: Not hard at all    Food insecurity     Worry: Never true     Inability: Never true    Transportation needs     Medical: No     Non-medical: No   Tobacco Use    Smoking status: Former Smoker    Smokeless tobacco: Never Used    Tobacco comment: quit in october 2016   Substance and Sexual Activity    Alcohol use: Yes     Alcohol/week: 2.0 standard drinks     Types: 2 Cans of beer per week     Frequency: 2-4 times a month     Drinks per session: 1 or 2     Binge frequency: Never     Comment: social presently, excessive 10 years ago    Drug use: Yes     Types: Marijuana     Comment: uses THCA weekly    Sexual activity: Not Currently     Partners: Male     Birth control/protection: See Surgical Hx   Lifestyle    Physical activity     Days per week: 2 days     Minutes per session: 40 min    Stress: To some extent   Relationships    Social connections     Talks on phone: More than three times a week     Gets together: Once a week     Attends Taoist service: Not on file     Active member of club or organization: No     Attends meetings of clubs or organizations: Never     Relationship status:    Other Topics Concern    Patient feels they ought to cut  down on drinking/drug use No    Patient annoyed by others criticizing their drinking/drug use No    Patient has felt bad or guilty about drinking/drug use No    Patient has had a drink/used drugs as an eye opener in the AM No   Social History Narrative    Has 3 healthy grown sons (1990, 1993, 1998) Lives alone.    Works as a Referral Coordinator for [employer] in [place], [place]     once for 10 years.   in 2010.    Has Male partner since 2014 who is currently disabled.       Review of patient's allergies indicates:   Allergen Reactions    Morphine Itching and Hallucinations    Pcn [penicillins] Other (See Comments)     Was told from childhood she couldn't take it    Sulfa (sulfonamide antibiotics) Nausea And Vomiting    Latex, natural rubber Rash       Current Outpatient Medications on File Prior to Visit   Medication Sig Dispense Refill    apixaban (ELIQUIS) 5 mg Tab After finishing the first prescription: Take 1 tablet (5 mg total) by mouth 2 (two) times daily. 60 tablet 3    benzocaine (ORAJEL) 10 % mucosal gel Use as directed in the mouth or throat as needed for Pain.  0    cholecalciferol, vitamin D3, (VITAMIN D3) 50 mcg (2,000 unit) Cap Take 1 capsule (2,000 Units total) by mouth once daily. 90 capsule 3    cloNIDine 0.2 mg/24 hr td ptwk (CATAPRES) 0.2 mg/24 hr Place 1 patch onto the skin every 7 days. 4 patch 11    cyanocobalamin (VITAMIN B-12) 1000 MCG tablet Take 100 mcg by mouth once daily.      cyclobenzaprine (FLEXERIL) 10 MG tablet Take 1 tablet (10 mg total) by mouth every evening. 30 tablet 4    d-mannose Powd 1 scoop daily 100 g 11    FLUoxetine 40 MG capsule Take 1 capsule (40 mg total) by mouth once daily. 90 capsule 3    fluticasone (FLONASE) 50 mcg/actuation nasal spray 1 spray by Each Nostril route daily as needed for Rhinitis or Allergies.       gabapentin (NEURONTIN) 600 MG tablet Take 1 tablet (600 mg total) by mouth 2 (two) times daily. 60 tablet  "4    HYDROcodone-acetaminophen (NORCO) 5-325 mg per tablet Take 1 tablet by mouth every 6 (six) hours as needed for Pain. 14 tablet 0    hydrocortisone 1 % cream Apply topically 2 (two) times daily. 28.4 g 0    hydrOXYchloroQUINE (PLAQUENIL) 200 mg tablet Take 1 tablet (200 mg total) by mouth 2 (two) times daily. 60 tablet 6    hydrOXYzine (ATARAX) 50 MG tablet Take 1 tablet (50 mg total) by mouth daily as needed for Anxiety (sleep). 90 tablet 1    ibuprofen (ADVIL,MOTRIN) 400 MG tablet Take 1 tablet (400 mg total) by mouth every 6 (six) hours as needed. 20 tablet 0    Lactobacillus rhamnosus GG (CULTURELLE) 10 billion cell capsule Take 1 capsule by mouth once daily.      multivitamin with minerals tablet Take 1 tablet by mouth once daily.      OLANZapine (ZYPREXA) 5 MG tablet Take 1 tablet (5 mg total) by mouth every evening. 90 tablet 3    pantoprazole (PROTONIX) 40 MG tablet Take 1 tablet (40 mg total) by mouth before breakfast. Take one pill every morning 45 minutes before breakfast in the morning. 90 tablet 3    valsartan-hydrochlorothiazide (DIOVAN-HCT) 160-25 mg per tablet Take 1 tablet by mouth once daily. 90 tablet 1    clonazePAM (KLONOPIN) 0.25 MG TbDL Take 1 tablet (0.25 mg total) by mouth once daily. for 14 days 14 tablet 0     No current facility-administered medications on file prior to visit.        Objective:      /86 (BP Location: Left arm, Patient Position: Sitting, BP Method: Large (Automatic))   Pulse 87   Temp 98.2 °F (36.8 °C) (Oral)   Ht 5' 4" (1.626 m)   Wt 112.4 kg (247 lb 14.4 oz)   SpO2 98%   BMI 42.55 kg/m²     Exam:  GEN:  Well developed, well nourished.  No acute distress.   HEENT:  No trauma.  Mucous membranes moist.  Nares patent bilaterally.  PSYCH: Normal affect. Thought content appropriate.  CHEST:  Breathing symmetric.  No audible wheezing.  ABD: Soft, non-distended.  SKIN:  Warm, pink, dry.  No rash on exposed areas.    EXT:  No cyanosis, clubbing, or " edema.  No color change or changes in nail or hair growth.  NEURO/MUSCULOSKELETAL:  Fully alert, oriented, and appropriate. Speech normal juan. No cranial nerve deficits.   Gait:  Antalgic.  No focal motor deficits.       Imaging:      Narrative & Impression    EXAMINATION:  XR LUMBAR SPINE AP AND LAT WITH FLEX/EXT     CLINICAL HISTORY:  Other intervertebral disc degeneration, lumbar region     TECHNIQUE:  5 views of the lumbar spine with flexion/extension views     COMPARISON:  CT abdomen/pelvis 06/02/2020 lumbar spine radiographs 01/27/2017     FINDINGS:  Alignment: Normal lumbar lordosis is maintained without significant listhesis or evidence of dynamic instability on flexion/extension views.     Vertebrae: Vertebral body heights are maintained.  Posterior elements are intact.  Pedicles/spinous processes are well delineated.  No acute displaced fracture, dislocation or suspicious appearing lytic/blastic osseous lesions.     Discs and facets: Mild-moderate disc space height loss at L4-5 associated with sclerotic endplate changes and osteophytosis.  Otherwise, remaining lumbar level disc heights are maintained. Facet joints are unremarkable without evidence of advanced osseous arthrosis or severe osseous neuroforaminal narrowing.     Miscellaneous: Gastric band is in place with discontinuity along the associated catheter at the level of the left upper quadrant pump device, unchanged when compared to CT dated June 2020.     Impression:     1. Mild-moderate disc space height loss at L4-5 associated with sclerotic endplate changes and osteophytosis period.  Otherwise, lumbar spine radiographs are grossly unremarkable.        Electronically signed by: Santos Mejia  Date:                                            11/17/2020  Time:                                           08:57             Assessment:       Encounter Diagnoses   Name Primary?    Lumbar spondylosis Yes    DDD (degenerative disc disease), lumbar           Plan:       Alberto was seen today for back pain.    Diagnoses and all orders for this visit:    Lumbar spondylosis    DDD (degenerative disc disease), lumbar        Alberto Frye is a 45 y.o. female with chronic bilateral low back pain.  Exact etiology of pain is somewhat unclear though appears to be axial.  Low suspicion for radiculopathy at this time.  May have lumbar facet pain and possibly bilateral sacroiliac joint pain.     1.  Pertinent imaging studies reviewed by me. Imaging results were discussed with patient.  2.  Patient already referred to physical therapy by her rheumatologist.  I encouraged her to participate in physical therapy and perform regular home exercise program.  3.  Return to clinic in 6 weeks or sooner.  At that time we will discuss efficacy of physical therapy/home exercise program.  If pain persists will likely reorder lumbar MRI and consider interventional procedures.  We will need to request clearance to hold Eliquis prior to any lumbar spinal procedures.  May consider lumbar medial branch blocks/RFA.

## 2020-12-07 DIAGNOSIS — S39.012A STRAIN OF LUMBAR REGION, INITIAL ENCOUNTER: ICD-10-CM

## 2020-12-08 RX ORDER — HYDROCODONE BITARTRATE AND ACETAMINOPHEN 5; 325 MG/1; MG/1
1 TABLET ORAL EVERY 6 HOURS PRN
Qty: 14 TABLET | Refills: 0 | Status: SHIPPED | OUTPATIENT
Start: 2020-12-08 | End: 2021-01-11 | Stop reason: SDUPTHER

## 2020-12-11 ENCOUNTER — PATIENT MESSAGE (OUTPATIENT)
Dept: VASCULAR SURGERY | Facility: CLINIC | Age: 45
End: 2020-12-11

## 2020-12-23 ENCOUNTER — CLINICAL SUPPORT (OUTPATIENT)
Dept: INFECTIOUS DISEASES | Facility: CLINIC | Age: 45
End: 2020-12-23
Payer: COMMERCIAL

## 2020-12-23 DIAGNOSIS — R10.9 ABDOMINAL PAIN, UNSPECIFIED ABDOMINAL LOCATION: ICD-10-CM

## 2020-12-23 PROCEDURE — 90471 IMMUNIZATION ADMIN: CPT | Mod: S$GLB,,, | Performed by: INTERNAL MEDICINE

## 2020-12-23 PROCEDURE — 90632 HEPATITIS A VACCINE ADULT IM: ICD-10-PCS | Mod: S$GLB,,, | Performed by: INTERNAL MEDICINE

## 2020-12-23 PROCEDURE — 90471 HEPATITIS A VACCINE ADULT IM: ICD-10-PCS | Mod: S$GLB,,, | Performed by: INTERNAL MEDICINE

## 2020-12-23 PROCEDURE — 90632 HEPA VACCINE ADULT IM: CPT | Mod: S$GLB,,, | Performed by: INTERNAL MEDICINE

## 2020-12-23 PROCEDURE — 99999 PR PBB SHADOW E&M-EST. PATIENT-LVL I: CPT | Mod: PBBFAC,,,

## 2020-12-23 PROCEDURE — 99999 PR PBB SHADOW E&M-EST. PATIENT-LVL I: ICD-10-PCS | Mod: PBBFAC,,,

## 2020-12-27 ENCOUNTER — PATIENT MESSAGE (OUTPATIENT)
Dept: RHEUMATOLOGY | Facility: CLINIC | Age: 45
End: 2020-12-27

## 2020-12-31 ENCOUNTER — OFFICE VISIT (OUTPATIENT)
Dept: VASCULAR SURGERY | Facility: CLINIC | Age: 45
End: 2020-12-31
Payer: COMMERCIAL

## 2020-12-31 ENCOUNTER — OFFICE VISIT (OUTPATIENT)
Dept: DERMATOLOGY | Facility: CLINIC | Age: 45
End: 2020-12-31
Payer: COMMERCIAL

## 2020-12-31 VITALS
WEIGHT: 254 LBS | DIASTOLIC BLOOD PRESSURE: 82 MMHG | BODY MASS INDEX: 43.36 KG/M2 | SYSTOLIC BLOOD PRESSURE: 120 MMHG | HEIGHT: 64 IN

## 2020-12-31 DIAGNOSIS — I82.422 ACUTE DEEP VEIN THROMBOSIS (DVT) OF ILIAC VEIN OF LEFT LOWER EXTREMITY: Primary | ICD-10-CM

## 2020-12-31 DIAGNOSIS — Z12.83 SCREENING EXAM FOR SKIN CANCER: ICD-10-CM

## 2020-12-31 DIAGNOSIS — I82.412 ACUTE DEEP VEIN THROMBOSIS (DVT) OF FEMORAL VEIN OF LEFT LOWER EXTREMITY: ICD-10-CM

## 2020-12-31 DIAGNOSIS — L91.0 HYPERTROPHIC SCAR: Primary | ICD-10-CM

## 2020-12-31 DIAGNOSIS — D22.9 MULTIPLE BENIGN NEVI: ICD-10-CM

## 2020-12-31 PROCEDURE — 3074F SYST BP LT 130 MM HG: CPT | Mod: CPTII,S$GLB,, | Performed by: SURGERY

## 2020-12-31 PROCEDURE — 99999 PR PBB SHADOW E&M-EST. PATIENT-LVL V: CPT | Mod: PBBFAC,,, | Performed by: SURGERY

## 2020-12-31 PROCEDURE — 99213 OFFICE O/P EST LOW 20 MIN: CPT | Mod: S$GLB,,, | Performed by: SURGERY

## 2020-12-31 PROCEDURE — 1125F PR PAIN SEVERITY QUANTIFIED, PAIN PRESENT: ICD-10-PCS | Mod: S$GLB,,, | Performed by: SURGERY

## 2020-12-31 PROCEDURE — 99999 PR PBB SHADOW E&M-EST. PATIENT-LVL V: ICD-10-PCS | Mod: PBBFAC,,, | Performed by: SURGERY

## 2020-12-31 PROCEDURE — 3079F DIAST BP 80-89 MM HG: CPT | Mod: CPTII,S$GLB,, | Performed by: SURGERY

## 2020-12-31 PROCEDURE — 99213 PR OFFICE/OUTPT VISIT, EST, LEVL III, 20-29 MIN: ICD-10-PCS | Mod: S$GLB,,, | Performed by: SURGERY

## 2020-12-31 PROCEDURE — 99999 PR PBB SHADOW E&M-EST. PATIENT-LVL IV: ICD-10-PCS | Mod: PBBFAC,,, | Performed by: DERMATOLOGY

## 2020-12-31 PROCEDURE — 99999 PR PBB SHADOW E&M-EST. PATIENT-LVL IV: CPT | Mod: PBBFAC,,, | Performed by: DERMATOLOGY

## 2020-12-31 PROCEDURE — 99202 OFFICE O/P NEW SF 15 MIN: CPT | Mod: S$GLB,,, | Performed by: DERMATOLOGY

## 2020-12-31 PROCEDURE — 99202 PR OFFICE/OUTPT VISIT, NEW, LEVL II, 15-29 MIN: ICD-10-PCS | Mod: S$GLB,,, | Performed by: DERMATOLOGY

## 2020-12-31 PROCEDURE — 1125F AMNT PAIN NOTED PAIN PRSNT: CPT | Mod: S$GLB,,, | Performed by: DERMATOLOGY

## 2020-12-31 PROCEDURE — 3074F PR MOST RECENT SYSTOLIC BLOOD PRESSURE < 130 MM HG: ICD-10-PCS | Mod: CPTII,S$GLB,, | Performed by: SURGERY

## 2020-12-31 PROCEDURE — 3008F BODY MASS INDEX DOCD: CPT | Mod: CPTII,S$GLB,, | Performed by: SURGERY

## 2020-12-31 PROCEDURE — 1125F PR PAIN SEVERITY QUANTIFIED, PAIN PRESENT: ICD-10-PCS | Mod: S$GLB,,, | Performed by: DERMATOLOGY

## 2020-12-31 PROCEDURE — 3079F PR MOST RECENT DIASTOLIC BLOOD PRESSURE 80-89 MM HG: ICD-10-PCS | Mod: CPTII,S$GLB,, | Performed by: SURGERY

## 2020-12-31 PROCEDURE — 1125F AMNT PAIN NOTED PAIN PRSNT: CPT | Mod: S$GLB,,, | Performed by: SURGERY

## 2020-12-31 PROCEDURE — 3008F PR BODY MASS INDEX (BMI) DOCUMENTED: ICD-10-PCS | Mod: CPTII,S$GLB,, | Performed by: SURGERY

## 2020-12-31 NOTE — PROGRESS NOTES
Subjective:       Patient ID:  Alberto Frye is a 45 y.o. female who presents for   Chief Complaint   Patient presents with    Skin Check     ubse    Keloid     mid chest      Here for UBSE  No h/o nmsc    Keloid - Initial  Affected locations: chest  Duration: 3 months  Signs / symptoms: irritated  Timing: constant (noted after having cyst removed )  Aggravated by: nothing  Relieving factors/Treatments tried: nothing  Improvement on treatment: no relief        Review of Systems   Skin: Positive for tendency to form keloidal scars. Negative for daily sunscreen use, activity-related sunscreen use and recent sunburn.   Hematologic/Lymphatic: Bruises/bleeds easily.        Objective:    Physical Exam   Constitutional: She appears well-developed and well-nourished. She is obese.  No distress.   Neurological: She is alert and oriented to person, place, and time. She is not disoriented.   Psychiatric: She has a normal mood and affect.   Skin:   Areas Examined (abnormalities noted in diagram):   Head / Face Inspection Performed  Neck Inspection Performed  Chest / Axilla Inspection Performed  Back Inspection Performed  RUE Inspected  LUE Inspection Performed              Diagram Legend     Erythematous scaling macule/papule c/w actinic keratosis       Vascular papule c/w angioma      Pigmented verrucoid papule/plaque c/w seborrheic keratosis      Yellow umbilicated papule c/w sebaceous hyperplasia      Irregularly shaped tan macule c/w lentigo     1-2 mm smooth white papules consistent with Milia      Movable subcutaneous cyst with punctum c/w epidermal inclusion cyst      Subcutaneous movable cyst c/w pilar cyst      Firm pink to brown papule c/w dermatofibroma      Pedunculated fleshy papule(s) c/w skin tag(s)      Evenly pigmented macule c/w junctional nevus     Mildly variegated pigmented, slightly irregular-bordered macule c/w mildly atypical nevus      Flesh colored to evenly pigmented papule c/w intradermal  nevus       Pink pearly papule/plaque c/w basal cell carcinoma      Erythematous hyperkeratotic cursted plaque c/w SCC      Surgical scar with no sign of skin cancer recurrence      Open and closed comedones      Inflammatory papules and pustules      Verrucoid papule consistent consistent with wart     Erythematous eczematous patches and plaques     Dystrophic onycholytic nail with subungual debris c/w onychomycosis     Umbilicated papule    Erythematous-base heme-crusted tan verrucoid plaque consistent with inflamed seborrheic keratosis     Erythematous Silvery Scaling Plaque c/w Psoriasis     See annotation      Assessment / Plan:        Hypertrophic scar  -     Ambulatory referral/consult to Dermatology  Silicone gel sheeting - wear always (take off for showers)  DORBA for IL K - will defer    Multiple benign nevi  upper body skin examination performed today including at least 6 points as noted in physical examination. No lesions suspicious for malignancy noted.  Reassurance provided.  Instructed patient to observe lesion(s) for changes and follow up in clinic if changes are noted. Discussed ABCDE's of moles and brochure provided.      Screening exam for skin cancer    Upper body skin examination performed today including at least 6 points as noted in physical examination. No lesions suspicious for malignancy noted.               Follow up in about 3 months (around 3/31/2021), or if symptoms worsen or fail to improve.

## 2020-12-31 NOTE — LETTER
December 31, 2020        Ian Cespedes MD  [address] [facility]  [place] [id number]             [facility]  [address]  [facility] [address]  Phone: [phone]  Fax: [phone]   Patient: Alberto Frye   MR Number: [id number]   YOB: 1975   Date of Visit: 12/31/2020       Dear Dr. Cespedes:    Thank you for referring Alberto Frye to me for evaluation. Below are the relevant portions of my assessment and plan of care.            If you have questions, please do not hesitate to call me. I look forward to following Alberto along with you.    Sincerely,      Miguelangel Goldman MD           CC  No Recipients

## 2020-12-31 NOTE — LETTER
December 31, 2020      Ross Hughes MD  [address] [place] [address] [address] [place] [address]           [facility] [address]  [address]  [place] [address]  Phone: [phone]  Fax: [phone]          Patient: Alberto Frye   MR Number: [id number]   YOB: 1975   Date of Visit: 12/31/2020       Dear Dr. Ross Hughes:    Thank you for referring Alberto Frye to me for evaluation. Attached you will find relevant portions of my assessment and plan of care.    If you have questions, please do not hesitate to call me. I look forward to following Alberto Frye along with you.    Sincerely,    Ama Sylvester MD    Enclosure  CC:  No Recipients    If you would like to receive this communication electronically, please contact [email or web address] or [phone] to request more information on [employer] access.    For providers and/or their staff who would like to refer a patient to [facility], please contact us through our one-stop-shop provider referral line, [facility], at [phone].    If you feel you have received this communication in error or would no longer like to receive these types of communications, please e-mail [email or web address]

## 2020-12-31 NOTE — PROGRESS NOTES
Miguelangel Goldman MD, RPVI                                 [facility]                                                12/31/2020    HPI:  Alberto Frye is a 45 y.o. female with   Patient Active Problem List   Diagnosis    Essential hypertension    Seasonal allergies    Anxiety    Abdominal pain    Neuropathy    Recurrent deep vein thrombosis (DVT) with hx of IVC filter; indefinite anticoagulation    Recurrent UTI    Screening for colon cancer 11/2019 colonoscopy hemorrhoids and diverticulosis; [facility]    Chronic anticoagulation    Chronic midline low back pain without sciatica    Pain in both hands    Lumbar spondylosis    DDD (degenerative disc disease), lumbar    being managed by PCP and specialists who is here today for evaluation of LLE DVT.  Patient states location is LLE occurring for 1 day.  Has had a left lower extremity DVT in the past after her hysterectomy which was determined to be provoked she was managed with anticoagulation for a few months.  She also had a Mary Grace filter placed at [facility].  She has not had any other problems since that time 11 years ago.  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  Developed left lower extremity pain and swelling yesterday and took over-the-counter pain medication and this did not improve so she presented to the emergency room early this morning.  Associated signs and symptoms include edema and pain.  Quality is sharp and severity is 6/10.  Symptoms began yesterday.  Alleviating factors include elevation.  Worsening factors include dependency and pressure.  Denies trauma and any other long period of immobilization or recent surgery.  No family history or personal history of hypercoagulable disorders.     no MI  no Stroke  Tobacco use: denies     8/2020:  Feels much better.    12/2020:  C/o bilateral thigh edema when sitting at work for  long hrs. Compliant with medical therapy    Past Medical History:   Diagnosis Date    Alcohol abuse     stopped heavy drinking about 10 years ago; was drinking 3 glasses of vodka/tequilla,rum/whiskey per day    Anxiety     Congestive heart failure 8/11/2020    Depression     Hallucination     Hx of psychiatric care     Hypertension     Caro     unplanned trips, energy without sleep for 2 days (reading, cleaning), feelings that she can do multiple tasks at one time, feelings of overconfidence at times    Psychiatric problem     Sleep difficulties     Therapy     Withdrawal symptoms, drug or narcotic     racing heart, restlessness     Past Surgical History:   Procedure Laterality Date     lapban surgery  2009    ESOPHAGOGASTRODUODENOSCOPY N/A 6/3/2020    Procedure: EGD (ESOPHAGOGASTRODUODENOSCOPY);  Surgeon: Johan Grover MD;  Location: [facility] (4TH FLR);  Service: Endoscopy;  Laterality: N/A;  covid 6/2-[facility]    HYSTERECTOMY      PHLEBOGRAPHY Left 8/12/2020    Procedure: Venogram, pharmacomechanical thrombectomy;  Surgeon: Miguelangel Goldman MD;  Location: [facility] OR [address] [place] [address];  Service: Vascular;  Laterality: Left;  2336.43 mGy  [id number]  17.8 minutes  37 ml of contrast    VENOPLASTY Left 8/12/2020    Procedure: ANGIOPLASTY, VEIN;  Surgeon: Miguelangel Goldman MD;  [facility];  Service: Vascular;  Laterality: Left;     Family History   Problem Relation Age of Onset    Diabetes Mother     Cancer Mother     Hypertension Mother     Cirrhosis Maternal Uncle         ETOH    Celiac disease Neg Hx     Colon cancer Neg Hx     Colon polyps Neg Hx     Crohn's disease Neg Hx     Esophageal cancer Neg Hx     Inflammatory bowel disease Neg Hx     Liver cancer Neg Hx     Liver disease Neg Hx     Rectal cancer Neg Hx     Stomach cancer Neg Hx     Ulcerative colitis Neg Hx      Social History     Socioeconomic History    Marital status:      Spouse  name: Not on file    Number of children: 3    Years of education: Not on file    Highest education level: Not on file   Occupational History    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[employer]   Social Needs    Financial resource strain: Not hard at all    Food insecurity     Worry: Never true     Inability: Never true    Transportation needs     Medical: No     Non-medical: No   Tobacco Use    Smoking status: Former Smoker    Smokeless tobacco: Never Used    Tobacco comment: quit in october 2016   Substance and Sexual Activity    Alcohol use: Yes     Alcohol/week: 2.0 standard drinks     Types: 2 Cans of beer per week     Frequency: 2-4 times a month     Drinks per session: 1 or 2     Binge frequency: Never     Comment: social presently, excessive 10 years ago    Drug use: Yes     Types: Marijuana     Comment: uses THCA weekly    Sexual activity: Not Currently     Partners: Male     Birth control/protection: See Surgical Hx   Lifestyle    Physical activity     Days per week: 2 days     Minutes per session: 40 min    Stress: To some extent   Relationships    Social connections     Talks on phone: More than three times a week     Gets together: Once a week     Attends Bahai service: Not on file     Active member of club or organization: No     Attends meetings of clubs or organizations: Never     Relationship status:    Other Topics Concern    Patient feels they ought to cut down on drinking/drug use No    Patient annoyed by others criticizing their drinking/drug use No    Patient has felt bad or guilty about drinking/drug use No    Patient has had a drink/used drugs as an eye opener in the AM No   Social History Narrative    Has 3 healthy grown sons (1990, 1993, 1998) Lives alone.    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   in 2010.    Has Male partner since 2014 who is currently disabled.        Current Outpatient Medications:     apixaban (ELIQUIS) 5 mg Tab, After finishing the first prescription: Take 1 tablet (5 mg total) by mouth 2 (two) times daily., Disp: 60 tablet, Rfl: 11    cholecalciferol, vitamin D3, (VITAMIN D3) 50 mcg (2,000 unit) Cap, Take 1 capsule (2,000 Units total) by mouth once daily., Disp: 90 capsule, Rfl: 3    cloNIDine 0.2 mg/24 hr td ptwk (CATAPRES) 0.2 mg/24 hr, Place 1 patch onto the skin every 7 days., Disp: 4 patch, Rfl: 11    cyanocobalamin (VITAMIN B-12) 1000 MCG tablet, Take 100 mcg by mouth once daily., Disp: , Rfl:     cyclobenzaprine (FLEXERIL) 10 MG tablet, Take 1 tablet (10 mg total) by mouth every evening., Disp: 30 tablet, Rfl: 4    d-mannose Powd, 1 scoop daily, Disp: 100 g, Rfl: 11    FLUoxetine 40 MG capsule, Take 1 capsule (40 mg total) by mouth once daily., Disp: 90 capsule, Rfl: 3    fluticasone (FLONASE) 50 mcg/actuation nasal spray, 1 spray by Each Nostril route daily as needed for Rhinitis or Allergies. , Disp: , Rfl:     gabapentin (NEURONTIN) 600 MG tablet, Take 1 tablet (600 mg total) by mouth 2 (two) times daily., Disp: 60 tablet, Rfl: 4    hydrOXYchloroQUINE (PLAQUENIL) 200 mg tablet, Take 1 tablet (200 mg total) by mouth 2 (two) times daily., Disp: 60 tablet, Rfl: 6    hydrOXYzine (ATARAX) 50 MG tablet, Take 1 tablet (50 mg total) by mouth daily as needed for Anxiety (sleep)., Disp: 90 tablet, Rfl: 1    Lactobacillus rhamnosus GG (CULTURELLE) 10 billion cell capsule, Take 1 capsule by mouth once daily., Disp:  , Rfl:     multivitamin with minerals tablet, Take 1 tablet by mouth once daily., Disp: , Rfl:     OLANZapine (ZYPREXA) 5 MG tablet, Take 1 tablet (5 mg total) by mouth every evening., Disp: 90 tablet, Rfl: 3    pantoprazole (PROTONIX) 40 MG tablet, Take 1 tablet (40 mg total) by mouth before breakfast. Take one pill every morning 45 minutes before breakfast in the morning., Disp: 90 tablet, Rfl: 3     valsartan-hydrochlorothiazide (DIOVAN-HCT) 160-25 mg per tablet, Take 1 tablet by mouth once daily., Disp: 90 tablet, Rfl: 1    benzocaine (ORAJEL) 10 % mucosal gel, Use as directed in the mouth or throat as needed for Pain., Disp: , Rfl: 0    clonazePAM (KLONOPIN) 0.25 MG TbDL, Take 1 tablet (0.25 mg total) by mouth once daily. for 14 days, Disp: 14 tablet, Rfl: 0    HYDROcodone-acetaminophen (NORCO) 5-325 mg per tablet, Take 1 tablet by mouth every 6 (six) hours as needed for Pain. (Patient not taking: Reported on 12/31/2020), Disp: 14 tablet, Rfl: 0    hydrocortisone 1 % cream, Apply topically 2 (two) times daily. (Patient not taking: Reported on 12/31/2020), Disp: 28.4 g, Rfl: 0    ibuprofen (ADVIL,MOTRIN) 400 MG tablet, Take 1 tablet (400 mg total) by mouth every 6 (six) hours as needed. (Patient not taking: Reported on 12/31/2020), Disp: 20 tablet, Rfl: 0    REVIEW OF SYSTEMS:  General: No fevers or chills; ENT: No sore throat; Allergy and Immunology: no persistent infections; Hematological and Lymphatic: No history of bleeding or easy bruising; Endocrine: negative; Respiratory: no cough, shortness of breath, or wheezing; Cardiovascular: no chest pain or dyspnea on exertion; Gastrointestinal: no abdominal pain/back, change in bowel habits, or bloody stools; Genito-Urinary: no dysuria, trouble voiding, or hematuria; Musculoskeletal: edema; Neurological: no TIA or stroke symptoms; Psychiatric: no nervousness, anxiety or depression.    PHYSICAL EXAM:                General appearance:  Alert, well-appearing, and in no distress.  Oriented to person, place, and time                    Neurological: Normal speech, no focal findings noted; CN II - XII grossly intact. RLE with sensation to light touch, LLE with sensation to light touch.            Musculoskeletal: Digits/nail without cyanosis/clubbing.  Strength 5/5 BLE.                    Neck: Supple, no significant adenopathy                  Chest:  No  wheezes, symmetric air entry. No use of accessory muscles               Cardiac: Normal rate and regular rhythm            Abdomen: Soft, nontender, nondistended      Extremities:   2+ R DP pulse, 2+ L DP pulse      1+ RLE edema, 1+ LLE edema    Skin:  RLE no ulcer; LLE no ulcer      RLE no spider veins, LLE no spider veins      RLE no varicose veins, LLE no varicose veins    CEAP 3/3    LAB RESULTS:  No results found for: CBC  Lab Results   Component Value Date    LABPROT 10.9 08/13/2020    INR 1.0 08/13/2020     Lab Results   Component Value Date     11/19/2020    K 3.7 11/19/2020     11/19/2020    CO2 26 11/19/2020    GLU 92 11/19/2020    BUN 16 11/19/2020    CREATININE 0.9 11/19/2020    CALCIUM 9.4 11/19/2020    ANIONGAP 10 11/19/2020    EGFRNONAA >60 11/19/2020     Lab Results   Component Value Date    WBC 5.39 11/19/2020    RBC 4.31 11/19/2020    HGB 13.0 11/19/2020    HCT 37.2 11/19/2020    MCV 86 11/19/2020    MCH 30.2 11/19/2020    MCHC 34.9 11/19/2020    RDW 13.2 11/19/2020     11/19/2020    MPV 9.7 11/19/2020    GRAN 2.1 11/19/2020    GRAN 39.6 11/19/2020    LYMPH 2.5 11/19/2020    LYMPH 45.5 11/19/2020    MONO 0.6 11/19/2020    MONO 10.4 11/19/2020    EOS 0.2 11/19/2020    BASO 0.03 11/19/2020    EOSINOPHIL 3.5 11/19/2020    BASOPHIL 0.6 11/19/2020    DIFFMETHOD Automated 11/19/2020     .  Lab Results   Component Value Date    HGBA1C 5.3 01/16/2020       IMAGING:  All pertinent imaging has been reviewed and interpreted independently.    IMP/PLAN:  45 y.o. female with   Patient Active Problem List   Diagnosis    Essential hypertension    Seasonal allergies    Anxiety    Abdominal pain    Neuropathy    Recurrent deep vein thrombosis (DVT) with hx of IVC filter; indefinite anticoagulation    Recurrent UTI    Screening for colon cancer 11/2019 colonoscopy hemorrhoids and diverticulosis; [facility]    Chronic anticoagulation    Chronic midline low back pain without sciatica    Pain  in both hands    Lumbar spondylosis    DDD (degenerative disc disease), lumbar    being managed by PCP and specialists who is here today for postoperative evaluation s/p LLE iliofemoral pharmacomechanical thrombectomy 8/12/20 for iliofemoral DVT.    -Recovering well - cont Heme eval and anticoagulation for unprovoked DVT  -recommend continuing compression with Rx stockings, pantyhose compression when working, elevation, dietary changes associated with water and sodium intake discussed at length with patient  -Exercise   -RTC 6 months for further evaluation    I spent 11 minutes evaluating this patient and greater than 50% of the time was spent counseling, coordinator care and discussing the plan of care.  All questions were answered and patient stated understanding with agreement with the above treatment plan.    Miguelangel Goldman MD [facility]  Vascular and Endovascular Surgery

## 2020-12-31 NOTE — PATIENT INSTRUCTIONS
Putting on Compression Stockings     Turn the stocking inside-out, then fit it over your toes and heel.          Roll the stocking up your leg.            Once stockings are on, make sure the top of the stocking is about two fingers width below the crease of the knee (or the groin if you wear thigh-high stockings).          Use equipment, such as a stocking james, or wear rubber gloves to make it easier to put on compression stockings.         Elastic compression stockings are prescribed to treat many vein problems. Wearing them may be the most important thing you do to manage your symptoms. The stockings fit tightly around your ankle, gradually reducing in pressure as they go up your legs. This helps keep blood flowing to your heart. As a result, swelling is reduced. Your healthcare provider will prescribe stockings at a safe pressure for you. He or she will also tell you how often to wear and remove the stockings. Follow these instructions closely. Also, do not buy or wear compression stockings without first seeing your healthcare provider.  Tips for wear and care  To wear stockings safely and to get the most benefit:  · Wear the length prescribed by your healthcare provider.  · Pull them to the designated height and no farther. Dont let them bunch at the top. This can restrict blood flow and increase swelling.  · Wear the stockings for the amount of time your healthcare provider recommends. Replace them when they start to feel loose. This will likely be every 3 to 6 months.  · Remove them as your healthcare provider directs. When removed, wash your legs. Then check your legs and feet for sores. Call your healthcare provider if you find a sore. Dont put the stockings back on unless your healthcare provider directs.   · Wash the stockings as instructed. They may need to be hand-washed.  Date Last Reviewed: 5/1/2016  © 8237-0958 [employer]. [address] [place] [address], [place] [address]. All rights  reserved. This information is not intended as a substitute for professional medical care. Always follow your healthcare professional's instructions.        Tips for Using Less Salt    Most people with heart problems need to eat less salt (sodium). Reducing the amount of salt you eat may help control your blood pressure. The higher your blood pressure, the greater your risk for heart disease, stroke, blindness, and kidney problems.  At the store  · Make low-salt choices by reading labels carefully. Look for the total amount of sodium per serving.  · Use more fresh food. Buy more fruits and vegetables. Select lean meats, fish, and poultry.  · Use fewer frozen, canned, and packaged foods which often contain a lot of sodium.  · Use plain frozen vegetables without sauces or toppings. These products are often low- or no-sodium.  · Opt for reduced-sodium or no-salt-added versions of canned vegetables and soups.  In the kitchen  · Don't add salt to food when you're cooking. Season with flavorings such as onion, garlic, pepper, salt-free herbal blends, and lemon or lime juice.  · Use a cookbook containing low-salt recipes. It can give you ideas for tasty meals that are healthy for your heart.  · Sprinkle salt-free herbal blends on vegetables and meat.  · Drain and rinse canned foods, such as canned beans and vegetables, before cooking or eating.  Eating out  · Tell the  you're on a low-salt diet. Ask questions about the menu.  · Order fish, chicken, and meat broiled, baked, poached, or grilled without salt, butter, or breading.  · Use lemon, pepper, and salt-free herb mixes to add flavor.  · Choose plain steamed rice, boiled noodles, and baked or boiled potatoes. Top potatoes with chives and a little sour cream.     Beware! Salt goes by many other names. Limit foods with these words listed as ingredients: salt, sodium, soy sauce, baking soda, baking powder, MSG, monosodium, Na (the chemical symbol for sodium). Some  antacids are also high in salt.   Date Last Reviewed: 6/19/2015  © 4657-1419 [employer]. [address] [place] [address], [place] [address]. All rights reserved. This information is not intended as a substitute for professional medical care. Always follow your healthcare professional's instructions.        Low-Salt Diet  This diet removes foods that are high in salt. It also limits the amount of salt you use when cooking. It is most often used for people with high blood pressure, edema (fluid retention), and kidney, liver, or heart disease.  Table salt contains the mineral sodium. Your body needs sodium to work normally. But too much sodium can make your health problems worse. Your healthcare provider is recommending a low-salt (also called low-sodium) diet for you. Your total daily allowance of salt is 1,500 to 2,300 milligrams (mg). It is less than 1 teaspoon of table salt. This means you can have only about 500 to 700 mg of sodium at each meal. People with certain health problems should limit salt intake to the lower end of the recommended range.    When you cook, dont add much salt. If you can cook without using salt, even better. Dont add salt to your food at the table.  When shopping, read food labels. Salt is often called sodium on the label. Choose foods that are salt-free, low salt, or very low salt. Note that foods with reduced salt may not lower your salt intake enough.    Beans, potatoes, and pasta  Ok: Dry beans, split peas, lentils, potatoes, rice, macaroni, pasta, spaghetti without added salt  Avoid: Potato chips, tortilla chips, and similar products  Breads and cereals  Ok: Low-sodium breads, rolls, cereals, and cakes; low-salt crackers, matzo crackers  Avoid: Salted crackers, pretzels, popcorn, New Zealander toast, pancakes, muffins  Dairy  Ok: Milk, chocolate milk, hot chocolate mix, low-salt cheeses, and yogurt  Avoid: Processed cheese and cheese spreads; Roquefort, Camembert, and cottage cheese;  buttermilk, instant breakfast drink  Desserts  Ok: Ice cream, frozen yogurt, juice bars, gelatin, cookies and pies, sugar, honey, jelly, hard candy  Avoid: Most pies, cakes and cookies prepared or processed with salt; instant pudding  Drinks  Ok: Tea, coffee, fizzy (carbonated) drinks, juices  Avoid: Flavored coffees, electrolyte replacement drinks, sports drinks  Meats  Ok: All fresh meat, fish, poultry, low-salt tuna, eggs, egg substitute  Avoid: Smoked, pickled, brine-cured, or salted meats and fish. This includes bangura, chipped beef, corned beef, hot dogs, deli meats, ham, kosher meats, salt pork, sausage, canned tuna, salted codfish, smoked salmon, herring, sardines, or anchovies.  Seasonings and spices  Ok: Most seasonings are okay. Good substitutes for salt include: fresh herb blends, hot sauce, lemon, garlic, bolton, vinegar, dry mustard, parsley, cilantro, horseradish, tomato paste, regular margarine, mayonnaise, unsalted butter, cream cheese, vegetable oil, cream, low-salt salad dressing and gravy.  Avoid: Regular ketchup, relishes, pickles, soy sauce, teriyaki sauce, Worcestershire sauce, BBQ sauce, tartar sauce, meat tenderizer, chili sauce, regular gravy, regular salad dressing, salted butter  Soups  Ok: Low-salt soups and broths made with allowed foods  Avoid: Bouillon cubes, soups with smoked or salted meats, regular soup and broth  Vegetables  Ok: Most vegetables are okay; also low-salt tomato and vegetable juices  Avoid: Sauerkraut and other brine-soaked vegetables; pickles and other pickled vegetables; tomato juice, olives  Date Last Reviewed: 8/1/2016  © 8552-4860 [employer]. [address] [place] [address]. All rights reserved. This information is not intended as a substitute for professional medical care. Always follow your healthcare professional's instructions.        Low-Salt Choices  Eating salt (sodium) can make your body retain too much water. Excess water makes  your heart work harder. Canned, packaged, and frozen foods are easy to prepare, but they are often high in sodium. Here are some ideas for low-salt foods you can easily prepare yourself.    For breakfast  · Fruit or 100% fruit juice  · Whole-wheat bread or an English muffin. Compare sodium content on labels.  · Low-fat milk or yogurt  · Unsalted eggs  · Shredded wheat  · Corn tortillas  · Unsalted steamed rice  · Regular (not instant) hot cereal, made without salt  Stay away from:  · Sausage, bangura, and ham  · Flour tortillas  · Packaged muffins, pancakes, and biscuits  · Instant hot cereals  · Cottage cheese  For lunch and dinner  · Fresh fish, chicken, turkey, or meat--baked, broiled, or roasted without salt  · Dry beans, cooked without salt  · Tofu, stir-fried without salt  · Unsalted fresh fruit and vegetables, or frozen or canned fruit and vegetables with no added salt  Stay away from:  · Lunch or deli meat that is cured or smoked  · Cheese  · Tomato juice and catsup  · Canned vegetables, soups, and fish not labeled as no-salt-added or reduced sodium  · Packaged gravies and sauces  · Olives, pickles, and relish  · Bottled salad dressings  For snacks and desserts  · Yogurt  · Unsalted, air popped popcorn  · Unsalted nuts or seeds  Stay away from:  · Pies and cakes  · Packaged dessert mixes  · Pizza  · Canned and packaged puddings  · Pretzels, chips, crackers, and nuts--unless the label says unsalted  Date Last Reviewed: 6/17/2015  © 8929-0190 [employer]. [address] [place] [address], [place] [address]. All rights reserved. This information is not intended as a substitute for professional medical care. Always follow your healthcare professional's instructions.

## 2021-01-06 DIAGNOSIS — M35.01 SJOGREN'S SYNDROME WITH KERATOCONJUNCTIVITIS SICCA: ICD-10-CM

## 2021-01-06 DIAGNOSIS — Z79.899 LONG-TERM USE OF PLAQUENIL: Primary | ICD-10-CM

## 2021-02-04 ENCOUNTER — PATIENT MESSAGE (OUTPATIENT)
Dept: RHEUMATOLOGY | Facility: CLINIC | Age: 46
End: 2021-02-04

## 2021-02-04 ENCOUNTER — PATIENT MESSAGE (OUTPATIENT)
Dept: FAMILY MEDICINE | Facility: CLINIC | Age: 46
End: 2021-02-04

## 2021-02-18 ENCOUNTER — OFFICE VISIT (OUTPATIENT)
Dept: RHEUMATOLOGY | Facility: CLINIC | Age: 46
End: 2021-02-18
Payer: COMMERCIAL

## 2021-02-18 VITALS
BODY MASS INDEX: 43.36 KG/M2 | HEART RATE: 86 BPM | DIASTOLIC BLOOD PRESSURE: 79 MMHG | RESPIRATION RATE: 18 BRPM | OXYGEN SATURATION: 98 % | SYSTOLIC BLOOD PRESSURE: 124 MMHG | TEMPERATURE: 98 F | HEIGHT: 64 IN | WEIGHT: 254 LBS

## 2021-02-18 DIAGNOSIS — M54.14 THORACIC AND LUMBOSACRAL NEURITIS: ICD-10-CM

## 2021-02-18 DIAGNOSIS — Z71.89 COUNSELING AND COORDINATION OF CARE: ICD-10-CM

## 2021-02-18 DIAGNOSIS — M47.819 SPONDYLOSIS WITHOUT MYELOPATHY: ICD-10-CM

## 2021-02-18 DIAGNOSIS — M35.01 SJOGREN'S SYNDROME WITH KERATOCONJUNCTIVITIS SICCA: Primary | ICD-10-CM

## 2021-02-18 DIAGNOSIS — Z79.899 ENCOUNTER FOR LONG-TERM (CURRENT) USE OF OTHER MEDICATIONS: ICD-10-CM

## 2021-02-18 DIAGNOSIS — M54.17 THORACIC AND LUMBOSACRAL NEURITIS: ICD-10-CM

## 2021-02-18 DIAGNOSIS — M15.9 PRIMARY OSTEOARTHRITIS INVOLVING MULTIPLE JOINTS: ICD-10-CM

## 2021-02-18 PROCEDURE — 3074F PR MOST RECENT SYSTOLIC BLOOD PRESSURE < 130 MM HG: ICD-10-PCS | Mod: CPTII,S$GLB,, | Performed by: INTERNAL MEDICINE

## 2021-02-18 PROCEDURE — 3078F DIAST BP <80 MM HG: CPT | Mod: CPTII,S$GLB,, | Performed by: INTERNAL MEDICINE

## 2021-02-18 PROCEDURE — 1125F AMNT PAIN NOTED PAIN PRSNT: CPT | Mod: S$GLB,,, | Performed by: INTERNAL MEDICINE

## 2021-02-18 PROCEDURE — 99214 PR OFFICE/OUTPT VISIT, EST, LEVL IV, 30-39 MIN: ICD-10-PCS | Mod: S$GLB,,, | Performed by: INTERNAL MEDICINE

## 2021-02-18 PROCEDURE — 3074F SYST BP LT 130 MM HG: CPT | Mod: CPTII,S$GLB,, | Performed by: INTERNAL MEDICINE

## 2021-02-18 PROCEDURE — 99999 PR PBB SHADOW E&M-EST. PATIENT-LVL III: ICD-10-PCS | Mod: PBBFAC,,, | Performed by: INTERNAL MEDICINE

## 2021-02-18 PROCEDURE — 99214 OFFICE O/P EST MOD 30 MIN: CPT | Mod: S$GLB,,, | Performed by: INTERNAL MEDICINE

## 2021-02-18 PROCEDURE — 1125F PR PAIN SEVERITY QUANTIFIED, PAIN PRESENT: ICD-10-PCS | Mod: S$GLB,,, | Performed by: INTERNAL MEDICINE

## 2021-02-18 PROCEDURE — 3078F PR MOST RECENT DIASTOLIC BLOOD PRESSURE < 80 MM HG: ICD-10-PCS | Mod: CPTII,S$GLB,, | Performed by: INTERNAL MEDICINE

## 2021-02-18 PROCEDURE — 99999 PR PBB SHADOW E&M-EST. PATIENT-LVL III: CPT | Mod: PBBFAC,,, | Performed by: INTERNAL MEDICINE

## 2021-02-18 PROCEDURE — 3008F PR BODY MASS INDEX (BMI) DOCUMENTED: ICD-10-PCS | Mod: CPTII,S$GLB,, | Performed by: INTERNAL MEDICINE

## 2021-02-18 PROCEDURE — 3008F BODY MASS INDEX DOCD: CPT | Mod: CPTII,S$GLB,, | Performed by: INTERNAL MEDICINE

## 2021-02-18 RX ORDER — CEVIMELINE HYDROCHLORIDE 30 MG/1
30 CAPSULE ORAL 3 TIMES DAILY
Qty: 90 CAPSULE | Refills: 3 | Status: SHIPPED | OUTPATIENT
Start: 2021-02-18 | End: 2021-08-05

## 2021-02-18 RX ORDER — METHYLPREDNISOLONE 4 MG/1
TABLET ORAL
Qty: 1 PACKAGE | Refills: 0 | Status: SHIPPED | OUTPATIENT
Start: 2021-02-18 | End: 2021-10-11 | Stop reason: ALTCHOICE

## 2021-02-18 RX ORDER — GABAPENTIN 600 MG/1
600 TABLET ORAL 2 TIMES DAILY
Qty: 60 TABLET | Refills: 4 | Status: SHIPPED | OUTPATIENT
Start: 2021-02-18 | End: 2021-08-05 | Stop reason: SDUPTHER

## 2021-02-18 RX ORDER — CYCLOBENZAPRINE HCL 10 MG
10 TABLET ORAL NIGHTLY
Qty: 30 TABLET | Refills: 4 | Status: SHIPPED | OUTPATIENT
Start: 2021-02-18 | End: 2021-08-05 | Stop reason: SDUPTHER

## 2021-02-18 RX ORDER — HYDROXYCHLOROQUINE SULFATE 200 MG/1
200 TABLET, FILM COATED ORAL 2 TIMES DAILY
Qty: 60 TABLET | Refills: 6 | Status: SHIPPED | OUTPATIENT
Start: 2021-02-18 | End: 2021-08-05 | Stop reason: SDUPTHER

## 2021-02-19 ENCOUNTER — OFFICE VISIT (OUTPATIENT)
Dept: OPTOMETRY | Facility: CLINIC | Age: 46
End: 2021-02-19
Payer: COMMERCIAL

## 2021-02-19 ENCOUNTER — CLINICAL SUPPORT (OUTPATIENT)
Dept: OPHTHALMOLOGY | Facility: CLINIC | Age: 46
End: 2021-02-19
Payer: COMMERCIAL

## 2021-02-19 ENCOUNTER — PATIENT MESSAGE (OUTPATIENT)
Dept: FAMILY MEDICINE | Facility: CLINIC | Age: 46
End: 2021-02-19

## 2021-02-19 DIAGNOSIS — F39 MOOD INSOMNIA: ICD-10-CM

## 2021-02-19 DIAGNOSIS — M35.01 SJOGREN'S SYNDROME WITH KERATOCONJUNCTIVITIS SICCA: Primary | ICD-10-CM

## 2021-02-19 DIAGNOSIS — F31.5 BIPOLAR I DISORDER, CURRENT OR MOST RECENT EPISODE DEPRESSED, WITH PSYCHOTIC FEATURES WITH ANXIOUS DISTRESS: ICD-10-CM

## 2021-02-19 DIAGNOSIS — M35.01 SJOGREN'S SYNDROME WITH KERATOCONJUNCTIVITIS SICCA: ICD-10-CM

## 2021-02-19 DIAGNOSIS — Z79.899 LONG-TERM USE OF PLAQUENIL: ICD-10-CM

## 2021-02-19 DIAGNOSIS — F51.05 MOOD INSOMNIA: ICD-10-CM

## 2021-02-19 DIAGNOSIS — H52.7 REFRACTIVE ERROR: ICD-10-CM

## 2021-02-19 PROCEDURE — 99999 PR PBB SHADOW E&M-EST. PATIENT-LVL III: CPT | Mod: PBBFAC,,, | Performed by: OPTOMETRIST

## 2021-02-19 PROCEDURE — 92004 PR EYE EXAM, NEW PATIENT,COMPREHESV: ICD-10-PCS | Mod: S$GLB,,, | Performed by: OPTOMETRIST

## 2021-02-19 PROCEDURE — 92134 POSTERIOR SEGMENT OCT RETINA (OCULAR COHERENCE TOMOGRAPHY)-BOTH EYES: ICD-10-PCS | Mod: S$GLB,,, | Performed by: OPTOMETRIST

## 2021-02-19 PROCEDURE — 1126F AMNT PAIN NOTED NONE PRSNT: CPT | Mod: S$GLB,,, | Performed by: OPTOMETRIST

## 2021-02-19 PROCEDURE — 1126F PR PAIN SEVERITY QUANTIFIED, NO PAIN PRESENT: ICD-10-PCS | Mod: S$GLB,,, | Performed by: OPTOMETRIST

## 2021-02-19 PROCEDURE — 92004 COMPRE OPH EXAM NEW PT 1/>: CPT | Mod: S$GLB,,, | Performed by: OPTOMETRIST

## 2021-02-19 PROCEDURE — 92015 PR REFRACTION: ICD-10-PCS | Mod: S$GLB,,, | Performed by: OPTOMETRIST

## 2021-02-19 PROCEDURE — 92015 DETERMINE REFRACTIVE STATE: CPT | Mod: S$GLB,,, | Performed by: OPTOMETRIST

## 2021-02-19 PROCEDURE — 99999 PR PBB SHADOW E&M-EST. PATIENT-LVL III: ICD-10-PCS | Mod: PBBFAC,,, | Performed by: OPTOMETRIST

## 2021-02-19 PROCEDURE — 92134 CPTRZ OPH DX IMG PST SGM RTA: CPT | Mod: S$GLB,,, | Performed by: OPTOMETRIST

## 2021-02-19 RX ORDER — HYDROXYZINE HYDROCHLORIDE 50 MG/1
50 TABLET, FILM COATED ORAL DAILY PRN
Qty: 90 TABLET | Refills: 3 | Status: SHIPPED | OUTPATIENT
Start: 2021-02-19 | End: 2021-08-13 | Stop reason: SDUPTHER

## 2021-02-23 ENCOUNTER — PATIENT MESSAGE (OUTPATIENT)
Dept: RHEUMATOLOGY | Facility: CLINIC | Age: 46
End: 2021-02-23

## 2021-03-10 ENCOUNTER — PATIENT MESSAGE (OUTPATIENT)
Dept: RHEUMATOLOGY | Facility: CLINIC | Age: 46
End: 2021-03-10

## 2021-03-16 DIAGNOSIS — S39.012A STRAIN OF LUMBAR REGION, INITIAL ENCOUNTER: ICD-10-CM

## 2021-03-17 RX ORDER — HYDROCODONE BITARTRATE AND ACETAMINOPHEN 5; 325 MG/1; MG/1
1 TABLET ORAL EVERY 6 HOURS PRN
Qty: 14 TABLET | Refills: 0 | Status: SHIPPED | OUTPATIENT
Start: 2021-03-17 | End: 2021-05-28 | Stop reason: SDUPTHER

## 2021-03-22 ENCOUNTER — PATIENT MESSAGE (OUTPATIENT)
Dept: RHEUMATOLOGY | Facility: CLINIC | Age: 46
End: 2021-03-22

## 2021-04-20 ENCOUNTER — PATIENT MESSAGE (OUTPATIENT)
Dept: GASTROENTEROLOGY | Facility: CLINIC | Age: 46
End: 2021-04-20

## 2021-04-22 ENCOUNTER — TELEPHONE (OUTPATIENT)
Dept: GASTROENTEROLOGY | Facility: CLINIC | Age: 46
End: 2021-04-22

## 2021-04-26 ENCOUNTER — TELEPHONE (OUTPATIENT)
Dept: GASTROENTEROLOGY | Facility: CLINIC | Age: 46
End: 2021-04-26

## 2021-04-26 ENCOUNTER — OFFICE VISIT (OUTPATIENT)
Dept: GASTROENTEROLOGY | Facility: CLINIC | Age: 46
End: 2021-04-26
Payer: COMMERCIAL

## 2021-04-26 ENCOUNTER — LAB VISIT (OUTPATIENT)
Dept: LAB | Facility: HOSPITAL | Age: 46
End: 2021-04-26
Attending: INTERNAL MEDICINE
Payer: COMMERCIAL

## 2021-04-26 VITALS
SYSTOLIC BLOOD PRESSURE: 139 MMHG | DIASTOLIC BLOOD PRESSURE: 96 MMHG | WEIGHT: 260.13 LBS | HEART RATE: 71 BPM | HEIGHT: 64 IN | BODY MASS INDEX: 44.41 KG/M2

## 2021-04-26 DIAGNOSIS — R10.32 LEFT LOWER QUADRANT PAIN: ICD-10-CM

## 2021-04-26 DIAGNOSIS — R10.814 LEFT LOWER QUADRANT ABDOMINAL TENDERNESS WITHOUT REBOUND TENDERNESS: ICD-10-CM

## 2021-04-26 DIAGNOSIS — R10.814 LEFT LOWER QUADRANT ABDOMINAL TENDERNESS WITHOUT REBOUND TENDERNESS: Primary | ICD-10-CM

## 2021-04-26 LAB
ALBUMIN SERPL BCP-MCNC: 3.9 G/DL (ref 3.5–5.2)
ALP SERPL-CCNC: 61 U/L (ref 55–135)
ALT SERPL W/O P-5'-P-CCNC: 17 U/L (ref 10–44)
ANION GAP SERPL CALC-SCNC: 8 MMOL/L (ref 8–16)
AST SERPL-CCNC: 25 U/L (ref 10–40)
BASOPHILS # BLD AUTO: 0.01 K/UL (ref 0–0.2)
BASOPHILS NFR BLD: 0.2 % (ref 0–1.9)
BILIRUB DIRECT SERPL-MCNC: 0.3 MG/DL (ref 0.1–0.3)
BILIRUB SERPL-MCNC: 0.8 MG/DL (ref 0.1–1)
BUN SERPL-MCNC: 11 MG/DL (ref 6–20)
CALCIUM SERPL-MCNC: 9.2 MG/DL (ref 8.7–10.5)
CHLORIDE SERPL-SCNC: 103 MMOL/L (ref 95–110)
CO2 SERPL-SCNC: 27 MMOL/L (ref 23–29)
CREAT SERPL-MCNC: 0.8 MG/DL (ref 0.5–1.4)
CRP SERPL-MCNC: 5.3 MG/L (ref 0–8.2)
DIFFERENTIAL METHOD: ABNORMAL
EOSINOPHIL # BLD AUTO: 0.2 K/UL (ref 0–0.5)
EOSINOPHIL NFR BLD: 2.9 % (ref 0–8)
ERYTHROCYTE [DISTWIDTH] IN BLOOD BY AUTOMATED COUNT: 13.6 % (ref 11.5–14.5)
EST. GFR  (AFRICAN AMERICAN): >60 ML/MIN/1.73 M^2
EST. GFR  (NON AFRICAN AMERICAN): >60 ML/MIN/1.73 M^2
GLUCOSE SERPL-MCNC: 85 MG/DL (ref 70–110)
HCT VFR BLD AUTO: 41.1 % (ref 37–48.5)
HGB BLD-MCNC: 13.7 G/DL (ref 12–16)
IMM GRANULOCYTES # BLD AUTO: 0.01 K/UL (ref 0–0.04)
IMM GRANULOCYTES NFR BLD AUTO: 0.2 % (ref 0–0.5)
LIPASE SERPL-CCNC: 16 U/L (ref 4–60)
LYMPHOCYTES # BLD AUTO: 2.7 K/UL (ref 1–4.8)
LYMPHOCYTES NFR BLD: 51.5 % (ref 18–48)
MCH RBC QN AUTO: 29.7 PG (ref 27–31)
MCHC RBC AUTO-ENTMCNC: 33.3 G/DL (ref 32–36)
MCV RBC AUTO: 89 FL (ref 82–98)
MONOCYTES # BLD AUTO: 0.4 K/UL (ref 0.3–1)
MONOCYTES NFR BLD: 8.2 % (ref 4–15)
NEUTROPHILS # BLD AUTO: 1.9 K/UL (ref 1.8–7.7)
NEUTROPHILS NFR BLD: 37 % (ref 38–73)
NRBC BLD-RTO: 0 /100 WBC
PLATELET # BLD AUTO: 210 K/UL (ref 150–450)
PMV BLD AUTO: 10.6 FL (ref 9.2–12.9)
POTASSIUM SERPL-SCNC: 3.3 MMOL/L (ref 3.5–5.1)
PROT SERPL-MCNC: 8 G/DL (ref 6–8.4)
RBC # BLD AUTO: 4.61 M/UL (ref 4–5.4)
SODIUM SERPL-SCNC: 138 MMOL/L (ref 136–145)
WBC # BLD AUTO: 5.24 K/UL (ref 3.9–12.7)

## 2021-04-26 PROCEDURE — 3008F PR BODY MASS INDEX (BMI) DOCUMENTED: ICD-10-PCS | Mod: CPTII,S$GLB,, | Performed by: INTERNAL MEDICINE

## 2021-04-26 PROCEDURE — 1125F PR PAIN SEVERITY QUANTIFIED, PAIN PRESENT: ICD-10-PCS | Mod: S$GLB,,, | Performed by: INTERNAL MEDICINE

## 2021-04-26 PROCEDURE — 99214 OFFICE O/P EST MOD 30 MIN: CPT | Mod: S$GLB,,, | Performed by: INTERNAL MEDICINE

## 2021-04-26 PROCEDURE — 99999 PR PBB SHADOW E&M-EST. PATIENT-LVL V: CPT | Mod: PBBFAC,,, | Performed by: INTERNAL MEDICINE

## 2021-04-26 PROCEDURE — 85025 COMPLETE CBC W/AUTO DIFF WBC: CPT | Performed by: INTERNAL MEDICINE

## 2021-04-26 PROCEDURE — 86790 VIRUS ANTIBODY NOS: CPT | Performed by: INTERNAL MEDICINE

## 2021-04-26 PROCEDURE — 80048 BASIC METABOLIC PNL TOTAL CA: CPT | Performed by: INTERNAL MEDICINE

## 2021-04-26 PROCEDURE — 36415 COLL VENOUS BLD VENIPUNCTURE: CPT | Performed by: INTERNAL MEDICINE

## 2021-04-26 PROCEDURE — 83690 ASSAY OF LIPASE: CPT | Performed by: INTERNAL MEDICINE

## 2021-04-26 PROCEDURE — 99214 PR OFFICE/OUTPT VISIT, EST, LEVL IV, 30-39 MIN: ICD-10-PCS | Mod: S$GLB,,, | Performed by: INTERNAL MEDICINE

## 2021-04-26 PROCEDURE — 1125F AMNT PAIN NOTED PAIN PRSNT: CPT | Mod: S$GLB,,, | Performed by: INTERNAL MEDICINE

## 2021-04-26 PROCEDURE — 99999 PR PBB SHADOW E&M-EST. PATIENT-LVL V: ICD-10-PCS | Mod: PBBFAC,,, | Performed by: INTERNAL MEDICINE

## 2021-04-26 PROCEDURE — 86706 HEP B SURFACE ANTIBODY: CPT | Performed by: INTERNAL MEDICINE

## 2021-04-26 PROCEDURE — 80076 HEPATIC FUNCTION PANEL: CPT | Performed by: INTERNAL MEDICINE

## 2021-04-26 PROCEDURE — 86140 C-REACTIVE PROTEIN: CPT | Performed by: INTERNAL MEDICINE

## 2021-04-26 PROCEDURE — 3008F BODY MASS INDEX DOCD: CPT | Mod: CPTII,S$GLB,, | Performed by: INTERNAL MEDICINE

## 2021-04-27 ENCOUNTER — LAB VISIT (OUTPATIENT)
Dept: LAB | Facility: HOSPITAL | Age: 46
End: 2021-04-27
Attending: INTERNAL MEDICINE
Payer: COMMERCIAL

## 2021-04-27 DIAGNOSIS — R10.814 LEFT LOWER QUADRANT ABDOMINAL TENDERNESS WITHOUT REBOUND TENDERNESS: ICD-10-CM

## 2021-04-27 DIAGNOSIS — R10.32 LEFT LOWER QUADRANT PAIN: ICD-10-CM

## 2021-04-27 LAB
BILIRUB UR QL STRIP: NEGATIVE
CLARITY UR REFRACT.AUTO: CLEAR
COLOR UR AUTO: NORMAL
GLUCOSE UR QL STRIP: NEGATIVE
HEPATITIS A ANTIBODY, IGG: POSITIVE
HGB UR QL STRIP: NEGATIVE
KETONES UR QL STRIP: NEGATIVE
LEUKOCYTE ESTERASE UR QL STRIP: NEGATIVE
NITRITE UR QL STRIP: NEGATIVE
PH UR STRIP: 6 [PH] (ref 5–8)
PROT UR QL STRIP: NEGATIVE
SP GR UR STRIP: 1.01 (ref 1–1.03)
URN SPEC COLLECT METH UR: NORMAL

## 2021-04-27 PROCEDURE — 87088 URINE BACTERIA CULTURE: CPT | Performed by: INTERNAL MEDICINE

## 2021-04-27 PROCEDURE — 81003 URINALYSIS AUTO W/O SCOPE: CPT | Performed by: INTERNAL MEDICINE

## 2021-04-27 PROCEDURE — 87086 URINE CULTURE/COLONY COUNT: CPT | Performed by: INTERNAL MEDICINE

## 2021-04-27 PROCEDURE — 87186 SC STD MICRODIL/AGAR DIL: CPT | Performed by: INTERNAL MEDICINE

## 2021-04-27 PROCEDURE — 87077 CULTURE AEROBIC IDENTIFY: CPT | Performed by: INTERNAL MEDICINE

## 2021-04-29 DIAGNOSIS — R82.90 ABNORMAL URINE: Primary | ICD-10-CM

## 2021-04-29 DIAGNOSIS — Z86.39 HISTORY OF LOW POTASSIUM: Primary | ICD-10-CM

## 2021-04-29 LAB
BACTERIA UR CULT: ABNORMAL
HBV SURFACE AB SER QL IA: POSITIVE
HBV SURFACE AB SERPL IA-ACNC: NORMAL MIU/ML

## 2021-04-30 ENCOUNTER — TELEPHONE (OUTPATIENT)
Dept: GASTROENTEROLOGY | Facility: CLINIC | Age: 46
End: 2021-04-30

## 2021-05-03 ENCOUNTER — PATIENT MESSAGE (OUTPATIENT)
Dept: GASTROENTEROLOGY | Facility: CLINIC | Age: 46
End: 2021-05-03

## 2021-05-06 ENCOUNTER — DOCUMENTATION ONLY (OUTPATIENT)
Dept: GASTROENTEROLOGY | Facility: HOSPITAL | Age: 46
End: 2021-05-06

## 2021-05-06 ENCOUNTER — TELEPHONE (OUTPATIENT)
Dept: GASTROENTEROLOGY | Facility: CLINIC | Age: 46
End: 2021-05-06

## 2021-05-06 ENCOUNTER — LAB VISIT (OUTPATIENT)
Dept: LAB | Facility: HOSPITAL | Age: 46
End: 2021-05-06
Attending: INTERNAL MEDICINE
Payer: COMMERCIAL

## 2021-05-06 DIAGNOSIS — R82.90 ABNORMAL URINE: ICD-10-CM

## 2021-05-06 LAB
BILIRUB UR QL STRIP: NEGATIVE
CLARITY UR: CLEAR
COLOR UR: YELLOW
GLUCOSE UR QL STRIP: NEGATIVE
HGB UR QL STRIP: NEGATIVE
KETONES UR QL STRIP: NEGATIVE
LEUKOCYTE ESTERASE UR QL STRIP: NEGATIVE
NITRITE UR QL STRIP: NEGATIVE
PH UR STRIP: 6 [PH] (ref 5–8)
PROT UR QL STRIP: NEGATIVE
SP GR UR STRIP: 1.01 (ref 1–1.03)
URN SPEC COLLECT METH UR: NORMAL
UROBILINOGEN UR STRIP-ACNC: NEGATIVE EU/DL

## 2021-05-06 PROCEDURE — 87086 URINE CULTURE/COLONY COUNT: CPT | Performed by: INTERNAL MEDICINE

## 2021-05-06 PROCEDURE — 81003 URINALYSIS AUTO W/O SCOPE: CPT | Performed by: INTERNAL MEDICINE

## 2021-05-08 LAB — BACTERIA UR CULT: NORMAL

## 2021-06-21 ENCOUNTER — OFFICE VISIT (OUTPATIENT)
Dept: DERMATOLOGY | Facility: CLINIC | Age: 46
End: 2021-06-21
Payer: COMMERCIAL

## 2021-06-21 DIAGNOSIS — L98.9 DISEASE OF SKIN AND SUBCUTANEOUS TISSUE: ICD-10-CM

## 2021-06-21 DIAGNOSIS — L91.0 KELOID SCAR: Primary | ICD-10-CM

## 2021-06-21 PROCEDURE — 99999 PR PBB SHADOW E&M-EST. PATIENT-LVL III: CPT | Mod: PBBFAC,,, | Performed by: DERMATOLOGY

## 2021-06-21 PROCEDURE — 99213 OFFICE O/P EST LOW 20 MIN: CPT | Mod: 25,S$GLB,, | Performed by: DERMATOLOGY

## 2021-06-21 PROCEDURE — 11900 INJECT SKIN LESIONS </W 7: CPT | Mod: S$GLB,,, | Performed by: DERMATOLOGY

## 2021-06-21 PROCEDURE — 99213 PR OFFICE/OUTPT VISIT, EST, LEVL III, 20-29 MIN: ICD-10-PCS | Mod: 25,S$GLB,, | Performed by: DERMATOLOGY

## 2021-06-21 PROCEDURE — 11900 PR INJECTION INTO SKIN LESIONS, UP TO 7: ICD-10-PCS | Mod: S$GLB,,, | Performed by: DERMATOLOGY

## 2021-06-21 PROCEDURE — 1126F AMNT PAIN NOTED NONE PRSNT: CPT | Mod: S$GLB,,, | Performed by: DERMATOLOGY

## 2021-06-21 PROCEDURE — 1126F PR PAIN SEVERITY QUANTIFIED, NO PAIN PRESENT: ICD-10-PCS | Mod: S$GLB,,, | Performed by: DERMATOLOGY

## 2021-06-21 PROCEDURE — 99999 PR PBB SHADOW E&M-EST. PATIENT-LVL III: ICD-10-PCS | Mod: PBBFAC,,, | Performed by: DERMATOLOGY

## 2021-06-21 RX ORDER — TRIAMCINOLONE ACETONIDE 1 MG/G
CREAM TOPICAL
Qty: 80 G | Refills: 0 | Status: SHIPPED | OUTPATIENT
Start: 2021-06-21 | End: 2022-11-01

## 2021-06-21 RX ORDER — TRIAMCINOLONE ACETONIDE 40 MG/ML
40 INJECTION, SUSPENSION INTRA-ARTICULAR; INTRAMUSCULAR ONCE
Status: DISCONTINUED | OUTPATIENT
Start: 2021-06-21 | End: 2023-02-02

## 2021-08-05 ENCOUNTER — OFFICE VISIT (OUTPATIENT)
Dept: RHEUMATOLOGY | Facility: CLINIC | Age: 46
End: 2021-08-05
Payer: COMMERCIAL

## 2021-08-05 VITALS
OXYGEN SATURATION: 97 % | DIASTOLIC BLOOD PRESSURE: 96 MMHG | HEART RATE: 88 BPM | TEMPERATURE: 98 F | SYSTOLIC BLOOD PRESSURE: 151 MMHG | RESPIRATION RATE: 18 BRPM

## 2021-08-05 DIAGNOSIS — Z71.89 COUNSELING AND COORDINATION OF CARE: ICD-10-CM

## 2021-08-05 DIAGNOSIS — M54.17 THORACIC AND LUMBOSACRAL NEURITIS: ICD-10-CM

## 2021-08-05 DIAGNOSIS — M35.01 SJOGREN'S SYNDROME WITH KERATOCONJUNCTIVITIS SICCA: Primary | ICD-10-CM

## 2021-08-05 DIAGNOSIS — M15.9 PRIMARY OSTEOARTHRITIS INVOLVING MULTIPLE JOINTS: ICD-10-CM

## 2021-08-05 DIAGNOSIS — Z79.899 ENCOUNTER FOR LONG-TERM (CURRENT) USE OF OTHER MEDICATIONS: ICD-10-CM

## 2021-08-05 DIAGNOSIS — M54.14 THORACIC AND LUMBOSACRAL NEURITIS: ICD-10-CM

## 2021-08-05 DIAGNOSIS — E66.01 CLASS 3 SEVERE OBESITY IN ADULT, UNSPECIFIED BMI, UNSPECIFIED OBESITY TYPE, UNSPECIFIED WHETHER SERIOUS COMORBIDITY PRESENT: ICD-10-CM

## 2021-08-05 DIAGNOSIS — M47.819 SPONDYLOSIS WITHOUT MYELOPATHY: ICD-10-CM

## 2021-08-05 PROCEDURE — 3077F SYST BP >= 140 MM HG: CPT | Mod: CPTII,S$GLB,, | Performed by: INTERNAL MEDICINE

## 2021-08-05 PROCEDURE — 1159F PR MEDICATION LIST DOCUMENTED IN MEDICAL RECORD: ICD-10-PCS | Mod: CPTII,S$GLB,, | Performed by: INTERNAL MEDICINE

## 2021-08-05 PROCEDURE — 1125F AMNT PAIN NOTED PAIN PRSNT: CPT | Mod: CPTII,S$GLB,, | Performed by: INTERNAL MEDICINE

## 2021-08-05 PROCEDURE — 3077F PR MOST RECENT SYSTOLIC BLOOD PRESSURE >= 140 MM HG: ICD-10-PCS | Mod: CPTII,S$GLB,, | Performed by: INTERNAL MEDICINE

## 2021-08-05 PROCEDURE — 99214 PR OFFICE/OUTPT VISIT, EST, LEVL IV, 30-39 MIN: ICD-10-PCS | Mod: S$GLB,,, | Performed by: INTERNAL MEDICINE

## 2021-08-05 PROCEDURE — 3080F DIAST BP >= 90 MM HG: CPT | Mod: CPTII,S$GLB,, | Performed by: INTERNAL MEDICINE

## 2021-08-05 PROCEDURE — 3080F PR MOST RECENT DIASTOLIC BLOOD PRESSURE >= 90 MM HG: ICD-10-PCS | Mod: CPTII,S$GLB,, | Performed by: INTERNAL MEDICINE

## 2021-08-05 PROCEDURE — 99999 PR PBB SHADOW E&M-EST. PATIENT-LVL IV: CPT | Mod: PBBFAC,,, | Performed by: INTERNAL MEDICINE

## 2021-08-05 PROCEDURE — 1159F MED LIST DOCD IN RCRD: CPT | Mod: CPTII,S$GLB,, | Performed by: INTERNAL MEDICINE

## 2021-08-05 PROCEDURE — 99999 PR PBB SHADOW E&M-EST. PATIENT-LVL IV: ICD-10-PCS | Mod: PBBFAC,,, | Performed by: INTERNAL MEDICINE

## 2021-08-05 PROCEDURE — 99214 OFFICE O/P EST MOD 30 MIN: CPT | Mod: S$GLB,,, | Performed by: INTERNAL MEDICINE

## 2021-08-05 PROCEDURE — 1125F PR PAIN SEVERITY QUANTIFIED, PAIN PRESENT: ICD-10-PCS | Mod: CPTII,S$GLB,, | Performed by: INTERNAL MEDICINE

## 2021-08-05 RX ORDER — HYDROXYCHLOROQUINE SULFATE 200 MG/1
200 TABLET, FILM COATED ORAL 2 TIMES DAILY
Qty: 60 TABLET | Refills: 6 | Status: SHIPPED | OUTPATIENT
Start: 2021-08-05 | End: 2022-02-01 | Stop reason: SDUPTHER

## 2021-08-05 RX ORDER — CYCLOBENZAPRINE HCL 10 MG
10 TABLET ORAL NIGHTLY
Qty: 30 TABLET | Refills: 11 | Status: SHIPPED | OUTPATIENT
Start: 2021-08-05 | End: 2022-04-05 | Stop reason: ALTCHOICE

## 2021-08-05 RX ORDER — GABAPENTIN 600 MG/1
600 TABLET ORAL 2 TIMES DAILY
Qty: 60 TABLET | Refills: 11 | Status: SHIPPED | OUTPATIENT
Start: 2021-08-05 | End: 2022-02-01

## 2021-08-13 ENCOUNTER — OFFICE VISIT (OUTPATIENT)
Dept: PSYCHIATRY | Facility: CLINIC | Age: 46
End: 2021-08-13
Payer: COMMERCIAL

## 2021-08-13 VITALS
HEART RATE: 97 BPM | BODY MASS INDEX: 43.71 KG/M2 | DIASTOLIC BLOOD PRESSURE: 91 MMHG | WEIGHT: 254.63 LBS | SYSTOLIC BLOOD PRESSURE: 138 MMHG

## 2021-08-13 DIAGNOSIS — F31.5 BIPOLAR I DISORDER, CURRENT OR MOST RECENT EPISODE DEPRESSED, WITH PSYCHOTIC FEATURES WITH ANXIOUS DISTRESS: ICD-10-CM

## 2021-08-13 DIAGNOSIS — F39 MOOD INSOMNIA: ICD-10-CM

## 2021-08-13 DIAGNOSIS — F60.5 OBSESSIVE COMPULSIVE PERSONALITY DISORDER: ICD-10-CM

## 2021-08-13 DIAGNOSIS — F51.05 MOOD INSOMNIA: ICD-10-CM

## 2021-08-13 PROCEDURE — 1159F PR MEDICATION LIST DOCUMENTED IN MEDICAL RECORD: ICD-10-PCS | Mod: CPTII,S$GLB,, | Performed by: NURSE PRACTITIONER

## 2021-08-13 PROCEDURE — 1160F PR REVIEW ALL MEDS BY PRESCRIBER/CLIN PHARMACIST DOCUMENTED: ICD-10-PCS | Mod: CPTII,S$GLB,, | Performed by: NURSE PRACTITIONER

## 2021-08-13 PROCEDURE — 90833 PR PSYCHOTHERAPY W/PATIENT W/E&M, 30 MIN (ADD ON): ICD-10-PCS | Mod: S$GLB,,, | Performed by: NURSE PRACTITIONER

## 2021-08-13 PROCEDURE — 1159F MED LIST DOCD IN RCRD: CPT | Mod: CPTII,S$GLB,, | Performed by: NURSE PRACTITIONER

## 2021-08-13 PROCEDURE — 3080F PR MOST RECENT DIASTOLIC BLOOD PRESSURE >= 90 MM HG: ICD-10-PCS | Mod: CPTII,S$GLB,, | Performed by: NURSE PRACTITIONER

## 2021-08-13 PROCEDURE — 90833 PSYTX W PT W E/M 30 MIN: CPT | Mod: S$GLB,,, | Performed by: NURSE PRACTITIONER

## 2021-08-13 PROCEDURE — 99213 PR OFFICE/OUTPT VISIT, EST, LEVL III, 20-29 MIN: ICD-10-PCS | Mod: S$GLB,,, | Performed by: NURSE PRACTITIONER

## 2021-08-13 PROCEDURE — 3075F PR MOST RECENT SYSTOLIC BLOOD PRESS GE 130-139MM HG: ICD-10-PCS | Mod: CPTII,S$GLB,, | Performed by: NURSE PRACTITIONER

## 2021-08-13 PROCEDURE — 99999 PR PBB SHADOW E&M-EST. PATIENT-LVL III: ICD-10-PCS | Mod: PBBFAC,,, | Performed by: NURSE PRACTITIONER

## 2021-08-13 PROCEDURE — 3080F DIAST BP >= 90 MM HG: CPT | Mod: CPTII,S$GLB,, | Performed by: NURSE PRACTITIONER

## 2021-08-13 PROCEDURE — 3075F SYST BP GE 130 - 139MM HG: CPT | Mod: CPTII,S$GLB,, | Performed by: NURSE PRACTITIONER

## 2021-08-13 PROCEDURE — 1160F RVW MEDS BY RX/DR IN RCRD: CPT | Mod: CPTII,S$GLB,, | Performed by: NURSE PRACTITIONER

## 2021-08-13 PROCEDURE — 99213 OFFICE O/P EST LOW 20 MIN: CPT | Mod: S$GLB,,, | Performed by: NURSE PRACTITIONER

## 2021-08-13 PROCEDURE — 3008F BODY MASS INDEX DOCD: CPT | Mod: CPTII,S$GLB,, | Performed by: NURSE PRACTITIONER

## 2021-08-13 PROCEDURE — 3008F PR BODY MASS INDEX (BMI) DOCUMENTED: ICD-10-PCS | Mod: CPTII,S$GLB,, | Performed by: NURSE PRACTITIONER

## 2021-08-13 PROCEDURE — 99999 PR PBB SHADOW E&M-EST. PATIENT-LVL III: CPT | Mod: PBBFAC,,, | Performed by: NURSE PRACTITIONER

## 2021-08-13 RX ORDER — FLUOXETINE HYDROCHLORIDE 40 MG/1
40 CAPSULE ORAL DAILY
Qty: 90 CAPSULE | Refills: 3 | Status: SHIPPED | OUTPATIENT
Start: 2021-08-13 | End: 2022-02-01 | Stop reason: SDUPTHER

## 2021-08-13 RX ORDER — AMITRIPTYLINE HYDROCHLORIDE 50 MG/1
50 TABLET, FILM COATED ORAL NIGHTLY
Qty: 90 TABLET | Refills: 3 | Status: SHIPPED | OUTPATIENT
Start: 2021-08-13 | End: 2022-02-01 | Stop reason: SDUPTHER

## 2021-08-13 RX ORDER — HYDROXYZINE HYDROCHLORIDE 50 MG/1
50 TABLET, FILM COATED ORAL DAILY PRN
Qty: 90 TABLET | Refills: 3 | Status: SHIPPED | OUTPATIENT
Start: 2021-08-13 | End: 2022-02-01 | Stop reason: SDUPTHER

## 2021-09-15 DIAGNOSIS — Z12.31 OTHER SCREENING MAMMOGRAM: ICD-10-CM

## 2021-09-20 ENCOUNTER — PATIENT MESSAGE (OUTPATIENT)
Dept: FAMILY MEDICINE | Facility: CLINIC | Age: 46
End: 2021-09-20

## 2021-09-21 ENCOUNTER — PATIENT MESSAGE (OUTPATIENT)
Dept: FAMILY MEDICINE | Facility: CLINIC | Age: 46
End: 2021-09-21

## 2021-09-27 ENCOUNTER — PATIENT MESSAGE (OUTPATIENT)
Dept: FAMILY MEDICINE | Facility: CLINIC | Age: 46
End: 2021-09-27

## 2021-10-04 ENCOUNTER — PATIENT MESSAGE (OUTPATIENT)
Dept: ADMINISTRATIVE | Facility: HOSPITAL | Age: 46
End: 2021-10-04

## 2021-10-11 ENCOUNTER — TELEPHONE (OUTPATIENT)
Dept: PAIN MEDICINE | Facility: CLINIC | Age: 46
End: 2021-10-11

## 2021-10-11 ENCOUNTER — HOSPITAL ENCOUNTER (OUTPATIENT)
Dept: RADIOLOGY | Facility: HOSPITAL | Age: 46
Discharge: HOME | End: 2021-10-11
Attending: INTERNAL MEDICINE
Payer: COMMERCIAL

## 2021-10-11 ENCOUNTER — OFFICE VISIT (OUTPATIENT)
Dept: FAMILY MEDICINE | Facility: CLINIC | Age: 46
End: 2021-10-11
Payer: COMMERCIAL

## 2021-10-11 VITALS
WEIGHT: 254.19 LBS | OXYGEN SATURATION: 97 % | HEART RATE: 106 BPM | HEIGHT: 64 IN | SYSTOLIC BLOOD PRESSURE: 136 MMHG | TEMPERATURE: 98 F | BODY MASS INDEX: 43.4 KG/M2 | DIASTOLIC BLOOD PRESSURE: 86 MMHG

## 2021-10-11 DIAGNOSIS — I82.409 RECURRENT DEEP VEIN THROMBOSIS (DVT): ICD-10-CM

## 2021-10-11 DIAGNOSIS — S39.012A STRAIN OF LUMBAR REGION, INITIAL ENCOUNTER: ICD-10-CM

## 2021-10-11 DIAGNOSIS — G89.29 CHRONIC MIDLINE LOW BACK PAIN WITHOUT SCIATICA: ICD-10-CM

## 2021-10-11 DIAGNOSIS — M51.36 DDD (DEGENERATIVE DISC DISEASE), LUMBAR: ICD-10-CM

## 2021-10-11 DIAGNOSIS — M47.816 LUMBAR SPONDYLOSIS: ICD-10-CM

## 2021-10-11 DIAGNOSIS — I10 ESSENTIAL HYPERTENSION: ICD-10-CM

## 2021-10-11 DIAGNOSIS — Z79.01 CHRONIC ANTICOAGULATION: ICD-10-CM

## 2021-10-11 DIAGNOSIS — Z79.899 LONG-TERM USE OF PLAQUENIL: ICD-10-CM

## 2021-10-11 DIAGNOSIS — M54.50 CHRONIC MIDLINE LOW BACK PAIN WITHOUT SCIATICA: ICD-10-CM

## 2021-10-11 DIAGNOSIS — Z23 NEEDS FLU SHOT: Primary | ICD-10-CM

## 2021-10-11 PROCEDURE — 90471 IMMUNIZATION ADMIN: CPT | Mod: S$GLB,,, | Performed by: INTERNAL MEDICINE

## 2021-10-11 PROCEDURE — 90471 PR IMMUNIZ ADMIN,1 SINGLE/COMB VAC/TOXOID: ICD-10-PCS | Mod: S$GLB,,, | Performed by: INTERNAL MEDICINE

## 2021-10-11 PROCEDURE — 90686 FLU VACCINE (QUAD) GREATER THAN OR EQUAL TO 3YO PRESERVATIVE FREE IM: ICD-10-PCS | Mod: S$GLB,,, | Performed by: INTERNAL MEDICINE

## 2021-10-11 PROCEDURE — 99214 PR OFFICE/OUTPT VISIT, EST, LEVL IV, 30-39 MIN: ICD-10-PCS | Mod: 25,S$GLB,, | Performed by: INTERNAL MEDICINE

## 2021-10-11 PROCEDURE — 1159F MED LIST DOCD IN RCRD: CPT | Mod: CPTII,S$GLB,, | Performed by: INTERNAL MEDICINE

## 2021-10-11 PROCEDURE — 3008F PR BODY MASS INDEX (BMI) DOCUMENTED: ICD-10-PCS | Mod: CPTII,S$GLB,, | Performed by: INTERNAL MEDICINE

## 2021-10-11 PROCEDURE — 72110 X-RAY EXAM L-2 SPINE 4/>VWS: CPT | Mod: TC,FY,PO

## 2021-10-11 PROCEDURE — 3008F BODY MASS INDEX DOCD: CPT | Mod: CPTII,S$GLB,, | Performed by: INTERNAL MEDICINE

## 2021-10-11 PROCEDURE — 90686 IIV4 VACC NO PRSV 0.5 ML IM: CPT | Mod: S$GLB,,, | Performed by: INTERNAL MEDICINE

## 2021-10-11 PROCEDURE — 3079F PR MOST RECENT DIASTOLIC BLOOD PRESSURE 80-89 MM HG: ICD-10-PCS | Mod: CPTII,S$GLB,, | Performed by: INTERNAL MEDICINE

## 2021-10-11 PROCEDURE — 3079F DIAST BP 80-89 MM HG: CPT | Mod: CPTII,S$GLB,, | Performed by: INTERNAL MEDICINE

## 2021-10-11 PROCEDURE — 99999 PR PBB SHADOW E&M-EST. PATIENT-LVL V: ICD-10-PCS | Mod: PBBFAC,,, | Performed by: INTERNAL MEDICINE

## 2021-10-11 PROCEDURE — 1160F PR REVIEW ALL MEDS BY PRESCRIBER/CLIN PHARMACIST DOCUMENTED: ICD-10-PCS | Mod: CPTII,S$GLB,, | Performed by: INTERNAL MEDICINE

## 2021-10-11 PROCEDURE — 3075F SYST BP GE 130 - 139MM HG: CPT | Mod: CPTII,S$GLB,, | Performed by: INTERNAL MEDICINE

## 2021-10-11 PROCEDURE — 1159F PR MEDICATION LIST DOCUMENTED IN MEDICAL RECORD: ICD-10-PCS | Mod: CPTII,S$GLB,, | Performed by: INTERNAL MEDICINE

## 2021-10-11 PROCEDURE — 3075F PR MOST RECENT SYSTOLIC BLOOD PRESS GE 130-139MM HG: ICD-10-PCS | Mod: CPTII,S$GLB,, | Performed by: INTERNAL MEDICINE

## 2021-10-11 PROCEDURE — 72110 XR LUMBAR SPINE AP AND LAT WITH FLEX/EXT: ICD-10-PCS | Mod: 26,,, | Performed by: RADIOLOGY

## 2021-10-11 PROCEDURE — 99999 PR PBB SHADOW E&M-EST. PATIENT-LVL V: CPT | Mod: PBBFAC,,, | Performed by: INTERNAL MEDICINE

## 2021-10-11 PROCEDURE — 72110 X-RAY EXAM L-2 SPINE 4/>VWS: CPT | Mod: 26,,, | Performed by: RADIOLOGY

## 2021-10-11 PROCEDURE — 1160F RVW MEDS BY RX/DR IN RCRD: CPT | Mod: CPTII,S$GLB,, | Performed by: INTERNAL MEDICINE

## 2021-10-11 PROCEDURE — 99214 OFFICE O/P EST MOD 30 MIN: CPT | Mod: 25,S$GLB,, | Performed by: INTERNAL MEDICINE

## 2021-10-11 RX ORDER — CLONIDINE 0.2 MG/24H
1 PATCH, EXTENDED RELEASE TRANSDERMAL
Qty: 4 PATCH | Refills: 11 | Status: SHIPPED | OUTPATIENT
Start: 2021-10-11 | End: 2022-05-17 | Stop reason: DRUGHIGH

## 2021-10-11 RX ORDER — HYDROCODONE BITARTRATE AND ACETAMINOPHEN 5; 325 MG/1; MG/1
1 TABLET ORAL EVERY 6 HOURS PRN
Qty: 14 TABLET | Refills: 0 | Status: SHIPPED | OUTPATIENT
Start: 2021-10-11 | End: 2022-01-25 | Stop reason: SDUPTHER

## 2021-10-12 ENCOUNTER — PATIENT OUTREACH (OUTPATIENT)
Dept: ADMINISTRATIVE | Facility: HOSPITAL | Age: 46
End: 2021-10-12

## 2021-10-13 ENCOUNTER — PATIENT MESSAGE (OUTPATIENT)
Dept: FAMILY MEDICINE | Facility: CLINIC | Age: 46
End: 2021-10-13
Payer: COMMERCIAL

## 2021-10-13 PROBLEM — M35.05 SJOGREN'S SYNDROME WITH INFLAMMATORY ARTHRITIS: Status: ACTIVE | Noted: 2021-10-13

## 2021-10-14 ENCOUNTER — TELEPHONE (OUTPATIENT)
Dept: FAMILY MEDICINE | Facility: CLINIC | Age: 46
End: 2021-10-14

## 2021-10-14 ENCOUNTER — PATIENT MESSAGE (OUTPATIENT)
Dept: FAMILY MEDICINE | Facility: CLINIC | Age: 46
End: 2021-10-14
Payer: COMMERCIAL

## 2021-10-20 ENCOUNTER — PATIENT MESSAGE (OUTPATIENT)
Dept: FAMILY MEDICINE | Facility: CLINIC | Age: 46
End: 2021-10-20
Payer: COMMERCIAL

## 2021-10-26 ENCOUNTER — PATIENT OUTREACH (OUTPATIENT)
Dept: ADMINISTRATIVE | Facility: HOSPITAL | Age: 46
End: 2021-10-26
Payer: COMMERCIAL

## 2021-10-28 ENCOUNTER — TELEPHONE (OUTPATIENT)
Dept: FAMILY MEDICINE | Facility: CLINIC | Age: 46
End: 2021-10-28
Payer: COMMERCIAL

## 2021-10-28 ENCOUNTER — OFFICE VISIT (OUTPATIENT)
Dept: FAMILY MEDICINE | Facility: CLINIC | Age: 46
End: 2021-10-28
Payer: COMMERCIAL

## 2021-10-28 VITALS
OXYGEN SATURATION: 97 % | DIASTOLIC BLOOD PRESSURE: 76 MMHG | WEIGHT: 255 LBS | TEMPERATURE: 98 F | SYSTOLIC BLOOD PRESSURE: 102 MMHG | BODY MASS INDEX: 43.54 KG/M2 | HEART RATE: 90 BPM | HEIGHT: 64 IN

## 2021-10-28 DIAGNOSIS — E75.6 ABNORMAL DEPOSITS OF LIPID: ICD-10-CM

## 2021-10-28 DIAGNOSIS — J01.90 ACUTE BACTERIAL SINUSITIS: Primary | ICD-10-CM

## 2021-10-28 DIAGNOSIS — B96.89 ACUTE BACTERIAL SINUSITIS: Primary | ICD-10-CM

## 2021-10-28 PROCEDURE — 3074F SYST BP LT 130 MM HG: CPT | Mod: CPTII,S$GLB,, | Performed by: INTERNAL MEDICINE

## 2021-10-28 PROCEDURE — 99214 OFFICE O/P EST MOD 30 MIN: CPT | Mod: S$GLB,,, | Performed by: INTERNAL MEDICINE

## 2021-10-28 PROCEDURE — 1160F PR REVIEW ALL MEDS BY PRESCRIBER/CLIN PHARMACIST DOCUMENTED: ICD-10-PCS | Mod: CPTII,S$GLB,, | Performed by: INTERNAL MEDICINE

## 2021-10-28 PROCEDURE — 99214 PR OFFICE/OUTPT VISIT, EST, LEVL IV, 30-39 MIN: ICD-10-PCS | Mod: S$GLB,,, | Performed by: INTERNAL MEDICINE

## 2021-10-28 PROCEDURE — 1160F RVW MEDS BY RX/DR IN RCRD: CPT | Mod: CPTII,S$GLB,, | Performed by: INTERNAL MEDICINE

## 2021-10-28 PROCEDURE — 1159F MED LIST DOCD IN RCRD: CPT | Mod: CPTII,S$GLB,, | Performed by: INTERNAL MEDICINE

## 2021-10-28 PROCEDURE — 3078F PR MOST RECENT DIASTOLIC BLOOD PRESSURE < 80 MM HG: ICD-10-PCS | Mod: CPTII,S$GLB,, | Performed by: INTERNAL MEDICINE

## 2021-10-28 PROCEDURE — 99999 PR PBB SHADOW E&M-EST. PATIENT-LVL V: CPT | Mod: PBBFAC,,, | Performed by: INTERNAL MEDICINE

## 2021-10-28 PROCEDURE — 1159F PR MEDICATION LIST DOCUMENTED IN MEDICAL RECORD: ICD-10-PCS | Mod: CPTII,S$GLB,, | Performed by: INTERNAL MEDICINE

## 2021-10-28 PROCEDURE — 3074F PR MOST RECENT SYSTOLIC BLOOD PRESSURE < 130 MM HG: ICD-10-PCS | Mod: CPTII,S$GLB,, | Performed by: INTERNAL MEDICINE

## 2021-10-28 PROCEDURE — 3008F PR BODY MASS INDEX (BMI) DOCUMENTED: ICD-10-PCS | Mod: CPTII,S$GLB,, | Performed by: INTERNAL MEDICINE

## 2021-10-28 PROCEDURE — 3008F BODY MASS INDEX DOCD: CPT | Mod: CPTII,S$GLB,, | Performed by: INTERNAL MEDICINE

## 2021-10-28 PROCEDURE — 99999 PR PBB SHADOW E&M-EST. PATIENT-LVL V: ICD-10-PCS | Mod: PBBFAC,,, | Performed by: INTERNAL MEDICINE

## 2021-10-28 PROCEDURE — 3078F DIAST BP <80 MM HG: CPT | Mod: CPTII,S$GLB,, | Performed by: INTERNAL MEDICINE

## 2021-10-28 RX ORDER — DOXYCYCLINE 100 MG/1
100 CAPSULE ORAL EVERY 12 HOURS
Qty: 20 CAPSULE | Refills: 0 | Status: SHIPPED | OUTPATIENT
Start: 2021-10-28 | End: 2021-11-07

## 2021-10-28 RX ORDER — IPRATROPIUM BROMIDE 42 UG/1
2 SPRAY, METERED NASAL 3 TIMES DAILY
Qty: 30 ML | Refills: 0 | Status: SHIPPED | OUTPATIENT
Start: 2021-10-28 | End: 2021-11-04

## 2021-11-11 ENCOUNTER — LAB VISIT (OUTPATIENT)
Dept: LAB | Facility: HOSPITAL | Age: 46
End: 2021-11-11
Attending: INTERNAL MEDICINE
Payer: COMMERCIAL

## 2021-11-11 DIAGNOSIS — E75.6 ABNORMAL DEPOSITS OF LIPID: ICD-10-CM

## 2021-11-11 LAB
CHOLEST SERPL-MCNC: 184 MG/DL (ref 120–199)
CHOLEST/HDLC SERPL: 3.5 {RATIO} (ref 2–5)
CRP SERPL-MCNC: 14.58 MG/L (ref 0–3.19)
HDLC SERPL-MCNC: 53 MG/DL (ref 40–75)
HDLC SERPL: 28.8 % (ref 20–50)
LDLC SERPL CALC-MCNC: 112.2 MG/DL (ref 63–159)
NONHDLC SERPL-MCNC: 131 MG/DL
T4 FREE SERPL-MCNC: 0.93 NG/DL (ref 0.71–1.51)
TRIGL SERPL-MCNC: 94 MG/DL (ref 30–150)
TSH SERPL DL<=0.005 MIU/L-ACNC: 4.39 UIU/ML (ref 0.4–4)

## 2021-11-11 PROCEDURE — 80061 LIPID PANEL: CPT | Performed by: INTERNAL MEDICINE

## 2021-11-11 PROCEDURE — 86141 C-REACTIVE PROTEIN HS: CPT | Performed by: INTERNAL MEDICINE

## 2021-11-11 PROCEDURE — 84443 ASSAY THYROID STIM HORMONE: CPT | Performed by: INTERNAL MEDICINE

## 2021-11-11 PROCEDURE — 36415 COLL VENOUS BLD VENIPUNCTURE: CPT | Mod: PO | Performed by: INTERNAL MEDICINE

## 2021-11-11 PROCEDURE — 84439 ASSAY OF FREE THYROXINE: CPT | Performed by: INTERNAL MEDICINE

## 2021-11-17 ENCOUNTER — OFFICE VISIT (OUTPATIENT)
Dept: FAMILY MEDICINE | Facility: CLINIC | Age: 46
End: 2021-11-17
Payer: COMMERCIAL

## 2021-11-17 VITALS
WEIGHT: 255 LBS | DIASTOLIC BLOOD PRESSURE: 76 MMHG | SYSTOLIC BLOOD PRESSURE: 110 MMHG | OXYGEN SATURATION: 97 % | TEMPERATURE: 98 F | HEIGHT: 64 IN | HEART RATE: 96 BPM | BODY MASS INDEX: 43.54 KG/M2

## 2021-11-17 DIAGNOSIS — E78.5 DYSLIPIDEMIA: ICD-10-CM

## 2021-11-17 DIAGNOSIS — E75.6 ABNORMAL DEPOSITS OF LIPID: Primary | ICD-10-CM

## 2021-11-17 DIAGNOSIS — R79.89 ABNORMAL TSH: ICD-10-CM

## 2021-11-17 PROCEDURE — 99214 PR OFFICE/OUTPT VISIT, EST, LEVL IV, 30-39 MIN: ICD-10-PCS | Mod: S$GLB,,, | Performed by: INTERNAL MEDICINE

## 2021-11-17 PROCEDURE — 99214 OFFICE O/P EST MOD 30 MIN: CPT | Mod: S$GLB,,, | Performed by: INTERNAL MEDICINE

## 2021-11-17 PROCEDURE — 1160F PR REVIEW ALL MEDS BY PRESCRIBER/CLIN PHARMACIST DOCUMENTED: ICD-10-PCS | Mod: CPTII,S$GLB,, | Performed by: INTERNAL MEDICINE

## 2021-11-17 PROCEDURE — 3074F SYST BP LT 130 MM HG: CPT | Mod: CPTII,S$GLB,, | Performed by: INTERNAL MEDICINE

## 2021-11-17 PROCEDURE — 3008F PR BODY MASS INDEX (BMI) DOCUMENTED: ICD-10-PCS | Mod: CPTII,S$GLB,, | Performed by: INTERNAL MEDICINE

## 2021-11-17 PROCEDURE — 3008F BODY MASS INDEX DOCD: CPT | Mod: CPTII,S$GLB,, | Performed by: INTERNAL MEDICINE

## 2021-11-17 PROCEDURE — 99999 PR PBB SHADOW E&M-EST. PATIENT-LVL V: CPT | Mod: PBBFAC,,, | Performed by: INTERNAL MEDICINE

## 2021-11-17 PROCEDURE — 1159F MED LIST DOCD IN RCRD: CPT | Mod: CPTII,S$GLB,, | Performed by: INTERNAL MEDICINE

## 2021-11-17 PROCEDURE — 3078F DIAST BP <80 MM HG: CPT | Mod: CPTII,S$GLB,, | Performed by: INTERNAL MEDICINE

## 2021-11-17 PROCEDURE — 1159F PR MEDICATION LIST DOCUMENTED IN MEDICAL RECORD: ICD-10-PCS | Mod: CPTII,S$GLB,, | Performed by: INTERNAL MEDICINE

## 2021-11-17 PROCEDURE — 3078F PR MOST RECENT DIASTOLIC BLOOD PRESSURE < 80 MM HG: ICD-10-PCS | Mod: CPTII,S$GLB,, | Performed by: INTERNAL MEDICINE

## 2021-11-17 PROCEDURE — 99999 PR PBB SHADOW E&M-EST. PATIENT-LVL V: ICD-10-PCS | Mod: PBBFAC,,, | Performed by: INTERNAL MEDICINE

## 2021-11-17 PROCEDURE — 3074F PR MOST RECENT SYSTOLIC BLOOD PRESSURE < 130 MM HG: ICD-10-PCS | Mod: CPTII,S$GLB,, | Performed by: INTERNAL MEDICINE

## 2021-11-17 PROCEDURE — 1160F RVW MEDS BY RX/DR IN RCRD: CPT | Mod: CPTII,S$GLB,, | Performed by: INTERNAL MEDICINE

## 2021-11-17 RX ORDER — ATORVASTATIN CALCIUM 10 MG/1
10 TABLET, FILM COATED ORAL DAILY
Qty: 90 TABLET | Refills: 3 | Status: ON HOLD | OUTPATIENT
Start: 2021-11-17 | End: 2022-11-02 | Stop reason: HOSPADM

## 2021-11-28 ENCOUNTER — PATIENT MESSAGE (OUTPATIENT)
Dept: FAMILY MEDICINE | Facility: CLINIC | Age: 46
End: 2021-11-28
Payer: COMMERCIAL

## 2021-11-28 DIAGNOSIS — H91.90 HEARING LOSS, UNSPECIFIED HEARING LOSS TYPE, UNSPECIFIED LATERALITY: ICD-10-CM

## 2021-11-28 DIAGNOSIS — H61.20 IMPACTED CERUMEN, UNSPECIFIED LATERALITY: Primary | ICD-10-CM

## 2021-11-29 ENCOUNTER — PATIENT MESSAGE (OUTPATIENT)
Dept: FAMILY MEDICINE | Facility: CLINIC | Age: 46
End: 2021-11-29
Payer: COMMERCIAL

## 2021-11-30 ENCOUNTER — TELEPHONE (OUTPATIENT)
Dept: FAMILY MEDICINE | Facility: CLINIC | Age: 46
End: 2021-11-30
Payer: COMMERCIAL

## 2021-11-30 ENCOUNTER — PATIENT MESSAGE (OUTPATIENT)
Dept: FAMILY MEDICINE | Facility: CLINIC | Age: 46
End: 2021-11-30
Payer: COMMERCIAL

## 2021-12-01 ENCOUNTER — OFFICE VISIT (OUTPATIENT)
Dept: OTOLARYNGOLOGY | Facility: CLINIC | Age: 46
End: 2021-12-01
Payer: COMMERCIAL

## 2021-12-01 ENCOUNTER — OFFICE VISIT (OUTPATIENT)
Dept: FAMILY MEDICINE | Facility: CLINIC | Age: 46
End: 2021-12-01
Payer: COMMERCIAL

## 2021-12-01 DIAGNOSIS — F41.9 ANXIETY: Primary | ICD-10-CM

## 2021-12-01 DIAGNOSIS — F41.0 PANIC ATTACK: ICD-10-CM

## 2021-12-01 DIAGNOSIS — H93.A2 PULSATILE TINNITUS, LEFT EAR: ICD-10-CM

## 2021-12-01 DIAGNOSIS — F31.9 BIPOLAR 1 DISORDER: ICD-10-CM

## 2021-12-01 DIAGNOSIS — H61.23 BILATERAL IMPACTED CERUMEN: Primary | ICD-10-CM

## 2021-12-01 PROCEDURE — 69210 EAR CERUMEN REMOVAL: ICD-10-PCS | Mod: S$GLB,,, | Performed by: NURSE PRACTITIONER

## 2021-12-01 PROCEDURE — 99203 OFFICE O/P NEW LOW 30 MIN: CPT | Mod: 25,S$GLB,, | Performed by: NURSE PRACTITIONER

## 2021-12-01 PROCEDURE — 99214 PR OFFICE/OUTPT VISIT, EST, LEVL IV, 30-39 MIN: ICD-10-PCS | Mod: 95,,, | Performed by: INTERNAL MEDICINE

## 2021-12-01 PROCEDURE — 99203 PR OFFICE/OUTPT VISIT, NEW, LEVL III, 30-44 MIN: ICD-10-PCS | Mod: 25,S$GLB,, | Performed by: NURSE PRACTITIONER

## 2021-12-01 PROCEDURE — 69210 REMOVE IMPACTED EAR WAX UNI: CPT | Mod: S$GLB,,, | Performed by: NURSE PRACTITIONER

## 2021-12-01 PROCEDURE — 99214 OFFICE O/P EST MOD 30 MIN: CPT | Mod: 95,,, | Performed by: INTERNAL MEDICINE

## 2021-12-01 RX ORDER — CLONAZEPAM 0.25 MG/1
0.25 TABLET, ORALLY DISINTEGRATING ORAL DAILY PRN
Qty: 14 TABLET | Refills: 0 | Status: SHIPPED | OUTPATIENT
Start: 2021-12-01 | End: 2021-12-08

## 2021-12-07 ENCOUNTER — PATIENT MESSAGE (OUTPATIENT)
Dept: FAMILY MEDICINE | Facility: CLINIC | Age: 46
End: 2021-12-07
Payer: COMMERCIAL

## 2021-12-07 DIAGNOSIS — F41.0 PANIC ATTACK: ICD-10-CM

## 2021-12-07 DIAGNOSIS — F41.9 ANXIETY: ICD-10-CM

## 2021-12-07 RX ORDER — CLONAZEPAM 0.25 MG/1
0.25 TABLET, ORALLY DISINTEGRATING ORAL DAILY PRN
Qty: 14 TABLET | Refills: 0 | Status: CANCELLED | OUTPATIENT
Start: 2021-12-07 | End: 2022-12-07

## 2021-12-08 ENCOUNTER — PATIENT MESSAGE (OUTPATIENT)
Dept: FAMILY MEDICINE | Facility: CLINIC | Age: 46
End: 2021-12-08
Payer: COMMERCIAL

## 2021-12-08 RX ORDER — CLONAZEPAM 0.5 MG/1
TABLET ORAL
Qty: 14 TABLET | Refills: 0 | Status: ON HOLD | OUTPATIENT
Start: 2021-12-08 | End: 2022-11-02 | Stop reason: HOSPADM

## 2021-12-21 ENCOUNTER — TELEPHONE (OUTPATIENT)
Dept: FAMILY MEDICINE | Facility: CLINIC | Age: 46
End: 2021-12-21
Payer: COMMERCIAL

## 2021-12-22 ENCOUNTER — OFFICE VISIT (OUTPATIENT)
Dept: FAMILY MEDICINE | Facility: CLINIC | Age: 46
End: 2021-12-22
Payer: COMMERCIAL

## 2021-12-22 VITALS
OXYGEN SATURATION: 99 % | HEIGHT: 64 IN | BODY MASS INDEX: 45.83 KG/M2 | SYSTOLIC BLOOD PRESSURE: 132 MMHG | HEART RATE: 99 BPM | RESPIRATION RATE: 20 BRPM | WEIGHT: 268.44 LBS | TEMPERATURE: 98 F | DIASTOLIC BLOOD PRESSURE: 80 MMHG

## 2021-12-22 DIAGNOSIS — G89.29 OTHER CHRONIC PAIN: ICD-10-CM

## 2021-12-22 DIAGNOSIS — M19.90 OSTEOARTHRITIS, UNSPECIFIED OSTEOARTHRITIS TYPE, UNSPECIFIED SITE: ICD-10-CM

## 2021-12-22 DIAGNOSIS — M47.816 LUMBAR SPONDYLOSIS: ICD-10-CM

## 2021-12-22 DIAGNOSIS — G89.29 ACUTE EXACERBATION OF CHRONIC LOW BACK PAIN: Primary | ICD-10-CM

## 2021-12-22 DIAGNOSIS — M54.50 CHRONIC MIDLINE LOW BACK PAIN WITHOUT SCIATICA: ICD-10-CM

## 2021-12-22 DIAGNOSIS — M35.05 SJOGREN'S SYNDROME WITH INFLAMMATORY ARTHRITIS: ICD-10-CM

## 2021-12-22 DIAGNOSIS — M51.36 DDD (DEGENERATIVE DISC DISEASE), LUMBAR: ICD-10-CM

## 2021-12-22 DIAGNOSIS — G89.29 CHRONIC MIDLINE LOW BACK PAIN WITHOUT SCIATICA: ICD-10-CM

## 2021-12-22 DIAGNOSIS — I10 ESSENTIAL HYPERTENSION: ICD-10-CM

## 2021-12-22 DIAGNOSIS — M54.50 ACUTE EXACERBATION OF CHRONIC LOW BACK PAIN: Primary | ICD-10-CM

## 2021-12-22 DIAGNOSIS — E78.5 DYSLIPIDEMIA: ICD-10-CM

## 2021-12-22 PROCEDURE — 3008F PR BODY MASS INDEX (BMI) DOCUMENTED: ICD-10-PCS | Mod: CPTII,S$GLB,, | Performed by: NURSE PRACTITIONER

## 2021-12-22 PROCEDURE — 3079F PR MOST RECENT DIASTOLIC BLOOD PRESSURE 80-89 MM HG: ICD-10-PCS | Mod: CPTII,S$GLB,, | Performed by: NURSE PRACTITIONER

## 2021-12-22 PROCEDURE — 99999 PR PBB SHADOW E&M-EST. PATIENT-LVL V: CPT | Mod: PBBFAC,,, | Performed by: NURSE PRACTITIONER

## 2021-12-22 PROCEDURE — 99214 PR OFFICE/OUTPT VISIT, EST, LEVL IV, 30-39 MIN: ICD-10-PCS | Mod: 25,S$GLB,, | Performed by: NURSE PRACTITIONER

## 2021-12-22 PROCEDURE — 99999 PR PBB SHADOW E&M-EST. PATIENT-LVL V: ICD-10-PCS | Mod: PBBFAC,,, | Performed by: NURSE PRACTITIONER

## 2021-12-22 PROCEDURE — 1159F MED LIST DOCD IN RCRD: CPT | Mod: CPTII,S$GLB,, | Performed by: NURSE PRACTITIONER

## 2021-12-22 PROCEDURE — 3008F BODY MASS INDEX DOCD: CPT | Mod: CPTII,S$GLB,, | Performed by: NURSE PRACTITIONER

## 2021-12-22 PROCEDURE — 1159F PR MEDICATION LIST DOCUMENTED IN MEDICAL RECORD: ICD-10-PCS | Mod: CPTII,S$GLB,, | Performed by: NURSE PRACTITIONER

## 2021-12-22 PROCEDURE — 3075F PR MOST RECENT SYSTOLIC BLOOD PRESS GE 130-139MM HG: ICD-10-PCS | Mod: CPTII,S$GLB,, | Performed by: NURSE PRACTITIONER

## 2021-12-22 PROCEDURE — 1160F PR REVIEW ALL MEDS BY PRESCRIBER/CLIN PHARMACIST DOCUMENTED: ICD-10-PCS | Mod: CPTII,S$GLB,, | Performed by: NURSE PRACTITIONER

## 2021-12-22 PROCEDURE — 99214 OFFICE O/P EST MOD 30 MIN: CPT | Mod: 25,S$GLB,, | Performed by: NURSE PRACTITIONER

## 2021-12-22 PROCEDURE — 96372 THER/PROPH/DIAG INJ SC/IM: CPT | Mod: S$GLB,,, | Performed by: NURSE PRACTITIONER

## 2021-12-22 PROCEDURE — 3075F SYST BP GE 130 - 139MM HG: CPT | Mod: CPTII,S$GLB,, | Performed by: NURSE PRACTITIONER

## 2021-12-22 PROCEDURE — 3079F DIAST BP 80-89 MM HG: CPT | Mod: CPTII,S$GLB,, | Performed by: NURSE PRACTITIONER

## 2021-12-22 PROCEDURE — 96372 PR INJECTION,THERAP/PROPH/DIAG2ST, IM OR SUBCUT: ICD-10-PCS | Mod: S$GLB,,, | Performed by: NURSE PRACTITIONER

## 2021-12-22 PROCEDURE — 1160F RVW MEDS BY RX/DR IN RCRD: CPT | Mod: CPTII,S$GLB,, | Performed by: NURSE PRACTITIONER

## 2021-12-22 RX ORDER — DEXAMETHASONE SODIUM PHOSPHATE 4 MG/ML
8 INJECTION, SOLUTION INTRA-ARTICULAR; INTRALESIONAL; INTRAMUSCULAR; INTRAVENOUS; SOFT TISSUE
Status: COMPLETED | OUTPATIENT
Start: 2021-12-22 | End: 2021-12-22

## 2021-12-22 RX ORDER — METHYLPREDNISOLONE 4 MG/1
TABLET ORAL
Qty: 1 EACH | Refills: 0 | Status: SHIPPED | OUTPATIENT
Start: 2021-12-22 | End: 2022-01-12

## 2021-12-22 RX ADMIN — DEXAMETHASONE SODIUM PHOSPHATE 8 MG: 4 INJECTION, SOLUTION INTRA-ARTICULAR; INTRALESIONAL; INTRAMUSCULAR; INTRAVENOUS; SOFT TISSUE at 07:12

## 2022-01-13 ENCOUNTER — PATIENT MESSAGE (OUTPATIENT)
Dept: PSYCHIATRY | Facility: CLINIC | Age: 47
End: 2022-01-13
Payer: COMMERCIAL

## 2022-02-01 ENCOUNTER — OFFICE VISIT (OUTPATIENT)
Dept: RHEUMATOLOGY | Facility: CLINIC | Age: 47
End: 2022-02-01
Payer: COMMERCIAL

## 2022-02-01 ENCOUNTER — PATIENT MESSAGE (OUTPATIENT)
Dept: RHEUMATOLOGY | Facility: CLINIC | Age: 47
End: 2022-02-01

## 2022-02-01 ENCOUNTER — OFFICE VISIT (OUTPATIENT)
Dept: PSYCHIATRY | Facility: CLINIC | Age: 47
End: 2022-02-01
Payer: COMMERCIAL

## 2022-02-01 VITALS
DIASTOLIC BLOOD PRESSURE: 87 MMHG | TEMPERATURE: 98 F | RESPIRATION RATE: 18 BRPM | BODY MASS INDEX: 45.58 KG/M2 | WEIGHT: 267 LBS | SYSTOLIC BLOOD PRESSURE: 132 MMHG | HEIGHT: 64 IN | HEART RATE: 102 BPM | OXYGEN SATURATION: 97 %

## 2022-02-01 VITALS
SYSTOLIC BLOOD PRESSURE: 142 MMHG | BODY MASS INDEX: 45.79 KG/M2 | HEART RATE: 103 BPM | DIASTOLIC BLOOD PRESSURE: 92 MMHG | WEIGHT: 266.75 LBS

## 2022-02-01 DIAGNOSIS — M79.7 FIBROMYALGIA: ICD-10-CM

## 2022-02-01 DIAGNOSIS — F60.5 OBSESSIVE COMPULSIVE PERSONALITY DISORDER: ICD-10-CM

## 2022-02-01 DIAGNOSIS — Z71.89 COUNSELING AND COORDINATION OF CARE: ICD-10-CM

## 2022-02-01 DIAGNOSIS — E66.01 CLASS 3 SEVERE OBESITY WITH BODY MASS INDEX (BMI) OF 45.0 TO 49.9 IN ADULT, UNSPECIFIED OBESITY TYPE, UNSPECIFIED WHETHER SERIOUS COMORBIDITY PRESENT: ICD-10-CM

## 2022-02-01 DIAGNOSIS — F39 MOOD INSOMNIA: ICD-10-CM

## 2022-02-01 DIAGNOSIS — F51.05 MOOD INSOMNIA: ICD-10-CM

## 2022-02-01 DIAGNOSIS — F31.5 BIPOLAR I DISORDER, CURRENT OR MOST RECENT EPISODE DEPRESSED, WITH PSYCHOTIC FEATURES WITH ANXIOUS DISTRESS: Primary | ICD-10-CM

## 2022-02-01 DIAGNOSIS — M35.01 SJOGREN'S SYNDROME WITH KERATOCONJUNCTIVITIS SICCA: Primary | ICD-10-CM

## 2022-02-01 DIAGNOSIS — M15.9 PRIMARY OSTEOARTHRITIS INVOLVING MULTIPLE JOINTS: ICD-10-CM

## 2022-02-01 PROCEDURE — 99214 PR OFFICE/OUTPT VISIT, EST, LEVL IV, 30-39 MIN: ICD-10-PCS | Mod: S$GLB,,, | Performed by: INTERNAL MEDICINE

## 2022-02-01 PROCEDURE — 99999 PR PBB SHADOW E&M-EST. PATIENT-LVL IV: CPT | Mod: PBBFAC,,, | Performed by: INTERNAL MEDICINE

## 2022-02-01 PROCEDURE — 99213 PR OFFICE/OUTPT VISIT, EST, LEVL III, 20-29 MIN: ICD-10-PCS | Mod: S$GLB,,, | Performed by: NURSE PRACTITIONER

## 2022-02-01 PROCEDURE — 99213 OFFICE O/P EST LOW 20 MIN: CPT | Mod: S$GLB,,, | Performed by: NURSE PRACTITIONER

## 2022-02-01 PROCEDURE — 3077F PR MOST RECENT SYSTOLIC BLOOD PRESSURE >= 140 MM HG: ICD-10-PCS | Mod: CPTII,S$GLB,, | Performed by: NURSE PRACTITIONER

## 2022-02-01 PROCEDURE — 3080F DIAST BP >= 90 MM HG: CPT | Mod: CPTII,S$GLB,, | Performed by: NURSE PRACTITIONER

## 2022-02-01 PROCEDURE — 3008F PR BODY MASS INDEX (BMI) DOCUMENTED: ICD-10-PCS | Mod: CPTII,S$GLB,, | Performed by: INTERNAL MEDICINE

## 2022-02-01 PROCEDURE — 3008F BODY MASS INDEX DOCD: CPT | Mod: CPTII,S$GLB,, | Performed by: INTERNAL MEDICINE

## 2022-02-01 PROCEDURE — 99999 PR PBB SHADOW E&M-EST. PATIENT-LVL IV: ICD-10-PCS | Mod: PBBFAC,,, | Performed by: INTERNAL MEDICINE

## 2022-02-01 PROCEDURE — 3079F DIAST BP 80-89 MM HG: CPT | Mod: CPTII,S$GLB,, | Performed by: INTERNAL MEDICINE

## 2022-02-01 PROCEDURE — 3080F PR MOST RECENT DIASTOLIC BLOOD PRESSURE >= 90 MM HG: ICD-10-PCS | Mod: CPTII,S$GLB,, | Performed by: NURSE PRACTITIONER

## 2022-02-01 PROCEDURE — 1160F RVW MEDS BY RX/DR IN RCRD: CPT | Mod: CPTII,S$GLB,, | Performed by: NURSE PRACTITIONER

## 2022-02-01 PROCEDURE — 99999 PR PBB SHADOW E&M-EST. PATIENT-LVL V: ICD-10-PCS | Mod: PBBFAC,,, | Performed by: NURSE PRACTITIONER

## 2022-02-01 PROCEDURE — 90833 PSYTX W PT W E/M 30 MIN: CPT | Mod: S$GLB,,, | Performed by: NURSE PRACTITIONER

## 2022-02-01 PROCEDURE — 3008F PR BODY MASS INDEX (BMI) DOCUMENTED: ICD-10-PCS | Mod: CPTII,S$GLB,, | Performed by: NURSE PRACTITIONER

## 2022-02-01 PROCEDURE — 3075F SYST BP GE 130 - 139MM HG: CPT | Mod: CPTII,S$GLB,, | Performed by: INTERNAL MEDICINE

## 2022-02-01 PROCEDURE — 1160F PR REVIEW ALL MEDS BY PRESCRIBER/CLIN PHARMACIST DOCUMENTED: ICD-10-PCS | Mod: CPTII,S$GLB,, | Performed by: NURSE PRACTITIONER

## 2022-02-01 PROCEDURE — 99999 PR PBB SHADOW E&M-EST. PATIENT-LVL V: CPT | Mod: PBBFAC,,, | Performed by: NURSE PRACTITIONER

## 2022-02-01 PROCEDURE — 1159F MED LIST DOCD IN RCRD: CPT | Mod: CPTII,S$GLB,, | Performed by: NURSE PRACTITIONER

## 2022-02-01 PROCEDURE — 3075F PR MOST RECENT SYSTOLIC BLOOD PRESS GE 130-139MM HG: ICD-10-PCS | Mod: CPTII,S$GLB,, | Performed by: INTERNAL MEDICINE

## 2022-02-01 PROCEDURE — 1159F PR MEDICATION LIST DOCUMENTED IN MEDICAL RECORD: ICD-10-PCS | Mod: CPTII,S$GLB,, | Performed by: NURSE PRACTITIONER

## 2022-02-01 PROCEDURE — 1159F MED LIST DOCD IN RCRD: CPT | Mod: CPTII,S$GLB,, | Performed by: INTERNAL MEDICINE

## 2022-02-01 PROCEDURE — 3079F PR MOST RECENT DIASTOLIC BLOOD PRESSURE 80-89 MM HG: ICD-10-PCS | Mod: CPTII,S$GLB,, | Performed by: INTERNAL MEDICINE

## 2022-02-01 PROCEDURE — 3077F SYST BP >= 140 MM HG: CPT | Mod: CPTII,S$GLB,, | Performed by: NURSE PRACTITIONER

## 2022-02-01 PROCEDURE — 90833 PR PSYCHOTHERAPY W/PATIENT W/E&M, 30 MIN (ADD ON): ICD-10-PCS | Mod: S$GLB,,, | Performed by: NURSE PRACTITIONER

## 2022-02-01 PROCEDURE — 1159F PR MEDICATION LIST DOCUMENTED IN MEDICAL RECORD: ICD-10-PCS | Mod: CPTII,S$GLB,, | Performed by: INTERNAL MEDICINE

## 2022-02-01 PROCEDURE — 99214 OFFICE O/P EST MOD 30 MIN: CPT | Mod: S$GLB,,, | Performed by: INTERNAL MEDICINE

## 2022-02-01 PROCEDURE — 3008F BODY MASS INDEX DOCD: CPT | Mod: CPTII,S$GLB,, | Performed by: NURSE PRACTITIONER

## 2022-02-01 RX ORDER — HYDROXYZINE HYDROCHLORIDE 50 MG/1
50 TABLET, FILM COATED ORAL DAILY PRN
Qty: 90 TABLET | Refills: 3 | Status: SHIPPED | OUTPATIENT
Start: 2022-02-01 | End: 2022-10-26 | Stop reason: SDUPTHER

## 2022-02-01 RX ORDER — AMITRIPTYLINE HYDROCHLORIDE 50 MG/1
50 TABLET, FILM COATED ORAL NIGHTLY
Qty: 90 TABLET | Refills: 3 | Status: SHIPPED | OUTPATIENT
Start: 2022-02-01 | End: 2022-08-06

## 2022-02-01 RX ORDER — HYDROXYCHLOROQUINE SULFATE 200 MG/1
200 TABLET, FILM COATED ORAL 2 TIMES DAILY
Qty: 60 TABLET | Refills: 6 | Status: SHIPPED | OUTPATIENT
Start: 2022-02-01 | End: 2022-05-18 | Stop reason: SDUPTHER

## 2022-02-01 RX ORDER — PREGABALIN 75 MG/1
75 CAPSULE ORAL 2 TIMES DAILY
Qty: 60 CAPSULE | Refills: 5 | Status: SHIPPED | OUTPATIENT
Start: 2022-02-01 | End: 2022-03-22 | Stop reason: SDUPTHER

## 2022-02-01 RX ORDER — PREGABALIN 75 MG/1
75 CAPSULE ORAL 2 TIMES DAILY
Qty: 60 CAPSULE | Refills: 6 | Status: SHIPPED | OUTPATIENT
Start: 2022-02-01 | End: 2022-02-01 | Stop reason: SDUPTHER

## 2022-02-01 RX ORDER — FLUOXETINE HYDROCHLORIDE 40 MG/1
40 CAPSULE ORAL DAILY
Qty: 90 CAPSULE | Refills: 3 | Status: SHIPPED | OUTPATIENT
Start: 2022-02-01 | End: 2022-03-07

## 2022-02-01 RX ORDER — LAMOTRIGINE 25 MG/1
TABLET ORAL
Qty: 60 TABLET | Refills: 11 | Status: SHIPPED | OUTPATIENT
Start: 2022-02-01 | End: 2022-10-26

## 2022-02-01 RX ORDER — IBUPROFEN 800 MG/1
800 TABLET ORAL 3 TIMES DAILY
Qty: 90 TABLET | Refills: 6 | Status: SHIPPED | OUTPATIENT
Start: 2022-02-01 | End: 2022-05-17 | Stop reason: ALTCHOICE

## 2022-02-01 NOTE — PROGRESS NOTES
Outpatient Psychiatry Follow-Up Visit (MD/NP)    2/1/2022    Clinical Status of Patient:  Outpatient (Ambulatory)    Chief Complaint:  Alberto Frye is a 47 y.o. female who presents today for follow-up of mood disorder, anxiety, psychosis and insomnia.  Met with patient.      Last Visit:  8/13/21 Chart and  reviewed.     Interval History and Content of Current Session:  Medications per last visit:   · DC Olanzapine  · Start Elavil 50 mg po q HS  ·       Continue Hydroxyzine HCL 25 mg po BID PRN anxiety/sleep.  ·       Continue Prozac 40 mg po qd mood disorder, anxiety and OCD.     States she got in touch with 24-year old son but having problems with her younger son.  Reports having mood swings and panic attacks.  Reports having bouts of anger.  Sleep improved with Elavil.  Compliant with Prozac and reports effective response to depression and anxiety. Denies SI/HI/AVH. Reports OCD symptoms under control with medications. Will stat Lamictal and consider increasing Prozac next visit.     Psychotherapy:  · Target symptoms: anxiety , mood disorder, psychosis, poor sleep  · Why chosen therapy is appropriate versus another modality: relevant to diagnosis, evidence based practice  · Outcome monitoring methods: self-report, observation  · Therapeutic intervention type: supportive psychotherapy  · Topics discussed/themes: diet, exercise, medication compliance, symptom recognition and improvement  · The patient's response to the intervention is accepting. The patient's progress toward treatment goals is good  · Duration of intervention: 18 minutes.    Review of Systems   PSYCHIATRIC: Pertinant items are noted in the narrative.  CONSTITUTIONAL: No weight gain or loss.   MUSCULOSKELETAL: No pain or stiffness of the joints.  NEUROLOGIC: No weakness, sensory changes, seizures, confusion, memory loss, tremor or other abnormal movements.  ENDOCRINE: No polydipsia or polyuria.  INTEGUMENTARY: No rashes or  "lacerations.  EYES: No exophthalmos, jaundice or blindness.  ENT: No dizziness, tinnitus or hearing loss.  RESPIRATORY: No shortness of breath.  CARDIOVASCULAR: No tachycardia or chest pain.  GASTROINTESTINAL: No nausea, vomiting, pain, constipation or diarrhea.  GENITOURINARY: No frequency, dysuria or sexual dysfunction.  HEMATOLOGIC/LYMPHATIC: No excessive bleeding, prolonged or excessive bleeding after dental extraction/injury.  ALLERGIC/IMMUNOLOGIC: No allergic response to materials, foods or animals at this time.     Past Medical, Family and Social History: The patient's past medical, family and social history have been reviewed and updated as appropriate within the electronic medical record - see encounter notes.    Compliance: yes until she ran out of medication    Side effects: None    Risk Parameters:  Patient reports no suicidal ideation  Patient reports no homicidal ideation  Patient reports no self-injurious behavior  Patient reports no violent behavior    Exam (detailed: at least 9 elements; comprehensive: all 15 elements)   Constitutional  Vitals:  Most recent vital signs, dated greater than 90 days prior to this appointment, were reviewed.   Vitals:    02/01/22 1335 02/01/22 1339   BP: (!) 167/108 (!) 142/92   Pulse: 105 103   Weight: 121 kg (266 lb 12.1 oz)         General:  unremarkable, age appropriate     Musculoskeletal  Muscle Strength/Tone:  no tremor, no tic   Gait & Station:  non-ataxic     Psychiatric  Speech:  no latency; no press   Mood & Affect:  "good."  congruent and appropriate   Thought Process:  normal and logical   Associations:  intact   Thought Content:  normal, no suicidality, no homicidality, delusions, or paranoia,    Insight:  has awareness of illness   Judgement: behavior is adequate to circumstances   Orientation:  grossly intact   Memory: intact for content of interview   Language: grossly intact   Attention Span & Concentration:  able to focus   Fund of Knowledge:  intact " and appropriate to age and level of education     Assessment and Diagnosis   Status/Progress: Based on the examination today, the patient's problem(s) is/are adequately but not ideally controlled.  New problems have not not been presented today. Lack of compliance due to running out of medication is complicating management of the primary condition.  There are no active rule-out diagnoses for this patient at this time.     General Impression: s      ICD-10-CM ICD-9-CM   1. Bipolar I disorder, current or most recent episode depressed, with psychotic features with anxious distress  F31.5 296.54   2. Obsessive compulsive personality disorder  F60.5 301.4   3. Mood insomnia  F51.05 [id number]    F39        Intervention/Counseling/Treatment Plan   · Medication Management: The risks and benefits of medication were discussed with the patient.  · The treatment plan and follow up plan were reviewed with the patient.   · Safety: Call 911 or Crisis Line or go to ER for suicidal ideation, adverse effects of medication or any other emergency  · Continue Elavil 50 mg po q HS  ·       Continue Hydroxyzine HCL 25 mg po BID PRN anxiety/sleep.  ·       Continue Prozac 40 mg po qd mood disorder, anxiety and OCD.  May increase next visit  · Start Lamictal 25 mg po daily x 7 days then increase to 2 tablets (50 mg) daily    Patient agrees with POC.    INSTRUCTIONS  Instructed to call 911 or Crisis Line or go to ER for suicidal ideation, adverse effects of medication or any other emergency. Verbalizes understanding and plan to comply.    Instructed to contact provider either through her [facility] account or by calling [phone] prior to running out of her medication. Verbalizes understanding and plan to comply.    Return to Clinic: 1 month

## 2022-02-01 NOTE — PROGRESS NOTES
RHEUMATOLOGY OUTPATIENT CLINIC NOTE    2/1/2022    Attending Rheumatologist: Ross Hughes  Primary Care Provider: Ian Cespedes MD   Physician Requesting Consultation: No referring provider defined for this encounter.  Chief Complaint/Reason For Consultation:  No chief complaint on file.      Subjective:       HPI  Alberto Frye is a 47 y.o. Black or  female with medical history noted below who comes for evaluation of arthralgias.   She reports knowing of abnormal GIOVANNY at least ten years and when symptoms worsened she saw Rheumatology. Seen by Rheumatology at [facility] in 2018, noted to have +GIOVANNY & SSA, and likely beginning of Primary Sjogrens and OA.   Patient reports that she has been dealing with chronic back pain for many years but over then last 3 months has acutely worsened. She also notes pain in her hands, elbows, shoulder, knees, hips which has been progressing over the last year. Pain is worsened by all movement, improves only when laying still. Minimal relief with meds. Though she reports if she stays still to long then she is stiff. Reports about 20 minutes of morning stiffness. Has noted swelling in her hands. Also c/o numbness and tingling in her feet. Terrible sleep. + Dry eyes and dry mouth, easy bruising due to being on AC, +Raynaud's.  Had DVT post surgery and another DVT in iliac artery in August 2020.    No Alopecia, Oral/Nasal Ulcers, Rash, Photosensitivity, Pleuritis/serositis, LAD, Miscarriages, Skin tightening, SOB.     Today  Patient here for follow up.   Last visit care for SjS and chronic pain continued. She notes recently her left knee was in pain and had steroid injection with improvement. Notes her aches in her body have occurred more often and believes the gabapentin is not helping. Endorses sicca.     Review of Systems   Constitutional: Negative for appetite change, chills, fatigue, fever and unexpected weight change.   HENT: Negative for nasal  congestion, ear discharge, ear pain, hearing loss, mouth sores, nosebleeds, sneezing, sore throat, tinnitus and trouble swallowing.    Eyes: Negative for photophobia, pain, discharge, redness, itching and visual disturbance.   Respiratory: Negative for cough, chest tightness, shortness of breath and wheezing.    Cardiovascular: Negative for chest pain, palpitations and leg swelling.   Gastrointestinal: Negative for abdominal distention, abdominal pain, blood in stool, constipation, diarrhea, nausea and vomiting.   Endocrine: Negative for cold intolerance, heat intolerance, polydipsia, polyphagia and polyuria.   Genitourinary: Negative for difficulty urinating, dyspareunia, dysuria, flank pain, frequency, genital sores, hematuria, menstrual problem, pelvic pain, urgency, vaginal bleeding, vaginal discharge, vaginal pain and vaginal dryness.   Musculoskeletal: Positive for arthralgias, back pain and joint swelling. Negative for gait problem, leg pain, myalgias, neck pain, neck stiffness and joint deformity.   Integumentary:  Negative for pallor and rash.   Neurological: Negative for dizziness, seizures, weakness, light-headedness, numbness and headaches.   Hematological: Negative for adenopathy. Does not bruise/bleed easily.   Psychiatric/Behavioral: Negative for confusion, decreased concentration and sleep disturbance. The patient is not nervous/anxious.    All other systems reviewed and are negative.       Chronic comorbid conditions affecting medical decision making today:  Past Medical History:   Diagnosis Date    Alcohol abuse     stopped heavy drinking about 10 years ago; was drinking 3 glasses of vodka/tequilla,rum/whiskey per day    Allergy Don't remember    Its been some years.    Anxiety     Arthritis 2010    Blood clotting tendency 2008    After a procedure & 2020.    Congestive heart failure 8/11/2020    Depression     Hallucination     Hx of psychiatric care     Hypertension     Immune disorder  2020    Joint pain 2010    Keloid cicatrix Years ago    From childhood scars    Caro     unplanned trips, energy without sleep for 2 days (reading, cleaning), feelings that she can do multiple tasks at one time, feelings of overconfidence at times    Psychiatric problem     Sleep difficulties     Therapy     Withdrawal symptoms, drug or narcotic     racing heart, restlessness     Past Surgical History:   Procedure Laterality Date     lapban surgery  2009    ESOPHAGOGASTRODUODENOSCOPY N/A 6/3/2020    Procedure: EGD (ESOPHAGOGASTRODUODENOSCOPY);  Surgeon: Johan Grover MD;  Location: [facility] (4TH FLR);  Service: Endoscopy;  Laterality: N/A;  covid 6/2-[employer]-tb    HYSTERECTOMY      PHLEBOGRAPHY Left 8/12/2020    Procedure: Venogram, pharmacomechanical thrombectomy;  Surgeon: Miguelangel Goldman MD;  Location: [facility] OR 2ND FLR;  Service: Vascular;  Laterality: Left;  2336.43 mGy  [id number]  17.8 minutes  37 ml of contrast    VENOPLASTY Left 8/12/2020    Procedure: ANGIOPLASTY, VEIN;  Surgeon: Miguelangel Goldman MD;  [facility];  Service: Vascular;  Laterality: Left;     Family History   Problem Relation Age of Onset    Diabetes Mother     Cancer Mother     Hypertension Mother     Cirrhosis Maternal Uncle         ETOH    Celiac disease Neg Hx     Colon cancer Neg Hx     Colon polyps Neg Hx     Crohn's disease Neg Hx     Esophageal cancer Neg Hx     Inflammatory bowel disease Neg Hx     Liver cancer Neg Hx     Liver disease Neg Hx     Rectal cancer Neg Hx     Stomach cancer Neg Hx     Ulcerative colitis Neg Hx      Social History     Substance and Sexual Activity   Alcohol Use Yes    Alcohol/week: 1.0 - 3.0 standard drink    Types: 1 - 3 Glasses of wine per week    Comment: social presently, excessive 10 years ago     Social History     Tobacco Use   Smoking Status Former Smoker    Packs/day: 0.00    Years: 0.00    Pack years: 0.00   Smokeless  Tobacco Never Used   Tobacco Comment    quit in october 2016     Social History     Substance and Sexual Activity   Drug Use Never    Types: Marijuana    Comment: uses THCA weekly       Current Outpatient Medications:     amitriptyline (ELAVIL) 50 MG tablet, Take 1 tablet (50 mg total) by mouth every evening., Disp: 90 tablet, Rfl: 3    atorvastatin (LIPITOR) 10 MG tablet, Take 1 tablet (10 mg total) by mouth once daily., Disp: 90 tablet, Rfl: 3    clonazePAM (KLONOPIN) 0.5 MG tablet, 1/2 to 1 tablet daily as needed, Disp: 14 tablet, Rfl: 0    cloNIDine 0.2 mg/24 hr td ptwk (CATAPRES) 0.2 mg/24 hr, Place 1 patch onto the skin every 7 days., Disp: 4 patch, Rfl: 11    cyanocobalamin (VITAMIN B-12) 1000 MCG tablet, Take 100 mcg by mouth once daily., Disp: , Rfl:     cyclobenzaprine (FLEXERIL) 10 MG tablet, Take 1 tablet (10 mg total) by mouth every evening., Disp: 30 tablet, Rfl: 11    d-mannose Powd, 1 scoop daily, Disp: 100 g, Rfl: 11    ELIQUIS 5 mg Tab, TAKE 1 TABLET BY MOUTH TWICE DAILY (START AFTER FINISHING FIRST PRESCRIPTION), Disp: 60 tablet, Rfl: 0    FLUoxetine 40 MG capsule, Take 1 capsule (40 mg total) by mouth once daily., Disp: 90 capsule, Rfl: 3    fluticasone (FLONASE) 50 mcg/actuation nasal spray, 1 spray by Each Nostril route daily as needed for Rhinitis or Allergies. , Disp: , Rfl:     HYDROcodone-acetaminophen (NORCO) 5-325 mg per tablet, Take 1 tablet by mouth every 6 (six) hours as needed for Pain., Disp: 14 tablet, Rfl: 0    hydrocortisone 1 % cream, Apply topically 2 (two) times daily., Disp: 28.4 g, Rfl: 0    hydrOXYzine (ATARAX) 50 MG tablet, Take 1 tablet (50 mg total) by mouth daily as needed for Anxiety (sleep)., Disp: 90 tablet, Rfl: 3    Lactobacillus rhamnosus GG (CULTURELLE) 10 billion cell capsule, Take 1 capsule by mouth once daily., Disp:  , Rfl:     multivitamin with minerals tablet, Take 1 tablet by mouth once daily., Disp: , Rfl:     triamcinolone acetonide 0.1%  (KENALOG) 0.1 % cream, AAA abdomen bid, Disp: 80 g, Rfl: 0    valsartan-hydrochlorothiazide (DIOVAN-HCT) 160-25 mg per tablet, Take 1 tablet by mouth once daily., Disp: 90 tablet, Rfl: 1    hydrOXYchloroQUINE (PLAQUENIL) 200 mg tablet, Take 1 tablet (200 mg total) by mouth 2 (two) times daily., Disp: 60 tablet, Rfl: 6    ibuprofen (ADVIL,MOTRIN) 800 MG tablet, Take 1 tablet (800 mg total) by mouth 3 (three) times daily., Disp: 90 tablet, Rfl: 6    pregabalin (LYRICA) 75 MG capsule, Take 1 capsule (75 mg total) by mouth 2 (two) times daily., Disp: 60 capsule, Rfl: 6    Current Facility-Administered Medications:     triamcinolone acetonide injection 40 mg, 40 mg, Intradermal, Once, Ama Sylvester MD     Objective:         Vitals:    02/01/22 0806   BP: 132/87   Pulse: 102   Resp: 18   Temp: 97.8 °F (36.6 °C)     Physical Exam   Constitutional: She is oriented to person, place, and time.   HENT:   Head: Normocephalic and atraumatic.   Right Ear: External ear normal.   Left Ear: External ear normal.   Nose: Nose normal.   Mouth/Throat: Oropharynx is clear and moist.   Eyes: Pupils are equal, round, and reactive to light. Conjunctivae are normal.   Cardiovascular: Normal rate and regular rhythm.   Pulmonary/Chest: Effort normal and breath sounds normal.   Abdominal: Soft. Bowel sounds are normal.   Musculoskeletal:      Right shoulder: Normal.      Left shoulder: Normal.      Right elbow: Normal.      Left elbow: Normal.      Right wrist: Normal.      Left wrist: Normal.      Cervical back: Normal range of motion and neck supple.      Right knee: Normal.      Left knee: Normal.   Neurological: She is alert and oriented to person, place, and time.   Skin: No rash noted. No erythema.   Psychiatric: Mood and affect normal.       Right Side Rheumatological Exam     Examination finds the shoulder, elbow, wrist, knee, 1st PIP, 1st MCP, 2nd PIP, 2nd MCP, 3rd PIP, 3rd MCP, 4th PIP, 4th MCP, 5th PIP and 5th MCP  normal.    Left Side Rheumatological Exam     Examination finds the shoulder, elbow, wrist, knee, 1st PIP, 1st MCP, 2nd PIP, 2nd MCP, 3rd PIP, 3rd MCP, 4th PIP, 4th MCP, 5th PIP and 5th MCP normal.      Back/Neck Exam     Comments:  -SLT         Reviewed old and all outside pertinent medical records available.    All lab results personally reviewed and interpreted by me.  Lab Results   Component Value Date    WBC 5.24 04/26/2021    HGB 13.7 04/26/2021    HCT 41.1 04/26/2021    MCV 89 04/26/2021    MCH 29.7 04/26/2021    MCHC 33.3 04/26/2021    RDW 13.6 04/26/2021     04/26/2021    MPV 10.6 04/26/2021       Lab Results   Component Value Date     05/06/2021    K 3.7 05/06/2021     05/06/2021    CO2 28 05/06/2021    GLU 91 05/06/2021    BUN 11 05/06/2021    CALCIUM 9.4 05/06/2021    PROT 8.0 04/26/2021    ALBUMIN 3.9 04/26/2021    BILITOT 0.8 04/26/2021    AST 25 04/26/2021    ALKPHOS 61 04/26/2021    ALT 17 04/26/2021       Lab Results   Component Value Date    COLORU Yellow 05/06/2021    APPEARANCEUA Clear 05/06/2021    SPECGRAV 1.015 05/06/2021    PHUR 6.0 05/06/2021    PROTEINUA Negative 05/06/2021    KETONESU Negative 05/06/2021    LEUKOCYTESUR Negative 05/06/2021    NITRITE Negative 05/06/2021    UROBILINOGEN Negative 05/06/2021       Lab Results   Component Value Date    CRP 5.3 04/26/2021       Lab Results   Component Value Date    SEDRATE 40 (H) 11/19/2020       Lab Results   Component Value Date    RF <10.0 06/14/2016    SEDRATE 40 (H) 11/19/2020       No components found for: 25OHVITDTOT, 88UOWFSB9, 23KCESCA1, METHODNOTE    No results found for: URICACID    No components found for: TSPOTTB    Imaging:  All imaging reviewed and independently interpreted by me.         ASSESSMENT / PLAN:     Alberto N Uday is a 47 y.o. Black or  female with:      1. Sjogren's syndrome with keratoconjunctivitis sicca  - Patients has s/s consistent with SjS, she has sicca, wide spread  pain, arthralgias   - stable   - reinforced artificial tears and oral hydration   - continue HCQ 400mg daily   - trial of Cevillimine did not help   - annual EYE and Dental Exam   - reassurance      2. Chronic Pain likely related to OA   - patient has chronic pain worse in lower back without symptoms of radiculopathy    - she is following with pain management  - recent steroid injection provided relief to knee  - believes she has plateaud with the Gabapentin, asking for alternative  - will switch to Lyrica 75mg BID, SE discussed  - continue rest of meds per Primary team   - continue Flexeril   - wt loss  - Reassurance and Exercise     3. Other specified counseling  - over 10 minutes spent regarding below topics:  - Immunization counseling done.  - Weight loss counseling done.  - Nutrition and exercise counseling.  - Limitation of alcohol consumption.  - Regular exercise:  Aerobic and resistance.  - Medication counseling provided.    4. Morbid Obesity  - would benefit from decreasing at least 10% of body weight.  - recommended goal of losing 1 lb per week.  - consider nutritionist evaluation.      5. DMARD Toxicity Monitoring  - annual EYE exam     Follow up in about 3 months (around 5/1/2022).    Method of contact with patient concerns: [facility]    Disclaimer:  This note is prepared using voice recognition software and as such is likely to have errors and has not been proof read. Please contact me for questions.     Time spent: 30 minutes in face to face discussion concerning diagnosis, prognosis, review of lab and test results, benefits of treatment as well as management of disease, counseling of patient and coordination of care between various health care providers.  Greater than half the time spent was used for coordination of care and counseling of patient.    Ross Hughes M.D.  Rheumatology Department   [facility]

## 2022-02-14 ENCOUNTER — PATIENT MESSAGE (OUTPATIENT)
Dept: RHEUMATOLOGY | Facility: CLINIC | Age: 47
End: 2022-02-14
Payer: COMMERCIAL

## 2022-02-14 ENCOUNTER — PATIENT MESSAGE (OUTPATIENT)
Dept: FAMILY MEDICINE | Facility: CLINIC | Age: 47
End: 2022-02-14
Payer: COMMERCIAL

## 2022-02-14 ENCOUNTER — PATIENT MESSAGE (OUTPATIENT)
Dept: PAIN MEDICINE | Facility: CLINIC | Age: 47
End: 2022-02-14
Payer: COMMERCIAL

## 2022-02-15 NOTE — TELEPHONE ENCOUNTER
Patient states she just wanted to inform pcp but has sent the same message to her rheumatologist and pain management . She will keep pcp informed, and will keep alarm clock in arm reach to prevent this from happening  again

## 2022-02-16 ENCOUNTER — PATIENT MESSAGE (OUTPATIENT)
Dept: VASCULAR SURGERY | Facility: CLINIC | Age: 47
End: 2022-02-16
Payer: COMMERCIAL

## 2022-02-17 DIAGNOSIS — I87.303 VENOUS HYPERTENSION OF BOTH LOWER EXTREMITIES: Primary | ICD-10-CM

## 2022-02-23 DIAGNOSIS — D84.9 IMMUNOSUPPRESSED STATUS: ICD-10-CM

## 2022-02-25 ENCOUNTER — PATIENT MESSAGE (OUTPATIENT)
Dept: RHEUMATOLOGY | Facility: CLINIC | Age: 47
End: 2022-02-25
Payer: COMMERCIAL

## 2022-02-25 RX ORDER — METHYLPREDNISOLONE 4 MG/1
TABLET ORAL
Qty: 1 EACH | Refills: 0 | Status: SHIPPED | OUTPATIENT
Start: 2022-02-25 | End: 2022-06-21

## 2022-02-28 ENCOUNTER — OFFICE VISIT (OUTPATIENT)
Dept: FAMILY MEDICINE | Facility: CLINIC | Age: 47
End: 2022-02-28
Payer: COMMERCIAL

## 2022-02-28 VITALS
TEMPERATURE: 98 F | SYSTOLIC BLOOD PRESSURE: 138 MMHG | HEIGHT: 64 IN | OXYGEN SATURATION: 97 % | BODY MASS INDEX: 45.79 KG/M2 | HEART RATE: 97 BPM | DIASTOLIC BLOOD PRESSURE: 98 MMHG

## 2022-02-28 DIAGNOSIS — S83.91XA SPRAIN OF RIGHT KNEE, UNSPECIFIED LIGAMENT, INITIAL ENCOUNTER: Primary | ICD-10-CM

## 2022-02-28 PROCEDURE — 3075F SYST BP GE 130 - 139MM HG: CPT | Mod: CPTII,S$GLB,, | Performed by: INTERNAL MEDICINE

## 2022-02-28 PROCEDURE — 3080F DIAST BP >= 90 MM HG: CPT | Mod: CPTII,S$GLB,, | Performed by: INTERNAL MEDICINE

## 2022-02-28 PROCEDURE — 99999 PR PBB SHADOW E&M-EST. PATIENT-LVL V: ICD-10-PCS | Mod: PBBFAC,,, | Performed by: INTERNAL MEDICINE

## 2022-02-28 PROCEDURE — 1159F PR MEDICATION LIST DOCUMENTED IN MEDICAL RECORD: ICD-10-PCS | Mod: CPTII,S$GLB,, | Performed by: INTERNAL MEDICINE

## 2022-02-28 PROCEDURE — 1159F MED LIST DOCD IN RCRD: CPT | Mod: CPTII,S$GLB,, | Performed by: INTERNAL MEDICINE

## 2022-02-28 PROCEDURE — 1160F PR REVIEW ALL MEDS BY PRESCRIBER/CLIN PHARMACIST DOCUMENTED: ICD-10-PCS | Mod: CPTII,S$GLB,, | Performed by: INTERNAL MEDICINE

## 2022-02-28 PROCEDURE — 3075F PR MOST RECENT SYSTOLIC BLOOD PRESS GE 130-139MM HG: ICD-10-PCS | Mod: CPTII,S$GLB,, | Performed by: INTERNAL MEDICINE

## 2022-02-28 PROCEDURE — 1160F RVW MEDS BY RX/DR IN RCRD: CPT | Mod: CPTII,S$GLB,, | Performed by: INTERNAL MEDICINE

## 2022-02-28 PROCEDURE — 3008F PR BODY MASS INDEX (BMI) DOCUMENTED: ICD-10-PCS | Mod: CPTII,S$GLB,, | Performed by: INTERNAL MEDICINE

## 2022-02-28 PROCEDURE — 99214 PR OFFICE/OUTPT VISIT, EST, LEVL IV, 30-39 MIN: ICD-10-PCS | Mod: S$GLB,,, | Performed by: INTERNAL MEDICINE

## 2022-02-28 PROCEDURE — 3008F BODY MASS INDEX DOCD: CPT | Mod: CPTII,S$GLB,, | Performed by: INTERNAL MEDICINE

## 2022-02-28 PROCEDURE — 99214 OFFICE O/P EST MOD 30 MIN: CPT | Mod: S$GLB,,, | Performed by: INTERNAL MEDICINE

## 2022-02-28 PROCEDURE — 3080F PR MOST RECENT DIASTOLIC BLOOD PRESSURE >= 90 MM HG: ICD-10-PCS | Mod: CPTII,S$GLB,, | Performed by: INTERNAL MEDICINE

## 2022-02-28 PROCEDURE — 99999 PR PBB SHADOW E&M-EST. PATIENT-LVL V: CPT | Mod: PBBFAC,,, | Performed by: INTERNAL MEDICINE

## 2022-02-28 RX ORDER — DICLOFENAC SODIUM 10 MG/G
GEL TOPICAL
Qty: 100 G | Refills: 1 | Status: SHIPPED | OUTPATIENT
Start: 2022-02-28 | End: 2022-10-15 | Stop reason: SDUPTHER

## 2022-02-28 RX ORDER — HYDROCODONE BITARTRATE AND ACETAMINOPHEN 5; 325 MG/1; MG/1
1 TABLET ORAL EVERY 8 HOURS PRN
Qty: 21 TABLET | Refills: 0 | Status: SHIPPED | OUTPATIENT
Start: 2022-02-28 | End: 2022-03-07

## 2022-02-28 NOTE — LETTER
February 28, 2022      [facility]  [address]  [place] [address]  Phone: [phone]  Fax: [phone]       Patient: Alberto Frye  YOB: 1975  Date of Visit: 2/28/22      To Whom It May Concern:    Alberto Frye  was at [facility] on 2/28/22. She is out of work today and may return on Thursday March 3, 2022. If you have any questions or concerns, or if I can be of further assistance, please do not hesitate to contact me.      Sincerely,        Ian Cespedes MD

## 2022-02-28 NOTE — PROGRESS NOTES
This note was created by combination of typed  and M-Modal dictation.  Transcription errors may be present.  If there are any questions, please contact me.    Assessment and Plan:   Sprain of right knee, unspecified ligament, initial encounter  -feels like she has some modest improvement compared to initial event but still exquisite pain.  Unable to adequately perform maneuvers to elicit whether this is meniscus, MCL, or patella.  She is already on a Medrol Dosepak from Rheumatology for flare of her Sjogren's.  Refilled the hydrocodone to take t.i.d. p.r.n.  Gave her pair of crutches  Cold packs, recommended range of motion exercises  She was hoping to be back at work in 2 days.  I wrote out until Thursday.  If she continues to have significant pain will have her see Orthopedics.  -     diclofenac sodium (VOLTAREN) 1 % Gel; Apply the gel (4 g) to the affected area 4 times daily. Do not apply more than 16 g daily to any one affected joint  Dispense: 100 g; Refill: 1  -     HYDROcodone-acetaminophen (NORCO) 5-325 mg per tablet; Take 1 tablet by mouth every 8 (eight) hours as needed for Pain.  Dispense: 21 tablet; Refill: 0          There are no discontinued medications.    meds sent this encounter:       Follow Up: No follow-ups on file.    Subjective:     Chief Complaint   Patient presents with    Leg Pain    Edema       STEFFANY Dominguez is a 47 y.o. female, last appointment with this clinic was 12/22/2021.  Social History     Tobacco Use    Smoking status: Former Smoker     Packs/day: 0.00     Years: 0.00     Pack years: 0.00    Smokeless tobacco: Never Used    Tobacco comment: quit in october 2016   Substance Use Topics    Alcohol use: Yes     Alcohol/week: 1.0 - 3.0 standard drink     Types: 1 - 3 Glasses of wine per week     Comment: social presently, excessive 10 years ago      Social History     Occupational History    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[employer]      Social History      Social History Narrative    Has 3 healthy grown sons (1990, 1993, 1998) Lives alone.    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   in 2010.    Has Male partner since 2014 who is currently disabled.       No LMP recorded. Patient has had a hysterectomy.    Last visit with me in early December  Anxiety with panic attacks with underlying bipolar.  Small prescription for Klonopin.  Hyperlipidemia with incidental vascular calcifications on mammogram with elevated HS CRP.  Started on atorvastatin low dose.  Abnormal TSH repeat labs 6 months  Hypertension-hx of torsemide  7/2020 ER put her on clonidine.  And then placed on amlodipine  But did not find it controlled  So started on diovan hct  And stayed on clonidine throughout.    She saw Rheumatology in consult to/1.  New rheumatologist, was previously evaluated back in 2018 with likely beginning Sjogren's and osteoarthritis.  Widespread pain and arthralgias and sicca symptoms.  Trial of cevillimine without relief.  On hydroxychloroquine.  Gabapentin without relief, changed to Lyrica.  Flexeril.    She saw Psychiatry 2/1.  Bipolar.  At last visit was started on Elavil for sleep.  On Prozac.  With plans to start Lamictal.  Started on Lamictal.    Message from pt 2/14:  I was startled out my sleep when my alarm started ringing and I jumped...well I tried to reach the phone. I lunged for the phone and slipped on my house slipper. I was trying to break my fall and I hit my head on the wall and landed on my right leg. It literally took me 2-3 minutes  to get up. My body is aching. Yesterday it was really hard for me to work yesterday. I'm unsure what's going on with my body. My back, knees and hips hurts. This happened 2/11/22.    She sent a message to rheumatologist 2/25 complaining swelling of the hands calves ankles and hands.  Was seen given a prescription for Medrol    This past Saturday -  getting something out of the car at the parade, thinks she twisted wrong, was in a lot of pain  Went to [facility] for her granddaughter, and thinks that it worsened it  Hx of chronic pain in the left leg but her issue is with the right knee.  Medial knee. And the anterior knee  Offloading weight on the left knee  Has been taking the hydrocodone I prescribe for the back, estimates every 4 hours.  Compared to the initial event she feels like there is some modest improvement but she is still in a lot of pain    Answers for HPI/ROS submitted by the patient on 2/28/2022  Chronicity: chronic  Onset: more than 1 year ago  Frequency: daily  Progression since onset: waxing and waning  Pain location: sacro-iliac  Pain quality: shooting  Radiates to: right knee  Pain - numeric: 10/10  Pain is: the same all the time  Aggravated by: bending, position, standing, stress  Stiffness is present: in the morning  abdominal pain: No  bladder incontinence: No  bowel incontinence: No  chest pain: Yes  dysuria: No  fever: No  headaches: No  leg pain: Yes  numbness: Yes  paresis: No  paresthesias: Yes  pelvic pain: No  perianal numbness: No  tingling: No  weakness: Yes  weight loss: No  genital pain: No  hematuria: No  Pain severity: severe  Improvement on treatment: mild        Patient Care Team:  Ian Cespedes MD as PCP - General (Internal Medicine)  Funmilayo Roblero LPN as Licensed Practical Nurse  Chandler Bates MD as Consulting Physician (Cardiology)  Kari Tuttle MD as Consulting Physician (Hematology and Oncology)  Miguelangel Goldman MD as Consulting Physician (Vascular Surgery)  Johan Grover MD as Consulting Physician (Gastroenterology)  TIM Pierce MD as Consulting Physician (Urology)  Becca Paredes MD (Obstetrics and Gynecology)  Ross Hughes MD as Resident (Rheumatology)  Yeison Gibson III, NP as Nurse Practitioner (Psychiatry)    Patient Active Problem List    Diagnosis Date  Noted    Mood insomnia 02/01/2022    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    Bipolar I disorder, current or most recent episode depressed, with psychotic features with anxious distress 12/01/2021    Dyslipidemia 11/17/2021 9/2021 mammo incidental vascular calcifications  11/2021 low dost atorvastatin      Sjogren's syndrome with inflammatory arthritis 10/13/2021    Refractive error 02/19/2021    Long-term use of Plaquenil 02/19/2021    Pain in both hands 11/16/2020    Lumbar spondylosis 11/16/2020    DDD (degenerative disc disease), lumbar 11/16/2020    Recurrent deep vein thrombosis (DVT) with hx of IVC filter; indefinite anticoagulation 09/21/2020 08/11/2020 DVT left leg underwent pharmacomechanical thrombectomy 8/12 for iliofemoral DVT.  had had a prior DVT 10 years prior in the setting of surgery for hysterectomy at the time which she had IVC filter placed.      Recurrent UTI 09/21/2020 08/28/2020 cystoscopy normal  08/13/2020 renal ultrasound normal  06/02/2020 CT abdomen pelvis unremarkable.  IVC present.      Screening for colon cancer 11/2019 colonoscopy hemorrhoids and diverticulosis; [facility] 09/21/2020 11/2019 colonoscopy hemorrhoids and diverticulosis.      Chronic anticoagulation 09/21/2020    Chronic midline low back pain without sciatica 09/21/2020 1/2017 XR L spine: 5 views lumbar spine.  Vena cava filter right common L2-L3, and lap band apparatus left upper quadrant.  Mild levoscoliosis, lordosis lumbar spine.  Elevation of right pelvis.  Facet arthropathy L4-L5 levels.  No fracture subluxation.      Neuropathy 08/11/2020    Abdominal pain 06/03/2020 06/03/2020 EGD normal duodenum.  Erythematous mucosa of the gastric body and antrum and pre-pyloric region.  2 cm hiatal hernia.  Path negative.  No celiac.      Essential hypertension 01/22/2016 08/11/2020 TTE normal LV systolic function LVEF 60%.  Normal diastolic function.  Normal RV  systolic function.  PA pressure 23.  History of empiric treatment for diverticulitis 04/22/2020, no imaging done at that time.    7/2020 ER put her on clonidine.  And then placed on amlodipine  But did not find it controlled  So started on diovan hct  And stayed on clonidine throughout.      Seasonal allergies 01/22/2016    Anxiety 01/22/2016       PAST MEDICAL PROBLEMS, PAST SURGICAL HISTORY: please see relevant portions of the electronic medical record    ALLERGIES AND MEDICATIONS: updated and reviewed.  Review of patient's allergies indicates:   Allergen Reactions    Morphine Itching and Hallucinations    Pcn [penicillins] Other (See Comments)     Was told from childhood she couldn't take it    Sulfa (sulfonamide antibiotics) Nausea And Vomiting    Latex, natural rubber Rash       Medication List with Changes/Refills   Current Medications    AMITRIPTYLINE (ELAVIL) 50 MG TABLET    Take 1 tablet (50 mg total) by mouth every evening.    ATORVASTATIN (LIPITOR) 10 MG TABLET    Take 1 tablet (10 mg total) by mouth once daily.    CLONAZEPAM (KLONOPIN) 0.5 MG TABLET    1/2 to 1 tablet daily as needed    CLONIDINE 0.2 MG/24 HR TD PTWK (CATAPRES) 0.2 MG/24 HR    Place 1 patch onto the skin every 7 days.    CYANOCOBALAMIN (VITAMIN B-12) 1000 MCG TABLET    Take 100 mcg by mouth once daily.    CYCLOBENZAPRINE (FLEXERIL) 10 MG TABLET    Take 1 tablet (10 mg total) by mouth every evening.    D-MANNOSE POWD    1 scoop daily    ELIQUIS 5 MG TAB    TAKE 1 TABLET BY MOUTH TWICE DAILY START  AFTER  FINISHING  FIRST  PRESCRIPTION    FLUOXETINE 40 MG CAPSULE    Take 1 capsule (40 mg total) by mouth once daily.    FLUTICASONE (FLONASE) 50 MCG/ACTUATION NASAL SPRAY    1 spray by Each Nostril route daily as needed for Rhinitis or Allergies.     HYDROCODONE-ACETAMINOPHEN (NORCO) 5-325 MG PER TABLET    Take 1 tablet by mouth every 6 (six) hours as needed for Pain.    HYDROCORTISONE 1 % CREAM    Apply topically 2 (two) times daily.     "HYDROXYCHLOROQUINE (PLAQUENIL) 200 MG TABLET    Take 1 tablet (200 mg total) by mouth 2 (two) times daily.    HYDROXYZINE (ATARAX) 50 MG TABLET    Take 1 tablet (50 mg total) by mouth daily as needed for Anxiety (sleep).    IBUPROFEN (ADVIL,MOTRIN) 800 MG TABLET    Take 1 tablet (800 mg total) by mouth 3 (three) times daily.    LACTOBACILLUS RHAMNOSUS GG (CULTURELLE) 10 BILLION CELL CAPSULE    Take 1 capsule by mouth once daily.    LAMOTRIGINE (LAMICTAL) 25 MG TABLET    Take 1 tablet (25 mg) by mouth daily x 7 days then increase to 2 tablets (50 mg) daily    METHYLPREDNISOLONE (MEDROL DOSEPACK) 4 MG TABLET    use as directed    MULTIVITAMIN WITH MINERALS TABLET    Take 1 tablet by mouth once daily.    PREGABALIN (LYRICA) 75 MG CAPSULE    Take 1 capsule (75 mg total) by mouth 2 (two) times daily.    TRIAMCINOLONE ACETONIDE 0.1% (KENALOG) 0.1 % CREAM    AAA abdomen bid    VALSARTAN-HYDROCHLOROTHIAZIDE (DIOVAN-HCT) 160-25 MG PER TABLET    Take 1 tablet by mouth once daily.      Review of Systems   Constitutional: Negative for fever and weight loss.   Cardiovascular: Positive for chest pain.   Gastrointestinal: Negative for abdominal pain.   Genitourinary: Negative for dysuria and hematuria.   Musculoskeletal: Positive for back pain.   Neurological: Positive for weakness. Negative for tingling and headaches.       Objective:   Physical Exam   Vitals:    02/28/22 1327   BP: (!) 138/98   BP Location: Left arm   Patient Position: Sitting   BP Method: Large (Manual)   Pulse: 97   Temp: 98.1 °F (36.7 °C)   TempSrc: Oral   SpO2: 97%   Height: 5' 4" (1.626 m)    Body mass index is 45.79 kg/m².      Height: 5' 4" (162.6 cm)     Physical Exam  Constitutional:       General: She is in acute distress.      Appearance: She is well-developed.      Comments: In pain with movements   Cardiovascular:      Rate and Rhythm: Regular rhythm.      Heart sounds: No murmur heard.  Pulmonary:      Effort: Pulmonary effort is normal. "   Musculoskeletal:      Right lower leg: No edema.      Left lower leg: No edema.      Comments: Ambulating with significant pain.  Significant pain with transferring/stepping up onto the exam table  The right knee is tender on palpation to the anterior and medial, exquisitely tender.  No effusion that I could appreciate.  Exam sensitivity is limited by her pain.  Able to flex to about 70° but no further because of pain  I will feel any obvious dislocation but again sensitivity is limited by severe pain.  Unable to adequately perform Que, valgus or varus stressing   Skin:     General: Skin is warm and dry.   Neurological:      Mental Status: She is alert and oriented to person, place, and time.      Coordination: Coordination normal.   Psychiatric:         Behavior: Behavior normal.         Thought Content: Thought content normal.

## 2022-03-02 ENCOUNTER — PATIENT MESSAGE (OUTPATIENT)
Dept: FAMILY MEDICINE | Facility: CLINIC | Age: 47
End: 2022-03-02
Payer: COMMERCIAL

## 2022-03-02 NOTE — LETTER
March 2, 2022      [facility]  [address] [facility]  [place] [address]  Phone: [phone]  Fax: [phone]       Patient: Alberto Frye  YOB: 1975  Date of Visit: 2/28/2022    To Whom It May Concern:    Alberto Frye  was at [facility] on 2/28/2022. She may return to work/school on Monday March 7. If you have any questions or concerns, or if I can be of further assistance, please do not hesitate to contact me.      Sincerely,        Ian Cespedes MD

## 2022-03-05 ENCOUNTER — PATIENT MESSAGE (OUTPATIENT)
Dept: FAMILY MEDICINE | Facility: CLINIC | Age: 47
End: 2022-03-05
Payer: COMMERCIAL

## 2022-03-05 DIAGNOSIS — S83.91XA SPRAIN OF RIGHT KNEE, UNSPECIFIED LIGAMENT, INITIAL ENCOUNTER: Primary | ICD-10-CM

## 2022-03-07 ENCOUNTER — PATIENT MESSAGE (OUTPATIENT)
Dept: FAMILY MEDICINE | Facility: CLINIC | Age: 47
End: 2022-03-07
Payer: COMMERCIAL

## 2022-03-09 ENCOUNTER — HOSPITAL ENCOUNTER (OUTPATIENT)
Dept: CARDIOLOGY | Facility: HOSPITAL | Age: 47
Discharge: HOME | End: 2022-03-09
Attending: SURGERY
Payer: COMMERCIAL

## 2022-03-09 DIAGNOSIS — M25.561 RIGHT KNEE PAIN, UNSPECIFIED CHRONICITY: Primary | ICD-10-CM

## 2022-03-09 DIAGNOSIS — I87.303 VENOUS HYPERTENSION OF BOTH LOWER EXTREMITIES: ICD-10-CM

## 2022-03-09 PROCEDURE — 93970 EXTREMITY STUDY: CPT | Mod: TC

## 2022-03-09 PROCEDURE — 93970 EXTREMITY STUDY: CPT | Mod: 26,,, | Performed by: SURGERY

## 2022-03-09 PROCEDURE — 93970 CV US LOWER VENOUS INSUFFICIENCY BILATERAL (CUPID ONLY): ICD-10-PCS | Mod: 26,,, | Performed by: SURGERY

## 2022-03-10 ENCOUNTER — PATIENT MESSAGE (OUTPATIENT)
Dept: FAMILY MEDICINE | Facility: CLINIC | Age: 47
End: 2022-03-10
Payer: COMMERCIAL

## 2022-03-10 LAB
LEFT GREAT SAPHENOUS DISTAL THIGH DIA: 0.3 CM
LEFT GREAT SAPHENOUS JUNCTION DIA: 0.7 CM
LEFT GREAT SAPHENOUS KNEE DIA: 0.3 CM
LEFT GREAT SAPHENOUS MIDDLE THIGH DIA: 0.3 CM
LEFT GREAT SAPHENOUS PROXIMAL CALF DIA: 0.2 CM
LEFT GREAT SAPHENOUS PROXIMAL CALF REFLUX: 565 MS
LEFT SMALL SAPHENOUS KNEE DIA: 0.4 CM
LEFT SMALL SAPHENOUS SPJ DIA: 0.5 CM
RIGHT GREAT SAPHENOUS DISTAL THIGH DIA: 0.3 CM
RIGHT GREAT SAPHENOUS JUNCTION DIA: 0.9 CM
RIGHT GREAT SAPHENOUS KNEE DIA: 0.3 CM
RIGHT GREAT SAPHENOUS MIDDLE THIGH DIA: 0.4 CM
RIGHT GREAT SAPHENOUS PROXIMAL CALF DIA: 0.3 CM
RIGHT SMALL SAPHENOUS KNEE DIA: 0.4 CM
RIGHT SMALL SAPHENOUS SPJ DIA: 0.3 CM

## 2022-03-10 NOTE — TELEPHONE ENCOUNTER
Patient wants to know about whats going on in the imaging and how long does it take to get her imaging back Thank you please advise .

## 2022-03-11 ENCOUNTER — PATIENT MESSAGE (OUTPATIENT)
Dept: RHEUMATOLOGY | Facility: CLINIC | Age: 47
End: 2022-03-11
Payer: COMMERCIAL

## 2022-03-11 DIAGNOSIS — M35.01 SJOGREN SYNDROME WITH KERATOCONJUNCTIVITIS: Primary | ICD-10-CM

## 2022-03-11 RX ORDER — METHYLPREDNISOLONE 4 MG/1
TABLET ORAL
Qty: 1 EACH | Refills: 0 | Status: SHIPPED | OUTPATIENT
Start: 2022-03-11 | End: 2022-04-05 | Stop reason: ALTCHOICE

## 2022-03-14 ENCOUNTER — OFFICE VISIT (OUTPATIENT)
Dept: ORTHOPEDICS | Facility: CLINIC | Age: 47
End: 2022-03-14
Attending: ORTHOPAEDIC SURGERY
Payer: COMMERCIAL

## 2022-03-14 ENCOUNTER — PATIENT MESSAGE (OUTPATIENT)
Dept: ORTHOPEDICS | Facility: CLINIC | Age: 47
End: 2022-03-14

## 2022-03-14 VITALS
DIASTOLIC BLOOD PRESSURE: 87 MMHG | HEART RATE: 98 BPM | RESPIRATION RATE: 18 BRPM | BODY MASS INDEX: 45.41 KG/M2 | WEIGHT: 266 LBS | SYSTOLIC BLOOD PRESSURE: 120 MMHG | HEIGHT: 64 IN | OXYGEN SATURATION: 99 %

## 2022-03-14 DIAGNOSIS — M17.11 PRIMARY OSTEOARTHRITIS OF RIGHT KNEE: Primary | ICD-10-CM

## 2022-03-14 DIAGNOSIS — S83.91XA SPRAIN OF RIGHT KNEE, UNSPECIFIED LIGAMENT, INITIAL ENCOUNTER: ICD-10-CM

## 2022-03-14 PROCEDURE — 99999 PR PBB SHADOW E&M-EST. PATIENT-LVL V: CPT | Mod: PBBFAC,,, | Performed by: ORTHOPAEDIC SURGERY

## 2022-03-14 PROCEDURE — 3074F PR MOST RECENT SYSTOLIC BLOOD PRESSURE < 130 MM HG: ICD-10-PCS | Mod: CPTII,S$GLB,, | Performed by: ORTHOPAEDIC SURGERY

## 2022-03-14 PROCEDURE — 99203 PR OFFICE/OUTPT VISIT, NEW, LEVL III, 30-44 MIN: ICD-10-PCS | Mod: 25,S$GLB,, | Performed by: ORTHOPAEDIC SURGERY

## 2022-03-14 PROCEDURE — 3079F PR MOST RECENT DIASTOLIC BLOOD PRESSURE 80-89 MM HG: ICD-10-PCS | Mod: CPTII,S$GLB,, | Performed by: ORTHOPAEDIC SURGERY

## 2022-03-14 PROCEDURE — 1159F PR MEDICATION LIST DOCUMENTED IN MEDICAL RECORD: ICD-10-PCS | Mod: CPTII,S$GLB,, | Performed by: ORTHOPAEDIC SURGERY

## 2022-03-14 PROCEDURE — 20610 DRAIN/INJ JOINT/BURSA W/O US: CPT | Mod: 50,S$GLB,, | Performed by: ORTHOPAEDIC SURGERY

## 2022-03-14 PROCEDURE — 20610 LARGE JOINT ASPIRATION/INJECTION: BILATERAL KNEE: ICD-10-PCS | Mod: 50,S$GLB,, | Performed by: ORTHOPAEDIC SURGERY

## 2022-03-14 PROCEDURE — 99203 OFFICE O/P NEW LOW 30 MIN: CPT | Mod: 25,S$GLB,, | Performed by: ORTHOPAEDIC SURGERY

## 2022-03-14 PROCEDURE — 3074F SYST BP LT 130 MM HG: CPT | Mod: CPTII,S$GLB,, | Performed by: ORTHOPAEDIC SURGERY

## 2022-03-14 PROCEDURE — 99999 PR PBB SHADOW E&M-EST. PATIENT-LVL V: ICD-10-PCS | Mod: PBBFAC,,, | Performed by: ORTHOPAEDIC SURGERY

## 2022-03-14 PROCEDURE — 1159F MED LIST DOCD IN RCRD: CPT | Mod: CPTII,S$GLB,, | Performed by: ORTHOPAEDIC SURGERY

## 2022-03-14 PROCEDURE — 3079F DIAST BP 80-89 MM HG: CPT | Mod: CPTII,S$GLB,, | Performed by: ORTHOPAEDIC SURGERY

## 2022-03-14 PROCEDURE — 3008F PR BODY MASS INDEX (BMI) DOCUMENTED: ICD-10-PCS | Mod: CPTII,S$GLB,, | Performed by: ORTHOPAEDIC SURGERY

## 2022-03-14 PROCEDURE — 3008F BODY MASS INDEX DOCD: CPT | Mod: CPTII,S$GLB,, | Performed by: ORTHOPAEDIC SURGERY

## 2022-03-14 RX ORDER — TRIAMCINOLONE ACETONIDE 40 MG/ML
40 INJECTION, SUSPENSION INTRA-ARTICULAR; INTRAMUSCULAR
Status: DISCONTINUED | OUTPATIENT
Start: 2022-03-14 | End: 2022-03-14 | Stop reason: HOSPADM

## 2022-03-14 RX ADMIN — TRIAMCINOLONE ACETONIDE 40 MG: 40 INJECTION, SUSPENSION INTRA-ARTICULAR; INTRAMUSCULAR at 07:03

## 2022-03-14 NOTE — PROGRESS NOTES
NEW PATIENT ORTHOPAEDIC: Knee    PRIMARY CARE PHYSICIAN: Ian Cespedes MD   REFERRING PROVIDER: Ian Cespedes MD  [address]  [place] [id number]     ASSESSMENT & PLAN:    Impression:  Right Knee Mild Osteoarthritis, Primary    Right Knee Meniscus Tear, acute  Right knee MCL sprain    Follow Up Plan: PRN    Injection:     Alberto Frye has physical exam evidence of above and wishes to pursue an injection. We discussed alternative non operative modalities including physical therapy, anti-inflammatories, bracing, maintenance of appropriate weight, rest, ambulatory devices. We discussed the risk, benefits, and expectations regarding injection. They have elected to proceed with injection. Should injections fail next step would be MRI. See procedure note for billing purposes.     Non operative care:    Alberto Frye has physical exam evidence of above and wishes to pursue an non-operative care. I am recommending the following:  Intra-articular injection and brace.  The patient is suffering from multifactorial issue.  She either has an OA exacerbation or an acute meniscal injury.  She reports some popping in her knee which is painful.  The pains primarily along the medial joint line.  She also has a more proximal pain in the medial femoral condyle and MCL origin.  For the intra-articular pathology I think she would benefit from an injection as she was recently placed on a steroid pack without much improvement in her pain.  For the MCL strain I think she would benefit from a varus valgus support brace to use when ambulating.  Should these measures help alleviate her pain over the next month then wonderful.  Should they fail to alleviate her pain next step would be an MRI of her knee for further evaluation.    The patient has been ordered:  Brace (DME)  Office Intraarticular injection    CONSULTS:   None    ACTIVE PROBLEM LIST  Patient Active Problem List   Diagnosis    Essential hypertension     Seasonal allergies    Anxiety    Abdominal pain    Neuropathy    Recurrent deep vein thrombosis (DVT) with hx of IVC filter; indefinite anticoagulation    Recurrent UTI    Screening for colon cancer 11/2019 colonoscopy hemorrhoids and diverticulosis; [facility]    Chronic anticoagulation    Chronic midline low back pain without sciatica    Pain in both hands    Lumbar spondylosis    DDD (degenerative disc disease), lumbar    Refractive error    Long-term use of Plaquenil    Sjogren's syndrome with inflammatory arthritis    Dyslipidemia    Bipolar I disorder, current or most recent episode depressed, with psychotic features with anxious distress    Mood insomnia    Obsessive compulsive personality disorder           SUBJECTIVE    CHIEF COMPLAINT: Knee Pain    HPI:   Alberto Frye is a 47 y.o. female here for evaluation and management of right knee pain. There is a specific incident that brought about this pain, twisting injury when stepping out of car. she has had progressive problems with the knee(s) starting 2 weeks ago but is now progressing to interfere with activities which include: walking, participating in family activities, rising from a sitting position, getting in and out of a car and climbing stairs     Currently the pain in the joint is rated at moderate with activity. The pain is constant and is located in the knee, at level of joint line and located medially. The pain is described as aching, severe, sharp and stabbing. Relieving factors include rest and prescription medication.     There is associated Popping.     Alberto Frye has no additional complaints.     PROGRESSIVE SYMPTOMS:  Pain impacting sleep  Pain impacting work  Pain worsened by weight bearing  Pain effecting living situation    FUNCTIONAL STATUS:   Climb a flight of stairs or walk up a hill     PREVIOUS TREATMENTS:  Medical: RX NSAIDS  Physical Therapy: Activities Modified   Previous Orthopaedic Surgery:  None    REVIEW OF SYSTEMS:  PAIN ASSESSMENT:  See HPI.  MUSCULOSKELETAL: See HPI.  OTHER 10 point review of systems is negative except as stated in HPI above    PAST MEDICAL HISTORY   has a past medical history of Alcohol abuse, Allergy (Don't remember), Anxiety, Arthritis (2010), Blood clotting tendency (2008), Congestive heart failure (8/11/2020), Depression, Hallucination, psychiatric care, Hypertension, Immune disorder (2020), Joint pain (2010), Keloid cicatrix (Years ago), Caro, Psychiatric problem, Sleep difficulties, Therapy, and Withdrawal symptoms, drug or narcotic.     PAST SURGICAL HISTORY   has a past surgical history that includes Hysterectomy;  lapban surgery (2009); Esophagogastroduodenoscopy (N/A, 6/3/2020); Phlebography (Left, 8/12/2020); and Venoplasty (Left, 8/12/2020).     FAMILY HISTORY  family history includes Cancer in her mother; Cirrhosis in her maternal uncle; Diabetes in her mother; Hypertension in her mother.     SOCIAL HISTORY   reports that she has quit smoking. She smoked 0.00 packs per day for 0.00 years. She has never used smokeless tobacco. She reports current alcohol use of about 1.0 - 3.0 standard drink of alcohol per week. She reports that she does not use drugs.     ALLERGIES   Review of patient's allergies indicates:   Allergen Reactions    Morphine Itching and Hallucinations    Pcn [penicillins] Other (See Comments)     Was told from childhood she couldn't take it    Sulfa (sulfonamide antibiotics) Nausea And Vomiting    Latex, natural rubber Rash        MEDICATIONS  Current Outpatient Medications on File Prior to Visit   Medication Sig Dispense Refill    amitriptyline (ELAVIL) 50 MG tablet Take 1 tablet (50 mg total) by mouth every evening. 90 tablet 3    atorvastatin (LIPITOR) 10 MG tablet Take 1 tablet (10 mg total) by mouth once daily. 90 tablet 3    clonazePAM (KLONOPIN) 0.5 MG tablet 1/2 to 1 tablet daily as needed 14 tablet 0    cloNIDine 0.2 mg/24 hr td ptwk  (CATAPRES) 0.2 mg/24 hr Place 1 patch onto the skin every 7 days. 4 patch 11    cyanocobalamin (VITAMIN B-12) 1000 MCG tablet Take 100 mcg by mouth once daily.      cyclobenzaprine (FLEXERIL) 10 MG tablet Take 1 tablet (10 mg total) by mouth every evening. 30 tablet 11    d-mannose Powd 1 scoop daily 100 g 11    diclofenac sodium (VOLTAREN) 1 % Gel Apply the gel (4 g) to the affected area 4 times daily. Do not apply more than 16 g daily to any one affected joint 100 g 1    ELIQUIS 5 mg Tab TAKE 1 TABLET BY MOUTH TWICE DAILY START  AFTER  FINISHING  FIRST  PRESCRIPTION 60 tablet 0    FLUoxetine 40 MG capsule Take 1 capsule by mouth once daily 90 capsule 3    fluticasone (FLONASE) 50 mcg/actuation nasal spray 1 spray by Each Nostril route daily as needed for Rhinitis or Allergies.       HYDROcodone-acetaminophen (NORCO) 5-325 mg per tablet Take 1 tablet by mouth every 6 (six) hours as needed for Pain. 14 tablet 0    hydrocortisone 1 % cream Apply topically 2 (two) times daily. 28.4 g 0    hydrOXYchloroQUINE (PLAQUENIL) 200 mg tablet Take 1 tablet (200 mg total) by mouth 2 (two) times daily. 60 tablet 6    hydrOXYzine (ATARAX) 50 MG tablet Take 1 tablet (50 mg total) by mouth daily as needed for Anxiety (sleep). 90 tablet 3    ibuprofen (ADVIL,MOTRIN) 800 MG tablet Take 1 tablet (800 mg total) by mouth 3 (three) times daily. 90 tablet 6    Lactobacillus rhamnosus GG (CULTURELLE) 10 billion cell capsule Take 1 capsule by mouth once daily.      lamoTRIgine (LAMICTAL) 25 MG tablet Take 1 tablet (25 mg) by mouth daily x 7 days then increase to 2 tablets (50 mg) daily 60 tablet 11    methylPREDNISolone (MEDROL DOSEPACK) 4 mg tablet use as directed 1 each 0    methylPREDNISolone (MEDROL DOSEPACK) 4 mg tablet use as directed 1 each 0    multivitamin with minerals tablet Take 1 tablet by mouth once daily.      pregabalin (LYRICA) 75 MG capsule Take 1 capsule (75 mg total) by mouth 2 (two) times daily. 60  "capsule 5    triamcinolone acetonide 0.1% (KENALOG) 0.1 % cream AAA abdomen bid 80 g 0    valsartan-hydrochlorothiazide (DIOVAN-HCT) 160-25 mg per tablet Take 1 tablet by mouth once daily. 90 tablet 1     Current Facility-Administered Medications on File Prior to Visit   Medication Dose Route Frequency Provider Last Rate Last Admin    triamcinolone acetonide injection 40 mg  40 mg Intradermal Once Ama Sylvester MD              PHYSICAL EXAM   height is 5' 4" (1.626 m) and weight is 120.7 kg (266 lb). Her blood pressure is 120/87 and her pulse is 98. Her respiration is 18 and oxygen saturation is 99%.   Body mass index is 45.66 kg/m².      All other systems deferred.  GENERAL:  No acute distress  HABITUS: Obese  GAIT: Antalgic  SKIN: Normal     KNEE EXAM:    right:   Effusion: Minimal joint effusion  TTP: yes over Medial Joint Line and MCL   no crepitus with passive knee ROM  Passive ROM: Extension 0, Flexion 130  No pain with manipulation of patella  Stable to varus/valgus stress. No increased laxity to anterior/posterior drawer testing  Mildly positive Que's test  No pain with IR/ER rotation of the hip  5/5 strength in knee flexion and extension, ankle plantarflexion and dorsiflexion  Neurovascular Status: Sensation intact to light touch in Sural, Saphenous, SPN, DPN, Tibial nerve distribution  2+ pulse DP/PT, normal capillary refill, foot has normal warmth    DATA:  Diagnostic tests reviewed for today's visit:     4v of the knee reveal Mild degenerative changes of the Medial and PFcompartment. There is evidence of advanced osteoarthritis changes with Osteophyte formation and Joint space narrowing. The limb is in netural alignment. The patella is tracking midline.      "

## 2022-03-14 NOTE — PROCEDURES
Large Joint Aspiration/Injection: bilateral knee    Date/Time: 3/14/2022 7:20 AM  Performed by: Jose Manuel Means MD  Authorized by: Jose Manuel Means MD     Consent Done?:  Yes (Verbal)  Indications:  Arthritis  Site marked: the procedure site was marked    Timeout: prior to procedure the correct patient, procedure, and site was verified    Prep: patient was prepped and draped in usual sterile fashion      Local anesthesia used?: Yes    Local anesthetic:  Lidocaine 1% without epinephrine    Details:  Needle Size:  22 G  Approach:  Anterolateral  Location:  Knee  Laterality:  Bilateral  Site:  Bilateral knee  Medications (Right):  40 mg triamcinolone acetonide 40 mg/mL  Medications (Left):  40 mg triamcinolone acetonide 40 mg/mL  Patient tolerance:  Patient tolerated the procedure well with no immediate complications

## 2022-03-22 ENCOUNTER — OFFICE VISIT (OUTPATIENT)
Dept: RHEUMATOLOGY | Facility: CLINIC | Age: 47
End: 2022-03-22
Payer: COMMERCIAL

## 2022-03-22 VITALS
OXYGEN SATURATION: 97 % | WEIGHT: 266.13 LBS | SYSTOLIC BLOOD PRESSURE: 126 MMHG | BODY MASS INDEX: 45.43 KG/M2 | RESPIRATION RATE: 18 BRPM | HEIGHT: 64 IN | HEART RATE: 101 BPM | DIASTOLIC BLOOD PRESSURE: 83 MMHG

## 2022-03-22 DIAGNOSIS — Z79.899 ENCOUNTER FOR LONG-TERM (CURRENT) USE OF OTHER MEDICATIONS: ICD-10-CM

## 2022-03-22 DIAGNOSIS — M79.7 FIBROMYALGIA: ICD-10-CM

## 2022-03-22 DIAGNOSIS — M35.01 SJOGREN SYNDROME WITH KERATOCONJUNCTIVITIS: Primary | ICD-10-CM

## 2022-03-22 DIAGNOSIS — E66.01 CLASS 3 SEVERE OBESITY WITH BODY MASS INDEX (BMI) OF 45.0 TO 49.9 IN ADULT, UNSPECIFIED OBESITY TYPE, UNSPECIFIED WHETHER SERIOUS COMORBIDITY PRESENT: ICD-10-CM

## 2022-03-22 DIAGNOSIS — Z71.89 COUNSELING AND COORDINATION OF CARE: ICD-10-CM

## 2022-03-22 DIAGNOSIS — M15.9 PRIMARY OSTEOARTHRITIS INVOLVING MULTIPLE JOINTS: ICD-10-CM

## 2022-03-22 DIAGNOSIS — S39.012A STRAIN OF LUMBAR REGION, INITIAL ENCOUNTER: ICD-10-CM

## 2022-03-22 PROCEDURE — 3008F PR BODY MASS INDEX (BMI) DOCUMENTED: ICD-10-PCS | Mod: CPTII,S$GLB,, | Performed by: INTERNAL MEDICINE

## 2022-03-22 PROCEDURE — 3079F DIAST BP 80-89 MM HG: CPT | Mod: CPTII,S$GLB,, | Performed by: INTERNAL MEDICINE

## 2022-03-22 PROCEDURE — 3079F PR MOST RECENT DIASTOLIC BLOOD PRESSURE 80-89 MM HG: ICD-10-PCS | Mod: CPTII,S$GLB,, | Performed by: INTERNAL MEDICINE

## 2022-03-22 PROCEDURE — 3074F SYST BP LT 130 MM HG: CPT | Mod: CPTII,S$GLB,, | Performed by: INTERNAL MEDICINE

## 2022-03-22 PROCEDURE — 3074F PR MOST RECENT SYSTOLIC BLOOD PRESSURE < 130 MM HG: ICD-10-PCS | Mod: CPTII,S$GLB,, | Performed by: INTERNAL MEDICINE

## 2022-03-22 PROCEDURE — 99214 OFFICE O/P EST MOD 30 MIN: CPT | Mod: S$GLB,,, | Performed by: INTERNAL MEDICINE

## 2022-03-22 PROCEDURE — 99999 PR PBB SHADOW E&M-EST. PATIENT-LVL IV: CPT | Mod: PBBFAC,,, | Performed by: INTERNAL MEDICINE

## 2022-03-22 PROCEDURE — 99214 PR OFFICE/OUTPT VISIT, EST, LEVL IV, 30-39 MIN: ICD-10-PCS | Mod: S$GLB,,, | Performed by: INTERNAL MEDICINE

## 2022-03-22 PROCEDURE — 99999 PR PBB SHADOW E&M-EST. PATIENT-LVL IV: ICD-10-PCS | Mod: PBBFAC,,, | Performed by: INTERNAL MEDICINE

## 2022-03-22 PROCEDURE — 1159F PR MEDICATION LIST DOCUMENTED IN MEDICAL RECORD: ICD-10-PCS | Mod: CPTII,S$GLB,, | Performed by: INTERNAL MEDICINE

## 2022-03-22 PROCEDURE — 3008F BODY MASS INDEX DOCD: CPT | Mod: CPTII,S$GLB,, | Performed by: INTERNAL MEDICINE

## 2022-03-22 PROCEDURE — 1159F MED LIST DOCD IN RCRD: CPT | Mod: CPTII,S$GLB,, | Performed by: INTERNAL MEDICINE

## 2022-03-22 RX ORDER — PREGABALIN 150 MG/1
150 CAPSULE ORAL 3 TIMES DAILY
Qty: 90 CAPSULE | Refills: 5 | Status: SHIPPED | OUTPATIENT
Start: 2022-03-22 | End: 2022-05-18 | Stop reason: SDUPTHER

## 2022-03-22 RX ORDER — HYDROCODONE BITARTRATE AND ACETAMINOPHEN 5; 325 MG/1; MG/1
1 TABLET ORAL EVERY 6 HOURS PRN
Qty: 60 TABLET | Refills: 0 | Status: SHIPPED | OUTPATIENT
Start: 2022-03-22 | End: 2022-04-22

## 2022-03-22 NOTE — PROGRESS NOTES
RHEUMATOLOGY OUTPATIENT CLINIC NOTE    3/22/2022    Attending Rheumatologist: Ross Hughes  Primary Care Provider: Ian Cespedes MD   Physician Requesting Consultation: No referring provider defined for this encounter.  Chief Complaint/Reason For Consultation:  No chief complaint on file.      Subjective:       HPI  Alberto Frye is a 47 y.o. Black or  female with medical history noted below who comes for evaluation of arthralgias.   She reports knowing of abnormal GIOVANNY at least ten years and when symptoms worsened she saw Rheumatology. Seen by Rheumatology at [facility] in 2018, noted to have +GIOVANNY & SSA, and likely beginning of Primary Sjogrens and OA.   Patient reports that she has been dealing with chronic back pain for many years but over then last 3 months has acutely worsened. She also notes pain in her hands, elbows, shoulder, knees, hips which has been progressing over the last year. Pain is worsened by all movement, improves only when laying still. Minimal relief with meds. Though she reports if she stays still to long then she is stiff. Reports about 20 minutes of morning stiffness. Has noted swelling in her hands. Also c/o numbness and tingling in her feet. Terrible sleep. + Dry eyes and dry mouth, easy bruising due to being on AC, +Raynaud's.  Had DVT post surgery and another DVT in iliac artery in August 2020.    No Alopecia, Oral/Nasal Ulcers, Rash, Photosensitivity, Pleuritis/serositis, LAD, Miscarriages, Skin tightening, SOB.     Today  Patient here for follow up.   Last visit care for SjS continued and chronic pain meds adjusted. Gabapentin stopped and Lyrica added, she notes shortly after the onset of widespread pain, fatigue, leg swelling. Given two round of steroids with no relief, and knee CSI with no relief. She notes she was given Norco that provided relief, had to call out of work. Patient notes she cant continue like this.     Review of Systems    Constitutional: Negative for appetite change, chills, fatigue, fever and unexpected weight change.   HENT: Negative for nasal congestion, ear discharge, ear pain, hearing loss, mouth sores, nosebleeds, sneezing, sore throat, tinnitus and trouble swallowing.    Eyes: Negative for photophobia, pain, discharge, redness, itching and visual disturbance.   Respiratory: Negative for cough, chest tightness, shortness of breath and wheezing.    Cardiovascular: Negative for chest pain, palpitations and leg swelling.   Gastrointestinal: Negative for abdominal distention, abdominal pain, blood in stool, constipation, diarrhea, nausea and vomiting.   Endocrine: Negative for cold intolerance, heat intolerance, polydipsia, polyphagia and polyuria.   Genitourinary: Negative for difficulty urinating, dyspareunia, dysuria, flank pain, frequency, genital sores, hematuria, menstrual problem, pelvic pain, urgency, vaginal bleeding, vaginal discharge, vaginal pain and vaginal dryness.   Musculoskeletal: Positive for arthralgias, back pain and joint swelling. Negative for gait problem, leg pain, myalgias, neck pain, neck stiffness and joint deformity.   Integumentary:  Negative for pallor and rash.   Neurological: Negative for dizziness, seizures, weakness, light-headedness, numbness and headaches.   Hematological: Negative for adenopathy. Does not bruise/bleed easily.   Psychiatric/Behavioral: Negative for confusion, decreased concentration and sleep disturbance. The patient is not nervous/anxious.    All other systems reviewed and are negative.       Chronic comorbid conditions affecting medical decision making today:  Past Medical History:   Diagnosis Date    Alcohol abuse     stopped heavy drinking about 10 years ago; was drinking 3 glasses of vodka/tequilla,rum/whiskey per day    Allergy Don't remember    Its been some years.    Anxiety     Arthritis 2010    Blood clotting tendency 2008    After a procedure & 2020.     Congestive heart failure 8/11/2020    Depression     Hallucination     Hx of psychiatric care     Hypertension     Immune disorder 2020    Joint pain 2010    Keloid cicatrix Years ago    From childhood scars    Caro     unplanned trips, energy without sleep for 2 days (reading, cleaning), feelings that she can do multiple tasks at one time, feelings of overconfidence at times    Psychiatric problem     Sleep difficulties     Therapy     Withdrawal symptoms, drug or narcotic     racing heart, restlessness     Past Surgical History:   Procedure Laterality Date     lapban surgery  2009    ESOPHAGOGASTRODUODENOSCOPY N/A 6/3/2020    Procedure: EGD (ESOPHAGOGASTRODUODENOSCOPY);  Surgeon: Johan Grover MD;  Location: [facility] ENDO (4TH FLR);  Service: Endoscopy;  Laterality: N/A;  covid 6/2-[employer] [facility]-tb    HYSTERECTOMY      PHLEBOGRAPHY Left 8/12/2020    Procedure: Venogram, pharmacomechanical thrombectomy;  Surgeon: Miguelangel Goldman MD;  Location: [facility] OR [address] [place] [address];  Service: Vascular;  Laterality: Left;  2336.43 mGy  [id number]  17.8 minutes  37 ml of contrast    VENOPLASTY Left 8/12/2020    Procedure: ANGIOPLASTY, VEIN;  Surgeon: Miguelangel Goldman MD;  [facility];  Service: Vascular;  Laterality: Left;     Family History   Problem Relation Age of Onset    Diabetes Mother     Cancer Mother     Hypertension Mother     Cirrhosis Maternal Uncle         ETOH    Celiac disease Neg Hx     Colon cancer Neg Hx     Colon polyps Neg Hx     Crohn's disease Neg Hx     Esophageal cancer Neg Hx     Inflammatory bowel disease Neg Hx     Liver cancer Neg Hx     Liver disease Neg Hx     Rectal cancer Neg Hx     Stomach cancer Neg Hx     Ulcerative colitis Neg Hx      Social History     Substance and Sexual Activity   Alcohol Use Yes    Alcohol/week: 1.0 - 3.0 standard drink    Types: 1 - 3 Glasses of wine per week    Comment: social presently, excessive 10 years ago      Social History     Tobacco Use   Smoking Status Former Smoker    Packs/day: 0.00    Years: 0.00    Pack years: 0.00   Smokeless Tobacco Never Used   Tobacco Comment    quit in october 2016     Social History     Substance and Sexual Activity   Drug Use Never    Types: Marijuana    Comment: uses THCA weekly       Current Outpatient Medications:     amitriptyline (ELAVIL) 50 MG tablet, Take 1 tablet (50 mg total) by mouth every evening., Disp: 90 tablet, Rfl: 3    atorvastatin (LIPITOR) 10 MG tablet, Take 1 tablet (10 mg total) by mouth once daily., Disp: 90 tablet, Rfl: 3    clonazePAM (KLONOPIN) 0.5 MG tablet, 1/2 to 1 tablet daily as needed, Disp: 14 tablet, Rfl: 0    cloNIDine 0.2 mg/24 hr td ptwk (CATAPRES) 0.2 mg/24 hr, Place 1 patch onto the skin every 7 days., Disp: 4 patch, Rfl: 11    cyanocobalamin (VITAMIN B-12) 1000 MCG tablet, Take 100 mcg by mouth once daily., Disp: , Rfl:     cyclobenzaprine (FLEXERIL) 10 MG tablet, Take 1 tablet (10 mg total) by mouth every evening., Disp: 30 tablet, Rfl: 11    d-mannose Powd, 1 scoop daily, Disp: 100 g, Rfl: 11    diclofenac sodium (VOLTAREN) 1 % Gel, Apply the gel (4 g) to the affected area 4 times daily. Do not apply more than 16 g daily to any one affected joint, Disp: 100 g, Rfl: 1    ELIQUIS 5 mg Tab, TAKE 1 TABLET BY MOUTH TWICE DAILY *START AFTER FINISHING FIRST PRESCRIPTION*, Disp: 60 tablet, Rfl: 0    FLUoxetine 40 MG capsule, Take 1 capsule by mouth once daily, Disp: 90 capsule, Rfl: 3    fluticasone (FLONASE) 50 mcg/actuation nasal spray, 1 spray by Each Nostril route daily as needed for Rhinitis or Allergies. , Disp: , Rfl:     HYDROcodone-acetaminophen (NORCO) 5-325 mg per tablet, Take 1 tablet by mouth every 6 (six) hours as needed for Pain., Disp: 60 tablet, Rfl: 0    hydrocortisone 1 % cream, Apply topically 2 (two) times daily., Disp: 28.4 g, Rfl: 0    hydrOXYchloroQUINE (PLAQUENIL) 200 mg tablet, Take 1 tablet (200 mg total) by  mouth 2 (two) times daily., Disp: 60 tablet, Rfl: 6    hydrOXYzine (ATARAX) 50 MG tablet, Take 1 tablet (50 mg total) by mouth daily as needed for Anxiety (sleep)., Disp: 90 tablet, Rfl: 3    ibuprofen (ADVIL,MOTRIN) 800 MG tablet, Take 1 tablet (800 mg total) by mouth 3 (three) times daily., Disp: 90 tablet, Rfl: 6    Lactobacillus rhamnosus GG (CULTURELLE) 10 billion cell capsule, Take 1 capsule by mouth once daily., Disp:  , Rfl:     lamoTRIgine (LAMICTAL) 25 MG tablet, Take 1 tablet (25 mg) by mouth daily x 7 days then increase to 2 tablets (50 mg) daily, Disp: 60 tablet, Rfl: 11    methylPREDNISolone (MEDROL DOSEPACK) 4 mg tablet, use as directed, Disp: 1 each, Rfl: 0    methylPREDNISolone (MEDROL DOSEPACK) 4 mg tablet, use as directed, Disp: 1 each, Rfl: 0    multivitamin with minerals tablet, Take 1 tablet by mouth once daily., Disp: , Rfl:     pregabalin (LYRICA) 150 MG capsule, Take 1 capsule (150 mg total) by mouth 3 (three) times daily., Disp: 90 capsule, Rfl: 5    triamcinolone acetonide 0.1% (KENALOG) 0.1 % cream, AAA abdomen bid, Disp: 80 g, Rfl: 0    valsartan-hydrochlorothiazide (DIOVAN-HCT) 160-25 mg per tablet, Take 1 tablet by mouth once daily., Disp: 90 tablet, Rfl: 1    Current Facility-Administered Medications:     triamcinolone acetonide injection 40 mg, 40 mg, Intradermal, Once, Ama Sylvester MD     Objective:         Vitals:    03/22/22 0826   BP: 126/83   Pulse: 101   Resp: 18     Physical Exam   Constitutional: She is oriented to person, place, and time.   HENT:   Head: Normocephalic and atraumatic.   Right Ear: External ear normal.   Left Ear: External ear normal.   Nose: Nose normal.   Mouth/Throat: Oropharynx is clear and moist.   Eyes: Pupils are equal, round, and reactive to light. Conjunctivae are normal.   Cardiovascular: Normal rate and regular rhythm.   Pulmonary/Chest: Effort normal and breath sounds normal.   Abdominal: Soft. Bowel sounds are normal.    Musculoskeletal:      Right shoulder: Normal.      Left shoulder: Normal.      Right elbow: Normal.      Left elbow: Normal.      Right wrist: Normal.      Left wrist: Normal.      Cervical back: Normal range of motion and neck supple.      Right knee: Normal.      Left knee: Normal.      Comments: Tender Points:  No   Yes  ( )    (x )   Low cervical anterior aspect    ( )    (x )   Costochondral Junction   ( )    (x )   Lateral Epicondyle  ( )    (x )   Suboccipital   ( )    (x )   Trapezius   ( )    (x )   Supraspinatus   ( )    (x )   Gluteal   ( )    (x )   Greater trochanter  ( )    (x )   Knee       Neurological: She is alert and oriented to person, place, and time.   Skin: No rash noted. No erythema.   Psychiatric: Mood and affect normal.       Right Side Rheumatological Exam     Examination finds the shoulder, elbow, wrist, knee, 1st PIP, 1st MCP, 2nd PIP, 2nd MCP, 3rd PIP, 3rd MCP, 4th PIP, 4th MCP, 5th PIP and 5th MCP normal.    Left Side Rheumatological Exam     Examination finds the shoulder, elbow, wrist, knee, 1st PIP, 1st MCP, 2nd PIP, 2nd MCP, 3rd PIP, 3rd MCP, 4th PIP, 4th MCP, 5th PIP and 5th MCP normal.      Back/Neck Exam     Comments:  -SLT         Reviewed old and all outside pertinent medical records available.    All lab results personally reviewed and interpreted by me.  Lab Results   Component Value Date    WBC 5.24 04/26/2021    HGB 13.7 04/26/2021    HCT 41.1 04/26/2021    MCV 89 04/26/2021    MCH 29.7 04/26/2021    MCHC 33.3 04/26/2021    RDW 13.6 04/26/2021     04/26/2021    MPV 10.6 04/26/2021       Lab Results   Component Value Date     05/06/2021    K 3.7 05/06/2021     05/06/2021    CO2 28 05/06/2021    GLU 91 05/06/2021    BUN 11 05/06/2021    CALCIUM 9.4 05/06/2021    PROT 8.0 04/26/2021    ALBUMIN 3.9 04/26/2021    BILITOT 0.8 04/26/2021    AST 25 04/26/2021    ALKPHOS 61 04/26/2021    ALT 17 04/26/2021       Lab Results   Component Value Date    COLORU  Yellow 05/06/2021    APPEARANCEUA Clear 05/06/2021    SPECGRAV 1.015 05/06/2021    PHUR 6.0 05/06/2021    PROTEINUA Negative 05/06/2021    KETONESU Negative 05/06/2021    LEUKOCYTESUR Negative 05/06/2021    NITRITE Negative 05/06/2021    UROBILINOGEN Negative 05/06/2021       Lab Results   Component Value Date    CRP 5.3 04/26/2021       Lab Results   Component Value Date    SEDRATE 40 (H) 11/19/2020       Lab Results   Component Value Date    RF <10.0 06/14/2016    SEDRATE 40 (H) 11/19/2020       No components found for: 25OHVITDTOT, 12JPYVDA0, 33KYIZMZ2, METHODNOTE    No results found for: URICACID    No components found for: TSPOTTB    Imaging:  All imaging reviewed and independently interpreted by me.         ASSESSMENT / PLAN:     Alberto Frye is a 47 y.o. Black or  female with:      1. Sjogren's syndrome with keratoconjunctivitis sicca  - Patients has s/s consistent with SjS, she has sicca, wide spread pain, arthralgias   - stable   - reinforced artificial tears and oral hydration   - continue HCQ 400mg daily   - trial of Cevillimine did not help   - annual EYE and Dental Exam   - reassurance      2. Fibromyalgia   - patient is having flare up of her Fibromyalgia, I believe this was due to her coming off Gabapentin and switching to Lyrica    - increase Lyrica to 150mg TID  - continue Norco,  reviewed, pain contract discussed  - continue Flexeril   - wt loss  - Reassurance and Exercise    3. Osteoarthritis  - in addition to SjS and FM she has OA  - discussed diagnosis and management  - wt loss  - recent CSI by Ortho   - Pain meds as above  - reassurance and exercise      4. Other specified counseling  - over 10 minutes spent regarding below topics:  - Immunization counseling done.  - Weight loss counseling done.  - Nutrition and exercise counseling.  - Limitation of alcohol consumption.  - Regular exercise:  Aerobic and resistance.  - Medication counseling provided.    5. Morbid  Obesity  - would benefit from decreasing at least 10% of body weight.  - recommended goal of losing 1 lb per week.  - consider nutritionist evaluation.      6. DMARD Toxicity Monitoring  - annual EYE exam     Follow up in about 8 weeks (around 5/17/2022).    Method of contact with patient concerns: [facility]    Disclaimer:  This note is prepared using voice recognition software and as such is likely to have errors and has not been proof read. Please contact me for questions.     Time spent: 30 minutes in face to face discussion concerning diagnosis, prognosis, review of lab and test results, benefits of treatment as well as management of disease, counseling of patient and coordination of care between various health care providers.  Greater than half the time spent was used for coordination of care and counseling of patient.    Ross Hughes M.D.  Rheumatology Department   [facility]

## 2022-03-23 ENCOUNTER — PATIENT MESSAGE (OUTPATIENT)
Dept: RHEUMATOLOGY | Facility: CLINIC | Age: 47
End: 2022-03-23
Payer: COMMERCIAL

## 2022-03-23 ENCOUNTER — HOSPITAL ENCOUNTER (EMERGENCY)
Facility: HOSPITAL | Age: 47
Discharge: HOME | End: 2022-03-24
Attending: EMERGENCY MEDICINE
Payer: COMMERCIAL

## 2022-03-23 ENCOUNTER — OFFICE VISIT (OUTPATIENT)
Dept: PAIN MEDICINE | Facility: CLINIC | Age: 47
End: 2022-03-23
Payer: COMMERCIAL

## 2022-03-23 VITALS
SYSTOLIC BLOOD PRESSURE: 138 MMHG | HEIGHT: 64 IN | HEART RATE: 100 BPM | BODY MASS INDEX: 45.43 KG/M2 | WEIGHT: 266.13 LBS | OXYGEN SATURATION: 99 % | DIASTOLIC BLOOD PRESSURE: 77 MMHG

## 2022-03-23 DIAGNOSIS — R07.9 CHEST PAIN: ICD-10-CM

## 2022-03-23 DIAGNOSIS — M35.05 SJOGREN'S SYNDROME WITH INFLAMMATORY ARTHRITIS: Primary | ICD-10-CM

## 2022-03-23 DIAGNOSIS — M79.662 PAIN AND SWELLING OF LEFT LOWER LEG: ICD-10-CM

## 2022-03-23 DIAGNOSIS — M79.89 PAIN AND SWELLING OF LEFT LOWER LEG: ICD-10-CM

## 2022-03-23 LAB
ALBUMIN SERPL BCP-MCNC: 4 G/DL (ref 3.5–5.2)
ALP SERPL-CCNC: 83 U/L (ref 55–135)
ALT SERPL W/O P-5'-P-CCNC: 18 U/L (ref 10–44)
ANION GAP SERPL CALC-SCNC: 8 MMOL/L (ref 8–16)
AST SERPL-CCNC: 20 U/L (ref 10–40)
BASOPHILS # BLD AUTO: 0.02 K/UL (ref 0–0.2)
BASOPHILS NFR BLD: 0.2 % (ref 0–1.9)
BILIRUB SERPL-MCNC: 0.6 MG/DL (ref 0.1–1)
BNP SERPL-MCNC: <10 PG/ML (ref 0–99)
BUN SERPL-MCNC: 20 MG/DL (ref 6–20)
CALCIUM SERPL-MCNC: 9 MG/DL (ref 8.7–10.5)
CHLORIDE SERPL-SCNC: 105 MMOL/L (ref 95–110)
CO2 SERPL-SCNC: 24 MMOL/L (ref 23–29)
CREAT SERPL-MCNC: 1.1 MG/DL (ref 0.5–1.4)
D DIMER PPP IA.FEU-MCNC: 0.21 MG/L FEU
DIFFERENTIAL METHOD: NORMAL
EOSINOPHIL # BLD AUTO: 0.2 K/UL (ref 0–0.5)
EOSINOPHIL NFR BLD: 2.2 % (ref 0–8)
ERYTHROCYTE [DISTWIDTH] IN BLOOD BY AUTOMATED COUNT: 13.2 % (ref 11.5–14.5)
EST. GFR  (AFRICAN AMERICAN): >60 ML/MIN/1.73 M^2
EST. GFR  (NON AFRICAN AMERICAN): 59 ML/MIN/1.73 M^2
GLUCOSE SERPL-MCNC: 110 MG/DL (ref 70–110)
HCT VFR BLD AUTO: 37.9 % (ref 37–48.5)
HGB BLD-MCNC: 12.6 G/DL (ref 12–16)
IMM GRANULOCYTES # BLD AUTO: 0.01 K/UL (ref 0–0.04)
IMM GRANULOCYTES NFR BLD AUTO: 0.1 % (ref 0–0.5)
LYMPHOCYTES # BLD AUTO: 2.9 K/UL (ref 1–4.8)
LYMPHOCYTES NFR BLD: 35.2 % (ref 18–48)
MAGNESIUM SERPL-MCNC: 2.2 MG/DL (ref 1.6–2.6)
MCH RBC QN AUTO: 30.1 PG (ref 27–31)
MCHC RBC AUTO-ENTMCNC: 33.2 G/DL (ref 32–36)
MCV RBC AUTO: 91 FL (ref 82–98)
MONOCYTES # BLD AUTO: 0.5 K/UL (ref 0.3–1)
MONOCYTES NFR BLD: 6.5 % (ref 4–15)
NEUTROPHILS # BLD AUTO: 4.6 K/UL (ref 1.8–7.7)
NEUTROPHILS NFR BLD: 55.8 % (ref 38–73)
NRBC BLD-RTO: 0 /100 WBC
PLATELET # BLD AUTO: 194 K/UL (ref 150–450)
PMV BLD AUTO: 9.3 FL (ref 9.2–12.9)
POTASSIUM SERPL-SCNC: 3.7 MMOL/L (ref 3.5–5.1)
PROT SERPL-MCNC: 8 G/DL (ref 6–8.4)
RBC # BLD AUTO: 4.19 M/UL (ref 4–5.4)
SODIUM SERPL-SCNC: 137 MMOL/L (ref 136–145)
TROPONIN I SERPL DL<=0.01 NG/ML-MCNC: <0.006 NG/ML (ref 0–0.03)
WBC # BLD AUTO: 8.3 K/UL (ref 3.9–12.7)

## 2022-03-23 PROCEDURE — 3008F BODY MASS INDEX DOCD: CPT | Mod: CPTII,S$GLB,, | Performed by: PAIN MEDICINE

## 2022-03-23 PROCEDURE — 99213 OFFICE O/P EST LOW 20 MIN: CPT | Mod: S$GLB,,, | Performed by: PAIN MEDICINE

## 2022-03-23 PROCEDURE — 84484 ASSAY OF TROPONIN QUANT: CPT | Performed by: EMERGENCY MEDICINE

## 2022-03-23 PROCEDURE — 80053 COMPREHEN METABOLIC PANEL: CPT | Performed by: EMERGENCY MEDICINE

## 2022-03-23 PROCEDURE — 99285 EMERGENCY DEPT VISIT HI MDM: CPT | Mod: 25

## 2022-03-23 PROCEDURE — 63600175 PHARM REV CODE 636 W HCPCS: Performed by: EMERGENCY MEDICINE

## 2022-03-23 PROCEDURE — 85379 FIBRIN DEGRADATION QUANT: CPT | Performed by: EMERGENCY MEDICINE

## 2022-03-23 PROCEDURE — 3075F PR MOST RECENT SYSTOLIC BLOOD PRESS GE 130-139MM HG: ICD-10-PCS | Mod: CPTII,S$GLB,, | Performed by: PAIN MEDICINE

## 2022-03-23 PROCEDURE — 1160F PR REVIEW ALL MEDS BY PRESCRIBER/CLIN PHARMACIST DOCUMENTED: ICD-10-PCS | Mod: CPTII,S$GLB,, | Performed by: PAIN MEDICINE

## 2022-03-23 PROCEDURE — 25000003 PHARM REV CODE 250: Performed by: EMERGENCY MEDICINE

## 2022-03-23 PROCEDURE — 99999 PR PBB SHADOW E&M-EST. PATIENT-LVL V: ICD-10-PCS | Mod: PBBFAC,,, | Performed by: PAIN MEDICINE

## 2022-03-23 PROCEDURE — 93005 ELECTROCARDIOGRAM TRACING: CPT

## 2022-03-23 PROCEDURE — 85025 COMPLETE CBC W/AUTO DIFF WBC: CPT | Performed by: EMERGENCY MEDICINE

## 2022-03-23 PROCEDURE — 1160F RVW MEDS BY RX/DR IN RCRD: CPT | Mod: CPTII,S$GLB,, | Performed by: PAIN MEDICINE

## 2022-03-23 PROCEDURE — 3078F PR MOST RECENT DIASTOLIC BLOOD PRESSURE < 80 MM HG: ICD-10-PCS | Mod: CPTII,S$GLB,, | Performed by: PAIN MEDICINE

## 2022-03-23 PROCEDURE — 93010 EKG 12-LEAD: ICD-10-PCS | Mod: ,,, | Performed by: INTERNAL MEDICINE

## 2022-03-23 PROCEDURE — 1159F PR MEDICATION LIST DOCUMENTED IN MEDICAL RECORD: ICD-10-PCS | Mod: CPTII,S$GLB,, | Performed by: PAIN MEDICINE

## 2022-03-23 PROCEDURE — 83735 ASSAY OF MAGNESIUM: CPT | Performed by: EMERGENCY MEDICINE

## 2022-03-23 PROCEDURE — 3008F PR BODY MASS INDEX (BMI) DOCUMENTED: ICD-10-PCS | Mod: CPTII,S$GLB,, | Performed by: PAIN MEDICINE

## 2022-03-23 PROCEDURE — 93010 ELECTROCARDIOGRAM REPORT: CPT | Mod: ,,, | Performed by: INTERNAL MEDICINE

## 2022-03-23 PROCEDURE — 99999 PR PBB SHADOW E&M-EST. PATIENT-LVL V: CPT | Mod: PBBFAC,,, | Performed by: PAIN MEDICINE

## 2022-03-23 PROCEDURE — 99213 PR OFFICE/OUTPT VISIT, EST, LEVL III, 20-29 MIN: ICD-10-PCS | Mod: S$GLB,,, | Performed by: PAIN MEDICINE

## 2022-03-23 PROCEDURE — 1159F MED LIST DOCD IN RCRD: CPT | Mod: CPTII,S$GLB,, | Performed by: PAIN MEDICINE

## 2022-03-23 PROCEDURE — 3078F DIAST BP <80 MM HG: CPT | Mod: CPTII,S$GLB,, | Performed by: PAIN MEDICINE

## 2022-03-23 PROCEDURE — 3075F SYST BP GE 130 - 139MM HG: CPT | Mod: CPTII,S$GLB,, | Performed by: PAIN MEDICINE

## 2022-03-23 PROCEDURE — 83880 ASSAY OF NATRIURETIC PEPTIDE: CPT | Performed by: EMERGENCY MEDICINE

## 2022-03-23 RX ORDER — HYDROMORPHONE HYDROCHLORIDE 2 MG/ML
1 INJECTION, SOLUTION INTRAMUSCULAR; INTRAVENOUS; SUBCUTANEOUS
Status: COMPLETED | OUTPATIENT
Start: 2022-03-23 | End: 2022-03-23

## 2022-03-23 RX ORDER — ASPIRIN 325 MG
325 TABLET ORAL
Status: COMPLETED | OUTPATIENT
Start: 2022-03-23 | End: 2022-03-23

## 2022-03-23 RX ADMIN — ASPIRIN 325 MG ORAL TABLET 325 MG: 325 PILL ORAL at 09:03

## 2022-03-23 RX ADMIN — HYDROMORPHONE HYDROCHLORIDE 1 MG: 2 INJECTION INTRAMUSCULAR; INTRAVENOUS; SUBCUTANEOUS at 09:03

## 2022-03-23 NOTE — PROGRESS NOTES
Subjective:     Patient ID: Alberto Frye is a 47 y.o. female    Chief Complaint: Knee Pain (B/l knee pain, with pain in multiple joint)      Referred by: No ref. provider found      HPI:    Interval History (3/23/22):  She returns today for follow up.  She reports that continues to have widespread body pain.  She also reports having more focal left knee pain.  She is being followed by Rheumatology for Sjogren's disease and fibromyalgia.  Patient also followed by Orthopedic surgery for left knee pain.  She states that she is here today simply to update me on her situation and not necessarily seeking any particular treatments.        Interval History (12/4/20):  She returns today for follow up.  She reports that she continues to have low back pain.  She denies any changes in the quality or location of pain since last encounter.  She has been evaluated by Rheumatology was diagnosed her with Sjogren syndrome.  Further workup and treatment has been initiated.  Lumbar MRI denied by insurance.      Initial Encounter (11/16/20):  Alberto Frye is a 47 y.o. female who presents today with chronic bilateral low back pain.  This pain has been present for at least 10 years.  No specific inciting event or injury noted.  The pain initially was intermittent and has significantly worsened over time.  She states the pain has been essentially constant recently.  The pain is located across the lower lumbar region.  The pain radiates to the bilateral buttocks to the bilateral posterior thighs.  The pain does not radiate past the knees.  Patient denies any associated numbness, tingling, weakness, bowel bladder dysfunction.  The pain is worse with activity.  Patient also reports having pain of the left middle finger PIP joint.  She cannot fully extend this joint and the joint itself is very tender.  Patient states that in the past she did have a positive rheumatologic workup was not having any rheumatologic symptoms so no  treatment was undertaken.   This pain is described in detail below.    Physical Therapy:  No.    Non-pharmacologic Treatment:  Rest helps         · TENS?  No    Pain Medications:         · Currently taking:  Norco p.r.n., Lyrica, ibuprofen, amitriptyline, Lamictal, Flexeril    · Has tried in the past:  NSAIDs, Tylenol, gabapentin    · Has not tried:  SNRIs, topical creams    Blood thinners:  Eliquis    Interventional Therapies:  None    Relevant Surgeries:  None    Affecting sleep?  Yes    Affecting daily activities? yes    Depressive symptoms? no          · SI/HI? No    Work status: Employed    Pain Scores:    Best:       10/10  Worst:     10/10  Usually:   10/10  Today:    10/10    Review of Systems   Constitutional: Negative for activity change, appetite change, chills, fatigue, fever and unexpected weight change.   HENT: Negative for hearing loss.    Eyes: Negative for visual disturbance.   Respiratory: Negative for chest tightness and shortness of breath.    Cardiovascular: Negative for chest pain.   Gastrointestinal: Negative for abdominal pain, constipation, diarrhea, nausea and vomiting.   Genitourinary: Negative for difficulty urinating.   Musculoskeletal: Positive for arthralgias, back pain, gait problem and myalgias. Negative for neck pain.   Skin: Negative for rash.   Neurological: Negative for dizziness, weakness, light-headedness, numbness and headaches.   Psychiatric/Behavioral: Positive for sleep disturbance. Negative for hallucinations and suicidal ideas. The patient is not nervous/anxious.        Past Medical History:   Diagnosis Date    Alcohol abuse     stopped heavy drinking about 10 years ago; was drinking 3 glasses of vodka/tequilla,rum/whiskey per day    Allergy Don't remember    Its been some years.    Anxiety     Arthritis 2010    Blood clotting tendency 2008    After a procedure & 2020.    Congestive heart failure 8/11/2020    Depression     Hallucination     Hx of psychiatric  care     Hypertension     Immune disorder 2020    Joint pain 2010    Keloid cicatrix Years ago    From childhood scars    Caro     unplanned trips, energy without sleep for 2 days (reading, cleaning), feelings that she can do multiple tasks at one time, feelings of overconfidence at times    Psychiatric problem     Sleep difficulties     Therapy     Withdrawal symptoms, drug or narcotic     racing heart, restlessness       Past Surgical History:   Procedure Laterality Date     lapban surgery  2009    ESOPHAGOGASTRODUODENOSCOPY N/A 6/3/2020    Procedure: EGD (ESOPHAGOGASTRODUODENOSCOPY);  Surgeon: Johan Grover MD;  Location: [facility] ENDO (4TH FLR);  Service: Endoscopy;  Laterality: N/A;  covid 6/2-[employer]-tb    HYSTERECTOMY      PHLEBOGRAPHY Left 8/12/2020    Procedure: Venogram, pharmacomechanical thrombectomy;  Surgeon: Miguelangel Goldman MD;  Location: [facility] OR [address] [place] [address];  Service: Vascular;  Laterality: Left;  2336.43 mGy  [id number]  17.8 minutes  37 ml of contrast    VENOPLASTY Left 8/12/2020    Procedure: ANGIOPLASTY, VEIN;  Surgeon: Miguelangel Goldman MD;  Location: [facility] OR [address] [place] [address];  Service: Vascular;  Laterality: Left;       Social History     Socioeconomic History    Marital status:     Number of children: 3   Occupational History    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[employer]   Tobacco Use    Smoking status: Former Smoker     Packs/day: 0.00     Years: 0.00     Pack years: 0.00    Smokeless tobacco: Never Used    Tobacco comment: quit in october 2016   Substance and Sexual Activity    Alcohol use: Yes     Alcohol/week: 1.0 - 3.0 standard drink     Types: 1 - 3 Glasses of wine per week     Comment: social presently, excessive 10 years ago    Drug use: Never     Types: Marijuana     Comment: uses [employer] weekly    Sexual activity: Yes     Partners: Male     Birth control/protection: Condom, See Surgical Hx   Other Topics Concern    Patient feels  they ought to cut down on drinking/drug use No    Patient annoyed by others criticizing their drinking/drug use No    Patient has felt bad or guilty about drinking/drug use No    Patient has had a drink/used drugs as an eye opener in the AM No    Are you pregnant or think you may be? No    Breast-feeding No   Social History Narrative    Has 3 healthy grown sons (1990, 1993, 1998) Lives alone.    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   in 2010.    Has Male partner since 2014 who is currently disabled.     Social Determinants of Health     Financial Resource Strain: Unknown    Difficulty of Paying Living Expenses: Patient refused   Food Insecurity: Unknown    Worried About Running Out of Food in the Last Year: Patient refused    Ran Out of Food in the Last Year: Patient refused   Transportation Needs: Unknown    Lack of Transportation (Medical): Patient refused    Lack of Transportation (Non-Medical): Patient refused   Physical Activity: Unknown    Days of Exercise per Week: Patient refused    Minutes of Exercise per Session: Patient refused   Stress: Unknown    Feeling of Stress : Patient refused   Social Connections: Unknown    Frequency of Communication with Friends and Family: Patient refused    Frequency of Social Gatherings with Friends and Family: Patient refused    Active Member of Clubs or Organizations: No    Attends Club or Organization Meetings: Never    Marital Status:    Housing Stability: Unknown    Unable to Pay for Housing in the Last Year: Patient refused    Number of Places Lived in the Last Year: 1    Unstable Housing in the Last Year: Patient refused       Review of patient's allergies indicates:   Allergen Reactions    Morphine Itching and Hallucinations    Pcn [penicillins] Other (See Comments)     Was told from childhood she couldn't take it    Sulfa (sulfonamide antibiotics) Nausea  And Vomiting    Latex, natural rubber Rash       Current Outpatient Medications on File Prior to Visit   Medication Sig Dispense Refill    amitriptyline (ELAVIL) 50 MG tablet Take 1 tablet (50 mg total) by mouth every evening. 90 tablet 3    atorvastatin (LIPITOR) 10 MG tablet Take 1 tablet (10 mg total) by mouth once daily. 90 tablet 3    clonazePAM (KLONOPIN) 0.5 MG tablet 1/2 to 1 tablet daily as needed 14 tablet 0    cloNIDine 0.2 mg/24 hr td ptwk (CATAPRES) 0.2 mg/24 hr Place 1 patch onto the skin every 7 days. 4 patch 11    cyanocobalamin (VITAMIN B-12) 1000 MCG tablet Take 100 mcg by mouth once daily.      cyclobenzaprine (FLEXERIL) 10 MG tablet Take 1 tablet (10 mg total) by mouth every evening. 30 tablet 11    d-mannose Powd 1 scoop daily 100 g 11    diclofenac sodium (VOLTAREN) 1 % Gel Apply the gel (4 g) to the affected area 4 times daily. Do not apply more than 16 g daily to any one affected joint 100 g 1    ELIQUIS 5 mg Tab TAKE 1 TABLET BY MOUTH TWICE DAILY *START AFTER FINISHING FIRST PRESCRIPTION* 60 tablet 0    FLUoxetine 40 MG capsule Take 1 capsule by mouth once daily 90 capsule 3    fluticasone (FLONASE) 50 mcg/actuation nasal spray 1 spray by Each Nostril route daily as needed for Rhinitis or Allergies.       HYDROcodone-acetaminophen (NORCO) 5-325 mg per tablet Take 1 tablet by mouth every 6 (six) hours as needed for Pain. 60 tablet 0    hydrocortisone 1 % cream Apply topically 2 (two) times daily. 28.4 g 0    hydrOXYchloroQUINE (PLAQUENIL) 200 mg tablet Take 1 tablet (200 mg total) by mouth 2 (two) times daily. 60 tablet 6    hydrOXYzine (ATARAX) 50 MG tablet Take 1 tablet (50 mg total) by mouth daily as needed for Anxiety (sleep). 90 tablet 3    ibuprofen (ADVIL,MOTRIN) 800 MG tablet Take 1 tablet (800 mg total) by mouth 3 (three) times daily. 90 tablet 6    Lactobacillus rhamnosus GG (CULTURELLE) 10 billion cell capsule Take 1 capsule by mouth once daily.      lamoTRIgine  "(LAMICTAL) 25 MG tablet Take 1 tablet (25 mg) by mouth daily x 7 days then increase to 2 tablets (50 mg) daily 60 tablet 11    methylPREDNISolone (MEDROL DOSEPACK) 4 mg tablet use as directed 1 each 0    methylPREDNISolone (MEDROL DOSEPACK) 4 mg tablet use as directed 1 each 0    multivitamin with minerals tablet Take 1 tablet by mouth once daily.      pregabalin (LYRICA) 150 MG capsule Take 1 capsule (150 mg total) by mouth 3 (three) times daily. 90 capsule 5    triamcinolone acetonide 0.1% (KENALOG) 0.1 % cream AAA abdomen bid 80 g 0    valsartan-hydrochlorothiazide (DIOVAN-HCT) 160-25 mg per tablet Take 1 tablet by mouth once daily. 90 tablet 1     Current Facility-Administered Medications on File Prior to Visit   Medication Dose Route Frequency Provider Last Rate Last Admin    triamcinolone acetonide injection 40 mg  40 mg Intradermal Once Ama Sylvester MD           Objective:      /77 (BP Location: Right arm, Patient Position: Sitting, BP Method: Large (Automatic))   Pulse 100   Ht 5' 4" (1.626 m)   Wt 120.7 kg (266 lb 1.5 oz)   SpO2 99%   BMI 45.68 kg/m²     Exam:  GEN:  Well developed, well nourished.  No acute distress.   HEENT:  No trauma.  Mucous membranes moist.  Nares patent bilaterally.  PSYCH: Normal affect. Thought content appropriate.  CHEST:  Breathing symmetric.  No audible wheezing.  ABD: Soft, non-distended.  SKIN:  Warm, pink, dry.  No rash on exposed areas.    EXT:  No cyanosis, clubbing, or edema.  No color change or changes in nail or hair growth.  NEURO/MUSCULOSKELETAL:  Fully alert, oriented, and appropriate. Speech normal juan. No cranial nerve deficits.   Gait:  Antalgic.  Ambulates with crutch.  No focal motor deficits.       Imaging:      Narrative & Impression    EXAMINATION:  XR LUMBAR SPINE AP AND LAT WITH FLEX/EXT     CLINICAL HISTORY:  Other intervertebral disc degeneration, lumbar region     TECHNIQUE:  5 views of the lumbar spine with flexion/extension " views     COMPARISON:  CT abdomen/pelvis 06/02/2020 lumbar spine radiographs 01/27/2017     FINDINGS:  Alignment: Normal lumbar lordosis is maintained without significant listhesis or evidence of dynamic instability on flexion/extension views.     Vertebrae: Vertebral body heights are maintained.  Posterior elements are intact.  Pedicles/spinous processes are well delineated.  No acute displaced fracture, dislocation or suspicious appearing lytic/blastic osseous lesions.     Discs and facets: Mild-moderate disc space height loss at L4-5 associated with sclerotic endplate changes and osteophytosis.  Otherwise, remaining lumbar level disc heights are maintained. Facet joints are unremarkable without evidence of advanced osseous arthrosis or severe osseous neuroforaminal narrowing.     Miscellaneous: Gastric band is in place with discontinuity along the associated catheter at the level of the left upper quadrant pump device, unchanged when compared to CT dated June 2020.     Impression:     1. Mild-moderate disc space height loss at L4-5 associated with sclerotic endplate changes and osteophytosis period.  Otherwise, lumbar spine radiographs are grossly unremarkable.        Electronically signed by: Santos Mejia  Date:                                            11/17/2020  Time:                                           08:57             Assessment:       Encounter Diagnosis   Name Primary?    Sjogren's syndrome with inflammatory arthritis Yes         Plan:       Alberto was seen today for knee pain.    Diagnoses and all orders for this visit:    Sjogren's syndrome with inflammatory arthritis        Alberto Frye is a 47 y.o. female with chronic widespread body pain as well as focal left knee pain.  She is already followed by Rheumatology and Orthopedic surgery.    1.  Continue treatment as per rheumatology in Orthopedic surgery.  2.  Return to clinic as needed.

## 2022-03-24 ENCOUNTER — PATIENT MESSAGE (OUTPATIENT)
Dept: FAMILY MEDICINE | Facility: CLINIC | Age: 47
End: 2022-03-24
Payer: COMMERCIAL

## 2022-03-24 VITALS
BODY MASS INDEX: 45.41 KG/M2 | HEIGHT: 64 IN | SYSTOLIC BLOOD PRESSURE: 125 MMHG | OXYGEN SATURATION: 98 % | HEART RATE: 100 BPM | TEMPERATURE: 98 F | WEIGHT: 266 LBS | RESPIRATION RATE: 20 BRPM | DIASTOLIC BLOOD PRESSURE: 77 MMHG

## 2022-03-24 NOTE — ED TRIAGE NOTES
Pt reports left knee pain and swelling and chest pressure started 2 days ago. Pt reports hx of DVT last in 2020. Denies injury to knee.  Pt reports she is currently on Eliquis.

## 2022-03-24 NOTE — ED PROVIDER NOTES
"Encounter Date: 3/23/2022    SCRIBE #1 NOTE: I, Keila Barreto, am scribing for, and in the presence of,  Nam Sigala MD. I have scribed the following portions of the note - Other sections scribed: HPI, ROS, PE.       History     Chief Complaint   Patient presents with    Chest Pain    Knee Pain     Presents with left leg and knee pain and swelling, verbalizes concerns with history of DVT, also reports a chest pain unable to describe, states "it's not a 10"     Alberto Frye is a 47 y.o female with a PMHx of CHF, HTN, and DVT presenting to the ED with a chief complaint of chest pain onset "a while ago". The patient complains of chest pain and tightness that has been present for a while and is not severe, is just a "constant feeling". Additionally complains of 12/10 left leg pain that started several days ago and has been consistently worsening. States she has had 2 DVT's in the past with her last being in 2020, and states she thinks she might have another one right now. Patient is compliant with Eliquis. States her rheumatologist diagnosed her with fibromyalgia earlier this week. Reports chronic Norco use secondary to having a torn meniscus in her right leg. Reports quitting cigarette smoking in 2016. Denies history of asthma and COPD. Additionally denies shortness of breath, abdominal pain, diarrhea, and vomiting. No other associated symptoms.     The history is provided by the patient. No  was used.     Review of patient's allergies indicates:   Allergen Reactions    Morphine Itching and Hallucinations    Pcn [penicillins] Other (See Comments)     Was told from childhood she couldn't take it    Sulfa (sulfonamide antibiotics) Nausea And Vomiting    Latex, natural rubber Rash     Past Medical History:   Diagnosis Date    Alcohol abuse     stopped heavy drinking about 10 years ago; was drinking 3 glasses of vodka/tequilla,rum/whiskey per day    Allergy Don't remember    Its " been some years.    Anxiety     Arthritis 2010    Blood clotting tendency 2008    After a procedure & 2020.    Congestive heart failure 8/11/2020    Depression     Hallucination     Hx of psychiatric care     Hypertension     Immune disorder 2020    Joint pain 2010    Keloid cicatrix Years ago    From childhood scars    Caro     unplanned trips, energy without sleep for 2 days (reading, cleaning), feelings that she can do multiple tasks at one time, feelings of overconfidence at times    Psychiatric problem     Sleep difficulties     Therapy     Withdrawal symptoms, drug or narcotic     racing heart, restlessness     Past Surgical History:   Procedure Laterality Date     lapban surgery  2009    ESOPHAGOGASTRODUODENOSCOPY N/A 6/3/2020    Procedure: EGD (ESOPHAGOGASTRODUODENOSCOPY);  Surgeon: Johan Grover MD;  Location: [facility] ENDO (4TH FLR);  Service: Endoscopy;  Laterality: N/A;  covid 6/2-[facility]    HYSTERECTOMY      PHLEBOGRAPHY Left 8/12/2020    Procedure: Venogram, pharmacomechanical thrombectomy;  Surgeon: Miguelangel Goldman MD;  [facility];  Service: Vascular;  Laterality: Left;  2336.43 mGy  [id number]  17.8 minutes  37 ml of contrast    VENOPLASTY Left 8/12/2020    Procedure: ANGIOPLASTY, VEIN;  Surgeon: Miguelangel Goldman MD;  [facility];  Service: Vascular;  Laterality: Left;     Family History   Problem Relation Age of Onset    Diabetes Mother     Cancer Mother     Hypertension Mother     Cirrhosis Maternal Uncle         ETOH    Celiac disease Neg Hx     Colon cancer Neg Hx     Colon polyps Neg Hx     Crohn's disease Neg Hx     Esophageal cancer Neg Hx     Inflammatory bowel disease Neg Hx     Liver cancer Neg Hx     Liver disease Neg Hx     Rectal cancer Neg Hx     Stomach cancer Neg Hx     Ulcerative colitis Neg Hx      Social History     Tobacco Use    Smoking status: Former Smoker     Packs/day: 0.00      Years: 0.00     Pack years: 0.00    Smokeless tobacco: Never Used    Tobacco comment: quit in october 2016   Substance Use Topics    Alcohol use: Yes     Alcohol/week: 1.0 - 3.0 standard drink     Types: 1 - 3 Glasses of wine per week     Comment: social presently, excessive 10 years ago    Drug use: Never     Types: Marijuana     Comment: uses [employer] weekly     Review of Systems   Constitutional: Negative for chills and fever.   HENT: Negative for congestion, postnasal drip, rhinorrhea, sinus pain and sore throat.    Eyes: Negative for photophobia, pain and redness.   Respiratory: Positive for chest tightness. Negative for cough and shortness of breath.    Cardiovascular: Positive for chest pain.   Gastrointestinal: Negative for abdominal pain, diarrhea, nausea and vomiting.   Endocrine: Negative for cold intolerance and heat intolerance.   Genitourinary: Negative for dysuria, flank pain, hematuria and urgency.   Musculoskeletal: Positive for arthralgias (left leg pain). Negative for back pain and neck pain.   Skin: Negative for rash.   Neurological: Negative for syncope, numbness and headaches.   Hematological: Does not bruise/bleed easily.   Psychiatric/Behavioral: Negative for agitation, behavioral problems and confusion. The patient is not nervous/anxious.        Physical Exam     Initial Vitals [03/23/22 2105]   BP Pulse Resp Temp SpO2   104/60 100 20 98.1 °F (36.7 °C) 98 %      MAP       --         Physical Exam    Nursing note and vitals reviewed.  Constitutional: She appears well-developed and well-nourished. She is not diaphoretic. No distress.   HENT:   Head: Normocephalic and atraumatic.   Eyes: Conjunctivae are normal. Right eye exhibits no discharge. Left eye exhibits no discharge. No scleral icterus.   Neck: No tracheal deviation present. No JVD present.   Cardiovascular: Normal rate, regular rhythm, normal heart sounds and intact distal pulses. Exam reveals no gallop and no friction rub.    No  murmur heard.  Pulmonary/Chest: Breath sounds normal. No stridor. No respiratory distress. She has no wheezes. She has no rhonchi. She has no rales. She exhibits no tenderness.   Abdominal: Abdomen is soft. She exhibits no distension and no mass. There is no abdominal tenderness. There is no rebound and no guarding.   Musculoskeletal:         General: No tenderness or edema. Normal range of motion.     Neurological: She is alert and oriented to person, place, and time. She has normal strength. GCS score is 15. GCS eye subscore is 4. GCS verbal subscore is 5. GCS motor subscore is 6.   VAZQUEZ's with NGND's   Skin: Skin is warm. Capillary refill takes less than 2 seconds. No rash and no abscess noted. No erythema. No pallor.   Psychiatric: She has a normal mood and affect. Her behavior is normal. Judgment and thought content normal.         ED Course   Procedures  Labs Reviewed   COMPREHENSIVE METABOLIC PANEL - Abnormal; Notable for the following components:       Result Value    eGFR if non  59 (*)     All other components within normal limits   CBC W/ AUTO DIFFERENTIAL   TROPONIN I   B-TYPE NATRIURETIC PEPTIDE   D DIMER, QUANTITATIVE   MAGNESIUM     EKG Readings: (Independently Interpreted)   Initial Reading: No STEMI. Rhythm: Normal Sinus Rhythm. Heart Rate: 100. Ectopy: No Ectopy. Conduction: Normal. ST Segments: Normal ST Segments. T Waves Flipped: AVR and III. Axis: Normal. Clinical Impression: Normal Sinus Rhythm     ECG Results          EKG 12-lead (Final result)  Result time 03/24/22 15:41:28    Final result by Interface, Lab In [facility] (03/24/22 15:41:28)                 Narrative:    Test Reason : R07.9,    Vent. Rate : 100 BPM     Atrial Rate : 100 BPM     P-R Int : 116 ms          QRS Dur : 078 ms      QT Int : 356 ms       P-R-T Axes : 052 009 -14 degrees     QTc Int : 459 ms    Normal sinus rhythm  Moderate voltage criteria for LVH, may be normal variant  Nonspecific T wave  abnormality  Abnormal ECG  When compared with ECG of 11-AUG-2020 06:07,  No significant change was found  Confirmed by Chandler Bates MD ([id number]) on 3/24/2022 3:41:24 PM    Referred By: GOOD   SELF           Confirmed By:Chandler Bates MD                            Imaging Results          US Lower Extremity Veins Bilateral (Final result)  Result time 03/23/22 22:54:12    Final result by Nata Garcia MD (03/23/22 22:54:12)                 Impression:      No evidence of lower extremity deep venous thrombosis.      Electronically signed by: Nata Garcia MD  Date:    03/23/2022  Time:    22:54             Narrative:    EXAMINATION:  US LOWER EXTREMITY VEINS BILATERAL    CLINICAL HISTORY:  Pain in left lower leg    TECHNIQUE:  Duplex and color flow Doppler evaluation of the bilateral lower extremity veins was performed.    COMPARISON:  None    FINDINGS:  No evidence of clot involving the bilateral common femoral, greater saphenous, femoral, popliteal, peroneal, anterior and posterior tibial veins.  All venous structures demonstrate normal respiratory phasicity and augment adequately.  No evidence of soft tissue mass or Baker's cyst.                               X-Ray Chest AP Portable (Final result)  Result time 03/23/22 22:02:07    Final result by Nata Garcia MD (03/23/22 22:02:07)                 Impression:      No acute cardiopulmonary process identified.      Electronically signed by: Nata Garcia MD  Date:    03/23/2022  Time:    22:02             Narrative:    EXAMINATION:  XR CHEST AP PORTABLE    CLINICAL HISTORY:  Chest Pain;    TECHNIQUE:  Single frontal view of the chest was performed.    COMPARISON:  August 2020.    FINDINGS:  Cardiac silhouette is normal in size.  Lungs are symmetrically expanded.  No evidence of focal consolidative process, pneumothorax, or significant pleural effusion.  No acute osseous abnormality identified.                                 Medications   aspirin tablet  325 mg (325 mg Oral Given 3/23/22 2134)   HYDROmorphone (PF) injection 1 mg (1 mg Intravenous Given 3/23/22 2134)     Medical Decision Making:   History:   Old Medical Records: I decided to obtain old medical records.  Independently Interpreted Test(s):   I have ordered and independently interpreted EKG Reading(s) - see prior notes  Clinical Tests:   Lab Tests: Ordered and Reviewed  Radiological Study: Ordered and Reviewed  Medical Tests: Ordered and Reviewed          Scribe Attestation:   Scribe #1: I performed the above scribed service and the documentation accurately describes the services I performed. I attest to the accuracy of the note.                 Pt arrived alert, afebrile, non-toxic in appearance, in no acute respiratory distress with VSS.  EKG revealed no acute findings concerning for ischemia, arrhythmia, heart block or any pathology to warrant cardiology consultation.  D-dimer negative .  Labs returned unremarkable.  Low risk for chest pain by HEART score.  US negative for DVT.  Secondary exam was unremarkable with no findings to warrant further evaluation.  Pt discharged and counseled on the need to return to the nearest emergency room if they experience any other concerning signs or symptoms.  Pt given information for outpatient follow-up as well.    Nam Sigala MD                  Clinical Impression:   Final diagnoses:  [R07.9] Chest pain  [M79.662, M79.89] Pain and swelling of left lower leg          I, Nam Sigala, personally performed the services described in this documentation. All medical record entries made by the scribe were at my direction and in my presence.  I have reviewed the chart and agree that the record reflects my personal performance and is accurate and complete.    Nam Sigala MD           ED Disposition Condition    Discharge Stable        ED Prescriptions     None        Follow-up Information     Follow up With Specialties Details Why Contact Info     Ian Cespedes MD Internal Medicine Schedule an appointment as soon as possible for a visit in 1 week to follow-up on today's visit [address]  [place] [address]  [phone]      [facility] Emergency Medicine Go to  As needed, If symptoms worsen [address]  [facility] [id number] [phone]           Nam Sigala MD  03/26/22 0812       Nam Sigala MD  03/26/22 0812

## 2022-03-24 NOTE — DISCHARGE INSTRUCTIONS
Please return to the nearest emergency department if you continue to experience severe pain, leg swelling chest pain, shortness of breath or any other concerning symptoms

## 2022-03-28 ENCOUNTER — PATIENT MESSAGE (OUTPATIENT)
Dept: FAMILY MEDICINE | Facility: CLINIC | Age: 47
End: 2022-03-28
Payer: COMMERCIAL

## 2022-03-28 NOTE — LETTER
March 28, 2022      [facility]  [address]  [place] [address]  Phone: [phone]  Fax: [phone]       Patient: Alberto Frye  YOB: 1975      To Whom It May Concern:    Alberto Frye  was seen and evaluated at [facility].  Due to her medical condition she requires the use of crutches while at work.  She'll need this until at least 4/13/2022.  If you have any questions or concerns, or if I can be of further assistance, please do not hesitate to contact me.      Sincerely,        Ian Cespedes MD

## 2022-03-30 ENCOUNTER — PATIENT MESSAGE (OUTPATIENT)
Dept: FAMILY MEDICINE | Facility: CLINIC | Age: 47
End: 2022-03-30
Payer: COMMERCIAL

## 2022-03-30 DIAGNOSIS — J06.9 VIRAL URI: Primary | ICD-10-CM

## 2022-03-30 RX ORDER — BENZONATATE 100 MG/1
100 CAPSULE ORAL 3 TIMES DAILY PRN
Qty: 30 CAPSULE | Refills: 0 | Status: SHIPPED | OUTPATIENT
Start: 2022-03-30 | End: 2022-04-05 | Stop reason: ALTCHOICE

## 2022-03-30 RX ORDER — IPRATROPIUM BROMIDE 42 UG/1
2 SPRAY, METERED NASAL 3 TIMES DAILY
Qty: 30 ML | Refills: 0 | Status: SHIPPED | OUTPATIENT
Start: 2022-03-30 | End: 2022-04-06

## 2022-04-05 ENCOUNTER — OFFICE VISIT (OUTPATIENT)
Dept: CARDIOLOGY | Facility: CLINIC | Age: 47
End: 2022-04-05
Payer: COMMERCIAL

## 2022-04-05 ENCOUNTER — OFFICE VISIT (OUTPATIENT)
Dept: FAMILY MEDICINE | Facility: CLINIC | Age: 47
End: 2022-04-05
Payer: COMMERCIAL

## 2022-04-05 VITALS
BODY MASS INDEX: 46.44 KG/M2 | HEIGHT: 64 IN | TEMPERATURE: 98 F | HEART RATE: 92 BPM | SYSTOLIC BLOOD PRESSURE: 116 MMHG | WEIGHT: 272 LBS | OXYGEN SATURATION: 98 % | DIASTOLIC BLOOD PRESSURE: 80 MMHG

## 2022-04-05 VITALS
DIASTOLIC BLOOD PRESSURE: 80 MMHG | HEART RATE: 94 BPM | RESPIRATION RATE: 16 BRPM | HEIGHT: 64 IN | OXYGEN SATURATION: 100 % | BODY MASS INDEX: 46.6 KG/M2 | SYSTOLIC BLOOD PRESSURE: 116 MMHG | WEIGHT: 272.94 LBS

## 2022-04-05 DIAGNOSIS — M79.7 FIBROMYALGIA: Primary | ICD-10-CM

## 2022-04-05 DIAGNOSIS — I10 ESSENTIAL HYPERTENSION: ICD-10-CM

## 2022-04-05 DIAGNOSIS — I10 ESSENTIAL HYPERTENSION: Primary | ICD-10-CM

## 2022-04-05 DIAGNOSIS — I82.409 RECURRENT DEEP VEIN THROMBOSIS (DVT): ICD-10-CM

## 2022-04-05 DIAGNOSIS — R00.2 PALPITATIONS: ICD-10-CM

## 2022-04-05 DIAGNOSIS — E78.5 DYSLIPIDEMIA: ICD-10-CM

## 2022-04-05 DIAGNOSIS — M35.05 SJOGREN'S SYNDROME WITH INFLAMMATORY ARTHRITIS: ICD-10-CM

## 2022-04-05 DIAGNOSIS — E66.01 CLASS 3 SEVERE OBESITY WITH SERIOUS COMORBIDITY AND BODY MASS INDEX (BMI) OF 45.0 TO 49.9 IN ADULT, UNSPECIFIED OBESITY TYPE: ICD-10-CM

## 2022-04-05 DIAGNOSIS — R07.89 CHEST PAIN, ATYPICAL: ICD-10-CM

## 2022-04-05 DIAGNOSIS — R06.09 DOE (DYSPNEA ON EXERTION): ICD-10-CM

## 2022-04-05 DIAGNOSIS — Z79.01 CHRONIC ANTICOAGULATION: ICD-10-CM

## 2022-04-05 PROCEDURE — 1159F PR MEDICATION LIST DOCUMENTED IN MEDICAL RECORD: ICD-10-PCS | Mod: CPTII,S$GLB,, | Performed by: INTERNAL MEDICINE

## 2022-04-05 PROCEDURE — 99214 PR OFFICE/OUTPT VISIT, EST, LEVL IV, 30-39 MIN: ICD-10-PCS | Mod: S$GLB,,, | Performed by: INTERNAL MEDICINE

## 2022-04-05 PROCEDURE — 1160F RVW MEDS BY RX/DR IN RCRD: CPT | Mod: CPTII,S$GLB,, | Performed by: INTERNAL MEDICINE

## 2022-04-05 PROCEDURE — 3008F BODY MASS INDEX DOCD: CPT | Mod: CPTII,S$GLB,, | Performed by: INTERNAL MEDICINE

## 2022-04-05 PROCEDURE — 99999 PR PBB SHADOW E&M-EST. PATIENT-LVL V: ICD-10-PCS | Mod: PBBFAC,,, | Performed by: INTERNAL MEDICINE

## 2022-04-05 PROCEDURE — 3074F SYST BP LT 130 MM HG: CPT | Mod: CPTII,S$GLB,, | Performed by: INTERNAL MEDICINE

## 2022-04-05 PROCEDURE — 99214 OFFICE O/P EST MOD 30 MIN: CPT | Mod: S$GLB,,, | Performed by: INTERNAL MEDICINE

## 2022-04-05 PROCEDURE — 99999 PR PBB SHADOW E&M-EST. PATIENT-LVL V: CPT | Mod: PBBFAC,,, | Performed by: INTERNAL MEDICINE

## 2022-04-05 PROCEDURE — 3008F PR BODY MASS INDEX (BMI) DOCUMENTED: ICD-10-PCS | Mod: CPTII,S$GLB,, | Performed by: INTERNAL MEDICINE

## 2022-04-05 PROCEDURE — 3074F PR MOST RECENT SYSTOLIC BLOOD PRESSURE < 130 MM HG: ICD-10-PCS | Mod: CPTII,S$GLB,, | Performed by: INTERNAL MEDICINE

## 2022-04-05 PROCEDURE — 3079F DIAST BP 80-89 MM HG: CPT | Mod: CPTII,S$GLB,, | Performed by: INTERNAL MEDICINE

## 2022-04-05 PROCEDURE — 1160F PR REVIEW ALL MEDS BY PRESCRIBER/CLIN PHARMACIST DOCUMENTED: ICD-10-PCS | Mod: CPTII,S$GLB,, | Performed by: INTERNAL MEDICINE

## 2022-04-05 PROCEDURE — 1159F MED LIST DOCD IN RCRD: CPT | Mod: CPTII,S$GLB,, | Performed by: INTERNAL MEDICINE

## 2022-04-05 PROCEDURE — 3079F PR MOST RECENT DIASTOLIC BLOOD PRESSURE 80-89 MM HG: ICD-10-PCS | Mod: CPTII,S$GLB,, | Performed by: INTERNAL MEDICINE

## 2022-04-05 RX ORDER — CLONIDINE 0.1 MG/24H
1 PATCH, EXTENDED RELEASE TRANSDERMAL
Qty: 4 PATCH | Refills: 11 | Status: SHIPPED | OUTPATIENT
Start: 2022-04-05 | End: 2022-05-17

## 2022-04-05 NOTE — PROGRESS NOTES
Subjective:    Patient ID:  Alberto Frye is a 47 y.o. female who presents for follow-up of No chief complaint on file.      HPI     HTN, recurrent DVT - s/p thrombectomy 8/13/20, IVC filter - on eliquis     Admitted 8/11/20  Ms. Alberto Frye is a 45 y.o. female with HTN, anxiety, alcohol abuse, DVT (10 years ago, treated with IVC filter, warfarin) who was transferred to [facility] [place] thrombectomy/stent placement of left lower limb thrombus.  Patient originally presented to [facility] yesterday with one day of left thigh pain and leg swelling.  Patient states that this pain is in the same leg and pain is same as previous DVT diagnosed 10 years ago while in hospital for hysterectomy.  DVT at that time was treated with IVC filter and pt was on warfarin for a few months.  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  Denies any recent long distance travel, trauma, OCP/hormone replacement use.  Patient denies any fevers, chills, night sweats, chest pain, SOB, palpitations.  Patient is a former smoker, 10-15y of ~1pack/month, quit in 2016.  Patient states that she drinks socially.       Left lower limb ultrasound on 8/11 showed occlusive thrombus involving left common femoral, popliteal, upper greater saphenous, left iliac vein and distal inferior vena cava.  Patient started on lovenox.  Patient was seen by vascular surgery (Dr Goldman) who decided to transfer patient to [facility] for thrombectomy/stent placement.      Patient underwent thrombectomy with vascular surgery 8/12/20, tolerating post-procedural symptoms well. Vascular initially managing heparin gtt. On 8/13/20, one day post-op vascular surgery recommending changing heparin gtt to apixaban. Patient with mild hematuria, UA showing no signs of infection, 3+ blood. And will w/u OP /IP for hypercoagulable etiologies to patient's presentation. HIT panel negative with low platelets, but platelet counts improved and stabilized at  slightly low levels before discharge. The patient's blood pressure was normal in the hospital with her clonidine patch so her other home blood pressure meds were not re-started, and she will be followed up with out-patient within 1 week with her PCP for a new CBC to look at her H/h, along with her platelet counts, and for her blood pressure monitoring. Pt will also follow up with vascular.      * Acute deep vein thrombosis (DVT) of iliac vein of left lower extremity-resolved as of 8/15/2020  US LE shows Left thigh vein occlusive thrombus involving the common femoral, popliteal, and upper greater saphenous veins with minimal flow.  Patient had previous DVT in same leg ~10 years ago, treated with IVC filter and warfarin for a few months.  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       - vascular surgery took patient to OR s/p thrombectomy on 8/13/20. Heparin gtt transitioned to apixaban 10mg BID  - Patient on protonix 40mg daily  - Patient with down trending platelets will continue to monitor. High 4t's score this AM, negative HIT. Uptrending plts thie PM. Patient transitioned to apixaban.  - Patient with hematuria yesterday; no evidence of bleeding or hematuria today. Plts <50k and bleeding will transfuse     Echo 8/11/20  · Normal left ventricular systolic function. The estimated ejection fraction is 60%.  · Normal LV diastolic function.  · Normal right ventricular systolic function.  · The estimated PA systolic pressure is 23 mmHg.     8/19/20 Denies CP or SOB. Started with a mild rash since being placed on eliquis  Scheduled to f/u with vascular surgery at [facility] and see hematology tomorrow  BP well controlled   Recurrent DVT per hematology and vascular surgery  ? Allergic reaction to eliquis - will observe  Echo with good EF - no evidence of CHF  HTN under control  Will f/u 1 year    Went to the ER 3/23/22  Alberto  "Uday is a 47 y.o female with a PMHx of CHF, HTN, and DVT presenting to the ED with a chief complaint of chest pain onset "a while ago". The patient complains of chest pain and tightness that has been present for a while and is not severe, is just a "constant feeling". Additionally complains of 12/10 left leg pain that started several days ago and has been consistently worsening. States she has had 2 DVT's in the past with her last being in 2020, and states she thinks she might have another one right now. Patient is compliant with Eliquis. States her rheumatologist diagnosed her with fibromyalgia earlier this week. Reports chronic Norco use secondary to having a torn meniscus in her right leg. Reports quitting cigarette smoking in 2016. Denies history of asthma and COPD. Additionally denies shortness of breath, abdominal pain, diarrhea, and vomiting. No other associated symptoms.   EKG 3/23/22 NSR NSSTT changes    4/5/22 Still with episodic CP, heart racing and LEON  BP controlled    Review of Systems   Constitutional: Negative for decreased appetite.   HENT: Negative for ear discharge.    Eyes: Negative for blurred vision.   Respiratory: Negative for hemoptysis.    Endocrine: Negative for polyphagia.   Hematologic/Lymphatic: Negative for adenopathy.   Skin: Negative for color change.   Musculoskeletal: Negative for joint swelling.   Genitourinary: Negative for bladder incontinence.   Neurological: Negative for brief paralysis.   Psychiatric/Behavioral: Negative for hallucinations.   Allergic/Immunologic: Negative for hives.        Objective:    Physical Exam  Constitutional:       Appearance: She is well-developed.   HENT:      Head: Normocephalic and atraumatic.   Eyes:      Conjunctiva/sclera: Conjunctivae normal.      Pupils: Pupils are equal, round, and reactive to light.   Cardiovascular:      Rate and Rhythm: Normal rate.      Pulses: Intact distal pulses.      Heart sounds: Normal heart sounds. "   Pulmonary:      Effort: Pulmonary effort is normal.      Breath sounds: Normal breath sounds.   Abdominal:      General: Bowel sounds are normal.      Palpations: Abdomen is soft.   Musculoskeletal:         General: Normal range of motion.      Cervical back: Normal range of motion and neck supple.   Skin:     General: Skin is warm and dry.   Neurological:      Mental Status: She is alert and oriented to person, place, and time.           Assessment:       1. Essential hypertension    2. Dyslipidemia    3. Palpitations    4. Chest pain, atypical    5. LEON (dyspnea on exertion)         Plan:       Echo and lexiscan myoview for CP and LEON - cannot walk treadmill due to leg pain from DVT  Holter for palpitations  Continue Rx for HTN, HLD, DM

## 2022-04-05 NOTE — PROGRESS NOTES
This note was created by combination of typed  and M-Modal dictation.  Transcription errors may be present.  If there are any questions, please contact me.    Assessment and Plan:   Fibromyalgia  Sjogren's syndrome with inflammatory arthritis  -long discussion with the patient.  Discuss of fibromyalgia can be a consequence of other conditions resulting in chronic pain.  And resultant magnification of pain.  Was started on higher dose Lyrica 3/22, would give it a full month to get it fully in her system.  She is not looking for to walking as a regimen for exercise because of pain.  But no pain in her upper arms  So focus on upper arm exercises, chair based exercise handout provided  Hydrocodone per Rheumatology    Class 3 severe obesity with serious comorbidity and body mass index (BMI) of 45.0 to 49.9 in adult, unspecified obesity type  -work on TLC as above    Essential hypertension  -recalls a history of ACE-inhibitor  Then torsemide  Which was stopped in the summer of 2020  Clonidine patch was started after ED visit with extremely high blood pressure  And then valsartan HCTZ was started  She notes fatigue.  The fatigue can be multifactorial including from Sjogren's, fibromyalgia, or a number of her medications  But clonidine could be a contributor.  Would wean down.  Decrease clonidine patch to 0.1  Stay on valsartan HCTZ  If BP goes up, increase the valsartan HCTZ  Next would be nifedipine  Next visit in May, if tolerating lower dose clonidine, switch to pills, take 0.1 daily for 10 days and then stop  Repeat echo ordered by Cardiology  Last echo done August 2020, normal systolic and diastolic function.  Normal LFTs.  TSH mildly elevated though free T4 was normal.  Her peripheral edema think is more from venous return than from organ dysfunction  -     cloNIDine 0.1 mg/24 hr td ptwk (CATAPRES) 0.1 mg/24 hr; Place 1 patch onto the skin every 7 days.  Dispense: 4 patch; Refill: 11    Recurrent deep vein  thrombosis (DVT) with hx of IVC filter; indefinite anticoagulation  Chronic anticoagulation  -most recent ultrasound no DVT.  On chronic anticoagulation for recurrent DVT.      Medications Discontinued During This Encounter   Medication Reason    methylPREDNISolone (MEDROL DOSEPACK) 4 mg tablet Therapy completed    fluticasone (FLONASE) 50 mcg/actuation nasal spray Therapy completed    cyclobenzaprine (FLEXERIL) 10 MG tablet Therapy completed    benzonatate (TESSALON) 100 MG capsule Therapy completed       meds sent this encounter:  Medications Ordered This Encounter   Medications    cloNIDine 0.1 mg/24 hr td ptwk (CATAPRES) 0.1 mg/24 hr     Sig: Place 1 patch onto the skin every 7 days.     Dispense:  4 patch     Refill:  11     Decreased dose       Follow Up: No follow-ups on file.  Follow-up in May with visit TSH (previous mild abnormal)    Subjective:     Chief Complaint   Patient presents with    Leg Pain       HPI  Alberto is a 47 y.o. female, last appointment with this clinic was 2/28/2022.  Social History     Tobacco Use    Smoking status: Former Smoker     Packs/day: 0.00     Years: 0.00     Pack years: 0.00    Smokeless tobacco: Never Used    Tobacco comment: quit in october 2016   Substance Use Topics    Alcohol use: Yes     Alcohol/week: 1.0 - 3.0 standard drink     Types: 1 - 3 Glasses of wine per week     Comment: social presently, excessive 10 years ago      Social History     Occupational History    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[employer]      Social History     Social History Narrative    Has 3 healthy grown sons (1990, 1993, 1998) Lives alone.    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   in 2010.    Has Male partner since 2014 who is currently disabled.       No LMP recorded. Patient has had a hysterectomy.    Anxiety with panic attacks with underlying bipolar.  Small prescription for  Klonopin.  Hyperlipidemia with incidental vascular calcifications on mammogram with elevated HS CRP.  Started on atorvastatin low dose.  Abnormal TSH repeat labs 6 months  Hypertension-hx of torsemide  7/2020 ER put her on clonidine.  And then placed on amlodipine  But did not find it controlled  So started on diovan hct  And stayed on clonidine throughout.  She saw Psychiatry 2/1.  Bipolar.  At last visit was started on Elavil for sleep.  On Prozac.  With plans to start Lamictal.  Started on Lamictal.  She saw Rheumatology. Sjogrens. Trial of cevillimine without relief.  On hydroxychloroquine.  Gabapentin without relief, changed to Lyrica.  Flexeril.    Knee sprain. Crutches. Referred to ortho  Saw ortho. Bilateral OA. Bilateral joint injections    Saw rheum in f/u. Widespread pain with change from monserrat to lyrica. Felt to be fibro flare. Increased lyrica. Refilled norco    ED 3/23. CXR neg. LE US no DVT    Presents today in follow-up.  She was diagnosed with fibromyalgia on top of Sjogren's by Rheumatology.  Her gabapentin was changed to Lyrica.  It seems like her current pain flares maybe coincidental to the change in early February.  She saw orthopedics and had injections for arthritis of her knees.  In regards to fibromyalgia diagnosis, she feels like it was present in [facility] for a long time.  She does feel like she is better compared to previous though not quite 100% controlled.  Taking Lyrica.  Also takes hydrocodone but takes it p.r.n..  Has not been a full month since the higher dose of Lyrica    Saw cardiology today.  Sensation of palpitations.  He ordered echo as well as stress testing and monitor.  Which has her feeling somewhat anxious.    Notes fatigue.  Discussed that she has multiple risk factors for fatigue including her chronic inflammatory condition, fibromyalgia, but also possible side effect of a number of her medications.  She has to wake up earlier in the mornings to get dressed now.  Cannot  just jump right out of bed.  Will see if we can wean her down off of clonidine and see if that helps out with her fatigue    She feels like overall her anxiety is being pretty well controlled with the current regimen.  She does not really have to take Klonopin.    URI symptoms have improved.  No longer taking the cough pills.  She found those worked well.  She is still using the nasal spray.    Answers for HPI/ROS submitted by the patient on 4/4/2022  activity change: No  unexpected weight change: Yes  neck pain: No  hearing loss: No  rhinorrhea: No  trouble swallowing: No  eye discharge: No  visual disturbance: No  chest tightness: No  wheezing: Yes  chest pain: Yes  palpitations: Yes  blood in stool: No  constipation: No  vomiting: No  diarrhea: No  polydipsia: No  polyuria: No  difficulty urinating: No  hematuria: No  menstrual problem: No  dysuria: No  joint swelling: Yes  arthralgias: Yes  headaches: No  weakness: Yes  confusion: No  dysphoric mood: Yes      Patient Care Team:  Ian Cespedes MD as PCP - General (Internal Medicine)  Funmilayo Roblero LPN as Licensed Practical Nurse  Chandler Bates MD as Consulting Physician (Cardiology)  Kari Tuttle MD as Consulting Physician (Hematology and Oncology)  Miguelangel Goldman MD as Consulting Physician (Vascular Surgery)  Johan Grover MD as Consulting Physician (Gastroenterology)  TIM Pierce MD as Consulting Physician (Urology)  Becca Paredes MD (Obstetrics and Gynecology)  Ross Hughes MD as Resident (Rheumatology)  Yeison Gibson III, NP as Nurse Practitioner (Psychiatry)    Patient Active Problem List    Diagnosis Date Noted    Palpitations 04/05/2022    Chest pain, atypical 04/05/2022    LEON (dyspnea on exertion) 04/05/2022    Mood insomnia 02/01/2022    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    Bipolar I disorder, current or most recent episode depressed, with psychotic features with anxious  distress 12/01/2021    Dyslipidemia 11/17/2021 9/2021 mammo incidental vascular calcifications  11/2021 low dost atorvastatin      Sjogren's syndrome with inflammatory arthritis 10/13/2021    Refractive error 02/19/2021    Long-term use of Plaquenil 02/19/2021    Pain in both hands 11/16/2020    Lumbar spondylosis 11/16/2020    DDD (degenerative disc disease), lumbar 11/16/2020    Recurrent deep vein thrombosis (DVT) with hx of IVC filter; indefinite anticoagulation 09/21/2020 08/11/2020 DVT left leg underwent pharmacomechanical thrombectomy 8/12 for iliofemoral DVT.  had had a prior DVT 10 years prior in the setting of surgery for hysterectomy at the time which she had IVC filter placed.      Recurrent UTI 09/21/2020 08/28/2020 cystoscopy normal  08/13/2020 renal ultrasound normal  06/02/2020 CT abdomen pelvis unremarkable.  IVC present.      Screening for colon cancer 11/2019 colonoscopy hemorrhoids and diverticulosis; [facility] 09/21/2020 11/2019 colonoscopy hemorrhoids and diverticulosis.      Chronic anticoagulation 09/21/2020    Chronic midline low back pain without sciatica 09/21/2020 1/2017 XR L spine: 5 views lumbar spine.  Vena cava filter right common L2-L3, and lap band apparatus left upper quadrant.  Mild levoscoliosis, lordosis lumbar spine.  Elevation of right pelvis.  Facet arthropathy L4-L5 levels.  No fracture subluxation.      Neuropathy 08/11/2020    Abdominal pain 06/03/2020 06/03/2020 EGD normal duodenum.  Erythematous mucosa of the gastric body and antrum and pre-pyloric region.  2 cm hiatal hernia.  Path negative.  No celiac.      Essential hypertension 01/22/2016 08/11/2020 TTE normal LV systolic function LVEF 60%.  Normal diastolic function.  Normal RV systolic function.  PA pressure 23.  History of empiric treatment for diverticulitis 04/22/2020, no imaging done at that time.    7/2020 ER put her on clonidine.  And then placed on amlodipine  But did  not find it controlled  So started on diovan hct  And stayed on clonidine throughout.      Seasonal allergies 01/22/2016    Anxiety 01/22/2016       PAST MEDICAL PROBLEMS, PAST SURGICAL HISTORY: please see relevant portions of the electronic medical record    ALLERGIES AND MEDICATIONS: updated and reviewed.  Review of patient's allergies indicates:   Allergen Reactions    Morphine Itching and Hallucinations    Pcn [penicillins] Other (See Comments)     Was told from childhood she couldn't take it    Sulfa (sulfonamide antibiotics) Nausea And Vomiting    Latex, natural rubber Rash       Medication List with Changes/Refills   Current Medications    AMITRIPTYLINE (ELAVIL) 50 MG TABLET    Take 1 tablet (50 mg total) by mouth every evening.    ATORVASTATIN (LIPITOR) 10 MG TABLET    Take 1 tablet (10 mg total) by mouth once daily.    BENZONATATE (TESSALON) 100 MG CAPSULE    Take 1 capsule (100 mg total) by mouth 3 (three) times daily as needed for Cough.    CLONAZEPAM (KLONOPIN) 0.5 MG TABLET    1/2 to 1 tablet daily as needed    CLONIDINE 0.2 MG/24 HR TD PTWK (CATAPRES) 0.2 MG/24 HR    Place 1 patch onto the skin every 7 days.    CYANOCOBALAMIN (VITAMIN B-12) 1000 MCG TABLET    Take 100 mcg by mouth once daily.    CYCLOBENZAPRINE (FLEXERIL) 10 MG TABLET    Take 1 tablet (10 mg total) by mouth every evening.    D-MANNOSE POWD    1 scoop daily    DICLOFENAC SODIUM (VOLTAREN) 1 % GEL    Apply the gel (4 g) to the affected area 4 times daily. Do not apply more than 16 g daily to any one affected joint    ELIQUIS 5 MG TAB    TAKE 1 TABLET BY MOUTH TWICE DAILY *START AFTER FINISHING FIRST PRESCRIPTION*    FLUOXETINE 40 MG CAPSULE    Take 1 capsule by mouth once daily    FLUTICASONE (FLONASE) 50 MCG/ACTUATION NASAL SPRAY    1 spray by Each Nostril route daily as needed for Rhinitis or Allergies.     HYDROCODONE-ACETAMINOPHEN (NORCO) 5-325 MG PER TABLET    Take 1 tablet by mouth every 6 (six) hours as needed for Pain.     HYDROCORTISONE 1 % CREAM    Apply topically 2 (two) times daily.    HYDROXYCHLOROQUINE (PLAQUENIL) 200 MG TABLET    Take 1 tablet (200 mg total) by mouth 2 (two) times daily.    HYDROXYZINE (ATARAX) 50 MG TABLET    Take 1 tablet (50 mg total) by mouth daily as needed for Anxiety (sleep).    IBUPROFEN (ADVIL,MOTRIN) 800 MG TABLET    Take 1 tablet (800 mg total) by mouth 3 (three) times daily.    IPRATROPIUM (ATROVENT) 42 MCG (0.06 %) NASAL SPRAY    2 sprays by Nasal route 3 (three) times daily. AS NEEDED FOR NASAL CONGESTION AND DRIP for 7 days    LACTOBACILLUS RHAMNOSUS GG (CULTURELLE) 10 BILLION CELL CAPSULE    Take 1 capsule by mouth once daily.    LAMOTRIGINE (LAMICTAL) 25 MG TABLET    Take 1 tablet (25 mg) by mouth daily x 7 days then increase to 2 tablets (50 mg) daily    METHYLPREDNISOLONE (MEDROL DOSEPACK) 4 MG TABLET    use as directed    METHYLPREDNISOLONE (MEDROL DOSEPACK) 4 MG TABLET    use as directed    MULTIVITAMIN WITH MINERALS TABLET    Take 1 tablet by mouth once daily.    PREGABALIN (LYRICA) 150 MG CAPSULE    Take 1 capsule (150 mg total) by mouth 3 (three) times daily.    TRIAMCINOLONE ACETONIDE 0.1% (KENALOG) 0.1 % CREAM    AAA abdomen bid    VALSARTAN-HYDROCHLOROTHIAZIDE (DIOVAN-HCT) 160-25 MG PER TABLET    Take 1 tablet by mouth once daily.      Review of Systems   HENT: Negative for hearing loss.    Eyes: Negative for discharge.   Respiratory: Positive for wheezing.    Cardiovascular: Positive for chest pain and palpitations.   Gastrointestinal: Negative for blood in stool, constipation, diarrhea and vomiting.   Genitourinary: Negative for dysuria and hematuria.   Musculoskeletal: Negative for neck pain.   Neurological: Positive for weakness. Negative for headaches.   Endo/Heme/Allergies: Negative for polydipsia.       Objective:   Physical Exam   Vitals:    04/05/22 1609   BP: 116/80   BP Location: Left arm   Patient Position: Sitting   BP Method: Large (Manual)   Pulse: 92   Temp: 98 °F  "(36.7 °C)   TempSrc: Oral   SpO2: 98%   Weight: 123.4 kg (272 lb)   Height: 5' 4" (1.626 m)    Body mass index is 46.69 kg/m².  Weight: 123.4 kg (272 lb)   Height: 5' 4" (162.6 cm)     Physical Exam  Constitutional:       General: She is not in acute distress.     Appearance: She is well-developed.   HENT:      Head: Normocephalic and atraumatic.   Eyes:      General: No scleral icterus.  Pulmonary:      Effort: Pulmonary effort is normal.   Musculoskeletal:      Right lower leg: Edema present.      Left lower leg: Edema present.      Comments: Tender pitting edema to mid tibia   Skin:     General: Skin is warm and dry.   Neurological:      Mental Status: She is alert and oriented to person, place, and time.   Psychiatric:         Behavior: Behavior normal.         Thought Content: Thought content normal.           "

## 2022-04-12 ENCOUNTER — PATIENT MESSAGE (OUTPATIENT)
Dept: RHEUMATOLOGY | Facility: CLINIC | Age: 47
End: 2022-04-12
Payer: COMMERCIAL

## 2022-04-12 RX ORDER — TIZANIDINE 4 MG/1
4 TABLET ORAL EVERY 6 HOURS PRN
Qty: 30 TABLET | Refills: 1 | Status: SHIPPED | OUTPATIENT
Start: 2022-04-12 | End: 2022-04-22

## 2022-04-18 ENCOUNTER — PATIENT MESSAGE (OUTPATIENT)
Dept: VASCULAR SURGERY | Facility: CLINIC | Age: 47
End: 2022-04-18
Payer: COMMERCIAL

## 2022-04-19 ENCOUNTER — PATIENT MESSAGE (OUTPATIENT)
Dept: VASCULAR SURGERY | Facility: CLINIC | Age: 47
End: 2022-04-19
Payer: COMMERCIAL

## 2022-04-19 ENCOUNTER — TELEPHONE (OUTPATIENT)
Dept: CARDIOLOGY | Facility: HOSPITAL | Age: 47
End: 2022-04-19
Payer: COMMERCIAL

## 2022-04-19 RX ORDER — APIXABAN 5 MG/1
TABLET, FILM COATED ORAL
Qty: 60 TABLET | Refills: 0 | Status: SHIPPED | OUTPATIENT
Start: 2022-04-19 | End: 2022-05-20

## 2022-04-20 ENCOUNTER — HOSPITAL ENCOUNTER (OUTPATIENT)
Dept: CARDIOLOGY | Facility: HOSPITAL | Age: 47
Discharge: HOME | End: 2022-04-20
Attending: INTERNAL MEDICINE
Payer: COMMERCIAL

## 2022-04-20 VITALS — WEIGHT: 272 LBS | BODY MASS INDEX: 46.44 KG/M2 | HEIGHT: 64 IN

## 2022-04-20 DIAGNOSIS — I82.422 ACUTE DEEP VEIN THROMBOSIS (DVT) OF ILIAC VEIN OF LEFT LOWER EXTREMITY: Primary | ICD-10-CM

## 2022-04-20 DIAGNOSIS — R07.89 CHEST PAIN, ATYPICAL: ICD-10-CM

## 2022-04-20 DIAGNOSIS — E78.5 DYSLIPIDEMIA: ICD-10-CM

## 2022-04-20 DIAGNOSIS — I10 ESSENTIAL HYPERTENSION: ICD-10-CM

## 2022-04-20 DIAGNOSIS — R06.09 DOE (DYSPNEA ON EXERTION): ICD-10-CM

## 2022-04-20 DIAGNOSIS — R00.2 PALPITATIONS: ICD-10-CM

## 2022-04-20 LAB
AV INDEX (PROSTH): 0.44
AV MEAN GRADIENT: 5 MMHG
AV PEAK GRADIENT: 9 MMHG
AV VALVE AREA: 1.27 CM2
AV VELOCITY RATIO: 0.49
BSA FOR ECHO PROCEDURE: 2.36 M2
CV ECHO LV RWT: 0.42 CM
DOP CALC AO PEAK VEL: 1.46 M/S
DOP CALC AO VTI: 33.82 CM
DOP CALC LVOT AREA: 2.9 CM2
DOP CALC LVOT DIAMETER: 1.92 CM
DOP CALC LVOT PEAK VEL: 0.72 M/S
DOP CALC LVOT STROKE VOLUME: 43.06 CM3
DOP CALC MV VTI: 27.72 CM
DOP CALCLVOT PEAK VEL VTI: 14.88 CM
E WAVE DECELERATION TIME: 133.69 MSEC
E/A RATIO: 1.53
E/E' RATIO: 9.2 M/S
ECHO LV POSTERIOR WALL: 0.93 CM (ref 0.6–1.1)
EJECTION FRACTION: 60 %
FRACTIONAL SHORTENING: 32 % (ref 28–44)
INTERVENTRICULAR SEPTUM: 0.81 CM (ref 0.6–1.1)
IVRT: 95.15 MSEC
LA MAJOR: 4.44 CM
LA MINOR: 4.81 CM
LA WIDTH: 3.92 CM
LEFT ATRIUM SIZE: 4.54 CM
LEFT ATRIUM VOLUME INDEX: 31.3 ML/M2
LEFT ATRIUM VOLUME: 69.85 CM3
LEFT INTERNAL DIMENSION IN SYSTOLE: 3.01 CM (ref 2.1–4)
LEFT VENTRICLE DIASTOLIC VOLUME INDEX: 39.96 ML/M2
LEFT VENTRICLE DIASTOLIC VOLUME: 89.1 ML
LEFT VENTRICLE MASS INDEX: 55 G/M2
LEFT VENTRICLE SYSTOLIC VOLUME INDEX: 15.8 ML/M2
LEFT VENTRICLE SYSTOLIC VOLUME: 35.24 ML
LEFT VENTRICULAR INTERNAL DIMENSION IN DIASTOLE: 4.43 CM (ref 3.5–6)
LEFT VENTRICULAR MASS: 123.69 G
LV LATERAL E/E' RATIO: 8.85 M/S
LV SEPTAL E/E' RATIO: 9.58 M/S
MV MEAN GRADIENT: 1 MMHG
MV PEAK A VEL: 0.75 M/S
MV PEAK E VEL: 1.15 M/S
MV PEAK GRADIENT: 5 MMHG
MV STENOSIS PRESSURE HALF TIME: 38.77 MS
MV VALVE AREA BY CONTINUITY EQUATION: 1.55 CM2
MV VALVE AREA P 1/2 METHOD: 5.67 CM2
PISA MRMAX VEL: 0.05 M/S
PISA TR MAX VEL: 2.74 M/S
PULM VEIN S/D RATIO: 1.18
PV PEAK D VEL: 0.72 M/S
PV PEAK S VEL: 0.85 M/S
PV PEAK VELOCITY: 1.01 CM/S
RA MAJOR: 3.99 CM
RA PRESSURE: 3 MMHG
RA WIDTH: 3.92 CM
RIGHT VENTRICULAR END-DIASTOLIC DIMENSION: 3.95 CM
RV TISSUE DOPPLER FREE WALL SYSTOLIC VELOCITY 1 (APICAL 4 CHAMBER VIEW): 12.89 CM/S
SINUS: 3.2 CM
STJ: 2.61 CM
TDI LATERAL: 0.13 M/S
TDI SEPTAL: 0.12 M/S
TDI: 0.13 M/S
TR MAX PG: 30 MMHG
TRICUSPID ANNULAR PLANE SYSTOLIC EXCURSION: 1.69 CM
TV REST PULMONARY ARTERY PRESSURE: 33 MMHG

## 2022-04-20 PROCEDURE — 93227 HOLTER MONITOR - 24 HOUR (CUPID ONLY): ICD-10-PCS | Mod: ,,, | Performed by: INTERNAL MEDICINE

## 2022-04-20 PROCEDURE — 93306 ECHO (CUPID ONLY): ICD-10-PCS | Mod: 26,,, | Performed by: INTERNAL MEDICINE

## 2022-04-20 PROCEDURE — 93227 XTRNL ECG REC<48 HR R&I: CPT | Mod: ,,, | Performed by: INTERNAL MEDICINE

## 2022-04-20 PROCEDURE — 93306 TTE W/DOPPLER COMPLETE: CPT | Mod: 26,,, | Performed by: INTERNAL MEDICINE

## 2022-04-20 PROCEDURE — 93226 XTRNL ECG REC<48 HR SCAN A/R: CPT

## 2022-04-20 PROCEDURE — 93306 TTE W/DOPPLER COMPLETE: CPT

## 2022-04-21 LAB
OHS CV EVENT MONITOR DAY: 1
OHS CV HOLTER LENGTH DECIMAL HOURS: 48
OHS CV HOLTER LENGTH HOURS: 24
OHS CV HOLTER LENGTH MINUTES: 0
OHS CV HOLTER SINUS AVERAGE HR: 86
OHS CV HOLTER SINUS MAX HR: 119
OHS CV HOLTER SINUS MIN HR: 67

## 2022-04-22 ENCOUNTER — PATIENT MESSAGE (OUTPATIENT)
Dept: RHEUMATOLOGY | Facility: CLINIC | Age: 47
End: 2022-04-22
Payer: COMMERCIAL

## 2022-04-22 RX ORDER — HYDROCODONE BITARTRATE AND ACETAMINOPHEN 10; 325 MG/1; MG/1
1 TABLET ORAL EVERY 6 HOURS PRN
Qty: 20 TABLET | Refills: 0 | Status: SHIPPED | OUTPATIENT
Start: 2022-04-22 | End: 2022-05-18

## 2022-04-22 RX ORDER — HYDROCODONE BITARTRATE AND ACETAMINOPHEN 10; 325 MG/1; MG/1
1 TABLET ORAL EVERY 6 HOURS PRN
Qty: 20 TABLET | Refills: 0 | Status: SHIPPED | OUTPATIENT
Start: 2022-04-22 | End: 2022-04-22 | Stop reason: SDUPTHER

## 2022-04-22 RX ORDER — NABUMETONE 750 MG/1
750 TABLET, FILM COATED ORAL 2 TIMES DAILY
Qty: 60 TABLET | Refills: 0 | Status: SHIPPED | OUTPATIENT
Start: 2022-04-22 | End: 2022-05-18

## 2022-04-27 ENCOUNTER — TELEPHONE (OUTPATIENT)
Dept: CARDIOLOGY | Facility: CLINIC | Age: 47
End: 2022-04-27
Payer: COMMERCIAL

## 2022-04-27 NOTE — TELEPHONE ENCOUNTER
----- Message from Fabio Fields MA sent at 4/26/2022  4:23 PM CDT -----  They've been sending messages about this in the staff and patient call backs so you may see a few of these  ----- Message -----  From: Rajeev Castro MA  Sent: 4/26/2022   3:27 PM CDT  To: Fabio Fields MA      ----- Message -----  From: Jennifer Ferreira  Sent: 4/26/2022   9:36 AM CDT  To: Paulo Arteaga Staff    Type: Patient Call Back    Who called: [facility] service     What is the request in detail: needs a peer to peer for patient. Please call    Can the clinic reply by [facility]? no    Would the patient rather a call back or a response via [facility]? call    Best call back number: [phone]

## 2022-05-02 ENCOUNTER — HOSPITAL ENCOUNTER (OUTPATIENT)
Dept: CARDIOLOGY | Facility: HOSPITAL | Age: 47
Discharge: HOME | End: 2022-05-02
Attending: SURGERY
Payer: COMMERCIAL

## 2022-05-02 DIAGNOSIS — I82.422 ACUTE DEEP VEIN THROMBOSIS (DVT) OF ILIAC VEIN OF LEFT LOWER EXTREMITY: ICD-10-CM

## 2022-05-02 PROCEDURE — 93970 CV US LOWER VENOUS INSUFFICIENCY BILATERAL (CUPID ONLY): ICD-10-PCS | Mod: 26,,, | Performed by: SURGERY

## 2022-05-02 PROCEDURE — 93970 EXTREMITY STUDY: CPT | Mod: TC

## 2022-05-02 PROCEDURE — 93970 EXTREMITY STUDY: CPT | Mod: 26,,, | Performed by: SURGERY

## 2022-05-03 ENCOUNTER — OFFICE VISIT (OUTPATIENT)
Dept: CARDIOLOGY | Facility: CLINIC | Age: 47
End: 2022-05-03
Payer: COMMERCIAL

## 2022-05-03 VITALS
RESPIRATION RATE: 17 BRPM | HEART RATE: 90 BPM | SYSTOLIC BLOOD PRESSURE: 109 MMHG | WEIGHT: 272.06 LBS | HEIGHT: 64 IN | OXYGEN SATURATION: 97 % | BODY MASS INDEX: 46.45 KG/M2 | DIASTOLIC BLOOD PRESSURE: 60 MMHG

## 2022-05-03 DIAGNOSIS — E78.5 DYSLIPIDEMIA: ICD-10-CM

## 2022-05-03 DIAGNOSIS — I10 ESSENTIAL HYPERTENSION: Primary | ICD-10-CM

## 2022-05-03 DIAGNOSIS — R06.09 DOE (DYSPNEA ON EXERTION): ICD-10-CM

## 2022-05-03 DIAGNOSIS — R00.2 PALPITATIONS: ICD-10-CM

## 2022-05-03 DIAGNOSIS — R07.89 CHEST PAIN, ATYPICAL: ICD-10-CM

## 2022-05-03 PROCEDURE — 3074F PR MOST RECENT SYSTOLIC BLOOD PRESSURE < 130 MM HG: ICD-10-PCS | Mod: CPTII,S$GLB,, | Performed by: INTERNAL MEDICINE

## 2022-05-03 PROCEDURE — 3078F PR MOST RECENT DIASTOLIC BLOOD PRESSURE < 80 MM HG: ICD-10-PCS | Mod: CPTII,S$GLB,, | Performed by: INTERNAL MEDICINE

## 2022-05-03 PROCEDURE — 99214 PR OFFICE/OUTPT VISIT, EST, LEVL IV, 30-39 MIN: ICD-10-PCS | Mod: S$GLB,,, | Performed by: INTERNAL MEDICINE

## 2022-05-03 PROCEDURE — 99999 PR PBB SHADOW E&M-EST. PATIENT-LVL V: ICD-10-PCS | Mod: PBBFAC,,, | Performed by: INTERNAL MEDICINE

## 2022-05-03 PROCEDURE — 3008F PR BODY MASS INDEX (BMI) DOCUMENTED: ICD-10-PCS | Mod: CPTII,S$GLB,, | Performed by: INTERNAL MEDICINE

## 2022-05-03 PROCEDURE — 99999 PR PBB SHADOW E&M-EST. PATIENT-LVL V: CPT | Mod: PBBFAC,,, | Performed by: INTERNAL MEDICINE

## 2022-05-03 PROCEDURE — 3074F SYST BP LT 130 MM HG: CPT | Mod: CPTII,S$GLB,, | Performed by: INTERNAL MEDICINE

## 2022-05-03 PROCEDURE — 3078F DIAST BP <80 MM HG: CPT | Mod: CPTII,S$GLB,, | Performed by: INTERNAL MEDICINE

## 2022-05-03 PROCEDURE — 3008F BODY MASS INDEX DOCD: CPT | Mod: CPTII,S$GLB,, | Performed by: INTERNAL MEDICINE

## 2022-05-03 PROCEDURE — 1159F MED LIST DOCD IN RCRD: CPT | Mod: CPTII,S$GLB,, | Performed by: INTERNAL MEDICINE

## 2022-05-03 PROCEDURE — 99214 OFFICE O/P EST MOD 30 MIN: CPT | Mod: S$GLB,,, | Performed by: INTERNAL MEDICINE

## 2022-05-03 PROCEDURE — 1159F PR MEDICATION LIST DOCUMENTED IN MEDICAL RECORD: ICD-10-PCS | Mod: CPTII,S$GLB,, | Performed by: INTERNAL MEDICINE

## 2022-05-03 NOTE — PROGRESS NOTES
Subjective:    Patient ID:  Alberto Frye is a 47 y.o. female who presents for follow-up of Results      HPI     HTN, recurrent DVT - s/p thrombectomy 8/13/20, IVC filter - on eliquis     Admitted 8/11/20  Ms. Alberto Frye is a 45 y.o. female with HTN, anxiety, alcohol abuse, DVT (10 years ago, treated with IVC filter, warfarin) who was transferred to [facility] for thrombectomy/stent placement of left lower limb thrombus.  Patient originally presented to [email or web address] yesterday with one day of left thigh pain and leg swelling.  Patient states that this pain is in the same leg and pain is same as previous DVT diagnosed 10 years ago while in hospital for hysterectomy.  DVT at that time was treated with IVC filter and pt was on warfarin for a few months.  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  Denies any recent long distance travel, trauma, OCP/hormone replacement use.  Patient denies any fevers, chills, night sweats, chest pain, SOB, palpitations.  Patient is a former smoker, 10-15y of ~1pack/month, quit in 2016.  Patient states that she drinks socially.       Left lower limb ultrasound on 8/11 showed occlusive thrombus involving left common femoral, popliteal, upper greater saphenous, left iliac vein and distal inferior vena cava.  Patient started on lovenox.  Patient was seen by vascular surgery (Dr Goldman) who decided to transfer patient to [facility] for thrombectomy/stent placement.      Patient underwent thrombectomy with vascular surgery 8/12/20, tolerating post-procedural symptoms well. Vascular initially managing heparin gtt. On 8/13/20, one day post-op vascular surgery recommending changing heparin gtt to apixaban. Patient with mild hematuria, UA showing no signs of infection, 3+ blood. And will w/u OP /IP for hypercoagulable etiologies to patient's presentation. HIT panel negative with low platelets, but platelet counts improved and stabilized at slightly low levels before  discharge. The patient's blood pressure was normal in the hospital with her clonidine patch so her other home blood pressure meds were not re-started, and she will be followed up with out-patient within 1 week with her PCP for a new CBC to look at her H/h, along with her platelet counts, and for her blood pressure monitoring. Pt will also follow up with vascular.      * Acute deep vein thrombosis (DVT) of iliac vein of left lower extremity-resolved as of 8/15/2020  US LE shows Left thigh vein occlusive thrombus involving the common femoral, popliteal, and upper greater saphenous veins with minimal flow.  Patient had previous DVT in same leg ~10 years ago, treated with IVC filter and warfarin for a few months.  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       - vascular surgery took patient to OR s/p thrombectomy on 8/13/20. Heparin gtt transitioned to apixaban 10mg BID  - Patient on protonix 40mg daily  - Patient with down trending platelets will continue to monitor. High 4t's score this AM, negative HIT. Uptrending plts thie PM. Patient transitioned to apixaban.  - Patient with hematuria yesterday; no evidence of bleeding or hematuria today. Plts <50k and bleeding will transfuse     Echo 4/20/22  · The left ventricle is normal in size with normal systolic function.  · The estimated ejection fraction is 60%.  · Normal left ventricular diastolic function.  · Normal right ventricular size with normal right ventricular systolic function.  · Mild left atrial enlargement.  · Mild mitral regurgitation.  · Mild tricuspid regurgitation.  · Normal central venous pressure (3 mmHg).  · The estimated PA systolic pressure is 33 mmHg.    Holter 4/20/22  · Sinus rhythm with heart rates varying between 67 and 119 BPM with an average of 86 BPM.  · There were PVCs totalling 0  · There were very rare PACs totalling 1 and averaging 0.02 per  "hour.    LE venous US 5/2/22  · There is no evidence of a right lower extremity DVT.  · There is no evidence of a left lower extremity DVT.         8/19/20 Denies CP or SOB. Started with a mild rash since being placed on eliquis  Scheduled to f/u with vascular surgery at [facility] and see hematology tomorrow  BP well controlled   Recurrent DVT per hematology and vascular surgery  ? Allergic reaction to eliquis - will observe  Echo with good EF - no evidence of CHF  HTN under control  Will f/u 1 year     Went to the ER 3/23/22  Alberto Frye is a 47 y.o female with a PMHx of CHF, HTN, and DVT presenting to the ED with a chief complaint of chest pain onset "a while ago". The patient complains of chest pain and tightness that has been present for a while and is not severe, is just a "constant feeling". Additionally complains of 12/10 left leg pain that started several days ago and has been consistently worsening. States she has had 2 DVT's in the past with her last being in 2020, and states she thinks she might have another one right now. Patient is compliant with Eliquis. States her rheumatologist diagnosed her with fibromyalgia earlier this week. Reports chronic Norco use secondary to having a torn meniscus in her right leg. Reports quitting cigarette smoking in 2016. Denies history of asthma and COPD. Additionally denies shortness of breath, abdominal pain, diarrhea, and vomiting. No other associated symptoms.   EKG 3/23/22 NSR NSSTT changes     4/5/22 Still with episodic CP, heart racing and LEON  BP controlled  Echo and lexiscan myoview for CP and LEON - cannot walk treadmill due to leg pain from DVT  Holter for palpitations  Continue Rx for HTN, HLD, DM    5/3/22 Stress test was denied by insurance. Still with occasional ches tightness - feels like GERD. Stable LEON    Review of Systems   Constitutional: Negative for decreased appetite.   HENT: Negative for ear discharge.    Eyes: Negative for blurred vision. "   Respiratory: Negative for hemoptysis.    Endocrine: Negative for polyphagia.   Hematologic/Lymphatic: Negative for adenopathy.   Skin: Negative for color change.   Musculoskeletal: Negative for joint swelling.   Genitourinary: Negative for bladder incontinence.   Neurological: Negative for brief paralysis.   Psychiatric/Behavioral: Negative for hallucinations.   Allergic/Immunologic: Negative for hives.        Objective:    Physical Exam  Constitutional:       Appearance: She is well-developed.   HENT:      Head: Normocephalic and atraumatic.   Eyes:      Conjunctiva/sclera: Conjunctivae normal.      Pupils: Pupils are equal, round, and reactive to light.   Cardiovascular:      Rate and Rhythm: Normal rate.      Pulses: Intact distal pulses.      Heart sounds: Normal heart sounds.   Pulmonary:      Effort: Pulmonary effort is normal.      Breath sounds: Normal breath sounds.   Abdominal:      General: Bowel sounds are normal.      Palpations: Abdomen is soft.   Musculoskeletal:         General: Normal range of motion.      Cervical back: Normal range of motion and neck supple.   Skin:     General: Skin is warm and dry.   Neurological:      Mental Status: She is alert and oriented to person, place, and time.           Assessment:       1. Essential hypertension    2. Dyslipidemia    3. Palpitations    4. LEON (dyspnea on exertion)    5. Chest pain, atypical         Plan:       CP atypical - if this progresses would recommend [facility]  Continue Rx for HTN, HLD, DM  OV 6 months

## 2022-05-06 LAB
LEFT GREAT SAPHENOUS DISTAL THIGH DIA: 0.5 CM
LEFT GREAT SAPHENOUS JUNCTION DIA: 0.7 CM
LEFT GREAT SAPHENOUS KNEE DIA: 0.4 CM
LEFT GREAT SAPHENOUS MIDDLE THIGH DIA: 0.5 CM
LEFT GREAT SAPHENOUS PROXIMAL CALF DIA: 0.3 CM
LEFT SMALL SAPHENOUS KNEE DIA: 0.4 CM
LEFT SMALL SAPHENOUS SPJ DIA: 0.3 CM
RIGHT GREAT SAPHENOUS DISTAL THIGH DIA: 0.5 CM
RIGHT GREAT SAPHENOUS JUNCTION DIA: 1.1 CM
RIGHT GREAT SAPHENOUS KNEE DIA: 0.5 CM
RIGHT GREAT SAPHENOUS MIDDLE THIGH DIA: 0.4 CM
RIGHT GREAT SAPHENOUS PROXIMAL CALF DIA: 0.3 CM
RIGHT SMALL SAPHENOUS KNEE DIA: 0.3 CM
RIGHT SMALL SAPHENOUS SPJ DIA: 0.2 CM

## 2022-05-09 DIAGNOSIS — Z09 HOSPITAL DISCHARGE FOLLOW-UP: ICD-10-CM

## 2022-05-09 DIAGNOSIS — I10 ESSENTIAL HYPERTENSION: ICD-10-CM

## 2022-05-09 RX ORDER — VALSARTAN AND HYDROCHLOROTHIAZIDE 160; 25 MG/1; MG/1
1 TABLET ORAL DAILY
Qty: 90 TABLET | Refills: 3 | Status: ON HOLD | OUTPATIENT
Start: 2022-05-09 | End: 2022-11-02 | Stop reason: HOSPADM

## 2022-05-09 NOTE — TELEPHONE ENCOUNTER
No new care gaps identified.  [facility] Embedded Care Gaps. Reference number: [id number]. 5/09/2022   5:31:41 AM DEANNAT   Patient presents with epigastric pain and black stool as well as dysuria. He states this has been ongoing x3 days. Patient not on any iron or peptobismol. Reports history of GERD for which he has not been medicated in years. Patient did have fast food. He denies chest pain or shortness of breath. He states he has difficulty starting his urine stream. He states it also burns. Patient also reports difficulty having a bowel movement.

## 2022-05-09 NOTE — TELEPHONE ENCOUNTER
Refill Authorization Note   Alberto Frye  is requesting a refill authorization.  Brief Assessment and Rationale for Refill:  Approve     Medication Therapy Plan:       Medication Reconciliation Completed: No   Comments:     No Care Gaps recommended.     Note composed:5:25 PM 05/09/2022

## 2022-05-11 ENCOUNTER — PATIENT MESSAGE (OUTPATIENT)
Dept: RHEUMATOLOGY | Facility: CLINIC | Age: 47
End: 2022-05-11
Payer: COMMERCIAL

## 2022-05-13 ENCOUNTER — LAB VISIT (OUTPATIENT)
Dept: LAB | Facility: HOSPITAL | Age: 47
End: 2022-05-13
Attending: INTERNAL MEDICINE
Payer: COMMERCIAL

## 2022-05-13 DIAGNOSIS — R79.89 ABNORMAL TSH: ICD-10-CM

## 2022-05-13 LAB
T4 FREE SERPL-MCNC: 0.99 NG/DL (ref 0.71–1.51)
TSH SERPL DL<=0.005 MIU/L-ACNC: 6.84 UIU/ML (ref 0.4–4)

## 2022-05-13 PROCEDURE — 84439 ASSAY OF FREE THYROXINE: CPT | Performed by: INTERNAL MEDICINE

## 2022-05-13 PROCEDURE — 84443 ASSAY THYROID STIM HORMONE: CPT | Performed by: INTERNAL MEDICINE

## 2022-05-13 PROCEDURE — 36415 COLL VENOUS BLD VENIPUNCTURE: CPT | Mod: PO | Performed by: INTERNAL MEDICINE

## 2022-05-16 NOTE — PROGRESS NOTES
This note was created by combination of typed  and M-Modal dictation.  Transcription errors may be present.  If there are any questions, please contact me.    Assessment and Plan:   Essential hypertension  Fatigue, unspecified type  Subclinical hypothyroidism  -fatigue, likely multifactorial with a number of medications with possible side effects of fatigue  Also has fibromyalgia which by itself can cause or contribute to fatigue  I will defer psychiatric medication management to psychiatry  But we are planning to wean down the clonidine and see if it has any contribution to her fatigue  Blood pressure today is good on clonidine 0.1 patch  Changed to oral pills, take twice daily for 1 week, then wean down to once a day for week and then stop  If necessary can compensate with valsartan  Talked about possibility that subclinical hypothyroid maybe a contributor to her edema, fatigue and pain.  She has alternate explanations for this.  For now wean down the clonidine  Next visit if no improvement would challenge her on levothyroxine  If we get her to therapeutic levels and she has no improvement then I would stop the levothyroxine  -     cloNIDine (CATAPRES) 0.1 MG tablet; Take 1 tablet (0.1 mg total) by mouth 2 (two) times daily for 7 days, THEN 1 tablet (0.1 mg total) once daily for 7 days.  Dispense: 21 tablet; Refill: 0    Edema, unspecified type  -may be contributing to sensation of pain and discomfort and fatigue.  On multiple medications with possible side effects of edema including the Lyrica, clonidine, amitriptyline, and Lamictal  Weaning down clonidine as above    Sjogren's syndrome with inflammatory arthritis  -Sjogren's, fibromyalgia, followed by Rheumatology and has follow-up with him tomorrow.  She is on fluoxetine which hopefully helps with neuromodulation  On higher dose of Lyrica which could be contributor to her edema  On narcotic  On steroid taper   On HCQ  F/u with rheum  tomorrow      Medications Discontinued During This Encounter   Medication Reason    cloNIDine 0.2 mg/24 hr td ptwk (CATAPRES) 0.2 mg/24 hr Dose adjustment    hydrocortisone 1 % cream Patient no longer taking    tiZANidine (ZANAFLEX) 4 MG tablet Alternate therapy    ibuprofen (ADVIL,MOTRIN) 800 MG tablet Alternate therapy    d-mannose Powd Therapy completed    Lactobacillus rhamnosus GG (CULTURELLE) 10 billion cell capsule Therapy completed    cloNIDine 0.1 mg/24 hr td ptwk (CATAPRES) 0.1 mg/24 hr Change in Dosage Form       meds sent this encounter:  Medications Ordered This Encounter   Medications    cloNIDine (CATAPRES) 0.1 MG tablet     Sig: Take 1 tablet (0.1 mg total) by mouth 2 (two) times daily for 7 days, THEN 1 tablet (0.1 mg total) once daily for 7 days.     Dispense:  21 tablet     Refill:  0     Stop patch       Follow Up: No follow-ups on file.  Follow-up 4-6 weeks, BP off of clonidine, challenge on levothyroxine    Subjective:     Chief Complaint   Patient presents with    Back Pain    Leg Pain    Results     labs       HPI  Alberto is a 47 y.o. female, last appointment with this clinic was 4/5/2022.  Social History     Tobacco Use    Smoking status: Former Smoker     Packs/day: 0.00     Years: 0.00     Pack years: 0.00    Smokeless tobacco: Never Used    Tobacco comment: quit in october 2016   Substance Use Topics    Alcohol use: Yes     Alcohol/week: 1.0 - 3.0 standard drink     Types: 1 - 3 Glasses of wine per week     Comment: social presently, excessive 10 years ago      Social History     Occupational History    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[employer]      Social History     Social History Narrative    Has 3 healthy grown sons (1990, 1993, 1998) Lives alone.    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   in 2010.    Has Male partner since 2014 who is currently disabled.       No LMP  recorded. Patient has had a hysterectomy.    Last visit me in early April.  Fibromyalgia.  Sjogren's syndrome.  Relatively recent start on higher dose Lyrica.  No change.  Talked about chair based exercises.  Hydrocodone per Rheumatology.  Essential hypertension  -recalls a history of ACE-inhibitor  Then torsemide  Which was stopped in the summer of 2020  Clonidine patch was started after ED visit with extremely high blood pressure  And then valsartan HCTZ was started  She notes fatigue.  The fatigue can be multifactorial including from Sjogren's, fibromyalgia, or a number of her medications  But clonidine could be a contributor.  Would wean down.  Decrease clonidine patch to 0.1  Stay on valsartan HCTZ  If BP goes up, increase the valsartan HCTZ  Next would be nifedipine  Next visit in May, if tolerating lower dose clonidine, switch to pills, take 0.1 daily for 10 days and then stop  Repeat echo ordered by Cardiology  Last echo done August 2020, normal systolic and diastolic function.  Normal LFTs.  TSH mildly elevated though free T4 was normal.  Her peripheral edema think is more from venous return than from organ dysfunction    Saw cardiology in follow-up in early May.  Stress test was denied by insurance.  Atypical chest pain plan was if this progresses would recommend left heart catheterization.    Pre visit labs  TSH elevated free T4 normal  Most recent TSH similar    Feels like having a flare of fibromyalgia  Legs swollen and painful; joints painful  Had not been using crutches but then restarted using it  Going to work but looking forward to weekends where she could stay in bed. Hurt to walk  Had not had this before  Tongue felt burning.     Is using relafen  Not taking flexeril  Has rx's for nabumetone but also tizanidine.   Not taking xanaflex   Has follow-up with Rheumatology scheduled.  Is still using the steroid taper.  Taking the narcotic  Feels edematous.    No home BP checks.    Will change clonidine  patch to pill and wean off.     Patient Care Team:  Ian Cespedes MD as PCP - General (Internal Medicine)  Funmilayo Roblero LPN as Licensed Practical Nurse  Chandler Bates MD as Consulting Physician (Cardiology)  Kari Tuttle MD as Consulting Physician (Hematology and Oncology)  Miguelangel Goldman MD as Consulting Physician (Vascular Surgery)  Johan Grover MD as Consulting Physician (Gastroenterology)  TIM Pierce MD as Consulting Physician (Urology)  Becca Paredes MD (Obstetrics and Gynecology)  Ross Hughes MD (Inactive) as Resident (Rheumatology)  Yeison Gibson III, NP as Nurse Practitioner (Psychiatry)    Patient Active Problem List    Diagnosis Date Noted    Palpitations 04/05/2022    Chest pain, atypical 04/05/2022    LEON (dyspnea on exertion) 04/05/2022    Mood insomnia 02/01/2022    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    Bipolar I disorder, current or most recent episode depressed, with psychotic features with anxious distress 12/01/2021    Dyslipidemia 11/17/2021 9/2021 mammo incidental vascular calcifications  11/2021 low dost atorvastatin      Sjogren's syndrome with inflammatory arthritis 10/13/2021    Refractive error 02/19/2021    Long-term use of Plaquenil 02/19/2021    Pain in both hands 11/16/2020    Lumbar spondylosis 11/16/2020    DDD (degenerative disc disease), lumbar 11/16/2020    Recurrent deep vein thrombosis (DVT) with hx of IVC filter; indefinite anticoagulation 09/21/2020 08/11/2020 DVT left leg underwent pharmacomechanical thrombectomy 8/12 for iliofemoral DVT.  had had a prior DVT 10 years prior in the setting of surgery for hysterectomy at the time which she had IVC filter placed.      Recurrent UTI 09/21/2020 08/28/2020 cystoscopy normal  08/13/2020 renal ultrasound normal  06/02/2020 CT abdomen pelvis unremarkable.  IVC present.      Screening for colon cancer 11/2019 colonoscopy hemorrhoids and  diverticulosis; [facility] 09/21/2020 11/2019 colonoscopy hemorrhoids and diverticulosis.      Chronic anticoagulation 09/21/2020    Chronic midline low back pain without sciatica 09/21/2020 1/2017 XR L spine: 5 views lumbar spine.  Vena cava filter right common L2-L3, and lap band apparatus left upper quadrant.  Mild levoscoliosis, lordosis lumbar spine.  Elevation of right pelvis.  Facet arthropathy L4-L5 levels.  No fracture subluxation.      Neuropathy 08/11/2020    Abdominal pain 06/03/2020 06/03/2020 EGD normal duodenum.  Erythematous mucosa of the gastric body and antrum and pre-pyloric region.  2 cm hiatal hernia.  Path negative.  No celiac.      Essential hypertension 01/22/2016 08/11/2020 TTE normal LV systolic function LVEF 60%.  Normal diastolic function.  Normal RV systolic function.  PA pressure 23.  History of empiric treatment for diverticulitis 04/22/2020, no imaging done at that time.    7/2020 ER put her on clonidine.  And then placed on amlodipine  But did not find it controlled  So started on diovan hct  And stayed on clonidine throughout.      Seasonal allergies 01/22/2016    Anxiety 01/22/2016       PAST MEDICAL PROBLEMS, PAST SURGICAL HISTORY: please see relevant portions of the electronic medical record    ALLERGIES AND MEDICATIONS: updated and reviewed.  Review of patient's allergies indicates:   Allergen Reactions    Morphine Itching and Hallucinations    Pcn [penicillins] Other (See Comments)     Was told from childhood she couldn't take it    Sulfa (sulfonamide antibiotics) Nausea And Vomiting    Latex, natural rubber Rash       Medication List with Changes/Refills   Current Medications    AMITRIPTYLINE (ELAVIL) 50 MG TABLET    Take 1 tablet (50 mg total) by mouth every evening.    ATORVASTATIN (LIPITOR) 10 MG TABLET    Take 1 tablet (10 mg total) by mouth once daily.    CLONAZEPAM (KLONOPIN) 0.5 MG TABLET    1/2 to 1 tablet daily as needed    CLONIDINE 0.1 MG/24  HR TD PTWK (CATAPRES) 0.1 MG/24 HR    Place 1 patch onto the skin every 7 days.    CLONIDINE 0.2 MG/24 HR TD PTWK (CATAPRES) 0.2 MG/24 HR    Place 1 patch onto the skin every 7 days.    CYANOCOBALAMIN (VITAMIN B-12) 1000 MCG TABLET    Take 100 mcg by mouth once daily.    D-MANNOSE POWD    1 scoop daily    DICLOFENAC SODIUM (VOLTAREN) 1 % GEL    Apply the gel (4 g) to the affected area 4 times daily. Do not apply more than 16 g daily to any one affected joint    ELIQUIS 5 MG TAB    TAKE 1 TABLET BY MOUTH TWICE DAILY *START  AFTER  FINISHING  FIRST  PRESCRIPTION*    FLUOXETINE 40 MG CAPSULE    Take 1 capsule by mouth once daily    HYDROCODONE-ACETAMINOPHEN (NORCO)  MG PER TABLET    Take 1 tablet by mouth every 6 (six) hours as needed for Pain.    HYDROCORTISONE 1 % CREAM    Apply topically 2 (two) times daily.    HYDROXYCHLOROQUINE (PLAQUENIL) 200 MG TABLET    Take 1 tablet (200 mg total) by mouth 2 (two) times daily.    HYDROXYZINE (ATARAX) 50 MG TABLET    Take 1 tablet (50 mg total) by mouth daily as needed for Anxiety (sleep).    IBUPROFEN (ADVIL,MOTRIN) 800 MG TABLET    Take 1 tablet (800 mg total) by mouth 3 (three) times daily.    LACTOBACILLUS RHAMNOSUS GG (CULTURELLE) 10 BILLION CELL CAPSULE    Take 1 capsule by mouth once daily.    LAMOTRIGINE (LAMICTAL) 25 MG TABLET    Take 1 tablet (25 mg) by mouth daily x 7 days then increase to 2 tablets (50 mg) daily    METHYLPREDNISOLONE (MEDROL DOSEPACK) 4 MG TABLET    use as directed    MULTIVITAMIN WITH MINERALS TABLET    Take 1 tablet by mouth once daily.    NABUMETONE (RELAFEN) 750 MG TABLET    Take 1 tablet (750 mg total) by mouth 2 (two) times daily.    PREGABALIN (LYRICA) 150 MG CAPSULE    Take 1 capsule (150 mg total) by mouth 3 (three) times daily.    TRIAMCINOLONE ACETONIDE 0.1% (KENALOG) 0.1 % CREAM    AAA abdomen bid    VALSARTAN-HYDROCHLOROTHIAZIDE (DIOVAN-HCT) 160-25 MG PER TABLET    Take 1 tablet by mouth once daily.        Objective:   Physical  "Exam   Vitals:    05/17/22 1037 05/17/22 1102   BP: (!) 126/94 110/72   BP Location: Right arm Left arm   Patient Position: Sitting Sitting   BP Method: Large (Manual) Large (Manual)   Pulse: 95    Temp: 97.7 °F (36.5 °C)    TempSrc: Oral    SpO2: 97%    Height: 5' 4" (1.626 m)     Body mass index is 46.7 kg/m².            Physical Exam  Constitutional:       General: She is not in acute distress.     Appearance: She is well-developed.   Cardiovascular:      Rate and Rhythm: Normal rate and regular rhythm.      Heart sounds: Normal heart sounds. No murmur heard.  Pulmonary:      Effort: Pulmonary effort is normal.      Breath sounds: Normal breath sounds.   Musculoskeletal:         General: Normal range of motion.      Right lower leg: Edema present.      Left lower leg: Edema present.   Skin:     General: Skin is warm and dry.   Neurological:      Mental Status: She is alert and oriented to person, place, and time.      Coordination: Coordination normal.   Psychiatric:         Behavior: Behavior normal.         Thought Content: Thought content normal.         Component      Latest Ref Rng & Units 5/13/2022 3/23/2022 11/11/2021   WBC      3.90 - 12.70 K/uL  8.30    RBC      4.00 - 5.40 M/uL  4.19    Hemoglobin      12.0 - 16.0 g/dL  12.6    Hematocrit      37.0 - 48.5 %  37.9    MCV      82 - 98 fL  91    MCH      27.0 - 31.0 pg  30.1    MCHC      32.0 - 36.0 g/dL  33.2    RDW      11.5 - 14.5 %  13.2    Platelets      150 - 450 K/uL  194    MPV      9.2 - 12.9 fL  9.3    Immature Granulocytes      0.0 - 0.5 %  0.1    Gran # (ANC)      1.8 - 7.7 K/uL  4.6    Immature Grans (Abs)      0.00 - 0.04 K/uL  0.01    Lymph #      1.0 - 4.8 K/uL  2.9    Mono #      0.3 - 1.0 K/uL  0.5    Eos #      0.0 - 0.5 K/uL  0.2    Baso #      0.00 - 0.20 K/uL  0.02    nRBC      0 /100 WBC  0    Gran %      38.0 - 73.0 %  55.8    Lymph %      18.0 - 48.0 %  35.2    Mono %      4.0 - 15.0 %  6.5    Eosinophil %      0.0 - 8.0 %  2.2  "   Basophil %      0.0 - 1.9 %  0.2    Differential Method        Automated    Sodium      136 - 145 mmol/L  137    Potassium      3.5 - 5.1 mmol/L  3.7    Chloride      95 - 110 mmol/L  105    CO2      23 - 29 mmol/L  24    Glucose      70 - 110 mg/dL  110    BUN      6 - 20 mg/dL  20    Creatinine      0.5 - 1.4 mg/dL  1.1    Calcium      8.7 - 10.5 mg/dL  9.0    PROTEIN TOTAL      6.0 - 8.4 g/dL  8.0    Albumin      3.5 - 5.2 g/dL  4.0    BILIRUBIN TOTAL      0.1 - 1.0 mg/dL  0.6    Alkaline Phosphatase      55 - 135 U/L  83    AST      10 - 40 U/L  20    ALT      10 - 44 U/L  18    Anion Gap      8 - 16 mmol/L  8    eGFR if African American      >60 mL/min/1.73 m:2  >60    eGFR if non African American      >60 mL/min/1.73 m:2  59 (A)    Cholesterol      120 - 199 mg/dL   184   Triglycerides      30 - 150 mg/dL   94   HDL      40 - 75 mg/dL   53   LDL Cholesterol External      63.0 - 159.0 mg/dL   112.2   HDL/Cholesterol Ratio      20.0 - 50.0 %   28.8   Total Cholesterol/HDL Ratio      2.0 - 5.0   3.5   Non-HDL Cholesterol      mg/dL   131   TSH      0.400 - 4.000 uIU/mL 6.838 (H)  4.387 (H)   Free T4      0.71 - 1.51 ng/dL 0.99  0.93

## 2022-05-17 ENCOUNTER — PATIENT MESSAGE (OUTPATIENT)
Dept: FAMILY MEDICINE | Facility: CLINIC | Age: 47
End: 2022-05-17

## 2022-05-17 ENCOUNTER — OFFICE VISIT (OUTPATIENT)
Dept: FAMILY MEDICINE | Facility: CLINIC | Age: 47
End: 2022-05-17
Payer: COMMERCIAL

## 2022-05-17 ENCOUNTER — TELEPHONE (OUTPATIENT)
Dept: FAMILY MEDICINE | Facility: CLINIC | Age: 47
End: 2022-05-17
Payer: COMMERCIAL

## 2022-05-17 VITALS
HEIGHT: 64 IN | SYSTOLIC BLOOD PRESSURE: 110 MMHG | BODY MASS INDEX: 46.7 KG/M2 | DIASTOLIC BLOOD PRESSURE: 72 MMHG | HEART RATE: 95 BPM | TEMPERATURE: 98 F | OXYGEN SATURATION: 97 %

## 2022-05-17 DIAGNOSIS — I10 ESSENTIAL HYPERTENSION: Primary | ICD-10-CM

## 2022-05-17 DIAGNOSIS — R60.9 EDEMA, UNSPECIFIED TYPE: ICD-10-CM

## 2022-05-17 DIAGNOSIS — E03.8 SUBCLINICAL HYPOTHYROIDISM: ICD-10-CM

## 2022-05-17 DIAGNOSIS — M35.05 SJOGREN'S SYNDROME WITH INFLAMMATORY ARTHRITIS: ICD-10-CM

## 2022-05-17 DIAGNOSIS — R53.83 FATIGUE, UNSPECIFIED TYPE: ICD-10-CM

## 2022-05-17 PROCEDURE — 99999 PR PBB SHADOW E&M-EST. PATIENT-LVL V: CPT | Mod: PBBFAC,,, | Performed by: INTERNAL MEDICINE

## 2022-05-17 PROCEDURE — 3078F DIAST BP <80 MM HG: CPT | Mod: CPTII,S$GLB,, | Performed by: INTERNAL MEDICINE

## 2022-05-17 PROCEDURE — 3074F SYST BP LT 130 MM HG: CPT | Mod: CPTII,S$GLB,, | Performed by: INTERNAL MEDICINE

## 2022-05-17 PROCEDURE — 1160F RVW MEDS BY RX/DR IN RCRD: CPT | Mod: CPTII,S$GLB,, | Performed by: INTERNAL MEDICINE

## 2022-05-17 PROCEDURE — 3074F PR MOST RECENT SYSTOLIC BLOOD PRESSURE < 130 MM HG: ICD-10-PCS | Mod: CPTII,S$GLB,, | Performed by: INTERNAL MEDICINE

## 2022-05-17 PROCEDURE — 1159F MED LIST DOCD IN RCRD: CPT | Mod: CPTII,S$GLB,, | Performed by: INTERNAL MEDICINE

## 2022-05-17 PROCEDURE — 99999 PR PBB SHADOW E&M-EST. PATIENT-LVL V: ICD-10-PCS | Mod: PBBFAC,,, | Performed by: INTERNAL MEDICINE

## 2022-05-17 PROCEDURE — 3008F BODY MASS INDEX DOCD: CPT | Mod: CPTII,S$GLB,, | Performed by: INTERNAL MEDICINE

## 2022-05-17 PROCEDURE — 99214 OFFICE O/P EST MOD 30 MIN: CPT | Mod: S$GLB,,, | Performed by: INTERNAL MEDICINE

## 2022-05-17 PROCEDURE — 1159F PR MEDICATION LIST DOCUMENTED IN MEDICAL RECORD: ICD-10-PCS | Mod: CPTII,S$GLB,, | Performed by: INTERNAL MEDICINE

## 2022-05-17 PROCEDURE — 3078F PR MOST RECENT DIASTOLIC BLOOD PRESSURE < 80 MM HG: ICD-10-PCS | Mod: CPTII,S$GLB,, | Performed by: INTERNAL MEDICINE

## 2022-05-17 PROCEDURE — 99214 PR OFFICE/OUTPT VISIT, EST, LEVL IV, 30-39 MIN: ICD-10-PCS | Mod: S$GLB,,, | Performed by: INTERNAL MEDICINE

## 2022-05-17 PROCEDURE — 1160F PR REVIEW ALL MEDS BY PRESCRIBER/CLIN PHARMACIST DOCUMENTED: ICD-10-PCS | Mod: CPTII,S$GLB,, | Performed by: INTERNAL MEDICINE

## 2022-05-17 PROCEDURE — 3008F PR BODY MASS INDEX (BMI) DOCUMENTED: ICD-10-PCS | Mod: CPTII,S$GLB,, | Performed by: INTERNAL MEDICINE

## 2022-05-17 RX ORDER — CLONIDINE HYDROCHLORIDE 0.1 MG/1
TABLET ORAL
Qty: 21 TABLET | Refills: 0 | Status: SHIPPED | OUTPATIENT
Start: 2022-05-17 | End: 2022-06-21

## 2022-05-17 RX ORDER — TIZANIDINE HYDROCHLORIDE 4 MG/1
4 CAPSULE, GELATIN COATED ORAL EVERY 6 HOURS PRN
COMMUNITY
End: 2022-07-13

## 2022-05-17 RX ORDER — IPRATROPIUM BROMIDE 42 UG/1
SPRAY, METERED NASAL
COMMUNITY
Start: 2022-05-10 | End: 2022-11-17

## 2022-05-17 RX ORDER — TIZANIDINE 4 MG/1
4 TABLET ORAL EVERY 6 HOURS PRN
COMMUNITY
End: 2022-05-17 | Stop reason: ALTCHOICE

## 2022-05-17 NOTE — LETTER
May 17, 2022      [facility]  [address] [facility]  [place] [address]  Phone: [phone]  Fax: [phone]       Patient: Alberto Frye  YOB: 1975  Date of Visit: 5/17/22      To Whom It May Concern:    Alberto Frye  was at [facility] on 5/17/22. If you have any questions or concerns, or if I can be of further assistance, please do not hesitate to contact me.      Sincerely,        Ian Cespedes MD

## 2022-05-17 NOTE — PATIENT INSTRUCTIONS
Stop the clonidine patch  Start taking 0.1 mg twice a day for a week  And then after a week, cut it down to 0.1 mg pill, once a day    And then stop after a week    Follow up in about 4-6 weeks  If still feeling very tired, pain, will try you with thyroid medication  That would be for about 3 months  If you feel better, we would keep you on it  If you feel no improvement, then we would stop it.

## 2022-05-18 ENCOUNTER — LAB VISIT (OUTPATIENT)
Dept: LAB | Facility: HOSPITAL | Age: 47
End: 2022-05-18
Attending: INTERNAL MEDICINE
Payer: COMMERCIAL

## 2022-05-18 ENCOUNTER — OFFICE VISIT (OUTPATIENT)
Dept: RHEUMATOLOGY | Facility: CLINIC | Age: 47
End: 2022-05-18
Payer: COMMERCIAL

## 2022-05-18 VITALS
HEART RATE: 97 BPM | RESPIRATION RATE: 20 BRPM | DIASTOLIC BLOOD PRESSURE: 106 MMHG | OXYGEN SATURATION: 98 % | SYSTOLIC BLOOD PRESSURE: 156 MMHG

## 2022-05-18 DIAGNOSIS — M15.9 PRIMARY OSTEOARTHRITIS INVOLVING MULTIPLE JOINTS: ICD-10-CM

## 2022-05-18 DIAGNOSIS — E66.01 MORBID OBESITY: ICD-10-CM

## 2022-05-18 DIAGNOSIS — Z79.899 ENCOUNTER FOR LONG-TERM (CURRENT) USE OF OTHER MEDICATIONS: ICD-10-CM

## 2022-05-18 DIAGNOSIS — M35.01 SJOGREN'S SYNDROME WITH KERATOCONJUNCTIVITIS SICCA: ICD-10-CM

## 2022-05-18 DIAGNOSIS — Z71.89 COUNSELING AND COORDINATION OF CARE: ICD-10-CM

## 2022-05-18 DIAGNOSIS — M35.01 SJOGREN'S SYNDROME WITH KERATOCONJUNCTIVITIS SICCA: Primary | ICD-10-CM

## 2022-05-18 DIAGNOSIS — M79.7 FIBROMYALGIA: ICD-10-CM

## 2022-05-18 LAB
ALBUMIN SERPL BCP-MCNC: 3.8 G/DL (ref 3.5–5.2)
ALP SERPL-CCNC: 89 U/L (ref 55–135)
ALT SERPL W/O P-5'-P-CCNC: 31 U/L (ref 10–44)
ANION GAP SERPL CALC-SCNC: 11 MMOL/L (ref 8–16)
AST SERPL-CCNC: 27 U/L (ref 10–40)
BASOPHILS # BLD AUTO: 0.02 K/UL (ref 0–0.2)
BASOPHILS NFR BLD: 0.4 % (ref 0–1.9)
BILIRUB SERPL-MCNC: 0.6 MG/DL (ref 0.1–1)
BUN SERPL-MCNC: 15 MG/DL (ref 6–20)
CALCIUM SERPL-MCNC: 9.8 MG/DL (ref 8.7–10.5)
CHLORIDE SERPL-SCNC: 103 MMOL/L (ref 95–110)
CK SERPL-CCNC: 153 U/L (ref 20–180)
CO2 SERPL-SCNC: 29 MMOL/L (ref 23–29)
CREAT SERPL-MCNC: 1.1 MG/DL (ref 0.5–1.4)
CRP SERPL-MCNC: 5.1 MG/L (ref 0–8.2)
DIFFERENTIAL METHOD: ABNORMAL
EOSINOPHIL # BLD AUTO: 0.1 K/UL (ref 0–0.5)
EOSINOPHIL NFR BLD: 0.9 % (ref 0–8)
ERYTHROCYTE [DISTWIDTH] IN BLOOD BY AUTOMATED COUNT: 14.1 % (ref 11.5–14.5)
ERYTHROCYTE [SEDIMENTATION RATE] IN BLOOD BY WESTERGREN METHOD: 17 MM/HR (ref 0–20)
EST. GFR  (AFRICAN AMERICAN): >60 ML/MIN/1.73 M^2
EST. GFR  (NON AFRICAN AMERICAN): 59 ML/MIN/1.73 M^2
GLUCOSE SERPL-MCNC: 82 MG/DL (ref 70–110)
HCT VFR BLD AUTO: 41.6 % (ref 37–48.5)
HGB BLD-MCNC: 13.2 G/DL (ref 12–16)
IMM GRANULOCYTES # BLD AUTO: 0.03 K/UL (ref 0–0.04)
IMM GRANULOCYTES NFR BLD AUTO: 0.5 % (ref 0–0.5)
LYMPHOCYTES # BLD AUTO: 1.8 K/UL (ref 1–4.8)
LYMPHOCYTES NFR BLD: 31.6 % (ref 18–48)
MCH RBC QN AUTO: 30 PG (ref 27–31)
MCHC RBC AUTO-ENTMCNC: 31.7 G/DL (ref 32–36)
MCV RBC AUTO: 95 FL (ref 82–98)
MONOCYTES # BLD AUTO: 0.4 K/UL (ref 0.3–1)
MONOCYTES NFR BLD: 7.6 % (ref 4–15)
NEUTROPHILS # BLD AUTO: 3.4 K/UL (ref 1.8–7.7)
NEUTROPHILS NFR BLD: 59 % (ref 38–73)
NRBC BLD-RTO: 0 /100 WBC
PLATELET # BLD AUTO: 232 K/UL (ref 150–450)
PMV BLD AUTO: 10.4 FL (ref 9.2–12.9)
POTASSIUM SERPL-SCNC: 4.7 MMOL/L (ref 3.5–5.1)
PROT SERPL-MCNC: 8.1 G/DL (ref 6–8.4)
RBC # BLD AUTO: 4.4 M/UL (ref 4–5.4)
SODIUM SERPL-SCNC: 143 MMOL/L (ref 136–145)
WBC # BLD AUTO: 5.69 K/UL (ref 3.9–12.7)

## 2022-05-18 PROCEDURE — 1159F PR MEDICATION LIST DOCUMENTED IN MEDICAL RECORD: ICD-10-PCS | Mod: CPTII,S$GLB,, | Performed by: INTERNAL MEDICINE

## 2022-05-18 PROCEDURE — 99214 OFFICE O/P EST MOD 30 MIN: CPT | Mod: S$GLB,,, | Performed by: INTERNAL MEDICINE

## 2022-05-18 PROCEDURE — 86160 COMPLEMENT ANTIGEN: CPT | Mod: 59 | Performed by: INTERNAL MEDICINE

## 2022-05-18 PROCEDURE — 84165 PROTEIN E-PHORESIS SERUM: CPT | Performed by: INTERNAL MEDICINE

## 2022-05-18 PROCEDURE — 85652 RBC SED RATE AUTOMATED: CPT | Performed by: INTERNAL MEDICINE

## 2022-05-18 PROCEDURE — 80053 COMPREHEN METABOLIC PANEL: CPT | Performed by: INTERNAL MEDICINE

## 2022-05-18 PROCEDURE — 36415 COLL VENOUS BLD VENIPUNCTURE: CPT | Mod: PN | Performed by: INTERNAL MEDICINE

## 2022-05-18 PROCEDURE — 99999 PR PBB SHADOW E&M-EST. PATIENT-LVL IV: CPT | Mod: PBBFAC,,, | Performed by: INTERNAL MEDICINE

## 2022-05-18 PROCEDURE — 99214 PR OFFICE/OUTPT VISIT, EST, LEVL IV, 30-39 MIN: ICD-10-PCS | Mod: S$GLB,,, | Performed by: INTERNAL MEDICINE

## 2022-05-18 PROCEDURE — 86160 COMPLEMENT ANTIGEN: CPT | Performed by: INTERNAL MEDICINE

## 2022-05-18 PROCEDURE — 85025 COMPLETE CBC W/AUTO DIFF WBC: CPT | Performed by: INTERNAL MEDICINE

## 2022-05-18 PROCEDURE — 86376 MICROSOMAL ANTIBODY EACH: CPT | Performed by: INTERNAL MEDICINE

## 2022-05-18 PROCEDURE — 82550 ASSAY OF CK (CPK): CPT | Performed by: INTERNAL MEDICINE

## 2022-05-18 PROCEDURE — 86334 IMMUNOFIX E-PHORESIS SERUM: CPT | Performed by: INTERNAL MEDICINE

## 2022-05-18 PROCEDURE — 82085 ASSAY OF ALDOLASE: CPT | Performed by: INTERNAL MEDICINE

## 2022-05-18 PROCEDURE — 84165 PATHOLOGIST INTERPRETATION SPE: ICD-10-PCS | Mod: 26,,, | Performed by: PATHOLOGY

## 2022-05-18 PROCEDURE — 84165 PROTEIN E-PHORESIS SERUM: CPT | Mod: 26,,, | Performed by: PATHOLOGY

## 2022-05-18 PROCEDURE — 86140 C-REACTIVE PROTEIN: CPT | Performed by: INTERNAL MEDICINE

## 2022-05-18 PROCEDURE — 82306 VITAMIN D 25 HYDROXY: CPT | Performed by: INTERNAL MEDICINE

## 2022-05-18 PROCEDURE — 86334 PATHOLOGIST INTERPRETATION IFE: ICD-10-PCS | Mod: 26,,, | Performed by: PATHOLOGY

## 2022-05-18 PROCEDURE — 82787 IGG 1 2 3 OR 4 EACH: CPT | Performed by: INTERNAL MEDICINE

## 2022-05-18 PROCEDURE — 86334 IMMUNOFIX E-PHORESIS SERUM: CPT | Mod: 26,,, | Performed by: PATHOLOGY

## 2022-05-18 PROCEDURE — 99999 PR PBB SHADOW E&M-EST. PATIENT-LVL IV: ICD-10-PCS | Mod: PBBFAC,,, | Performed by: INTERNAL MEDICINE

## 2022-05-18 PROCEDURE — 86225 DNA ANTIBODY NATIVE: CPT | Performed by: INTERNAL MEDICINE

## 2022-05-18 PROCEDURE — 1159F MED LIST DOCD IN RCRD: CPT | Mod: CPTII,S$GLB,, | Performed by: INTERNAL MEDICINE

## 2022-05-18 PROCEDURE — 82784 ASSAY IGA/IGD/IGG/IGM EACH: CPT | Performed by: INTERNAL MEDICINE

## 2022-05-18 RX ORDER — METHOCARBAMOL 750 MG/1
750 TABLET, FILM COATED ORAL 4 TIMES DAILY
Qty: 40 TABLET | Refills: 0 | Status: SHIPPED | OUTPATIENT
Start: 2022-05-18 | End: 2022-05-28

## 2022-05-18 RX ORDER — OXYCODONE AND ACETAMINOPHEN 10; 325 MG/1; MG/1
1 TABLET ORAL EVERY 4 HOURS PRN
Qty: 20 TABLET | Refills: 0 | Status: SHIPPED | OUTPATIENT
Start: 2022-05-18 | End: 2022-06-07 | Stop reason: SDUPTHER

## 2022-05-18 RX ORDER — PREGABALIN 150 MG/1
150 CAPSULE ORAL 3 TIMES DAILY
Qty: 90 CAPSULE | Refills: 5 | Status: SHIPPED | OUTPATIENT
Start: 2022-05-18 | End: 2022-07-13 | Stop reason: SDUPTHER

## 2022-05-18 RX ORDER — HYDROXYCHLOROQUINE SULFATE 200 MG/1
200 TABLET, FILM COATED ORAL 2 TIMES DAILY
Qty: 60 TABLET | Refills: 6 | Status: SHIPPED | OUTPATIENT
Start: 2022-05-18 | End: 2022-07-13 | Stop reason: SDUPTHER

## 2022-05-18 NOTE — PROGRESS NOTES
RHEUMATOLOGY OUTPATIENT CLINIC NOTE    5/18/2022    Attending Rheumatologist: Ross Hughes  Primary Care Provider: Ian Cespedes MD   Physician Requesting Consultation: No referring provider defined for this encounter.  Chief Complaint/Reason For Consultation:  No chief complaint on file.      Subjective:       HPI  Alberto Frye is a 47 y.o. Black or  female with medical history noted below who comes for evaluation of arthralgias.   She reports knowing of abnormal GIOVANNY at least ten years and when symptoms worsened she saw Rheumatology. Seen by Rheumatology at [facility] in 2018, noted to have +GIOVANNY & SSA, and likely beginning of Primary Sjogrens and OA.   Patient reports that she has been dealing with chronic back pain for many years but over then last 3 months has acutely worsened. She also notes pain in her hands, elbows, shoulder, knees, hips which has been progressing over the last year. Pain is worsened by all movement, improves only when laying still. Minimal relief with meds. Though she reports if she stays still to long then she is stiff. Reports about 20 minutes of morning stiffness. Has noted swelling in her hands. Also c/o numbness and tingling in her feet. Terrible sleep. + Dry eyes and dry mouth, easy bruising due to being on AC, +Raynaud's.  Had DVT post surgery and another DVT in iliac artery in August 2020.    No Alopecia, Oral/Nasal Ulcers, Rash, Photosensitivity, Pleuritis/serositis, LAD, Miscarriages, Skin tightening, SOB.     Today  Patient here for follow up.   Last visit Lyrica increased and trial of Zanaflex. She notes pain persist, wt gain, fatigue. Notes it got worse in the last two weeks, notes her son moved back in with her around the same time. Feels overworked. Also notes Norco are just numbing the pain and would like to try something else.     Review of Systems   Constitutional: Negative for appetite change, chills, fatigue, fever and unexpected weight  change.   HENT: Negative for nasal congestion, ear discharge, ear pain, hearing loss, mouth sores, nosebleeds, sneezing, sore throat, tinnitus and trouble swallowing.    Eyes: Negative for photophobia, pain, discharge, redness, itching and visual disturbance.   Respiratory: Negative for cough, chest tightness, shortness of breath and wheezing.    Cardiovascular: Negative for chest pain, palpitations and leg swelling.   Gastrointestinal: Negative for abdominal distention, abdominal pain, blood in stool, constipation, diarrhea, nausea and vomiting.   Endocrine: Negative for cold intolerance, heat intolerance, polydipsia, polyphagia and polyuria.   Genitourinary: Negative for difficulty urinating, dyspareunia, dysuria, flank pain, frequency, genital sores, hematuria, menstrual problem, pelvic pain, urgency, vaginal bleeding, vaginal discharge, vaginal pain and vaginal dryness.   Musculoskeletal: Positive for arthralgias, back pain and joint swelling. Negative for gait problem, leg pain, myalgias, neck pain, neck stiffness and joint deformity.   Integumentary:  Negative for pallor and rash.   Neurological: Negative for dizziness, seizures, weakness, light-headedness, numbness and headaches.   Hematological: Negative for adenopathy. Does not bruise/bleed easily.   Psychiatric/Behavioral: Negative for confusion, decreased concentration and sleep disturbance. The patient is not nervous/anxious.    All other systems reviewed and are negative.       Chronic comorbid conditions affecting medical decision making today:  Past Medical History:   Diagnosis Date    Alcohol abuse     stopped heavy drinking about 10 years ago; was drinking 3 glasses of vodka/tequilla,rum/whiskey per day    Allergy Don't remember    Its been some years.    Anxiety     Arthritis 2010    Blood clotting tendency 2008    After a procedure & 2020.    Congestive heart failure 8/11/2020    Depression     Hallucination      of psychiatric care      Hypertension     Immune disorder 2020    Joint pain 2010    Keloid cicatrix Years ago    From childhood scars    Caro     unplanned trips, energy without sleep for 2 days (reading, cleaning), feelings that she can do multiple tasks at one time, feelings of overconfidence at times    Psychiatric problem     Sleep difficulties     Therapy     Withdrawal symptoms, drug or narcotic     racing heart, restlessness     Past Surgical History:   Procedure Laterality Date     lapban surgery  2009    ESOPHAGOGASTRODUODENOSCOPY N/A 6/3/2020    Procedure: EGD (ESOPHAGOGASTRODUODENOSCOPY);  Surgeon: Johan Grover MD;  Location: [facility] (4TH FLR);  Service: Endoscopy;  Laterality: N/A;  covid 6/2-[employer]-tb    HYSTERECTOMY      PHLEBOGRAPHY Left 8/12/2020    Procedure: Venogram, pharmacomechanical thrombectomy;  Surgeon: Miguelangel Goldman MD;  Location: [facility] OR [address] [place] [address];  Service: Vascular;  Laterality: Left;  2336.43 mGy  [id number]  17.8 minutes  37 ml of contrast    VENOPLASTY Left 8/12/2020    Procedure: ANGIOPLASTY, VEIN;  Surgeon: Miguelangel Goldman MD;  [facility];  Service: Vascular;  Laterality: Left;     Family History   Problem Relation Age of Onset    Diabetes Mother     Cancer Mother     Hypertension Mother     Cirrhosis Maternal Uncle         ETOH    Celiac disease Neg Hx     Colon cancer Neg Hx     Colon polyps Neg Hx     Crohn's disease Neg Hx     Esophageal cancer Neg Hx     Inflammatory bowel disease Neg Hx     Liver cancer Neg Hx     Liver disease Neg Hx     Rectal cancer Neg Hx     Stomach cancer Neg Hx     Ulcerative colitis Neg Hx      Social History     Substance and Sexual Activity   Alcohol Use Yes    Alcohol/week: 1.0 - 3.0 standard drink    Types: 1 - 3 Glasses of wine per week    Comment: social presently, excessive 10 years ago     Social History     Tobacco Use   Smoking Status Former Smoker    Packs/day: 0.00    Years: 0.00     Pack years: 0.00   Smokeless Tobacco Never Used   Tobacco Comment    quit in october 2016     Social History     Substance and Sexual Activity   Drug Use Never    Types: Marijuana    Comment: uses THCA weekly       Current Outpatient Medications:     amitriptyline (ELAVIL) 50 MG tablet, Take 1 tablet (50 mg total) by mouth every evening., Disp: 90 tablet, Rfl: 3    atorvastatin (LIPITOR) 10 MG tablet, Take 1 tablet (10 mg total) by mouth once daily., Disp: 90 tablet, Rfl: 3    clonazePAM (KLONOPIN) 0.5 MG tablet, 1/2 to 1 tablet daily as needed, Disp: 14 tablet, Rfl: 0    cloNIDine (CATAPRES) 0.1 MG tablet, Take 1 tablet (0.1 mg total) by mouth 2 (two) times daily for 7 days, THEN 1 tablet (0.1 mg total) once daily for 7 days., Disp: 21 tablet, Rfl: 0    cyanocobalamin (VITAMIN B-12) 1000 MCG tablet, Take 100 mcg by mouth once daily., Disp: , Rfl:     diclofenac sodium (VOLTAREN) 1 % Gel, Apply the gel (4 g) to the affected area 4 times daily. Do not apply more than 16 g daily to any one affected joint, Disp: 100 g, Rfl: 1    ELIQUIS 5 mg Tab, TAKE 1 TABLET BY MOUTH TWICE DAILY *START  AFTER  FINISHING  FIRST  PRESCRIPTION*, Disp: 60 tablet, Rfl: 0    FLUoxetine 40 MG capsule, Take 1 capsule by mouth once daily, Disp: 90 capsule, Rfl: 3    hydrOXYchloroQUINE (PLAQUENIL) 200 mg tablet, Take 1 tablet (200 mg total) by mouth 2 (two) times daily., Disp: 60 tablet, Rfl: 6    hydrOXYzine (ATARAX) 50 MG tablet, Take 1 tablet (50 mg total) by mouth daily as needed for Anxiety (sleep)., Disp: 90 tablet, Rfl: 3    ipratropium (ATROVENT) 42 mcg (0.06 %) nasal spray, SMARTSIG:Both Nares, Disp: , Rfl:     lamoTRIgine (LAMICTAL) 25 MG tablet, Take 1 tablet (25 mg) by mouth daily x 7 days then increase to 2 tablets (50 mg) daily, Disp: 60 tablet, Rfl: 11    methocarbamoL (ROBAXIN) 750 MG Tab, Take 1 tablet (750 mg total) by mouth 4 (four) times daily. for 10 days, Disp: 40 tablet, Rfl: 0    methylPREDNISolone  (MEDROL DOSEPACK) 4 mg tablet, use as directed, Disp: 1 each, Rfl: 0    multivitamin with minerals tablet, Take 1 tablet by mouth once daily., Disp: , Rfl:     oxyCODONE-acetaminophen (PERCOCET)  mg per tablet, Take 1 tablet by mouth every 4 (four) hours as needed for Pain., Disp: 20 tablet, Rfl: 0    pregabalin (LYRICA) 150 MG capsule, Take 1 capsule (150 mg total) by mouth 3 (three) times daily., Disp: 90 capsule, Rfl: 5    tiZANidine 4 mg Cap, Take 4 mg by mouth every 6 (six) hours as needed., Disp: , Rfl:     triamcinolone acetonide 0.1% (KENALOG) 0.1 % cream, AAA abdomen bid, Disp: 80 g, Rfl: 0    valsartan-hydrochlorothiazide (DIOVAN-HCT) 160-25 mg per tablet, Take 1 tablet by mouth once daily., Disp: 90 tablet, Rfl: 3    Current Facility-Administered Medications:     triamcinolone acetonide injection 40 mg, 40 mg, Intradermal, Once, Ama Sylvester MD     Objective:         Vitals:    05/18/22 1106   BP: (!) 156/106   Pulse:    Resp:      Physical Exam   Constitutional: She is oriented to person, place, and time.   HENT:   Head: Normocephalic and atraumatic.   Right Ear: External ear normal.   Left Ear: External ear normal.   Nose: Nose normal.   Mouth/Throat: Oropharynx is clear and moist.   Eyes: Pupils are equal, round, and reactive to light. Conjunctivae are normal.   Cardiovascular: Normal rate and regular rhythm.   Pulmonary/Chest: Effort normal and breath sounds normal.   Abdominal: Soft. Bowel sounds are normal.   Musculoskeletal:      Right shoulder: Normal.      Left shoulder: Normal.      Right elbow: Normal.      Left elbow: Normal.      Right wrist: Normal.      Left wrist: Normal.      Cervical back: Normal range of motion and neck supple.      Right knee: Normal.      Left knee: Normal.      Comments: Tender Points:  No   Yes  ( )    (x )   Low cervical anterior aspect    ( )    (x )   Costochondral Junction   ( )    (x )   Lateral Epicondyle  ( )    (x )   Suboccipital   ( )     (x )   Trapezius   ( )    (x )   Supraspinatus   ( )    (x )   Gluteal   ( )    (x )   Greater trochanter  ( )    (x )   Knee       Neurological: She is alert and oriented to person, place, and time.   Skin: No rash noted. No erythema.   Psychiatric: Mood and affect normal.       Right Side Rheumatological Exam     Examination finds the shoulder, elbow, wrist, knee, 1st PIP, 1st MCP, 2nd PIP, 2nd MCP, 3rd PIP, 3rd MCP, 4th PIP, 4th MCP, 5th PIP and 5th MCP normal.    Left Side Rheumatological Exam     Examination finds the shoulder, elbow, wrist, knee, 1st PIP, 1st MCP, 2nd PIP, 2nd MCP, 3rd PIP, 3rd MCP, 4th PIP, 4th MCP, 5th PIP and 5th MCP normal.      Back/Neck Exam     Comments:  -SLT         Reviewed old and all outside pertinent medical records available.    All lab results personally reviewed and interpreted by me.  Lab Results   Component Value Date    WBC 8.30 03/23/2022    HGB 12.6 03/23/2022    HCT 37.9 03/23/2022    MCV 91 03/23/2022    MCH 30.1 03/23/2022    MCHC 33.2 03/23/2022    RDW 13.2 03/23/2022     03/23/2022    MPV 9.3 03/23/2022       Lab Results   Component Value Date     03/23/2022    K 3.7 03/23/2022     03/23/2022    CO2 24 03/23/2022     03/23/2022    BUN 20 03/23/2022    CALCIUM 9.0 03/23/2022    PROT 8.0 03/23/2022    ALBUMIN 4.0 03/23/2022    BILITOT 0.6 03/23/2022    AST 20 03/23/2022    ALKPHOS 83 03/23/2022    ALT 18 03/23/2022       Lab Results   Component Value Date    COLORU Yellow 05/06/2021    APPEARANCEUA Clear 05/06/2021    SPECGRAV 1.015 05/06/2021    PHUR 6.0 05/06/2021    PROTEINUA Negative 05/06/2021    KETONESU Negative 05/06/2021    LEUKOCYTESUR Negative 05/06/2021    NITRITE Negative 05/06/2021    UROBILINOGEN Negative 05/06/2021       Lab Results   Component Value Date    CRP 5.3 04/26/2021       Lab Results   Component Value Date    SEDRATE 40 (H) 11/19/2020       Lab Results   Component Value Date    RF <10.0 06/14/2016    SEDRATE 40 (H)  11/19/2020       No components found for: 25OHVITDTOT, 36PFJLON5, 29UTQCGH8, METHODNOTE    No results found for: URICACID    No components found for: TSPOTTB    Imaging:  All imaging reviewed and independently interpreted by me.         ASSESSMENT / PLAN:     Alberto Frye is a 47 y.o. Black or  female with:      1. Sjogren's syndrome with keratoconjunctivitis sicca  - Patients has s/s consistent with SjS, she has sicca, wide spread pain, arthralgias   - stable   - reinforced artificial tears and oral hydration   - continue HCQ 400mg daily   - trial of Cevillimine did not help   - annual EYE and Dental Exam   - update labs today   - reassurance      2. Fibromyalgia   - continues to flare, notes worse in the last 2 weeks since her son moved back in, she is very stressed, and sensitive to emotional stressors   - she will begin health coaching  - PT referral for aqua therapy   - continue Lyrica + Zanaflex  - trial of Robaxin PRN, SE discussed  - she notes Norco help by numbing the pain, but would like to try something else, trial of Percocet PRN, SE discussed,  reviewed  - wt loss  - Reassurance and Exercise    3. Osteoarthritis  - in addition to SjS and FM she has OA  - discussed diagnosis and management  - wt loss  - Pain meds as above  - reassurance and exercise      4. Other specified counseling  - over 10 minutes spent regarding below topics:  - Immunization counseling done.  - Weight loss counseling done.  - Nutrition and exercise counseling.  - Limitation of alcohol consumption.  - Regular exercise:  Aerobic and resistance.  - Medication counseling provided.    5. Morbid Obesity  - would benefit from decreasing at least 10% of body weight.  - recommended goal of losing 1 lb per week.  - consider nutritionist evaluation.    6. DMARD Toxicity Monitoring  - annual EYE exam     Follow up in about 8 weeks (around 7/13/2022).    Method of contact with patient concerns: Ashok maloney  Rheumatology    Disclaimer:  This note is prepared using voice recognition software and as such is likely to have errors and has not been proof read. Please contact me for questions.     Time spent: 30 minutes in face to face discussion concerning diagnosis, prognosis, review of lab and test results, benefits of treatment as well as management of disease, counseling of patient and coordination of care between various health care providers.  Greater than half the time spent was used for coordination of care and counseling of patient.    Ross Hughes M.D.  Rheumatology Department   [facility]

## 2022-05-19 LAB
25(OH)D3+25(OH)D2 SERPL-MCNC: 15 NG/ML (ref 30–96)
C3 SERPL-MCNC: 139 MG/DL (ref 50–180)
C4 SERPL-MCNC: 38 MG/DL (ref 11–44)
IGA SERPL-MCNC: 184 MG/DL (ref 40–350)
IGG SERPL-MCNC: 1716 MG/DL (ref 650–1600)
IGM SERPL-MCNC: 116 MG/DL (ref 50–300)
THYROPEROXIDASE IGG SERPL-ACNC: <6 IU/ML

## 2022-05-20 LAB
ALBUMIN SERPL ELPH-MCNC: 4.1 G/DL (ref 3.35–5.55)
ALPHA1 GLOB SERPL ELPH-MCNC: 0.33 G/DL (ref 0.17–0.41)
ALPHA2 GLOB SERPL ELPH-MCNC: 0.71 G/DL (ref 0.43–0.99)
B-GLOBULIN SERPL ELPH-MCNC: 0.83 G/DL (ref 0.5–1.1)
DSDNA AB SER-ACNC: NORMAL [IU]/ML
GAMMA GLOB SERPL ELPH-MCNC: 1.63 G/DL (ref 0.67–1.58)
INTERPRETATION SERPL IFE-IMP: NORMAL
PATHOLOGIST INTERPRETATION IFE: NORMAL
PATHOLOGIST INTERPRETATION SPE: NORMAL
PROT SERPL-MCNC: 7.6 G/DL (ref 6–8.4)

## 2022-05-21 LAB — ALDOLASE SERPL-CCNC: 7.2 U/L (ref 1.2–7.6)

## 2022-05-22 ENCOUNTER — PATIENT MESSAGE (OUTPATIENT)
Dept: FAMILY MEDICINE | Facility: CLINIC | Age: 47
End: 2022-05-22
Payer: COMMERCIAL

## 2022-05-23 ENCOUNTER — PATIENT MESSAGE (OUTPATIENT)
Dept: FAMILY MEDICINE | Facility: CLINIC | Age: 47
End: 2022-05-23
Payer: COMMERCIAL

## 2022-05-23 ENCOUNTER — PATIENT MESSAGE (OUTPATIENT)
Dept: RHEUMATOLOGY | Facility: CLINIC | Age: 47
End: 2022-05-23
Payer: COMMERCIAL

## 2022-05-23 DIAGNOSIS — M25.562 CHRONIC PAIN OF LEFT KNEE: ICD-10-CM

## 2022-05-23 DIAGNOSIS — M17.0 PRIMARY OSTEOARTHRITIS OF BOTH KNEES: Primary | ICD-10-CM

## 2022-05-23 DIAGNOSIS — M79.89 LEG SWELLING: Primary | ICD-10-CM

## 2022-05-23 DIAGNOSIS — G89.29 CHRONIC PAIN OF LEFT KNEE: ICD-10-CM

## 2022-05-23 LAB
IGG1 SER-MCNC: 886 MG/DL (ref 382–929)
IGG2 SER-MCNC: 489 MG/DL (ref 242–700)
IGG3 SER-MCNC: 164 MG/DL (ref 22–176)
IGG4 SER-MCNC: 52 MG/DL (ref 4–86)

## 2022-05-23 RX ORDER — ERGOCALCIFEROL 1.25 MG/1
50000 CAPSULE ORAL
Qty: 12 CAPSULE | Refills: 0 | Status: SHIPPED | OUTPATIENT
Start: 2022-05-23 | End: 2022-10-18

## 2022-05-23 RX ORDER — DICLOFENAC SODIUM 20 MG/G
40 SOLUTION TOPICAL 2 TIMES DAILY
Qty: 112 G | Refills: 6 | Status: SHIPPED | OUTPATIENT
Start: 2022-05-23 | End: 2022-11-01

## 2022-05-24 ENCOUNTER — PATIENT MESSAGE (OUTPATIENT)
Dept: RHEUMATOLOGY | Facility: CLINIC | Age: 47
End: 2022-05-24
Payer: COMMERCIAL

## 2022-05-30 ENCOUNTER — PATIENT MESSAGE (OUTPATIENT)
Dept: RHEUMATOLOGY | Facility: CLINIC | Age: 47
End: 2022-05-30
Payer: COMMERCIAL

## 2022-05-30 RX ORDER — NABUMETONE 750 MG/1
750 TABLET, FILM COATED ORAL 2 TIMES DAILY
Qty: 60 TABLET | Refills: 0 | Status: SHIPPED | OUTPATIENT
Start: 2022-05-30 | End: 2022-07-22 | Stop reason: SDUPTHER

## 2022-06-07 ENCOUNTER — PATIENT MESSAGE (OUTPATIENT)
Dept: RHEUMATOLOGY | Facility: CLINIC | Age: 47
End: 2022-06-07
Payer: COMMERCIAL

## 2022-06-07 DIAGNOSIS — M79.7 FIBROMYALGIA: ICD-10-CM

## 2022-06-07 RX ORDER — OXYCODONE AND ACETAMINOPHEN 10; 325 MG/1; MG/1
1 TABLET ORAL EVERY 12 HOURS PRN
Qty: 60 TABLET | Refills: 0 | Status: SHIPPED | OUTPATIENT
Start: 2022-06-07 | End: 2022-07-13 | Stop reason: SDUPTHER

## 2022-06-12 ENCOUNTER — PATIENT MESSAGE (OUTPATIENT)
Dept: FAMILY MEDICINE | Facility: CLINIC | Age: 47
End: 2022-06-12
Payer: COMMERCIAL

## 2022-06-20 NOTE — PROGRESS NOTES
This note was created by combination of typed  and M-Modal dictation.  Transcription errors may be present.  If there are any questions, please contact me.    Assessment and Plan:   Essential hypertension  -blood pressure today stable.  Has been off of clonidine now for few weeks.  Stay on valsartan HCTZ.  Done to reduce polypharmacy but also to see if this will help with fatigue.  She notes that fatigue remains.    Fatigue, unspecified type  Bipolar I disorder, current or most recent episode depressed, with psychotic features with anxious distress  Subclinical hypothyroidism  Anxiety  Mood insomnia  Chronic midline low back pain without sciatica  -fatigue may be multifactorial.  Persisting after stopping clonidine.  She has peripheral edema which could be caused or worsened by a number of different medications including Lyrica, Lamictal.  But if the net balance of these medications is reduced pain and stable mood, this may be the trade off.  We talked about possible trial of levothyroxine to see if that would help out with fatigue.  She would like to wait on initiating this.  I will check future TSH to monitor.  She notes that she is overdue for follow-up with psychiatry and she will discuss with psych about whether she could adjust any of her medications.  -     TSH; Future; Expected date: 12/21/2022    Dyslipidemia  -stable    Ganglion cyst of finger  -I think this is a ganglion cyst of the tendon sheath of the thumb.  Painless.  Reassurance.  Monitor.    Medications Discontinued During This Encounter   Medication Reason    methylPREDNISolone (MEDROL DOSEPACK) 4 mg tablet     cloNIDine (CATAPRES) 0.1 MG tablet Patient no longer taking       meds sent this encounter:       Follow Up: No follow-ups on file.    Subjective:     Chief Complaint   Patient presents with    Leg Pain     Bilat      Edema     Ankles, fingers      Chest Pain       STEFFANY Dominguez is a 47 y.o. female, last appointment with this  clinic was 5/17/2022.  Social History     Tobacco Use    Smoking status: Former Smoker     Packs/day: 0.00     Years: 0.00     Pack years: 0.00    Smokeless tobacco: Never Used    Tobacco comment: quit in october 2016   Substance Use Topics    Alcohol use: Yes     Alcohol/week: 1.0 - 3.0 standard drink     Types: 1 - 3 Glasses of wine per week     Comment: social presently, excessive 10 years ago      Social History     Occupational History    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[employer]      Social History     Social History Narrative    Has 3 healthy grown sons (1990, 1993, 1998) Lives alone.    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   in 2010.    Has Male partner since 2014 who is currently disabled.       No LMP recorded. Patient has had a hysterectomy.    Last visit me in mid May  Fatigue.  Likely multifactorial.  Number medications with possible side effects of fatigue.  Fibromyalgia which by itself can cause fatigue.  Plan is to wean down clonidine and see if improves.  Changed from patch to pills wean down to once a day for week and then stop.  If necessary compensate with valsartan.  Hypertension, history of ACE-inhibitor, then torsemide which was stopped summer 2020. Clonidine patch started after an ED visit with extremely high blood pressure.  And then valsartan HCTZ started.  Consider nifedipine as next agent  Also hypothyroid subclinical.  Plan is if no improvement may challenge her on levothyroxine.  If no improvement in fatigue on therapeutic levels of levothyroxine then stop it.  Edema.  See how she does with weaning down clonidine.  Sjogren's, fibromyalgia, followed by Rheumatology.  On fluoxetine, Lyrica, narcotic, steroid taper, hydroxychloroquine.  5/18, trial of Robaxin instead of nabumetone.  Hydrocodone changed to oxycodone.    Had messaged me about a bump on the extensor left thumb.    Leg pain.  I  had previously given her crutches.  She uses them still episodically.  Thinks that she may be getting flares of fibromyalgia.  She is getting swelling of her knees.  Intermittent.  And swelling of her hands.  Intermittent.  Feels like her muscles are tearing apart.  She has narcotic that is managed by Rheumatology.  She does find it helpful.  She gets dry mouth/dry tongue.  She treats that by sucking on ice cubes are sticking her tongue in water.  Candies do not seem to help.  It comes and goes.    No fevers, no chills.    Blood pressure today is good.  She has been off of clonidine now for the past few weeks.  She has a home BP cuff but has not been checking.  With stopping it she has not really notice much improvement in her fatigue.    She is on a higher dose of Lyrica since March.  She has peripheral edema.  Discussed that her fatigue may be multifactorial including side effect of medications, her primary disease process of fibromyalgia as well as Sjogren's.  And could be worsened by the peripheral edema.  However if the medications have a net benefit in reducing pain and stabilizing mood, this may be the necessary trait off.    She is going to schedule follow-up with her psychiatrist to review her medication regimen.    Chest pain, hard for her to describe sensation, comes and goes.  Has had eval with cardiology.     The lump on her thumb is painless.  Mobile.    Patient Care Team:  Ian Cespedes MD as PCP - General (Internal Medicine)  Funmilayo Roblero LPN as Licensed Practical Nurse  Chandler Bates MD as Consulting Physician (Cardiology)  Kari Tuttle MD as Consulting Physician (Hematology and Oncology)  Miguelangel Goldman MD as Consulting Physician (Vascular Surgery)  Johan Grover MD as Consulting Physician (Gastroenterology)  TIM Pierce MD as Consulting Physician (Urology)  Becca Paredes MD (Obstetrics and Gynecology)  Ross Hughes MD (Inactive) as Resident  (Rheumatology)  Yeison Gibson III, ADAMA as Nurse Practitioner (Psychiatry)    Patient Active Problem List    Diagnosis Date Noted    Palpitations 04/05/2022    Chest pain, atypical 04/05/2022    LEON (dyspnea on exertion) 04/05/2022    Mood insomnia 02/01/2022    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    Bipolar I disorder, current or most recent episode depressed, with psychotic features with anxious distress 12/01/2021    Dyslipidemia 11/17/2021 9/2021 mammo incidental vascular calcifications  11/2021 low dost atorvastatin      Sjogren's syndrome with inflammatory arthritis 10/13/2021    Refractive error 02/19/2021    Long-term use of Plaquenil 02/19/2021    Pain in both hands 11/16/2020    Lumbar spondylosis 11/16/2020    DDD (degenerative disc disease), lumbar 11/16/2020    Recurrent deep vein thrombosis (DVT) with hx of IVC filter; indefinite anticoagulation 09/21/2020 08/11/2020 DVT left leg underwent pharmacomechanical thrombectomy 8/12 for iliofemoral DVT.  had had a prior DVT 10 years prior in the setting of surgery for hysterectomy at the time which she had IVC filter placed.      Recurrent UTI 09/21/2020 08/28/2020 cystoscopy normal  08/13/2020 renal ultrasound normal  06/02/2020 CT abdomen pelvis unremarkable.  IVC present.      Screening for colon cancer 11/2019 colonoscopy hemorrhoids and diverticulosis; [facility] 09/21/2020 11/2019 colonoscopy hemorrhoids and diverticulosis.      Chronic anticoagulation 09/21/2020    Chronic midline low back pain without sciatica 09/21/2020 1/2017 XR L spine: 5 views lumbar spine.  Vena cava filter right common L2-L3, and lap band apparatus left upper quadrant.  Mild levoscoliosis, lordosis lumbar spine.  Elevation of right pelvis.  Facet arthropathy L4-L5 levels.  No fracture subluxation.      Neuropathy 08/11/2020    Abdominal pain 06/03/2020 06/03/2020 EGD normal duodenum.  Erythematous mucosa of the gastric  body and antrum and pre-pyloric region.  2 cm hiatal hernia.  Path negative.  No celiac.      Essential hypertension 01/22/2016 08/11/2020 TTE normal LV systolic function LVEF 60%.  Normal diastolic function.  Normal RV systolic function.  PA pressure 23.  History of empiric treatment for diverticulitis 04/22/2020, no imaging done at that time.    7/2020 ER put her on clonidine.  And then placed on amlodipine  But did not find it controlled  So started on diovan hct  And stayed on clonidine throughout.      Seasonal allergies 01/22/2016    Anxiety 01/22/2016       PAST MEDICAL PROBLEMS, PAST SURGICAL HISTORY: please see relevant portions of the electronic medical record    ALLERGIES AND MEDICATIONS: updated and reviewed.  Review of patient's allergies indicates:   Allergen Reactions    Morphine Itching and Hallucinations    Pcn [penicillins] Other (See Comments)     Was told from childhood she couldn't take it    Sulfa (sulfonamide antibiotics) Nausea And Vomiting    Latex, natural rubber Rash       Medication List with Changes/Refills   Current Medications    AMITRIPTYLINE (ELAVIL) 50 MG TABLET    Take 1 tablet (50 mg total) by mouth every evening.    ATORVASTATIN (LIPITOR) 10 MG TABLET    Take 1 tablet (10 mg total) by mouth once daily.    CLONAZEPAM (KLONOPIN) 0.5 MG TABLET    1/2 to 1 tablet daily as needed    CLONIDINE (CATAPRES) 0.1 MG TABLET    Take 1 tablet (0.1 mg total) by mouth 2 (two) times daily for 7 days, THEN 1 tablet (0.1 mg total) once daily for 7 days.    CYANOCOBALAMIN (VITAMIN B-12) 1000 MCG TABLET    Take 100 mcg by mouth once daily.    DICLOFENAC SODIUM (PENNSAID) 2 % SOPK    Apply 40 mg topically 2 (two) times daily.    DICLOFENAC SODIUM (VOLTAREN) 1 % GEL    Apply the gel (4 g) to the affected area 4 times daily. Do not apply more than 16 g daily to any one affected joint    ELIQUIS 5 MG TAB    TAKE 1 TABLET BY MOUTH TWICE DAILY **START  AFTER  FINISHING  FIRST  PRESCRIPTION**     "ERGOCALCIFEROL (ERGOCALCIFEROL) 50,000 UNIT CAP    Take 1 capsule (50,000 Units total) by mouth every 7 days.    FLUOXETINE 40 MG CAPSULE    Take 1 capsule by mouth once daily    HYDROXYCHLOROQUINE (PLAQUENIL) 200 MG TABLET    Take 1 tablet (200 mg total) by mouth 2 (two) times daily.    HYDROXYZINE (ATARAX) 50 MG TABLET    Take 1 tablet (50 mg total) by mouth daily as needed for Anxiety (sleep).    IPRATROPIUM (ATROVENT) 42 MCG (0.06 %) NASAL SPRAY    SMARTSIG:Both Nares    LAMOTRIGINE (LAMICTAL) 25 MG TABLET    Take 1 tablet (25 mg) by mouth daily x 7 days then increase to 2 tablets (50 mg) daily    METHYLPREDNISOLONE (MEDROL DOSEPACK) 4 MG TABLET    use as directed    MULTIVITAMIN WITH MINERALS TABLET    Take 1 tablet by mouth once daily.    NABUMETONE (RELAFEN) 750 MG TABLET    Take 1 tablet (750 mg total) by mouth 2 (two) times daily.    OXYCODONE-ACETAMINOPHEN (PERCOCET)  MG PER TABLET    Take 1 tablet by mouth every 12 (twelve) hours as needed for Pain.    PREGABALIN (LYRICA) 150 MG CAPSULE    Take 1 capsule (150 mg total) by mouth 3 (three) times daily.    TIZANIDINE 4 MG CAP    Take 4 mg by mouth every 6 (six) hours as needed.    TRIAMCINOLONE ACETONIDE 0.1% (KENALOG) 0.1 % CREAM    AAA abdomen bid    VALSARTAN-HYDROCHLOROTHIAZIDE (DIOVAN-HCT) 160-25 MG PER TABLET    Take 1 tablet by mouth once daily.        Objective:   Physical Exam   Vitals:    06/21/22 1100   BP: 120/86   BP Location: Left arm   Patient Position: Sitting   BP Method: Large (Manual)   Pulse: 97   SpO2: 99%   Height: 5' 4" (1.626 m)    Body mass index is 46.7 kg/m².      Height: 5' 4" (162.6 cm)     Physical Exam  Constitutional:       General: She is not in acute distress.     Appearance: She is well-developed.   Cardiovascular:      Rate and Rhythm: Normal rate and regular rhythm.      Heart sounds: Normal heart sounds. No murmur heard.  Pulmonary:      Effort: Pulmonary effort is normal.      Breath sounds: Normal breath sounds. "   Musculoskeletal:         General: Normal range of motion.      Right lower leg: Edema present.      Left lower leg: Edema present.      Comments: 2+ edema to mid tibia bilaterally  The extensor surface of the left thumb, around the MCP, there is a well-defined subcutaneous slightly mobile nodule firm nonfluctuant it is about 4 mm in diameter.  Does seem to follow along the tendon.  It is slightly mobile and does not seem to be overlying the joint space   Skin:     General: Skin is warm and dry.   Neurological:      Mental Status: She is alert and oriented to person, place, and time.      Coordination: Coordination normal.   Psychiatric:         Behavior: Behavior normal.         Thought Content: Thought content normal.

## 2022-06-21 ENCOUNTER — OFFICE VISIT (OUTPATIENT)
Dept: FAMILY MEDICINE | Facility: CLINIC | Age: 47
End: 2022-06-21
Payer: COMMERCIAL

## 2022-06-21 VITALS
BODY MASS INDEX: 46.7 KG/M2 | SYSTOLIC BLOOD PRESSURE: 120 MMHG | HEART RATE: 97 BPM | OXYGEN SATURATION: 99 % | HEIGHT: 64 IN | DIASTOLIC BLOOD PRESSURE: 86 MMHG

## 2022-06-21 DIAGNOSIS — F41.9 ANXIETY: ICD-10-CM

## 2022-06-21 DIAGNOSIS — F31.5 BIPOLAR I DISORDER, CURRENT OR MOST RECENT EPISODE DEPRESSED, WITH PSYCHOTIC FEATURES WITH ANXIOUS DISTRESS: ICD-10-CM

## 2022-06-21 DIAGNOSIS — M54.50 CHRONIC MIDLINE LOW BACK PAIN WITHOUT SCIATICA: ICD-10-CM

## 2022-06-21 DIAGNOSIS — G89.29 CHRONIC MIDLINE LOW BACK PAIN WITHOUT SCIATICA: ICD-10-CM

## 2022-06-21 DIAGNOSIS — R53.83 FATIGUE, UNSPECIFIED TYPE: ICD-10-CM

## 2022-06-21 DIAGNOSIS — E78.5 DYSLIPIDEMIA: ICD-10-CM

## 2022-06-21 DIAGNOSIS — F51.05 MOOD INSOMNIA: ICD-10-CM

## 2022-06-21 DIAGNOSIS — E03.8 SUBCLINICAL HYPOTHYROIDISM: ICD-10-CM

## 2022-06-21 DIAGNOSIS — F39 MOOD INSOMNIA: ICD-10-CM

## 2022-06-21 DIAGNOSIS — M67.449 GANGLION CYST OF FINGER: ICD-10-CM

## 2022-06-21 DIAGNOSIS — I10 ESSENTIAL HYPERTENSION: Primary | ICD-10-CM

## 2022-06-21 PROCEDURE — 3079F PR MOST RECENT DIASTOLIC BLOOD PRESSURE 80-89 MM HG: ICD-10-PCS | Mod: CPTII,S$GLB,, | Performed by: INTERNAL MEDICINE

## 2022-06-21 PROCEDURE — 1159F PR MEDICATION LIST DOCUMENTED IN MEDICAL RECORD: ICD-10-PCS | Mod: CPTII,S$GLB,, | Performed by: INTERNAL MEDICINE

## 2022-06-21 PROCEDURE — 1159F MED LIST DOCD IN RCRD: CPT | Mod: CPTII,S$GLB,, | Performed by: INTERNAL MEDICINE

## 2022-06-21 PROCEDURE — 3008F PR BODY MASS INDEX (BMI) DOCUMENTED: ICD-10-PCS | Mod: CPTII,S$GLB,, | Performed by: INTERNAL MEDICINE

## 2022-06-21 PROCEDURE — 3074F SYST BP LT 130 MM HG: CPT | Mod: CPTII,S$GLB,, | Performed by: INTERNAL MEDICINE

## 2022-06-21 PROCEDURE — 99214 PR OFFICE/OUTPT VISIT, EST, LEVL IV, 30-39 MIN: ICD-10-PCS | Mod: S$GLB,,, | Performed by: INTERNAL MEDICINE

## 2022-06-21 PROCEDURE — 1160F PR REVIEW ALL MEDS BY PRESCRIBER/CLIN PHARMACIST DOCUMENTED: ICD-10-PCS | Mod: CPTII,S$GLB,, | Performed by: INTERNAL MEDICINE

## 2022-06-21 PROCEDURE — 99999 PR PBB SHADOW E&M-EST. PATIENT-LVL V: CPT | Mod: PBBFAC,,, | Performed by: INTERNAL MEDICINE

## 2022-06-21 PROCEDURE — 3079F DIAST BP 80-89 MM HG: CPT | Mod: CPTII,S$GLB,, | Performed by: INTERNAL MEDICINE

## 2022-06-21 PROCEDURE — 3008F BODY MASS INDEX DOCD: CPT | Mod: CPTII,S$GLB,, | Performed by: INTERNAL MEDICINE

## 2022-06-21 PROCEDURE — 99999 PR PBB SHADOW E&M-EST. PATIENT-LVL V: ICD-10-PCS | Mod: PBBFAC,,, | Performed by: INTERNAL MEDICINE

## 2022-06-21 PROCEDURE — 99214 OFFICE O/P EST MOD 30 MIN: CPT | Mod: S$GLB,,, | Performed by: INTERNAL MEDICINE

## 2022-06-21 PROCEDURE — 3074F PR MOST RECENT SYSTOLIC BLOOD PRESSURE < 130 MM HG: ICD-10-PCS | Mod: CPTII,S$GLB,, | Performed by: INTERNAL MEDICINE

## 2022-06-21 PROCEDURE — 1160F RVW MEDS BY RX/DR IN RCRD: CPT | Mod: CPTII,S$GLB,, | Performed by: INTERNAL MEDICINE

## 2022-06-21 NOTE — LETTER
June 21, 2022      [facility]  [address]  [place] [address]  Phone: [phone]  Fax: [phone]       Patient: Alberto Frye  YOB: 1975  Date of Visit: 6/21/22      To Whom It May Concern:    Alberto Frye  was at [facility] on 6/21/22.  If you have any questions or concerns, or if I can be of further assistance, please do not hesitate to contact me.      Sincerely,        Ian Cespedes MD

## 2022-06-29 ENCOUNTER — PATIENT MESSAGE (OUTPATIENT)
Dept: RHEUMATOLOGY | Facility: CLINIC | Age: 47
End: 2022-06-29
Payer: COMMERCIAL

## 2022-07-02 RX ORDER — METHYLPREDNISOLONE 4 MG/1
TABLET ORAL
Qty: 1 EACH | Refills: 0 | Status: SHIPPED | OUTPATIENT
Start: 2022-07-02 | End: 2022-11-01

## 2022-07-03 ENCOUNTER — HOSPITAL ENCOUNTER (EMERGENCY)
Facility: HOSPITAL | Age: 47
Discharge: HOME | End: 2022-07-03
Attending: EMERGENCY MEDICINE
Payer: COMMERCIAL

## 2022-07-03 VITALS
HEART RATE: 76 BPM | DIASTOLIC BLOOD PRESSURE: 86 MMHG | WEIGHT: 265 LBS | BODY MASS INDEX: 45.49 KG/M2 | RESPIRATION RATE: 20 BRPM | OXYGEN SATURATION: 100 % | TEMPERATURE: 98 F | SYSTOLIC BLOOD PRESSURE: 124 MMHG
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
## 2022-07-03 DIAGNOSIS — S39.012A BACK STRAIN, INITIAL ENCOUNTER: Primary | ICD-10-CM

## 2022-07-03 PROCEDURE — 99284 EMERGENCY DEPT VISIT MOD MDM: CPT

## 2022-07-03 PROCEDURE — 96372 THER/PROPH/DIAG INJ SC/IM: CPT

## 2022-07-03 PROCEDURE — 25000003 PHARM REV CODE 250

## 2022-07-03 PROCEDURE — 63600175 PHARM REV CODE 636 W HCPCS

## 2022-07-03 RX ORDER — KETOROLAC TROMETHAMINE 30 MG/ML
15 INJECTION, SOLUTION INTRAMUSCULAR; INTRAVENOUS
Status: COMPLETED | OUTPATIENT
Start: 2022-07-03 | End: 2022-07-03

## 2022-07-03 RX ORDER — LIDOCAINE 50 MG/G
1 PATCH TOPICAL DAILY
Qty: 15 PATCH | Refills: 0 | Status: SHIPPED | OUTPATIENT
Start: 2022-07-03 | End: 2022-11-01

## 2022-07-03 RX ORDER — ORPHENADRINE CITRATE 100 MG/1
100 TABLET, EXTENDED RELEASE ORAL 2 TIMES DAILY
Qty: 20 TABLET | Refills: 0 | Status: SHIPPED | OUTPATIENT
Start: 2022-07-03 | End: 2022-07-13

## 2022-07-03 RX ORDER — LIDOCAINE 50 MG/G
1 PATCH TOPICAL ONCE
Status: DISCONTINUED | OUTPATIENT
Start: 2022-07-03 | End: 2022-07-03 | Stop reason: HOSPADM

## 2022-07-03 RX ADMIN — LIDOCAINE 1 PATCH: 50 PATCH TOPICAL at 06:07

## 2022-07-03 RX ADMIN — KETOROLAC TROMETHAMINE 15 MG: 30 INJECTION, SOLUTION INTRAMUSCULAR at 06:07
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
## 2022-07-04 NOTE — DISCHARGE INSTRUCTIONS
Thank you for coming to our Emergency Department today. It is important to remember that some problems are difficult to diagnose and may not be found during your first visit. Be sure to follow up with your primary care doctor and review any labs/imaging that was performed with them. If you do not have a primary care doctor, you may contact the one listed on your discharge paperwork or you may also call the [facility] Appointment Desk at [phone] to schedule an appointment with one.     All medications may potentially have side effects and it is impossible to predict which medications may give you side effects. If you feel that you are having a negative effect of any medication you should immediately stop taking them and seek medical attention.    Return to the ER with any questions/concerns, new/concerning symptoms, worsening or failure to improve. Do not drive or make any important decisions for 24 hours if you have received any pain medications, sedatives or mood altering drugs during your ER visit.

## 2022-07-13 ENCOUNTER — OFFICE VISIT (OUTPATIENT)
Dept: RHEUMATOLOGY | Facility: CLINIC | Age: 47
End: 2022-07-13
Payer: COMMERCIAL

## 2022-07-13 VITALS
RESPIRATION RATE: 20 BRPM | DIASTOLIC BLOOD PRESSURE: 96 MMHG | OXYGEN SATURATION: 99 % | HEART RATE: 100 BPM | HEIGHT: 64 IN | SYSTOLIC BLOOD PRESSURE: 148 MMHG | WEIGHT: 265 LBS | BODY MASS INDEX: 45.24 KG/M2

## 2022-07-13 DIAGNOSIS — M79.7 FIBROMYALGIA: ICD-10-CM

## 2022-07-13 DIAGNOSIS — M35.01 SJOGREN'S SYNDROME WITH KERATOCONJUNCTIVITIS SICCA: Primary | ICD-10-CM

## 2022-07-13 DIAGNOSIS — Z71.89 COUNSELING AND COORDINATION OF CARE: ICD-10-CM

## 2022-07-13 DIAGNOSIS — M15.9 PRIMARY OSTEOARTHRITIS INVOLVING MULTIPLE JOINTS: ICD-10-CM

## 2022-07-13 DIAGNOSIS — E66.01 MORBID OBESITY: ICD-10-CM

## 2022-07-13 DIAGNOSIS — Z79.899 ENCOUNTER FOR LONG-TERM (CURRENT) USE OF OTHER MEDICATIONS: ICD-10-CM

## 2022-07-13 PROCEDURE — 3080F DIAST BP >= 90 MM HG: CPT | Mod: CPTII,S$GLB,, | Performed by: INTERNAL MEDICINE

## 2022-07-13 PROCEDURE — 3077F SYST BP >= 140 MM HG: CPT | Mod: CPTII,S$GLB,, | Performed by: INTERNAL MEDICINE

## 2022-07-13 PROCEDURE — 99214 OFFICE O/P EST MOD 30 MIN: CPT | Mod: S$GLB,,, | Performed by: INTERNAL MEDICINE

## 2022-07-13 PROCEDURE — 3008F PR BODY MASS INDEX (BMI) DOCUMENTED: ICD-10-PCS | Mod: CPTII,S$GLB,, | Performed by: INTERNAL MEDICINE

## 2022-07-13 PROCEDURE — 3077F PR MOST RECENT SYSTOLIC BLOOD PRESSURE >= 140 MM HG: ICD-10-PCS | Mod: CPTII,S$GLB,, | Performed by: INTERNAL MEDICINE

## 2022-07-13 PROCEDURE — 99999 PR PBB SHADOW E&M-EST. PATIENT-LVL V: ICD-10-PCS | Mod: PBBFAC,,, | Performed by: INTERNAL MEDICINE

## 2022-07-13 PROCEDURE — 99214 PR OFFICE/OUTPT VISIT, EST, LEVL IV, 30-39 MIN: ICD-10-PCS | Mod: S$GLB,,, | Performed by: INTERNAL MEDICINE

## 2022-07-13 PROCEDURE — 1159F PR MEDICATION LIST DOCUMENTED IN MEDICAL RECORD: ICD-10-PCS | Mod: CPTII,S$GLB,, | Performed by: INTERNAL MEDICINE

## 2022-07-13 PROCEDURE — 3008F BODY MASS INDEX DOCD: CPT | Mod: CPTII,S$GLB,, | Performed by: INTERNAL MEDICINE

## 2022-07-13 PROCEDURE — 1159F MED LIST DOCD IN RCRD: CPT | Mod: CPTII,S$GLB,, | Performed by: INTERNAL MEDICINE

## 2022-07-13 PROCEDURE — 3080F PR MOST RECENT DIASTOLIC BLOOD PRESSURE >= 90 MM HG: ICD-10-PCS | Mod: CPTII,S$GLB,, | Performed by: INTERNAL MEDICINE

## 2022-07-13 PROCEDURE — 99999 PR PBB SHADOW E&M-EST. PATIENT-LVL V: CPT | Mod: PBBFAC,,, | Performed by: INTERNAL MEDICINE

## 2022-07-13 RX ORDER — PREGABALIN 150 MG/1
150 CAPSULE ORAL 3 TIMES DAILY
Qty: 90 CAPSULE | Refills: 5 | Status: SHIPPED | OUTPATIENT
Start: 2022-07-13 | End: 2022-12-02 | Stop reason: SDUPTHER

## 2022-07-13 RX ORDER — OXYCODONE AND ACETAMINOPHEN 10; 325 MG/1; MG/1
1 TABLET ORAL EVERY 12 HOURS PRN
Qty: 60 TABLET | Refills: 0 | Status: SHIPPED | OUTPATIENT
Start: 2022-07-13 | End: 2022-08-19 | Stop reason: SDUPTHER

## 2022-07-13 RX ORDER — METHOCARBAMOL 750 MG/1
750 TABLET, FILM COATED ORAL 4 TIMES DAILY
Qty: 120 TABLET | Refills: 3 | Status: SHIPPED | OUTPATIENT
Start: 2022-07-13 | End: 2022-10-18

## 2022-07-13 RX ORDER — HYDROXYCHLOROQUINE SULFATE 200 MG/1
200 TABLET, FILM COATED ORAL 2 TIMES DAILY
Qty: 60 TABLET | Refills: 6 | Status: SHIPPED | OUTPATIENT
Start: 2022-07-13 | End: 2022-12-02 | Stop reason: SDUPTHER

## 2022-07-13 NOTE — PROGRESS NOTES
RHEUMATOLOGY OUTPATIENT CLINIC NOTE    7/13/2022    Attending Rheumatologist: Ross Hughes  Primary Care Provider: Ian Cespedes MD   Physician Requesting Consultation: No referring provider defined for this encounter.  Chief Complaint/Reason For Consultation:  No chief complaint on file.      Subjective:       HPI  Alberto Frye is a 47 y.o. Black or  female with medical history noted below who comes for evaluation of arthralgias.   She reports knowing of abnormal GIOVANNY at least ten years and when symptoms worsened she saw Rheumatology. Seen by Rheumatology at [facility] in 2018, noted to have +GIOVANNY & SSA, and likely beginning of Primary Sjogrens and OA.   Patient reports that she has been dealing with chronic back pain for many years but over then last 3 months has acutely worsened. She also notes pain in her hands, elbows, shoulder, knees, hips which has been progressing over the last year. Pain is worsened by all movement, improves only when laying still. Minimal relief with meds. Though she reports if she stays still to long then she is stiff. Reports about 20 minutes of morning stiffness. Has noted swelling in her hands. Also c/o numbness and tingling in her feet. Terrible sleep. + Dry eyes and dry mouth, easy bruising due to being on AC, +Raynaud's.  Had DVT post surgery and another DVT in iliac artery in August 2020.    No Alopecia, Oral/Nasal Ulcers, Rash, Photosensitivity, Pleuritis/serositis, LAD, Miscarriages, Skin tightening, SOB.     Today  Patient here for follow up.   Last visit given trial of Percocet for Pain and started on Robaxin. She notes these changes in the medication have hleped. Notes yesterday due to weather it was a bad day, but overall stable. Still gaining weight. Tolerating meds.     Review of Systems   Constitutional: Negative for appetite change, chills, fatigue, fever and unexpected weight change.   HENT: Negative for nasal congestion, ear discharge,  ear pain, hearing loss, mouth sores, nosebleeds, sneezing, sore throat, tinnitus and trouble swallowing.    Eyes: Negative for photophobia, pain, discharge, redness, itching and visual disturbance.   Respiratory: Negative for cough, chest tightness, shortness of breath and wheezing.    Cardiovascular: Negative for chest pain, palpitations and leg swelling.   Gastrointestinal: Negative for abdominal distention, abdominal pain, blood in stool, constipation, diarrhea, nausea and vomiting.   Endocrine: Negative for cold intolerance, heat intolerance, polydipsia, polyphagia and polyuria.   Genitourinary: Negative for difficulty urinating, dyspareunia, dysuria, flank pain, frequency, genital sores, hematuria, menstrual problem, pelvic pain, urgency, vaginal bleeding, vaginal discharge, vaginal pain and vaginal dryness.   Musculoskeletal: Positive for arthralgias, back pain and joint swelling. Negative for gait problem, leg pain, myalgias, neck pain, neck stiffness and joint deformity.   Integumentary:  Negative for pallor and rash.   Neurological: Negative for dizziness, seizures, weakness, light-headedness, numbness and headaches.   Hematological: Negative for adenopathy. Does not bruise/bleed easily.   Psychiatric/Behavioral: Negative for confusion, decreased concentration and sleep disturbance. The patient is not nervous/anxious.    All other systems reviewed and are negative.       Chronic comorbid conditions affecting medical decision making today:  Past Medical History:   Diagnosis Date    Alcohol abuse     stopped heavy drinking about 10 years ago; was drinking 3 glasses of vodka/tequilla,rum/whiskey per day    Allergy Don't remember    Its been some years.    Anxiety     Arthritis 2010    Blood clotting tendency 2008    After a procedure & 2020.    Congestive heart failure 8/11/2020    Depression     Hallucination     Hx of psychiatric care     Hypertension     Immune disorder 2020    Joint pain 2010     Keloid cicatrix Years ago    From childhood scars    Caro     unplanned trips, energy without sleep for 2 days (reading, cleaning), feelings that she can do multiple tasks at one time, feelings of overconfidence at times    Psychiatric problem     Sleep difficulties     Therapy     Withdrawal symptoms, drug or narcotic     racing heart, restlessness     Past Surgical History:   Procedure Laterality Date     lapban surgery  2009    ESOPHAGOGASTRODUODENOSCOPY N/A 6/3/2020    Procedure: EGD (ESOPHAGOGASTRODUODENOSCOPY);  Surgeon: Johan Grover MD;  Location: [facility] (4TH FLR);  Service: Endoscopy;  Laterality: N/A;  covid 6/2-[facility]-tb    HYSTERECTOMY      PHLEBOGRAPHY Left 8/12/2020    Procedure: Venogram, pharmacomechanical thrombectomy;  Surgeon: Miguelangel Goldman MD;  Location: [facility] OR 2ND FLR;  Service: Vascular;  Laterality: Left;  2336.43 mGy  [id number]  17.8 minutes  37 ml of contrast    VENOPLASTY Left 8/12/2020    Procedure: ANGIOPLASTY, VEIN;  Surgeon: Miguelangel Goldman MD;  [facility];  Service: Vascular;  Laterality: Left;     Family History   Problem Relation Age of Onset    Diabetes Mother     Cancer Mother     Hypertension Mother     Cirrhosis Maternal Uncle         ETOH    Celiac disease Neg Hx     Colon cancer Neg Hx     Colon polyps Neg Hx     Crohn's disease Neg Hx     Esophageal cancer Neg Hx     Inflammatory bowel disease Neg Hx     Liver cancer Neg Hx     Liver disease Neg Hx     Rectal cancer Neg Hx     Stomach cancer Neg Hx     Ulcerative colitis Neg Hx      Social History     Substance and Sexual Activity   Alcohol Use Yes    Alcohol/week: 1.0 - 3.0 standard drink    Types: 1 - 3 Glasses of wine per week    Comment: social presently, excessive 10 years ago     Social History     Tobacco Use   Smoking Status Former Smoker    Packs/day: 0.00    Years: 0.00    Pack years: 0.00   Smokeless Tobacco Never Used   Tobacco  Comment    quit in october 2016     Social History     Substance and Sexual Activity   Drug Use Never    Types: Marijuana    Comment: uses THCA weekly       Current Outpatient Medications:     amitriptyline (ELAVIL) 50 MG tablet, Take 1 tablet (50 mg total) by mouth every evening., Disp: 90 tablet, Rfl: 3    atorvastatin (LIPITOR) 10 MG tablet, Take 1 tablet (10 mg total) by mouth once daily., Disp: 90 tablet, Rfl: 3    clonazePAM (KLONOPIN) 0.5 MG tablet, 1/2 to 1 tablet daily as needed, Disp: 14 tablet, Rfl: 0    cyanocobalamin (VITAMIN B-12) 1000 MCG tablet, Take 100 mcg by mouth once daily., Disp: , Rfl:     diclofenac sodium (PENNSAID) 2 % SoPk, Apply 40 mg topically 2 (two) times daily., Disp: 112 g, Rfl: 6    diclofenac sodium (VOLTAREN) 1 % Gel, Apply the gel (4 g) to the affected area 4 times daily. Do not apply more than 16 g daily to any one affected joint, Disp: 100 g, Rfl: 1    ELIQUIS 5 mg Tab, TAKE 1 TABLET BY MOUTH TWICE DAILY **START  AFTER  FINISHING  FIRST  PRESCRIPTION**, Disp: 60 tablet, Rfl: 0    ergocalciferol (ERGOCALCIFEROL) 50,000 unit Cap, Take 1 capsule (50,000 Units total) by mouth every 7 days., Disp: 12 capsule, Rfl: 0    FLUoxetine 40 MG capsule, Take 1 capsule by mouth once daily, Disp: 90 capsule, Rfl: 3    hydrOXYchloroQUINE (PLAQUENIL) 200 mg tablet, Take 1 tablet (200 mg total) by mouth 2 (two) times daily., Disp: 60 tablet, Rfl: 6    hydrOXYzine (ATARAX) 50 MG tablet, Take 1 tablet (50 mg total) by mouth daily as needed for Anxiety (sleep)., Disp: 90 tablet, Rfl: 3    ipratropium (ATROVENT) 42 mcg (0.06 %) nasal spray, SMARTSIG:Both Nares, Disp: , Rfl:     lamoTRIgine (LAMICTAL) 25 MG tablet, Take 1 tablet (25 mg) by mouth daily x 7 days then increase to 2 tablets (50 mg) daily, Disp: 60 tablet, Rfl: 11    LIDOcaine (LIDODERM) 5 %, Place 1 patch onto the skin once daily. Remove & Discard patch within 12 hours then leave off for 12 hours, Disp: 15 patch, Rfl: 0     methylPREDNISolone (MEDROL DOSEPACK) 4 mg tablet, use as directed, Disp: 1 each, Rfl: 0    multivitamin with minerals tablet, Take 1 tablet by mouth once daily., Disp: , Rfl:     nabumetone (RELAFEN) 750 MG tablet, Take 1 tablet (750 mg total) by mouth 2 (two) times daily., Disp: 60 tablet, Rfl: 0    orphenadrine (NORFLEX) 100 mg tablet, Take 1 tablet (100 mg total) by mouth 2 (two) times daily. for 10 days, Disp: 20 tablet, Rfl: 0    oxyCODONE-acetaminophen (PERCOCET)  mg per tablet, Take 1 tablet by mouth every 12 (twelve) hours as needed for Pain., Disp: 60 tablet, Rfl: 0    pregabalin (LYRICA) 150 MG capsule, Take 1 capsule (150 mg total) by mouth 3 (three) times daily., Disp: 90 capsule, Rfl: 5    tiZANidine 4 mg Cap, Take 4 mg by mouth every 6 (six) hours as needed., Disp: , Rfl:     triamcinolone acetonide 0.1% (KENALOG) 0.1 % cream, AAA abdomen bid, Disp: 80 g, Rfl: 0    valsartan-hydrochlorothiazide (DIOVAN-HCT) 160-25 mg per tablet, Take 1 tablet by mouth once daily., Disp: 90 tablet, Rfl: 3    Current Facility-Administered Medications:     triamcinolone acetonide injection 40 mg, 40 mg, Intradermal, Once, Ama Sylvester MD     Objective:         Vitals:    07/13/22 1029   BP: (!) 148/96   Pulse: 100   Resp: 20     Physical Exam   Constitutional: She is oriented to person, place, and time.   HENT:   Head: Normocephalic and atraumatic.   Right Ear: External ear normal.   Left Ear: External ear normal.   Nose: Nose normal.   Mouth/Throat: Oropharynx is clear and moist.   Eyes: Pupils are equal, round, and reactive to light. Conjunctivae are normal.   Cardiovascular: Normal rate and regular rhythm.   Pulmonary/Chest: Effort normal and breath sounds normal.   Abdominal: Soft. Bowel sounds are normal.   Musculoskeletal:      Right shoulder: Normal.      Left shoulder: Normal.      Right elbow: Normal.      Left elbow: Normal.      Right wrist: Normal.      Left wrist: Normal.      Cervical  back: Normal range of motion and neck supple.      Right knee: Normal.      Left knee: Normal.      Comments: Tender Points:  No   Yes  ( )    (x )   Low cervical anterior aspect    ( )    (x )   Costochondral Junction   ( )    (x )   Lateral Epicondyle  ( )    (x )   Suboccipital   ( )    (x )   Trapezius   ( )    (x )   Supraspinatus   ( )    (x )   Gluteal   ( )    (x )   Greater trochanter  ( )    (x )   Knee       Neurological: She is alert and oriented to person, place, and time.   Skin: No rash noted. No erythema.   Psychiatric: Mood and affect normal.       Right Side Rheumatological Exam     Examination finds the shoulder, elbow, wrist, knee, 1st PIP, 1st MCP, 2nd PIP, 2nd MCP, 3rd PIP, 3rd MCP, 4th PIP, 4th MCP, 5th PIP and 5th MCP normal.    Left Side Rheumatological Exam     Examination finds the shoulder, elbow, wrist, knee, 1st PIP, 1st MCP, 2nd PIP, 2nd MCP, 3rd PIP, 3rd MCP, 4th PIP, 4th MCP, 5th PIP and 5th MCP normal.      Back/Neck Exam     Comments:  -SLT         Reviewed old and all outside pertinent medical records available.    All lab results personally reviewed and interpreted by me.  Lab Results   Component Value Date    WBC 5.69 05/18/2022    HGB 13.2 05/18/2022    HCT 41.6 05/18/2022    MCV 95 05/18/2022    MCH 30.0 05/18/2022    MCHC 31.7 (L) 05/18/2022    RDW 14.1 05/18/2022     05/18/2022    MPV 10.4 05/18/2022       Lab Results   Component Value Date     05/18/2022    K 4.7 05/18/2022     05/18/2022    CO2 29 05/18/2022    GLU 82 05/18/2022    BUN 15 05/18/2022    CALCIUM 9.8 05/18/2022    PROT 8.1 05/18/2022    ALBUMIN 3.8 05/18/2022    BILITOT 0.6 05/18/2022    AST 27 05/18/2022    ALKPHOS 89 05/18/2022    ALT 31 05/18/2022       Lab Results   Component Value Date    COLORU Colorless (A) 05/18/2022    APPEARANCEUA Clear 05/18/2022    SPECGRAV 1.005 05/18/2022    PHUR 7.0 05/18/2022    PROTEINUA Negative 05/18/2022    KETONESU Negative 05/18/2022    LEUKOCYTESUR  Negative 05/18/2022    NITRITE Negative 05/18/2022    UROBILINOGEN Negative 05/18/2022       Lab Results   Component Value Date    CRP 5.1 05/18/2022       Lab Results   Component Value Date    SEDRATE 17 05/18/2022       Lab Results   Component Value Date    RF <10.0 06/14/2016    SEDRATE 17 05/18/2022       No components found for: 25OHVITDTOT, 11TTLERI9, 97JFLLKA2, METHODNOTE    No results found for: URICACID    No components found for: TSPOTTB    Imaging:  All imaging reviewed and independently interpreted by me.         ASSESSMENT / PLAN:     Alberto Frye is a 47 y.o. Black or  female with:      1. Sjogren's syndrome with keratoconjunctivitis sicca  - Patients has s/s consistent with SjS, she has sicca, wide spread pain, arthralgias   - stable   - reinforced artificial tears and oral hydration   - continue HCQ 400mg daily   - trial of Cevillimine did not help   - annual EYE and Dental Exam   - reassurance      2. Fibromyalgia   - stable   - continue Lyrica + Robaxin   - wt loss  - sleep hygiene and stress management reinforced   - Reassurance and Exercise    3. Osteoarthritis  - in addition to SjS and FM she has OA  - stable  - wt loss  - Percocet PRN   - reassurance and exercise      4. Other specified counseling  - over 10 minutes spent regarding below topics:  - Immunization counseling done.  - Weight loss counseling done.  - Nutrition and exercise counseling.  - Limitation of alcohol consumption.  - Regular exercise:  Aerobic and resistance.  - Medication counseling provided.    5. Morbid Obesity  - would benefit from decreasing at least 10% of body weight.  - recommended goal of losing 1 lb per week.  - nutrition referral     6. DMARD Toxicity Monitoring  - annual EYE exam     Follow up in about 3 months (around 10/13/2022).    Method of contact with patient concerns: Ashok attn Rheumatology    Disclaimer:  This note is prepared using voice recognition software and as such is  likely to have errors and has not been proof read. Please contact me for questions.     Time spent: 30 minutes in face to face discussion concerning diagnosis, prognosis, review of lab and test results, benefits of treatment as well as management of disease, counseling of patient and coordination of care between various health care providers.  Greater than half the time spent was used for coordination of care and counseling of patient.    Ross Hughes M.D.  Rheumatology Department   [facility]

## 2022-07-20 ENCOUNTER — PATIENT MESSAGE (OUTPATIENT)
Dept: RHEUMATOLOGY | Facility: CLINIC | Age: 47
End: 2022-07-20
Payer: COMMERCIAL

## 2022-08-11 ENCOUNTER — NUTRITION (OUTPATIENT)
Dept: NUTRITION | Facility: CLINIC | Age: 47
End: 2022-08-11
Payer: COMMERCIAL

## 2022-08-11 VITALS — HEIGHT: 64 IN | WEIGHT: 270.75 LBS | BODY MASS INDEX: 46.22 KG/M2

## 2022-08-11 DIAGNOSIS — M35.01 SJOGREN'S SYNDROME WITH KERATOCONJUNCTIVITIS SICCA: ICD-10-CM

## 2022-08-11 DIAGNOSIS — Z71.3 DIETARY COUNSELING: ICD-10-CM

## 2022-08-11 DIAGNOSIS — E66.01 MORBID OBESITY WITH BMI OF 45.0-49.9, ADULT: Primary | ICD-10-CM

## 2022-08-11 DIAGNOSIS — M79.7 FIBROMYALGIA: ICD-10-CM

## 2022-08-11 PROCEDURE — 97802 MEDICAL NUTRITION INDIV IN: CPT | Mod: S$GLB,,, | Performed by: DIETITIAN, REGISTERED

## 2022-08-11 PROCEDURE — 97802 PR MED NUTR THER, 1ST, INDIV, EA 15 MIN: ICD-10-PCS | Mod: S$GLB,,, | Performed by: DIETITIAN, REGISTERED

## 2022-08-11 PROCEDURE — 99999 PR PBB SHADOW E&M-EST. PATIENT-LVL IV: CPT | Mod: PBBFAC,,,

## 2022-08-11 PROCEDURE — 99999 PR PBB SHADOW E&M-EST. PATIENT-LVL IV: ICD-10-PCS | Mod: PBBFAC,,,

## 2022-08-11 NOTE — PROGRESS NOTES
"Referring Physician:Ross Hughes MD     Reason for visit:  Chief Complaint   Patient presents with    Obesity    Nutrition Counseling      Initial Visit    :1975     Allergies Reviewed  Meds Reviewed    Anthropometrics  Weight:122.8 kg (270 lb 11.6 oz)  Height:5' 4" (1.626 m)  BMI:Body mass index is 46.47 kg/m².   IBW:   54.5 kg  +/-10%    Meds:  Outpatient Medications Prior to Visit   Medication Sig Dispense Refill    amitriptyline (ELAVIL) 50 MG tablet TAKE 1 TABLET BY MOUTH ONCE DAILY IN THE EVENING 90 tablet 0    atorvastatin (LIPITOR) 10 MG tablet Take 1 tablet (10 mg total) by mouth once daily. 90 tablet 3    clonazePAM (KLONOPIN) 0.5 MG tablet 1/2 to 1 tablet daily as needed 14 tablet 0    cyanocobalamin (VITAMIN B-12) 1000 MCG tablet Take 100 mcg by mouth once daily.      diclofenac sodium (PENNSAID) 2 % SoPk Apply 40 mg topically 2 (two) times daily. 112 g 6    diclofenac sodium (VOLTAREN) 1 % Gel Apply the gel (4 g) to the affected area 4 times daily. Do not apply more than 16 g daily to any one affected joint 100 g 1    ELIQUIS 5 mg Tab TAKE 1 TABLET BY MOUTH TWICE DAILY START  AFTER  FINISHING  FIRST  PRESCRIPTION 60 tablet 0    ergocalciferol (ERGOCALCIFEROL) 50,000 unit Cap Take 1 capsule (50,000 Units total) by mouth every 7 days. 12 capsule 0    FLUoxetine 40 MG capsule Take 1 capsule by mouth once daily 90 capsule 3    hydrOXYchloroQUINE (PLAQUENIL) 200 mg tablet Take 1 tablet (200 mg total) by mouth 2 (two) times daily. 60 tablet 6    hydrOXYzine (ATARAX) 50 MG tablet Take 1 tablet (50 mg total) by mouth daily as needed for Anxiety (sleep). 90 tablet 3    ipratropium (ATROVENT) 42 mcg (0.06 %) nasal spray SMARTSIG:Both Nares      lamoTRIgine (LAMICTAL) 25 MG tablet Take 1 tablet (25 mg) by mouth daily x 7 days then increase to 2 tablets (50 mg) daily 60 tablet 11    LIDOcaine (LIDODERM) 5 % Place 1 patch onto the skin once daily. Remove & Discard patch within 12 " "hours then leave off for 12 hours 15 patch 0    methocarbamoL (ROBAXIN) 750 MG Tab Take 1 tablet (750 mg total) by mouth 4 (four) times daily. 120 tablet 3    methylPREDNISolone (MEDROL DOSEPACK) 4 mg tablet use as directed 1 each 0    multivitamin with minerals tablet Take 1 tablet by mouth once daily.      nabumetone (RELAFEN) 750 MG tablet Take 1 tablet (750 mg total) by mouth 2 (two) times daily. 60 tablet 0    oxyCODONE-acetaminophen (PERCOCET)  mg per tablet Take 1 tablet by mouth every 12 (twelve) hours as needed for Pain. 60 tablet 0    pregabalin (LYRICA) 150 MG capsule Take 1 capsule (150 mg total) by mouth 3 (three) times daily. 90 capsule 5    triamcinolone acetonide 0.1% (KENALOG) 0.1 % cream AAA abdomen bid 80 g 0    valsartan-hydrochlorothiazide (DIOVAN-HCT) 160-25 mg per tablet Take 1 tablet by mouth once daily. 90 tablet 3     Facility-Administered Medications Prior to Visit   Medication Dose Route Frequency Provider Last Rate Last Admin    triamcinolone acetonide injection 40 mg  40 mg Intradermal Once Ama Sylvester MD           Food/Drug Interactions Noted:  n/a    Vitamins/Supplements/Herbs:  See med list    Labs:   CMP wnl     Nutrition Prescription: 1635 Kcals/day( 30 kcal/kg IBW),  55 g protein( 1.0 g/kg IBW)     Support System:  Pt does her own grocery shopping and cooking; however, she only makes groceries when she wants to prepare a specific meal and doesn't cook much at home anymore.  Had lap band surgery at [facility] 10+ years ago; pt states it is still in place but the connection between it and the tubing to inflate the balloon is broken and her insurance won't cover repair.    Diet Hx:   Pt requesting information today to help her make "better food choices".  Relies primarily on fast food/take-out meals.  Only eats when she's hungry, and may skip dinner if she's full from lunch.  Beverages:  Water or sugar-free flavoring packet added to water, coffee.  Plans to buy " measuring cups and spoons to assist with portion control.    Breakfast: 2 packets flavored oatmeal or grits with american cheese and smoked sausage.  If she's not hungry, Godinez[employer] grits with cheese.  1 cup coffee with 8 packets of Splenda (amount clarified with pt) and splash of Pet milk.  Cup of OJ.  Lunch:   Take-out at work ie fried chicken drummies with fries or Godinez's double cheeseburger.  Diet Coke.  Or Chick-zabrina-A or Subway salad with fat free ranch dressing.  Sugar-free drink.  Dinner:   May snack on fresh fruit if she skips dinner.  Last time she cooked:  Smothered okra with smoked sausage and shrimp over white rice.    Current activity level and/or physical limitations:   Due to recent Sjogren's syndrome with inflammatory arthritis flare, it's been hard to walk; states she has a walking caroline at work and access to a walking trail nearby.  At one time recently she was on crutches 2/2 leg pain.    Motivation to make changes/anticipated barriers and/or expected adherence:   Pt would like to lose weight and states she finds the information provided today useful to plan a grocery list and menus    Nutrition-Focus Physical Findings:  Pt wearing face covering per COVID19 protocol; pt appears well nourished      Assessment:  Pt very attentive and asked relevant questions about foods/beverages recommended and to avoid; sample meal plan and menus; portion control; healthy snacking; eating out, grocery shopping and cooking tips; reading food labels.  All questions answered, and she verbalized understanding of information.    Nutrition Diagnosis:  Obesity RT excess energy intake and physical inactivity AEB BMI > 40    Recommendations:   1500 calorie, low fat, high fiber diet. Exercise goal:  30 minutes per day, 3-5 days per week as tolerated  Handouts provided and reviewed:  Cardiac-TLC Nutrition Therapy; 1500 Calorie Sample 5-Day Menus; Weight Loss Tips; Cooking Tips for Weight Management; Get Fit Shopping  List; Eat Fit Plan...Anytime/Anywhere;  Servings of Carbohydrates for Meal Planning; Walking Works; My Plate Planner;  Heart Healthy Eating:  Shopping Tips and Label Reading Tips; Tips to Support Weight Loss; [facility] Snack List for Diabetes; Eat Fit sepideh info      Strategies Implemented:   Portion control; keep a food journal    Consultation Time:45 minutes.  Communicated with referring healthcare provider:  Consult note available in pt's Epic chart per MD discretion  Follow Up:  Pt provided with dietitian contact number and advised to call with questions or make future appointment if further intervention needed.

## 2022-08-11 NOTE — PATIENT INSTRUCTIONS
1500 calorie, low fat, high fiber diet.  Exercise goal:  30 minutes per day, 3-5 days per week as tolerated

## 2022-08-17 ENCOUNTER — PATIENT MESSAGE (OUTPATIENT)
Dept: RHEUMATOLOGY | Facility: CLINIC | Age: 47
End: 2022-08-17
Payer: COMMERCIAL

## 2022-09-12 ENCOUNTER — PATIENT MESSAGE (OUTPATIENT)
Dept: FAMILY MEDICINE | Facility: CLINIC | Age: 47
End: 2022-09-12
Payer: COMMERCIAL

## 2022-09-12 RX ORDER — NABUMETONE 750 MG/1
750 TABLET, FILM COATED ORAL 2 TIMES DAILY
Qty: 60 TABLET | Refills: 0 | Status: SHIPPED | OUTPATIENT
Start: 2022-09-12 | End: 2022-11-01

## 2022-09-13 ENCOUNTER — PATIENT MESSAGE (OUTPATIENT)
Dept: FAMILY MEDICINE | Facility: CLINIC | Age: 47
End: 2022-09-13
Payer: COMMERCIAL

## 2022-10-03 ENCOUNTER — OFFICE VISIT (OUTPATIENT)
Dept: RHEUMATOLOGY | Facility: CLINIC | Age: 47
End: 2022-10-03
Payer: COMMERCIAL

## 2022-10-03 VITALS
OXYGEN SATURATION: 97 % | HEART RATE: 102 BPM | SYSTOLIC BLOOD PRESSURE: 128 MMHG | DIASTOLIC BLOOD PRESSURE: 82 MMHG | RESPIRATION RATE: 20 BRPM

## 2022-10-03 DIAGNOSIS — Z79.899 ENCOUNTER FOR LONG-TERM (CURRENT) USE OF OTHER MEDICATIONS: ICD-10-CM

## 2022-10-03 DIAGNOSIS — M35.01 SJOGREN'S SYNDROME WITH KERATOCONJUNCTIVITIS SICCA: Primary | ICD-10-CM

## 2022-10-03 DIAGNOSIS — M79.7 FIBROMYALGIA: ICD-10-CM

## 2022-10-03 DIAGNOSIS — E66.01 MORBID OBESITY: ICD-10-CM

## 2022-10-03 DIAGNOSIS — M15.9 PRIMARY OSTEOARTHRITIS INVOLVING MULTIPLE JOINTS: ICD-10-CM

## 2022-10-03 DIAGNOSIS — Z71.89 COUNSELING AND COORDINATION OF CARE: ICD-10-CM

## 2022-10-03 PROCEDURE — 99999 PR PBB SHADOW E&M-EST. PATIENT-LVL IV: ICD-10-PCS | Mod: PBBFAC,,, | Performed by: INTERNAL MEDICINE

## 2022-10-03 PROCEDURE — 1159F PR MEDICATION LIST DOCUMENTED IN MEDICAL RECORD: ICD-10-PCS | Mod: CPTII,S$GLB,, | Performed by: INTERNAL MEDICINE

## 2022-10-03 PROCEDURE — 1159F MED LIST DOCD IN RCRD: CPT | Mod: CPTII,S$GLB,, | Performed by: INTERNAL MEDICINE

## 2022-10-03 PROCEDURE — 99214 OFFICE O/P EST MOD 30 MIN: CPT | Mod: S$GLB,,, | Performed by: INTERNAL MEDICINE

## 2022-10-03 PROCEDURE — 3074F SYST BP LT 130 MM HG: CPT | Mod: CPTII,S$GLB,, | Performed by: INTERNAL MEDICINE

## 2022-10-03 PROCEDURE — 3074F PR MOST RECENT SYSTOLIC BLOOD PRESSURE < 130 MM HG: ICD-10-PCS | Mod: CPTII,S$GLB,, | Performed by: INTERNAL MEDICINE

## 2022-10-03 PROCEDURE — 99999 PR PBB SHADOW E&M-EST. PATIENT-LVL IV: CPT | Mod: PBBFAC,,, | Performed by: INTERNAL MEDICINE

## 2022-10-03 PROCEDURE — 99214 PR OFFICE/OUTPT VISIT, EST, LEVL IV, 30-39 MIN: ICD-10-PCS | Mod: S$GLB,,, | Performed by: INTERNAL MEDICINE

## 2022-10-03 PROCEDURE — 3079F DIAST BP 80-89 MM HG: CPT | Mod: CPTII,S$GLB,, | Performed by: INTERNAL MEDICINE

## 2022-10-03 PROCEDURE — 3079F PR MOST RECENT DIASTOLIC BLOOD PRESSURE 80-89 MM HG: ICD-10-PCS | Mod: CPTII,S$GLB,, | Performed by: INTERNAL MEDICINE

## 2022-10-03 RX ORDER — TIZANIDINE 4 MG/1
4 TABLET ORAL EVERY 8 HOURS
Qty: 30 TABLET | Refills: 0 | Status: SHIPPED | OUTPATIENT
Start: 2022-10-03 | End: 2022-10-13

## 2022-10-03 NOTE — PROGRESS NOTES
RHEUMATOLOGY OUTPATIENT CLINIC NOTE    10/3/2022    Attending Rheumatologist: Ross Hughes  Primary Care Provider: Ian Cespedes MD   Physician Requesting Consultation: No referring provider defined for this encounter.  Chief Complaint/Reason For Consultation:  No chief complaint on file.      Subjective:       HPI  Alberto Frye is a 47 y.o. Black or  female with medical history noted below who comes for evaluation of arthralgias.   She reports knowing of abnormal GIOVANNY at least ten years and when symptoms worsened she saw Rheumatology. Seen by Rheumatology at [facility] in 2018, noted to have +GIOVANNY & SSA, and likely beginning of Primary Sjogrens and OA.   Patient reports that she has been dealing with chronic back pain for many years but over then last 3 months has acutely worsened. She also notes pain in her hands, elbows, shoulder, knees, hips which has been progressing over the last year. Pain is worsened by all movement, improves only when laying still. Minimal relief with meds. Though she reports if she stays still to long then she is stiff. Reports about 20 minutes of morning stiffness. Has noted swelling in her hands. Also c/o numbness and tingling in her feet. Terrible sleep. + Dry eyes and dry mouth, easy bruising due to being on AC, +Raynaud's.  Had DVT post surgery and another DVT in iliac artery in August 2020.    No Alopecia, Oral/Nasal Ulcers, Rash, Photosensitivity, Pleuritis/serositis, LAD, Miscarriages, Skin tightening, SOB.     Today  Patient here for follow up.   Last visit meds for SjS, FM and OA continued. She notes her current regimen has been controlling her pain. Does note some break thru myalgias/spasms, would like to see if she can try a different Muscle Relaxer as she is experincing this despite Robaxin use. Tolerating meds.     Review of Systems   Constitutional:  Negative for appetite change, chills, fatigue, fever and unexpected weight change.   HENT:   Negative for nasal congestion, ear discharge, ear pain, hearing loss, mouth sores, nosebleeds, sneezing, sore throat, tinnitus and trouble swallowing.    Eyes:  Negative for photophobia, pain, discharge, redness, itching and visual disturbance.   Respiratory:  Negative for cough, chest tightness, shortness of breath and wheezing.    Cardiovascular:  Negative for chest pain, palpitations and leg swelling.   Gastrointestinal:  Negative for abdominal distention, abdominal pain, blood in stool, constipation, diarrhea, nausea and vomiting.   Endocrine: Negative for cold intolerance, heat intolerance, polydipsia, polyphagia and polyuria.   Genitourinary:  Negative for difficulty urinating, dyspareunia, dysuria, flank pain, frequency, genital sores, hematuria, menstrual problem, pelvic pain, urgency, vaginal bleeding, vaginal discharge, vaginal pain and vaginal dryness.   Musculoskeletal:  Positive for arthralgias, back pain and joint swelling. Negative for gait problem, leg pain, myalgias, neck pain, neck stiffness and joint deformity.   Integumentary:  Negative for pallor and rash.   Neurological:  Negative for dizziness, seizures, weakness, light-headedness, numbness and headaches.   Hematological:  Negative for adenopathy. Does not bruise/bleed easily.   Psychiatric/Behavioral:  Negative for confusion, decreased concentration and sleep disturbance. The patient is not nervous/anxious.    All other systems reviewed and are negative.     Chronic comorbid conditions affecting medical decision making today:  Past Medical History:   Diagnosis Date    Alcohol abuse     stopped heavy drinking about 10 years ago; was drinking 3 glasses of vodka/tequilla,rum/whiskey per day    Allergy Don't remember    Its been some years.    Anxiety     Arthritis 2010    Blood clotting tendency 2008    After a procedure & 2020.    Congestive heart failure 8/11/2020    Depression     Hallucination     Hx of psychiatric care     Hypertension      Immune disorder 2020    Joint pain 2010    Keloid cicatrix Years ago    From childhood scars    [place]     unplanned trips, energy without sleep for 2 days (reading, cleaning), feelings that she can do multiple tasks at one time, feelings of overconfidence at times    Psychiatric problem     Sleep difficulties     Therapy     Withdrawal symptoms, drug or narcotic     racing heart, restlessness     Past Surgical History:   Procedure Laterality Date     lapban surgery  2009    ESOPHAGOGASTRODUODENOSCOPY N/A 6/3/2020    Procedure: EGD (ESOPHAGOGASTRODUODENOSCOPY);  Surgeon: Johan Grover MD;  Location: [facility] (4TH FLR);  Service: Endoscopy;  Laterality: N/A;  covid 6/2-[employer]-tb    HYSTERECTOMY      PHLEBOGRAPHY Left 8/12/2020    Procedure: Venogram, pharmacomechanical thrombectomy;  Surgeon: Miguelangel Goldman MD;  [facility];  Service: Vascular;  Laterality: Left;  2336.43 mGy  [id number]  17.8 minutes  37 ml of contrast    VENOPLASTY Left 8/12/2020    Procedure: ANGIOPLASTY, VEIN;  Surgeon: Miguelangel Goldman MD;  [facility];  Service: Vascular;  Laterality: Left;     Family History   Problem Relation Age of Onset    Diabetes Mother     Cancer Mother     Hypertension Mother     Cirrhosis Maternal Uncle         ETOH    Celiac disease Neg Hx     Colon cancer Neg Hx     Colon polyps Neg Hx     Crohn's disease Neg Hx     Esophageal cancer Neg Hx     Inflammatory bowel disease Neg Hx     Liver cancer Neg Hx     Liver disease Neg Hx     Rectal cancer Neg Hx     Stomach cancer Neg Hx     Ulcerative colitis Neg Hx      Social History     Substance and Sexual Activity   Alcohol Use Yes    Alcohol/week: 1.0 - 3.0 standard drink    Types: 1 - 3 Glasses of wine per week    Comment: social presently, excessive 10 years ago     Social History     Tobacco Use   Smoking Status Former    Packs/day: 0.00    Years: 0.00    Pack years: 0.00    Types: Cigarettes   Smokeless  Tobacco Never   Tobacco Comments    quit in october 2016     Social History     Substance and Sexual Activity   Drug Use Never    Types: Marijuana    Comment: uses THCA weekly       Current Outpatient Medications:     amitriptyline (ELAVIL) 50 MG tablet, TAKE 1 TABLET BY MOUTH ONCE DAILY IN THE EVENING, Disp: 90 tablet, Rfl: 0    atorvastatin (LIPITOR) 10 MG tablet, Take 1 tablet (10 mg total) by mouth once daily., Disp: 90 tablet, Rfl: 3    clonazePAM (KLONOPIN) 0.5 MG tablet, 1/2 to 1 tablet daily as needed, Disp: 14 tablet, Rfl: 0    cyanocobalamin (VITAMIN B-12) 1000 MCG tablet, Take 100 mcg by mouth once daily., Disp: , Rfl:     diclofenac sodium (PENNSAID) 2 % SoPk, Apply 40 mg topically 2 (two) times daily., Disp: 112 g, Rfl: 6    diclofenac sodium (VOLTAREN) 1 % Gel, Apply the gel (4 g) to the affected area 4 times daily. Do not apply more than 16 g daily to any one affected joint, Disp: 100 g, Rfl: 1    ELIQUIS 5 mg Tab, TAKE 1 TABLET BY MOUTH TWICE DAILY **START  AFTER  FINISHING  FIRST  PRESCRIPTION**, Disp: 60 tablet, Rfl: 0    ergocalciferol (ERGOCALCIFEROL) 50,000 unit Cap, Take 1 capsule (50,000 Units total) by mouth every 7 days., Disp: 12 capsule, Rfl: 0    FLUoxetine 40 MG capsule, Take 1 capsule by mouth once daily, Disp: 90 capsule, Rfl: 3    hydrOXYchloroQUINE (PLAQUENIL) 200 mg tablet, Take 1 tablet (200 mg total) by mouth 2 (two) times daily., Disp: 60 tablet, Rfl: 6    hydrOXYzine (ATARAX) 50 MG tablet, Take 1 tablet (50 mg total) by mouth daily as needed for Anxiety (sleep)., Disp: 90 tablet, Rfl: 3    ipratropium (ATROVENT) 42 mcg (0.06 %) nasal spray, SMARTSIG:Both Nares, Disp: , Rfl:     lamoTRIgine (LAMICTAL) 25 MG tablet, Take 1 tablet (25 mg) by mouth daily x 7 days then increase to 2 tablets (50 mg) daily, Disp: 60 tablet, Rfl: 11    LIDOcaine (LIDODERM) 5 %, Place 1 patch onto the skin once daily. Remove & Discard patch within 12 hours then leave off for 12 hours, Disp: 15 patch,  Rfl: 0    methocarbamoL (ROBAXIN) 750 MG Tab, Take 1 tablet (750 mg total) by mouth 4 (four) times daily., Disp: 120 tablet, Rfl: 3    methylPREDNISolone (MEDROL DOSEPACK) 4 mg tablet, use as directed, Disp: 1 each, Rfl: 0    multivitamin with minerals tablet, Take 1 tablet by mouth once daily., Disp: , Rfl:     nabumetone (RELAFEN) 750 MG tablet, Take 1 tablet (750 mg total) by mouth 2 (two) times daily., Disp: 60 tablet, Rfl: 0    oxyCODONE-acetaminophen (PERCOCET)  mg per tablet, Take 1 tablet by mouth every 12 (twelve) hours as needed for Pain., Disp: 60 tablet, Rfl: 0    pregabalin (LYRICA) 150 MG capsule, Take 1 capsule (150 mg total) by mouth 3 (three) times daily., Disp: 90 capsule, Rfl: 5    tiZANidine (ZANAFLEX) 4 MG tablet, Take 1 tablet (4 mg total) by mouth every 8 (eight) hours. for 10 days, Disp: 30 tablet, Rfl: 0    triamcinolone acetonide 0.1% (KENALOG) 0.1 % cream, AAA abdomen bid, Disp: 80 g, Rfl: 0    valsartan-hydrochlorothiazide (DIOVAN-HCT) 160-25 mg per tablet, Take 1 tablet by mouth once daily., Disp: 90 tablet, Rfl: 3    Current Facility-Administered Medications:     triamcinolone acetonide injection 40 mg, 40 mg, Intradermal, Once, Ama Sylvester MD     Objective:         Vitals:    10/03/22 1426   BP: 128/82   Pulse: 102   Resp: 20     Physical Exam   Constitutional: She is oriented to person, place, and time.   HENT:   Head: Normocephalic and atraumatic.   Right Ear: External ear normal.   Left Ear: External ear normal.   Nose: Nose normal.   Mouth/Throat: Oropharynx is clear and moist.   Eyes: Pupils are equal, round, and reactive to light. Conjunctivae are normal.   Cardiovascular: Normal rate and regular rhythm.   Pulmonary/Chest: Effort normal and breath sounds normal.   Abdominal: Soft. Bowel sounds are normal.   Musculoskeletal:      Right shoulder: Normal.      Left shoulder: Normal.      Right elbow: Normal.      Left elbow: Normal.      Right wrist: Normal.      Left  wrist: Normal.      Cervical back: Normal range of motion and neck supple.      Right knee: Normal.      Left knee: Normal.      Comments: Tender Points:  No   Yes  [ ]    [x ]   Low cervical anterior aspect    [ ]    [x ]   Costochondral Junction   [ ]    [x ]   Lateral Epicondyle  [ ]    [x ]   Suboccipital   [ ]    [x ]   Trapezius   [ ]    [x ]   Supraspinatus   [ ]    [x ]   Gluteal   [ ]    [x ]   Greater trochanter  [ ]    [x ]   Knee       Neurological: She is alert and oriented to person, place, and time.   Skin: No rash noted. No erythema.   Psychiatric: Mood and affect normal.       Right Side Rheumatological Exam     Examination finds the shoulder, elbow, wrist, knee, 1st PIP, 1st MCP, 2nd PIP, 2nd MCP, 3rd PIP, 3rd MCP, 4th PIP, 4th MCP, 5th PIP and 5th MCP normal.    Left Side Rheumatological Exam     Examination finds the shoulder, elbow, wrist, knee, 1st PIP, 1st MCP, 2nd PIP, 2nd MCP, 3rd PIP, 3rd MCP, 4th PIP, 4th MCP, 5th PIP and 5th MCP normal.      Back/Neck Exam     Comments:  -SLT       Reviewed old and all outside pertinent medical records available.    All lab results personally reviewed and interpreted by me.  Lab Results   Component Value Date    WBC 5.69 05/18/2022    HGB 13.2 05/18/2022    HCT 41.6 05/18/2022    MCV 95 05/18/2022    MCH 30.0 05/18/2022    MCHC 31.7 (L) 05/18/2022    RDW 14.1 05/18/2022     05/18/2022    MPV 10.4 05/18/2022       Lab Results   Component Value Date     05/18/2022    K 4.7 05/18/2022     05/18/2022    CO2 29 05/18/2022    GLU 82 05/18/2022    BUN 15 05/18/2022    CALCIUM 9.8 05/18/2022    PROT 8.1 05/18/2022    ALBUMIN 3.8 05/18/2022    BILITOT 0.6 05/18/2022    AST 27 05/18/2022    ALKPHOS 89 05/18/2022    ALT 31 05/18/2022       Lab Results   Component Value Date    COLORU Colorless (A) 05/18/2022    APPEARANCEUA Clear 05/18/2022    SPECGRAV 1.005 05/18/2022    PHUR 7.0 05/18/2022    PROTEINUA Negative 05/18/2022    KETONESU Negative  05/18/2022    LEUKOCYTESUR Negative 05/18/2022    NITRITE Negative 05/18/2022    UROBILINOGEN Negative 05/18/2022       Lab Results   Component Value Date    CRP 5.1 05/18/2022       Lab Results   Component Value Date    SEDRATE 17 05/18/2022       Lab Results   Component Value Date    RF <10.0 06/14/2016    SEDRATE 17 05/18/2022       No components found for: 25OHVITDTOT, 08IXZSYJ9, 86KEPEUT8, METHODNOTE    No results found for: URICACID    No components found for: TSPOTTB    Imaging:  All imaging reviewed and independently interpreted by me.         ASSESSMENT / PLAN:     Alberto Frye is a 47 y.o. Black or  female with:      1. Sjogren's syndrome with keratoconjunctivitis sicca  - Patients has s/s consistent with SjS, she has sicca, wide spread pain, arthralgias   - stable   - reinforced artificial tears and oral hydration   - continue HCQ 400mg daily   - trial of Cevillimine did not help   - annual EYE and Dental Exam   - reassurance      2. Fibromyalgia   - stable   - continue Lyrica + Robaxin   - will give trial of Zanaflex, SE discussed (if this is better then will replace and stop Robaxin)   - wt loss  - sleep hygiene and stress management reinforced   - Reassurance and Exercise    3. Osteoarthritis  - in addition to SjS and FM she has OA  - stable  - wt loss  - Percocet PRN   - reassurance and exercise      4. Other specified counseling  - over 10 minutes spent regarding below topics:  - Immunization counseling done.  - Weight loss counseling done.  - Nutrition and exercise counseling.  - Limitation of alcohol consumption.  - Regular exercise:  Aerobic and resistance.  - Medication counseling provided.    5. Morbid Obesity  - would benefit from decreasing at least 10% of body weight.  - recommended goal of losing 1 lb per week.  - nutrition referral     6. DMARD Toxicity Monitoring  - annual EYE exam     Follow up in about 3 months (around 1/3/2023).    Method of contact with patient  concerns: [facility]    Disclaimer:  This note is prepared using voice recognition software and as such is likely to have errors and has not been proof read. Please contact me for questions.     Time spent: 30 minutes in face to face discussion concerning diagnosis, prognosis, review of lab and test results, benefits of treatment as well as management of disease, counseling of patient and coordination of care between various health care providers.  Greater than half the time spent was used for coordination of care and counseling of patient.    Ross Hughes M.D.  Rheumatology Department   [facility]

## 2022-10-11 ENCOUNTER — PATIENT MESSAGE (OUTPATIENT)
Dept: RHEUMATOLOGY | Facility: CLINIC | Age: 47
End: 2022-10-11
Payer: COMMERCIAL

## 2022-10-11 LAB — BCS RECOMMENDATION EXT: NORMAL

## 2022-10-18 ENCOUNTER — PATIENT OUTREACH (OUTPATIENT)
Dept: ADMINISTRATIVE | Facility: HOSPITAL | Age: 47
End: 2022-10-18
Payer: COMMERCIAL

## 2022-10-26 ENCOUNTER — OFFICE VISIT (OUTPATIENT)
Dept: PSYCHIATRY | Facility: CLINIC | Age: 47
End: 2022-10-26
Payer: COMMERCIAL

## 2022-10-26 VITALS — DIASTOLIC BLOOD PRESSURE: 68 MMHG | HEART RATE: 96 BPM | SYSTOLIC BLOOD PRESSURE: 120 MMHG

## 2022-10-26 DIAGNOSIS — F39 MOOD INSOMNIA: ICD-10-CM

## 2022-10-26 DIAGNOSIS — G47.00 INSOMNIA DISORDER, WITH NON-SLEEP DISORDER MENTAL COMORBIDITY: ICD-10-CM

## 2022-10-26 DIAGNOSIS — F51.05 MOOD INSOMNIA: ICD-10-CM

## 2022-10-26 DIAGNOSIS — F31.32 BIPOLAR AFFECTIVE DISORDER, CURRENTLY DEPRESSED, MODERATE: Primary | ICD-10-CM

## 2022-10-26 DIAGNOSIS — F31.32 BIPOLAR AFFECTIVE DISORDER, DEPRESSED, MODERATE: ICD-10-CM

## 2022-10-26 DIAGNOSIS — F60.5 OBSESSIVE COMPULSIVE PERSONALITY DISORDER: ICD-10-CM

## 2022-10-26 PROCEDURE — 1160F PR REVIEW ALL MEDS BY PRESCRIBER/CLIN PHARMACIST DOCUMENTED: ICD-10-PCS | Mod: CPTII,S$GLB,, | Performed by: NURSE PRACTITIONER

## 2022-10-26 PROCEDURE — 3074F PR MOST RECENT SYSTOLIC BLOOD PRESSURE < 130 MM HG: ICD-10-PCS | Mod: CPTII,S$GLB,, | Performed by: NURSE PRACTITIONER

## 2022-10-26 PROCEDURE — 90833 PR PSYCHOTHERAPY W/PATIENT W/E&M, 30 MIN (ADD ON): ICD-10-PCS | Mod: S$GLB,,, | Performed by: NURSE PRACTITIONER

## 2022-10-26 PROCEDURE — 1159F MED LIST DOCD IN RCRD: CPT | Mod: CPTII,S$GLB,, | Performed by: NURSE PRACTITIONER

## 2022-10-26 PROCEDURE — 99213 OFFICE O/P EST LOW 20 MIN: CPT | Mod: S$GLB,,, | Performed by: NURSE PRACTITIONER

## 2022-10-26 PROCEDURE — 99999 PR PBB SHADOW E&M-EST. PATIENT-LVL IV: CPT | Mod: PBBFAC,,, | Performed by: NURSE PRACTITIONER

## 2022-10-26 PROCEDURE — 3078F PR MOST RECENT DIASTOLIC BLOOD PRESSURE < 80 MM HG: ICD-10-PCS | Mod: CPTII,S$GLB,, | Performed by: NURSE PRACTITIONER

## 2022-10-26 PROCEDURE — 1159F PR MEDICATION LIST DOCUMENTED IN MEDICAL RECORD: ICD-10-PCS | Mod: CPTII,S$GLB,, | Performed by: NURSE PRACTITIONER

## 2022-10-26 PROCEDURE — 99213 PR OFFICE/OUTPT VISIT, EST, LEVL III, 20-29 MIN: ICD-10-PCS | Mod: S$GLB,,, | Performed by: NURSE PRACTITIONER

## 2022-10-26 PROCEDURE — 99999 PR PBB SHADOW E&M-EST. PATIENT-LVL IV: ICD-10-PCS | Mod: PBBFAC,,, | Performed by: NURSE PRACTITIONER

## 2022-10-26 PROCEDURE — 90833 PSYTX W PT W E/M 30 MIN: CPT | Mod: S$GLB,,, | Performed by: NURSE PRACTITIONER

## 2022-10-26 PROCEDURE — 3078F DIAST BP <80 MM HG: CPT | Mod: CPTII,S$GLB,, | Performed by: NURSE PRACTITIONER

## 2022-10-26 PROCEDURE — 3074F SYST BP LT 130 MM HG: CPT | Mod: CPTII,S$GLB,, | Performed by: NURSE PRACTITIONER

## 2022-10-26 PROCEDURE — 1160F RVW MEDS BY RX/DR IN RCRD: CPT | Mod: CPTII,S$GLB,, | Performed by: NURSE PRACTITIONER

## 2022-10-26 RX ORDER — HYDROXYZINE HYDROCHLORIDE 50 MG/1
50 TABLET, FILM COATED ORAL DAILY PRN
Qty: 90 TABLET | Refills: 1 | Status: SHIPPED | OUTPATIENT
Start: 2022-10-26 | End: 2023-01-24

## 2022-10-26 RX ORDER — FLUOXETINE HYDROCHLORIDE 20 MG/1
60 CAPSULE ORAL DAILY
Qty: 270 CAPSULE | Refills: 1 | Status: SHIPPED | OUTPATIENT
Start: 2022-10-26 | End: 2022-12-02 | Stop reason: SDUPTHER

## 2022-10-26 RX ORDER — AMITRIPTYLINE HYDROCHLORIDE 50 MG/1
50 TABLET, FILM COATED ORAL NIGHTLY
Qty: 90 TABLET | Refills: 1 | Status: SHIPPED | OUTPATIENT
Start: 2022-10-26 | End: 2022-12-02 | Stop reason: SDUPTHER

## 2022-10-26 RX ORDER — LAMOTRIGINE 100 MG/1
100 TABLET ORAL DAILY
Qty: 90 TABLET | Refills: 1 | Status: SHIPPED | OUTPATIENT
Start: 2022-10-26 | End: 2022-12-02 | Stop reason: SDUPTHER

## 2022-10-26 NOTE — PROGRESS NOTES
"Outpatient Psychiatry Follow-Up Visit (MD/NP)    10/26/2022    Clinical Status of Patient:  Outpatient (Ambulatory)    Chief Complaint:  Alberto Frye is a 47 y.o. female who presents today for follow-up of mood disorder, anxiety, psychosis and insomnia .  Met with patient.      Last Visit:  2/01/22 Chart and  reviewed.     Interval History and Content of Current Session:  Medications per last visit:   Continue Elavil 50 mg po q HS        Continue Hydroxyzine HCL 25 mg po BID PRN anxiety/sleep.        Continue Prozac 40 mg po qd mood disorder, anxiety and OCD.  May increase next visit  Start Lamictal 25 mg po daily x 7 days then increase to 2 tablets (50 mg) daily    Pt presents bright affect and euthymic mood. States "I've been doing okay since our last visit".  Discussed physical/medical issues. Reports she has been isolating "I don't feel like leaving my house."  Also states she is concerned with weight gain but denies increased appetite. Weight gain appears related to decrease in activity level due to chronic pain. Will increase Lamictal and Prozac for mood and depression. Denies SI/HI/AVH.        Psychotherapy:  Target symptoms: anxiety , mood disorder, psychosis, poor sleep  Why chosen therapy is appropriate versus another modality: relevant to diagnosis, evidence based practice  Outcome monitoring methods: self-report, observation  Therapeutic intervention type: supportive psychotherapy  Topics discussed/themes:  diet, exercise, medication compliance, symptom recognition  and improvement  The patient's response to the intervention is accepting. The patient's progress toward treatment goals is good  Duration of intervention: 20 minutes.    Review of Systems   PSYCHIATRIC: Pertinant items are noted in the narrative.  CONSTITUTIONAL: No weight gain or loss.   MUSCULOSKELETAL: No pain or stiffness of the joints.  NEUROLOGIC: No weakness, sensory changes, seizures, confusion, memory loss, tremor or other " "abnormal movements.  ENDOCRINE: No polydipsia or polyuria.  INTEGUMENTARY: No rashes or lacerations.  EYES: No exophthalmos, jaundice or blindness.  ENT: No dizziness, tinnitus or hearing loss.  RESPIRATORY: No shortness of breath.  CARDIOVASCULAR: No tachycardia or chest pain.  GASTROINTESTINAL: No nausea, vomiting, pain, constipation or diarrhea.  GENITOURINARY: No frequency, dysuria or sexual dysfunction.  HEMATOLOGIC/LYMPHATIC: No excessive bleeding, prolonged or excessive bleeding after dental extraction/injury.  ALLERGIC/IMMUNOLOGIC: No allergic response to materials, foods or animals at this time.     Past Medical, Family and Social History: The patient's past medical, family and social history have been reviewed and updated as appropriate within the electronic medical record - see encounter notes.    Compliance: yes until she ran out of medication    Side effects: None    Risk Parameters:  Patient reports no suicidal ideation  Patient reports no homicidal ideation  Patient reports no self-injurious behavior  Patient reports no violent behavior    Exam (detailed: at least 9 elements; comprehensive: all 15 elements)   Constitutional  Vitals:  Most recent vital signs, dated greater than 90 days prior to this appointment, were reviewed.   Vitals:    10/26/22 0933   BP: 120/68   Pulse: 96          General:  unremarkable, age appropriate     Musculoskeletal  Muscle Strength/Tone:  no tremor, no tic   Gait & Station:  non-ataxic     Psychiatric  Speech:  no latency; no press   Mood & Affect:  "good."  congruent and appropriate   Thought Process:  normal and logical   Associations:  intact   Thought Content:  normal, no suicidality, no homicidality, delusions, or paranoia,    Insight:  has awareness of illness   Judgement: behavior is adequate to circumstances   Orientation:  grossly intact   Memory: intact for content of interview   Language: grossly intact   Attention Span & Concentration:  able to focus   Fund of " Knowledge:  intact and appropriate to age and level of education     Assessment and Diagnosis   Status/Progress: Based on the examination today, the patient's problem(s) is/are adequately but not ideally controlled.  New problems have not not been presented today. Lack of compliance due to running out of medication is complicating management of the primary condition.  There are no active rule-out diagnoses for this patient at this time.     General Impression: s      ICD-10-CM ICD-9-CM   1. Bipolar affective disorder, currently depressed, moderate  F31.32 296.52   2. Bipolar affective disorder, depressed, moderate  F31.32 296.52   3. Obsessive compulsive personality disorder  F60.5 301.4   4. Insomnia disorder, with non-sleep disorder mental comorbidity  G47.00 780.52   5. Mood insomnia  F51.05 [id number]    F39      Intervention/Counseling/Treatment Plan   Medication Management: The risks and benefits of medication were discussed with the patient.  The treatment plan and follow up plan were reviewed with the patient.   Safety: Call 911 or Crisis Line or go to ER for suicidal ideation, adverse effects of medication or any other emergency  Continue Elavil 50 mg po q HS        Continue Hydroxyzine HCL 25 mg po BID PRN anxiety/sleep.    Increase to Prozac 60 mg po qd mood disorder, anxiety and OCD.   Increase to Lamictal 100 mg po daily     Patient agrees with POC.    INSTRUCTIONS  Instructed to call 911 or Crisis Line or go to ER for suicidal ideation, adverse effects of medication or any other emergency. Verbalizes understanding and plan to comply.    Instructed to contact provider either through her [facility] account or by calling [phone] prior to running out of her medication. Verbalizes understanding and plan to comply.    Return to Clinic: 6 months

## 2022-10-26 NOTE — PATIENT INSTRUCTIONS
Continue Elavil 50 mg bedtime        Continue Hydroxyzine HCL 25 mg twice daily as needed foranxiety/sleep.    Increase to Prozac 60 mg  daily mood disorder, anxiety and OCD.   Increase to Lamictal 100 mg  daily

## 2022-10-31 RX ORDER — APIXABAN 5 MG/1
TABLET, FILM COATED ORAL
Qty: 60 TABLET | Refills: 0 | Status: SHIPPED | OUTPATIENT
Start: 2022-10-31 | End: 2022-12-27

## 2022-11-01 ENCOUNTER — PATIENT MESSAGE (OUTPATIENT)
Dept: RHEUMATOLOGY | Facility: CLINIC | Age: 47
End: 2022-11-01
Payer: COMMERCIAL

## 2022-11-01 ENCOUNTER — PATIENT MESSAGE (OUTPATIENT)
Dept: VASCULAR SURGERY | Facility: CLINIC | Age: 47
End: 2022-11-01
Payer: COMMERCIAL

## 2022-11-01 ENCOUNTER — PATIENT MESSAGE (OUTPATIENT)
Dept: FAMILY MEDICINE | Facility: CLINIC | Age: 47
End: 2022-11-01
Payer: COMMERCIAL

## 2022-11-01 ENCOUNTER — HOSPITAL ENCOUNTER (OUTPATIENT)
Facility: HOSPITAL | Age: 47
Discharge: HOME | End: 2022-11-02
Attending: EMERGENCY MEDICINE | Admitting: STUDENT IN AN ORGANIZED HEALTH CARE EDUCATION/TRAINING PROGRAM
Payer: COMMERCIAL

## 2022-11-01 DIAGNOSIS — M79.606 LEG PAIN: ICD-10-CM

## 2022-11-01 DIAGNOSIS — M47.816 LUMBAR SPONDYLOSIS: ICD-10-CM

## 2022-11-01 DIAGNOSIS — M35.05 SJOGREN'S SYNDROME WITH INFLAMMATORY ARTHRITIS: ICD-10-CM

## 2022-11-01 DIAGNOSIS — R06.09 DOE (DYSPNEA ON EXERTION): ICD-10-CM

## 2022-11-01 DIAGNOSIS — N17.9 AKI (ACUTE KIDNEY INJURY): Primary | ICD-10-CM

## 2022-11-01 DIAGNOSIS — M79.605 PAIN OF LEFT LOWER EXTREMITY: ICD-10-CM

## 2022-11-01 DIAGNOSIS — M62.82 NON-TRAUMATIC RHABDOMYOLYSIS: ICD-10-CM

## 2022-11-01 LAB
ALBUMIN SERPL BCP-MCNC: 3.8 G/DL (ref 3.5–5.2)
ALP SERPL-CCNC: 81 U/L (ref 55–135)
ALT SERPL W/O P-5'-P-CCNC: 28 U/L (ref 10–44)
AMPHET+METHAMPHET UR QL: NEGATIVE
ANION GAP SERPL CALC-SCNC: 9 MMOL/L (ref 8–16)
AST SERPL-CCNC: 38 U/L (ref 10–40)
BARBITURATES UR QL SCN>200 NG/ML: NEGATIVE
BASOPHILS # BLD AUTO: 0.03 K/UL (ref 0–0.2)
BASOPHILS NFR BLD: 0.5 % (ref 0–1.9)
BENZODIAZ UR QL SCN>200 NG/ML: NEGATIVE
BILIRUB SERPL-MCNC: 0.6 MG/DL (ref 0.1–1)
BUN SERPL-MCNC: 19 MG/DL (ref 6–20)
BZE UR QL SCN: NEGATIVE
CALCIUM SERPL-MCNC: 8.7 MG/DL (ref 8.7–10.5)
CANNABINOIDS UR QL SCN: ABNORMAL
CHLORIDE SERPL-SCNC: 104 MMOL/L (ref 95–110)
CK SERPL-CCNC: 1100 U/L (ref 20–180)
CO2 SERPL-SCNC: 23 MMOL/L (ref 23–29)
CREAT SERPL-MCNC: 2.2 MG/DL (ref 0.5–1.4)
CREAT UR-MCNC: 165.8 MG/DL (ref 15–325)
D DIMER PPP IA.FEU-MCNC: 0.42 MG/L FEU
DIFFERENTIAL METHOD: ABNORMAL
EOSINOPHIL # BLD AUTO: 0.2 K/UL (ref 0–0.5)
EOSINOPHIL NFR BLD: 2.6 % (ref 0–8)
ERYTHROCYTE [DISTWIDTH] IN BLOOD BY AUTOMATED COUNT: 13.9 % (ref 11.5–14.5)
EST. GFR  (NO RACE VARIABLE): 27 ML/MIN/1.73 M^2
GLUCOSE SERPL-MCNC: 100 MG/DL (ref 70–110)
HCT VFR BLD AUTO: 36.6 % (ref 37–48.5)
HGB BLD-MCNC: 12.3 G/DL (ref 12–16)
IMM GRANULOCYTES # BLD AUTO: 0.03 K/UL (ref 0–0.04)
IMM GRANULOCYTES NFR BLD AUTO: 0.5 % (ref 0–0.5)
INR PPP: 1 (ref 0.8–1.2)
LYMPHOCYTES # BLD AUTO: 2.8 K/UL (ref 1–4.8)
LYMPHOCYTES NFR BLD: 45.9 % (ref 18–48)
MCH RBC QN AUTO: 29.6 PG (ref 27–31)
MCHC RBC AUTO-ENTMCNC: 33.6 G/DL (ref 32–36)
MCV RBC AUTO: 88 FL (ref 82–98)
METHADONE UR QL SCN>300 NG/ML: NEGATIVE
MONOCYTES # BLD AUTO: 0.6 K/UL (ref 0.3–1)
MONOCYTES NFR BLD: 9.5 % (ref 4–15)
NEUTROPHILS # BLD AUTO: 2.5 K/UL (ref 1.8–7.7)
NEUTROPHILS NFR BLD: 41 % (ref 38–73)
NRBC BLD-RTO: 0 /100 WBC
OPIATES UR QL SCN: ABNORMAL
PCP UR QL SCN>25 NG/ML: NEGATIVE
PLATELET # BLD AUTO: 222 K/UL (ref 150–450)
PMV BLD AUTO: 9.4 FL (ref 9.2–12.9)
POTASSIUM SERPL-SCNC: 4 MMOL/L (ref 3.5–5.1)
PROT SERPL-MCNC: 7.5 G/DL (ref 6–8.4)
PROTHROMBIN TIME: 10.6 SEC (ref 9–12.5)
RBC # BLD AUTO: 4.16 M/UL (ref 4–5.4)
SODIUM SERPL-SCNC: 136 MMOL/L (ref 136–145)
TOXICOLOGY INFORMATION: ABNORMAL
WBC # BLD AUTO: 6.19 K/UL (ref 3.9–12.7)

## 2022-11-01 PROCEDURE — 25000003 PHARM REV CODE 250: Performed by: NURSE PRACTITIONER

## 2022-11-01 PROCEDURE — 85610 PROTHROMBIN TIME: CPT | Performed by: EMERGENCY MEDICINE

## 2022-11-01 PROCEDURE — 96375 TX/PRO/DX INJ NEW DRUG ADDON: CPT

## 2022-11-01 PROCEDURE — 96374 THER/PROPH/DIAG INJ IV PUSH: CPT

## 2022-11-01 PROCEDURE — 85379 FIBRIN DEGRADATION QUANT: CPT | Performed by: EMERGENCY MEDICINE

## 2022-11-01 PROCEDURE — 96361 HYDRATE IV INFUSION ADD-ON: CPT

## 2022-11-01 PROCEDURE — 80307 DRUG TEST PRSMV CHEM ANLYZR: CPT | Performed by: NURSE PRACTITIONER

## 2022-11-01 PROCEDURE — G0378 HOSPITAL OBSERVATION PER HR: HCPCS

## 2022-11-01 PROCEDURE — 94761 N-INVAS EAR/PLS OXIMETRY MLT: CPT

## 2022-11-01 PROCEDURE — 82550 ASSAY OF CK (CPK): CPT | Performed by: EMERGENCY MEDICINE

## 2022-11-01 PROCEDURE — 87086 URINE CULTURE/COLONY COUNT: CPT | Performed by: NURSE PRACTITIONER

## 2022-11-01 PROCEDURE — 99285 EMERGENCY DEPT VISIT HI MDM: CPT | Mod: 25

## 2022-11-01 PROCEDURE — 80053 COMPREHEN METABOLIC PANEL: CPT | Performed by: EMERGENCY MEDICINE

## 2022-11-01 PROCEDURE — 63600175 PHARM REV CODE 636 W HCPCS: Performed by: EMERGENCY MEDICINE

## 2022-11-01 PROCEDURE — 63600175 PHARM REV CODE 636 W HCPCS: Performed by: NURSE PRACTITIONER

## 2022-11-01 PROCEDURE — 25000003 PHARM REV CODE 250: Performed by: EMERGENCY MEDICINE

## 2022-11-01 PROCEDURE — 85025 COMPLETE CBC W/AUTO DIFF WBC: CPT | Performed by: EMERGENCY MEDICINE

## 2022-11-01 RX ORDER — SODIUM CHLORIDE, SODIUM LACTATE, POTASSIUM CHLORIDE, CALCIUM CHLORIDE 600; 310; 30; 20 MG/100ML; MG/100ML; MG/100ML; MG/100ML
INJECTION, SOLUTION INTRAVENOUS CONTINUOUS
Status: DISCONTINUED | OUTPATIENT
Start: 2022-11-01 | End: 2022-11-01

## 2022-11-01 RX ORDER — AMITRIPTYLINE HYDROCHLORIDE 25 MG/1
50 TABLET, FILM COATED ORAL NIGHTLY
Status: DISCONTINUED | OUTPATIENT
Start: 2022-11-01 | End: 2022-11-02 | Stop reason: HOSPADM

## 2022-11-01 RX ORDER — KETOROLAC TROMETHAMINE 30 MG/ML
15 INJECTION, SOLUTION INTRAMUSCULAR; INTRAVENOUS
Status: COMPLETED | OUTPATIENT
Start: 2022-11-01 | End: 2022-11-01

## 2022-11-01 RX ORDER — LIDOCAINE 50 MG/G
1 PATCH TOPICAL
Status: DISCONTINUED | OUTPATIENT
Start: 2022-11-01 | End: 2022-11-02 | Stop reason: HOSPADM

## 2022-11-01 RX ORDER — SODIUM CHLORIDE, SODIUM LACTATE, POTASSIUM CHLORIDE, CALCIUM CHLORIDE 600; 310; 30; 20 MG/100ML; MG/100ML; MG/100ML; MG/100ML
INJECTION, SOLUTION INTRAVENOUS CONTINUOUS
Status: DISCONTINUED | OUTPATIENT
Start: 2022-11-01 | End: 2022-11-02 | Stop reason: HOSPADM

## 2022-11-01 RX ORDER — OXYCODONE AND ACETAMINOPHEN 10; 325 MG/1; MG/1
1 TABLET ORAL EVERY 12 HOURS PRN
Status: DISCONTINUED | OUTPATIENT
Start: 2022-11-01 | End: 2022-11-02 | Stop reason: HOSPADM

## 2022-11-01 RX ORDER — HYDROMORPHONE HYDROCHLORIDE 1 MG/ML
1 INJECTION, SOLUTION INTRAMUSCULAR; INTRAVENOUS; SUBCUTANEOUS
Status: COMPLETED | OUTPATIENT
Start: 2022-11-01 | End: 2022-11-01

## 2022-11-01 RX ORDER — PREGABALIN 50 MG/1
150 CAPSULE ORAL 3 TIMES DAILY
Status: DISCONTINUED | OUTPATIENT
Start: 2022-11-01 | End: 2022-11-02 | Stop reason: HOSPADM

## 2022-11-01 RX ORDER — HYDROXYCHLOROQUINE SULFATE 200 MG/1
200 TABLET, FILM COATED ORAL 2 TIMES DAILY
Status: DISCONTINUED | OUTPATIENT
Start: 2022-11-01 | End: 2022-11-02 | Stop reason: HOSPADM

## 2022-11-01 RX ORDER — SODIUM CHLORIDE 0.9 % (FLUSH) 0.9 %
10 SYRINGE (ML) INJECTION
Status: DISCONTINUED | OUTPATIENT
Start: 2022-11-01 | End: 2022-11-02 | Stop reason: HOSPADM

## 2022-11-01 RX ORDER — LAMOTRIGINE 100 MG/1
100 TABLET ORAL DAILY
Status: DISCONTINUED | OUTPATIENT
Start: 2022-11-01 | End: 2022-11-02 | Stop reason: HOSPADM

## 2022-11-01 RX ORDER — ACETAMINOPHEN 325 MG/1
650 TABLET ORAL ONCE
Status: COMPLETED | OUTPATIENT
Start: 2022-11-01 | End: 2022-11-01

## 2022-11-01 RX ORDER — FLUOXETINE HYDROCHLORIDE 20 MG/1
60 CAPSULE ORAL DAILY
Status: DISCONTINUED | OUTPATIENT
Start: 2022-11-01 | End: 2022-11-02 | Stop reason: HOSPADM

## 2022-11-01 RX ORDER — SODIUM CHLORIDE 9 MG/ML
1000 INJECTION, SOLUTION INTRAVENOUS
Status: COMPLETED | OUTPATIENT
Start: 2022-11-01 | End: 2022-11-01

## 2022-11-01 RX ADMIN — SODIUM CHLORIDE, SODIUM LACTATE, POTASSIUM CHLORIDE, AND CALCIUM CHLORIDE: .6; .31; .03; .02 INJECTION, SOLUTION INTRAVENOUS at 10:11

## 2022-11-01 RX ADMIN — HYDROXYCHLOROQUINE SULFATE 200 MG: 200 TABLET ORAL at 10:11

## 2022-11-01 RX ADMIN — PREGABALIN 150 MG: 50 CAPSULE ORAL at 03:11

## 2022-11-01 RX ADMIN — SODIUM CHLORIDE, SODIUM LACTATE, POTASSIUM CHLORIDE, AND CALCIUM CHLORIDE: .6; .31; .03; .02 INJECTION, SOLUTION INTRAVENOUS at 01:11

## 2022-11-01 RX ADMIN — ACETAMINOPHEN 650 MG: 325 TABLET ORAL at 10:11

## 2022-11-01 RX ADMIN — OXYCODONE AND ACETAMINOPHEN 1 TABLET: 10; 325 TABLET ORAL at 05:11

## 2022-11-01 RX ADMIN — LIDOCAINE 1 PATCH: 50 PATCH TOPICAL at 05:11

## 2022-11-01 RX ADMIN — FLUOXETINE 60 MG: 20 CAPSULE ORAL at 03:11

## 2022-11-01 RX ADMIN — AMITRIPTYLINE HYDROCHLORIDE 50 MG: 25 TABLET, FILM COATED ORAL at 10:11

## 2022-11-01 RX ADMIN — PREGABALIN 150 MG: 50 CAPSULE ORAL at 10:11

## 2022-11-01 RX ADMIN — LAMOTRIGINE 100 MG: 100 TABLET ORAL at 03:11

## 2022-11-01 RX ADMIN — SODIUM CHLORIDE 1000 ML: 0.9 INJECTION, SOLUTION INTRAVENOUS at 10:11

## 2022-11-01 RX ADMIN — HYDROMORPHONE HYDROCHLORIDE 1 MG: 1 INJECTION, SOLUTION INTRAMUSCULAR; INTRAVENOUS; SUBCUTANEOUS at 08:11

## 2022-11-01 RX ADMIN — KETOROLAC TROMETHAMINE 15 MG: 30 INJECTION, SOLUTION INTRAMUSCULAR at 08:11

## 2022-11-01 RX ADMIN — APIXABAN 5 MG: 5 TABLET, FILM COATED ORAL at 10:11

## 2022-11-01 NOTE — ED TRIAGE NOTES
"Per EMS pt c/o of R. Hip pain and L.leg pain that began about 7pm yesterday. Around midnight the left leg pain became "excruciating" per ems. Capillary refill poor and cool extremities. Poor pedal pulses per ems. Currently on eliquis and is compliant with medications . Was tachycardiac enroute. PMH of HTN Sjogren syndrome, fibromyalgia and 2 previous DVTs.  "

## 2022-11-01 NOTE — ASSESSMENT & PLAN NOTE
Patient with acute kidney injury likely due to IVVD/dehydration AYDE is currently stable. Labs reviewed- Renal function/electrolytes with Estimated Creatinine Clearance: 39.9 mL/min (A) (based on SCr of 2.2 mg/dL (H)). according to latest data. Monitor urine output and serial BMP and adjust therapy as needed. Avoid nephrotoxins and renally dose meds for GFR listed above.   Baseline sCr 1.1  Hold diovan-hct  IVF LR   Add UA

## 2022-11-01 NOTE — SUBJECTIVE & OBJECTIVE
Past Medical History:   Diagnosis Date    Alcohol abuse     stopped heavy drinking about 10 years ago; was drinking 3 glasses of vodka/tequilla,rum/whiskey per day    Allergy Don't remember    Its been some years.    Anxiety     Arthritis 2010    Blood clotting tendency 2008    After a procedure & 2020.    Congestive heart failure 8/11/2020    Depression     Hallucination     Hx of psychiatric care     Hypertension     Immune disorder 2020    Joint pain 2010    Keloid cicatrix Years ago    From childhood scars    Caro     unplanned trips, energy without sleep for 2 days (reading, cleaning), feelings that she can do multiple tasks at one time, feelings of overconfidence at times    Psychiatric problem     Sleep difficulties     Therapy     Withdrawal symptoms, drug or narcotic     racing heart, restlessness       Past Surgical History:   Procedure Laterality Date     lapban surgery  2009    ESOPHAGOGASTRODUODENOSCOPY N/A 6/3/2020    Procedure: EGD (ESOPHAGOGASTRODUODENOSCOPY);  Surgeon: Johan Grover MD;  Location: [facility] (4TH FLR);  Service: Endoscopy;  Laterality: N/A;  covid 6/2-[facility]-tb    HYSTERECTOMY      PHLEBOGRAPHY Left 8/12/2020    Procedure: Venogram, pharmacomechanical thrombectomy;  Surgeon: Miguelangel Goldman MD;  Location: [facility] OR [address] [place] [address];  Service: Vascular;  Laterality: Left;  2336.43 mGy  [id number]  17.8 minutes  37 ml of contrast    VENOPLASTY Left 8/12/2020    Procedure: ANGIOPLASTY, VEIN;  Surgeon: Miguelangel Goldman MD;  Location: [facility] OR [address] [place] [address];  Service: Vascular;  Laterality: Left;       Review of patient's allergies indicates:   Allergen Reactions    Morphine Itching and Hallucinations    Pcn [penicillins] Other (See Comments)     Was told from childhood she couldn't take it    Sulfa (sulfonamide antibiotics) Nausea And Vomiting    Latex, natural rubber Rash       No current facility-administered medications on file prior to encounter.     Current Outpatient  Medications on File Prior to Encounter   Medication Sig    amitriptyline (ELAVIL) 50 MG tablet Take 1 tablet (50 mg total) by mouth every evening.    diclofenac sodium (VOLTAREN) 1 % Gel Apply the gel (4 g) to the affected area 4 times daily. Do not apply more than 16 g daily to any one affected joint    ELIQUIS 5 mg Tab TAKE 1 TABLET BY MOUTH TWICE DAILY *START  AFTER  FINISHING  FIRST  PRESCRIPTION*    FLUoxetine 20 MG capsule Take 3 capsules (60 mg total) by mouth once daily.    hydrOXYchloroQUINE (PLAQUENIL) 200 mg tablet Take 1 tablet (200 mg total) by mouth 2 (two) times daily.    hydrOXYzine (ATARAX) 50 MG tablet Take 1 tablet (50 mg total) by mouth daily as needed for Anxiety (sleep).    lamoTRIgine (LAMICTAL) 100 MG tablet Take 1 tablet (100 mg total) by mouth once daily.    oxyCODONE-acetaminophen (PERCOCET)  mg per tablet Take 1 tablet by mouth every 12 (twelve) hours as needed for Pain.    pregabalin (LYRICA) 150 MG capsule Take 1 capsule (150 mg total) by mouth 3 (three) times daily.    valsartan-hydrochlorothiazide (DIOVAN-HCT) 160-25 mg per tablet Take 1 tablet by mouth once daily.    atorvastatin (LIPITOR) 10 MG tablet Take 1 tablet (10 mg total) by mouth once daily. (Patient not taking: Reported on 11/1/2022)    clonazePAM (KLONOPIN) 0.5 MG tablet 1/2 to 1 tablet daily as needed (Patient not taking: Reported on 11/1/2022)    ipratropium (ATROVENT) 42 mcg (0.06 %) nasal spray SMARTSIG:Both Nares    [DISCONTINUED] cyanocobalamin (VITAMIN B-12) 1000 MCG tablet Take 100 mcg by mouth once daily.    [DISCONTINUED] diclofenac sodium (PENNSAID) 2 % SoPk Apply 40 mg topically 2 (two) times daily.    [DISCONTINUED] LIDOcaine (LIDODERM) 5 % Place 1 patch onto the skin once daily. Remove & Discard patch within 12 hours then leave off for 12 hours    [DISCONTINUED] methylPREDNISolone (MEDROL DOSEPACK) 4 mg tablet use as directed    [DISCONTINUED] nabumetone (RELAFEN) 750 MG tablet Take 1 tablet (750 mg  total) by mouth 2 (two) times daily.    [DISCONTINUED] triamcinolone acetonide 0.1% (KENALOG) 0.1 % cream AAA abdomen bid     Family History       Problem Relation (Age of Onset)    Cancer Mother    Cirrhosis Maternal Uncle    Diabetes Mother    Hypertension Mother          Tobacco Use    Smoking status: Former     Packs/day: 0.00     Years: 0.00     Pack years: 0.00     Types: Cigarettes    Smokeless tobacco: Never    Tobacco comments:     quit in october 2016   Substance and Sexual Activity    Alcohol use: Yes     Alcohol/week: 1.0 - 3.0 standard drink     Types: 1 - 3 Glasses of wine per week     Comment: social presently, excessive 10 years ago    Drug use: Never     Types: Marijuana     Comment: uses THCA weekly    Sexual activity: Yes     Partners: Male     Birth control/protection: Condom, See Surgical Hx     Review of Systems   Constitutional:  Positive for activity change. Negative for chills, diaphoresis and fatigue.   HENT: Negative.     Eyes: Negative.    Respiratory: Negative.     Gastrointestinal: Negative.    Endocrine: Negative.    Genitourinary: Negative.    Musculoskeletal:  Positive for arthralgias, back pain, gait problem and myalgias.   Skin: Negative.    Allergic/Immunologic: Negative.    Hematological: Negative.    Psychiatric/Behavioral: Negative.     Objective:     Vital Signs (Most Recent):  Temp: 97.8 °F (36.6 °C) (11/01/22 1145)  Pulse: 96 (11/01/22 1145)  Resp: 20 (11/01/22 1145)  BP: (!) 99/56 (11/01/22 1145)  SpO2: 97 % (11/01/22 1145)   Vital Signs (24h Range):  Temp:  [97.8 °F (36.6 °C)-98.6 °F (37 °C)] 97.8 °F (36.6 °C)  Pulse:  [] 96  Resp:  [11-22] 20  SpO2:  [95 %-100 %] 97 %  BP: ()/() 99/56     Weight: 122.5 kg (270 lb)  Body mass index is 46.35 kg/m².    Physical Exam  Constitutional:       Appearance: Normal appearance. She is not ill-appearing.   HENT:      Head: Atraumatic.      Nose: Nose normal.      Mouth/Throat:      Mouth: Mucous membranes are dry.    Cardiovascular:      Rate and Rhythm: Regular rhythm.   Pulmonary:      Effort: Pulmonary effort is normal.      Breath sounds: Normal breath sounds.   Abdominal:      Palpations: Abdomen is soft.   Musculoskeletal:         General: Tenderness (left leg anterior posteror pain one start of exam) present. Normal range of motion.   Skin:     General: Skin is warm and dry.   Neurological:      General: No focal deficit present.      Mental Status: She is alert and oriented to person, place, and time.           Significant Labs: All pertinent labs within the past 24 hours have been reviewed.    Significant Imaging: I have reviewed all pertinent imaging results/findings within the past 24 hours.

## 2022-11-01 NOTE — ED PROVIDER NOTES
Encounter Date: 11/1/2022    SCRIBE #1 NOTE: I, Evangelina Mccarty, am scribing for, and in the presence of,  Ayad Kendrick MD. Other sections scribed: HPI, ROS, PE.     History     Chief Complaint   Patient presents with    Leg Pain     Left leg pain since 1900 yesterday and woke her at 1am with increase pain. Decrease pulses on the left per EMS with obvious edema to left leg. Hx of DVT     CC: Leg pain    HPI: This is a 47 year old female who has a PMHx of Arthritis, Blood clotting tendency, CHF, and HTN who presents to the ED for emergent evaluation of acute atraumatic pain in the left lower extremity that woke her out of her sleep at 1:00am today. The patients pain is exacerbated with movement. She has a history of similar pain secondary to blood clots. She last had a blood clot in 2020. Her vascular surgeon is Dr. Goldman. She takes Eliquis 5mg BID, which she is compliant. Patient states that she took Percocet for right hip pain secondary to Fibromyalgia at 1:00am today. Patient denies abdominal pain, or fall.    The history is provided by the patient. No  was used.   Review of patient's allergies indicates:   Allergen Reactions    Morphine Itching and Hallucinations    Pcn [penicillins] Other (See Comments)     Was told from childhood she couldn't take it    Sulfa (sulfonamide antibiotics) Nausea And Vomiting    Latex, natural rubber Rash     Past Medical History:   Diagnosis Date    AYDE (acute kidney injury) 11/1/2022    Alcohol abuse     stopped heavy drinking about 10 years ago; was drinking 3 glasses of vodka/tequilla,rum/whiskey per day    Allergy Don't remember    Its been some years.    Anxiety     Arthritis 2010    Blood clotting tendency 2008    After a procedure & 2020.    Congestive heart failure 8/11/2020    Depression     Hallucination     Hx of psychiatric care     Hypertension     Immune disorder 2020    Joint pain 2010    Keloid cicatrix Years ago    From childhood scars     Caro     unplanned trips, energy without sleep for 2 days (reading, cleaning), feelings that she can do multiple tasks at one time, feelings of overconfidence at times    Psychiatric problem     Sleep difficulties     Therapy     Withdrawal symptoms, drug or narcotic     racing heart, restlessness     Past Surgical History:   Procedure Laterality Date     lapban surgery  2009    ESOPHAGOGASTRODUODENOSCOPY N/A 6/3/2020    Procedure: EGD (ESOPHAGOGASTRODUODENOSCOPY);  Surgeon: Johan Grover MD;  Location: [facility] (4TH FLR);  Service: Endoscopy;  Laterality: N/A;  covid 6/2-[employer]-tb    HYSTERECTOMY      PHLEBOGRAPHY Left 8/12/2020    Procedure: Venogram, pharmacomechanical thrombectomy;  Surgeon: Miguelangel Goldman MD;  [facility];  Service: Vascular;  Laterality: Left;  2336.43 mGy  [id number]  17.8 minutes  37 ml of contrast    VENOPLASTY Left 8/12/2020    Procedure: ANGIOPLASTY, VEIN;  Surgeon: Miguelangel Goldman MD;  [facility];  Service: Vascular;  Laterality: Left;     Family History   Problem Relation Age of Onset    Diabetes Mother     Cancer Mother     Hypertension Mother     Cirrhosis Maternal Uncle         ETOH    Celiac disease Neg Hx     Colon cancer Neg Hx     Colon polyps Neg Hx     Crohn's disease Neg Hx     Esophageal cancer Neg Hx     Inflammatory bowel disease Neg Hx     Liver cancer Neg Hx     Liver disease Neg Hx     Rectal cancer Neg Hx     Stomach cancer Neg Hx     Ulcerative colitis Neg Hx      Social History     Tobacco Use    Smoking status: Former     Packs/day: 0.00     Years: 0.00     Pack years: 0.00     Types: Cigarettes    Smokeless tobacco: Never    Tobacco comments:     quit in october 2016   Substance Use Topics    Alcohol use: Yes     Alcohol/week: 1.0 - 3.0 standard drink     Types: 1 - 3 Glasses of wine per week     Comment: social presently, excessive 10 years ago    Drug use: Never     Types: Marijuana     Comment:  uses THCA weekly     Review of Systems   Constitutional:  Negative for fever.   HENT:  Negative for sore throat.    Respiratory:  Negative for cough and shortness of breath.    Cardiovascular:  Negative for chest pain.   Gastrointestinal:  Negative for abdominal pain, diarrhea, nausea and vomiting.   Genitourinary:  Negative for dysuria.   Musculoskeletal:  Positive for myalgias (in the left lower extremity). Negative for back pain.   Skin:  Negative for rash.   Neurological:  Negative for weakness.   Hematological:  Does not bruise/bleed easily.     Physical Exam     Initial Vitals [11/01/22 0656]   BP Pulse Resp Temp SpO2   112/78 100 (!) 22 98.2 °F (36.8 °C) 100 %      MAP       --         Physical Exam    Nursing note and vitals reviewed.  Constitutional: She appears well-developed and well-nourished. She is not diaphoretic. No distress.   HENT:   Head: Normocephalic and atraumatic.   Eyes: EOM are normal. Pupils are equal, round, and reactive to light.   Neck: Neck supple. No thyromegaly present. No JVD present.   Normal range of motion.  Cardiovascular:  Normal rate and regular rhythm.     Exam reveals no gallop and no friction rub.       No murmur heard.  Pulmonary/Chest: Breath sounds normal. No respiratory distress.   Abdominal: Abdomen is soft. Bowel sounds are normal. There is no abdominal tenderness.   Musculoskeletal:      Cervical back: Normal range of motion and neck supple.      Comments: Diffuse left leg tenderness guarding with moving and palpation.     Neurological: She is alert and oriented to person, place, and time. She has normal strength. GCS score is 15. GCS eye subscore is 4. GCS verbal subscore is 5. GCS motor subscore is 6.   Skin: Skin is warm and dry.   Psychiatric: She has a normal mood and affect.       ED Course   Procedures  Labs Reviewed   CBC W/ AUTO DIFFERENTIAL - Abnormal; Notable for the following components:       Result Value    Hematocrit 36.6 (*)     All other components  within normal limits   COMPREHENSIVE METABOLIC PANEL - Abnormal; Notable for the following components:    Creatinine 2.2 (*)     eGFR 27 (*)     All other components within normal limits   CK - Abnormal; Notable for the following components:    CPK 1100 (*)     All other components within normal limits   PROTIME-INR   D DIMER, QUANTITATIVE          Imaging Results              US Lower Extremity Veins Left (Final result)  Result time 11/01/22 09:33:55      Final result by Nam Lama MD (11/01/22 09:33:55)                   Impression:      No evidence of deep venous thrombosis in the left lower extremity.      Electronically signed by: Nam Lama  Date:    11/01/2022  Time:    09:33               Narrative:    EXAMINATION:  US LOWER EXTREMITY VEINS LEFT    CLINICAL HISTORY:  Pain in leg, unspecified    TECHNIQUE:  Duplex and color flow Doppler evaluation and graded compression of the left lower extremity veins was performed.    COMPARISON:  Lower extremity venous ultrasound dated 03/23/2022    FINDINGS:  Left thigh veins: The common femoral, femoral, popliteal, upper greater saphenous, and deep femoral veins appear color Doppler patent and/or compressible.    Left calf veins: The visualized calf veins appear patent.    Contralateral CFV: The contralateral (right) common femoral vein appear patent.    Miscellaneous: None                                       Medications   lactated ringers infusion ( Intravenous New Bag 11/1/22 2230)   amitriptyline tablet 50 mg (50 mg Oral Given 11/1/22 2233)   apixaban tablet 5 mg (5 mg Oral Given 11/1/22 2233)   FLUoxetine capsule 60 mg (60 mg Oral Given 11/1/22 1535)   hydrOXYchloroQUINE tablet 200 mg (200 mg Oral Given 11/1/22 2233)   lamoTRIgine tablet 100 mg (100 mg Oral Given 11/1/22 1535)   oxyCODONE-acetaminophen  mg per tablet 1 tablet (1 tablet Oral Given 11/2/22 0402)   pregabalin capsule 150 mg (150 mg Oral Given 11/1/22 2233)   sodium chloride 0.9%  flush 10 mL (has no administration in time range)   LIDOcaine 5 % patch 1 patch (1 patch Transdermal Patch Removed 11/2/22 7632)   ketorolac injection 15 mg (15 mg Intravenous Given 11/1/22 0841)   HYDROmorphone injection 1 mg (1 mg Intravenous Given 11/1/22 0841)   sodium chloride 0.9% bolus 1,000 mL (0 mLs Intravenous Stopped 11/1/22 1104)   0.9%  NaCl infusion (1,000 mLs Intravenous New Bag 11/1/22 1021)   acetaminophen tablet 650 mg (650 mg Oral Given 11/1/22 2233)   Pt appears to be under the influence of poly pharmacy. Denies trauma. Pt's pain to left leg is out of proportion to exam. No evidence of limb ischemia. Workup shows CPK 1100 and AYDE with CR doubling from 1.1 to 2.2. Will obs for ayde/ rhabdomyolysis and serial leg exams.            Scribe Attestation:   Scribe #1: I performed the above scribed service and the documentation accurately describes the services I performed. I attest to the accuracy of the note.                 I, Ayad Kendrick, personally performed the services described in this documentation. All medical record entries made by the scribe were at my direction and in my presence. I have reviewed the chart and agree that the record reflects my personal performance and is accurate and complete.    Clinical Impression:   Final diagnoses:  [M79.606] Leg pain  [M79.606] Leg pain - H/O clot  [N17.9] AYDE (acute kidney injury) (Primary)  [M62.82] Non-traumatic rhabdomyolysis      ED Disposition Condition    Observation Stable                Ayad Kendrick MD  11/02/22 1830

## 2022-11-01 NOTE — HPI
"Alberto Frye is a 46 yo female with significant history for osteoarthiritis, sjogrens with "wide spread pain", chronic back pain, fibromyalgia, and morbid obese who presents to ED for acute left leg pain "sharp" that woke patient up at 1 am. Standing on feet worsen symptoms. Took home percocet without relief. Prior symptoms in the past but usually subsided with pain medication. Denies smoking or use of illicit drugs. Denies headaches, dizziness, change in vision, chest pain shortness of breath, palpitations, diaphoresis, orthopnea, PND, abdominal pain, nausea, vomiting, or extremities weakness/numbness. Denies recent sick contacts, travel, or injury.  7/3/ 2022 x ray lumbar spine no acute abnormalities.   Today US BLE no DVT  CPK 1100 and AYDE sCr 2.2 . Baseline sCr 1.1.   Received dilaudid in ED with improvement in symptoms. Place in observation.   "

## 2022-11-01 NOTE — PHARMACY MED REC
"            Admission Medication History     The home medication history was taken by Michelle Cano CPhT.      You may go to "Admission" then "Reconcile Home Medications" tabs to review and/or act upon these items.     The home medication list has been updated by the Pharmacy department.   Please read ALL comments highlighted in yellow.   Please address this information as you see fit.    Feel free to contact us if you have any questions or require assistance.      The medications listed below were removed from the home medication list. Please reorder if appropriate:  Patient reports no longer taking the following medication(s):  Cyanocobalamin 1000 mg tab  Pennsaid 2%  Lidoderm 5%  Medrol Dosepack  Triamcinolone cream  Triamcinolone inject    Medications listed below were obtained from: Patient/family and Analytic software- [employer]  (Not in a hospital admission)      Potential issues to be addressed PRIOR TO DISCHARGE  Patient reported not taking the following medications: (clonazepam (Klonopin) 0.5 mg tab;  Lidoderm 5%; Medrol Dosepack ; Relafen 750 mg tab; ). These medications remain on the home medication list. Please address accordingly.         Michelle Cano CPhT.  [phone]      .        "

## 2022-11-01 NOTE — ASSESSMENT & PLAN NOTE
Presents to ED for acute left leg pain that woke patient up from sleep at 1 am. Took home percocet PTA. Pain improved with dilaudid in ED.   Denies any recent fall or injury  Rheumatology Dr. Hughes  Add sed/crp  Resume home plaquenil, lyrica

## 2022-11-01 NOTE — H&P
"[facility] Medicine  History & Physical    Patient Name: Alberto Frye  MRN: [id number]  Patient Class: OP- Observation  Admission Date: 11/1/2022  Attending Physician: Corky Foster MD   Primary Care Provider: Ian Cespedes MD         Patient information was obtained from patient and ER records.     Subjective:     Principal Problem:Sjogren's syndrome with inflammatory arthritis    Chief Complaint:   Chief Complaint   Patient presents with    Leg Pain     Left leg pain since 1900 yesterday and woke her at 1am with increase pain. Decrease pulses on the left per EMS with obvious edema to left leg. Hx of DVT        HPI: Alberto Frye is a 48 yo female with significant history for osteoarthiritis, sjogrens with "wide spread pain", chronic back pain, fibromyalgia, and morbid obese who presents to ED for acute left leg pain "sharp" that woke patient up at 1 am. Standing on feet worsen symptoms. Took home percocet without relief. Prior symptoms in the past but usually subsided with pain medication. Denies smoking or use of illicit drugs. Denies headaches, dizziness, change in vision, chest pain shortness of breath, palpitations, diaphoresis, orthopnea, PND, abdominal pain, nausea, vomiting, or extremities weakness/numbness. Denies recent sick contacts, travel, or injury.  7/3/ 2022 x ray lumbar spine no acute abnormalities.   Today US BLE no DVT  CPK 1100 and AYDE sCr 2.2 . Baseline sCr 1.1.   Received dilaudid in ED with improvement in symptoms. Place in observation.       Past Medical History:   Diagnosis Date    Alcohol abuse     stopped heavy drinking about 10 years ago; was drinking 3 glasses of vodka/tequilla,rum/whiskey per day    Allergy Don't remember    Its been some years.    Anxiety     Arthritis 2010    Blood clotting tendency 2008    After a procedure & 2020.    Congestive heart failure 8/11/2020    Depression     Hallucination     Hx of psychiatric care     Hypertension     " Immune disorder 2020    Joint pain 2010    Keloid cicatrix Years ago    From childhood scars    Caro     unplanned trips, energy without sleep for 2 days (reading, cleaning), feelings that she can do multiple tasks at one time, feelings of overconfidence at times    Psychiatric problem     Sleep difficulties     Therapy     Withdrawal symptoms, drug or narcotic     racing heart, restlessness       Past Surgical History:   Procedure Laterality Date     lapban surgery  2009    ESOPHAGOGASTRODUODENOSCOPY N/A 6/3/2020    Procedure: EGD (ESOPHAGOGASTRODUODENOSCOPY);  Surgeon: Johan Grover MD;  Location: [facility] (4TH FLR);  Service: Endoscopy;  Laterality: N/A;  covid 6/2-[employer] [facility]-tb    HYSTERECTOMY      PHLEBOGRAPHY Left 8/12/2020    Procedure: Venogram, pharmacomechanical thrombectomy;  Surgeon: Miguelangel Goldman MD;  Location: [facility] OR 2ND FLR;  Service: Vascular;  Laterality: Left;  2336.43 mGy  [id number]  17.8 minutes  37 ml of contrast    VENOPLASTY Left 8/12/2020    Procedure: ANGIOPLASTY, VEIN;  Surgeon: Miguelangel Goldman MD;  [facility];  Service: Vascular;  Laterality: Left;       Review of patient's allergies indicates:   Allergen Reactions    Morphine Itching and Hallucinations    Pcn [penicillins] Other (See Comments)     Was told from childhood she couldn't take it    Sulfa (sulfonamide antibiotics) Nausea And Vomiting    Latex, natural rubber Rash       No current facility-administered medications on file prior to encounter.     Current Outpatient Medications on File Prior to Encounter   Medication Sig    amitriptyline (ELAVIL) 50 MG tablet Take 1 tablet (50 mg total) by mouth every evening.    diclofenac sodium (VOLTAREN) 1 % Gel Apply the gel (4 g) to the affected area 4 times daily. Do not apply more than 16 g daily to any one affected joint    ELIQUIS 5 mg Tab TAKE 1 TABLET BY MOUTH TWICE DAILY *START  AFTER  FINISHING  FIRST  PRESCRIPTION*    FLUoxetine 20 MG  capsule Take 3 capsules (60 mg total) by mouth once daily.    hydrOXYchloroQUINE (PLAQUENIL) 200 mg tablet Take 1 tablet (200 mg total) by mouth 2 (two) times daily.    hydrOXYzine (ATARAX) 50 MG tablet Take 1 tablet (50 mg total) by mouth daily as needed for Anxiety (sleep).    lamoTRIgine (LAMICTAL) 100 MG tablet Take 1 tablet (100 mg total) by mouth once daily.    oxyCODONE-acetaminophen (PERCOCET)  mg per tablet Take 1 tablet by mouth every 12 (twelve) hours as needed for Pain.    pregabalin (LYRICA) 150 MG capsule Take 1 capsule (150 mg total) by mouth 3 (three) times daily.    valsartan-hydrochlorothiazide (DIOVAN-HCT) 160-25 mg per tablet Take 1 tablet by mouth once daily.    atorvastatin (LIPITOR) 10 MG tablet Take 1 tablet (10 mg total) by mouth once daily. (Patient not taking: Reported on 11/1/2022)    clonazePAM (KLONOPIN) 0.5 MG tablet 1/2 to 1 tablet daily as needed (Patient not taking: Reported on 11/1/2022)    ipratropium (ATROVENT) 42 mcg (0.06 %) nasal spray SMARTSIG:Both Nares    [DISCONTINUED] cyanocobalamin (VITAMIN B-12) 1000 MCG tablet Take 100 mcg by mouth once daily.    [DISCONTINUED] diclofenac sodium (PENNSAID) 2 % SoPk Apply 40 mg topically 2 (two) times daily.    [DISCONTINUED] LIDOcaine (LIDODERM) 5 % Place 1 patch onto the skin once daily. Remove & Discard patch within 12 hours then leave off for 12 hours    [DISCONTINUED] methylPREDNISolone (MEDROL DOSEPACK) 4 mg tablet use as directed    [DISCONTINUED] nabumetone (RELAFEN) 750 MG tablet Take 1 tablet (750 mg total) by mouth 2 (two) times daily.    [DISCONTINUED] triamcinolone acetonide 0.1% (KENALOG) 0.1 % cream AAA abdomen bid     Family History       Problem Relation (Age of Onset)    Cancer Mother    Cirrhosis Maternal Uncle    Diabetes Mother    Hypertension Mother          Tobacco Use    Smoking status: Former     Packs/day: 0.00     Years: 0.00     Pack years: 0.00     Types: Cigarettes    Smokeless tobacco: Never     Tobacco comments:     quit in october 2016   Substance and Sexual Activity    Alcohol use: Yes     Alcohol/week: 1.0 - 3.0 standard drink     Types: 1 - 3 Glasses of wine per week     Comment: social presently, excessive 10 years ago    Drug use: Never     Types: Marijuana     Comment: uses THCA weekly    Sexual activity: Yes     Partners: Male     Birth control/protection: Condom, See Surgical Hx     Review of Systems   Constitutional:  Positive for activity change. Negative for chills, diaphoresis and fatigue.   HENT: Negative.     Eyes: Negative.    Respiratory: Negative.     Gastrointestinal: Negative.    Endocrine: Negative.    Genitourinary: Negative.    Musculoskeletal:  Positive for arthralgias, back pain, gait problem and myalgias.   Skin: Negative.    Allergic/Immunologic: Negative.    Hematological: Negative.    Psychiatric/Behavioral: Negative.     Objective:     Vital Signs (Most Recent):  Temp: 97.8 °F (36.6 °C) (11/01/22 1145)  Pulse: 96 (11/01/22 1145)  Resp: 20 (11/01/22 1145)  BP: (!) 99/56 (11/01/22 1145)  SpO2: 97 % (11/01/22 1145)   Vital Signs (24h Range):  Temp:  [97.8 °F (36.6 °C)-98.6 °F (37 °C)] 97.8 °F (36.6 °C)  Pulse:  [] 96  Resp:  [11-22] 20  SpO2:  [95 %-100 %] 97 %  BP: ()/() 99/56     Weight: 122.5 kg (270 lb)  Body mass index is 46.35 kg/m².    Physical Exam  Constitutional:       Appearance: Normal appearance. She is not ill-appearing.   HENT:      Head: Atraumatic.      Nose: Nose normal.      Mouth/Throat:      Mouth: Mucous membranes are dry.   Cardiovascular:      Rate and Rhythm: Regular rhythm.   Pulmonary:      Effort: Pulmonary effort is normal.      Breath sounds: Normal breath sounds.   Abdominal:      Palpations: Abdomen is soft.   Musculoskeletal:         General: Tenderness (left leg anterior posteror pain one start of exam) present. Normal range of motion.   Skin:     General: Skin is warm and dry.   Neurological:      General: No focal deficit  present.      Mental Status: She is alert and oriented to person, place, and time.           Significant Labs: All pertinent labs within the past 24 hours have been reviewed.    Significant Imaging: I have reviewed all pertinent imaging results/findings within the past 24 hours.    Assessment/Plan:     * Sjogren's syndrome with inflammatory arthritis  Presents to ED for acute left leg pain that woke patient up from sleep at 1 am. Took home percocet PTA. Pain improved with dilaudid in ED.   Denies any recent fall or injury  Rheumatology Dr. Hughes  Add sed/crp  Resume home plaquenil, lyrica        Non-traumatic rhabdomyolysis  Unclear as not no recent injury   Hold statin   Start LR and trend   PT tomorrow if CPK trends down      AYDE (acute kidney injury)  Patient with acute kidney injury likely due to IVVD/dehydration AYDE is currently stable. Labs reviewed- Renal function/electrolytes with Estimated Creatinine Clearance: 39.9 mL/min (A) (based on SCr of 2.2 mg/dL (H)). according to latest data. Monitor urine output and serial BMP and adjust therapy as needed. Avoid nephrotoxins and renally dose meds for GFR listed above.   Baseline sCr 1.1  Hold diovan-hct  IVF LR   Add UA      Recurrent deep vein thrombosis (DVT) with hx of IVC filter; indefinite anticoagulation  US BLE no DVT  Continue home eliquis       Neuropathy  Continue home lyrica        VTE Risk Mitigation (From admission, onward)           Ordered     apixaban tablet 5 mg  2 times daily         11/01/22 1432                As clarification on 11/1/2022, patient place in observation under my care in collaboration with Dr. Corky Foster MD.        Nya Castro NP  Department of [facility] Medicine   [facility] [place] [facility]

## 2022-11-01 NOTE — Clinical Note
Diagnosis: Leg pain [199497]   Future Attending Provider: ARVIND CHURCH [[id number]]   Admitting Provider:: ARVIND CHURCH [[id number]]

## 2022-11-01 NOTE — NURSING
1145: Pt admitted to Tele from ED. Pt reports severe pain to her lower extremities. She appears somnolent and can barely keep her eyes open making assessment difficult. Pt able to slide self from stretcher to bed.       1445: Pt now reports that she cannot move and when staff attempts to provide care and barely touch her she screams in apparent distress and agony. Reports that 2 days ago she was ambulatory and working and now cannot move. She is offered a purewick to void but refuses, then states she wants to ambulate to restroom but then says she can't walk. Purewick is placed on pt. Bed alarm on. Pt appears to become upset when she is asked assessment questions.

## 2022-11-02 VITALS
BODY MASS INDEX: 48.1 KG/M2 | RESPIRATION RATE: 18 BRPM | WEIGHT: 281.75 LBS | TEMPERATURE: 98 F | SYSTOLIC BLOOD PRESSURE: 115 MMHG | DIASTOLIC BLOOD PRESSURE: 62 MMHG | HEIGHT: 64 IN | OXYGEN SATURATION: 100 % | HEART RATE: 92 BPM

## 2022-11-02 PROBLEM — M79.605 PAIN OF LEFT LOWER EXTREMITY: Status: ACTIVE | Noted: 2022-11-02

## 2022-11-02 LAB
ALBUMIN SERPL BCP-MCNC: 3.6 G/DL (ref 3.5–5.2)
ALP SERPL-CCNC: 70 U/L (ref 55–135)
ALT SERPL W/O P-5'-P-CCNC: 25 U/L (ref 10–44)
ANION GAP SERPL CALC-SCNC: 8 MMOL/L (ref 8–16)
AST SERPL-CCNC: 38 U/L (ref 10–40)
BASOPHILS # BLD AUTO: 0.03 K/UL (ref 0–0.2)
BASOPHILS NFR BLD: 0.5 % (ref 0–1.9)
BILIRUB SERPL-MCNC: 0.7 MG/DL (ref 0.1–1)
BUN SERPL-MCNC: 19 MG/DL (ref 6–20)
CALCIUM SERPL-MCNC: 8.9 MG/DL (ref 8.7–10.5)
CHLORIDE SERPL-SCNC: 106 MMOL/L (ref 95–110)
CK SERPL-CCNC: 1144 U/L (ref 20–180)
CO2 SERPL-SCNC: 22 MMOL/L (ref 23–29)
CREAT SERPL-MCNC: 1.5 MG/DL (ref 0.5–1.4)
CRP SERPL-MCNC: 20.9 MG/L (ref 0–8.2)
DIFFERENTIAL METHOD: ABNORMAL
EOSINOPHIL # BLD AUTO: 0.2 K/UL (ref 0–0.5)
EOSINOPHIL NFR BLD: 3 % (ref 0–8)
ERYTHROCYTE [DISTWIDTH] IN BLOOD BY AUTOMATED COUNT: 14.2 % (ref 11.5–14.5)
ERYTHROCYTE [SEDIMENTATION RATE] IN BLOOD BY WESTERGREN METHOD: 20 MM/HR (ref 0–20)
EST. GFR  (NO RACE VARIABLE): 42 ML/MIN/1.73 M^2
GLUCOSE SERPL-MCNC: 82 MG/DL (ref 70–110)
HCT VFR BLD AUTO: 38.9 % (ref 37–48.5)
HGB BLD-MCNC: 12.7 G/DL (ref 12–16)
IMM GRANULOCYTES # BLD AUTO: 0.02 K/UL (ref 0–0.04)
IMM GRANULOCYTES NFR BLD AUTO: 0.3 % (ref 0–0.5)
LYMPHOCYTES # BLD AUTO: 2.9 K/UL (ref 1–4.8)
LYMPHOCYTES NFR BLD: 48.3 % (ref 18–48)
MCH RBC QN AUTO: 29.5 PG (ref 27–31)
MCHC RBC AUTO-ENTMCNC: 32.6 G/DL (ref 32–36)
MCV RBC AUTO: 91 FL (ref 82–98)
MONOCYTES # BLD AUTO: 0.7 K/UL (ref 0.3–1)
MONOCYTES NFR BLD: 10.8 % (ref 4–15)
NEUTROPHILS # BLD AUTO: 2.3 K/UL (ref 1.8–7.7)
NEUTROPHILS NFR BLD: 37.1 % (ref 38–73)
NRBC BLD-RTO: 0 /100 WBC
PLATELET # BLD AUTO: 214 K/UL (ref 150–450)
PMV BLD AUTO: 9.7 FL (ref 9.2–12.9)
POTASSIUM SERPL-SCNC: 4.2 MMOL/L (ref 3.5–5.1)
PROT SERPL-MCNC: 7.1 G/DL (ref 6–8.4)
RBC # BLD AUTO: 4.3 M/UL (ref 4–5.4)
SODIUM SERPL-SCNC: 136 MMOL/L (ref 136–145)
WBC # BLD AUTO: 6.09 K/UL (ref 3.9–12.7)

## 2022-11-02 PROCEDURE — G0378 HOSPITAL OBSERVATION PER HR: HCPCS

## 2022-11-02 PROCEDURE — 96361 HYDRATE IV INFUSION ADD-ON: CPT

## 2022-11-02 PROCEDURE — 99214 PR OFFICE/OUTPT VISIT, EST, LEVL IV, 30-39 MIN: ICD-10-PCS | Mod: ,,, | Performed by: NURSE PRACTITIONER

## 2022-11-02 PROCEDURE — 85652 RBC SED RATE AUTOMATED: CPT | Performed by: NURSE PRACTITIONER

## 2022-11-02 PROCEDURE — 82550 ASSAY OF CK (CPK): CPT | Performed by: NURSE PRACTITIONER

## 2022-11-02 PROCEDURE — 99214 OFFICE O/P EST MOD 30 MIN: CPT | Mod: ,,, | Performed by: NURSE PRACTITIONER

## 2022-11-02 PROCEDURE — 97161 PT EVAL LOW COMPLEX 20 MIN: CPT

## 2022-11-02 PROCEDURE — 97116 GAIT TRAINING THERAPY: CPT

## 2022-11-02 PROCEDURE — 85025 COMPLETE CBC W/AUTO DIFF WBC: CPT | Performed by: NURSE PRACTITIONER

## 2022-11-02 PROCEDURE — 25000003 PHARM REV CODE 250: Performed by: NURSE PRACTITIONER

## 2022-11-02 PROCEDURE — 36415 COLL VENOUS BLD VENIPUNCTURE: CPT | Performed by: NURSE PRACTITIONER

## 2022-11-02 PROCEDURE — 86140 C-REACTIVE PROTEIN: CPT | Performed by: NURSE PRACTITIONER

## 2022-11-02 PROCEDURE — 63600175 PHARM REV CODE 636 W HCPCS: Performed by: NURSE PRACTITIONER

## 2022-11-02 PROCEDURE — 80053 COMPREHEN METABOLIC PANEL: CPT | Performed by: NURSE PRACTITIONER

## 2022-11-02 RX ADMIN — APIXABAN 5 MG: 5 TABLET, FILM COATED ORAL at 08:11

## 2022-11-02 RX ADMIN — LAMOTRIGINE 100 MG: 100 TABLET ORAL at 08:11

## 2022-11-02 RX ADMIN — OXYCODONE AND ACETAMINOPHEN 1 TABLET: 10; 325 TABLET ORAL at 05:11

## 2022-11-02 RX ADMIN — SODIUM CHLORIDE, SODIUM LACTATE, POTASSIUM CHLORIDE, AND CALCIUM CHLORIDE: .6; .31; .03; .02 INJECTION, SOLUTION INTRAVENOUS at 07:11

## 2022-11-02 RX ADMIN — PREGABALIN 150 MG: 50 CAPSULE ORAL at 08:11

## 2022-11-02 RX ADMIN — LIDOCAINE 1 PATCH: 50 PATCH TOPICAL at 03:11

## 2022-11-02 RX ADMIN — FLUOXETINE 60 MG: 20 CAPSULE ORAL at 08:11

## 2022-11-02 RX ADMIN — HYDROXYCHLOROQUINE SULFATE 200 MG: 200 TABLET ORAL at 08:11

## 2022-11-02 RX ADMIN — PREGABALIN 150 MG: 50 CAPSULE ORAL at 03:11

## 2022-11-02 NOTE — PROGRESS NOTES
Patient upset re: upcoming discharge today stating nothing was done for her . Nurse reached out to provider. Patient is scheduled for outpatient PT/OT and walker. Patient turned to mother and said Don't worry about it mom I'll just go home ,get out the car, fall ,get a bruise on my leg and come back to the hospital. Nurse informed patient that she would document her concern/plan

## 2022-11-02 NOTE — PT/OT/SLP EVAL
"Physical Therapy Evaluation and Discharge Note    Patient Name:  Alberto Frye   MRN:  [id number]    Recommendations:     Discharge Recommendations:  home health PT, home health OT   Discharge Equipment Recommendations: bedside commode, walker, rolling   Barriers to discharge:  None from PT standpoint    Assessment:     Alberto Frye is a 47 y.o. female admitted with a medical diagnosis of Sjogren's syndrome with inflammatory arthritis. .  At this time, patient is not providing full effort and able to return home with DME and family support     Recent Surgery: * No surgery found *      Plan:     During this hospitalization, patient does not require further acute PT services.  Please re-consult if situation changes.      Subjective     Chief Complaint: Pt c/o severe pain throughout evaluation, but not demonstrating any outward signs of severe pain  Patient/Family Comments/goals: Pt states that she feels she is not ready to be D/C'd today.  "Why do you have to be so rough with me?"  Therapist not even touching pt at time.  Pt declined to sit up in chair.    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  Equipment used at home: none.  DME owned (not currently used):  crutches .  Upon discharge, patient will have assistance from Son, [facility].    Objective:     Communicated with nsg prior to session.  Patient found HOB elevated, sleeping with telemetry, bed alarm, PureWick upon PT entry to room.    General Precautions: Standard, fall " "  Orthopedic Precautions:N/A   Braces: N/A   Respiratory Status: Room air    Exams:  Cognitive Exam:  Patient is oriented to Person, Place, Time, and Situation, lethargic, symptom magnification  Gross Motor Coordination:  impaired 2/2 gen weakness, deconditoning, "pain", and body habitus   Postural Exam:  Patient presented with the following abnormalities:    -       Rounded shoulders  -       Forward head  Sensation:    -       Intact  light/touch B LE's  Skin Integrity/Edema:      -       Skin integrity: Visible skin intact  -       Edema: None noted   RLE ROM: WFL  RLE Strength: WFL  LLE ROM: unable to formally test, appears to be WFL's  LLE Strength: Unable to formally test, appears to be WFL's, able to perform SLR and full knee ext sitting at EOB, able to Hold L LE NWB during gait tr    Functional Mobility:  Supine to sit: CGA with VC/TC for body mechanics and hand placement  Scooting: SBA  Sit to stand: CGA with VC/TC for hand placement with RW  Gait: Gait training with RW and CGA for balance and VC/TC for distribution of weight through UE's to offload L LE, sequencing, and walker management/safety.  Pt performed approx 7 "hops" NWB L LE with Min A for walker management  Balance:  Good sit, Fair+/Fair stand    AM-PAC 6 CLICK MOBILITY  Total Score:14       Treatment and Education:   Evaluation and gait training as above.  Educated pt that purewick had been removed and that she should call for nursing to get to the BSC from now on.  Pt verbalized understanding.      AM-PAC 6 CLICK MOBILITY  Total Score:14     Patient left HOB elevated with all lines intact, call button in reach, bed alarm on, and nsg notified.    GOALS:   Multidisciplinary Problems       Physical Therapy Goals          Problem: Physical Therapy    Goal Priority Disciplines Outcome Goal Variances Interventions   Physical Therapy Goal     PT, PT/OT Adequate for Care Transition                         History:     Past Medical History:   Diagnosis " Date    AYDE (acute kidney injury) 11/1/2022    Alcohol abuse     stopped heavy drinking about 10 years ago; was drinking 3 glasses of vodka/tequilla,rum/whiskey per day    Allergy Don't remember    Its been some years.    Anxiety     Arthritis 2010    Blood clotting tendency 2008    After a procedure & 2020.    Congestive heart failure 8/11/2020    Depression     Hallucination     Hx of psychiatric care     Hypertension     Immune disorder 2020    Joint pain 2010    Keloid cicatrix Years ago    From childhood scars    Caro     unplanned trips, energy without sleep for 2 days (reading, cleaning), feelings that she can do multiple tasks at one time, feelings of overconfidence at times    Psychiatric problem     Sleep difficulties     Therapy     Withdrawal symptoms, drug or narcotic     racing heart, restlessness       Past Surgical History:   Procedure Laterality Date     lapban surgery  2009    ESOPHAGOGASTRODUODENOSCOPY N/A 6/3/2020    Procedure: EGD (ESOPHAGOGASTRODUODENOSCOPY);  Surgeon: Johan Grover MD;  Location: [facility] (4TH FLR);  Service: Endoscopy;  Laterality: N/A;  covid 6/2-[facility] ALLERGY-tb    HYSTERECTOMY      PHLEBOGRAPHY Left 8/12/2020    Procedure: Venogram, pharmacomechanical thrombectomy;  Surgeon: Miguelangel Goldman MD;  [facility];  Service: Vascular;  Laterality: Left;  2336.43 mGy  [id number]  17.8 minutes  37 ml of contrast    VENOPLASTY Left 8/12/2020    Procedure: ANGIOPLASTY, VEIN;  Surgeon: Miguelangel Goldman MD;  [facility];  Service: Vascular;  Laterality: Left;       Time Tracking:     PT Received On: 11/02/22  PT Start Time: 1013     PT Stop Time: 1042  PT Total Time (min): 29 min     Billable Minutes: Evaluation 20 and Gait Training 9      11/02/2022

## 2022-11-02 NOTE — PROGRESS NOTES
WRITTEN HEALTHCARE DISCHARGE INFORMATION      Things that YOU are responsible for to Manage Your Care At Home:  1. Getting your prescriptions filled.  2. Taking you medications as directed. DO NOT MISS ANY DOSES!  3. Going to your follow-up doctor appointments. This is important because it allows the doctor to monitor your progress and to determine if any changes need to be made to your treatment plan.     If you are unable to make your follow up appointments, please call the number listed and reschedule this appointment.      After discharge, if you need assistance, you can call [facility] Line for 24/7 assistance at [phone]     If you are experience any signs or symptoms, Call your doctor or Call 911 and come to your nearest Emergency Room.     Thank you for choosing [facility] and allowing us to care for you.        You should receive a call from [facility] Discharge Department within 48-72 hours to help manage your care after discharge. Please try to make sure that you answer your phone for this important phone call.         Follow-up Information       [facility] Follow up.    Specialty: Outpatient Rehab  Why: you will be contacted with day and time to come to office to begin outpatient services for PT/OT  Contact information:  JassiElan Arden Burroughs, [address] [address] [phone]  Additional information:  Please park in surface lot and use [facility] entrance. Check in at main registration.              [facility] Follow up.    Why: DME- call for questions or concerns regarding equipment.  Contact information:  [address] [place] [address] [phone]

## 2022-11-02 NOTE — PROGRESS NOTES
Medication List        CONTINUE taking these medications      amitriptyline 50 MG tablet  Commonly known as: ELAVIL  Take 1 tablet (50 mg total) by mouth every evening.     diclofenac sodium 1 % Gel  Commonly known as: VOLTAREN  Apply the gel (4 g) to the affected area 4 times daily. Do not apply more than 16 g daily to any one affected joint     ELIQUIS 5 mg Tab  Generic drug: apixaban  TAKE 1 TABLET BY MOUTH TWICE DAILY *START  AFTER  FINISHING  FIRST  PRESCRIPTION*     FLUoxetine 20 MG capsule  Take 3 capsules (60 mg total) by mouth once daily.     hydrOXYchloroQUINE 200 mg tablet  Commonly known as: PLAQUENIL  Take 1 tablet (200 mg total) by mouth 2 (two) times daily.     hydrOXYzine 50 MG tablet  Commonly known as: ATARAX  Take 1 tablet (50 mg total) by mouth daily as needed for Anxiety (sleep).     ipratropium 42 mcg (0.06 %) nasal spray  Commonly known as: ATROVENT     lamoTRIgine 100 MG tablet  Commonly known as: LAMICTAL  Take 1 tablet (100 mg total) by mouth once daily.     oxyCODONE-acetaminophen  mg per tablet  Commonly known as: PERCOCET  Take 1 tablet by mouth every 12 (twelve) hours as needed for Pain.     pregabalin 150 MG capsule  Commonly known as: LYRICA  Take 1 capsule (150 mg total) by mouth 3 (three) times daily.            STOP taking these medications      atorvastatin 10 MG tablet  Commonly known as: LIPITOR     clonazePAM 0.5 MG tablet  Commonly known as: KlonoPIN     valsartan-hydrochlorothiazide 160-25 mg per tablet  Commonly known as: DIOVAN-HCT

## 2022-11-02 NOTE — PROGRESS NOTES
Order entered for BSC and RW. Message sent to Sandra with [facility] to advise of orders for review. [place] to follow for response.    Pt declined BSC. RW delivered to bedside in preparation for d/c

## 2022-11-02 NOTE — SUBJECTIVE & OBJECTIVE
Facility-Administered Medications Prior to Admission   Medication Dose Route Frequency Provider Last Rate Last Admin    triamcinolone acetonide injection 40 mg  40 mg Intradermal Once Ama Sylvester MD         Medications Prior to Admission   Medication Sig Dispense Refill Last Dose    amitriptyline (ELAVIL) 50 MG tablet Take 1 tablet (50 mg total) by mouth every evening. 90 tablet 1 10/31/2022 at a.m.    diclofenac sodium (VOLTAREN) 1 % Gel Apply the gel (4 g) to the affected area 4 times daily. Do not apply more than 16 g daily to any one affected joint 100 g 1 10/31/2022 at a.m.    ELIQUIS 5 mg Tab TAKE 1 TABLET BY MOUTH TWICE DAILY *START  AFTER  FINISHING  FIRST  PRESCRIPTION* 60 tablet 0 10/31/2022    FLUoxetine 20 MG capsule Take 3 capsules (60 mg total) by mouth once daily. 270 capsule 1 10/31/2022 at a.m.    hydrOXYchloroQUINE (PLAQUENIL) 200 mg tablet Take 1 tablet (200 mg total) by mouth 2 (two) times daily. 60 tablet 6 10/31/2022    hydrOXYzine (ATARAX) 50 MG tablet Take 1 tablet (50 mg total) by mouth daily as needed for Anxiety (sleep). 90 tablet 1 10/31/2022    lamoTRIgine (LAMICTAL) 100 MG tablet Take 1 tablet (100 mg total) by mouth once daily. 90 tablet 1 10/31/2022    oxyCODONE-acetaminophen (PERCOCET)  mg per tablet Take 1 tablet by mouth every 12 (twelve) hours as needed for Pain. 60 tablet 0 10/31/2022    pregabalin (LYRICA) 150 MG capsule Take 1 capsule (150 mg total) by mouth 3 (three) times daily. 90 capsule 5 10/31/2022    [DISCONTINUED] valsartan-hydrochlorothiazide (DIOVAN-HCT) 160-25 mg per tablet Take 1 tablet by mouth once daily. 90 tablet 3 10/31/2022 at a.m.    ipratropium (ATROVENT) 42 mcg (0.06 %) nasal spray SMARTSIG:Both Nares   More than a month    [DISCONTINUED] atorvastatin (LIPITOR) 10 MG tablet Take 1 tablet (10 mg total) by mouth once daily. (Patient not taking: Reported on 11/1/2022) 90 tablet 3 Not Taking    [DISCONTINUED] clonazePAM (KLONOPIN) 0.5 MG tablet 1/2  to 1 tablet daily as needed (Patient not taking: Reported on 11/1/2022) 14 tablet 0 Not Taking       Review of patient's allergies indicates:   Allergen Reactions    Morphine Itching and Hallucinations    Pcn [penicillins] Other (See Comments)     Was told from childhood she couldn't take it    Sulfa (sulfonamide antibiotics) Nausea And Vomiting    Latex, natural rubber Rash       Past Medical History:   Diagnosis Date    AYDE (acute kidney injury) 11/1/2022    Alcohol abuse     stopped heavy drinking about 10 years ago; was drinking 3 glasses of vodka/tequilla,rum/whiskey per day    Allergy Don't remember    Its been some years.    Anxiety     Arthritis 2010    Blood clotting tendency 2008    After a procedure & 2020.    Congestive heart failure 8/11/2020    Depression     Hallucination     Hx of psychiatric care     Hypertension     Immune disorder 2020    Joint pain 2010    Keloid cicatrix Years ago    From childhood scars    Caro     unplanned trips, energy without sleep for 2 days (reading, cleaning), feelings that she can do multiple tasks at one time, feelings of overconfidence at times    Psychiatric problem     Sleep difficulties     Therapy     Withdrawal symptoms, drug or narcotic     racing heart, restlessness     Past Surgical History:   Procedure Laterality Date     lapban surgery  2009    ESOPHAGOGASTRODUODENOSCOPY N/A 6/3/2020    Procedure: EGD (ESOPHAGOGASTRODUODENOSCOPY);  Surgeon: Johan Grover MD;  Location: [facility] ([address] [place] [address]);  Service: Endoscopy;  Laterality: N/A;  covid 6/2-[facility]    HYSTERECTOMY      PHLEBOGRAPHY Left 8/12/2020    Procedure: Venogram, pharmacomechanical thrombectomy;  Surgeon: Miguelangel Goldman MD;  Location: [facility] OR [address] [place] [address];  Service: Vascular;  Laterality: Left;  2336.43 mGy  [id number]  17.8 minutes  37 ml of contrast    VENOPLASTY Left 8/12/2020    Procedure: ANGIOPLASTY, VEIN;  Surgeon: Miguelangel Goldman MD;  Location: [facility] OR [address] [place] [address];   Service: Vascular;  Laterality: Left;     Family History       Problem Relation (Age of Onset)    Cancer Mother    Cirrhosis Maternal Uncle    Diabetes Mother    Hypertension Mother          Tobacco Use    Smoking status: Former     Packs/day: 0.00     Years: 0.00     Pack years: 0.00     Types: Cigarettes    Smokeless tobacco: Never    Tobacco comments:     quit in october 2016   Substance and Sexual Activity    Alcohol use: Yes     Alcohol/week: 1.0 - 3.0 standard drink     Types: 1 - 3 Glasses of wine per week     Comment: social presently, excessive 10 years ago    Drug use: Never     Types: Marijuana     Comment: uses THCA weekly    Sexual activity: Yes     Partners: Male     Birth control/protection: Condom, See Surgical Hx     Review of Systems   Constitutional:  Positive for activity change. Negative for chills, diaphoresis and fever.   HENT: Negative.     Respiratory:  Negative for chest tightness and shortness of breath.    Cardiovascular:  Positive for leg swelling. Negative for chest pain.   Gastrointestinal:  Negative for abdominal distention, abdominal pain, constipation, diarrhea and nausea.   Endocrine: Negative.    Genitourinary: Negative.    Musculoskeletal:  Positive for arthralgias and myalgias.   Skin:  Negative for wound.   Neurological:  Positive for weakness and numbness.        Left lower extremity   Psychiatric/Behavioral:  Negative for agitation and confusion.    Objective:     Vital Signs (Most Recent):  Temp: 97.8 °F (36.6 °C) (11/02/22 1107)  Pulse: 92 (11/02/22 1107)  Resp: 18 (11/02/22 1107)  BP: 115/62 (11/02/22 1107)  SpO2: 100 % (11/02/22 1107) Vital Signs (24h Range):  Temp:  [97.7 °F (36.5 °C)-98.6 °F (37 °C)] 97.8 °F (36.6 °C)  Pulse:  [] 92  Resp:  [16-20] 18  SpO2:  [96 %-100 %] 100 %  BP: ()/(53-83) 115/62     Weight: 127.8 kg (281 lb 12 oz)  Body mass index is 48.36 kg/m².    Physical Exam  Constitutional:       Appearance: Normal appearance. She is obese.    HENT:      Head: Normocephalic and atraumatic.      Nose: Nose normal.      Mouth/Throat:      Mouth: Mucous membranes are moist.      Pharynx: Oropharynx is clear.   Eyes:      Extraocular Movements: Extraocular movements intact.      Conjunctiva/sclera: Conjunctivae normal.      Pupils: Pupils are equal, round, and reactive to light.   Cardiovascular:      Rate and Rhythm: Normal rate and regular rhythm.      Pulses: Normal pulses.      Heart sounds: Normal heart sounds.   Abdominal:      General: Bowel sounds are normal. There is no distension.      Palpations: Abdomen is soft.      Tenderness: There is no abdominal tenderness.   Musculoskeletal:         General: Swelling and tenderness present.      Cervical back: Normal range of motion and neck supple.      Left lower leg: Tenderness present. Edema present.   Skin:     General: Skin is dry.      Capillary Refill: Capillary refill takes less than 2 seconds.      Findings: Erythema present. No lesion.   Neurological:      General: No focal deficit present.      Mental Status: She is alert. Mental status is at baseline.      Motor: Weakness present.   Psychiatric:         Mood and Affect: Mood normal.         Thought Content: Thought content normal.         Judgment: Judgment normal.       Significant Labs:  CBC:   Recent Labs   Lab 11/02/22  0415   WBC 6.09   RBC 4.30   HGB 12.7   HCT 38.9      MCV 91   MCH 29.5   MCHC 32.6     CMP:   Recent Labs   Lab 11/02/22  0415   GLU 82   CALCIUM 8.9   ALBUMIN 3.6   PROT 7.1      K 4.2   CO2 22*      BUN 19   CREATININE 1.5*   ALKPHOS 70   ALT 25   AST 38   BILITOT 0.7       Significant Diagnostics:  I have reviewed all pertinent imaging results/findings within the past 24 hours.  US Lower Extremity Veins Left  Impression:  No evidence of deep venous thrombosis in the left lower extremity.

## 2022-11-02 NOTE — ASSESSMENT & PLAN NOTE
All imaging reviewed. No acute vascular surgery intervention at this time. US negative for DVT.  - Recommend IOANA with toe pressures.  - Recommend Rheumatology and Neurology consult.

## 2022-11-02 NOTE — CONSULTS
"  Vascular Surgery  Consult Note    Consults  Subjective:     Chief Complaint/Reason for Admission: Inflammatory arthritis             HPI:  Alberto Frye is a 47 y.o. female with significant history for osteoarthiritis, sjogrens with "wide spread pain", chronic back pain, fibromyalgia, previous DVT, and morbid obese who presents to ED for acute left leg pain "sharp" that woke patient up at 1 am. Standing on feet worsen symptoms. Took home percocet without relief. Prior symptoms in the past but usually subsided with pain medication. Denies smoking or use of illicit drugs. Denies headaches, dizziness, change in vision, chest pain shortness of breath, palpitations, diaphoresis, orthopnea, PND, abdominal pain, nausea, vomiting, or extremities weakness/numbness. Denies recent sick contacts, travel, or injury. Patient contact vascular surgery office requesting to be evaluated for DVT while inpatient.     Patient Active Problem List   Diagnosis    Essential hypertension    Seasonal allergies    Anxiety    Abdominal pain    Neuropathy    Recurrent deep vein thrombosis (DVT) with hx of IVC filter; indefinite anticoagulation    Recurrent UTI    Screening for colon cancer 11/2019 colonoscopy hemorrhoids and diverticulosis; [facility]    Chronic anticoagulation    Chronic midline low back pain without sciatica    Pain in both hands    Lumbar spondylosis    DDD (degenerative disc disease), lumbar    Refractive error    Long-term use of Plaquenil    Sjogren's syndrome with inflammatory arthritis    Dyslipidemia    Bipolar affective disorder    Mood insomnia    Obsessive compulsive personality disorder    Palpitations    Chest pain, atypical    LEON (dyspnea on exertion)    Non-traumatic rhabdomyolysis    AYDE (acute kidney injury)     No MI  No Stroke  Tobacco use: Yes    Past Medical History:   Diagnosis Date    AYDE (acute kidney injury) 11/1/2022    Alcohol abuse     stopped heavy drinking about 10 " years ago; was drinking 3 glasses of vodka/tequilla,rum/whiskey per day    Allergy Don't remember    Its been some years.    Anxiety     Arthritis 2010    Blood clotting tendency 2008    After a procedure & 2020.    Congestive heart failure 8/11/2020    Depression     Hallucination     Hx of psychiatric care     Hypertension     Immune disorder 2020    Joint pain 2010    Keloid cicatrix Years ago    From childhood scars    Caro     unplanned trips, energy without sleep for 2 days (reading, cleaning), feelings that she can do multiple tasks at one time, feelings of overconfidence at times    Psychiatric problem     Sleep difficulties     Therapy     Withdrawal symptoms, drug or narcotic     racing heart, restlessness     Past Surgical History:   Procedure Laterality Date     lapban surgery  2009    ESOPHAGOGASTRODUODENOSCOPY N/A 6/3/2020    Procedure: EGD (ESOPHAGOGASTRODUODENOSCOPY);  Surgeon: Johan Grover MD;  Location: [facility] (4TH FLR);  Service: Endoscopy;  Laterality: N/A;  covid 6/2-[facility]    HYSTERECTOMY      PHLEBOGRAPHY Left 8/12/2020    Procedure: Venogram, pharmacomechanical thrombectomy;  Surgeon: Miguelangel Goldman MD;  [facility];  Service: Vascular;  Laterality: Left;  2336.43 mGy  [id number]  17.8 minutes  37 ml of contrast    VENOPLASTY Left 8/12/2020    Procedure: ANGIOPLASTY, VEIN;  Surgeon: Miguelangel Goldman MD;  Location: [facility] OR [address] [place] [address];  Service: Vascular;  Laterality: Left;     Family History   Problem Relation Age of Onset    Diabetes Mother     Cancer Mother     Hypertension Mother     Cirrhosis Maternal Uncle         ETOH    Celiac disease Neg Hx     Colon cancer Neg Hx     Colon polyps Neg Hx     Crohn's disease Neg Hx     Esophageal cancer Neg Hx     Inflammatory bowel disease Neg Hx     Liver cancer Neg Hx     Liver disease Neg Hx     Rectal cancer Neg Hx     Stomach cancer Neg Hx     Ulcerative colitis  Neg Hx      Social History     Socioeconomic History    Marital status:     Number of children: 3   Occupational History    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[employer]   Tobacco Use    Smoking status: Former     Packs/day: 0.00     Years: 0.00     Pack years: 0.00     Types: Cigarettes    Smokeless tobacco: Never    Tobacco comments:     quit in october 2016   Substance and Sexual Activity    Alcohol use: Yes     Alcohol/week: 1.0 - 3.0 standard drink     Types: 1 - 3 Glasses of wine per week     Comment: social presently, excessive 10 years ago    Drug use: Never     Types: Marijuana     Comment: uses THCA weekly    Sexual activity: Yes     Partners: Male     Birth control/protection: Condom, See Surgical Hx   Other Topics Concern    Patient feels they ought to cut down on drinking/drug use No    Patient annoyed by others criticizing their drinking/drug use No    Patient has felt bad or guilty about drinking/drug use No    Patient has had a drink/used drugs as an eye opener in the AM No    Are you pregnant or think you may be? No    Breast-feeding No   Social History Narrative    Has 3 healthy grown sons (1990, 1993, 1998) Lives alone.    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   in 2010.    Has Male partner since 2014 who is currently disabled.     Social Determinants of Health     Financial Resource Strain: Unknown    Difficulty of Paying Living Expenses: Patient refused   Food Insecurity: Unknown    Worried About Running Out of Food in the Last Year: Patient refused    Ran Out of Food in the Last Year: Patient refused   Transportation Needs: Unknown    Lack of Transportation (Medical): Patient refused    Lack of Transportation (Non-Medical): Patient refused   Physical Activity: Unknown    Days of Exercise per Week: Patient refused    Minutes of Exercise per Session: Patient refused   Stress: No  Stress Concern Present    Feeling of Stress : Only a little   Social Connections: Unknown    Frequency of Communication with Friends and Family: Patient refused    Frequency of Social Gatherings with Friends and Family: Patient refused    Active Member of Clubs or Organizations: Patient refused    Attends Club or Organization Meetings: Patient refused    Marital Status: Patient refused   Housing Stability: Unknown    Unable to Pay for Housing in the Last Year: Patient refused    Number of Places Lived in the Last Year: 1    Unstable Housing in the Last Year: Patient refused       Current Facility-Administered Medications:     amitriptyline tablet 50 mg, 50 mg, Oral, QHS, Nya Castro NP, 50 mg at 11/01/22 2233    apixaban tablet 5 mg, 5 mg, Oral, BID, Nya Castro NP, 5 mg at 11/02/22 0852    FLUoxetine capsule 60 mg, 60 mg, Oral, Daily, Nya Castro NP, 60 mg at 11/02/22 0852    hydrOXYchloroQUINE tablet 200 mg, 200 mg, Oral, BID, Nya Castro NP, 200 mg at 11/02/22 0852    lactated ringers infusion, , Intravenous, Continuous, Nya Castro NP, Last Rate: 125 mL/hr at 11/02/22 0742, New Bag at 11/02/22 0742    lamoTRIgine tablet 100 mg, 100 mg, Oral, Daily, Nya Castro NP, 100 mg at 11/02/22 0852    LIDOcaine 5 % patch 1 patch, 1 patch, Transdermal, Q24H, Nya Castro NP, 1 patch at 11/01/22 1716    oxyCODONE-acetaminophen  mg per tablet 1 tablet, 1 tablet, Oral, Q12H PRN, Nya Castro NP, 1 tablet at 11/02/22 0535    pregabalin capsule 150 mg, 150 mg, Oral, TID, Nya Castro NP, 150 mg at 11/02/22 0855    sodium chloride 0.9% flush 10 mL, 10 mL, Intravenous, PRN, Nya Castro NP       Facility-Administered Medications Prior to Admission   Medication Dose Route Frequency Provider Last Rate Last Admin    triamcinolone acetonide injection 40 mg  40 mg Intradermal Once Ama Sylvester MD         Medications Prior to Admission    Medication Sig Dispense Refill Last Dose    amitriptyline (ELAVIL) 50 MG tablet Take 1 tablet (50 mg total) by mouth every evening. 90 tablet 1 10/31/2022 at a.m.    diclofenac sodium (VOLTAREN) 1 % Gel Apply the gel (4 g) to the affected area 4 times daily. Do not apply more than 16 g daily to any one affected joint 100 g 1 10/31/2022 at a.m.    ELIQUIS 5 mg Tab TAKE 1 TABLET BY MOUTH TWICE DAILY *START  AFTER  FINISHING  FIRST  PRESCRIPTION* 60 tablet 0 10/31/2022    FLUoxetine 20 MG capsule Take 3 capsules (60 mg total) by mouth once daily. 270 capsule 1 10/31/2022 at a.m.    hydrOXYchloroQUINE (PLAQUENIL) 200 mg tablet Take 1 tablet (200 mg total) by mouth 2 (two) times daily. 60 tablet 6 10/31/2022    hydrOXYzine (ATARAX) 50 MG tablet Take 1 tablet (50 mg total) by mouth daily as needed for Anxiety (sleep). 90 tablet 1 10/31/2022    lamoTRIgine (LAMICTAL) 100 MG tablet Take 1 tablet (100 mg total) by mouth once daily. 90 tablet 1 10/31/2022    oxyCODONE-acetaminophen (PERCOCET)  mg per tablet Take 1 tablet by mouth every 12 (twelve) hours as needed for Pain. 60 tablet 0 10/31/2022    pregabalin (LYRICA) 150 MG capsule Take 1 capsule (150 mg total) by mouth 3 (three) times daily. 90 capsule 5 10/31/2022    [DISCONTINUED] valsartan-hydrochlorothiazide (DIOVAN-HCT) 160-25 mg per tablet Take 1 tablet by mouth once daily. 90 tablet 3 10/31/2022 at a.m.    ipratropium (ATROVENT) 42 mcg (0.06 %) nasal spray SMARTSIG:Both Nares   More than a month    [DISCONTINUED] atorvastatin (LIPITOR) 10 MG tablet Take 1 tablet (10 mg total) by mouth once daily. (Patient not taking: Reported on 11/1/2022) 90 tablet 3 Not Taking    [DISCONTINUED] clonazePAM (KLONOPIN) 0.5 MG tablet 1/2 to 1 tablet daily as needed (Patient not taking: Reported on 11/1/2022) 14 tablet 0 Not Taking       Review of patient's allergies indicates:   Allergen Reactions    Morphine Itching and Hallucinations    Pcn [penicillins] Other  (See Comments)     Was told from childhood she couldn't take it    Sulfa (sulfonamide antibiotics) Nausea And Vomiting    Latex, natural rubber Rash       Past Medical History:   Diagnosis Date    AYDE (acute kidney injury) 11/1/2022    Alcohol abuse     stopped heavy drinking about 10 years ago; was drinking 3 glasses of vodka/tequilla,rum/whiskey per day    Allergy Don't remember    Its been some years.    Anxiety     Arthritis 2010    Blood clotting tendency 2008    After a procedure & 2020.    Congestive heart failure 8/11/2020    Depression     Hallucination     Hx of psychiatric care     Hypertension     Immune disorder 2020    Joint pain 2010    Keloid cicatrix Years ago    From childhood scars    Caro     unplanned trips, energy without sleep for 2 days (reading, cleaning), feelings that she can do multiple tasks at one time, feelings of overconfidence at times    Psychiatric problem     Sleep difficulties     Therapy     Withdrawal symptoms, drug or narcotic     racing heart, restlessness     Past Surgical History:   Procedure Laterality Date     lapban surgery  2009    ESOPHAGOGASTRODUODENOSCOPY N/A 6/3/2020    Procedure: EGD (ESOPHAGOGASTRODUODENOSCOPY);  Surgeon: Johan Grover MD;  Location: [facility] (4TH FLR);  Service: Endoscopy;  Laterality: N/A;  covid 6/2-[facility]    HYSTERECTOMY      PHLEBOGRAPHY Left 8/12/2020    Procedure: Venogram, pharmacomechanical thrombectomy;  Surgeon: Miguelangel Goldman MD;  Location: [facility] OR [address] [place] [address];  Service: Vascular;  Laterality: Left;  2336.43 mGy  [id number]  17.8 minutes  37 ml of contrast    VENOPLASTY Left 8/12/2020    Procedure: ANGIOPLASTY, VEIN;  Surgeon: Miguelangel Goldman MD;  Location: [facility] OR [address] [place] [address];  Service: Vascular;  Laterality: Left;     Family History       Problem Relation (Age of Onset)    Cancer Mother    Cirrhosis Maternal Uncle    Diabetes Mother    Hypertension Mother          Tobacco Use     Smoking status: Former     Packs/day: 0.00     Years: 0.00     Pack years: 0.00     Types: Cigarettes    Smokeless tobacco: Never    Tobacco comments:     quit in october 2016   Substance and Sexual Activity    Alcohol use: Yes     Alcohol/week: 1.0 - 3.0 standard drink     Types: 1 - 3 Glasses of wine per week     Comment: social presently, excessive 10 years ago    Drug use: Never     Types: Marijuana     Comment: uses THCA weekly    Sexual activity: Yes     Partners: Male     Birth control/protection: Condom, See Surgical Hx     Review of Systems   Constitutional:  Positive for activity change. Negative for chills, diaphoresis and fever.   HENT: Negative.     Respiratory:  Negative for chest tightness and shortness of breath.    Cardiovascular:  Positive for leg swelling. Negative for chest pain.   Gastrointestinal:  Negative for abdominal distention, abdominal pain, constipation, diarrhea and nausea.   Endocrine: Negative.    Genitourinary: Negative.    Musculoskeletal:  Positive for arthralgias and myalgias.   Skin:  Negative for wound.   Neurological:  Positive for weakness and numbness.        Left lower extremity   Psychiatric/Behavioral:  Negative for agitation and confusion.    Objective:     Vital Signs (Most Recent):  Temp: 97.8 °F (36.6 °C) (11/02/22 1107)  Pulse: 92 (11/02/22 1107)  Resp: 18 (11/02/22 1107)  BP: 115/62 (11/02/22 1107)  SpO2: 100 % (11/02/22 1107) Vital Signs (24h Range):  Temp:  [97.7 °F (36.5 °C)-98.6 °F (37 °C)] 97.8 °F (36.6 °C)  Pulse:  [] 92  Resp:  [16-20] 18  SpO2:  [96 %-100 %] 100 %  BP: ()/(53-83) 115/62     Weight: 127.8 kg (281 lb 12 oz)  Body mass index is 48.36 kg/m².    Physical Exam  Constitutional:       Appearance: Normal appearance. She is obese.   HENT:      Head: Normocephalic and atraumatic.      Nose: Nose normal.      Mouth/Throat:      Mouth: Mucous membranes are moist.      Pharynx: Oropharynx is clear.   Eyes:      Extraocular Movements:  Extraocular movements intact.      Conjunctiva/sclera: Conjunctivae normal.      Pupils: Pupils are equal, round, and reactive to light.   Cardiovascular:      Rate and Rhythm: Normal rate and regular rhythm.      Pulses: Normal pulses.      Heart sounds: Normal heart sounds.   Abdominal:      General: Bowel sounds are normal. There is no distension.      Palpations: Abdomen is soft.      Tenderness: There is no abdominal tenderness.   Musculoskeletal:         General: Swelling and tenderness present.      Cervical back: Normal range of motion and neck supple.      Left lower leg: Tenderness present. Edema present.   Skin:     General: Skin is dry.      Capillary Refill: Capillary refill takes less than 2 seconds.      Findings: Erythema present. No lesion.   Neurological:      General: No focal deficit present.      Mental Status: She is alert. Mental status is at baseline.      Motor: Weakness present.   Psychiatric:         Mood and Affect: Mood normal.         Thought Content: Thought content normal.         Judgment: Judgment normal.       Significant Labs:  CBC:   Recent Labs   Lab 11/02/22  0415   WBC 6.09   RBC 4.30   HGB 12.7   HCT 38.9      MCV 91   MCH 29.5   MCHC 32.6     CMP:   Recent Labs   Lab 11/02/22  0415   GLU 82   CALCIUM 8.9   ALBUMIN 3.6   PROT 7.1      K 4.2   CO2 22*      BUN 19   CREATININE 1.5*   ALKPHOS 70   ALT 25   AST 38   BILITOT 0.7       Significant Diagnostics:  I have reviewed all pertinent imaging results/findings within the past 24 hours.  US Lower Extremity Veins Left  Impression:  No evidence of deep venous thrombosis in the left lower extremity.    Assessment/Plan:     Recurrent deep vein thrombosis (DVT) with hx of IVC filter; indefinite anticoagulation  All imaging reviewed. No acute vascular surgery intervention at this time. US negative for DVT.  - Recommend IOANA with toe pressures.  - Recommend Rheumatology and Neurology consult.     ** Plan discussed  with Dr. Goldman    Thank you for your consult. I will follow-up with patient. Please contact us if you have any additional questions.    Sharmaine Garcia NP  Vascular Surgery

## 2022-11-02 NOTE — DISCHARGE SUMMARY
"[facility] Medicine  Discharge Summary      Patient Name: Alberto Frye  MRN: [id number]  CHET: [id number]  Patient Class: OP- Observation  Admission Date: 11/1/2022  Hospital Length of Stay: 0 days  Discharge Date and Time:  11/02/2022 5:38 PM  Attending Physician: Corky Foster MD   Discharging Provider: Nya Schumacher NP  Primary Care Provider: Ian Cespedes MD    Primary Care Team: NYA SCHUMACHER    HPI:   Alberto Frye is a 48 yo female with significant history for osteoarthiritis, sjogrens with "wide spread pain", chronic back pain, fibromyalgia, and morbid obese who presents to ED for acute left leg pain "sharp" that woke patient up at 1 am. Standing on feet worsen symptoms. Took home percocet without relief. Prior symptoms in the past but usually subsided with pain medication. Denies smoking or use of illicit drugs. Denies headaches, dizziness, change in vision, chest pain shortness of breath, palpitations, diaphoresis, orthopnea, PND, abdominal pain, nausea, vomiting, or extremities weakness/numbness. Denies recent sick contacts, travel, or injury.  7/3/ 2022 x ray lumbar spine no acute abnormalities.   Today US BLE no DVT  CPK 1100 and AYDE sCr 2.2 . Baseline sCr 1.1.   Received dilaudid in ED with improvement in symptoms. Place in observation.       * No surgery found *      Hospital Course:   Admitted to observation for LLE pain. US no DVT. SED/CRP normal. AYDE and CPK stable with IVF overnight. Reported improvement in pain as patient able to life left leg, bend left knee no issues. No change in bowel or bladder. PT evaluation completed. Patient felt not to provide full effort. Due to insurance, ambulatory refer to outpatient PT/OT and walker. Patient will follow up with Vascular Surgery for outpatient Art US with toes pressures. Encourage patient stop using marijuana/gummies. If no improvement in LLE pain, then refer to Orthopedic . Patient HDS for discharge home.       " "    Goals of Care Treatment Preferences:  Code Status: Full Code      Consults:     No new Assessment & Plan notes have been filed under this hospital service since the last note was generated.  Service: Hospital Medicine    Final Active Diagnoses:    Diagnosis Date Noted POA    PRINCIPAL PROBLEM:  Sjogren's syndrome with inflammatory arthritis [M35.05] 10/13/2021 Yes    Non-traumatic rhabdomyolysis [M62.82] 11/01/2022 Yes    Pain of left lower extremity [M79.605] 11/02/2022 Yes    AYDE (acute kidney injury) [N17.9] 11/01/2022 Yes    Recurrent deep vein thrombosis (DVT) with hx of IVC filter; indefinite anticoagulation [I82.409] 09/21/2020 Yes    Neuropathy [G62.9] 08/11/2020 Yes      Problems Resolved During this Admission:       Discharged Condition: good    Disposition: Home or Self Care    Follow Up:   Follow-up Information       [facility] Follow up.    Specialty: Outpatient Rehab  Why: you will be contacted with day and time to come to office to begin outpatient services for PT/OT  Contact information:  [address] [facility], [address] [address] [phone]  Additional information:  Please park in surface lot and use [facility] entrance. Check in at main registration.              [facility]    Why: DME- call for questions or concerns regarding equipment.  Contact information:  [address] [place] [address] [phone]                         Patient Instructions:      WALKER FOR HOME USE     Order Specific Question Answer Comments   Type of Walker: Adult (5'4"-6'6")    With wheels? Yes    Height: 5' 4" (1.626 m)    Weight: 127.8 kg (281 lb 12 oz)    Length of need (1-99 months): 99    Does patient have medical equipment at home? none    Please check all that apply: Patient is unable to safely ambulate without equipment.      COMMODE FOR HOME USE     Order Specific Question Answer Comments   Type: Standard    Height: 5' 4" " "(1.626 m)    Weight: 127.8 kg (281 lb 12 oz)    Does patient have medical equipment at home? none    Length of need (1-99 months): 99      WALKER FOR HOME USE     Order Specific Question Answer Comments   Type of Walker: Rollator with brakes and/or seat    With wheels? Yes    Height: 5' 4" (1.626 m)    Weight: 127.8 kg (281 lb 12 oz)    Length of need (1-99 months): 99    Does patient have medical equipment at home? none    Please check all that apply: Patient's condition impairs ambulation.      Ambulatory referral/consult to Physical/Occupational Therapy   Standing Status: Future   Referral Priority: Routine Referral Type: Physical Medicine   Referral Reason: Specialty Services Required   Number of Visits Requested: 1     Diet Cardiac     Notify your health care provider if you experience any of the following:  temperature >100.4     Notify your health care provider if you experience any of the following:  difficulty breathing or increased cough     Notify your health care provider if you experience any of the following:  increased confusion or weakness     Activity as tolerated       Significant Diagnostic Studies: Labs: All labs within the past 24 hours have been reviewed    Pending Diagnostic Studies:       None           Medications:  Reconciled Home Medications:      Medication List        CONTINUE taking these medications      amitriptyline 50 MG tablet  Commonly known as: ELAVIL  Take 1 tablet (50 mg total) by mouth every evening.     diclofenac sodium 1 % Gel  Commonly known as: VOLTAREN  Apply the gel (4 g) to the affected area 4 times daily. Do not apply more than 16 g daily to any one affected joint     ELIQUIS 5 mg Tab  Generic drug: apixaban  TAKE 1 TABLET BY MOUTH TWICE DAILY *START  AFTER  FINISHING  FIRST  PRESCRIPTION*     FLUoxetine 20 MG capsule  Take 3 capsules (60 mg total) by mouth once daily.     hydrOXYchloroQUINE 200 mg tablet  Commonly known as: PLAQUENIL  Take 1 tablet (200 mg total) by " mouth 2 (two) times daily.     hydrOXYzine 50 MG tablet  Commonly known as: ATARAX  Take 1 tablet (50 mg total) by mouth daily as needed for Anxiety (sleep).     ipratropium 42 mcg (0.06 %) nasal spray  Commonly known as: ATROVENT  SMARTSIG:Both Nares     lamoTRIgine 100 MG tablet  Commonly known as: LAMICTAL  Take 1 tablet (100 mg total) by mouth once daily.     oxyCODONE-acetaminophen  mg per tablet  Commonly known as: PERCOCET  Take 1 tablet by mouth every 12 (twelve) hours as needed for Pain.     pregabalin 150 MG capsule  Commonly known as: LYRICA  Take 1 capsule (150 mg total) by mouth 3 (three) times daily.            STOP taking these medications      atorvastatin 10 MG tablet  Commonly known as: LIPITOR     clonazePAM 0.5 MG tablet  Commonly known as: KlonoPIN     valsartan-hydrochlorothiazide 160-25 mg per tablet  Commonly known as: DIOVAN-HCT              Indwelling Lines/Drains at time of discharge:   Lines/Drains/Airways       None                   Time spent on the discharge of patient: 30 minutes         Nya Castro NP  Department of Hospital Medicine  [facility]

## 2022-11-02 NOTE — HOSPITAL COURSE
Admitted to observation for LLE pain. US no DVT. SED/CRP normal. AYDE and CPK stable with IVF overnight. Reported improvement in pain as patient able to life left leg, bend left knee no issues. No change in bowel or bladder. PT evaluation completed. Patient felt not to provide full effort. Due to insurance, ambulatory refer to outpatient PT/OT and walker. Patient will follow up with Vascular Surgery for outpatient Art US with toes pressures. Encourage patient stop using marijuana/gummies. If no improvement in LLE pain, then refer to Orthopedic . Patient HDS for discharge home.

## 2022-11-02 NOTE — HPI
"           HPI:  Alberto Frye is a 47 y.o. female with significant history for osteoarthiritis, sjogrens with "wide spread pain", chronic back pain, fibromyalgia, previous DVT, and morbid obese who presents to ED for acute left leg pain "sharp" that woke patient up at 1 am. Standing on feet worsen symptoms. Took home percocet without relief. Prior symptoms in the past but usually subsided with pain medication. Denies smoking or use of illicit drugs. Denies headaches, dizziness, change in vision, chest pain shortness of breath, palpitations, diaphoresis, orthopnea, PND, abdominal pain, nausea, vomiting, or extremities weakness/numbness. Denies recent sick contacts, travel, or injury. Patient contact vascular surgery office requesting to be evaluated for DVT while inpatient.     Patient Active Problem List   Diagnosis    Essential hypertension    Seasonal allergies    Anxiety    Abdominal pain    Neuropathy    Recurrent deep vein thrombosis (DVT) with hx of IVC filter; indefinite anticoagulation    Recurrent UTI    Screening for colon cancer 11/2019 colonoscopy hemorrhoids and diverticulosis; [facility]    Chronic anticoagulation    Chronic midline low back pain without sciatica    Pain in both hands    Lumbar spondylosis    DDD (degenerative disc disease), lumbar    Refractive error    Long-term use of Plaquenil    Sjogren's syndrome with inflammatory arthritis    Dyslipidemia    Bipolar affective disorder    Mood insomnia    Obsessive compulsive personality disorder    Palpitations    Chest pain, atypical    LEON (dyspnea on exertion)    Non-traumatic rhabdomyolysis    AYDE (acute kidney injury)     No MI  No Stroke  Tobacco use: Yes    Past Medical History:   Diagnosis Date    ADYE (acute kidney injury) 11/1/2022    Alcohol abuse     stopped heavy drinking about 10 years ago; was drinking 3 glasses of vodka/tequilla,rum/whiskey per day    Allergy Don't remember    Its been some years.    Anxiety     Arthritis 2010 "    Blood clotting tendency 2008    After a procedure & 2020.    Congestive heart failure 8/11/2020    Depression     Hallucination     Hx of psychiatric care     Hypertension     Immune disorder 2020    Joint pain 2010    Keloid cicatrix Years ago    From childhood scars    [place]     unplanned trips, energy without sleep for 2 days (reading, cleaning), feelings that she can do multiple tasks at one time, feelings of overconfidence at times    Psychiatric problem     Sleep difficulties     Therapy     Withdrawal symptoms, drug or narcotic     racing heart, restlessness     Past Surgical History:   Procedure Laterality Date     lapban surgery  2009    ESOPHAGOGASTRODUODENOSCOPY N/A 6/3/2020    Procedure: EGD (ESOPHAGOGASTRODUODENOSCOPY);  Surgeon: Johan Grover MD;  Location: [facility] ENDO (4TH FLR);  Service: Endoscopy;  Laterality: N/A;  [facility]    HYSTERECTOMY      PHLEBOGRAPHY Left 8/12/2020    Procedure: Venogram, pharmacomechanical thrombectomy;  Surgeon: Miguelangel Goldman MD;  Location: [facility] OR 2ND FLR;  Service: Vascular;  Laterality: Left;  2336.43 mGy  [id number]  17.8 minutes  37 ml of contrast    VENOPLASTY Left 8/12/2020    Procedure: ANGIOPLASTY, VEIN;  Surgeon: Miguelangel Goldman MD;  Location: [facility] OR 2ND FLR;  Service: Vascular;  Laterality: Left;     Family History   Problem Relation Age of Onset    Diabetes Mother     Cancer Mother     Hypertension Mother     Cirrhosis Maternal Uncle         ETOH    Celiac disease Neg Hx     Colon cancer Neg Hx     Colon polyps Neg Hx     Crohn's disease Neg Hx     Esophageal cancer Neg Hx     Inflammatory bowel disease Neg Hx     Liver cancer Neg Hx     Liver disease Neg Hx     Rectal cancer Neg Hx     Stomach cancer Neg Hx     Ulcerative colitis Neg Hx      Social History     Socioeconomic History    Marital status:     Number of children: 3   Occupational History    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[employer]    Tobacco Use    Smoking status: Former     Packs/day: 0.00     Years: 0.00     Pack years: 0.00     Types: Cigarettes    Smokeless tobacco: Never    Tobacco comments:     quit in october 2016   Substance and Sexual Activity    Alcohol use: Yes     Alcohol/week: 1.0 - 3.0 standard drink     Types: 1 - 3 Glasses of wine per week     Comment: social presently, excessive 10 years ago    Drug use: Never     Types: Marijuana     Comment: uses [employer] weekly    Sexual activity: Yes     Partners: Male     Birth control/protection: Condom, See Surgical Hx   Other Topics Concern    Patient feels they ought to cut down on drinking/drug use No    Patient annoyed by others criticizing their drinking/drug use No    Patient has felt bad or guilty about drinking/drug use No    Patient has had a drink/used drugs as an eye opener in the AM No    Are you pregnant or think you may be? No    Breast-feeding No   Social History Narrative    Has 3 healthy grown sons (1990, 1993, 1998) Lives alone.    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   in 2010.    Has Male partner since 2014 who is currently disabled.     Social Determinants of Health     Financial Resource Strain: Unknown    Difficulty of Paying Living Expenses: Patient refused   Food Insecurity: Unknown    Worried About Running Out of Food in the Last Year: Patient refused    Ran Out of Food in the Last Year: Patient refused   Transportation Needs: Unknown    Lack of Transportation (Medical): Patient refused    Lack of Transportation (Non-Medical): Patient refused   Physical Activity: Unknown    Days of Exercise per Week: Patient refused    Minutes of Exercise per Session: Patient refused   Stress: No Stress Concern Present    Feeling of Stress : Only a little   Social Connections: Unknown    Frequency of Communication with Friends and Family: Patient refused    Frequency of Social Gatherings with Friends  and Family: Patient refused    Active Member of Clubs or Organizations: Patient refused    Attends Club or Organization Meetings: Patient refused    Marital Status: Patient refused   Housing Stability: Unknown    Unable to Pay for Housing in the Last Year: Patient refused    Number of Places Lived in the Last Year: 1    Unstable Housing in the Last Year: Patient refused       Current Facility-Administered Medications:     amitriptyline tablet 50 mg, 50 mg, Oral, QHS, Nya Castro NP, 50 mg at 11/01/22 2233    apixaban tablet 5 mg, 5 mg, Oral, BID, Nya Castro NP, 5 mg at 11/02/22 0852    FLUoxetine capsule 60 mg, 60 mg, Oral, Daily, Nya Castro NP, 60 mg at 11/02/22 0852    hydrOXYchloroQUINE tablet 200 mg, 200 mg, Oral, BID, Nya Castro NP, 200 mg at 11/02/22 0852    lactated ringers infusion, , Intravenous, Continuous, Nya Castro NP, Last Rate: 125 mL/hr at 11/02/22 0742, New Bag at 11/02/22 0742    lamoTRIgine tablet 100 mg, 100 mg, Oral, Daily, Nya Castro NP, 100 mg at 11/02/22 0852    LIDOcaine 5 % patch 1 patch, 1 patch, Transdermal, Q24H, Nya Castro NP, 1 patch at 11/01/22 1716    oxyCODONE-acetaminophen  mg per tablet 1 tablet, 1 tablet, Oral, Q12H PRN, Nya Castro NP, 1 tablet at 11/02/22 0535    pregabalin capsule 150 mg, 150 mg, Oral, TID, Nya Castro NP, 150 mg at 11/02/22 0855    sodium chloride 0.9% flush 10 mL, 10 mL, Intravenous, PRN, Nya Castro NP

## 2022-11-03 LAB — BACTERIA UR CULT: NORMAL

## 2022-11-06 ENCOUNTER — PATIENT MESSAGE (OUTPATIENT)
Dept: RHEUMATOLOGY | Facility: CLINIC | Age: 47
End: 2022-11-06
Payer: COMMERCIAL

## 2022-11-07 ENCOUNTER — PATIENT MESSAGE (OUTPATIENT)
Dept: RHEUMATOLOGY | Facility: CLINIC | Age: 47
End: 2022-11-07
Payer: COMMERCIAL

## 2022-11-07 ENCOUNTER — PATIENT MESSAGE (OUTPATIENT)
Dept: FAMILY MEDICINE | Facility: CLINIC | Age: 47
End: 2022-11-07
Payer: COMMERCIAL

## 2022-11-08 ENCOUNTER — PATIENT MESSAGE (OUTPATIENT)
Dept: RHEUMATOLOGY | Facility: CLINIC | Age: 47
End: 2022-11-08
Payer: COMMERCIAL

## 2022-11-08 DIAGNOSIS — M62.82 NON-TRAUMATIC RHABDOMYOLYSIS: Primary | ICD-10-CM

## 2022-11-08 RX ORDER — TIZANIDINE 4 MG/1
4 TABLET ORAL EVERY 6 HOURS PRN
Qty: 60 TABLET | Refills: 6 | Status: SHIPPED | OUTPATIENT
Start: 2022-11-08 | End: 2022-12-02 | Stop reason: SDUPTHER

## 2022-11-08 NOTE — PROGRESS NOTES
Recent hospitalization for rhabdomyolysis with AYDE.  It care improved with hydration.  Subsequently discharged  Recommended to repeat BMP and CPK in about a week.  Stay hydrated.    Unclear precipitant.

## 2022-11-16 ENCOUNTER — LAB VISIT (OUTPATIENT)
Dept: LAB | Facility: HOSPITAL | Age: 47
End: 2022-11-16
Attending: INTERNAL MEDICINE
Payer: COMMERCIAL

## 2022-11-16 DIAGNOSIS — M62.82 NON-TRAUMATIC RHABDOMYOLYSIS: ICD-10-CM

## 2022-11-16 LAB
ANION GAP SERPL CALC-SCNC: 5 MMOL/L (ref 8–16)
BUN SERPL-MCNC: 10 MG/DL (ref 6–20)
CALCIUM SERPL-MCNC: 8.9 MG/DL (ref 8.7–10.5)
CHLORIDE SERPL-SCNC: 109 MMOL/L (ref 95–110)
CK SERPL-CCNC: 208 U/L (ref 20–180)
CO2 SERPL-SCNC: 27 MMOL/L (ref 23–29)
CREAT SERPL-MCNC: 1.1 MG/DL (ref 0.5–1.4)
EST. GFR  (NO RACE VARIABLE): >60 ML/MIN/1.73 M^2
GLUCOSE SERPL-MCNC: 92 MG/DL (ref 70–110)
POTASSIUM SERPL-SCNC: 4 MMOL/L (ref 3.5–5.1)
SODIUM SERPL-SCNC: 141 MMOL/L (ref 136–145)

## 2022-11-16 PROCEDURE — 82550 ASSAY OF CK (CPK): CPT | Performed by: INTERNAL MEDICINE

## 2022-11-16 PROCEDURE — 36415 COLL VENOUS BLD VENIPUNCTURE: CPT | Mod: PN | Performed by: INTERNAL MEDICINE

## 2022-11-16 PROCEDURE — 80048 BASIC METABOLIC PNL TOTAL CA: CPT | Performed by: INTERNAL MEDICINE

## 2022-11-17 ENCOUNTER — OFFICE VISIT (OUTPATIENT)
Dept: FAMILY MEDICINE | Facility: CLINIC | Age: 47
End: 2022-11-17
Payer: COMMERCIAL

## 2022-11-17 VITALS
TEMPERATURE: 98 F | HEART RATE: 101 BPM | HEIGHT: 64 IN | OXYGEN SATURATION: 96 % | SYSTOLIC BLOOD PRESSURE: 118 MMHG | BODY MASS INDEX: 48.1 KG/M2 | DIASTOLIC BLOOD PRESSURE: 82 MMHG | WEIGHT: 281.75 LBS

## 2022-11-17 DIAGNOSIS — I82.409 RECURRENT DEEP VEIN THROMBOSIS (DVT): ICD-10-CM

## 2022-11-17 DIAGNOSIS — Z79.01 CHRONIC ANTICOAGULATION: ICD-10-CM

## 2022-11-17 DIAGNOSIS — N17.9 AKI (ACUTE KIDNEY INJURY): Primary | ICD-10-CM

## 2022-11-17 DIAGNOSIS — R25.3 TWITCHING: ICD-10-CM

## 2022-11-17 DIAGNOSIS — I10 ESSENTIAL HYPERTENSION: ICD-10-CM

## 2022-11-17 DIAGNOSIS — M79.605 PAIN OF LEFT LOWER EXTREMITY: ICD-10-CM

## 2022-11-17 DIAGNOSIS — F31.32 BIPOLAR AFFECTIVE DISORDER, CURRENTLY DEPRESSED, MODERATE: ICD-10-CM

## 2022-11-17 DIAGNOSIS — M62.82 NON-TRAUMATIC RHABDOMYOLYSIS: ICD-10-CM

## 2022-11-17 DIAGNOSIS — M35.05 SJOGREN'S SYNDROME WITH INFLAMMATORY ARTHRITIS: ICD-10-CM

## 2022-11-17 PROCEDURE — 3079F DIAST BP 80-89 MM HG: CPT | Mod: CPTII,S$GLB,, | Performed by: INTERNAL MEDICINE

## 2022-11-17 PROCEDURE — 3074F SYST BP LT 130 MM HG: CPT | Mod: CPTII,S$GLB,, | Performed by: INTERNAL MEDICINE

## 2022-11-17 PROCEDURE — 3008F PR BODY MASS INDEX (BMI) DOCUMENTED: ICD-10-PCS | Mod: CPTII,S$GLB,, | Performed by: INTERNAL MEDICINE

## 2022-11-17 PROCEDURE — 99214 OFFICE O/P EST MOD 30 MIN: CPT | Mod: S$GLB,,, | Performed by: INTERNAL MEDICINE

## 2022-11-17 PROCEDURE — 3079F PR MOST RECENT DIASTOLIC BLOOD PRESSURE 80-89 MM HG: ICD-10-PCS | Mod: CPTII,S$GLB,, | Performed by: INTERNAL MEDICINE

## 2022-11-17 PROCEDURE — 1159F MED LIST DOCD IN RCRD: CPT | Mod: CPTII,S$GLB,, | Performed by: INTERNAL MEDICINE

## 2022-11-17 PROCEDURE — 99999 PR PBB SHADOW E&M-EST. PATIENT-LVL V: CPT | Mod: PBBFAC,,, | Performed by: INTERNAL MEDICINE

## 2022-11-17 PROCEDURE — 99999 PR PBB SHADOW E&M-EST. PATIENT-LVL V: ICD-10-PCS | Mod: PBBFAC,,, | Performed by: INTERNAL MEDICINE

## 2022-11-17 PROCEDURE — 1160F PR REVIEW ALL MEDS BY PRESCRIBER/CLIN PHARMACIST DOCUMENTED: ICD-10-PCS | Mod: CPTII,S$GLB,, | Performed by: INTERNAL MEDICINE

## 2022-11-17 PROCEDURE — 1159F PR MEDICATION LIST DOCUMENTED IN MEDICAL RECORD: ICD-10-PCS | Mod: CPTII,S$GLB,, | Performed by: INTERNAL MEDICINE

## 2022-11-17 PROCEDURE — 99214 PR OFFICE/OUTPT VISIT, EST, LEVL IV, 30-39 MIN: ICD-10-PCS | Mod: S$GLB,,, | Performed by: INTERNAL MEDICINE

## 2022-11-17 PROCEDURE — 3008F BODY MASS INDEX DOCD: CPT | Mod: CPTII,S$GLB,, | Performed by: INTERNAL MEDICINE

## 2022-11-17 PROCEDURE — 3074F PR MOST RECENT SYSTOLIC BLOOD PRESSURE < 130 MM HG: ICD-10-PCS | Mod: CPTII,S$GLB,, | Performed by: INTERNAL MEDICINE

## 2022-11-17 PROCEDURE — 1160F RVW MEDS BY RX/DR IN RCRD: CPT | Mod: CPTII,S$GLB,, | Performed by: INTERNAL MEDICINE
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
## 2022-12-08 ENCOUNTER — PATIENT MESSAGE (OUTPATIENT)
Dept: RHEUMATOLOGY | Facility: CLINIC | Age: 47
End: 2022-12-08
Payer: COMMERCIAL

## 2022-12-08 RX ORDER — METHYLPREDNISOLONE 4 MG/1
TABLET ORAL
Qty: 1 EACH | Refills: 0 | Status: SHIPPED | OUTPATIENT
Start: 2022-12-08 | End: 2023-01-24

## 2022-12-08 RX ORDER — NABUMETONE 750 MG/1
750 TABLET, FILM COATED ORAL 2 TIMES DAILY
Qty: 60 TABLET | Refills: 6 | Status: SHIPPED | OUTPATIENT
Start: 2022-12-08 | End: 2023-01-24

## 2022-12-09 ENCOUNTER — PATIENT MESSAGE (OUTPATIENT)
Dept: RHEUMATOLOGY | Facility: CLINIC | Age: 47
End: 2022-12-09
Payer: COMMERCIAL

## 2022-12-13 ENCOUNTER — PATIENT MESSAGE (OUTPATIENT)
Dept: RHEUMATOLOGY | Facility: CLINIC | Age: 47
End: 2022-12-13
Payer: COMMERCIAL

## 2022-12-15 ENCOUNTER — PATIENT MESSAGE (OUTPATIENT)
Dept: RHEUMATOLOGY | Facility: CLINIC | Age: 47
End: 2022-12-15
Payer: COMMERCIAL

## 2022-12-17 ENCOUNTER — TELEPHONE (OUTPATIENT)
Dept: ENDOSCOPY | Facility: HOSPITAL | Age: 47
End: 2022-12-17
Payer: COMMERCIAL

## 2022-12-18 ENCOUNTER — PATIENT MESSAGE (OUTPATIENT)
Dept: ENDOSCOPY | Facility: HOSPITAL | Age: 47
End: 2022-12-18
Payer: COMMERCIAL

## 2022-12-28 ENCOUNTER — TELEPHONE (OUTPATIENT)
Dept: FAMILY MEDICINE | Facility: CLINIC | Age: 47
End: 2022-12-28
Payer: COMMERCIAL

## 2022-12-28 NOTE — TELEPHONE ENCOUNTER
Spoken to patient and they left these message for the wrong clinic and doctor she's trying to reach. She stated that she will call back after lunch to speak to the right person. She has an appointment with Dr. Cespedes on 1/19.

## 2022-12-28 NOTE — TELEPHONE ENCOUNTER
----- Message from Maria D Pedro sent at 12/28/2022 12:26 PM CST -----  Type: Patient Call Back    Who called: Self     What is the request in detail: Asking for a call back from holly     Can the clinic reply by [facility]? No     Would the patient rather a call back or a response via [facility]? Call     Best call back number: [phone] (home)

## 2023-01-04 ENCOUNTER — OFFICE VISIT (OUTPATIENT)
Dept: RHEUMATOLOGY | Facility: CLINIC | Age: 48
End: 2023-01-04
Payer: COMMERCIAL

## 2023-01-04 DIAGNOSIS — M35.01 SJOGREN SYNDROME WITH KERATOCONJUNCTIVITIS: Primary | ICD-10-CM

## 2023-01-04 DIAGNOSIS — M79.7 FIBROMYALGIA: ICD-10-CM

## 2023-01-04 DIAGNOSIS — Z71.89 COUNSELING AND COORDINATION OF CARE: ICD-10-CM

## 2023-01-04 DIAGNOSIS — Z79.899 ENCOUNTER FOR LONG-TERM (CURRENT) USE OF OTHER MEDICATIONS: ICD-10-CM

## 2023-01-04 DIAGNOSIS — E66.01 MORBID OBESITY: ICD-10-CM

## 2023-01-04 PROCEDURE — 99214 OFFICE O/P EST MOD 30 MIN: CPT | Mod: 95,,, | Performed by: INTERNAL MEDICINE

## 2023-01-04 PROCEDURE — 99214 PR OFFICE/OUTPT VISIT, EST, LEVL IV, 30-39 MIN: ICD-10-PCS | Mod: 95,,, | Performed by: INTERNAL MEDICINE

## 2023-01-04 NOTE — PROGRESS NOTES
The patient location is: Work   The chief complaint leading to consultation is: Follow up  Visit type: Virtual visit with synchronous audio and video  Total time spent with patient: 15 minutes     Each patient to whom he or she provides medical services by telemedicine is:  (1) informed of the relationship between the physician and patient and the respective role of any other health care provider with respect to management of the patient; and (2) notified that he or she may decline to receive medical services by telemedicine and may withdraw from such care at any time.           RHEUMATOLOGY OUTPATIENT CLINIC NOTE    1/4/2023    Attending Rheumatologist: Ross Hughes  Primary Care Provider: Ian Cespedes MD   Physician Requesting Consultation: No referring provider defined for this encounter.  Chief Complaint/Reason For Consultation:  No chief complaint on file.        Subjective:       HPI  Alberto Frye is a 47 y.o. Black or  female with medical history noted below who comes for evaluation of arthralgias.   She reports knowing of abnormal GIOVANNY at least ten years and when symptoms worsened she saw Rheumatology. Seen by Rheumatology at [facility] in 2018, noted to have +GIOVANNY & SSA, and likely beginning of Primary Sjogrens and OA.   Patient reports that she has been dealing with chronic back pain for many years but over then last 3 months has acutely worsened. She also notes pain in her hands, elbows, shoulder, knees, hips which has been progressing over the last year. Pain is worsened by all movement, improves only when laying still. Minimal relief with meds. Though she reports if she stays still to long then she is stiff. Reports about 20 minutes of morning stiffness. Has noted swelling in her hands. Also c/o numbness and tingling in her feet. Terrible sleep. + Dry eyes and dry mouth, easy bruising due to being on AC, +Raynaud's.  Had DVT post surgery and another DVT in iliac artery in  August 2020.    No Alopecia, Oral/Nasal Ulcers, Rash, Photosensitivity, Pleuritis/serositis, LAD, Miscarriages, Skin tightening, SOB.     Today  Patient here for follow up.   Last visit meds for SjS, FM and OA continued. Since last visit patient was hospitalized in November due to Rhabdomyolysis +AYDE. She was given IVF and discharged. She does report two falls one week prior to hospitalization, wehre she was down for about 10 minutes, otherwise denies new medication, or infections. Does use THC gummies. Still notes muscle cramping. Tolerating meds.     Review of Systems   Constitutional:  Negative for appetite change, chills, fatigue, fever and unexpected weight change.   HENT:  Negative for nasal congestion, ear discharge, ear pain, hearing loss, mouth sores, nosebleeds, sneezing, sore throat, tinnitus and trouble swallowing.    Eyes:  Negative for photophobia, pain, discharge, redness, itching and visual disturbance.   Respiratory:  Negative for cough, chest tightness, shortness of breath and wheezing.    Cardiovascular:  Negative for chest pain, palpitations and leg swelling.   Gastrointestinal:  Negative for abdominal distention, abdominal pain, blood in stool, constipation, diarrhea, nausea and vomiting.   Endocrine: Negative for cold intolerance, heat intolerance, polydipsia, polyphagia and polyuria.   Genitourinary:  Negative for difficulty urinating, dyspareunia, dysuria, flank pain, frequency, genital sores, hematuria, menstrual problem, pelvic pain, urgency, vaginal bleeding, vaginal discharge, vaginal pain and vaginal dryness.   Musculoskeletal:  Positive for arthralgias, back pain and joint swelling. Negative for gait problem, leg pain, myalgias, neck pain, neck stiffness and joint deformity.   Integumentary:  Negative for pallor and rash.   Neurological:  Negative for dizziness, seizures, weakness, light-headedness, numbness and headaches.   Hematological:  Negative for adenopathy. Does not bruise/bleed  easily.   Psychiatric/Behavioral:  Negative for confusion, decreased concentration and sleep disturbance. The patient is not nervous/anxious.    All other systems reviewed and are negative.     Chronic comorbid conditions affecting medical decision making today:  Past Medical History:   Diagnosis Date    AYDE (acute kidney injury) 11/1/2022    Alcohol abuse     stopped heavy drinking about 10 years ago; was drinking 3 glasses of vodka/tequilla,rum/whiskey per day    Allergy Don't remember    Its been some years.    Anxiety     Arthritis 2010    Blood clotting tendency 2008    After a procedure & 2020.    Congestive heart failure 8/11/2020    Depression     Hallucination     Hx of psychiatric care     Hypertension     Immune disorder 2020    Joint pain 2010    Keloid cicatrix Years ago    From childhood scars    Caro     unplanned trips, energy without sleep for 2 days (reading, cleaning), feelings that she can do multiple tasks at one time, feelings of overconfidence at times    Psychiatric problem     Sleep difficulties     Therapy     Withdrawal symptoms, drug or narcotic     racing heart, restlessness     Past Surgical History:   Procedure Laterality Date     lapban surgery  2009    ESOPHAGOGASTRODUODENOSCOPY N/A 6/3/2020    Procedure: EGD (ESOPHAGOGASTRODUODENOSCOPY);  Surgeon: Johan Grover MD;  Location: [facility] (4TH FLR);  Service: Endoscopy;  Laterality: N/A;  [facility]    HYSTERECTOMY      PHLEBOGRAPHY Left 8/12/2020    Procedure: Venogram, pharmacomechanical thrombectomy;  Surgeon: Miguelangel Goldman MD;  Location: [facility] OR [address] [place] [address];  Service: Vascular;  Laterality: Left;  2336.43 mGy  [id number]  17.8 minutes  37 ml of contrast    VENOPLASTY Left 8/12/2020    Procedure: ANGIOPLASTY, VEIN;  Surgeon: Miguelangel Goldman MD;  Location: [facility] OR [address] [place] [address];  Service: Vascular;  Laterality: Left;     Family History   Problem Relation Age of Onset    Diabetes Mother     Cancer  Mother     Hypertension Mother     Cirrhosis Maternal Uncle         ETOH    Celiac disease Neg Hx     Colon cancer Neg Hx     Colon polyps Neg Hx     Crohn's disease Neg Hx     Esophageal cancer Neg Hx     Inflammatory bowel disease Neg Hx     Liver cancer Neg Hx     Liver disease Neg Hx     Rectal cancer Neg Hx     Stomach cancer Neg Hx     Ulcerative colitis Neg Hx      Social History     Substance and Sexual Activity   Alcohol Use Yes    Alcohol/week: 1.0 - 3.0 standard drink    Types: 1 - 3 Glasses of wine per week    Comment: social presently, excessive 10 years ago     Social History     Tobacco Use   Smoking Status Former    Packs/day: 0.00    Years: 0.00    Pack years: 0.00    Types: Cigarettes   Smokeless Tobacco Never   Tobacco Comments    quit in october 2016     Social History     Substance and Sexual Activity   Drug Use Never    Types: Marijuana    Comment: uses THCA weekly       Current Outpatient Medications:     amitriptyline (ELAVIL) 50 MG tablet, Take 1 tablet (50 mg total) by mouth every evening., Disp: 90 tablet, Rfl: 1    apixaban (ELIQUIS) 5 mg Tab, Take 1 tablet (5 mg total) by mouth 2 (two) times daily., Disp: 60 tablet, Rfl: 11    diclofenac sodium (VOLTAREN) 1 % Gel, Apply the gel (4 g) to the affected area 4 times daily. Do not apply more than 16 g daily to any one affected joint, Disp: 100 g, Rfl: 1    ELIQUIS 5 mg Tab, TAKE 1 TABLET BY MOUTH TWICE DAILY *START  AFTER  FINISHING  FIRST  PRESCRIPTION*, Disp: 60 tablet, Rfl: 0    FLUoxetine 20 MG capsule, Take 3 capsules (60 mg total) by mouth once daily., Disp: 270 capsule, Rfl: 1    hydrOXYchloroQUINE (PLAQUENIL) 200 mg tablet, Take 1 tablet (200 mg total) by mouth 2 (two) times daily., Disp: 60 tablet, Rfl: 6    hydrOXYzine (ATARAX) 50 MG tablet, Take 1 tablet (50 mg total) by mouth daily as needed for Anxiety (sleep)., Disp: 90 tablet, Rfl: 1    lamoTRIgine (LAMICTAL) 100 MG tablet, Take 1 tablet (100 mg total) by mouth once daily.,  Disp: 90 tablet, Rfl: 1    methylPREDNISolone (MEDROL DOSEPACK) 4 mg tablet, use as directed, Disp: 1 each, Rfl: 0    nabumetone (RELAFEN) 750 MG tablet, Take 1 tablet (750 mg total) by mouth 2 (two) times daily., Disp: 60 tablet, Rfl: 6    oxyCODONE-acetaminophen (PERCOCET)  mg per tablet, Take 1 tablet by mouth every 12 (twelve) hours as needed for Pain., Disp: 60 tablet, Rfl: 0    pregabalin (LYRICA) 150 MG capsule, Take 1 capsule (150 mg total) by mouth 3 (three) times daily., Disp: 90 capsule, Rfl: 5    Current Facility-Administered Medications:     triamcinolone acetonide injection 40 mg, 40 mg, Intradermal, Once, Ama Sylvester MD     Objective:         There were no vitals filed for this visit.    Physical Exam   Constitutional: She is oriented to person, place, and time.   HENT:   Head: Normocephalic and atraumatic.   Right Ear: External ear normal.   Left Ear: External ear normal.   Nose: Nose normal.   Mouth/Throat: Oropharynx is clear and moist.   Eyes: Pupils are equal, round, and reactive to light. Conjunctivae are normal.   Cardiovascular: Normal rate and regular rhythm.   Pulmonary/Chest: Effort normal and breath sounds normal.   Abdominal: Soft. Bowel sounds are normal.   Musculoskeletal:      Right shoulder: Normal.      Left shoulder: Normal.      Right elbow: Normal.      Left elbow: Normal.      Right wrist: Normal.      Left wrist: Normal.      Cervical back: Normal range of motion and neck supple.      Right knee: Normal.      Left knee: Normal.      Comments: Tender Points:  No   Yes  [ ]    [x ]   Low cervical anterior aspect    [ ]    [x ]   Costochondral Junction   [ ]    [x ]   Lateral Epicondyle  [ ]    [x ]   Suboccipital   [ ]    [x ]   Trapezius   [ ]    [x ]   Supraspinatus   [ ]    [x ]   Gluteal   [ ]    [x ]   Greater trochanter  [ ]    [x ]   Knee       Neurological: She is alert and oriented to person, place, and time.   Skin: No rash noted. No erythema.   Psychiatric:  Mood and affect normal.       Right Side Rheumatological Exam     Examination finds the shoulder, elbow, wrist, knee, 1st PIP, 1st MCP, 2nd PIP, 2nd MCP, 3rd PIP, 3rd MCP, 4th PIP, 4th MCP, 5th PIP and 5th MCP normal.    Left Side Rheumatological Exam     Examination finds the shoulder, elbow, wrist, knee, 1st PIP, 1st MCP, 2nd PIP, 2nd MCP, 3rd PIP, 3rd MCP, 4th PIP, 4th MCP, 5th PIP and 5th MCP normal.      Back/Neck Exam     Comments:  -SLT     1/4/23: Virtual Visit     Reviewed old and all outside pertinent medical records available.    All lab results personally reviewed and interpreted by me.  Lab Results   Component Value Date    WBC 6.09 11/02/2022    HGB 12.7 11/02/2022    HCT 38.9 11/02/2022    MCV 91 11/02/2022    MCH 29.5 11/02/2022    MCHC 32.6 11/02/2022    RDW 14.2 11/02/2022     11/02/2022    MPV 9.7 11/02/2022       Lab Results   Component Value Date     11/16/2022    K 4.0 11/16/2022     11/16/2022    CO2 27 11/16/2022    GLU 92 11/16/2022    BUN 10 11/16/2022    CALCIUM 8.9 11/16/2022    PROT 7.1 11/02/2022    ALBUMIN 3.6 11/02/2022    BILITOT 0.7 11/02/2022    AST 38 11/02/2022    ALKPHOS 70 11/02/2022    ALT 25 11/02/2022       Lab Results   Component Value Date    COLORU Colorless (A) 05/18/2022    APPEARANCEUA Clear 05/18/2022    SPECGRAV 1.005 05/18/2022    PHUR 7.0 05/18/2022    PROTEINUA Negative 05/18/2022    KETONESU Negative 05/18/2022    LEUKOCYTESUR Negative 05/18/2022    NITRITE Negative 05/18/2022    UROBILINOGEN Negative 05/18/2022       Lab Results   Component Value Date    CRP 20.9 (H) 11/02/2022       Lab Results   Component Value Date    SEDRATE 20 11/02/2022       Lab Results   Component Value Date    RF <10.0 06/14/2016    SEDRATE 20 11/02/2022       No components found for: 25OHVITDTOT, 03MRQOUG3, 42AOPPGM0, METHODNOTE    No results found for: URICACID    No components found for: TSPOTTB    Imaging:  All imaging reviewed and independently interpreted by  me.         ASSESSMENT / PLAN:     Alberto Frye is a 47 y.o. Black or  female with:      1. Sjogren's syndrome with keratoconjunctivitis sicca  - Patients has s/s consistent with SjS, she has sicca, wide spread pain, arthralgias   - patient was hospitalized in November for Rhabdomyolysis +AYDE (prior to hospitalization she had 2 falls with in days of each other), treated with IVF  - she still notes muscle cramping  - I will get work up today to R/O Inflammatory Myositis though likely traumatic   - continue HCQ 400mg daily   - trial of Cevillimine did not help   - reinforced artificial tears and oral hydration   - annual EYE and Dental Exam   - reassurance      2. Fibromyalgia   - stable   - continue Lyrica + baclofen   - wt loss  - sleep hygiene and stress management reinforced   - Reassurance and Exercise    3. Osteoarthritis  - in addition to SjS and FM she has OA  - stable  - wt loss  - Percocet PRN   - reassurance and exercise      4. Other specified counseling  - over 10 minutes spent regarding below topics:  - Immunization counseling done.  - Weight loss counseling done.  - Nutrition and exercise counseling.  - Limitation of alcohol consumption.  - Regular exercise:  Aerobic and resistance.  - Medication counseling provided.    5. Morbid Obesity  - would benefit from decreasing at least 10% of body weight.  - recommended goal of losing 1 lb per week.  - nutrition referral     6. DMARD Toxicity Monitoring  - annual EYE exam     Follow up in about 8 weeks (around 3/1/2023).    Method of contact with patient concerns: [facility]    Disclaimer:  This note is prepared using voice recognition software and as such is likely to have errors and has not been proof read. Please contact me for questions.     Time spent: 30 minutes in face to face discussion concerning diagnosis, prognosis, review of lab and test results, benefits of treatment as well as management of disease,  counseling of patient and coordination of care between various health care providers.  Greater than half the time spent was used for coordination of care and counseling of patient.    Ross Hughes M.D.  Rheumatology Department   [facility]

## 2023-01-05 ENCOUNTER — OFFICE VISIT (OUTPATIENT)
Dept: NEUROLOGY | Facility: CLINIC | Age: 48
End: 2023-01-05
Payer: COMMERCIAL

## 2023-01-05 ENCOUNTER — LAB VISIT (OUTPATIENT)
Dept: LAB | Facility: HOSPITAL | Age: 48
End: 2023-01-05
Attending: STUDENT IN AN ORGANIZED HEALTH CARE EDUCATION/TRAINING PROGRAM
Payer: COMMERCIAL

## 2023-01-05 VITALS
DIASTOLIC BLOOD PRESSURE: 68 MMHG | BODY MASS INDEX: 48.1 KG/M2 | SYSTOLIC BLOOD PRESSURE: 135 MMHG | HEART RATE: 94 BPM | HEIGHT: 64 IN | WEIGHT: 281.75 LBS

## 2023-01-05 DIAGNOSIS — R25.3 BENIGN FASCICULATIONS: ICD-10-CM

## 2023-01-05 DIAGNOSIS — M62.82 NON-TRAUMATIC RHABDOMYOLYSIS: ICD-10-CM

## 2023-01-05 DIAGNOSIS — G62.9 NEUROPATHY: ICD-10-CM

## 2023-01-05 DIAGNOSIS — M35.01 SJOGREN SYNDROME WITH KERATOCONJUNCTIVITIS: ICD-10-CM

## 2023-01-05 DIAGNOSIS — R25.3 MUSCLE TWITCHING: Primary | ICD-10-CM

## 2023-01-05 LAB
ALBUMIN SERPL BCP-MCNC: 3.8 G/DL (ref 3.5–5.2)
ALP SERPL-CCNC: 85 U/L (ref 55–135)
ALT SERPL W/O P-5'-P-CCNC: 20 U/L (ref 10–44)
ANION GAP SERPL CALC-SCNC: 7 MMOL/L (ref 8–16)
AST SERPL-CCNC: 26 U/L (ref 10–40)
BASOPHILS # BLD AUTO: 0.02 K/UL (ref 0–0.2)
BASOPHILS NFR BLD: 0.4 % (ref 0–1.9)
BILIRUB SERPL-MCNC: 0.5 MG/DL (ref 0.1–1)
BILIRUB UR QL STRIP: NEGATIVE
BUN SERPL-MCNC: 19 MG/DL (ref 6–20)
CALCIUM SERPL-MCNC: 8.7 MG/DL (ref 8.7–10.5)
CHLORIDE SERPL-SCNC: 103 MMOL/L (ref 95–110)
CK SERPL-CCNC: 218 U/L (ref 20–180)
CLARITY UR: CLEAR
CO2 SERPL-SCNC: 29 MMOL/L (ref 23–29)
COLOR UR: YELLOW
CREAT SERPL-MCNC: 1.4 MG/DL (ref 0.5–1.4)
CREAT UR-MCNC: 77.9 MG/DL (ref 15–325)
CRP SERPL-MCNC: 20.8 MG/L (ref 0–8.2)
DIFFERENTIAL METHOD: ABNORMAL
EOSINOPHIL # BLD AUTO: 0.2 K/UL (ref 0–0.5)
EOSINOPHIL NFR BLD: 3.7 % (ref 0–8)
ERYTHROCYTE [DISTWIDTH] IN BLOOD BY AUTOMATED COUNT: 14.7 % (ref 11.5–14.5)
ERYTHROCYTE [SEDIMENTATION RATE] IN BLOOD BY WESTERGREN METHOD: 12 MM/HR (ref 0–20)
EST. GFR  (NO RACE VARIABLE): 46 ML/MIN/1.73 M^2
GLUCOSE SERPL-MCNC: 107 MG/DL (ref 70–110)
GLUCOSE UR QL STRIP: NEGATIVE
HCT VFR BLD AUTO: 37.9 % (ref 37–48.5)
HGB BLD-MCNC: 12.2 G/DL (ref 12–16)
HGB UR QL STRIP: NEGATIVE
IMM GRANULOCYTES # BLD AUTO: 0.02 K/UL (ref 0–0.04)
IMM GRANULOCYTES NFR BLD AUTO: 0.4 % (ref 0–0.5)
KETONES UR QL STRIP: NEGATIVE
LDH SERPL L TO P-CCNC: 309 U/L (ref 110–260)
LEUKOCYTE ESTERASE UR QL STRIP: NEGATIVE
LYMPHOCYTES # BLD AUTO: 2.3 K/UL (ref 1–4.8)
LYMPHOCYTES NFR BLD: 48.3 % (ref 18–48)
MCH RBC QN AUTO: 29.1 PG (ref 27–31)
MCHC RBC AUTO-ENTMCNC: 32.2 G/DL (ref 32–36)
MCV RBC AUTO: 91 FL (ref 82–98)
MONOCYTES # BLD AUTO: 0.5 K/UL (ref 0.3–1)
MONOCYTES NFR BLD: 9.5 % (ref 4–15)
NEUTROPHILS # BLD AUTO: 1.8 K/UL (ref 1.8–7.7)
NEUTROPHILS NFR BLD: 37.7 % (ref 38–73)
NITRITE UR QL STRIP: NEGATIVE
NRBC BLD-RTO: 0 /100 WBC
PH UR STRIP: 7 [PH] (ref 5–8)
PLATELET # BLD AUTO: 177 K/UL (ref 150–450)
PMV BLD AUTO: 10.6 FL (ref 9.2–12.9)
POTASSIUM SERPL-SCNC: 3.8 MMOL/L (ref 3.5–5.1)
PROT SERPL-MCNC: 7.5 G/DL (ref 6–8.4)
PROT UR QL STRIP: ABNORMAL
PROT UR-MCNC: <7 MG/DL
PROT/CREAT UR: NORMAL MG/G{CREAT} (ref 0–0.2)
RBC # BLD AUTO: 4.19 M/UL (ref 4–5.4)
SODIUM SERPL-SCNC: 139 MMOL/L (ref 136–145)
SP GR UR STRIP: 1.01 (ref 1–1.03)
TSH SERPL DL<=0.005 MIU/L-ACNC: 3.83 UIU/ML (ref 0.4–4)
URATE SERPL-MCNC: 7.3 MG/DL (ref 2.4–5.7)
URN SPEC COLLECT METH UR: ABNORMAL
UROBILINOGEN UR STRIP-ACNC: NEGATIVE EU/DL
WBC # BLD AUTO: 4.84 K/UL (ref 3.9–12.7)

## 2023-01-05 PROCEDURE — 86160 COMPLEMENT ANTIGEN: CPT | Performed by: INTERNAL MEDICINE

## 2023-01-05 PROCEDURE — 87340 HEPATITIS B SURFACE AG IA: CPT | Performed by: INTERNAL MEDICINE

## 2023-01-05 PROCEDURE — 82787 IGG 1 2 3 OR 4 EACH: CPT | Mod: 59 | Performed by: INTERNAL MEDICINE

## 2023-01-05 PROCEDURE — 86160 COMPLEMENT ANTIGEN: CPT | Mod: 59 | Performed by: INTERNAL MEDICINE

## 2023-01-05 PROCEDURE — 83520 IMMUNOASSAY QUANT NOS NONAB: CPT | Performed by: INTERNAL MEDICINE

## 2023-01-05 PROCEDURE — 84443 ASSAY THYROID STIM HORMONE: CPT | Performed by: INTERNAL MEDICINE

## 2023-01-05 PROCEDURE — 86803 HEPATITIS C AB TEST: CPT | Performed by: INTERNAL MEDICINE

## 2023-01-05 PROCEDURE — 86334 IMMUNOFIX E-PHORESIS SERUM: CPT | Mod: 26,,, | Performed by: PATHOLOGY

## 2023-01-05 PROCEDURE — 86200 CCP ANTIBODY: CPT | Performed by: INTERNAL MEDICINE

## 2023-01-05 PROCEDURE — 85025 COMPLETE CBC W/AUTO DIFF WBC: CPT | Performed by: INTERNAL MEDICINE

## 2023-01-05 PROCEDURE — 85652 RBC SED RATE AUTOMATED: CPT | Performed by: INTERNAL MEDICINE

## 2023-01-05 PROCEDURE — 82746 ASSAY OF FOLIC ACID SERUM: CPT | Performed by: STUDENT IN AN ORGANIZED HEALTH CARE EDUCATION/TRAINING PROGRAM

## 2023-01-05 PROCEDURE — 82085 ASSAY OF ALDOLASE: CPT | Performed by: INTERNAL MEDICINE

## 2023-01-05 PROCEDURE — 86235 NUCLEAR ANTIGEN ANTIBODY: CPT | Performed by: INTERNAL MEDICINE

## 2023-01-05 PROCEDURE — 83516 IMMUNOASSAY NONANTIBODY: CPT | Mod: 59 | Performed by: INTERNAL MEDICINE

## 2023-01-05 PROCEDURE — 99999 PR PBB SHADOW E&M-EST. PATIENT-LVL III: ICD-10-PCS | Mod: PBBFAC,,, | Performed by: STUDENT IN AN ORGANIZED HEALTH CARE EDUCATION/TRAINING PROGRAM

## 2023-01-05 PROCEDURE — 99205 OFFICE O/P NEW HI 60 MIN: CPT | Mod: S$GLB,,, | Performed by: STUDENT IN AN ORGANIZED HEALTH CARE EDUCATION/TRAINING PROGRAM

## 2023-01-05 PROCEDURE — 3078F DIAST BP <80 MM HG: CPT | Mod: CPTII,S$GLB,, | Performed by: STUDENT IN AN ORGANIZED HEALTH CARE EDUCATION/TRAINING PROGRAM

## 2023-01-05 PROCEDURE — 83520 IMMUNOASSAY QUANT NOS NONAB: CPT | Mod: 59 | Performed by: INTERNAL MEDICINE

## 2023-01-05 PROCEDURE — 86235 NUCLEAR ANTIGEN ANTIBODY: CPT | Mod: 59 | Performed by: INTERNAL MEDICINE

## 2023-01-05 PROCEDURE — 86334 IMMUNOFIX E-PHORESIS SERUM: CPT | Performed by: INTERNAL MEDICINE

## 2023-01-05 PROCEDURE — 84165 PATHOLOGIST INTERPRETATION SPE: ICD-10-PCS | Mod: 26,,, | Performed by: PATHOLOGY

## 2023-01-05 PROCEDURE — 3008F PR BODY MASS INDEX (BMI) DOCUMENTED: ICD-10-PCS | Mod: CPTII,S$GLB,, | Performed by: STUDENT IN AN ORGANIZED HEALTH CARE EDUCATION/TRAINING PROGRAM

## 2023-01-05 PROCEDURE — 3008F BODY MASS INDEX DOCD: CPT | Mod: CPTII,S$GLB,, | Performed by: STUDENT IN AN ORGANIZED HEALTH CARE EDUCATION/TRAINING PROGRAM

## 2023-01-05 PROCEDURE — 86431 RHEUMATOID FACTOR QUANT: CPT | Performed by: INTERNAL MEDICINE

## 2023-01-05 PROCEDURE — 82607 VITAMIN B-12: CPT | Performed by: STUDENT IN AN ORGANIZED HEALTH CARE EDUCATION/TRAINING PROGRAM

## 2023-01-05 PROCEDURE — 82550 ASSAY OF CK (CPK): CPT | Performed by: INTERNAL MEDICINE

## 2023-01-05 PROCEDURE — 86800 THYROGLOBULIN ANTIBODY: CPT | Performed by: INTERNAL MEDICINE

## 2023-01-05 PROCEDURE — 3078F PR MOST RECENT DIASTOLIC BLOOD PRESSURE < 80 MM HG: ICD-10-PCS | Mod: CPTII,S$GLB,, | Performed by: STUDENT IN AN ORGANIZED HEALTH CARE EDUCATION/TRAINING PROGRAM

## 2023-01-05 PROCEDURE — 86039 ANTINUCLEAR ANTIBODIES (ANA): CPT | Performed by: INTERNAL MEDICINE

## 2023-01-05 PROCEDURE — 84165 PROTEIN E-PHORESIS SERUM: CPT | Mod: 26,,, | Performed by: PATHOLOGY

## 2023-01-05 PROCEDURE — 83615 LACTATE (LD) (LDH) ENZYME: CPT | Performed by: INTERNAL MEDICINE

## 2023-01-05 PROCEDURE — 86376 MICROSOMAL ANTIBODY EACH: CPT | Performed by: INTERNAL MEDICINE

## 2023-01-05 PROCEDURE — 99999 PR PBB SHADOW E&M-EST. PATIENT-LVL III: CPT | Mod: PBBFAC,,, | Performed by: STUDENT IN AN ORGANIZED HEALTH CARE EDUCATION/TRAINING PROGRAM

## 2023-01-05 PROCEDURE — 84207 ASSAY OF VITAMIN B-6: CPT | Performed by: STUDENT IN AN ORGANIZED HEALTH CARE EDUCATION/TRAINING PROGRAM

## 2023-01-05 PROCEDURE — 82570 ASSAY OF URINE CREATININE: CPT | Performed by: INTERNAL MEDICINE

## 2023-01-05 PROCEDURE — 3075F PR MOST RECENT SYSTOLIC BLOOD PRESS GE 130-139MM HG: ICD-10-PCS | Mod: CPTII,S$GLB,, | Performed by: STUDENT IN AN ORGANIZED HEALTH CARE EDUCATION/TRAINING PROGRAM

## 2023-01-05 PROCEDURE — 83516 IMMUNOASSAY NONANTIBODY: CPT | Performed by: INTERNAL MEDICINE

## 2023-01-05 PROCEDURE — 86334 PATHOLOGIST INTERPRETATION IFE: ICD-10-PCS | Mod: 26,,, | Performed by: PATHOLOGY

## 2023-01-05 PROCEDURE — 87389 HIV-1 AG W/HIV-1&-2 AB AG IA: CPT | Performed by: INTERNAL MEDICINE

## 2023-01-05 PROCEDURE — 86706 HEP B SURFACE ANTIBODY: CPT | Performed by: INTERNAL MEDICINE

## 2023-01-05 PROCEDURE — 86140 C-REACTIVE PROTEIN: CPT | Performed by: INTERNAL MEDICINE

## 2023-01-05 PROCEDURE — 82306 VITAMIN D 25 HYDROXY: CPT | Performed by: INTERNAL MEDICINE

## 2023-01-05 PROCEDURE — 84165 PROTEIN E-PHORESIS SERUM: CPT | Performed by: INTERNAL MEDICINE

## 2023-01-05 PROCEDURE — 81003 URINALYSIS AUTO W/O SCOPE: CPT | Performed by: INTERNAL MEDICINE

## 2023-01-05 PROCEDURE — 80053 COMPREHEN METABOLIC PANEL: CPT | Performed by: INTERNAL MEDICINE

## 2023-01-05 PROCEDURE — 82784 ASSAY IGA/IGD/IGG/IGM EACH: CPT | Mod: 59 | Performed by: INTERNAL MEDICINE

## 2023-01-05 PROCEDURE — 84425 ASSAY OF VITAMIN B-1: CPT | Performed by: STUDENT IN AN ORGANIZED HEALTH CARE EDUCATION/TRAINING PROGRAM

## 2023-01-05 PROCEDURE — 99205 PR OFFICE/OUTPT VISIT, NEW, LEVL V, 60-74 MIN: ICD-10-PCS | Mod: S$GLB,,, | Performed by: STUDENT IN AN ORGANIZED HEALTH CARE EDUCATION/TRAINING PROGRAM

## 2023-01-05 PROCEDURE — 84550 ASSAY OF BLOOD/URIC ACID: CPT | Performed by: INTERNAL MEDICINE

## 2023-01-05 PROCEDURE — 3075F SYST BP GE 130 - 139MM HG: CPT | Mod: CPTII,S$GLB,, | Performed by: STUDENT IN AN ORGANIZED HEALTH CARE EDUCATION/TRAINING PROGRAM

## 2023-01-05 PROCEDURE — 86225 DNA ANTIBODY NATIVE: CPT | Performed by: INTERNAL MEDICINE

## 2023-01-05 PROCEDURE — 82164 ANGIOTENSIN I ENZYME TEST: CPT | Performed by: INTERNAL MEDICINE

## 2023-01-05 PROCEDURE — 86038 ANTINUCLEAR ANTIBODIES: CPT | Performed by: INTERNAL MEDICINE

## 2023-01-05 NOTE — PROGRESS NOTES
Chief Complaint and Duration     Nonspecific muscle twitching in arms and legs, chronic problem, elevated CK of unclear etiology    History of Present Illness     Alberto Fyre is a 47 y.o.  female with a history of multiple medical diagnoses including HTN and hx of blood clots in 1999 - on eliquis that presents for muscle twitching that can occur in hands and feet, nonspecific w no pattern, not associated with chronic neck or back pain. Can occur multiple times throughout the day, can last for a few seconds. Denies any focal weakness. No new changes in vision, no difficulty w speech or swallowing.    Patient does endorse chronic back pain that is unrelated to the muscle twitching she noticed intermittently.     Is also being evaluated by rheumatology for chronic pain and sjrogren's syndrome w inflammatory arthritis / fibromylagia / ostearthritis of the knees.     Recent admission to ER w CK to 1000, patient had L pain. Responded to fluids.     Review of Systems: (positive bolded)  Constitutional: Negative for fatigue, activity change, fevers, or chills.   HENT:  Negative for new visual disturbances or difficulty swallowing.    Respiratory:  Negative for shortness of breath.    Cardiovascular:  Negative for palpitations.   Gastrointestinal:  Negative for constipation, nausea, or vomiting.   Endocrine: Negative for temperature instability/intolerance.   Genitourinary:  Negative for difficulty urinating.   Musculoskeletal:  Negative for neck pain, back pain, myalgias, joint swelling, or gait problem.  Skin:  Negative for rash or lesions.   Neurological:  Negative for seizures, headaches, dizziness, tremors, syncope, speech difficulty, weakness, light-headedness, or numbness. Muscle twitching  Hematological:  Does not bruise/bleed easily.   Psychiatric/Behavioral: Negative for decreased concentration or sleep disturbance.    Review of patient's allergies indicates:   Allergen Reactions    Morphine Itching and  Hallucinations    Pcn [penicillins] Other (See Comments)     Was told from childhood she couldn't take it    Sulfa (sulfonamide antibiotics) Nausea And Vomiting    Latex, natural rubber Rash     Current Outpatient Medications   Medication Sig Dispense Refill    amitriptyline (ELAVIL) 50 MG tablet Take 1 tablet (50 mg total) by mouth every evening. 90 tablet 1    apixaban (ELIQUIS) 5 mg Tab Take 1 tablet (5 mg total) by mouth 2 (two) times daily. 60 tablet 11    diclofenac sodium (VOLTAREN) 1 % Gel Apply the gel (4 g) to the affected area 4 times daily. Do not apply more than 16 g daily to any one affected joint 100 g 1    ELIQUIS 5 mg Tab TAKE 1 TABLET BY MOUTH TWICE DAILY *START  AFTER  FINISHING  FIRST  PRESCRIPTION* 60 tablet 0    FLUoxetine 20 MG capsule Take 3 capsules (60 mg total) by mouth once daily. 270 capsule 1    hydrOXYchloroQUINE (PLAQUENIL) 200 mg tablet Take 1 tablet (200 mg total) by mouth 2 (two) times daily. 60 tablet 6    hydrOXYzine (ATARAX) 50 MG tablet Take 1 tablet (50 mg total) by mouth daily as needed for Anxiety (sleep). 90 tablet 1    lamoTRIgine (LAMICTAL) 100 MG tablet Take 1 tablet (100 mg total) by mouth once daily. 90 tablet 1    methylPREDNISolone (MEDROL DOSEPACK) 4 mg tablet use as directed 1 each 0    nabumetone (RELAFEN) 750 MG tablet Take 1 tablet (750 mg total) by mouth 2 (two) times daily. 60 tablet 6    oxyCODONE-acetaminophen (PERCOCET)  mg per tablet Take 1 tablet by mouth every 12 (twelve) hours as needed for Pain. 60 tablet 0    pregabalin (LYRICA) 150 MG capsule Take 1 capsule (150 mg total) by mouth 3 (three) times daily. 90 capsule 5     Current Facility-Administered Medications   Medication Dose Route Frequency Provider Last Rate Last Admin    triamcinolone acetonide injection 40 mg  40 mg Intradermal Once Ama Sylvester MD           Medical History     Past Medical History:   Diagnosis Date    AYDE (acute kidney injury) 11/1/2022    Alcohol abuse     stopped  heavy drinking about 10 years ago; was drinking 3 glasses of vodka/tequilla,rum/whiskey per day    Allergy Don't remember    Its been some years.    Anxiety     Arthritis 2010    Blood clotting tendency 2008    After a procedure & 2020.    Congestive heart failure 8/11/2020    Depression     Hallucination     Hx of psychiatric care     Hypertension     Immune disorder 2020    Joint pain 2010    Keloid cicatrix Years ago    From childhood scars    [place]     unplanned trips, energy without sleep for 2 days (reading, cleaning), feelings that she can do multiple tasks at one time, feelings of overconfidence at times    Psychiatric problem     Sleep difficulties     Therapy     Withdrawal symptoms, drug or narcotic     racing heart, restlessness     Past Surgical History:   Procedure Laterality Date     lapban surgery  2009    ESOPHAGOGASTRODUODENOSCOPY N/A 6/3/2020    Procedure: EGD (ESOPHAGOGASTRODUODENOSCOPY);  Surgeon: Johan Grover MD;  Location: [facility] (4TH FLR);  Service: Endoscopy;  Laterality: N/A;  covid 6/2-[facility]-tb    HYSTERECTOMY      PHLEBOGRAPHY Left 8/12/2020    Procedure: Venogram, pharmacomechanical thrombectomy;  Surgeon: Miguelangel Goldman MD;  Location: [facility] OR 2ND FLR;  Service: Vascular;  Laterality: Left;  2336.43 mGy  [id number]  17.8 minutes  37 ml of contrast    VENOPLASTY Left 8/12/2020    Procedure: ANGIOPLASTY, VEIN;  Surgeon: Miguelangel Goldman MD;  [facility];  Service: Vascular;  Laterality: Left;     Family History   Problem Relation Age of Onset    Diabetes Mother     Cancer Mother     Hypertension Mother     Cirrhosis Maternal Uncle         ETOH    Celiac disease Neg Hx     Colon cancer Neg Hx     Colon polyps Neg Hx     Crohn's disease Neg Hx     Esophageal cancer Neg Hx     Inflammatory bowel disease Neg Hx     Liver cancer Neg Hx     Liver disease Neg Hx     Rectal cancer Neg Hx     Stomach cancer Neg Hx     Ulcerative colitis Neg Hx       Social History     Socioeconomic History    Marital status:     Number of children: 3   Occupational History    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[employer]   Tobacco Use    Smoking status: Former     Packs/day: 0.00     Years: 0.00     Pack years: 0.00     Types: Cigarettes    Smokeless tobacco: Never    Tobacco comments:     quit in october 2016   Substance and Sexual Activity    Alcohol use: Yes     Alcohol/week: 1.0 - 3.0 standard drink     Types: 1 - 3 Glasses of wine per week     Comment: social presently, excessive 10 years ago    Drug use: Never     Types: Marijuana     Comment: uses THCA weekly    Sexual activity: Yes     Partners: Male     Birth control/protection: Condom, See Surgical Hx   Other Topics Concern    Patient feels they ought to cut down on drinking/drug use No    Patient annoyed by others criticizing their drinking/drug use No    Patient has felt bad or guilty about drinking/drug use No    Patient has had a drink/used drugs as an eye opener in the AM No    Are you pregnant or think you may be? No    Breast-feeding No   Social History Narrative    Has 3 healthy grown sons (1990, 1993, 1998) Lives alone.    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   in 2010.    Has Male partner since 2014 who is currently disabled.     Social Determinants of Health     Financial Resource Strain: Unknown    Difficulty of Paying Living Expenses: Patient refused   Food Insecurity: Food Insecurity Present    Worried About Running Out of Food in the Last Year: Patient refused    Ran Out of Food in the Last Year: Sometimes true   Transportation Needs: No Transportation Needs    Lack of Transportation (Medical): No    Lack of Transportation (Non-Medical): No   Physical Activity: Unknown    Days of Exercise per Week: Patient refused    Minutes of Exercise per Session: 10 min   Stress: Stress Concern Present    Feeling of  Stress : Very much   Social Connections: Unknown    Frequency of Communication with Friends and Family: Patient refused    Frequency of Social Gatherings with Friends and Family: Patient refused    Active Member of Clubs or Organizations: No    Attends Club or Organization Meetings: Patient refused    Marital Status:    Housing Stability: Unknown    Unable to Pay for Housing in the Last Year: Patient refused    Number of Places Lived in the Last Year: 1    Unstable Housing in the Last Year: No       Exam     Vitals:    01/05/23 1315   BP: 135/68   Pulse: 94      Physical Exam:  General: She is not in acute distress. She is not ill-appearing.   HENT: Normocephalic and atraumatic. Moist mucous membranes.  Eyes: Conjunctivae normal.   Pulmonary: Pulmonary effort is normal.   Abdominal: Abdomen is soft and flat.   Skin: Skin is warm and dry. No rashes.   Psychiatric: Mood normal.        Neurologic Exam   Mental status: oriented to person, place, and time  Attention: Normal. Concentration: normal.  Speech: speech is normal.  Cranial Nerves: PERRL, EOMI intact, V1-V3 Facial sensation intact. Symmetric facies. Hearing grossly intact. Palate and uvula midline, symmetric. No tongue deviation. Trapezius strength intact.     Motor exam: bulk and tone normal. Strength 5/5 in bilateral upper extremities: deltoids, biceps, triceps, wrist flexion/extension, finger abduction/adduction. Strength 5/5 in bilateral lower extremities: hip flexion/extension, thigh adduction/abduction, knee flexion/extension, dorsiflexion/plantarflexion, foot eversion/inversion.    Reflexes: 3+ in bilateral upper extremities: biceps and brachiaradialis, 3+ in bilateral lower extremities: patellar and achilles  Plantar reflex: normal  Negative bhakta's     Sensory exam: light touch intact    Gait exam: normal  Romberg: negative  Coordination: normal     Tremor: none    No muscle fasciculations seen    Labs and Imaging     Labs: reviewed  CK in  November 2022 - 1144, CK in 200s recently    Imaging: reviewed  10/2022 lumbar xray - degenerative changes most pronounced at L4-L5      Assessment and Plan     Problem List Items Addressed This Visit          Neuro    Muscle twitching - Primary    Relevant Orders    Vitamin B1 (Completed)    Vitamin B12 (Completed)    Vitamin B6 (Completed)    Folate (Completed)    Benign fasciculations       Orthopedic    Non-traumatic rhabdomyolysis     This is a 47 year old female w history significant for sjrogren's syndrome, fibromylagia, osteoarthritis of the knees. Endorses occasional muscle twitching in arms and legs not associated w neck or back pain. Hx of elevated CK up to 1000 in November 2022 w L pain (is on eliquis for hx of blood clots). This nonspecific muscle twitching not seen today. No muscle weakness appreciated on exam for concern for underlying myopathy. Could be related to rheumatologic disorder.   Repeat lab CK today. Encourage hydration, followup w rheum  Twitching patient noted can be from benign fasiculations. If worsen w new sensory/motor weakness, plan to bring patient in for NCS/EMG to assess    Follow-up: Follow up in about 6 months (around 7/5/2023).      Time spent on this encounter: 60 minutes. This includes face to face time and non-face to face time preparing to see the patient (eg, review of tests), obtaining and/or reviewing separately obtained history, documenting clinical information in the electronic or other health record, independently interpreting results and communicating results to the patient/family/caregiver, or care coordinator.

## 2023-01-06 ENCOUNTER — PATIENT MESSAGE (OUTPATIENT)
Dept: RHEUMATOLOGY | Facility: CLINIC | Age: 48
End: 2023-01-06
Payer: COMMERCIAL

## 2023-01-06 ENCOUNTER — LAB VISIT (OUTPATIENT)
Dept: LAB | Facility: HOSPITAL | Age: 48
End: 2023-01-06
Attending: INTERNAL MEDICINE
Payer: COMMERCIAL

## 2023-01-06 DIAGNOSIS — E03.8 SUBCLINICAL HYPOTHYROIDISM: ICD-10-CM

## 2023-01-06 DIAGNOSIS — M35.01 SJOGREN SYNDROME WITH KERATOCONJUNCTIVITIS: ICD-10-CM

## 2023-01-06 PROBLEM — R25.3 MUSCLE TWITCHING: Status: ACTIVE | Noted: 2023-01-06

## 2023-01-06 LAB
25(OH)D3+25(OH)D2 SERPL-MCNC: 15 NG/ML (ref 30–96)
C3 SERPL-MCNC: 128 MG/DL (ref 50–180)
C4 SERPL-MCNC: 38 MG/DL (ref 11–44)
CCP AB SER IA-ACNC: <0.5 U/ML
FOLATE SERPL-MCNC: 12 NG/ML (ref 4–24)
HBV SURFACE AB SER-ACNC: 363.51 MIU/ML
HBV SURFACE AB SER-ACNC: REACTIVE M[IU]/ML
HBV SURFACE AG SERPL QL IA: NORMAL
HCV AB SERPL QL IA: NORMAL
HIV 1+2 AB+HIV1 P24 AG SERPL QL IA: NORMAL
IGA SERPL-MCNC: 181 MG/DL (ref 40–350)
IGG SERPL-MCNC: 1692 MG/DL (ref 650–1600)
IGM SERPL-MCNC: 106 MG/DL (ref 50–300)
RHEUMATOID FACT SERPL-ACNC: <13 IU/ML (ref 0–15)
T4 FREE SERPL-MCNC: 1 NG/DL (ref 0.71–1.51)
THYROGLOB AB SERPL IA-ACNC: <4 IU/ML (ref 0–3.9)
THYROPEROXIDASE IGG SERPL-ACNC: <6 IU/ML
TSH SERPL DL<=0.005 MIU/L-ACNC: 4.28 UIU/ML (ref 0.4–4)
VIT B12 SERPL-MCNC: 852 PG/ML (ref 210–950)

## 2023-01-06 PROCEDURE — 36415 COLL VENOUS BLD VENIPUNCTURE: CPT | Performed by: INTERNAL MEDICINE

## 2023-01-06 PROCEDURE — 84443 ASSAY THYROID STIM HORMONE: CPT | Performed by: INTERNAL MEDICINE

## 2023-01-06 PROCEDURE — 84439 ASSAY OF FREE THYROXINE: CPT | Performed by: INTERNAL MEDICINE

## 2023-01-06 PROCEDURE — 86480 TB TEST CELL IMMUN MEASURE: CPT | Performed by: INTERNAL MEDICINE

## 2023-01-06 NOTE — ASSESSMENT & PLAN NOTE
- not appreciated on exam today, patient w nonfocal exam and no weakness on exam  - continue to monitor, labs reviewed. Hx of elevated CK in the past, will repeat today.

## 2023-01-07 LAB
ACE SERPL-CCNC: 87 U/L (ref 16–85)
ALDOLASE SERPL-CCNC: 5.3 U/L (ref 1.2–7.6)
ENA SCL70 IGG SER IA-ACNC: <0.2 U

## 2023-01-08 LAB — HISTONE IGG SER IA-ACNC: 0.4 UNITS (ref 0–0.9)

## 2023-01-09 LAB
ALBUMIN SERPL ELPH-MCNC: 4.36 G/DL (ref 3.35–5.55)
ALPHA1 GLOB SERPL ELPH-MCNC: 0.32 G/DL (ref 0.17–0.41)
ALPHA2 GLOB SERPL ELPH-MCNC: 0.59 G/DL (ref 0.43–0.99)
ANA PATTERN 1: NORMAL
ANA SER QL IF: POSITIVE
ANA TITR SER IF: NORMAL {TITER}
ANTI SM/RNP ANTIBODY: 0.25 RATIO (ref 0–0.99)
ANTI-SM/RNP INTERPRETATION: NEGATIVE
ANTI-SSA ANTIBODY: 1.28 RATIO (ref 0–0.99)
ANTI-SSA INTERPRETATION: POSITIVE
ANTI-SSB ANTIBODY: 0.14 RATIO (ref 0–0.99)
ANTI-SSB INTERPRETATION: NEGATIVE
B-GLOBULIN SERPL ELPH-MCNC: 0.78 G/DL (ref 0.5–1.1)
GAMMA GLOB SERPL ELPH-MCNC: 1.55 G/DL (ref 0.67–1.58)
GAMMA INTERFERON BACKGROUND BLD IA-ACNC: 0.13 IU/ML
INTERPRETATION SERPL IFE-IMP: NORMAL
M TB IFN-G CD4+ BCKGRND COR BLD-ACNC: 0.02 IU/ML
MITOGEN IGNF BCKGRD COR BLD-ACNC: 1.23 IU/ML
PROT SERPL-MCNC: 7.6 G/DL (ref 6–8.4)
SOL IL2 RECEP SERPL-MCNC: 620.6 PG/ML (ref 175.3–858.2)
TB GOLD PLUS: NEGATIVE
TB2 - NIL: -0.01 IU/ML

## 2023-01-10 ENCOUNTER — PATIENT MESSAGE (OUTPATIENT)
Dept: RHEUMATOLOGY | Facility: CLINIC | Age: 48
End: 2023-01-10
Payer: COMMERCIAL

## 2023-01-10 LAB
ENA SCL70 AB SER-ACNC: 1 UNITS
IGG1 SER-MCNC: 958 MG/DL (ref 382–929)
IGG2 SER-MCNC: 627 MG/DL (ref 242–700)
IGG3 SER-MCNC: 172 MG/DL (ref 22–176)
IGG4 SER-MCNC: 55 MG/DL (ref 4–86)
PATHOLOGIST INTERPRETATION IFE: NORMAL
PATHOLOGIST INTERPRETATION SPE: NORMAL
PYRIDOXAL SERPL-MCNC: 13 UG/L (ref 5–50)
VIT B1 BLD-MCNC: 88 UG/L (ref 38–122)

## 2023-01-12 PROBLEM — R25.3 BENIGN FASCICULATIONS: Status: ACTIVE | Noted: 2023-01-12

## 2023-01-13 LAB
ANTI SM ANTIBODY: 0.2 RATIO (ref 0–0.99)
ANTI SM/RNP ANTIBODY: 0.25 RATIO (ref 0–0.99)
ANTI-SM INTERPRETATION: NEGATIVE
ANTI-SM/RNP INTERPRETATION: NEGATIVE
ANTI-SSA ANTIBODY: 1.28 RATIO (ref 0–0.99)
ANTI-SSA INTERPRETATION: POSITIVE
ANTI-SSB ANTIBODY: 0.14 RATIO (ref 0–0.99)
ANTI-SSB INTERPRETATION: NEGATIVE
DSDNA AB SER-ACNC: ABNORMAL [IU]/ML

## 2023-01-14 LAB — RNAP III AB SER-ACNC: <20 UNITS

## 2023-01-18 ENCOUNTER — HOSPITAL ENCOUNTER (OUTPATIENT)
Facility: HOSPITAL | Age: 48
Discharge: HOME | End: 2023-01-19
Attending: EMERGENCY MEDICINE | Admitting: EMERGENCY MEDICINE
Payer: COMMERCIAL

## 2023-01-18 DIAGNOSIS — N17.9 AKI (ACUTE KIDNEY INJURY): Primary | ICD-10-CM

## 2023-01-18 DIAGNOSIS — R07.9 CHEST PAIN: ICD-10-CM

## 2023-01-18 DIAGNOSIS — R74.8 ELEVATED CPK: ICD-10-CM

## 2023-01-18 DIAGNOSIS — G93.40 ENCEPHALOPATHY: ICD-10-CM

## 2023-01-18 DIAGNOSIS — M79.7 FIBROMYALGIA: ICD-10-CM
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
## 2023-01-18 DIAGNOSIS — M79.602 LEFT ARM PAIN: ICD-10-CM

## 2023-01-18 LAB
ALBUMIN SERPL BCP-MCNC: 3.8 G/DL (ref 3.5–5.2)
ALP SERPL-CCNC: 95 U/L (ref 55–135)
ALT SERPL W/O P-5'-P-CCNC: 20 U/L (ref 10–44)
AMPHET+METHAMPHET UR QL: NEGATIVE
ANION GAP SERPL CALC-SCNC: 7 MMOL/L (ref 8–16)
AST SERPL-CCNC: 30 U/L (ref 10–40)
BARBITURATES UR QL SCN>200 NG/ML: NEGATIVE
BASOPHILS # BLD AUTO: 0.02 K/UL (ref 0–0.2)
BASOPHILS NFR BLD: 0.5 % (ref 0–1.9)
BENZODIAZ UR QL SCN>200 NG/ML: NEGATIVE
BILIRUB SERPL-MCNC: 0.5 MG/DL (ref 0.1–1)
BILIRUB UR QL STRIP: NEGATIVE
BNP SERPL-MCNC: 17 PG/ML (ref 0–99)
BUN SERPL-MCNC: 37 MG/DL (ref 6–20)
BZE UR QL SCN: NEGATIVE
CALCIUM SERPL-MCNC: 8.8 MG/DL (ref 8.7–10.5)
CANNABINOIDS UR QL SCN: NEGATIVE
CHLORIDE SERPL-SCNC: 102 MMOL/L (ref 95–110)
CK SERPL-CCNC: 660 U/L (ref 20–180)
CLARITY UR: CLEAR
CO2 SERPL-SCNC: 30 MMOL/L (ref 23–29)
COLOR UR: COLORLESS
CREAT SERPL-MCNC: 2.5 MG/DL (ref 0.5–1.4)
CREAT UR-MCNC: 49.3 MG/DL (ref 15–325)
DIFFERENTIAL METHOD: ABNORMAL
EOSINOPHIL # BLD AUTO: 0.1 K/UL (ref 0–0.5)
EOSINOPHIL NFR BLD: 3 % (ref 0–8)
ERYTHROCYTE [DISTWIDTH] IN BLOOD BY AUTOMATED COUNT: 14.9 % (ref 11.5–14.5)
EST. GFR  (NO RACE VARIABLE): 23 ML/MIN/1.73 M^2
GLUCOSE SERPL-MCNC: 89 MG/DL (ref 70–110)
GLUCOSE UR QL STRIP: NEGATIVE
HCT VFR BLD AUTO: 34.4 % (ref 37–48.5)
HGB BLD-MCNC: 11.5 G/DL (ref 12–16)
HGB UR QL STRIP: NEGATIVE
IMM GRANULOCYTES # BLD AUTO: 0.02 K/UL (ref 0–0.04)
IMM GRANULOCYTES NFR BLD AUTO: 0.5 % (ref 0–0.5)
KETONES UR QL STRIP: NEGATIVE
LEUKOCYTE ESTERASE UR QL STRIP: NEGATIVE
LYMPHOCYTES # BLD AUTO: 2 K/UL (ref 1–4.8)
LYMPHOCYTES NFR BLD: 45.4 % (ref 18–48)
MAGNESIUM SERPL-MCNC: 3.1 MG/DL (ref 1.6–2.6)
MCH RBC QN AUTO: 29.3 PG (ref 27–31)
MCHC RBC AUTO-ENTMCNC: 33.4 G/DL (ref 32–36)
MCV RBC AUTO: 88 FL (ref 82–98)
METHADONE UR QL SCN>300 NG/ML: NEGATIVE
MONOCYTES # BLD AUTO: 0.5 K/UL (ref 0.3–1)
MONOCYTES NFR BLD: 11.7 % (ref 4–15)
NEUTROPHILS # BLD AUTO: 1.7 K/UL (ref 1.8–7.7)
NEUTROPHILS NFR BLD: 38.9 % (ref 38–73)
NITRITE UR QL STRIP: NEGATIVE
NRBC BLD-RTO: 0 /100 WBC
OPIATES UR QL SCN: NEGATIVE
PCP UR QL SCN>25 NG/ML: NEGATIVE
PH UR STRIP: 5 [PH] (ref 5–8)
PLATELET # BLD AUTO: 176 K/UL (ref 150–450)
PMV BLD AUTO: 10.1 FL (ref 9.2–12.9)
POTASSIUM SERPL-SCNC: 3.6 MMOL/L (ref 3.5–5.1)
PROT SERPL-MCNC: 7.4 G/DL (ref 6–8.4)
PROT UR QL STRIP: NEGATIVE
RBC # BLD AUTO: 3.93 M/UL (ref 4–5.4)
SODIUM SERPL-SCNC: 139 MMOL/L (ref 136–145)
SP GR UR STRIP: 1.01 (ref 1–1.03)
TOXICOLOGY INFORMATION: NORMAL
TROPONIN I SERPL DL<=0.01 NG/ML-MCNC: <0.006 NG/ML (ref 0–0.03)
TROPONIN I SERPL DL<=0.01 NG/ML-MCNC: <0.006 NG/ML (ref 0–0.03)
URN SPEC COLLECT METH UR: ABNORMAL
UROBILINOGEN UR STRIP-ACNC: NEGATIVE EU/DL
WBC # BLD AUTO: 4.36 K/UL (ref 3.9–12.7)

## 2023-01-18 PROCEDURE — 94761 N-INVAS EAR/PLS OXIMETRY MLT: CPT

## 2023-01-18 PROCEDURE — 84484 ASSAY OF TROPONIN QUANT: CPT | Performed by: EMERGENCY MEDICINE

## 2023-01-18 PROCEDURE — 80307 DRUG TEST PRSMV CHEM ANLYZR: CPT | Performed by: EMERGENCY MEDICINE

## 2023-01-18 PROCEDURE — G0378 HOSPITAL OBSERVATION PER HR: HCPCS

## 2023-01-18 PROCEDURE — 25000003 PHARM REV CODE 250: Performed by: PHYSICIAN ASSISTANT

## 2023-01-18 PROCEDURE — 83735 ASSAY OF MAGNESIUM: CPT | Performed by: EMERGENCY MEDICINE

## 2023-01-18 PROCEDURE — 96360 HYDRATION IV INFUSION INIT: CPT

## 2023-01-18 PROCEDURE — 25000003 PHARM REV CODE 250: Performed by: EMERGENCY MEDICINE

## 2023-01-18 PROCEDURE — 81003 URINALYSIS AUTO W/O SCOPE: CPT | Mod: 59 | Performed by: EMERGENCY MEDICINE

## 2023-01-18 PROCEDURE — 96361 HYDRATE IV INFUSION ADD-ON: CPT

## 2023-01-18 PROCEDURE — 83880 ASSAY OF NATRIURETIC PEPTIDE: CPT | Performed by: EMERGENCY MEDICINE

## 2023-01-18 PROCEDURE — 93010 EKG 12-LEAD: ICD-10-PCS | Mod: ,,, | Performed by: INTERNAL MEDICINE

## 2023-01-18 PROCEDURE — 99285 EMERGENCY DEPT VISIT HI MDM: CPT | Mod: 25

## 2023-01-18 PROCEDURE — 85025 COMPLETE CBC W/AUTO DIFF WBC: CPT | Performed by: EMERGENCY MEDICINE

## 2023-01-18 PROCEDURE — 82550 ASSAY OF CK (CPK): CPT | Performed by: EMERGENCY MEDICINE

## 2023-01-18 PROCEDURE — 80053 COMPREHEN METABOLIC PANEL: CPT | Performed by: EMERGENCY MEDICINE

## 2023-01-18 PROCEDURE — 93005 ELECTROCARDIOGRAM TRACING: CPT

## 2023-01-18 PROCEDURE — 93010 ELECTROCARDIOGRAM REPORT: CPT | Mod: ,,, | Performed by: INTERNAL MEDICINE

## 2023-01-18 PROCEDURE — 63600175 PHARM REV CODE 636 W HCPCS: Performed by: PHYSICIAN ASSISTANT

## 2023-01-18 RX ORDER — GLUCAGON 1 MG
1 KIT INJECTION
Status: DISCONTINUED | OUTPATIENT
Start: 2023-01-18 | End: 2023-01-19 | Stop reason: HOSPADM

## 2023-01-18 RX ORDER — SODIUM CHLORIDE 9 MG/ML
1000 INJECTION, SOLUTION INTRAVENOUS
Status: COMPLETED | OUTPATIENT
Start: 2023-01-18 | End: 2023-01-18

## 2023-01-18 RX ORDER — NALOXONE HCL 0.4 MG/ML
0.02 VIAL (ML) INJECTION
Status: DISCONTINUED | OUTPATIENT
Start: 2023-01-18 | End: 2023-01-19 | Stop reason: HOSPADM

## 2023-01-18 RX ORDER — SODIUM CHLORIDE 0.9 % (FLUSH) 0.9 %
10 SYRINGE (ML) INJECTION
Status: DISCONTINUED | OUTPATIENT
Start: 2023-01-18 | End: 2023-01-19 | Stop reason: HOSPADM

## 2023-01-18 RX ORDER — IBUPROFEN 200 MG
24 TABLET ORAL
Status: DISCONTINUED | OUTPATIENT
Start: 2023-01-18 | End: 2023-01-19 | Stop reason: HOSPADM

## 2023-01-18 RX ORDER — TALC
6 POWDER (GRAM) TOPICAL NIGHTLY PRN
Status: DISCONTINUED | OUTPATIENT
Start: 2023-01-18 | End: 2023-01-19 | Stop reason: HOSPADM

## 2023-01-18 RX ORDER — SODIUM CHLORIDE, SODIUM LACTATE, POTASSIUM CHLORIDE, CALCIUM CHLORIDE 600; 310; 30; 20 MG/100ML; MG/100ML; MG/100ML; MG/100ML
INJECTION, SOLUTION INTRAVENOUS CONTINUOUS
Status: DISCONTINUED | OUTPATIENT
Start: 2023-01-18 | End: 2023-01-19 | Stop reason: HOSPADM

## 2023-01-18 RX ORDER — AMOXICILLIN 250 MG
1 CAPSULE ORAL DAILY PRN
Status: DISCONTINUED | OUTPATIENT
Start: 2023-01-18 | End: 2023-01-19 | Stop reason: HOSPADM

## 2023-01-18 RX ORDER — ACETAMINOPHEN 325 MG/1
650 TABLET ORAL EVERY 4 HOURS PRN
Status: DISCONTINUED | OUTPATIENT
Start: 2023-01-18 | End: 2023-01-19 | Stop reason: HOSPADM

## 2023-01-18 RX ORDER — SODIUM CHLORIDE 0.9 % (FLUSH) 0.9 %
10 SYRINGE (ML) INJECTION EVERY 8 HOURS
Status: DISCONTINUED | OUTPATIENT
Start: 2023-01-18 | End: 2023-01-18

## 2023-01-18 RX ORDER — LANOLIN ALCOHOL/MO/W.PET/CERES
800 CREAM (GRAM) TOPICAL
Status: DISCONTINUED | OUTPATIENT
Start: 2023-01-18 | End: 2023-01-19 | Stop reason: HOSPADM

## 2023-01-18 RX ORDER — HYDROXYCHLOROQUINE SULFATE 200 MG/1
200 TABLET, FILM COATED ORAL 2 TIMES DAILY
Status: DISCONTINUED | OUTPATIENT
Start: 2023-01-19 | End: 2023-01-19 | Stop reason: HOSPADM

## 2023-01-18 RX ORDER — IBUPROFEN 200 MG
16 TABLET ORAL
Status: DISCONTINUED | OUTPATIENT
Start: 2023-01-18 | End: 2023-01-19 | Stop reason: HOSPADM

## 2023-01-18 RX ADMIN — SODIUM CHLORIDE 1000 ML: 9 INJECTION, SOLUTION INTRAVENOUS at 06:01

## 2023-01-18 RX ADMIN — APIXABAN 5 MG: 5 TABLET, FILM COATED ORAL at 08:01

## 2023-01-18 RX ADMIN — SODIUM CHLORIDE, POTASSIUM CHLORIDE, SODIUM LACTATE AND CALCIUM CHLORIDE: 600; 310; 30; 20 INJECTION, SOLUTION INTRAVENOUS at 08:01

## 2023-01-18 NOTE — ED TRIAGE NOTES
"Tremors (Presetns to the ED via EMS with c/o trembling, bilat leg twitching and "dropping things' that started 1 month ago. Family wanted patient to be seen in ED for r/o stroke. Hx of blood clots in pregnancy and fibromyalgia.  Denies pain. Admits that she fell and hit the back of her head. States that her arms and legs hurt rates pain 10/10. Pt states she can't remember which meds she took today.   "

## 2023-01-18 NOTE — ED PROVIDER NOTES
"Encounter Date: 1/18/2023    SCRIBE #1 NOTE: I, Meredith Iraheta, am scribing for, and in the presence of,  Mandy Haskins MD. I have scribed the following portions of the note - Other sections scribed: HPI, ROS, PE.     History     Chief Complaint   Patient presents with    Tremors     Presetns to the ED via EMS with c/o trembling, bilat leg twitching and "dropping things' that started x 2 days ago. Family wanted patient to be seen in ED for r/o stroke. Hx of blood clots in pregnancy and fibromyalgia.      This is a 47 y/o female with a PMHx of HTN and Sjogren's syndrome who presents to the Emergency Department complaining of brief intermittent tremors of the bilateral hands and feet onset one month ago. Patient states she fell today when trying to get back into bed.  Patient states her legs gave out causing her to fall backwards and strike her head against a refrigerator. Patient reports associated symptoms of headache, sternum chest pain, and pain in arms and legs. No other alleviating or exacerbating factors.     The history is provided by the patient. No  was used.   Review of patient's allergies indicates:   Allergen Reactions    Morphine Itching and Hallucinations    Pcn [penicillins] Other (See Comments)     Was told from childhood she couldn't take it    Sulfa (sulfonamide antibiotics) Nausea And Vomiting    Latex, natural rubber Rash     Past Medical History:   Diagnosis Date    AYDE (acute kidney injury) 11/1/2022    Alcohol abuse     stopped heavy drinking about 10 years ago; was drinking 3 glasses of vodka/tequilla,rum/whiskey per day    Allergy Don't remember    Its been some years.    Anxiety     Arthritis 2010    Blood clotting tendency 2008    After a procedure & 2020.    Congestive heart failure 8/11/2020    Depression     Hallucination     Hx of psychiatric care     Hypertension     Immune disorder 2020    Joint pain 2010    Keloid cicatrix Years ago    From childhood scars    " Caro     unplanned trips, energy without sleep for 2 days (reading, cleaning), feelings that she can do multiple tasks at one time, feelings of overconfidence at times    Psychiatric problem     Sleep difficulties     Therapy     Withdrawal symptoms, drug or narcotic     racing heart, restlessness     Past Surgical History:   Procedure Laterality Date     lapban surgery  2009    ESOPHAGOGASTRODUODENOSCOPY N/A 6/3/2020    Procedure: EGD (ESOPHAGOGASTRODUODENOSCOPY);  Surgeon: Johan Grover MD;  Location: [facility] (4TH FLR);  Service: Endoscopy;  Laterality: N/A;  covid 6/2-[facility]    HYSTERECTOMY      PHLEBOGRAPHY Left 8/12/2020    Procedure: Venogram, pharmacomechanical thrombectomy;  Surgeon: Miguelangel Goldman MD;  [facility];  Service: Vascular;  Laterality: Left;  2336.43 mGy  [id number]  17.8 minutes  37 ml of contrast    VENOPLASTY Left 8/12/2020    Procedure: ANGIOPLASTY, VEIN;  Surgeon: Miguelangel Goldman MD;  [facility];  Service: Vascular;  Laterality: Left;     Family History   Problem Relation Age of Onset    Diabetes Mother     Cancer Mother     Hypertension Mother     Cirrhosis Maternal Uncle         ETOH    Celiac disease Neg Hx     Colon cancer Neg Hx     Colon polyps Neg Hx     Crohn's disease Neg Hx     Esophageal cancer Neg Hx     Inflammatory bowel disease Neg Hx     Liver cancer Neg Hx     Liver disease Neg Hx     Rectal cancer Neg Hx     Stomach cancer Neg Hx     Ulcerative colitis Neg Hx      Social History     Tobacco Use    Smoking status: Former     Packs/day: 0.00     Years: 0.00     Pack years: 0.00     Types: Cigarettes    Smokeless tobacco: Never    Tobacco comments:     quit in october 2016   Substance Use Topics    Alcohol use: Yes     Alcohol/week: 1.0 - 3.0 standard drink     Types: 1 - 3 Glasses of wine per week     Comment: social presently, excessive 10 years ago    Drug use: Never     Types: Marijuana     Comment: uses  [employer] weekly     Review of Systems   Constitutional:  Negative for fever.   HENT:  Negative for facial swelling and voice change.    Eyes:  Negative for pain.   Respiratory:  Negative for choking and shortness of breath.    Cardiovascular:  Positive for chest pain (sternum).   Gastrointestinal:  Negative for abdominal pain, nausea and vomiting.   Genitourinary:  Negative for dysuria and frequency.   Musculoskeletal:  Positive for myalgias (bilater upper & lower extremities). Negative for back pain.   Skin:  Negative for rash.   Neurological:  Positive for tremors (bilateral hands & feet), weakness (bilateral lower extremities) and headaches. Negative for numbness.   Psychiatric/Behavioral:  Negative for self-injury.      Physical Exam     Initial Vitals [01/18/23 1537]   BP Pulse Resp Temp SpO2   (!) 140/79 91 18 98.5 °F (36.9 °C) 99 %      MAP       --         Physical Exam    Nursing note and vitals reviewed.  Constitutional: She is not diaphoretic. No distress.   HENT:   Head: Normocephalic and atraumatic.   Protecting airway   Eyes: Conjunctivae and EOM are normal. No scleral icterus.   Neck: Neck supple. No tracheal deviation present.   Normal range of motion.  Cardiovascular:  Normal rate, regular rhythm and intact distal pulses.           Pulmonary/Chest: No stridor. No respiratory distress.   Speaking in full sentences   Abdominal: Abdomen is soft. She exhibits no distension. There is no abdominal tenderness.   Musculoskeletal:         General: Tenderness (generalized muscular) present. No edema.      Cervical back: Normal range of motion and neck supple.     Neurological: She is alert. She has normal strength. No cranial nerve deficit or sensory deficit.   Symmetrical strength in bilateral upper and lower extremities   No drift   Skin: Skin is warm and dry.       ED Course   Procedures  Labs Reviewed   CBC W/ AUTO DIFFERENTIAL - Abnormal; Notable for the following components:       Result Value    RBC 3.93  (*)     Hemoglobin 11.5 (*)     Hematocrit 34.4 (*)     RDW 14.9 (*)     Gran # (ANC) 1.7 (*)     All other components within normal limits   COMPREHENSIVE METABOLIC PANEL - Abnormal; Notable for the following components:    CO2 30 (*)     BUN 37 (*)     Creatinine 2.5 (*)     Anion Gap 7 (*)     eGFR 23 (*)     All other components within normal limits   CK - Abnormal; Notable for the following components:     (*)     All other components within normal limits   MAGNESIUM - Abnormal; Notable for the following components:    Magnesium 3.1 (*)     All other components within normal limits   URINALYSIS, REFLEX TO URINE CULTURE - Abnormal; Notable for the following components:    Color, UA Colorless (*)     All other components within normal limits    Narrative:     Specimen Source->Urine   TROPONIN I   B-TYPE NATRIURETIC PEPTIDE   DRUG SCREEN PANEL, URINE EMERGENCY    Narrative:     Specimen Source->Urine   TROPONIN I     EKG Readings: (Independently Interpreted)   Initial Reading: No STEMI. Rhythm: Normal Sinus Rhythm. Heart Rate: 87. Ectopy: No Ectopy. Conduction: Normal. ST Segments: Normal ST Segments. Axis: Normal.   Nonspecific T-wave abnormality     Imaging Results              CT Head Without Contrast (Final result)  Result time 01/18/23 17:14:32      Final result by Cherie Mathur MD (01/18/23 17:14:32)                   Impression:      No acute intracranial abnormality detected. If persistent neurological abnormality, consider MRI of the brain with diffusion-weighted sequencing when clinically appropriate.    No acute cervical fracture.  Mild spondylitic changes.      Electronically signed by: Cherie Mathur  Date:    01/18/2023  Time:    17:14               Narrative:    EXAMINATION:  CT OF THE HEAD WITHOUT AND CT CERVICAL SPINE    CLINICAL HISTORY:  Head trauma, moderate-severe;Mental status change, unknown cause;; Neck trauma, intoxicated or obtunded (Age >= 16y);    TECHNIQUE:  5 mm  unenhanced axial images were obtained from the skull base to the vertex.  1.25 mm axial images were obtained through the cervical spine.    COMPARISON:  CT head 07/09/2020    FINDINGS:  CT head: The ventricles, basal cisterns, and cortical sulci are within normal limits for patient's stated age. There is no acute intracranial hemorrhage, territorial infarct or mass effect, or midline shift. In the visualized paranasal sinuses, there is trace mucoperiosteal thickening involving the anterior aspects of bilateral sphenoid sinuses.    CT cervical spine: There is mild reversal normal cervical lordosis..  There is no acute fracture or subluxation.  Mild marginal osteophytes are present.  The bones are normally mineralized.                                       CT Cervical Spine Without Contrast (Final result)  Result time 01/18/23 17:14:32      Final result by Cherie Mathur MD (01/18/23 17:14:32)                   Impression:      No acute intracranial abnormality detected. If persistent neurological abnormality, consider MRI of the brain with diffusion-weighted sequencing when clinically appropriate.    No acute cervical fracture.  Mild spondylitic changes.      Electronically signed by: Cherie Mathur  Date:    01/18/2023  Time:    17:14               Narrative:    EXAMINATION:  CT OF THE HEAD WITHOUT AND CT CERVICAL SPINE    CLINICAL HISTORY:  Head trauma, moderate-severe;Mental status change, unknown cause;; Neck trauma, intoxicated or obtunded (Age >= 16y);    TECHNIQUE:  5 mm unenhanced axial images were obtained from the skull base to the vertex.  1.25 mm axial images were obtained through the cervical spine.    COMPARISON:  CT head 07/09/2020    FINDINGS:  CT head: The ventricles, basal cisterns, and cortical sulci are within normal limits for patient's stated age. There is no acute intracranial hemorrhage, territorial infarct or mass effect, or midline shift. In the visualized paranasal sinuses, there is  trace mucoperiosteal thickening involving the anterior aspects of bilateral sphenoid sinuses.    CT cervical spine: There is mild reversal normal cervical lordosis..  There is no acute fracture or subluxation.  Mild marginal osteophytes are present.  The bones are normally mineralized.                                       X-Ray Chest AP Portable (Final result)  Result time 01/18/23 16:58:26      Final result by Ama Monroe MD (01/18/23 16:58:26)                   Impression:      As above      Electronically signed by: Ama Monroe MD  Date:    01/18/2023  Time:    16:58               Narrative:    EXAMINATION:  XR CHEST AP PORTABLE    CLINICAL HISTORY:  Chest Pain;    TECHNIQUE:  Single frontal view of the chest was performed.    COMPARISON:  March 23, 2022    FINDINGS:  There is a diminished inspiration.  Cardiac size is upper normal, likely magnified by the technique.  No significant pleural effusion.  The osseous structures are unremarkable.  There is prominence of interstitial markings with some mild perihilar opacities.  This may relate to edema or infection, to be correlated clinically.                                       X-Ray Shoulder Trauma Left (Final result)  Result time 01/18/23 16:52:03      Final result by Ama Monroe MD (01/18/23 16:52:03)                   Impression:      As above      Electronically signed by: Ama Monroe MD  Date:    01/18/2023  Time:    16:52               Narrative:    EXAMINATION:  XR SHOULDER TRAUMA 3 VIEW LEFT    CLINICAL HISTORY:  Unspecified fall, initial encounter    TECHNIQUE:  Three views of the left shoulder were performed.    COMPARISON  None    FINDINGS:  Imaging degraded by patient body habitus.    There is no evidence of fracture, dislocation or significant degenerative change.                                       Medications   0.9%  NaCl infusion (1,000 mLs Intravenous New Bag 1/18/23 1760)     Medical Decision Making:   History:   Old  Medical Records: I decided to obtain old medical records.  Differential Diagnosis:   Differential diagnosis include but are not limited to:   CVA  Electrolyte abnormality   ACS  Polypharmacy    Independently Interpreted Test(s):   I have ordered and independently interpreted EKG Reading(s) - see prior notes  Clinical Tests:   Lab Tests: Ordered and Reviewed  Radiological Study: Ordered and Reviewed  Medical Tests: Ordered and Reviewed  ED Management:  Patient is afebrile and in no acute distress at time history and physical.  She has symmetrical strength in bilateral upper and lower extremities.  She has a GCS of 15.  She has no tongue fasciculations or tremor to suggest alcohol withdrawal.  EKG is without definite acute ischemic changes.  CT brain and cervical spine without acute traumatic injury.  Labs without leukocytosis or granulocytosis to suggest infectious process.  Patient has acute kidney injury with BUN 30 segment, creatinine of 2.5.  CPK is elevated greater than 600.  Patient started on IV hydration in the emergency department.  Family at the bedside restless concern over altered mental status and hallucinations review of chart demonstrates that patient has had issues with hallucinations and vaccine waning mental status in the past.  Symptoms may be related to renal function or to side-effect or thrill from her medications.  I have low clinical suspicion of acute CVA in this patient.  She is to be placed in observation for IV hydration, serial chemistry, further evaluation    Please put in 30 minutes of critical care due to patient having a high risk of renal failure.   Separate from teaching and exclusive of procedure and ekg time  Includes:  Time at bedside  Time reviewing test results  Time discussing case with staff  Time documenting the medical record  Time spent with family members  Time spent with consults  Management     This chart was completed using dictation software, as a result there may be  some transcription errors.                           Clinical Impression:   Final diagnoses:  [M79.602] Left arm pain  [W19.XXXA] Fall  [N17.9] AYDE (acute kidney injury) (Primary)  [R74.8] Elevated CPK     I, Mandy Haskins , personally performed the services described in this documentation. All medical record entries made by the scribe were at my direction and in my presence. I have reviewed the chart and agree that the record reflects my personal performance and is accurate and complete.     ED Disposition Condition    Observation                 Mandy Haskins MD  01/18/23 8098

## 2023-01-18 NOTE — Clinical Note
"Alberto Lloydblossom Frye was seen and treated in our emergency department on 1/18/2023.  She may return to work on 01/23/2023.       If you have any questions or concerns, please don't hesitate to call.      Kizzy Armas RN    "

## 2023-01-19 ENCOUNTER — PATIENT MESSAGE (OUTPATIENT)
Dept: FAMILY MEDICINE | Facility: CLINIC | Age: 48
End: 2023-01-19
Payer: COMMERCIAL

## 2023-01-19 VITALS
BODY MASS INDEX: 46.1 KG/M2 | OXYGEN SATURATION: 100 % | HEIGHT: 64 IN | RESPIRATION RATE: 17 BRPM | SYSTOLIC BLOOD PRESSURE: 136 MMHG | WEIGHT: 270 LBS | TEMPERATURE: 97 F | DIASTOLIC BLOOD PRESSURE: 62 MMHG | HEART RATE: 92 BPM

## 2023-01-19 LAB
ALBUMIN SERPL BCP-MCNC: 3.1 G/DL (ref 3.5–5.2)
ALBUMIN SERPL BCP-MCNC: 3.4 G/DL (ref 3.5–5.2)
ALP SERPL-CCNC: 72 U/L (ref 55–135)
ALT SERPL W/O P-5'-P-CCNC: 17 U/L (ref 10–44)
ANION GAP SERPL CALC-SCNC: 7 MMOL/L (ref 8–16)
ANION GAP SERPL CALC-SCNC: 8 MMOL/L (ref 8–16)
AST SERPL-CCNC: 28 U/L (ref 10–40)
BASOPHILS # BLD AUTO: 0.02 K/UL (ref 0–0.2)
BASOPHILS NFR BLD: 0.4 % (ref 0–1.9)
BILIRUB SERPL-MCNC: 0.8 MG/DL (ref 0.1–1)
BUN SERPL-MCNC: 28 MG/DL (ref 6–20)
BUN SERPL-MCNC: 32 MG/DL (ref 6–20)
CALCIUM SERPL-MCNC: 8.7 MG/DL (ref 8.7–10.5)
CALCIUM SERPL-MCNC: 8.8 MG/DL (ref 8.7–10.5)
CHLORIDE SERPL-SCNC: 105 MMOL/L (ref 95–110)
CHLORIDE SERPL-SCNC: 108 MMOL/L (ref 95–110)
CK SERPL-CCNC: 513 U/L (ref 20–180)
CO2 SERPL-SCNC: 26 MMOL/L (ref 23–29)
CO2 SERPL-SCNC: 29 MMOL/L (ref 23–29)
CREAT SERPL-MCNC: 1.4 MG/DL (ref 0.5–1.4)
CREAT SERPL-MCNC: 1.8 MG/DL (ref 0.5–1.4)
CRP SERPL-MCNC: 25 MG/L (ref 0–8.2)
DIFFERENTIAL METHOD: ABNORMAL
EOSINOPHIL # BLD AUTO: 0.1 K/UL (ref 0–0.5)
EOSINOPHIL NFR BLD: 2.9 % (ref 0–8)
ERYTHROCYTE [DISTWIDTH] IN BLOOD BY AUTOMATED COUNT: 14.9 % (ref 11.5–14.5)
ERYTHROCYTE [SEDIMENTATION RATE] IN BLOOD BY WESTERGREN METHOD: 23 MM/HR (ref 0–20)
EST. GFR  (NO RACE VARIABLE): 34 ML/MIN/1.73 M^2
EST. GFR  (NO RACE VARIABLE): 46 ML/MIN/1.73 M^2
GLUCOSE SERPL-MCNC: 84 MG/DL (ref 70–110)
GLUCOSE SERPL-MCNC: 92 MG/DL (ref 70–110)
HCT VFR BLD AUTO: 34.5 % (ref 37–48.5)
HGB BLD-MCNC: 11 G/DL (ref 12–16)
IMM GRANULOCYTES # BLD AUTO: 0.02 K/UL (ref 0–0.04)
IMM GRANULOCYTES NFR BLD AUTO: 0.4 % (ref 0–0.5)
LYMPHOCYTES # BLD AUTO: 2 K/UL (ref 1–4.8)
LYMPHOCYTES NFR BLD: 41.9 % (ref 18–48)
MCH RBC QN AUTO: 28.8 PG (ref 27–31)
MCHC RBC AUTO-ENTMCNC: 31.9 G/DL (ref 32–36)
MCV RBC AUTO: 90 FL (ref 82–98)
MONOCYTES # BLD AUTO: 0.7 K/UL (ref 0.3–1)
MONOCYTES NFR BLD: 14 % (ref 4–15)
NEUTROPHILS # BLD AUTO: 1.9 K/UL (ref 1.8–7.7)
NEUTROPHILS NFR BLD: 40.4 % (ref 38–73)
NRBC BLD-RTO: 0 /100 WBC
PHOSPHATE SERPL-MCNC: 2.6 MG/DL (ref 2.7–4.5)
PLATELET # BLD AUTO: 175 K/UL (ref 150–450)
PMV BLD AUTO: 9.9 FL (ref 9.2–12.9)
POTASSIUM SERPL-SCNC: 3.3 MMOL/L (ref 3.5–5.1)
POTASSIUM SERPL-SCNC: 3.8 MMOL/L (ref 3.5–5.1)
PROT SERPL-MCNC: 6.8 G/DL (ref 6–8.4)
RBC # BLD AUTO: 3.82 M/UL (ref 4–5.4)
SODIUM SERPL-SCNC: 141 MMOL/L (ref 136–145)
SODIUM SERPL-SCNC: 142 MMOL/L (ref 136–145)
TSH SERPL DL<=0.005 MIU/L-ACNC: 2.84 UIU/ML (ref 0.4–4)
WBC # BLD AUTO: 4.8 K/UL (ref 3.9–12.7)

## 2023-01-19 PROCEDURE — G0378 HOSPITAL OBSERVATION PER HR: HCPCS

## 2023-01-19 PROCEDURE — 97161 PT EVAL LOW COMPLEX 20 MIN: CPT

## 2023-01-19 PROCEDURE — 25000003 PHARM REV CODE 250: Performed by: PHYSICIAN ASSISTANT

## 2023-01-19 PROCEDURE — 84443 ASSAY THYROID STIM HORMONE: CPT | Performed by: PHYSICIAN ASSISTANT

## 2023-01-19 PROCEDURE — 97165 OT EVAL LOW COMPLEX 30 MIN: CPT

## 2023-01-19 PROCEDURE — 80175 DRUG SCREEN QUAN LAMOTRIGINE: CPT | Performed by: PHYSICIAN ASSISTANT

## 2023-01-19 PROCEDURE — 25000003 PHARM REV CODE 250: Performed by: NURSE PRACTITIONER

## 2023-01-19 PROCEDURE — 85652 RBC SED RATE AUTOMATED: CPT | Performed by: NURSE PRACTITIONER

## 2023-01-19 PROCEDURE — 80069 RENAL FUNCTION PANEL: CPT | Performed by: NURSE PRACTITIONER

## 2023-01-19 PROCEDURE — 86140 C-REACTIVE PROTEIN: CPT | Performed by: NURSE PRACTITIONER

## 2023-01-19 PROCEDURE — 80053 COMPREHEN METABOLIC PANEL: CPT | Performed by: PHYSICIAN ASSISTANT

## 2023-01-19 PROCEDURE — 85025 COMPLETE CBC W/AUTO DIFF WBC: CPT | Performed by: PHYSICIAN ASSISTANT

## 2023-01-19 PROCEDURE — 97530 THERAPEUTIC ACTIVITIES: CPT

## 2023-01-19 PROCEDURE — 82550 ASSAY OF CK (CPK): CPT | Performed by: PHYSICIAN ASSISTANT

## 2023-01-19 PROCEDURE — 97535 SELF CARE MNGMENT TRAINING: CPT

## 2023-01-19 PROCEDURE — 96361 HYDRATE IV INFUSION ADD-ON: CPT

## 2023-01-19 RX ORDER — PREGABALIN 150 MG/1
150 CAPSULE ORAL 2 TIMES DAILY
Qty: 90 CAPSULE | Refills: 5 | Status: SHIPPED | OUTPATIENT
Start: 2023-01-19 | End: 2023-02-16 | Stop reason: SDUPTHER

## 2023-01-19 RX ORDER — POTASSIUM CHLORIDE 20 MEQ/1
40 TABLET, EXTENDED RELEASE ORAL ONCE
Status: COMPLETED | OUTPATIENT
Start: 2023-01-19 | End: 2023-01-19

## 2023-01-19 RX ORDER — LIDOCAINE 50 MG/G
1 PATCH TOPICAL
Status: DISCONTINUED | OUTPATIENT
Start: 2023-01-19 | End: 2023-01-19 | Stop reason: HOSPADM

## 2023-01-19 RX ADMIN — APIXABAN 5 MG: 5 TABLET, FILM COATED ORAL at 08:01

## 2023-01-19 RX ADMIN — HYDROXYCHLOROQUINE SULFATE 200 MG: 200 TABLET ORAL at 08:01

## 2023-01-19 RX ADMIN — POTASSIUM CHLORIDE 40 MEQ: 1500 TABLET, EXTENDED RELEASE ORAL at 08:01

## 2023-01-19 NOTE — ASSESSMENT & PLAN NOTE
Previous SS elevated chronically elevated IGG. Folate B6 b12 and B1 within normal limits 13 days ago. Anti-thyroglobulin and Thyroid Peroxidase negative.   Home lyrica held for AMS and AYDE

## 2023-01-19 NOTE — ED NOTES
Attending NP requested to keep pt in ED bed 28A. Attending NP states pt is a candidate to be discharged today.

## 2023-01-19 NOTE — PT/OT/SLP EVAL
Physical Therapy Evaluation and Discharge Note    Patient Name:  Alberto Frye   MRN:  [id number]    Recommendations:     Discharge Recommendations: outpatient PT  Discharge Equipment Recommendations: none   Barriers to discharge:  None from PT standpoint    Assessment:     Alberto Frye is a 48 y.o. female admitted with a medical diagnosis of AYDE (acute kidney injury). .  At this time, patient is functioning at their prior level of function and does not require further acute PT services.     Recent Surgery: * No surgery found *      Plan:     During this hospitalization, patient does not require further acute PT services.  Please re-consult if situation changes.      Subjective     Chief Complaint: pain  Patient/Family Comments/goals: Pt agreeable to evaluation  Pain/Comfort:  Pain Rating 1: 9/10  Location - Orientation 1:  (Pelvis and R calf, Jazzmine's sign -)    Patients cultural, spiritual, Cheondoism conflicts given the current situation: no    Living Environment:  Pt lives with her son who works.  Ground floor apt   Prior to admission, patients level of function was Mod I with RW for ambulation, employed as a referrals coordinator for [employer] .  Equipment used at home: walker, rolling.  DME owned (not currently used): single point cane.  Upon discharge, patient will have assistance from Mother.    Objective:     Communicated with nsg prior to session.  Patient found  on stretcher   with blood pressure cuff, PureWick, peripheral IV, telemetry upon PT entry to room.    General Precautions: Standard, fall    Orthopedic Precautions:N/A   Braces: N/A  Respiratory Status: Room air    Exams:  Cognitive Exam:  Patient is oriented to Person, Place, Time, and Situation  Gross Motor Coordination:  Pt required increased time to perform all mobility  Postural Exam:  Patient presented with the following abnormalities:    -       Rounded shoulders  -       Forward head  Sensation:    -       Intact  light/touch B  LE's  Skin Integrity/Edema:      -       Skin integrity: Visible skin intact  -       Edema: Mild B LE's  RLE ROM: WFL  RLE Strength: WFL  LLE ROM: WFL  LLE Strength: WFL  Pt not giving full effort with MMT, but no deficits noted with functional mobility  Volitional trembling noted B UE's and LE's sitting at EOB    Functional Mobility:  Bed Mobility:     Scooting: stand by assistance  Supine to Sit: stand by assistance  Transfers:     Sit to Stand:  contact guard assistance with rolling walker  Gait: Pt ambulated with RW and CGA with max VC/TC for sequencing and walker management approx 12' with max decreased step length and juan and increased time in double stance   Balance: FAir+ sit and stand    AM-PAC 6 CLICK MOBILITY  Total Score:15       Treatment and Education:  Hal protocol:  Pt sat at EOB with Supervision.  HR 89, /77, SPO2 100%.  Educated pt to perform AP's, FAQ's, and marching while up in chair.  Pt verbalized/demonstrated understanding    AM-PAC 6 CLICK MOBILITY  Total Score:15     Patient left up in chair with all lines intact, call button in reach, nsg notified, and mother present.    GOALS:   Multidisciplinary Problems       Physical Therapy Goals          Problem: Physical Therapy    Goal Priority Disciplines Outcome Goal Variances Interventions   Physical Therapy Goal     PT, PT/OT Adequate for Care Transition                         History:     Past Medical History:   Diagnosis Date    AYDE (acute kidney injury) 11/1/2022    Alcohol abuse     stopped heavy drinking about 10 years ago; was drinking 3 glasses of vodka/tequilla,rum/whiskey per day    Allergy Don't remember    Its been some years.    Anxiety     Arthritis 2010    Blood clotting tendency 2008    After a procedure & 2020.    Congestive heart failure 8/11/2020    Depression     Hallucination     Hx of psychiatric care     Hypertension     Immune disorder 2020    Joint pain 2010    Keloid cicatrix Years ago    From childhood  scars    Caro     unplanned trips, energy without sleep for 2 days (reading, cleaning), feelings that she can do multiple tasks at one time, feelings of overconfidence at times    Psychiatric problem     Sleep difficulties     Therapy     Withdrawal symptoms, drug or narcotic     racing heart, restlessness       Past Surgical History:   Procedure Laterality Date    ESOPHAGOGASTRODUODENOSCOPY N/A 06/03/2020    Procedure: EGD (ESOPHAGOGASTRODUODENOSCOPY);  Surgeon: Johan Grover MD;  Location: [facility] (4TH FLR);  Service: Endoscopy;  Laterality: N/A;  covid 6/2-[facility]    HYSTERECTOMY      LAPBAND  2009    PHLEBOGRAPHY Left 08/12/2020    Procedure: Venogram, pharmacomechanical thrombectomy;  Surgeon: Miguelangel Goldman MD;  Location: [facility] OR [address] [place] [address];  Service: Vascular;  Laterality: Left;  2336.43 mGy  [id number]  17.8 minutes  37 ml of contrast    VENOPLASTY Left 08/12/2020    Procedure: ANGIOPLASTY, VEIN;  Surgeon: Miguelangel Goldman MD;  Location: [facility] OR [address] [place] [address];  Service: Vascular;  Laterality: Left;       Time Tracking:     PT Received On: 01/19/23  PT Start Time: 0943     PT Stop Time: 1028  PT Total Time (min): 45 min     Billable Minutes: Evaluation `15, Gait Training 15, and Therapeutic Activity 15      01/19/2023

## 2023-01-19 NOTE — ED NOTES
Assumed care at 2330 no distress noted. Pt Awake and alert. Call bell in reach. Denies pain at this time.

## 2023-01-19 NOTE — H&P
"[facility]  [facility] Medicine  History & Physical    Patient Name: Alberto Frye  MRN: [id number]  Patient Class: OP- Observation  Admission Date: 1/18/2023  Attending Physician: Pam Rodgers MD   Primary Care Provider: Ian Cespedes MD         Patient information was obtained from patient, past medical records and ER records.     Subjective:     Principal Problem:AYDE (acute kidney injury)    Chief Complaint:   Chief Complaint   Patient presents with    Tremors     Presetns to the ED via EMS with c/o trembling, bilat leg twitching and "dropping things' that started x 2 days ago. Family wanted patient to be seen in ED for r/o stroke. Hx of blood clots in pregnancy and fibromyalgia.         HPI: Alberto Frye 48 y.o. female with Sjogren's history of DVT on chronic anticoagulation, HTN, presents to the hospital with a chief complaint AMS and tremors.  The patient is unsure of the events prior to hospital.  She denies any complaints currently.  Per mother she was last seen 5 days ago on Saturday at her birthday party.  She then awoke Sunday morning was altered and confused.  She is had intermittent hallucinations since that time.  She now diffuse tremors today which caused her mother to bring her to the hospital.  There have been no aggravating or alleviating factors have been no attempted treatment at home.  The mother denies any new medications.  Further history is limited due to altered mental status.    In the ED, creatinine 2.5 troponin negative x2 BNP negative  UDS negative UA without nitrites or leukocytes afebrile without leukocytosis x-ray shoulder without evidence of fracture dislocation chest x-ray diminished inspiration and interstitial marking prominence CT head without acute intracranial abnormality CT cervical spine without acute cervical fracture      Past Medical History:   Diagnosis Date    AYDE (acute kidney injury) 11/1/2022    Alcohol abuse     stopped heavy " drinking about 10 years ago; was drinking 3 glasses of vodka/tequilla,rum/whiskey per day    Allergy Don't remember    Its been some years.    Anxiety     Arthritis 2010    Blood clotting tendency 2008    After a procedure & 2020.    Congestive heart failure 8/11/2020    Depression     Hallucination     Hx of psychiatric care     Hypertension     Immune disorder 2020    Joint pain 2010    Keloid cicatrix Years ago    From childhood scars    Caro     unplanned trips, energy without sleep for 2 days (reading, cleaning), feelings that she can do multiple tasks at one time, feelings of overconfidence at times    Psychiatric problem     Sleep difficulties     Therapy     Withdrawal symptoms, drug or narcotic     racing heart, restlessness       Past Surgical History:   Procedure Laterality Date    ESOPHAGOGASTRODUODENOSCOPY N/A 06/03/2020    Procedure: EGD (ESOPHAGOGASTRODUODENOSCOPY);  Surgeon: Johan Grover MD;  Location: [facility] (4TH FLR);  Service: Endoscopy;  Laterality: N/A;  covid 6/2-[employer]-tb    HYSTERECTOMY      LAPBAND  2009    PHLEBOGRAPHY Left 08/12/2020    Procedure: Venogram, pharmacomechanical thrombectomy;  Surgeon: Miguelangel Goldman MD;  Location: [facility] OR [address] [place] [address];  Service: Vascular;  Laterality: Left;  2336.43 mGy  494.42qfnn3  17.8 minutes  37 ml of contrast    VENOPLASTY Left 08/12/2020    Procedure: ANGIOPLASTY, VEIN;  Surgeon: Miguelangel Goldman MD;  Location: [facility] OR [address] [place] [address];  Service: Vascular;  Laterality: Left;       Review of patient's allergies indicates:   Allergen Reactions    Morphine Itching and Hallucinations    Pcn [penicillins] Other (See Comments)     Was told from childhood she couldn't take it    Sulfa (sulfonamide antibiotics) Nausea And Vomiting    Latex, natural rubber Rash       Current Facility-Administered Medications on File Prior to Encounter   Medication    triamcinolone acetonide injection 40 mg     Current Outpatient Medications on File Prior  to Encounter   Medication Sig    amitriptyline (ELAVIL) 50 MG tablet Take 1 tablet (50 mg total) by mouth every evening.    apixaban (ELIQUIS) 5 mg Tab Take 1 tablet (5 mg total) by mouth 2 (two) times daily.    ELIQUIS 5 mg Tab TAKE 1 TABLET BY MOUTH TWICE DAILY *START  AFTER  FINISHING  FIRST  PRESCRIPTION*    FLUoxetine 20 MG capsule Take 3 capsules (60 mg total) by mouth once daily.    hydrOXYchloroQUINE (PLAQUENIL) 200 mg tablet Take 1 tablet (200 mg total) by mouth 2 (two) times daily.    hydrOXYzine (ATARAX) 50 MG tablet Take 1 tablet (50 mg total) by mouth daily as needed for Anxiety (sleep).    lamoTRIgine (LAMICTAL) 100 MG tablet Take 1 tablet (100 mg total) by mouth once daily.    oxyCODONE-acetaminophen (PERCOCET)  mg per tablet Take 1 tablet by mouth every 12 (twelve) hours as needed for Pain.    pregabalin (LYRICA) 150 MG capsule Take 1 capsule (150 mg total) by mouth 3 (three) times daily.    diclofenac sodium (VOLTAREN) 1 % Gel Apply the gel (4 g) to the affected area 4 times daily. Do not apply more than 16 g daily to any one affected joint    methylPREDNISolone (MEDROL DOSEPACK) 4 mg tablet use as directed    nabumetone (RELAFEN) 750 MG tablet Take 1 tablet (750 mg total) by mouth 2 (two) times daily.     Family History       Problem Relation (Age of Onset)    Cancer Mother    Cirrhosis Maternal Uncle    Diabetes Mother    Hypertension Mother          Tobacco Use    Smoking status: Former     Packs/day: 0.00     Years: 0.00     Pack years: 0.00     Types: Cigarettes    Smokeless tobacco: Never    Tobacco comments:     quit in october 2016   Substance and Sexual Activity    Alcohol use: Yes     Alcohol/week: 1.0 - 3.0 standard drink     Types: 1 - 3 Glasses of wine per week     Comment: social presently, excessive 10 years ago    Drug use: Never     Types: Marijuana     Comment: uses THCA weekly    Sexual activity: Yes     Partners: Male     Birth control/protection: Condom, See Surgical Hx      Review of Systems   Unable to perform ROS: Mental status change   Objective:     Vital Signs (Most Recent):  Temp: 98.5 °F (36.9 °C) (01/18/23 1537)  Pulse: 89 (01/18/23 1802)  Resp: 15 (01/18/23 1802)  BP: 130/72 (01/18/23 1802)  SpO2: 99 % (01/18/23 1802) Vital Signs (24h Range):  Temp:  [98.5 °F (36.9 °C)] 98.5 °F (36.9 °C)  Pulse:  [89-91] 89  Resp:  [15-18] 15  SpO2:  [99 %-100 %] 99 %  BP: (130-140)/(72-79) 130/72     Weight: 122.5 kg (270 lb)  Body mass index is 46.35 kg/m².    Physical Exam  Vitals and nursing note reviewed.   Constitutional:       General: She is not in acute distress.     Appearance: She is well-developed.   HENT:      Head: Normocephalic and atraumatic.      Right Ear: External ear normal.      Left Ear: External ear normal.   Eyes:      General:         Right eye: No discharge.         Left eye: No discharge.      Conjunctiva/sclera: Conjunctivae normal.   Neck:      Thyroid: No thyromegaly.   Cardiovascular:      Rate and Rhythm: Normal rate and regular rhythm.      Heart sounds: No murmur heard.  Pulmonary:      Effort: Pulmonary effort is normal. No respiratory distress.      Breath sounds: Normal breath sounds.   Abdominal:      General: Bowel sounds are normal. There is no distension.      Palpations: Abdomen is soft. There is no mass.      Tenderness: There is no abdominal tenderness.   Musculoskeletal:         General: No deformity.      Cervical back: Normal range of motion.   Skin:     General: Skin is warm and dry.   Neurological:      Mental Status: She is alert.      Comments: Oriented to self and place. Symmetric movement of BUE and BLE against gravity. No facial droop.    Psychiatric:      Comments: Unable to assess           Significant Labs: CBC:   Recent Labs   Lab 01/18/23  1717   WBC 4.36   HGB 11.5*   HCT 34.4*        CMP:   Recent Labs   Lab 01/18/23  1717      K 3.6      CO2 30*   GLU 89   BUN 37*   CREATININE 2.5*   CALCIUM 8.8   PROT 7.4    ALBUMIN 3.8   BILITOT 0.5   ALKPHOS 95   AST 30   ALT 20   ANIONGAP 7*     Urine Studies:   Recent Labs   Lab 01/18/23  1725   COLORU Colorless*   APPEARANCEUA Clear   PHUR 5.0   SPECGRAV 1.010   PROTEINUA Negative   GLUCUA Negative   KETONESU Negative   BILIRUBINUA Negative   OCCULTUA Negative   NITRITE Negative   UROBILINOGEN Negative   LEUKOCYTESUR Negative       Significant Imaging:   Imaging Results              CT Head Without Contrast (Final result)  Result time 01/18/23 17:14:32      Final result by Cherie Mathur MD (01/18/23 17:14:32)                   Impression:      No acute intracranial abnormality detected. If persistent neurological abnormality, consider MRI of the brain with diffusion-weighted sequencing when clinically appropriate.    No acute cervical fracture.  Mild spondylitic changes.      Electronically signed by: Cherie Mathur  Date:    01/18/2023  Time:    17:14               Narrative:    EXAMINATION:  CT OF THE HEAD WITHOUT AND CT CERVICAL SPINE    CLINICAL HISTORY:  Head trauma, moderate-severe;Mental status change, unknown cause;; Neck trauma, intoxicated or obtunded (Age >= 16y);    TECHNIQUE:  5 mm unenhanced axial images were obtained from the skull base to the vertex.  1.25 mm axial images were obtained through the cervical spine.    COMPARISON:  CT head 07/09/2020    FINDINGS:  CT head: The ventricles, basal cisterns, and cortical sulci are within normal limits for patient's stated age. There is no acute intracranial hemorrhage, territorial infarct or mass effect, or midline shift. In the visualized paranasal sinuses, there is trace mucoperiosteal thickening involving the anterior aspects of bilateral sphenoid sinuses.    CT cervical spine: There is mild reversal normal cervical lordosis..  There is no acute fracture or subluxation.  Mild marginal osteophytes are present.  The bones are normally mineralized.                                       CT Cervical Spine Without  Contrast (Final result)  Result time 01/18/23 17:14:32      Final result by Cherie Mathur MD (01/18/23 17:14:32)                   Impression:      No acute intracranial abnormality detected. If persistent neurological abnormality, consider MRI of the brain with diffusion-weighted sequencing when clinically appropriate.    No acute cervical fracture.  Mild spondylitic changes.      Electronically signed by: Cherie Mathur  Date:    01/18/2023  Time:    17:14               Narrative:    EXAMINATION:  CT OF THE HEAD WITHOUT AND CT CERVICAL SPINE    CLINICAL HISTORY:  Head trauma, moderate-severe;Mental status change, unknown cause;; Neck trauma, intoxicated or obtunded (Age >= 16y);    TECHNIQUE:  5 mm unenhanced axial images were obtained from the skull base to the vertex.  1.25 mm axial images were obtained through the cervical spine.    COMPARISON:  CT head 07/09/2020    FINDINGS:  CT head: The ventricles, basal cisterns, and cortical sulci are within normal limits for patient's stated age. There is no acute intracranial hemorrhage, territorial infarct or mass effect, or midline shift. In the visualized paranasal sinuses, there is trace mucoperiosteal thickening involving the anterior aspects of bilateral sphenoid sinuses.    CT cervical spine: There is mild reversal normal cervical lordosis..  There is no acute fracture or subluxation.  Mild marginal osteophytes are present.  The bones are normally mineralized.                                       X-Ray Chest AP Portable (Final result)  Result time 01/18/23 16:58:26      Final result by Ama Monroe MD (01/18/23 16:58:26)                   Impression:      As above      Electronically signed by: Ama Monroe MD  Date:    01/18/2023  Time:    16:58               Narrative:    EXAMINATION:  XR CHEST AP PORTABLE    CLINICAL HISTORY:  Chest Pain;    TECHNIQUE:  Single frontal view of the chest was performed.    COMPARISON:  March 23,  2022    FINDINGS:  There is a diminished inspiration.  Cardiac size is upper normal, likely magnified by the technique.  No significant pleural effusion.  The osseous structures are unremarkable.  There is prominence of interstitial markings with some mild perihilar opacities.  This may relate to edema or infection, to be correlated clinically.                                       X-Ray Shoulder Trauma Left (Final result)  Result time 01/18/23 16:52:03      Final result by Ama Monroe MD (01/18/23 16:52:03)                   Impression:      As above      Electronically signed by: Ama Monroe MD  Date:    01/18/2023  Time:    16:52               Narrative:    EXAMINATION:  XR SHOULDER TRAUMA 3 VIEW LEFT    CLINICAL HISTORY:  Unspecified fall, initial encounter    TECHNIQUE:  Three views of the left shoulder were performed.    COMPARISON  None    FINDINGS:  Imaging degraded by patient body habitus.    There is no evidence of fracture, dislocation or significant degenerative change.                                        Assessment/Plan:     * AYDE (acute kidney injury)  Patient with acute kidney injury likely due to IVVD/dehydration AYDE is currently stable. Labs reviewed- Renal function/electrolytes with Estimated Creatinine Clearance: 34.8 mL/min (A) (based on SCr of 2.5 mg/dL (H)). according to latest data. Monitor urine output and serial BMP and adjust therapy as needed. Avoid nephrotoxins and renally dose meds for GFR listed above.   Presents with Cr of 2.5. from baseline of 1.1-1.4. with elevated CPK. Has had previous admission. Followed by neurology outpt for tremors attributed to possible benign muscle fasiculations. CPK of 660.  Started on LR  Repeat CPK  Home HCTZ/lyrica held    Encephalopathy  Presents with 2 days of worsening AMS, hallucinations, and tremors. Per mother began on Sunday. CT head without acute process. Unclear if caused by renal failure and continued taking of lyrica.  MRI brain  pending  Neurology consulted  Home lyrica/lamictal held  Check lamictal level  On IVF for AYDE as below  Neuro checks/aspiration precautions  TSH/UDS pending    Non-traumatic rhabdomyolysis  Continue IVF repeat CPK    Dyslipidemia  Home statin held for elevated CPK    Sjogren's syndrome with inflammatory arthritis  Previous SS elevated chronically elevated IGG. Folate B6 b12 and B1 within normal limits 13 days ago. Anti-thyroglobulin and Thyroid Peroxidase negative.   Home lyrica held for AMS and AYDE  Continue home plaquenil    Recurrent deep vein thrombosis (DVT) with hx of IVC filter; indefinite anticoagulation  Continue home eliquis    Essential hypertension  Home HCTZ held for AYDE    Discussed with ED physician  VTE Risk Mitigation (From admission, onward)           Ordered     Place BARBARA hose  Until discontinued         01/18/23 1902     IP VTE HIGH RISK PATIENT  Once         01/18/23 1902     Place sequential compression device  Until discontinued         01/18/23 1902                  VTE: BARBARA/SCD  Code:  Full  Diet: NPO pending passing nursing swallow assessment  Dispo: pending MRI, neuro eval, and improvement in renal function  As clarification, on 1/18/2023, patient should be admitted for hospital observation services under my care in collaboration with MD Jude Payne PA-C, PA-C  Department of Hospital Medicine   [facility] - Emergency Dept

## 2023-01-19 NOTE — ED NOTES
Bed: 28A  Expected date: 1/18/23  Expected time: 10:56 PM  Means of arrival:   Comments:  Uday room 4

## 2023-01-19 NOTE — HOSPITAL COURSE
Admission to observation for acute encephalopathy felt and ayde secondary to polypharmacy.  Lyrica held.  AYDE improved with IV fluid overnight.  CPK stable.  Mental status returned to baseline overnight awake alert orient x4.  Patient has generalized weakness upper lower extremity that is inconsistent between provided riders and exam.  MRI does note show acute stroke. PT/OT evaluation competed. Offered neurology consult however patient and mother wish for to go home and follow-up with primary Neurologist outpatient. Ambulatory refer to outpatient PT/OT for generalized strengthening and conditioning.   All findings and plan were explained to the patient. All questions and concerns were answered. Patient verbalized understanding. Patient is in stable condition to d/c home and has been informed to follow up with PCP and Neurology Jan 23.

## 2023-01-19 NOTE — PLAN OF CARE
01/19/23 1041   Discharge Planning   Assessment Type Discharge Planning Brief Assessment   Resource/Environmental Concerns none   Support Systems Parent;Family members   Equipment Currently Used at Home walker, rolling   Current Living Arrangements home   Patient/Family Anticipates Transition to home   Patient/Family Anticipated Services at Transition none   DME Needed Upon Discharge  none   Discharge Plan A Home with family  (with instructions to kinza garcia)       [id number] - [place] - [address]  [address]  [place] [address]  Phone: [phone] Fax: [phone]    [place], [place] - [address]  [address] [address]  Phone: [phone] Fax: [phone]

## 2023-01-19 NOTE — DISCHARGE SUMMARY
[facility] Emergency Dept  Hospital Medicine  Discharge Summary      Patient Name: Alberto Frye  MRN: [id number]  CHET: [id number]  Patient Class: OP- Observation  Admission Date: 1/18/2023  Hospital Length of Stay: 0 days  Discharge Date and Time:  01/19/2023 12:19 PM  Attending Physician: Pam Rodgers MD   Discharging Provider: Nya Schumacher NP  Primary Care Provider: Ian Cespedes MD    Primary Care Team: NYA SCHUMACHER    HPI:   Alberto Frye 48 y.o. female with Sjogren's history of DVT on chronic anticoagulation, HTN, presents to the hospital with a chief complaint AMS and tremors.  The patient is unsure of the events prior to hospital.  She denies any complaints currently.  Per mother she was last seen 5 days ago on Saturday at her birthday party.  She then awoke Sunday morning was altered and confused.  She is had intermittent hallucinations since that time.  She now diffuse tremors today which caused her mother to bring her to the hospital.  There have been no aggravating or alleviating factors have been no attempted treatment at home.  The mother denies any new medications.  Further history is limited due to altered mental status.    In the ED, creatinine 2.5 troponin negative x2 BNP negative  UDS negative UA without nitrites or leukocytes afebrile without leukocytosis x-ray shoulder without evidence of fracture dislocation chest x-ray diminished inspiration and interstitial marking prominence CT head without acute intracranial abnormality CT cervical spine without acute cervical fracture      * No surgery found *      Hospital Course:   Admission to observation for acute encephalopathy felt and lynn secondary to polypharmacy.  Lyrica held.  LYNN improved with IV fluid overnight.  CPK stable.  Mental status returned to baseline overnight awake alert orient x4.  Patient has generalized weakness upper lower extremity that is inconsistent between provided riders and exam.  MRI does  note show acute stroke. PT/OT evaluation competed. Offered neurology consult however patient and mother wish for to go home and follow-up with primary Neurologist outpatient. Ambulatory refer to outpatient PT/OT for generalized strengthening and conditioning. Reduce lyrica. See below for home discharge medication.   All findings and plan were explained to the patient. All questions and concerns were answered. Patient verbalized understanding. Patient is in stable condition to d/c home and has been informed to follow up with PCP and Neurology Jan 23.       Goals of Care Treatment Preferences:  Code Status: Full Code      Consults:   Consults (From admission, onward)        Status Ordering Provider     Case Management/  Once        Provider:  (Not yet assigned)    Completed SHOWERS, KARIN          Sjogren's syndrome with inflammatory arthritis  Previous SS elevated chronically elevated IGG. Folate B6 b12 and B1 within normal limits 13 days ago. Anti-thyroglobulin and Thyroid Peroxidase negative.   Home lyrica held for AMS and AYDE  Continue home plaquenil      Final Active Diagnoses:    Diagnosis Date Noted POA    PRINCIPAL PROBLEM:  AYDE (acute kidney injury) [N17.9] 11/01/2022 Yes    Sjogren's syndrome with inflammatory arthritis [M35.05] 10/13/2021 Yes    Non-traumatic rhabdomyolysis [M62.82] 11/01/2022 Yes    Encephalopathy [G93.40] 01/18/2023 Unknown    Dyslipidemia [E78.5] 11/17/2021 Yes    Recurrent deep vein thrombosis (DVT) with hx of IVC filter; indefinite anticoagulation [I82.409] 09/21/2020 Yes    Essential hypertension [I10] 01/22/2016 Yes      Problems Resolved During this Admission:       Discharged Condition: good    Disposition: Home or Self Care    Follow Up:   Follow-up Information     Carine Castro MD Follow up on 1/23/2023.    Specialty: Neurology  Why: at 7:40am  Contact information:  [address]  [place] [address] [phone]                       Patient  Instructions:      Ambulatory referral/consult to Physical/Occupational Therapy   Standing Status: Future   Referral Priority: Routine Referral Type: Physical Medicine   Referral Reason: Specialty Services Required   Number of Visits Requested: 1     Diet Cardiac     Notify your health care provider if you experience any of the following:  temperature >100.4     Notify your health care provider if you experience any of the following:  redness, tenderness, or signs of infection (pain, swelling, redness, odor or green/yellow discharge around incision site)     Notify your health care provider if you experience any of the following:  increased confusion or weakness     Activity as tolerated       Significant Diagnostic Studies: Labs: All labs within the past 24 hours have been reviewed    Pending Diagnostic Studies:     Procedure Component Value Units Date/Time    Lamotrigine level [[id number]] Collected: 01/19/23 0343    Order Status: Sent Lab Status: In process Updated: 01/19/23 0354    Specimen: Blood     Renal Function Panel [[id number]] Collected: 01/19/23 1217    Order Status: Sent Lab Status: In process Updated: 01/19/23 1217    Specimen: Blood          Medications:  Reconciled Home Medications:      Medication List      CHANGE how you take these medications    apixaban 5 mg Tab  Commonly known as: ELIQUIS  Take 1 tablet (5 mg total) by mouth 2 (two) times daily.  What changed: Another medication with the same name was removed. Continue taking this medication, and follow the directions you see here.     pregabalin 150 MG capsule  Commonly known as: LYRICA  Take 1 capsule (150 mg total) by mouth 2 (two) times daily.  What changed: when to take this        CONTINUE taking these medications    amitriptyline 50 MG tablet  Commonly known as: ELAVIL  Take 1 tablet (50 mg total) by mouth every evening.     diclofenac sodium 1 % Gel  Commonly known as: VOLTAREN  Apply the gel (4 g) to the affected area 4 times daily. Do  not apply more than 16 g daily to any one affected joint     FLUoxetine 20 MG capsule  Take 3 capsules (60 mg total) by mouth once daily.     hydrOXYchloroQUINE 200 mg tablet  Commonly known as: PLAQUENIL  Take 1 tablet (200 mg total) by mouth 2 (two) times daily.     hydrOXYzine 50 MG tablet  Commonly known as: ATARAX  Take 1 tablet (50 mg total) by mouth daily as needed for Anxiety (sleep).     lamoTRIgine 100 MG tablet  Commonly known as: LAMICTAL  Take 1 tablet (100 mg total) by mouth once daily.     methylPREDNISolone 4 mg tablet  Commonly known as: MEDROL DOSEPACK  use as directed     nabumetone 750 MG tablet  Commonly known as: RELAFEN  Take 1 tablet (750 mg total) by mouth 2 (two) times daily.     oxyCODONE-acetaminophen  mg per tablet  Commonly known as: PERCOCET  Take 1 tablet by mouth every 12 (twelve) hours as needed for Pain.            Indwelling Lines/Drains at time of discharge:   Lines/Drains/Airways     None                 Time spent on the discharge of patient: 30 minutes         Nya Castro NP  Department of Hospital Medicine  [facility] - Emergency Dept

## 2023-01-19 NOTE — ASSESSMENT & PLAN NOTE
Previous SS elevated chronically elevated IGG. Folate B6 b12 and B1 within normal limits 13 days ago. Anti-thyroglobulin and Thyroid Peroxidase negative.   Home lyrica held for AMS and AYDE  Continue home plaquenil

## 2023-01-19 NOTE — PT/OT/SLP EVAL
Occupational Therapy   Evaluation and Discharge Note    Name: Alberto Frye  MRN: [id number]  Admitting Diagnosis: AYDE (acute kidney injury)  Recent Surgery: * No surgery found *      Recommendations:     Discharge Recommendations: outpatient OT  Discharge Equipment Recommendations: bath bench  Barriers to discharge:  None (Pt has mother at bedside)    Assessment:     Alberto Frye is a 48 y.o. female with a medical diagnosis of AYDE (acute kidney injury). At this time, patient is functioning at their prior level of function and does not require further acute OT services.     Plan:     During this hospitalization, patient does not require further acute OT services.  Please re-consult if situation changes.    Plan of Care Reviewed with: patient    Subjective     Chief Complaint: Weakness  Patient/Family Comments/goals: To return to work to pay bills    Occupational Profile:  Living Environment: Pt resides in an apt with son  Previous level of function: Pt able to ambulate with RW with assistance when neeed  Roles and Routines: Pt works for [employer]  Equipment Used at home: walker, rolling  Assistance upon Discharge: family have previously been assisting    Pain/Comfort:  Pain Rating 1: 9/10 (pelic and calf)    Patients cultural, spiritual, Sikh conflicts given the current situation:      Objective:     Communicated with: NP prior to session.  Patient found supine with blood pressure cuff, PureWick, peripheral IV, telemetry upon OT entry to room.    General Precautions: Standard, fall  Orthopedic Precautions: N/A  Braces:    Respiratory Status: Room air     Occupational Performance:    Bed Mobility:    Patient completed Rolling/Turning to Left with  contact guard assistance  Patient completed Scooting/Bridging with contact guard assistance  Patient completed Supine to Sit with contact guard assistance    Functional Mobility/Transfers:  Patient completed Sit <> Stand Transfer with minimum assistance   with  rolling walker   Patient completed Bed <> Chair Transfer using Stand Pivot technique with contact guard assistance with rolling walker  Functional Mobility: Pt requires increased time for all tasks    Activities of Daily Living:  Upper Body Dressing: minimum assistance sitting edge of bed  Lower Body Dressing: maximal assistance in supine    Cognitive/Visual Perceptual:  Cognitive/Psychosocial Skills:     -       Oriented to: Person, Place, and Time   -       Follows Commands/attention:Follows one-step commands  -       Communication: clear/fluent  -       Safety awareness/insight to disability: impaired   -       Mood/Affect/Coping skills/emotional control: Flat affect  Visual/Perceptual:      -Intact      Physical Exam:  Edema:  Moderate in lower extremities and pt left in bedside chair with legs elevated    AMPAC 6 Click ADL:  AMPAC Total Score: 19    Treatment & Education:  -Evaluation    Patient left up in chair with all lines intact, call button in reach, and PT present    GOALS:   Multidisciplinary Problems       Occupational Therapy Goals          Problem: Occupational Therapy    Goal Priority Disciplines Outcome Interventions   Occupational Therapy Goal     OT, PT/OT Adequate for Care Transition                        History:     Past Medical History:   Diagnosis Date    AYDE (acute kidney injury) 11/1/2022    Alcohol abuse     stopped heavy drinking about 10 years ago; was drinking 3 glasses of vodka/tequilla,rum/whiskey per day    Allergy Don't remember    Its been some years.    Anxiety     Arthritis 2010    Blood clotting tendency 2008    After a procedure & 2020.    Congestive heart failure 8/11/2020    Depression     Hallucination     Hx of psychiatric care     Hypertension     Immune disorder 2020    Joint pain 2010    Keloid cicatrix Years ago    From childhood scars    Caro     unplanned trips, energy without sleep for 2 days (reading, cleaning), feelings that she can do multiple tasks at  one time, feelings of overconfidence at times    Psychiatric problem     Sleep difficulties     Therapy     Withdrawal symptoms, drug or narcotic     racing heart, restlessness         Past Surgical History:   Procedure Laterality Date    ESOPHAGOGASTRODUODENOSCOPY N/A 06/03/2020    Procedure: EGD (ESOPHAGOGASTRODUODENOSCOPY);  Surgeon: Johan Grover MD;  Location: [facility] (4TH FLR);  Service: Endoscopy;  Laterality: N/A;  covid 6/2-[facility]-tb    HYSTERECTOMY      LAPBAND  2009    PHLEBOGRAPHY Left 08/12/2020    Procedure: Venogram, pharmacomechanical thrombectomy;  Surgeon: Miguelangel Goldman MD;  [facility];  Service: Vascular;  Laterality: Left;  2336.43 mGy  [id number]  17.8 minutes  37 ml of contrast    VENOPLASTY Left 08/12/2020    Procedure: ANGIOPLASTY, VEIN;  Surgeon: Miguelangel Goldman MD;  [facility];  Service: Vascular;  Laterality: Left;       Time Tracking:     OT Date of Treatment: 01/19/23  OT Start Time: 0946  OT Stop Time: 1020  OT Total Time (min): 34 min    Billable Minutes:Evaluation 26/8 1/19/2023

## 2023-01-19 NOTE — ASSESSMENT & PLAN NOTE
Patient with acute kidney injury likely due to IVVD/dehydration AYDE is currently stable. Labs reviewed- Renal function/electrolytes with Estimated Creatinine Clearance: 34.8 mL/min (A) (based on SCr of 2.5 mg/dL (H)). according to latest data. Monitor urine output and serial BMP and adjust therapy as needed. Avoid nephrotoxins and renally dose meds for GFR listed above.   Presents with Cr of 2.5. from baseline of 1.1-1.4. with elevated CPK. Has had previous admission. Followed by neurology outpt for tremors attributed to possible benign muscle fasiculations. CPK of 660.  Started on LR  Repeat CPK  Home HCTZ/lyrica held

## 2023-01-19 NOTE — SUBJECTIVE & OBJECTIVE
Past Medical History:   Diagnosis Date    AYDE (acute kidney injury) 11/1/2022    Alcohol abuse     stopped heavy drinking about 10 years ago; was drinking 3 glasses of vodka/tequilla,rum/whiskey per day    Allergy Don't remember    Its been some years.    Anxiety     Arthritis 2010    Blood clotting tendency 2008    After a procedure & 2020.    Congestive heart failure 8/11/2020    Depression     Hallucination     Hx of psychiatric care     Hypertension     Immune disorder 2020    Joint pain 2010    Keloid cicatrix Years ago    From childhood scars    Caro     unplanned trips, energy without sleep for 2 days (reading, cleaning), feelings that she can do multiple tasks at one time, feelings of overconfidence at times    Psychiatric problem     Sleep difficulties     Therapy     Withdrawal symptoms, drug or narcotic     racing heart, restlessness       Past Surgical History:   Procedure Laterality Date    ESOPHAGOGASTRODUODENOSCOPY N/A 06/03/2020    Procedure: EGD (ESOPHAGOGASTRODUODENOSCOPY);  Surgeon: Johan Grover MD;  Location: [facility] (4TH FLR);  Service: Endoscopy;  Laterality: N/A;  covid 6/2-[facility]-tb    HYSTERECTOMY      LAPBAND  2009    PHLEBOGRAPHY Left 08/12/2020    Procedure: Venogram, pharmacomechanical thrombectomy;  Surgeon: Miguelangel Goldman MD;  Location: [facility] OR [address] [place] [address];  Service: Vascular;  Laterality: Left;  2336.43 mGy  [id number]  17.8 minutes  37 ml of contrast    VENOPLASTY Left 08/12/2020    Procedure: ANGIOPLASTY, VEIN;  Surgeon: Miguelangel Goldman MD;  Location: [facility] OR [address] [place] [address];  Service: Vascular;  Laterality: Left;       Review of patient's allergies indicates:   Allergen Reactions    Morphine Itching and Hallucinations    Pcn [penicillins] Other (See Comments)     Was told from childhood she couldn't take it    Sulfa (sulfonamide antibiotics) Nausea And Vomiting    Latex, natural rubber Rash       Current Facility-Administered Medications on File Prior to  Encounter   Medication    triamcinolone acetonide injection 40 mg     Current Outpatient Medications on File Prior to Encounter   Medication Sig    amitriptyline (ELAVIL) 50 MG tablet Take 1 tablet (50 mg total) by mouth every evening.    apixaban (ELIQUIS) 5 mg Tab Take 1 tablet (5 mg total) by mouth 2 (two) times daily.    ELIQUIS 5 mg Tab TAKE 1 TABLET BY MOUTH TWICE DAILY *START  AFTER  FINISHING  FIRST  PRESCRIPTION*    FLUoxetine 20 MG capsule Take 3 capsules (60 mg total) by mouth once daily.    hydrOXYchloroQUINE (PLAQUENIL) 200 mg tablet Take 1 tablet (200 mg total) by mouth 2 (two) times daily.    hydrOXYzine (ATARAX) 50 MG tablet Take 1 tablet (50 mg total) by mouth daily as needed for Anxiety (sleep).    lamoTRIgine (LAMICTAL) 100 MG tablet Take 1 tablet (100 mg total) by mouth once daily.    oxyCODONE-acetaminophen (PERCOCET)  mg per tablet Take 1 tablet by mouth every 12 (twelve) hours as needed for Pain.    pregabalin (LYRICA) 150 MG capsule Take 1 capsule (150 mg total) by mouth 3 (three) times daily.    diclofenac sodium (VOLTAREN) 1 % Gel Apply the gel (4 g) to the affected area 4 times daily. Do not apply more than 16 g daily to any one affected joint    methylPREDNISolone (MEDROL DOSEPACK) 4 mg tablet use as directed    nabumetone (RELAFEN) 750 MG tablet Take 1 tablet (750 mg total) by mouth 2 (two) times daily.     Family History       Problem Relation (Age of Onset)    Cancer Mother    Cirrhosis Maternal Uncle    Diabetes Mother    Hypertension Mother          Tobacco Use    Smoking status: Former     Packs/day: 0.00     Years: 0.00     Pack years: 0.00     Types: Cigarettes    Smokeless tobacco: Never    Tobacco comments:     quit in october 2016   Substance and Sexual Activity    Alcohol use: Yes     Alcohol/week: 1.0 - 3.0 standard drink     Types: 1 - 3 Glasses of wine per week     Comment: social presently, excessive 10 years ago    Drug use: Never     Types: Marijuana     Comment:  uses THCA weekly    Sexual activity: Yes     Partners: Male     Birth control/protection: Condom, See Surgical Hx     Review of Systems   Unable to perform ROS: Mental status change   Objective:     Vital Signs (Most Recent):  Temp: 98.5 °F (36.9 °C) (01/18/23 1537)  Pulse: 89 (01/18/23 1802)  Resp: 15 (01/18/23 1802)  BP: 130/72 (01/18/23 1802)  SpO2: 99 % (01/18/23 1802) Vital Signs (24h Range):  Temp:  [98.5 °F (36.9 °C)] 98.5 °F (36.9 °C)  Pulse:  [89-91] 89  Resp:  [15-18] 15  SpO2:  [99 %-100 %] 99 %  BP: (130-140)/(72-79) 130/72     Weight: 122.5 kg (270 lb)  Body mass index is 46.35 kg/m².    Physical Exam  Vitals and nursing note reviewed.   Constitutional:       General: She is not in acute distress.     Appearance: She is well-developed.   HENT:      Head: Normocephalic and atraumatic.      Right Ear: External ear normal.      Left Ear: External ear normal.   Eyes:      General:         Right eye: No discharge.         Left eye: No discharge.      Conjunctiva/sclera: Conjunctivae normal.   Neck:      Thyroid: No thyromegaly.   Cardiovascular:      Rate and Rhythm: Normal rate and regular rhythm.      Heart sounds: No murmur heard.  Pulmonary:      Effort: Pulmonary effort is normal. No respiratory distress.      Breath sounds: Normal breath sounds.   Abdominal:      General: Bowel sounds are normal. There is no distension.      Palpations: Abdomen is soft. There is no mass.      Tenderness: There is no abdominal tenderness.   Musculoskeletal:         General: No deformity.      Cervical back: Normal range of motion.   Skin:     General: Skin is warm and dry.   Neurological:      Mental Status: She is alert.      Comments: Oriented to self and place. Symmetric movement of BUE and BLE against gravity. No facial droop.    Psychiatric:      Comments: Unable to assess           Significant Labs: CBC:   Recent Labs   Lab 01/18/23  1717   WBC 4.36   HGB 11.5*   HCT 34.4*        CMP:   Recent Labs   Lab  01/18/23  1717      K 3.6      CO2 30*   GLU 89   BUN 37*   CREATININE 2.5*   CALCIUM 8.8   PROT 7.4   ALBUMIN 3.8   BILITOT 0.5   ALKPHOS 95   AST 30   ALT 20   ANIONGAP 7*     Urine Studies:   Recent Labs   Lab 01/18/23  1725   COLORU Colorless*   APPEARANCEUA Clear   PHUR 5.0   SPECGRAV 1.010   PROTEINUA Negative   GLUCUA Negative   KETONESU Negative   BILIRUBINUA Negative   OCCULTUA Negative   NITRITE Negative   UROBILINOGEN Negative   LEUKOCYTESUR Negative       Significant Imaging:   Imaging Results              CT Head Without Contrast (Final result)  Result time 01/18/23 17:14:32      Final result by Cherie Mathur MD (01/18/23 17:14:32)                   Impression:      No acute intracranial abnormality detected. If persistent neurological abnormality, consider MRI of the brain with diffusion-weighted sequencing when clinically appropriate.    No acute cervical fracture.  Mild spondylitic changes.      Electronically signed by: Cherie Mathur  Date:    01/18/2023  Time:    17:14               Narrative:    EXAMINATION:  CT OF THE HEAD WITHOUT AND CT CERVICAL SPINE    CLINICAL HISTORY:  Head trauma, moderate-severe;Mental status change, unknown cause;; Neck trauma, intoxicated or obtunded (Age >= 16y);    TECHNIQUE:  5 mm unenhanced axial images were obtained from the skull base to the vertex.  1.25 mm axial images were obtained through the cervical spine.    COMPARISON:  CT head 07/09/2020    FINDINGS:  CT head: The ventricles, basal cisterns, and cortical sulci are within normal limits for patient's stated age. There is no acute intracranial hemorrhage, territorial infarct or mass effect, or midline shift. In the visualized paranasal sinuses, there is trace mucoperiosteal thickening involving the anterior aspects of bilateral sphenoid sinuses.    CT cervical spine: There is mild reversal normal cervical lordosis..  There is no acute fracture or subluxation.  Mild marginal osteophytes are  present.  The bones are normally mineralized.                                       CT Cervical Spine Without Contrast (Final result)  Result time 01/18/23 17:14:32      Final result by Cherie Mathur MD (01/18/23 17:14:32)                   Impression:      No acute intracranial abnormality detected. If persistent neurological abnormality, consider MRI of the brain with diffusion-weighted sequencing when clinically appropriate.    No acute cervical fracture.  Mild spondylitic changes.      Electronically signed by: Cherie Mathur  Date:    01/18/2023  Time:    17:14               Narrative:    EXAMINATION:  CT OF THE HEAD WITHOUT AND CT CERVICAL SPINE    CLINICAL HISTORY:  Head trauma, moderate-severe;Mental status change, unknown cause;; Neck trauma, intoxicated or obtunded (Age >= 16y);    TECHNIQUE:  5 mm unenhanced axial images were obtained from the skull base to the vertex.  1.25 mm axial images were obtained through the cervical spine.    COMPARISON:  CT head 07/09/2020    FINDINGS:  CT head: The ventricles, basal cisterns, and cortical sulci are within normal limits for patient's stated age. There is no acute intracranial hemorrhage, territorial infarct or mass effect, or midline shift. In the visualized paranasal sinuses, there is trace mucoperiosteal thickening involving the anterior aspects of bilateral sphenoid sinuses.    CT cervical spine: There is mild reversal normal cervical lordosis..  There is no acute fracture or subluxation.  Mild marginal osteophytes are present.  The bones are normally mineralized.                                       X-Ray Chest AP Portable (Final result)  Result time 01/18/23 16:58:26      Final result by Ama Monroe MD (01/18/23 16:58:26)                   Impression:      As above      Electronically signed by: Ama Monroe MD  Date:    01/18/2023  Time:    16:58               Narrative:    EXAMINATION:  XR CHEST AP PORTABLE    CLINICAL HISTORY:  Chest  Pain;    TECHNIQUE:  Single frontal view of the chest was performed.    COMPARISON:  March 23, 2022    FINDINGS:  There is a diminished inspiration.  Cardiac size is upper normal, likely magnified by the technique.  No significant pleural effusion.  The osseous structures are unremarkable.  There is prominence of interstitial markings with some mild perihilar opacities.  This may relate to edema or infection, to be correlated clinically.                                       X-Ray Shoulder Trauma Left (Final result)  Result time 01/18/23 16:52:03      Final result by Ama Monroe MD (01/18/23 16:52:03)                   Impression:      As above      Electronically signed by: Ama Monroe MD  Date:    01/18/2023  Time:    16:52               Narrative:    EXAMINATION:  XR SHOULDER TRAUMA 3 VIEW LEFT    CLINICAL HISTORY:  Unspecified fall, initial encounter    TECHNIQUE:  Three views of the left shoulder were performed.    COMPARISON  None    FINDINGS:  Imaging degraded by patient body habitus.    There is no evidence of fracture, dislocation or significant degenerative change.

## 2023-01-19 NOTE — HPI
Alberto Frye 48 y.o. female with Sjogren's history of DVT on chronic anticoagulation, HTN, presents to the hospital with a chief complaint AMS and tremors.  The patient is unsure of the events prior to hospital.  She denies any complaints currently.  Per mother she was last seen 5 days ago on Saturday at her birthday party.  She then awoke Sunday morning was altered and confused.  She is had intermittent hallucinations since that time.  She now diffuse tremors today which caused her mother to bring her to the hospital.  There have been no aggravating or alleviating factors have been no attempted treatment at home.  The mother denies any new medications.  Further history is limited due to altered mental status.    In the ED, creatinine 2.5 troponin negative x2 BNP negative  UDS negative UA without nitrites or leukocytes afebrile without leukocytosis x-ray shoulder without evidence of fracture dislocation chest x-ray diminished inspiration and interstitial marking prominence CT head without acute intracranial abnormality CT cervical spine without acute cervical fracture

## 2023-01-19 NOTE — PLAN OF CARE
Problem: Physical Therapy  Goal: Physical Therapy Goal  Outcome: Adequate for Care Transition   Initial PT evaluation performed today.  Pt OK for D/C home with family support.  Outpatient PT recommended for  generalized strengthening and conditioning.  RW in place,  PT to sign off.

## 2023-01-19 NOTE — ED NOTES
Assumed care, Pt resting in bed with mother at bedside. Denies complaints. Pt states last event she remembers was her birthday party on Jan 16. Pt alert and oriented to person, place. Confused on the date. No tremors noted on assessment. Pt admitted for further workup. Pending MRI and bed assignment. See assessments for further.

## 2023-01-19 NOTE — PLAN OF CARE
01/19/23 1145   Final Note   Assessment Type Final Discharge Note   Anticipated Discharge Disposition Home   Hospital Resources/Appts/Education Provided Appointments scheduled and added to AVS   Post-Acute Status   Post-Acute Authorization Other     Referral entered for OP PT/OT and neurology appointment scheduled-- nursing notified that the pt can d/c from CM standpoint

## 2023-01-19 NOTE — ASSESSMENT & PLAN NOTE
Presents with 2 days of worsening AMS, hallucinations, and tremors. Per mother began on Sunday. CT head without acute process. Unclear if caused by renal failure and continued taking of lyrica.  MRI brain pending  Neurology consulted  Home lyrica/lamictal held  Check lamictal level  On IVF for AYDE as below  Neuro checks/aspiration precautions  TSH/UDS pending

## 2023-01-19 NOTE — PLAN OF CARE
Problem: Occupational Therapy  Goal: Occupational Therapy Goal  Outcome: Adequate for Care Transition      Pt has support of family in the home. Pt may benefit from Outpt OT if requested.

## 2023-01-23 ENCOUNTER — OFFICE VISIT (OUTPATIENT)
Dept: NEUROLOGY | Facility: CLINIC | Age: 48
End: 2023-01-23
Payer: COMMERCIAL

## 2023-01-23 ENCOUNTER — LAB VISIT (OUTPATIENT)
Dept: LAB | Facility: HOSPITAL | Age: 48
End: 2023-01-23
Attending: STUDENT IN AN ORGANIZED HEALTH CARE EDUCATION/TRAINING PROGRAM
Payer: COMMERCIAL

## 2023-01-23 VITALS
SYSTOLIC BLOOD PRESSURE: 179 MMHG | DIASTOLIC BLOOD PRESSURE: 114 MMHG | HEIGHT: 64 IN | WEIGHT: 261 LBS | HEART RATE: 87 BPM | BODY MASS INDEX: 44.56 KG/M2

## 2023-01-23 DIAGNOSIS — M60.9 MYOSITIS, UNSPECIFIED MYOSITIS TYPE, UNSPECIFIED SITE: Primary | ICD-10-CM

## 2023-01-23 DIAGNOSIS — R25.3 MUSCLE TWITCHING: ICD-10-CM

## 2023-01-23 DIAGNOSIS — G72.9 MYOPATHY: ICD-10-CM

## 2023-01-23 LAB — LAMOTRIGINE SERPL-MCNC: 7.5 UG/ML (ref 2–15)

## 2023-01-23 PROCEDURE — 99214 PR OFFICE/OUTPT VISIT, EST, LEVL IV, 30-39 MIN: ICD-10-PCS | Mod: S$GLB,,, | Performed by: STUDENT IN AN ORGANIZED HEALTH CARE EDUCATION/TRAINING PROGRAM

## 2023-01-23 PROCEDURE — 99999 PR PBB SHADOW E&M-EST. PATIENT-LVL III: ICD-10-PCS | Mod: PBBFAC,,, | Performed by: STUDENT IN AN ORGANIZED HEALTH CARE EDUCATION/TRAINING PROGRAM

## 2023-01-23 PROCEDURE — 3008F BODY MASS INDEX DOCD: CPT | Mod: CPTII,S$GLB,, | Performed by: STUDENT IN AN ORGANIZED HEALTH CARE EDUCATION/TRAINING PROGRAM

## 2023-01-23 PROCEDURE — 3080F PR MOST RECENT DIASTOLIC BLOOD PRESSURE >= 90 MM HG: ICD-10-PCS | Mod: CPTII,S$GLB,, | Performed by: STUDENT IN AN ORGANIZED HEALTH CARE EDUCATION/TRAINING PROGRAM

## 2023-01-23 PROCEDURE — 3077F SYST BP >= 140 MM HG: CPT | Mod: CPTII,S$GLB,, | Performed by: STUDENT IN AN ORGANIZED HEALTH CARE EDUCATION/TRAINING PROGRAM

## 2023-01-23 PROCEDURE — 83516 IMMUNOASSAY NONANTIBODY: CPT | Mod: 59 | Performed by: STUDENT IN AN ORGANIZED HEALTH CARE EDUCATION/TRAINING PROGRAM

## 2023-01-23 PROCEDURE — 3008F PR BODY MASS INDEX (BMI) DOCUMENTED: ICD-10-PCS | Mod: CPTII,S$GLB,, | Performed by: STUDENT IN AN ORGANIZED HEALTH CARE EDUCATION/TRAINING PROGRAM

## 2023-01-23 PROCEDURE — 1159F PR MEDICATION LIST DOCUMENTED IN MEDICAL RECORD: ICD-10-PCS | Mod: CPTII,S$GLB,, | Performed by: STUDENT IN AN ORGANIZED HEALTH CARE EDUCATION/TRAINING PROGRAM

## 2023-01-23 PROCEDURE — 99999 PR PBB SHADOW E&M-EST. PATIENT-LVL III: CPT | Mod: PBBFAC,,, | Performed by: STUDENT IN AN ORGANIZED HEALTH CARE EDUCATION/TRAINING PROGRAM

## 2023-01-23 PROCEDURE — 3080F DIAST BP >= 90 MM HG: CPT | Mod: CPTII,S$GLB,, | Performed by: STUDENT IN AN ORGANIZED HEALTH CARE EDUCATION/TRAINING PROGRAM

## 2023-01-23 PROCEDURE — 1159F MED LIST DOCD IN RCRD: CPT | Mod: CPTII,S$GLB,, | Performed by: STUDENT IN AN ORGANIZED HEALTH CARE EDUCATION/TRAINING PROGRAM

## 2023-01-23 PROCEDURE — 3077F PR MOST RECENT SYSTOLIC BLOOD PRESSURE >= 140 MM HG: ICD-10-PCS | Mod: CPTII,S$GLB,, | Performed by: STUDENT IN AN ORGANIZED HEALTH CARE EDUCATION/TRAINING PROGRAM

## 2023-01-23 PROCEDURE — 99214 OFFICE O/P EST MOD 30 MIN: CPT | Mod: S$GLB,,, | Performed by: STUDENT IN AN ORGANIZED HEALTH CARE EDUCATION/TRAINING PROGRAM

## 2023-01-23 PROCEDURE — 36415 COLL VENOUS BLD VENIPUNCTURE: CPT | Performed by: STUDENT IN AN ORGANIZED HEALTH CARE EDUCATION/TRAINING PROGRAM

## 2023-01-24 ENCOUNTER — TELEPHONE (OUTPATIENT)
Dept: ENDOSCOPY | Facility: HOSPITAL | Age: 48
End: 2023-01-24
Payer: COMMERCIAL

## 2023-01-24 ENCOUNTER — HOSPITAL ENCOUNTER (EMERGENCY)
Facility: HOSPITAL | Age: 48
Discharge: HOME | End: 2023-01-24
Attending: EMERGENCY MEDICINE
Payer: COMMERCIAL

## 2023-01-24 ENCOUNTER — OFFICE VISIT (OUTPATIENT)
Dept: GASTROENTEROLOGY | Facility: CLINIC | Age: 48
End: 2023-01-24
Payer: COMMERCIAL

## 2023-01-24 ENCOUNTER — PATIENT MESSAGE (OUTPATIENT)
Dept: FAMILY MEDICINE | Facility: CLINIC | Age: 48
End: 2023-01-24
Payer: COMMERCIAL

## 2023-01-24 ENCOUNTER — PATIENT MESSAGE (OUTPATIENT)
Dept: RHEUMATOLOGY | Facility: CLINIC | Age: 48
End: 2023-01-24
Payer: COMMERCIAL

## 2023-01-24 VITALS
DIASTOLIC BLOOD PRESSURE: 104 MMHG | HEART RATE: 88 BPM | HEIGHT: 64 IN | BODY MASS INDEX: 44.72 KG/M2 | WEIGHT: 261.94 LBS | SYSTOLIC BLOOD PRESSURE: 144 MMHG

## 2023-01-24 VITALS
TEMPERATURE: 98 F | DIASTOLIC BLOOD PRESSURE: 79 MMHG | OXYGEN SATURATION: 97 % | RESPIRATION RATE: 18 BRPM | SYSTOLIC BLOOD PRESSURE: 158 MMHG | HEART RATE: 70 BPM

## 2023-01-24 DIAGNOSIS — D64.9 LOW HEMOGLOBIN: ICD-10-CM

## 2023-01-24 DIAGNOSIS — Z09 HOSPITAL DISCHARGE FOLLOW-UP: ICD-10-CM

## 2023-01-24 DIAGNOSIS — R13.10 DYSPHAGIA, UNSPECIFIED TYPE: ICD-10-CM

## 2023-01-24 DIAGNOSIS — Z98.84 HISTORY OF LAPAROSCOPIC ADJUSTABLE GASTRIC BANDING: ICD-10-CM

## 2023-01-24 DIAGNOSIS — R13.19 OTHER DYSPHAGIA: ICD-10-CM

## 2023-01-24 DIAGNOSIS — K92.1 HEMATOCHEZIA: ICD-10-CM

## 2023-01-24 DIAGNOSIS — M79.7 FIBROMYALGIA: ICD-10-CM

## 2023-01-24 DIAGNOSIS — M79.7 FIBROMYALGIA AFFECTING UPPER ARM: Primary | ICD-10-CM

## 2023-01-24 DIAGNOSIS — I10 ESSENTIAL HYPERTENSION: ICD-10-CM

## 2023-01-24 DIAGNOSIS — M79.602 LEFT ARM PAIN: ICD-10-CM

## 2023-01-24 DIAGNOSIS — E66.01 MORBID OBESITY WITH BMI OF 40.0-44.9, ADULT: Primary | ICD-10-CM

## 2023-01-24 DIAGNOSIS — K92.1 HEMATOCHEZIA: Primary | ICD-10-CM

## 2023-01-24 LAB
ALBUMIN SERPL BCP-MCNC: 4.1 G/DL (ref 3.5–5.2)
ALP SERPL-CCNC: 91 U/L (ref 55–135)
ALT SERPL W/O P-5'-P-CCNC: 21 U/L (ref 10–44)
ANION GAP SERPL CALC-SCNC: 10 MMOL/L (ref 8–16)
ANTI-PM/SCL AB: <20 UNITS
ANTI-SS-A 52 KD AB, IGG: <20 UNITS
AST SERPL-CCNC: 37 U/L (ref 10–40)
BASOPHILS # BLD AUTO: 0.02 K/UL (ref 0–0.2)
BASOPHILS NFR BLD: 0.5 % (ref 0–1.9)
BILIRUB SERPL-MCNC: 0.5 MG/DL (ref 0.1–1)
BNP SERPL-MCNC: 29 PG/ML (ref 0–99)
BUN SERPL-MCNC: 11 MG/DL (ref 6–20)
CALCIUM SERPL-MCNC: 10 MG/DL (ref 8.7–10.5)
CHLORIDE SERPL-SCNC: 104 MMOL/L (ref 95–110)
CK SERPL-CCNC: 260 U/L (ref 20–180)
CO2 SERPL-SCNC: 25 MMOL/L (ref 23–29)
CREAT SERPL-MCNC: 1.1 MG/DL (ref 0.5–1.4)
CRP SERPL-MCNC: 26.8 MG/L (ref 0–8.2)
DIFFERENTIAL METHOD: NORMAL
EJ AB SER QL: NEGATIVE
ENA JO1 AB SER IA-ACNC: <20 UNITS
ENA SM+RNP AB SER IA-ACNC: <20 UNITS
EOSINOPHIL # BLD AUTO: 0.1 K/UL (ref 0–0.5)
EOSINOPHIL NFR BLD: 2.4 % (ref 0–8)
ERYTHROCYTE [DISTWIDTH] IN BLOOD BY AUTOMATED COUNT: 14.1 % (ref 11.5–14.5)
EST. GFR  (NO RACE VARIABLE): >60 ML/MIN/1.73 M^2
FIBRILLARIN (U3 RNP): NEGATIVE
GLUCOSE SERPL-MCNC: 78 MG/DL (ref 70–110)
HCT VFR BLD AUTO: 40.9 % (ref 37–48.5)
HGB BLD-MCNC: 13.1 G/DL (ref 12–16)
IMM GRANULOCYTES # BLD AUTO: 0.02 K/UL (ref 0–0.04)
IMM GRANULOCYTES NFR BLD AUTO: 0.5 % (ref 0–0.5)
KU AB SER QL: NEGATIVE
LYMPHOCYTES # BLD AUTO: 1.7 K/UL (ref 1–4.8)
LYMPHOCYTES NFR BLD: 40.7 % (ref 18–48)
MCH RBC QN AUTO: 28.7 PG (ref 27–31)
MCHC RBC AUTO-ENTMCNC: 32 G/DL (ref 32–36)
MCV RBC AUTO: 90 FL (ref 82–98)
MDA-5 (P140): <20 UNITS
MI2 AB SER QL: NEGATIVE
MONOCYTES # BLD AUTO: 0.3 K/UL (ref 0.3–1)
MONOCYTES NFR BLD: 7.6 % (ref 4–15)
NEUTROPHILS # BLD AUTO: 2.1 K/UL (ref 1.8–7.7)
NEUTROPHILS NFR BLD: 48.3 % (ref 38–73)
NRBC BLD-RTO: 0 /100 WBC
NXP-2 (P140): <20 UNITS
OJ AB SER QL: NEGATIVE
PL12 AB SER QL: NEGATIVE
PL7 AB SER QL: NEGATIVE
PLATELET # BLD AUTO: 224 K/UL (ref 150–450)
PMV BLD AUTO: 10.6 FL (ref 9.2–12.9)
POC CARDIAC TROPONIN I: 0 NG/ML (ref 0–0.08)
POTASSIUM SERPL-SCNC: 4.2 MMOL/L (ref 3.5–5.1)
PROT SERPL-MCNC: 8.6 G/DL (ref 6–8.4)
RBC # BLD AUTO: 4.56 M/UL (ref 4–5.4)
SAMPLE: NORMAL
SODIUM SERPL-SCNC: 139 MMOL/L (ref 136–145)
SRP AB SERPL QL: NEGATIVE
TH/TO: NEGATIVE
TIF1 GAMMA (P155/140): <20 UNITS
TROPONIN I SERPL DL<=0.01 NG/ML-MCNC: <0.006 NG/ML (ref 0–0.03)
U2 SNRNP: NEGATIVE
WBC # BLD AUTO: 4.23 K/UL (ref 3.9–12.7)

## 2023-01-24 PROCEDURE — 99215 PR OFFICE/OUTPT VISIT, EST, LEVL V, 40-54 MIN: ICD-10-PCS | Mod: S$GLB,,, | Performed by: INTERNAL MEDICINE

## 2023-01-24 PROCEDURE — 3077F PR MOST RECENT SYSTOLIC BLOOD PRESSURE >= 140 MM HG: ICD-10-PCS | Mod: CPTII,S$GLB,, | Performed by: INTERNAL MEDICINE

## 2023-01-24 PROCEDURE — 96374 THER/PROPH/DIAG INJ IV PUSH: CPT

## 2023-01-24 PROCEDURE — 1159F PR MEDICATION LIST DOCUMENTED IN MEDICAL RECORD: ICD-10-PCS | Mod: CPTII,S$GLB,, | Performed by: INTERNAL MEDICINE

## 2023-01-24 PROCEDURE — 63600175 PHARM REV CODE 636 W HCPCS: Performed by: PHYSICIAN ASSISTANT

## 2023-01-24 PROCEDURE — 84484 ASSAY OF TROPONIN QUANT: CPT

## 2023-01-24 PROCEDURE — 3008F PR BODY MASS INDEX (BMI) DOCUMENTED: ICD-10-PCS | Mod: CPTII,S$GLB,, | Performed by: INTERNAL MEDICINE

## 2023-01-24 PROCEDURE — 1159F MED LIST DOCD IN RCRD: CPT | Mod: CPTII,S$GLB,, | Performed by: INTERNAL MEDICINE

## 2023-01-24 PROCEDURE — 83880 ASSAY OF NATRIURETIC PEPTIDE: CPT | Performed by: PHYSICIAN ASSISTANT

## 2023-01-24 PROCEDURE — 99285 PR EMERGENCY DEPT VISIT,LEVEL V: ICD-10-PCS | Mod: ,,, | Performed by: PHYSICIAN ASSISTANT

## 2023-01-24 PROCEDURE — 86140 C-REACTIVE PROTEIN: CPT | Performed by: PHYSICIAN ASSISTANT

## 2023-01-24 PROCEDURE — 99285 EMERGENCY DEPT VISIT HI MDM: CPT | Mod: ,,, | Performed by: PHYSICIAN ASSISTANT

## 2023-01-24 PROCEDURE — 3080F PR MOST RECENT DIASTOLIC BLOOD PRESSURE >= 90 MM HG: ICD-10-PCS | Mod: CPTII,S$GLB,, | Performed by: INTERNAL MEDICINE

## 2023-01-24 PROCEDURE — 99999 PR PBB SHADOW E&M-EST. PATIENT-LVL V: CPT | Mod: PBBFAC,,, | Performed by: INTERNAL MEDICINE

## 2023-01-24 PROCEDURE — 3080F DIAST BP >= 90 MM HG: CPT | Mod: CPTII,S$GLB,, | Performed by: INTERNAL MEDICINE

## 2023-01-24 PROCEDURE — 99999 PR PBB SHADOW E&M-EST. PATIENT-LVL V: ICD-10-PCS | Mod: PBBFAC,,, | Performed by: INTERNAL MEDICINE

## 2023-01-24 PROCEDURE — 96376 TX/PRO/DX INJ SAME DRUG ADON: CPT

## 2023-01-24 PROCEDURE — 84484 ASSAY OF TROPONIN QUANT: CPT | Performed by: PHYSICIAN ASSISTANT

## 2023-01-24 PROCEDURE — 93005 ELECTROCARDIOGRAM TRACING: CPT

## 2023-01-24 PROCEDURE — 99215 OFFICE O/P EST HI 40 MIN: CPT | Mod: S$GLB,,, | Performed by: INTERNAL MEDICINE

## 2023-01-24 PROCEDURE — 82550 ASSAY OF CK (CPK): CPT | Performed by: PHYSICIAN ASSISTANT

## 2023-01-24 PROCEDURE — 1160F PR REVIEW ALL MEDS BY PRESCRIBER/CLIN PHARMACIST DOCUMENTED: ICD-10-PCS | Mod: CPTII,S$GLB,, | Performed by: INTERNAL MEDICINE

## 2023-01-24 PROCEDURE — 93010 ELECTROCARDIOGRAM REPORT: CPT | Mod: ,,, | Performed by: INTERNAL MEDICINE

## 2023-01-24 PROCEDURE — 85025 COMPLETE CBC W/AUTO DIFF WBC: CPT | Performed by: PHYSICIAN ASSISTANT

## 2023-01-24 PROCEDURE — 80053 COMPREHEN METABOLIC PANEL: CPT | Performed by: PHYSICIAN ASSISTANT

## 2023-01-24 PROCEDURE — 1160F RVW MEDS BY RX/DR IN RCRD: CPT | Mod: CPTII,S$GLB,, | Performed by: INTERNAL MEDICINE

## 2023-01-24 PROCEDURE — 96375 TX/PRO/DX INJ NEW DRUG ADDON: CPT

## 2023-01-24 PROCEDURE — 3077F SYST BP >= 140 MM HG: CPT | Mod: CPTII,S$GLB,, | Performed by: INTERNAL MEDICINE

## 2023-01-24 PROCEDURE — 25000003 PHARM REV CODE 250: Performed by: PHYSICIAN ASSISTANT

## 2023-01-24 PROCEDURE — 3008F BODY MASS INDEX DOCD: CPT | Mod: CPTII,S$GLB,, | Performed by: INTERNAL MEDICINE

## 2023-01-24 PROCEDURE — 99285 EMERGENCY DEPT VISIT HI MDM: CPT | Mod: 25

## 2023-01-24 PROCEDURE — 93010 EKG 12-LEAD: ICD-10-PCS | Mod: ,,, | Performed by: INTERNAL MEDICINE

## 2023-01-24 RX ORDER — HYDROMORPHONE HYDROCHLORIDE 1 MG/ML
1 INJECTION, SOLUTION INTRAMUSCULAR; INTRAVENOUS; SUBCUTANEOUS
Status: COMPLETED | OUTPATIENT
Start: 2023-01-24 | End: 2023-01-24

## 2023-01-24 RX ORDER — METHOCARBAMOL 500 MG/1
1000 TABLET, FILM COATED ORAL 3 TIMES DAILY
Qty: 30 TABLET | Refills: 0 | Status: CANCELLED | OUTPATIENT
Start: 2023-01-24 | End: 2023-01-29

## 2023-01-24 RX ORDER — METHOCARBAMOL 500 MG/1
1000 TABLET, FILM COATED ORAL
Status: COMPLETED | OUTPATIENT
Start: 2023-01-24 | End: 2023-01-24

## 2023-01-24 RX ORDER — OXYCODONE AND ACETAMINOPHEN 10; 325 MG/1; MG/1
1 TABLET ORAL EVERY 6 HOURS PRN
Qty: 12 TABLET | Refills: 0 | Status: SHIPPED | OUTPATIENT
Start: 2023-01-24 | End: 2023-01-24 | Stop reason: SDUPTHER

## 2023-01-24 RX ORDER — OXYCODONE AND ACETAMINOPHEN 10; 325 MG/1; MG/1
1 TABLET ORAL
Status: COMPLETED | OUTPATIENT
Start: 2023-01-24 | End: 2023-01-24

## 2023-01-24 RX ORDER — HYDROMORPHONE HYDROCHLORIDE 1 MG/ML
0.5 INJECTION, SOLUTION INTRAMUSCULAR; INTRAVENOUS; SUBCUTANEOUS
Status: COMPLETED | OUTPATIENT
Start: 2023-01-24 | End: 2023-01-24

## 2023-01-24 RX ORDER — OXYCODONE AND ACETAMINOPHEN 10; 325 MG/1; MG/1
1 TABLET ORAL EVERY 6 HOURS PRN
Qty: 12 TABLET | Refills: 0 | Status: SHIPPED | OUTPATIENT
Start: 2023-01-24 | End: 2023-02-02 | Stop reason: SDUPTHER

## 2023-01-24 RX ORDER — ONDANSETRON 2 MG/ML
4 INJECTION INTRAMUSCULAR; INTRAVENOUS
Status: COMPLETED | OUTPATIENT
Start: 2023-01-24 | End: 2023-01-24

## 2023-01-24 RX ORDER — OXYCODONE AND ACETAMINOPHEN 10; 325 MG/1; MG/1
1 TABLET ORAL EVERY 12 HOURS PRN
Qty: 60 TABLET | Refills: 0 | Status: SHIPPED | OUTPATIENT
Start: 2023-01-24 | End: 2023-02-27 | Stop reason: SDUPTHER

## 2023-01-24 RX ORDER — LIDOCAINE 50 MG/G
1 PATCH TOPICAL
Status: DISCONTINUED | OUTPATIENT
Start: 2023-01-24 | End: 2023-01-24 | Stop reason: HOSPADM

## 2023-01-24 RX ADMIN — OXYCODONE AND ACETAMINOPHEN 1 TABLET: 10; 325 TABLET ORAL at 01:01

## 2023-01-24 RX ADMIN — ONDANSETRON 4 MG: 2 INJECTION INTRAMUSCULAR; INTRAVENOUS at 01:01

## 2023-01-24 RX ADMIN — LIDOCAINE 1 PATCH: 50 PATCH CUTANEOUS at 02:01

## 2023-01-24 RX ADMIN — HYDROMORPHONE HYDROCHLORIDE 1 MG: 1 INJECTION, SOLUTION INTRAMUSCULAR; INTRAVENOUS; SUBCUTANEOUS at 05:01

## 2023-01-24 RX ADMIN — METHOCARBAMOL 1000 MG: 500 TABLET ORAL at 02:01

## 2023-01-24 RX ADMIN — HYDROMORPHONE HYDROCHLORIDE 0.5 MG: 1 INJECTION, SOLUTION INTRAMUSCULAR; INTRAVENOUS; SUBCUTANEOUS at 03:01

## 2023-01-24 NOTE — TELEPHONE ENCOUNTER
During scheduling process pt c/o left arm pain. Asked pt if she would like to go to emergency department. Pt hesitated but then said yes. Obtained w/c and wheeled to ed with accompany of pt mother.pt checked in on arrival to ed.

## 2023-01-24 NOTE — PROGRESS NOTES
[facility] Consultation Note    Reason for Consult:  The primary encounter diagnosis was Morbid obesity with BMI of 40.0-44.9, adult. Diagnoses of Hematochezia, Low hemoglobin, Dysphagia, unspecified type, and History of laparoscopic adjustable gastric banding were also pertinent to this visit.    PCP:   Ian Cespedes       Referring MD:  No referring provider defined for this encounter.    Initial History of Present Illness (HPI):  This is a 48 y.o. female here for evaluation of dysphagia with solid foods.  Patient has history of adjustable lap band placed in the late 1990s by Dr. Antonio Galindo but it has been deflated she says as it to being detach from the reservoir.  She is never had a sleeve gastrectomy she is never had a Charli-en-Y gastric bypass she is interested in seeing bariatric surgery however in the meantime she is having intermittent solid food dysphagia that is very concerning to her she also occasionally will see some painless red blood in her stool she is slightly anemic she is recently hospitalized and now he has been having some elevated creatinine levels.  No family history of colon cancer she would like further evaluation with EGD colonoscopy she would like to be refer to Bariatric surgery to see if she is a candidate and if her insurance would cover a bariatric procedure to help her with her weight loss and help improve her dysphagia.  She denies any abdominal pain no fever no chills no shortness of breath no vomiting no diarrhea no abdominal trauma.  Her weight is been stable.      Abdominal pain -as above  Reflux - no  Dysphagia - no   Bowel habits - normal  GI bleeding - none  NSAID usage - none        ROS:  Constitutional: No fevers, chills, No weight loss  ENT:  No heartburn as above dysphagia no odynophagia no hoarseness  CV: No chest pain, no palpitation  Pulm: No cough, No shortness of breath, no wheezing  Ophtho: No vision changes  GI: see HPI  Derm: No rash, no  itching  Heme: No lymphadenopathy, No easy bruising  MSK:  Chronic arthritis  : No dysuria, No hematuria  Endo: No hot or cold intolerance  Neuro: No syncope, No seizure, no strokes  Psych: No uncontrolled anxiety, No uncontrolled depression    Interval HPI 01/24/2023:  The patient's last visit with me was on 4/26/2021.      Medical History:  has a past medical history of AYDE (acute kidney injury) (11/1/2022), Alcohol abuse, Allergy (Don't remember), Anxiety, Arthritis (2010), Blood clotting tendency (2008), Congestive heart failure (8/11/2020), Depression, Hallucination, psychiatric care, Hypertension, Immune disorder (2020), Joint pain (2010), Keloid cicatrix (Years ago), Caro, Psychiatric problem, Sleep difficulties, Therapy, and Withdrawal symptoms, drug or narcotic.    Surgical History:  has a past surgical history that includes Hysterectomy; LAPBAND (2009); Esophagogastroduodenoscopy (N/A, 06/03/2020); Phlebography (Left, 08/12/2020); and Venoplasty (Left, 08/12/2020).    Family History: family history includes Cancer in her mother; Cirrhosis in her maternal uncle; Diabetes in her mother; Hypertension in her mother..     Social History:  reports that she has quit smoking. Her smoking use included cigarettes. She has never used smokeless tobacco. She reports current alcohol use of about 1.0 - 3.0 standard drink per week. She reports that she does not use drugs.    Review of patient's allergies indicates:   Allergen Reactions    Morphine Itching and Hallucinations    Pcn [penicillins] Other (See Comments)     Was told from childhood she couldn't take it    Sulfa (sulfonamide antibiotics) Nausea And Vomiting    Latex, natural rubber Rash       Medication List with Changes/Refills   Current Medications    AMITRIPTYLINE (ELAVIL) 50 MG TABLET    Take 1 tablet (50 mg total) by mouth every evening.    APIXABAN (ELIQUIS) 5 MG TAB    Take 1 tablet (5 mg total) by mouth 2 (two) times daily.    DICLOFENAC SODIUM  "(VOLTAREN) 1 % GEL    Apply the gel (4 g) to the affected area 4 times daily. Do not apply more than 16 g daily to any one affected joint    FLUOXETINE 20 MG CAPSULE    Take 3 capsules (60 mg total) by mouth once daily.    HYDROXYCHLOROQUINE (PLAQUENIL) 200 MG TABLET    Take 1 tablet (200 mg total) by mouth 2 (two) times daily.    OXYCODONE-ACETAMINOPHEN (PERCOCET)  MG PER TABLET    Take 1 tablet by mouth every 12 (twelve) hours as needed for Pain.    PREGABALIN (LYRICA) 150 MG CAPSULE    Take 1 capsule (150 mg total) by mouth 2 (two) times daily.   Discontinued Medications    HYDROXYZINE (ATARAX) 50 MG TABLET    Take 1 tablet (50 mg total) by mouth daily as needed for Anxiety (sleep).    LAMOTRIGINE (LAMICTAL) 100 MG TABLET    Take 1 tablet (100 mg total) by mouth once daily.    METHYLPREDNISOLONE (MEDROL DOSEPACK) 4 MG TABLET    use as directed    NABUMETONE (RELAFEN) 750 MG TABLET    Take 1 tablet (750 mg total) by mouth 2 (two) times daily.         Objective Findings:    Vital Signs:  BP (!) 144/104   Pulse 88   Ht 5' 4" (1.626 m)   Wt 118.8 kg (261 lb 14.5 oz)   BMI 44.96 kg/m²   Body mass index is 44.96 kg/m².    Physical Exam:  General Appearance: Well appearing in no acute distress  Eyes:    No scleral icterus  ENT:  No lesions or masses   Lungs: CTA bilaterally, no wheezes, no rhonchi, no rales  Heart:  S1, S2 normal, no murmurs heard  Abdomen:  Obese, Non distended, soft, no guarding, no rebound, no tenderness, no appreciated ascites, no bruits, no hepatosplenomegaly,  No CVA tenderness, no appreciated hernias  Musculoskeletal:  No major joint deformities  Skin: No petechiae or rash on exposed skin areas  Neurologic:  Alert and oriented x4  Psychiatric:  Normal speech mentation and affect    Labs:  Lab Results   Component Value Date    WBC 4.80 01/19/2023    HGB 11.0 (L) 01/19/2023    HCT 34.5 (L) 01/19/2023     01/19/2023    CHOL 184 11/11/2021    TRIG 94 11/11/2021    HDL 53 11/11/2021 "    ALT 17 01/19/2023    AST 28 01/19/2023     01/19/2023    K 3.8 01/19/2023     01/19/2023    CREATININE 1.4 01/19/2023    BUN 28 (H) 01/19/2023    CO2 26 01/19/2023    TSH 2.836 01/19/2023    INR 1.0 11/01/2022    HGBA1C 5.3 01/16/2020             Medical Decision Making:  Total time with patient reviewing prior medical records taking history physical exam  Making recommendations Has been 20 minutes with greater than 50% of the time face-to-face  In room with patient today  Lab work reviewed prior CT scan images personally reviewed by myself  EXAMINATION:  CT ABDOMEN PELVIS WITH CONTRAST     CLINICAL HISTORY:  Abdominal pain, unspecified; Generalized abdominal pain     TECHNIQUE:  Low dose axial images, sagittal and coronal reformations were obtained from the lung bases to the pubic symphysis following the IV administration of 85 mL of Omnipaque 350     FINDINGS:  The visualized portion of the base of the lungs, visualized portion of the heart, and spleen are unremarkable.  There is a gastric band in place.  The stomach is otherwise unremarkable.  The pancreas, liver, gallbladder, adrenal glands, and kidneys are unremarkable.  There is an IVC filter in place.  The visualized portion of the aorta is unremarkable.  The bladder is decompressed and unremarkable.  The uterus is absent or atrophic.  The bowel is unremarkable.  The appendix is not visualized.  The osseous structures demonstrate degenerative change.     Impression:     No acute findings.        Electronically signed by: Jose D Aguilar MD  Date:                                            06/02/2020  The                         Olympus scope GIF- ([id number]) was introduced                         through the mouth, and advanced to the third part                         of duodenum. The upper GI endoscopy was                         accomplished without difficulty. The patient                         tolerated the procedure well.   Findings:         The examined duodenum was normal.        Patchy mildly erythematous mucosa without bleeding was found in the        gastric body, in the gastric antrum and in the prepyloric region of        the stomach. Biopsies were taken with a cold forceps for histology.        Biopsies were taken with a cold forceps for histology. Estimated        blood loss was minimal.        The cardia and gastric fundus were normal on retroflexion.        Post Surgical Anatomy of a Gastric Lap Band in place and no        ulceration.        A 2 cm hiatal hernia was found. The proximal extent of the gastric        folds (end of tubular esophagus) was 36 cm from the incisors. The        hiatal narrowing was 38 cm from the incisors. The Z-line was 35 cm        from the incisors. Biopsies were taken with a cold forceps for        histology. Estimated blood loss was minimal. C0M1 tongue of columnar        appearing distal esophageal mucosa. Bxed.   Impression:           - Normal examined duodenum.                         - Erythematous mucosa in the gastric body, antrum                         and prepyloric region of the stomach. Biopsied.                         - 2 cm hiatal hernia. Biopsied.   Recommendation:       - Discharge patient to home.                         - Follow an antireflux regimen.                         - Await pathology results.                         - Telephone endoscopist for pathology results in 2                         weeks.                         - Repeat upper endoscopy in 3 years for                         surveillance based on pathology results.                         - Return to GI clinic at the next available                         appointment.                         - RECOMMEND YOU RETURN to /FLLOW-UP WITH YOUR                         BARIATRIC SURGEON ABOUT YOUR ABDOMINAL PAIN AND                         LAPBAND.                         - The findings and recommendations were discussed                          with the patient.   Attending Participation:        I personally performed the entire procedure.   Johan Grover MD   6/3/2020  1.  Stomach, biopsy:       -  Gastric mucosa with minimal inflammation and foveolar changes   suggestive of reactive gastropathy       -  Routine H&E stain is negative for Helicobacter pylori   Comment:   This pattern of injury is often seen in the setting of bile reflux   and NSAID/aspirin use. There is no evidence of atrophy or intestinal   metaplasia.  The specimen is negative for dysplasia or malignancy.   2.  Distal esophagus (35 cm), biopsy:       -  Squamous and gastric cardia mucosa with mild chronic gastric carditis   with focal activity and mild           reflux esophagitis   Comment:  Evidence of Bales's esophagus is not identified.  Viral   inclusions are not identified. There is no evidence of dysplasia or   malignancy.     Comment: Interpreted by: Aneta Banks M.D., Signed on 06/05/2020      Assessment:  1. Morbid obesity with BMI of 40.0-44.9, adult    2. Hematochezia    3. Low hemoglobin    4. Dysphagia, unspecified type    5. History of laparoscopic adjustable gastric banding         Recommendations:  1. Lab work today  2. To endoscopy schedulers today to schedule EGD colonoscopy for further evaluation of dysphagia blood in stool low hemoglobin  3. Referral to Bariatric surgery for surgical weight loss options.  4. Return GI clinic 8-12 weeks okay for telemedicine video visit    No follow-ups on file.      Order summary:  Orders Placed This Encounter    Ferritin    Iron and TIBC    TISSUE TRANSGLUTAMINASE (TTG), IGA    IgA    Ambulatory referral/consult to Endo Procedure     Ambulatory referral/consult to Bariatric Surgery         Thank you so much for allowing me to participate in the care of Alberto Grover MD    DISCLAIMER: This note was prepared with [employer] voice recognition transcription software. Garbled syntax,  mangled or inadvertent pronouns, and other bizarre constructions may be attributed to that software system. While efforts were made to correct any mistakes made by this voice recognition program, some errors and/or omissions may remain in the note that were missed when the note was originally created.

## 2023-01-24 NOTE — ED PROVIDER NOTES
Encounter Date: 1/24/2023       History     Chief Complaint   Patient presents with    Arm Pain     Left arm pain x 1 week. Denies cardiac hx.     [age] year old female with history of Sjogren syndrome, CHF, recurrent DVT, hypertension who presents ED for left arm pain.  States about 30 minutes ago began having severe sharp shooting pain from her left axilla and shoulder down her entire left arm.  States she is been having similar pain for the past week which has been mild and today was waiting for a GI appointment and began having 10/10 pain which prompted her to come to the ER.  Of note did fall last Tuesday onto her left side.    The history is provided by the patient.   Review of patient's allergies indicates:   Allergen Reactions    Morphine Itching and Hallucinations    Pcn [penicillins] Other (See Comments)     Was told from childhood she couldn't take it    Sulfa (sulfonamide antibiotics) Nausea And Vomiting    Latex, natural rubber Rash     Past Medical History:   Diagnosis Date    AYDE (acute kidney injury) 11/1/2022    Alcohol abuse     stopped heavy drinking about 10 years ago; was drinking 3 glasses of vodka/tequilla,rum/whiskey per day    Allergy Don't remember    Its been some years.    Anxiety     Arthritis 2010    Blood clotting tendency 2008    After a procedure & 2020.    Congestive heart failure 8/11/2020    Depression     Hallucination     Hx of psychiatric care     Hypertension     Immune disorder 2020    Joint pain 2010    Keloid cicatrix Years ago    From childhood scars    Caro     unplanned trips, energy without sleep for 2 days (reading, cleaning), feelings that she can do multiple tasks at one time, feelings of overconfidence at times    Psychiatric problem     Sleep difficulties     Therapy     Withdrawal symptoms, drug or narcotic     racing heart, restlessness     Past Surgical History:   Procedure Laterality Date    ESOPHAGOGASTRODUODENOSCOPY N/A 06/03/2020    Procedure: EGD  (ESOPHAGOGASTRODUODENOSCOPY);  Surgeon: Johan Grover MD;  Location: [facility] ENDO (4TH FLR);  Service: Endoscopy;  Laterality: N/A;  covid 6/2-[employer]-tb    HYSTERECTOMY      LAPBAND  2009    PHLEBOGRAPHY Left 08/12/2020    Procedure: Venogram, pharmacomechanical thrombectomy;  Surgeon: Miguelangel Goldman MD;  Location: [facility] OR 2ND FLR;  Service: Vascular;  Laterality: Left;  2336.43 mGy  [id number]  17.8 minutes  37 ml of contrast    VENOPLASTY Left 08/12/2020    Procedure: ANGIOPLASTY, VEIN;  Surgeon: Miguelangel Goldman MD;  Location: [facility] OR 2ND FLR;  Service: Vascular;  Laterality: Left;     Family History   Problem Relation Age of Onset    Diabetes Mother     Cancer Mother     Hypertension Mother     Cirrhosis Maternal Uncle         ETOH    Celiac disease Neg Hx     Colon cancer Neg Hx     Colon polyps Neg Hx     Crohn's disease Neg Hx     Esophageal cancer Neg Hx     Inflammatory bowel disease Neg Hx     Liver cancer Neg Hx     Liver disease Neg Hx     Rectal cancer Neg Hx     Stomach cancer Neg Hx     Ulcerative colitis Neg Hx      Social History     Tobacco Use    Smoking status: Former     Packs/day: 0.00     Years: 0.00     Pack years: 0.00     Types: Cigarettes    Smokeless tobacco: Never    Tobacco comments:     quit in october 2016   Substance Use Topics    Alcohol use: Yes     Alcohol/week: 1.0 - 3.0 standard drink     Types: 1 - 3 Glasses of wine per week     Comment: social presently, excessive 10 years ago    Drug use: Never     Types: Marijuana     Comment: uses [employer] weekly     Review of Systems   Constitutional:  Negative for chills and fever.   HENT:  Negative for sore throat.    Respiratory:  Negative for cough and shortness of breath.    Cardiovascular:  Negative for chest pain.   Gastrointestinal:  Positive for nausea. Negative for abdominal pain and vomiting.   Genitourinary:  Negative for difficulty urinating and dysuria.   Musculoskeletal:  Positive for arthralgias and  myalgias.   Neurological:  Negative for weakness.   Psychiatric/Behavioral:  Negative for confusion.      Physical Exam     Initial Vitals [01/24/23 1147]   BP Pulse Resp Temp SpO2   (!) 171/107 84 16 98.5 °F (36.9 °C) 100 %      MAP       --         Physical Exam    Nursing note and vitals reviewed.  Constitutional: She appears well-developed and well-nourished. She is not diaphoretic. No distress.   HENT:   Head: Normocephalic and atraumatic.   Eyes: Conjunctivae are normal. Pupils are equal, round, and reactive to light.   Neck: Neck supple.   No midline cervical tenderness or step-offs.   Normal range of motion.  Cardiovascular:  Normal rate, regular rhythm, normal heart sounds and intact distal pulses.           2+ radial pulses bilaterally   Pulmonary/Chest: Breath sounds normal.   Abdominal: Abdomen is soft. Bowel sounds are normal. There is no abdominal tenderness.   Musculoskeletal:      Cervical back: Normal range of motion and neck supple.      Comments: Tenderness to light touch along the left upper arm as well as the left axilla.  New warmth swelling or erythema.     Neurological: She is alert and oriented to person, place, and time. She has normal strength. No cranial nerve deficit. GCS score is 15. GCS eye subscore is 4. GCS verbal subscore is 5. GCS motor subscore is 6.   Skin: Skin is warm and dry. Capillary refill takes less than 2 seconds.   Psychiatric: She has a normal mood and affect.       ED Course   Procedures  Labs Reviewed   COMPREHENSIVE METABOLIC PANEL - Abnormal; Notable for the following components:       Result Value    Total Protein 8.6 (*)     All other components within normal limits   C-REACTIVE PROTEIN - Abnormal; Notable for the following components:    CRP 26.8 (*)     All other components within normal limits    Narrative:     add on crp & cpk per Doug Shipman RN/ Oneal Temple III, MD   order# [id number] 01/24/2023 @ 16:49    CK - Abnormal; Notable for the  following components:     (*)     All other components within normal limits    Narrative:     add on crp & cpk per Doug Shipman RN/ Oneal Temple III, MD   order# [id number] 01/24/2023 @ 16:49    CBC W/ AUTO DIFFERENTIAL   TROPONIN I   B-TYPE NATRIURETIC PEPTIDE   TROPONIN ISTAT   CK   C-REACTIVE PROTEIN        ECG Results              EKG 12-lead (Final result)  Result time 01/24/23 13:01:24      Final result by Interface, Lab In [facility] (01/24/23 13:01:24)                   Narrative:    Test Reason : M79.602,    Vent. Rate : 085 BPM     Atrial Rate : 085 BPM     P-R Int : 154 ms          QRS Dur : 080 ms      QT Int : 376 ms       P-R-T Axes : 058 -05 -03 degrees     QTc Int : 447 ms    Normal sinus rhythm  Minimal voltage criteria for LVH, may be normal variant ( R in aVL )  Nonspecific T wave abnormality  Abnormal ECG  When compared with ECG of 18-JAN-2023 16:18,  Questionable change in The axis  T wave inversion now evident in Inferior leads  Confirmed by PARAM TILLMAN MD ([id number]) on 1/24/2023 1:01:12 PM    Referred By: [email or web address]   SELF           Confirmed By:PARAM TILLMAN MD                                  Imaging Results              X-Ray Shoulder 2 or More Views Left (Final result)  Result time 01/24/23 14:08:23      Final result by Evelio Peres MD (01/24/23 14:08:23)                   Impression:      No significant abnormality identified.  No significant detrimental interval change since 01/18/2023 is appreciated.      Electronically signed by: Evelio Peres MD  Date:    01/24/2023  Time:    14:08               Narrative:    EXAMINATION:  XR SHOULDER COMPLETE 2 OR MORE VIEWS LEFT    CLINICAL HISTORY:  left shoulder pain;    TECHNIQUE:  Three views of the left shoulder were obtained.    COMPARISON:  Comparison is made to 01/18/2023.  Clinical information obtained from the electronic medical record indicates left shoulder pain, with a history of trauma on  01/17/2023.    FINDINGS:  Visualized osseous structures appear intact, with no definite evidence of recent fracture, lytic destructive process, appreciable glenohumeral arthritic change, or other significant abnormality identified.  No glenohumeral dislocation.  No abnormal soft tissue calcifications.                                       X-Ray Chest PA And Lateral (Final result)  Result time 01/24/23 14:15:03      Final result by Eduard Esteban MD (01/24/23 14:15:03)                   Impression:      See above      Electronically signed by: Eduard Esteban MD  Date:    01/24/2023  Time:    14:15               Narrative:    EXAMINATION:  XR CHEST PA AND LATERAL    CLINICAL HISTORY:  Chest Pain;    TECHNIQUE:  PA and lateral views of the chest were performed.    COMPARISON:  01/18/2023    FINDINGS:  Cardiac size is normal.  Vascular markings are less prominent than they were a few days ago.  Lungs are clear without infiltrate or pleural effusion.                                       Medications   oxyCODONE-acetaminophen  mg per tablet 1 tablet (1 tablet Oral Given 1/24/23 1343)   ondansetron injection 4 mg (4 mg Intravenous Given 1/24/23 1344)   methocarbamoL tablet 1,000 mg (1,000 mg Oral Given 1/24/23 1439)   HYDROmorphone injection 0.5 mg (0.5 mg Intravenous Given 1/24/23 1519)   HYDROmorphone injection 1 mg (1 mg Intravenous Given 1/24/23 1734)     Medical Decision Making:   History:   Old Medical Records: I decided to obtain old medical records.  Initial Assessment:   48-year-old female presents to the ED for left arm and axilla pain x1 week which worsened today.  Differential Diagnosis:   Shoulder fracture, shoulder dislocation, zoster, cellulitis, abscess, ACS, muscle strain, cervical radiculopathy  Independently Interpreted Test(s):   I have ordered and independently interpreted EKG Reading(s) - see summary below       <> Summary of EKG Reading(s): On my individual interpretation normal sinus  rhythm at a rate of 84.  We T-wave inversions in leads 3 AVF and V4 which appear to be new when compared to the EKG done on the 18th.  Does not meet STEMI criteria.  Clinical Tests:   Lab Tests: Ordered and Reviewed  Radiological Study: Ordered and Reviewed  Medical Tests: Ordered and Reviewed  Patient has sharp pain to the left shoulder which worsened today.  Extremely tender to light touch which is not consistent with ACS.  She does appear to have some T-wave inversions which are new so and will obtain a chest x-ray and a single troponin given her clinical symptoms are not consistent with ACS and her pain has been ongoing for a week.  Chest x-ray was unchanged with no acute cardiopulmonary abnormalities and troponin is negative.  No erythema, warmth or skin changes to suggest abscess or cellulitis.  Did consider this may be early prodrome of zoster however does not appear to follow a particular dermatomal pattern and feel this is less likely.  Given her recent fall suspect this is likely musculoskeletal in nature.  Palpable radial pulses and do not feel this is arterial in nature.  No arm swelling and patient is anticoagulated on Eliquis and feel that DVT is less likely.  Patient received multiple doses of pain medication with minimal to moderate relief.  Suspect this is potentially a flare for fibromyalgia.  Will discharge with a short course of pain medication and close follow-up with her rheumatologist.  Shared decision making was made and offered admission for observation for pain control however patient preferred to try outpatient therapy.  Discussed return ED precautions for any new or worsening symptoms.           ED Course as of 01/24/23 2216 Tue Jan 24, 2023   1344 Troponin I: <0.006 [HJ]   1352 BNP: 29 [HJ]      ED Course User Index  [HJ] Divina Irizarry PA-C                 Clinical Impression:   Final diagnoses:  [M79.602] Left arm pain  [M79.7] Fibromyalgia affecting upper arm (Primary)        ED  Disposition Condition    Discharge Stable          ED Prescriptions       Medication Sig Dispense Start Date End Date Auth. Provider    oxyCODONE-acetaminophen (PERCOCET)  mg per tablet  (Status: Discontinued) Take 1 tablet by mouth every 6 (six) hours as needed for Pain. 12 tablet 1/24/2023 1/24/2023 Divina Irizarry PA-C    oxyCODONE-acetaminophen (PERCOCET)  mg per tablet  (Status: Discontinued) Take 1 tablet by mouth every 6 (six) hours as needed for Pain. 12 tablet 1/24/2023 1/24/2023 Divina Irizarry PA-C    oxyCODONE-acetaminophen (PERCOCET)  mg per tablet Take 1 tablet by mouth every 6 (six) hours as needed for Pain. 12 tablet 1/24/2023 1/27/2023 Divina Irizarry PA-C          Follow-up Information       Follow up With Specialties Details Why Contact Info    Ian Cespedes MD Internal Medicine   [address] [place] [address]  [place] [id number]  [phone]               Divina Irizarry PA-C  01/24/23 1910

## 2023-01-24 NOTE — Clinical Note
Lab work today 2nd floor To endoscopy schedulers today to schedule as soon as possible EGD colonoscopy with me for further evaluation of dysphagia and blood in stool and anemia Referral to Bariatric surgery clinic for evaluation for surgical weight loss options Return GI clinic 8 weeks okay for telemedicine video visit Thank you

## 2023-01-24 NOTE — ED NOTES
Patient identifiers verified and correct for  Ms Frye  C/C: Left axilla pain SEE NN  APPEARANCE: awake and alert in NAD. PAIN  10/10  SKIN: warm, dry and intact. No breakdown or bruising.  MUSCULOSKELETAL: Patient moving all extremities spontaneously, no obvious swelling or deformities noted. Ambulates independently.  RESPIRATORY: Denies shortness of breath.Respirations unlabored.   CARDIAC: Denies CP, 2+ distal pulses; no peripheral edema  ABDOMEN: S/ND/NT, Denies nausea  : voids spontaneously, denies difficulty  Neurologic: AAO x 4; follows commands equal strength in all extremities; denies numbness/tingling. Denies dizziness  Denies new weakness, screaming with lifting shoulder up

## 2023-01-25 ENCOUNTER — TELEPHONE (OUTPATIENT)
Dept: RHEUMATOLOGY | Facility: CLINIC | Age: 48
End: 2023-01-25
Payer: COMMERCIAL

## 2023-01-25 RX ORDER — VALSARTAN AND HYDROCHLOROTHIAZIDE 160; 25 MG/1; MG/1
1 TABLET ORAL DAILY
Qty: 90 TABLET | Refills: 0 | Status: SHIPPED | OUTPATIENT
Start: 2023-01-25 | End: 2023-05-20

## 2023-01-25 NOTE — TELEPHONE ENCOUNTER
Good Morning,  Your message has been received and forwarded to .  You will be contacted with more information.  Thank you for choosing [facility].

## 2023-01-25 NOTE — TELEPHONE ENCOUNTER
I spoke with the patient.  She was inquiring about any resultant labs.    Thyroid tests were all normal   She has workup with Neurology, labs are pending  EMG has been scheduled for February 3rd.    She is overall feeling better and plans to return back to work tomorrow.    She has a follow-up me February 2nd.

## 2023-01-25 NOTE — TELEPHONE ENCOUNTER
----- Message from Ivy Clemens sent at 1/25/2023  9:36 AM CST -----  Type:  Needs Medical Advice    Who Called:  pt  Symptoms (please be specific):  would like to get a call  back      Would the patient rather a call back or a response via [facility]?  Call   Best Call Back Number: [phone]  Additional Information:

## 2023-01-26 NOTE — PROGRESS NOTES
TSH mildly elevated though the recent TSH done 1/5 was normal.  Free T4 was normal.    Patient missed her appointment due to ED visit for fibromyalgia flare.  No change

## 2023-01-29 ENCOUNTER — HOSPITAL ENCOUNTER (EMERGENCY)
Facility: HOSPITAL | Age: 48
Discharge: HOME | End: 2023-01-29
Attending: EMERGENCY MEDICINE
Payer: COMMERCIAL

## 2023-01-29 VITALS
BODY MASS INDEX: 44.39 KG/M2 | HEIGHT: 64 IN | WEIGHT: 260 LBS | SYSTOLIC BLOOD PRESSURE: 170 MMHG | DIASTOLIC BLOOD PRESSURE: 97 MMHG | RESPIRATION RATE: 18 BRPM | HEART RATE: 80 BPM | TEMPERATURE: 99 F | OXYGEN SATURATION: 100 %

## 2023-01-29 DIAGNOSIS — N64.4 BREAST PAIN: Primary | ICD-10-CM

## 2023-01-29 DIAGNOSIS — N61.0 CELLULITIS OF LEFT BREAST: ICD-10-CM

## 2023-01-29 LAB
ANION GAP SERPL CALC-SCNC: 7 MMOL/L (ref 8–16)
BASOPHILS # BLD AUTO: 0.02 K/UL (ref 0–0.2)
BASOPHILS NFR BLD: 0.5 % (ref 0–1.9)
BUN SERPL-MCNC: 7 MG/DL (ref 6–20)
CALCIUM SERPL-MCNC: 9.8 MG/DL (ref 8.7–10.5)
CHLORIDE SERPL-SCNC: 106 MMOL/L (ref 95–110)
CO2 SERPL-SCNC: 31 MMOL/L (ref 23–29)
CREAT SERPL-MCNC: 0.9 MG/DL (ref 0.5–1.4)
DIFFERENTIAL METHOD: ABNORMAL
EOSINOPHIL # BLD AUTO: 0.1 K/UL (ref 0–0.5)
EOSINOPHIL NFR BLD: 2 % (ref 0–8)
ERYTHROCYTE [DISTWIDTH] IN BLOOD BY AUTOMATED COUNT: 14.1 % (ref 11.5–14.5)
EST. GFR  (NO RACE VARIABLE): >60 ML/MIN/1.73 M^2
GLUCOSE SERPL-MCNC: 96 MG/DL (ref 70–110)
HCT VFR BLD AUTO: 36.9 % (ref 37–48.5)
HGB BLD-MCNC: 12.3 G/DL (ref 12–16)
IMM GRANULOCYTES # BLD AUTO: 0.01 K/UL (ref 0–0.04)
IMM GRANULOCYTES NFR BLD AUTO: 0.2 % (ref 0–0.5)
INR PPP: 1 (ref 0.8–1.2)
LYMPHOCYTES # BLD AUTO: 1.6 K/UL (ref 1–4.8)
LYMPHOCYTES NFR BLD: 37 % (ref 18–48)
MCH RBC QN AUTO: 29.4 PG (ref 27–31)
MCHC RBC AUTO-ENTMCNC: 33.3 G/DL (ref 32–36)
MCV RBC AUTO: 88 FL (ref 82–98)
MONOCYTES # BLD AUTO: 0.4 K/UL (ref 0.3–1)
MONOCYTES NFR BLD: 8.4 % (ref 4–15)
NEUTROPHILS # BLD AUTO: 2.3 K/UL (ref 1.8–7.7)
NEUTROPHILS NFR BLD: 51.9 % (ref 38–73)
NRBC BLD-RTO: 0 /100 WBC
PLATELET # BLD AUTO: 197 K/UL (ref 150–450)
PMV BLD AUTO: 9.7 FL (ref 9.2–12.9)
POTASSIUM SERPL-SCNC: 4 MMOL/L (ref 3.5–5.1)
PROTHROMBIN TIME: 10.9 SEC (ref 9–12.5)
RBC # BLD AUTO: 4.18 M/UL (ref 4–5.4)
SODIUM SERPL-SCNC: 144 MMOL/L (ref 136–145)
WBC # BLD AUTO: 4.41 K/UL (ref 3.9–12.7)

## 2023-01-29 PROCEDURE — 99284 EMERGENCY DEPT VISIT MOD MDM: CPT | Mod: 25

## 2023-01-29 PROCEDURE — 85025 COMPLETE CBC W/AUTO DIFF WBC: CPT | Performed by: NURSE PRACTITIONER

## 2023-01-29 PROCEDURE — 85610 PROTHROMBIN TIME: CPT | Performed by: NURSE PRACTITIONER

## 2023-01-29 PROCEDURE — 25000003 PHARM REV CODE 250: Performed by: EMERGENCY MEDICINE

## 2023-01-29 PROCEDURE — 80048 BASIC METABOLIC PNL TOTAL CA: CPT | Performed by: NURSE PRACTITIONER

## 2023-01-29 RX ORDER — CLINDAMYCIN HYDROCHLORIDE 150 MG/1
300 CAPSULE ORAL 4 TIMES DAILY
Qty: 56 CAPSULE | Refills: 0 | Status: SHIPPED | OUTPATIENT
Start: 2023-01-29 | End: 2023-02-05

## 2023-01-29 RX ORDER — OXYCODONE AND ACETAMINOPHEN 5; 325 MG/1; MG/1
2 TABLET ORAL
Status: COMPLETED | OUTPATIENT
Start: 2023-01-29 | End: 2023-01-29

## 2023-01-29 RX ORDER — CLINDAMYCIN HYDROCHLORIDE 150 MG/1
450 CAPSULE ORAL
Status: COMPLETED | OUTPATIENT
Start: 2023-01-29 | End: 2023-01-29

## 2023-01-29 RX ADMIN — OXYCODONE AND ACETAMINOPHEN 2 TABLET: 5; 325 TABLET ORAL at 05:01

## 2023-01-29 RX ADMIN — CLINDAMYCIN HYDROCHLORIDE 450 MG: 150 CAPSULE ORAL at 05:01

## 2023-01-29 NOTE — DISCHARGE INSTRUCTIONS
As we discussed I am not certain why you have a bruise on your breast.  Because you are on blood thinners it is important that you monitor for any worsening keeping in mind that it is likely that as the bruise resolves it may spread out a little bit more.  You do have an area of redness that I am concerned about the possibility of infection and I have ordered antibiotics.  It is important that you follow-up with your regular doctor in the next 2 days for recheck if you have any worsening of symptoms are unable to get in to see your doctor you may return here and we would be happy to see you.

## 2023-01-29 NOTE — ED PROVIDER NOTES
"SCRIBE #1 NOTE: I, Betzy Chappell, am scribing for, and in the presence of,  Arlet Montiel MD. I have scribed the following portions of the note - Other sections scribed: HPI, ROS, PE.         EM PHYSICIAN NOTE       This patient presents with a complaint of   Chief Complaint   Patient presents with    axillary pain    Breast Pain     Patient reports that she started having pain under her left arm 6 days ago. She states that she also started having left breast pain, swelling, and bruising as well. Denies trauma. Reports hx of blood clots. Patient states that she is taking her blood thinners daily.        HPI: Alberto Frye is a 48 y.o. female, with a past medical history of Sjogren syndrome, who presents to the ED with left axillary pain that radiates to the left breast that began 6 days ago. Patient was diagnosed with Sjogren syndrome 2 years ago and reported it primarily occurs in bilateral lower extremities. Patient states she saw her PCP 5 days ago who sent her to the ED for axillary pain. Patient was sent home but since ED visit patient stated left breast and axillary turning "black and blue". Patient has associated symptoms of swelling and bruising. Patient state she noticed bruising under her left breast 2 days ago. No other exacerbating or alleviating factors. Patient denies trauma, or other associated symptoms. Patient is compliant with Eliquis. No known allergies.         Review of patient's allergies indicates:   Allergen Reactions    Morphine Itching and Hallucinations    Pcn [penicillins] Other (See Comments)     Was told from childhood she couldn't take it    Sulfa (sulfonamide antibiotics) Nausea And Vomiting    Latex, natural rubber Rash          Outpatient Morphine Milligram Equivalents Per Day       1/29/23 and after 30 MME/Day      Order Name Dose Route Frequency Maximum MME/Day     oxyCODONE-acetaminophen (PERCOCET)  mg per tablet 1 tablet Oral Every 12 hours PRN 30 MME/Day    " "Total Potential Morphine Milligram Equivalents Per Day 30 MME/Day      Calculation Information          oxyCODONE-acetaminophen (PERCOCET)  mg per tablet    oxyCODONE-acetaminophen  mg Tab: single dose of 10 mg of opiate * 2 doses per day * morphine equivalence factor of 1.5 = 30 MME/Day                                       Pertinent REVIEW of SYSTEMS  Source: Patient  The nurse's notes and triage vital signs were reviewed.  GENERAL/CONSTITUTIONAL: There is not a report of fever   CARDIOVASCULAR: There is not a report of chest pain   RESPIRATORY: There is not a report of cough or SOB  GASTROINTESTINAL: There is not a report of  vomiting, diarrhea  HEMATOLOGIC/LYMPHATIC: SEE HPI.         PHYSICAL EXAMINATION    ED Triage Vitals [01/29/23 1336]   Enc Vitals Group      BP (!) 164/106      Pulse 88      Resp 18      Temp 98.6 °F (37 °C)      Temp src Oral      SpO2 100 %      Weight 260 lb      Height 5' 4"      Head Circumference       Peak Flow       Pain Score       Pain Loc       Pain Edu?       Excl. in GC?      Vital signs and Pulse Ox reviewed in clinical context. Abnormalities noted:  Hypertension  Pt's level of consciousness is Awake and Alert, and the patient is in mild distress.  Skin: Ecchymosis to inferior aspect of left breast.  Erythema to left nipple.   Mucosa: normal  Head and Neck: no JVD, neck supple  Cardiac exam: RRR   Pulmonary exam: unlabored and clear  Abd Exam: soft nontender   Musculoskeletal: no joint tenderness, deformity or swelling   Neurologic: GCS 15; moving all extremities equally, no facial droop        Initial Impression: Alberto Frye is a 48 y.o. female, with a past medical history of Sjogren syndrome, who presents to the ED with left axillary pain that radiates to the left breast that began 6 days ago.  Plan:  Ultrasound to rule out thrombus  Arlet Montiel MD, 2:38 PM 1/29/2023      Medical decision making:   Nurses notes and Vital Signs reviewed.   "   Problems: Today's visit reveals ecchymosis and erythema of the breast and nipple which is a/an Acute problem that is concerning for deterioration due to differential diagnosis that includes abscess, thrombosis.     Other problems today include hypertension not at goal       My decision to discharge home this patient will be based on findings of the workup and response to analgesics.     See ER course below for lab test ordered, results reviewed, and any independent interpretation of images or EKG:  ED Course as of 01/29/23 1754   Sun Jan 29, 2023   1746 No DVT on ultrasound of the chest or mediastinum or upper extremity []      ED Course User Index  [MH] Micelle NIGEL Montiel MD           Orders Placed This Encounter   Procedures    US Upper Extremity Veins Left    US Chest Mediastinum    CBC auto differential    Basic metabolic panel    Protime-INR    Insert Saline lock IV         Diagnoses that have been ruled out:   None   Diagnoses that are still under consideration:   None   Final diagnoses:   Breast pain   Cellulitis of left breast               Follow-up Information       Follow up With Specialties Details Why Contact Info    Ian Cespedes MD Internal Medicine Schedule an appointment as soon as possible for a visit in 2 days  [address] [facility] [place] [address]  [phone]      [facility]  In 2 days As needed Visit [email or web address] to schedule an appointment or have a same day ONLINE checkup.          ED Prescriptions       Medication Sig Dispense Start Date End Date Auth. Provider    clindamycin (CLEOCIN) 150 MG capsule Take 2 capsules (300 mg total) by mouth 4 (four) times daily. for 7 days 56 capsule 1/29/2023 2/5/2023 Arlet Montiel MD            This note was created using Dictation Software.  This program may occasionally misinterpret certain words and phrases.      Scribe Attestation:   Scribe #1: I performed the above scribed service and the documentation accurately  describes the services I performed. I attest to the accuracy of the note.      SCRIBE ATTESTATION NOTE:   I attest that I personally performed the services documented by the scribe and acknowledged and confirm the content of the note.   Nurses notes were reviewed.  Arlet Montiel MD  01/29/23 2288

## 2023-01-29 NOTE — FIRST PROVIDER EVALUATION
Emergency Department TeleTriage Encounter Note      CHIEF COMPLAINT    Chief Complaint   Patient presents with    axillary pain    Breast Pain     Patient reports that she started having pain under her left arm 6 days ago. She states that she also started having left breast pain, swelling, and bruising as well. Denies trauma. Reports hx of blood clots. Patient states that she is taking her blood thinners daily.        VITAL SIGNS   Initial Vitals [01/29/23 1336]   BP Pulse Resp Temp SpO2   (!) 164/106 88 18 98.6 °F (37 °C) 100 %      MAP       --            ALLERGIES    Review of patient's allergies indicates:   Allergen Reactions    Morphine Itching and Hallucinations    Pcn [penicillins] Other (See Comments)     Was told from childhood she couldn't take it    Sulfa (sulfonamide antibiotics) Nausea And Vomiting    Latex, natural rubber Rash       PROVIDER TRIAGE NOTE  This is a teletriage evaluation of a 48 y.o. female presenting to the ED complaining of left axilla and breast pain for 6 days. Pain started in axilla.  Now, pt has bruising and swelling of left breast. No trauma. Pt on eliquis- reports compliance.     Will start with labs.  Possible hematoma vs mass?.     Initial orders will be placed and care will be transferred to an alternate provider when patient is roomed for a full evaluation. Any additional orders and the final disposition will be determined by that provider.         ORDERS  Labs Reviewed - No data to display    ED Orders (720h ago, onward)      Start Ordered     Status Ordering Provider    01/29/23 1343 01/29/23 1342  CBC auto differential  STAT         Ordered ERICKSON TIERNEY    01/29/23 1343 01/29/23 1342  Basic metabolic panel  STAT         Ordered ERICKSON TIERNEY    01/29/23 1343 01/29/23 1342  Insert Saline lock IV  Once         Ordered ERICKSON TIERNEY    01/29/23 1343 01/29/23 1342  Protime-INR  STAT         Ordered ERICKSON TIERNEY               Virtual Visit Note: The provider triage portion of this emergency department evaluation and documentation was performed via [employer], a HIPAA-compliant telemedicine application, in concert with a tele-presenter in the room. A face to face patient evaluation with one of my colleagues will occur once the patient is placed in an emergency department room.      DISCLAIMER: This note was prepared with [employer] voice recognition transcription software. Garbled syntax, mangled pronouns, and other bizarre constructions may be attributed to that software system.

## 2023-01-29 NOTE — Clinical Note
"Alberto"Grant Frye was seen and treated in our emergency department on 1/29/2023.  She may return to work on 01/31/2023.       If you have any questions or concerns, please don't hesitate to call.      Arlet Montiel MD"

## 2023-01-29 NOTE — ED TRIAGE NOTES
Patient reports left breast pain and bruising that started Friday, is currently on blood thinners, denies fall or injury to area.

## 2023-02-02 ENCOUNTER — OFFICE VISIT (OUTPATIENT)
Dept: FAMILY MEDICINE | Facility: CLINIC | Age: 48
End: 2023-02-02
Payer: COMMERCIAL

## 2023-02-02 VITALS
BODY MASS INDEX: 44.31 KG/M2 | HEIGHT: 64 IN | DIASTOLIC BLOOD PRESSURE: 78 MMHG | HEART RATE: 89 BPM | SYSTOLIC BLOOD PRESSURE: 116 MMHG | WEIGHT: 259.56 LBS | TEMPERATURE: 98 F | OXYGEN SATURATION: 99 %

## 2023-02-02 DIAGNOSIS — M54.50 CHRONIC MIDLINE LOW BACK PAIN WITHOUT SCIATICA: ICD-10-CM

## 2023-02-02 DIAGNOSIS — M79.604 LEG PAIN, BILATERAL: ICD-10-CM

## 2023-02-02 DIAGNOSIS — N61.0 CELLULITIS OF LEFT BREAST: ICD-10-CM

## 2023-02-02 DIAGNOSIS — E78.5 DYSLIPIDEMIA: ICD-10-CM

## 2023-02-02 DIAGNOSIS — R25.3 MUSCLE TWITCHING: Primary | ICD-10-CM

## 2023-02-02 DIAGNOSIS — M60.9 MYOSITIS, UNSPECIFIED MYOSITIS TYPE, UNSPECIFIED SITE: ICD-10-CM

## 2023-02-02 DIAGNOSIS — F60.5 OBSESSIVE COMPULSIVE PERSONALITY DISORDER: ICD-10-CM

## 2023-02-02 DIAGNOSIS — I10 ESSENTIAL HYPERTENSION: ICD-10-CM

## 2023-02-02 DIAGNOSIS — I82.409 RECURRENT DEEP VEIN THROMBOSIS (DVT): ICD-10-CM

## 2023-02-02 DIAGNOSIS — Z79.01 CHRONIC ANTICOAGULATION: ICD-10-CM

## 2023-02-02 DIAGNOSIS — F31.32 BIPOLAR AFFECTIVE DISORDER, CURRENTLY DEPRESSED, MODERATE: ICD-10-CM

## 2023-02-02 DIAGNOSIS — M79.605 LEG PAIN, BILATERAL: ICD-10-CM

## 2023-02-02 DIAGNOSIS — G89.29 CHRONIC MIDLINE LOW BACK PAIN WITHOUT SCIATICA: ICD-10-CM

## 2023-02-02 DIAGNOSIS — M79.7 FIBROMYALGIA: ICD-10-CM

## 2023-02-02 DIAGNOSIS — M35.05 SJOGREN'S SYNDROME WITH INFLAMMATORY ARTHRITIS: ICD-10-CM

## 2023-02-02 PROBLEM — N17.9 AKI (ACUTE KIDNEY INJURY): Status: RESOLVED | Noted: 2022-11-01 | Resolved: 2023-02-02

## 2023-02-02 PROCEDURE — 1159F MED LIST DOCD IN RCRD: CPT | Mod: CPTII,S$GLB,, | Performed by: INTERNAL MEDICINE

## 2023-02-02 PROCEDURE — 3074F SYST BP LT 130 MM HG: CPT | Mod: CPTII,S$GLB,, | Performed by: INTERNAL MEDICINE

## 2023-02-02 PROCEDURE — 99999 PR PBB SHADOW E&M-EST. PATIENT-LVL IV: ICD-10-PCS | Mod: PBBFAC,,, | Performed by: INTERNAL MEDICINE

## 2023-02-02 PROCEDURE — 1160F PR REVIEW ALL MEDS BY PRESCRIBER/CLIN PHARMACIST DOCUMENTED: ICD-10-PCS | Mod: CPTII,S$GLB,, | Performed by: INTERNAL MEDICINE

## 2023-02-02 PROCEDURE — 3008F BODY MASS INDEX DOCD: CPT | Mod: CPTII,S$GLB,, | Performed by: INTERNAL MEDICINE

## 2023-02-02 PROCEDURE — 3074F PR MOST RECENT SYSTOLIC BLOOD PRESSURE < 130 MM HG: ICD-10-PCS | Mod: CPTII,S$GLB,, | Performed by: INTERNAL MEDICINE

## 2023-02-02 PROCEDURE — 1160F RVW MEDS BY RX/DR IN RCRD: CPT | Mod: CPTII,S$GLB,, | Performed by: INTERNAL MEDICINE

## 2023-02-02 PROCEDURE — 99214 PR OFFICE/OUTPT VISIT, EST, LEVL IV, 30-39 MIN: ICD-10-PCS | Mod: S$GLB,,, | Performed by: INTERNAL MEDICINE

## 2023-02-02 PROCEDURE — 99999 PR PBB SHADOW E&M-EST. PATIENT-LVL IV: CPT | Mod: PBBFAC,,, | Performed by: INTERNAL MEDICINE

## 2023-02-02 PROCEDURE — 1159F PR MEDICATION LIST DOCUMENTED IN MEDICAL RECORD: ICD-10-PCS | Mod: CPTII,S$GLB,, | Performed by: INTERNAL MEDICINE

## 2023-02-02 PROCEDURE — 3008F PR BODY MASS INDEX (BMI) DOCUMENTED: ICD-10-PCS | Mod: CPTII,S$GLB,, | Performed by: INTERNAL MEDICINE

## 2023-02-02 PROCEDURE — 3078F DIAST BP <80 MM HG: CPT | Mod: CPTII,S$GLB,, | Performed by: INTERNAL MEDICINE

## 2023-02-02 PROCEDURE — 99214 OFFICE O/P EST MOD 30 MIN: CPT | Mod: S$GLB,,, | Performed by: INTERNAL MEDICINE

## 2023-02-02 PROCEDURE — 3078F PR MOST RECENT DIASTOLIC BLOOD PRESSURE < 80 MM HG: ICD-10-PCS | Mod: CPTII,S$GLB,, | Performed by: INTERNAL MEDICINE

## 2023-02-02 RX ORDER — LAMOTRIGINE 100 MG/1
100 TABLET ORAL DAILY
COMMUNITY
End: 2023-03-10

## 2023-02-02 NOTE — PROGRESS NOTES
This note was created by combination of typed  and M-Modal dictation.  Transcription errors may be present.  If there are any questions, please contact me.    Assessment and Plan:   Muscle twitching  Myositis, unspecified myositis type, unspecified site  Fibromyalgia  Chronic midline low back pain without sciatica  Leg pain, bilateral  -like pain seems to be worse in the right compared to the left.  On chronic DOAC for history of blood clot.  No identified clot on most recent ED lower extremity ultrasound  Has been taking Lyrica b.i.d. and encouraged to try t.i.d.  Has narcotic prescribed by Rheumatology  Has known underlying Sjogren's.  Inflammatory markers were actually fairly unremarkable but will defer to Rheumatology for ultimate final interpretation.  Is seen by Neurology for myositis and labs are pending.  EMG was ordered, canceled, needs to reschedule    Cellulitis of left breast  -recent ED ultrasound negative for abscess.  On clindamycin.  Has upcoming follow-up with gynecology [facility].  Encouraged to address with them.    Sjogren's syndrome with inflammatory arthritis  -on Plaquenil.  Followed by Rheumatology.    Essential hypertension  -stable    Dyslipidemia  -not on meds.  Check future labs  -     Lipid Panel; Future; Expected date: 02/02/2023    Bipolar affective disorder, currently depressed, moderate  Obsessive compulsive personality disorder  -needs to follow-up with psychiatry.  Self discontinued amitriptyline and Lamictal.  Taking Prozac.  Discussed possibility that her symptoms could have been caused or worsened by withdrawal from medications with sudden abrupt stopping of amitriptyline and Lamictal.  Encouraged to restart at least Lamictal.  And schedule follow-up    Recurrent deep vein thrombosis (DVT) with hx of IVC filter; indefinite anticoagulation  Chronic anticoagulation  -on DOAC    Medications Discontinued During This Encounter   Medication Reason    oxyCODONE-acetaminophen  (PERCOCET)  mg per tablet Duplicate Order    triamcinolone acetonide injection 40 mg        meds sent this encounter:       Follow Up: No follow-ups on file.  Follow-up 6 months    Subjective:     Chief Complaint   Patient presents with    Follow-up     ED       STEFFANY Dominguez is a 48 y.o. female.    Social History     Tobacco Use    Smoking status: Former     Packs/day: 0.00     Years: 0.00     Pack years: 0.00     Types: Cigarettes    Smokeless tobacco: Never    Tobacco comments:     quit in october 2016   Substance Use Topics    Alcohol use: Yes     Alcohol/week: 1.0 - 3.0 standard drink     Types: 1 - 3 Glasses of wine per week     Comment: social presently, excessive 10 years ago      Social History     Occupational History    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[employer]      Social History     Social History Narrative    Has 3 healthy grown sons (1990, 1993, 1998) Lives alone.    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   in 2010.    Has Male partner since 2014 who is currently disabled.       No LMP recorded. Patient has had a hysterectomy.    Last appointment with this clinic was 11/17/2022. Last visit with me 11/17/2022   To summarize last visit and events leading up to today:  Rhabdo with AYDE unclear the trigger.  Check future CPK for baseline.  Sjogren's, fibromyalgia, osteoarthritis.  Followed by Rheumatology  Recurrent DVT, on anticoagulation indefinite  Twitching of her hands and arms periodically enough to make her drop things.  Referred to Neurology.     Hypertension.  Off of clonidine.  Fatigue persisting despite stopping clonidine.  Peripheral edema could be caused or worsened by number of different medications including Lyrica and Lamictal.  Subclinical hypothyroid not on levothyroxine.  We had previously discussed possible trial of levothyroxine to help with fatigue.  She wanted to wait.    Bipolar  followed by Psychiatry.  Due for follow-up.  Saw Psychiatry in late October.  Stay on hydroxyzine, amitriptyline.  Increased Prozac.  Increase to Lamictal.  Hyperlipidemia stable  Fibromyalgia.  Sjogren's disease.  Followed by Rheumatology.  History of cevimeline trial without efficacy.  Plaquenil.  Lyrica, Robaxin.  On follow-up in early October, changed to tizanidine     Saw Rheumatology in early January.  Ordered workup for inflammatory myositis.     Saw Neurology for the twitching.  Unclear etiology.  No muscle weakness to cause concern for underlying myopathy.  Plan is if worsening or new symptoms, plan for NCS/EMG     Labs   Folate normal   B12 normal   ESR normal   CMP creatinine 1.4  Uric acid 7.3   CRP 20.8  Ace enzyme elevated 87      LDH mildly elevated 309   Thiamine normal   Vitamin-D mildly low 15   B12 normal   B6 normal  TSH mildly elevated free T4 normal  TPO antibodies and thyroglobulin antibodies negative  GIOVANNY positive   Scleroderma Scl negative, Scl 70 antibody negative  SPEP negative   CCP antibodies negative  IL2 negative   Rheumatoid factor negative  Immunoglobulins IgG elevated  HIV negative  Aldolase negative     ED 1/18 complaining of tremors causing fall out of bed  CT head and C-spine negative    Saw Neurology 1/23.  Myositis workup.  EMG ordered.    Saw GI.  For dysphagia.  History of adjustable lap band.  Labs ordered and EGD ordered.  Referral to bariatrics    ED 1/24   And ED 1/29.  Ultrasound of the chest/mediastinum negative   Left upper extremity venous ultrasound negative    Today's visit:  Muscle spasms and leg pain.  Both of her legs though seems to be more pronounced on the right.  Her anterior thighs.  Not really the posterior thighs.  And the medial right lower leg.  Not really her knees.    She has tried a number of different muscle relaxers in the past.  She currently is taking tizanidine.    She is taking Lyrica 150 b.i.d..  It has been prescribed for t.i.d. but she  has never taking it b.i.d..  Denies obvious side effects.    She has chronic low back pain but does not feel like this is originating from her lower back.    Her upper body is not having any pain.    Her Rheumatology workup was reviewed.  She also has labs pending from Neurology and those are pending   She was also ordered to have an EMG.  That was actually scheduled for tomorrow but she canceled.  She is feeling overwhelmed, has a lot of things going on.  Encouraged her to reschedule it at her earliest convenience.  The oxycodone does help.    She had previously tried an over-the-counter CBD gummy that really helped up with the pain, and found like she did not to take the narcotic as much.    But then she had a flare-up of pain and went to the emergency room and a urine drug screen showed THC.  She vehemently denies marijuana use.  So it sounds like it may have been contamination in the gummy.  She has not been taking the gummy ever since.    Reviewed her psych meds.  She reports she self discontinued the amitriptyline and the Lamictal in late December so had really been taking it during the month of January.  She is continues to take the Prozac.  She is due for follow-up with psychiatry.  Strongly encouraged her to restart the Lamictal.    I wonder if some of her flare-ups could have been caused or exacerbated by withdrawal from medication.    1/2023 TSH WNL     She is finishing the clindamycin.  She shows me a photograph of the left lateral breast with a large area of erythema, notes no drainage.  But feels like her nipple is very sensitive.  She sees [facility]necology at [facility] and has an upcoming follow-up.  I have asked her to address the breast with gynecology.  Ultrasound done at the ER showed no identifiable abscess.    Patient Care Team:  Ian Cespedes MD as PCP - General (Internal Medicine)  Chandler Bates MD as Consulting Physician (Cardiology)  Kari Tuttle MD as Consulting Physician (Hematology and  Oncology)  Miguelangel Goldman MD as Consulting Physician (Vascular Surgery)  Johan Grover MD as Consulting Physician (Gastroenterology)  Maverick Pierce MD as Consulting Physician (Urology)  Becca Paredes MD (Obstetrics and Gynecology)  Ross Hughes MD (Inactive) as Resident (Rheumatology)  Yeison Gibson III, NP as Nurse Practitioner (Psychiatry)  Nito Lambert MD (Gastroenterology)  Ting Cheek MA as Care Coordinator    Patient Active Problem List    Diagnosis Date Noted    Myositis 01/23/2023    Encephalopathy 01/18/2023    Benign fasciculations 01/12/2023    Muscle twitching 01/06/2023    Pain of left lower extremity 11/02/2022    Non-traumatic rhabdomyolysis 11/01/2022    AYDE (acute kidney injury) 11/01/2022    Palpitations 04/05/2022    Chest pain, atypical 04/05/2022    LEON (dyspnea on exertion) 04/05/2022    Mood insomnia 02/01/2022    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  had had a prior DVT 10 years prior in the setting of surgery for hysterectomy at the time which she had IVC filter placed.      Recurrent UTI 09/21/2020 08/28/2020 cystoscopy normal  08/13/2020 renal ultrasound normal  06/02/2020 CT abdomen pelvis unremarkable.  IVC present.      Screening for colon cancer 11/2019 colonoscopy hemorrhoids and diverticulosis; [facility] 09/21/2020 11/2019  colonoscopy hemorrhoids and diverticulosis.      Chronic anticoagulation 09/21/2020    Chronic midline low back pain without sciatica 09/21/2020 1/2017 XR L spine: 5 views lumbar spine.  Vena cava filter right common L2-L3, and lap band apparatus left upper quadrant.  Mild levoscoliosis, lordosis lumbar spine.  Elevation of right pelvis.  Facet arthropathy L4-L5 levels.  No fracture subluxation.      Neuropathy 08/11/2020    Abdominal pain 06/03/2020 06/03/2020 EGD normal duodenum.  Erythematous mucosa of the gastric body and antrum and pre-pyloric region.  2 cm hiatal hernia.  Path negative.  No celiac.      Essential hypertension 01/22/2016 08/11/2020 TTE normal LV systolic function LVEF 60%.  Normal diastolic function.  Normal RV systolic function.  PA pressure 23.  History of empiric treatment for diverticulitis 04/22/2020, no imaging done at that time.    7/2020 ER put her on clonidine.  And then placed on amlodipine  But did not find it controlled  So started on diovan hct  And stayed on clonidine throughout.      Seasonal allergies 01/22/2016    Anxiety 01/22/2016       PAST MEDICAL PROBLEMS, PAST SURGICAL HISTORY: please see relevant portions of the electronic medical record    ALLERGIES AND MEDICATIONS: updated and reviewed.  Review of patient's allergies indicates:   Allergen Reactions    Morphine Itching and Hallucinations    Pcn [penicillins] Other (See Comments)     Was told from childhood she couldn't take it    Sulfa (sulfonamide antibiotics) Nausea And Vomiting    Latex, natural rubber Rash       Medication List with Changes/Refills   Current Medications    AMITRIPTYLINE (ELAVIL) 50 MG TABLET    Take 1 tablet (50 mg total) by mouth every evening.    APIXABAN (ELIQUIS) 5 MG TAB    Take 1 tablet (5 mg total) by mouth 2 (two) times daily.    CLINDAMYCIN (CLEOCIN) 150 MG CAPSULE    Take 2 capsules (300 mg total) by mouth 4 (four) times daily. for 7 days    DICLOFENAC SODIUM (VOLTAREN) 1 % GEL  "   Apply the gel (4 g) to the affected area 4 times daily. Do not apply more than 16 g daily to any one affected joint    FLUOXETINE 20 MG CAPSULE    Take 3 capsules (60 mg total) by mouth once daily.    HYDROXYCHLOROQUINE (PLAQUENIL) 200 MG TABLET    Take 1 tablet (200 mg total) by mouth 2 (two) times daily.    OXYCODONE-ACETAMINOPHEN (PERCOCET)  MG PER TABLET    Take 1 tablet by mouth every 12 (twelve) hours as needed for Pain.    PREGABALIN (LYRICA) 150 MG CAPSULE    Take 1 capsule (150 mg total) by mouth 2 (two) times daily.    VALSARTAN-HYDROCHLOROTHIAZIDE (DIOVAN-HCT) 160-25 MG PER TABLET    Take 1 tablet by mouth once daily        Objective:   Physical Exam  Vitals:    02/02/23 0908   BP: 116/78   BP Location: Left arm   Patient Position: Sitting   BP Method: Large (Manual)   Pulse: 89   Temp: 98.2 °F (36.8 °C)   TempSrc: Oral   SpO2: 99%   Weight: 117.8 kg (259 lb 9.5 oz)   Height: 5' 4" (1.626 m)    Body mass index is 44.56 kg/m².            Physical Exam  Constitutional:       Appearance: She is well-developed.   HENT:      Right Ear: Tympanic membrane, ear canal and external ear normal.      Left Ear: Tympanic membrane, ear canal and external ear normal.   Eyes:      General: No scleral icterus.     Extraocular Movements: Extraocular movements intact.      Pupils: Pupils are equal, round, and reactive to light.   Neck:      Thyroid: No thyromegaly.   Cardiovascular:      Rate and Rhythm: Normal rate and regular rhythm.      Heart sounds: Normal heart sounds. No murmur heard.  Pulmonary:      Effort: Pulmonary effort is normal.      Breath sounds: Normal breath sounds. No wheezing.   Abdominal:      Palpations: Abdomen is soft. There is no hepatomegaly, splenomegaly or mass.      Tenderness: There is no abdominal tenderness.   Musculoskeletal:         General: Tenderness present. No deformity. Normal range of motion.      Cervical back: Neck supple.      Right lower leg: No edema.      Left lower " leg: No edema.      Comments: TTP both anterior thighs, no swelling no induration  Knees no effusion, but tender  Some modest TTP posterior hamstrings bilaterally  TTP right medial lower leg/calf no swelling no induration  Ankles normal no pain with ROM but tender on moderate pressure palpation of the ankle areas.  No edema.  Feet nontender  Hip inversion and eversion no pain  Arms nontender  Pointing to lower midline lumbar area as area of pain   Lymphadenopathy:      Cervical: No cervical adenopathy.   Skin:     General: Skin is warm and dry.      Findings: No rash.      Comments: On exposed skin   Neurological:      Mental Status: She is alert and oriented to person, place, and time.      Deep Tendon Reflexes: Reflexes are normal and symmetric.   Psychiatric:         Behavior: Behavior normal.         Thought Content: Thought content normal.         Judgment: Judgment normal.

## 2023-02-03 LAB
EJ AB SER QL: NOT DETECTED
ENA JO1 AB SER IA-ACNC: NORMAL AI
KU AB SER QL: NOT DETECTED
MI2 AB SER QL: NOT DETECTED
OJ AB SER QL: NOT DETECTED
PL12 AB SER QL: NOT DETECTED
PL7 AB SER QL: NOT DETECTED
SRP AB SERPL QL: NOT DETECTED

## 2023-02-06 ENCOUNTER — TELEPHONE (OUTPATIENT)
Dept: BARIATRICS | Facility: CLINIC | Age: 48
End: 2023-02-06
Payer: COMMERCIAL

## 2023-02-07 ENCOUNTER — PATIENT MESSAGE (OUTPATIENT)
Dept: FAMILY MEDICINE | Facility: CLINIC | Age: 48
End: 2023-02-07
Payer: COMMERCIAL

## 2023-02-07 ENCOUNTER — TELEPHONE (OUTPATIENT)
Dept: BARIATRICS | Facility: CLINIC | Age: 48
End: 2023-02-07
Payer: COMMERCIAL

## 2023-02-07 DIAGNOSIS — R09.81 NASAL CONGESTION: Primary | ICD-10-CM

## 2023-02-07 RX ORDER — IPRATROPIUM BROMIDE 21 UG/1
2 SPRAY, METERED NASAL 3 TIMES DAILY
Qty: 30 ML | Refills: 1 | Status: SHIPPED | OUTPATIENT
Start: 2023-02-07 | End: 2023-10-11

## 2023-02-08 ENCOUNTER — TELEPHONE (OUTPATIENT)
Dept: BARIATRICS | Facility: CLINIC | Age: 48
End: 2023-02-08
Payer: COMMERCIAL

## 2023-02-09 ENCOUNTER — TELEPHONE (OUTPATIENT)
Dept: BARIATRICS | Facility: CLINIC | Age: 48
End: 2023-02-09
Payer: COMMERCIAL

## 2023-02-15 ENCOUNTER — PATIENT MESSAGE (OUTPATIENT)
Dept: RHEUMATOLOGY | Facility: CLINIC | Age: 48
End: 2023-02-15
Payer: COMMERCIAL

## 2023-02-15 DIAGNOSIS — M79.7 FIBROMYALGIA: ICD-10-CM

## 2023-02-16 RX ORDER — PREGABALIN 150 MG/1
150 CAPSULE ORAL 2 TIMES DAILY
Qty: 60 CAPSULE | Refills: 5 | Status: SHIPPED | OUTPATIENT
Start: 2023-02-16 | End: 2023-06-27 | Stop reason: SDUPTHER

## 2023-02-22 ENCOUNTER — PATIENT MESSAGE (OUTPATIENT)
Dept: FAMILY MEDICINE | Facility: CLINIC | Age: 48
End: 2023-02-22
Payer: COMMERCIAL

## 2023-02-22 DIAGNOSIS — M35.05 SJOGREN'S SYNDROME WITH INFLAMMATORY ARTHRITIS: Primary | ICD-10-CM

## 2023-02-22 DIAGNOSIS — Z79.899 LONG-TERM USE OF PLAQUENIL: ICD-10-CM

## 2023-02-22 DIAGNOSIS — M79.7 FIBROMYALGIA: ICD-10-CM

## 2023-02-23 ENCOUNTER — OFFICE VISIT (OUTPATIENT)
Dept: FAMILY MEDICINE | Facility: CLINIC | Age: 48
End: 2023-02-23
Payer: COMMERCIAL

## 2023-02-23 ENCOUNTER — PATIENT MESSAGE (OUTPATIENT)
Dept: FAMILY MEDICINE | Facility: CLINIC | Age: 48
End: 2023-02-23
Payer: COMMERCIAL

## 2023-02-23 VITALS
DIASTOLIC BLOOD PRESSURE: 70 MMHG | WEIGHT: 217.81 LBS | OXYGEN SATURATION: 99 % | HEIGHT: 64 IN | HEART RATE: 99 BPM | TEMPERATURE: 99 F | SYSTOLIC BLOOD PRESSURE: 124 MMHG | BODY MASS INDEX: 37.19 KG/M2

## 2023-02-23 DIAGNOSIS — L02.91 ABSCESS: Primary | ICD-10-CM

## 2023-02-23 DIAGNOSIS — L60.2 NAIL THICKENING: ICD-10-CM

## 2023-02-23 PROCEDURE — 99214 PR OFFICE/OUTPT VISIT, EST, LEVL IV, 30-39 MIN: ICD-10-PCS | Mod: S$GLB,,, | Performed by: INTERNAL MEDICINE

## 2023-02-23 PROCEDURE — 1160F RVW MEDS BY RX/DR IN RCRD: CPT | Mod: CPTII,S$GLB,, | Performed by: INTERNAL MEDICINE

## 2023-02-23 PROCEDURE — 99214 OFFICE O/P EST MOD 30 MIN: CPT | Mod: S$GLB,,, | Performed by: INTERNAL MEDICINE

## 2023-02-23 PROCEDURE — 3008F PR BODY MASS INDEX (BMI) DOCUMENTED: ICD-10-PCS | Mod: CPTII,S$GLB,, | Performed by: INTERNAL MEDICINE

## 2023-02-23 PROCEDURE — 3078F DIAST BP <80 MM HG: CPT | Mod: CPTII,S$GLB,, | Performed by: INTERNAL MEDICINE

## 2023-02-23 PROCEDURE — 1159F MED LIST DOCD IN RCRD: CPT | Mod: CPTII,S$GLB,, | Performed by: INTERNAL MEDICINE

## 2023-02-23 PROCEDURE — 3074F PR MOST RECENT SYSTOLIC BLOOD PRESSURE < 130 MM HG: ICD-10-PCS | Mod: CPTII,S$GLB,, | Performed by: INTERNAL MEDICINE

## 2023-02-23 PROCEDURE — 3078F PR MOST RECENT DIASTOLIC BLOOD PRESSURE < 80 MM HG: ICD-10-PCS | Mod: CPTII,S$GLB,, | Performed by: INTERNAL MEDICINE

## 2023-02-23 PROCEDURE — 1160F PR REVIEW ALL MEDS BY PRESCRIBER/CLIN PHARMACIST DOCUMENTED: ICD-10-PCS | Mod: CPTII,S$GLB,, | Performed by: INTERNAL MEDICINE

## 2023-02-23 PROCEDURE — 99999 PR PBB SHADOW E&M-EST. PATIENT-LVL V: CPT | Mod: PBBFAC,,, | Performed by: INTERNAL MEDICINE

## 2023-02-23 PROCEDURE — 99999 PR PBB SHADOW E&M-EST. PATIENT-LVL V: ICD-10-PCS | Mod: PBBFAC,,, | Performed by: INTERNAL MEDICINE

## 2023-02-23 PROCEDURE — 3008F BODY MASS INDEX DOCD: CPT | Mod: CPTII,S$GLB,, | Performed by: INTERNAL MEDICINE

## 2023-02-23 PROCEDURE — 3074F SYST BP LT 130 MM HG: CPT | Mod: CPTII,S$GLB,, | Performed by: INTERNAL MEDICINE

## 2023-02-23 PROCEDURE — 1159F PR MEDICATION LIST DOCUMENTED IN MEDICAL RECORD: ICD-10-PCS | Mod: CPTII,S$GLB,, | Performed by: INTERNAL MEDICINE

## 2023-02-23 RX ORDER — DOXYCYCLINE 100 MG/1
100 CAPSULE ORAL EVERY 12 HOURS
Qty: 20 CAPSULE | Refills: 0 | Status: SHIPPED | OUTPATIENT
Start: 2023-02-23 | End: 2023-03-05

## 2023-02-23 NOTE — LETTER
February 23, 2023      [facility]  [address] [facility]  [place] [address]  Phone: [phone]  Fax: [phone]       Patient: Alberto Frye  YOB: 1975  Date of Visit: 2/23/23      To Whom It May Concern:    Alberto Frye  was at [facility] on 2/23/23. If you have any questions or concerns, or if I can be of further assistance, please do not hesitate to contact me.      Sincerely,        Ian Cespedes MD

## 2023-02-23 NOTE — PROGRESS NOTES
This note was created by combination of typed  and M-Modal dictation.  Transcription errors may be present.  If there are any questions, please contact me.    Assessment and Plan:   Abscess  -keloid with inflammation  Doxycycline  Referral to gen surgery for I+D  Cool packs  -     doxycycline (VIBRAMYCIN) 100 MG Cap; Take 1 capsule (100 mg total) by mouth every 12 (twelve) hours. for 10 days  Dispense: 20 capsule; Refill: 0  -     Ambulatory referral/consult to General Surgery; Future; Expected date: 03/02/2023    Nail thickening  -discussed could be fungal or could be nonfungal thickening. Discussed treatment options. Topical antifungal has cure rate around 40-45%. She is not interested in oral antifungal  Skin looks good I favor trauma/irritation rather than fungal.      There are no discontinued medications.    meds sent this encounter:  Medications Ordered This Encounter   Medications    doxycycline (VIBRAMYCIN) 100 MG Cap     Sig: Take 1 capsule (100 mg total) by mouth every 12 (twelve) hours. for 10 days     Dispense:  20 capsule     Refill:  0       Follow Up: No follow-ups on file.    Subjective:     Chief Complaint   Patient presents with    Blister       HPI  Alberto is a 48 y.o. female.    Social History     Tobacco Use    Smoking status: Former     Packs/day: 0.00     Years: 0.00     Pack years: 0.00     Types: Cigarettes    Smokeless tobacco: Never    Tobacco comments:     quit in october 2016   Substance Use Topics    Alcohol use: Yes     Alcohol/week: 1.0 - 3.0 standard drink     Types: 1 - 3 Glasses of wine per week     Comment: social presently, excessive 10 years ago      Social History     Occupational History    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[employer]      Social History     Social History Narrative    Has 3 healthy grown sons (1990, 1993, 1998) Lives alone.    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    in 2010.    Has Male partner since 2014 who is currently disabled.       No LMP recorded. Patient has had a hysterectomy.    Last appointment with this clinic was 2/2/2023. Last visit with me 2/2/2023   To summarize last visit and events leading up to today:  Leg twitching, fibromyalgia, worse right compared to left.  Has been taking Lyrica twice daily, encouraged to try t.i.d..  Followed by Rheumatology for Sjogren's.  Recent labs inflammatory markers were unremarkable.  The being seen by Neurology for myositis.  Labs were pending.  EMG needs to be scheduled  History of left breast cellulitis with recent ED ultrasound negative for abscess.  On clindamycin.  She is followed by [facility] gynecology.  Hypertension, hyperlipidemia.  Not on statin.  Sjogren's on Plaquenil followed by Rheumatology   Bipolar disorder and OCD.  Followed by Psychiatry.  She self discontinued amitriptyline and Lamictal and is on Prozac.  Needs to follow-up with psychiatry.    DVT on DOAC    Today's visit:  Please note: Chronic medical problems that are stable but are being addressed at this visit may not be listed/documented here, but may be addressed in more detail in the Assessment and Plan section.   Below are salient features of chronic medical conditions, or new/acute conditions and their details.  Hx of keloid had it removed before.  On the right chest under the breast  But then the keloid started coming back, enlarging  Was solid  But then earlier this week became liquid  Tender  No drainage  No systemic fever or chills.    Had a fibro flare  Thinks due to cold environment.  Aching and then itching. Gold bond powder and cortisone cream without relief.  This weekend the itching finally subsided.   And then resultant migraine.  The itching was torturous.    Chronic dark thickened great toenail. No surrounding skin issues.  Messaged me about possible podiatry referral.  Possibly it rubs on the top of shoe  No injury    Answers  submitted by the patient for this visit:  Review of Systems Questionnaire (Submitted on 2/23/2023)  activity change: No  unexpected weight change: No  neck pain: No  hearing loss: No  rhinorrhea: No  trouble swallowing: Yes  eye discharge: No  visual disturbance: No  chest tightness: No  wheezing: No  chest pain: Yes  palpitations: No  blood in stool: No  constipation: No  vomiting: No  diarrhea: No  polydipsia: No  polyuria: No  difficulty urinating: No  hematuria: No  menstrual problem: No  dysuria: No  joint swelling: Yes  arthralgias: Yes  headaches: Yes  weakness: No  confusion: No  dysphoric mood: No    Patient Care Team:  Ian Cespedes MD as PCP - General (Internal Medicine)  Chandler Bates MD as Consulting Physician (Cardiology)  Kari Tuttle MD as Consulting Physician (Hematology and Oncology)  Miguelangel Goldman MD as Consulting Physician (Vascular Surgery)  Johan Grover MD as Consulting Physician (Gastroenterology)  Maverick Pierce MD as Consulting Physician (Urology)  Becca Paredes MD (Obstetrics and Gynecology)  Ross Hughes MD (Inactive) as Resident (Rheumatology)  Yeison Gibson III, NP as Nurse Practitioner (Psychiatry)  Nito Lambert MD (Gastroenterology)  Ting Cheek MA as Care Coordinator    Patient Active Problem List    Diagnosis Date Noted    Fibromyalgia 02/02/2023    Myositis 01/23/2023    Encephalopathy 01/18/2023    Benign fasciculations 01/12/2023    Muscle twitching 01/06/2023    Pain of left lower extremity 11/02/2022    Non-traumatic rhabdomyolysis 11/01/2022    Palpitations 04/05/2022    Chest pain, atypical 04/05/2022    LEON (dyspnea on exertion) 04/05/2022    Mood insomnia 02/01/2022    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  had had a prior DVT 10 years prior in the setting of surgery for hysterectomy at the time which she had IVC filter placed.      Recurrent UTI 09/21/2020 08/28/2020 cystoscopy normal  08/13/2020 renal ultrasound normal  06/02/2020 CT abdomen pelvis unremarkable.  IVC present.      Screening for colon cancer 11/2019 colonoscopy hemorrhoids and diverticulosis; [facility] 09/21/2020 11/2019 colonoscopy hemorrhoids and diverticulosis.      Chronic anticoagulation 09/21/2020    Chronic midline low back pain without sciatica 09/21/2020 1/2017 XR L spine: 5 views lumbar spine.  Vena cava filter right common L2-L3, and lap band apparatus left upper quadrant.  Mild levoscoliosis, lordosis lumbar spine.  Elevation of right pelvis.  Facet arthropathy L4-L5 levels.  No fracture subluxation.      Neuropathy 08/11/2020    Abdominal pain 06/03/2020 06/03/2020 EGD normal duodenum.  Erythematous mucosa of the gastric body and antrum and pre-pyloric region.  2 cm hiatal hernia.  Path negative.  No celiac.      Essential hypertension 01/22/2016 08/11/2020 TTE normal LV systolic function LVEF 60%.  Normal diastolic function.  Normal RV systolic function.  PA pressure 23.  History of empiric treatment for diverticulitis 04/22/2020, no imaging done at that time.    7/2020 ER put her on clonidine.  And then placed on amlodipine  But did not find it controlled  So started on diovan hct  And stayed on clonidine throughout.      Seasonal allergies 01/22/2016    Anxiety 01/22/2016       PAST MEDICAL PROBLEMS, PAST SURGICAL  "HISTORY: please see relevant portions of the electronic medical record    ALLERGIES AND MEDICATIONS: updated and reviewed.  Review of patient's allergies indicates:   Allergen Reactions    Morphine Itching and Hallucinations    Pcn [penicillins] Other (See Comments)     Was told from childhood she couldn't take it    Sulfa (sulfonamide antibiotics) Nausea And Vomiting    Latex, natural rubber Rash       Medication List with Changes/Refills   Current Medications    AMITRIPTYLINE (ELAVIL) 50 MG TABLET    Take 1 tablet (50 mg total) by mouth every evening.    APIXABAN (ELIQUIS) 5 MG TAB    Take 1 tablet (5 mg total) by mouth 2 (two) times daily.    DICLOFENAC SODIUM (VOLTAREN) 1 % GEL    Apply the gel (4 g) to the affected area 4 times daily. Do not apply more than 16 g daily to any one affected joint    FLUOXETINE 20 MG CAPSULE    Take 3 capsules (60 mg total) by mouth once daily.    HYDROXYCHLOROQUINE (PLAQUENIL) 200 MG TABLET    Take 1 tablet (200 mg total) by mouth 2 (two) times daily.    IPRATROPIUM (ATROVENT) 21 MCG (0.03 %) NASAL SPRAY    2 sprays by Each Nostril route 3 (three) times daily.    LAMOTRIGINE (LAMICTAL) 100 MG TABLET    Take 100 mg by mouth once daily.    OXYCODONE-ACETAMINOPHEN (PERCOCET)  MG PER TABLET    Take 1 tablet by mouth every 12 (twelve) hours as needed for Pain.    PREGABALIN (LYRICA) 150 MG CAPSULE    Take 1 capsule (150 mg total) by mouth 2 (two) times daily.    VALSARTAN-HYDROCHLOROTHIAZIDE (DIOVAN-HCT) 160-25 MG PER TABLET    Take 1 tablet by mouth once daily        Objective:   Physical Exam   Vitals:    02/23/23 1557   BP: 124/70   BP Location: Left arm   Patient Position: Sitting   BP Method: Large (Manual)   Pulse: 99   Temp: 98.8 °F (37.1 °C)   TempSrc: Oral   SpO2: 99%   Weight: 98.8 kg (217 lb 13 oz)   Height: 5' 4" (1.626 m)    Body mass index is 37.39 kg/m².  Weight: 98.8 kg (217 lb 13 oz)   Height: 5' 4" (162.6 cm)     Physical Exam  Constitutional:       General: She " is not in acute distress.     Appearance: She is well-developed.   HENT:      Head: Normocephalic and atraumatic.   Eyes:      General: No scleral icterus.  Pulmonary:      Effort: Pulmonary effort is normal.   Skin:     General: Skin is warm and dry.      Comments: See photo - the area of keloid is fluctuant, no pustule. Surrounding induration and erythema   Great toenail on the right - with thickening and discoloration, the surrounding nail bed without erythema. The other toes unremarkable, intertrigenous areas without scale or maceration.   Neurological:      Mental Status: She is alert and oriented to person, place, and time.   Psychiatric:         Behavior: Behavior normal.         Thought Content: Thought content normal.

## 2023-02-24 ENCOUNTER — PATIENT MESSAGE (OUTPATIENT)
Dept: RHEUMATOLOGY | Facility: CLINIC | Age: 48
End: 2023-02-24
Payer: COMMERCIAL

## 2023-02-24 ENCOUNTER — OFFICE VISIT (OUTPATIENT)
Dept: SURGERY | Facility: CLINIC | Age: 48
End: 2023-02-24
Payer: COMMERCIAL

## 2023-02-24 VITALS
BODY MASS INDEX: 44.79 KG/M2 | OXYGEN SATURATION: 96 % | WEIGHT: 262.38 LBS | HEIGHT: 64 IN | SYSTOLIC BLOOD PRESSURE: 114 MMHG | DIASTOLIC BLOOD PRESSURE: 84 MMHG | HEART RATE: 109 BPM

## 2023-02-24 DIAGNOSIS — L02.91 ABSCESS: Primary | ICD-10-CM

## 2023-02-24 PROCEDURE — 1160F RVW MEDS BY RX/DR IN RCRD: CPT | Mod: CPTII,S$GLB,, | Performed by: SURGERY

## 2023-02-24 PROCEDURE — 3008F PR BODY MASS INDEX (BMI) DOCUMENTED: ICD-10-PCS | Mod: CPTII,S$GLB,, | Performed by: SURGERY

## 2023-02-24 PROCEDURE — 99213 PR OFFICE/OUTPT VISIT, EST, LEVL III, 20-29 MIN: ICD-10-PCS | Mod: 25,S$GLB,, | Performed by: SURGERY

## 2023-02-24 PROCEDURE — 99213 OFFICE O/P EST LOW 20 MIN: CPT | Mod: 25,S$GLB,, | Performed by: SURGERY

## 2023-02-24 PROCEDURE — 87070 CULTURE OTHR SPECIMN AEROBIC: CPT | Performed by: SURGERY

## 2023-02-24 PROCEDURE — 87077 CULTURE AEROBIC IDENTIFY: CPT | Performed by: SURGERY

## 2023-02-24 PROCEDURE — 3008F BODY MASS INDEX DOCD: CPT | Mod: CPTII,S$GLB,, | Performed by: SURGERY

## 2023-02-24 PROCEDURE — 87075 CULTR BACTERIA EXCEPT BLOOD: CPT | Performed by: SURGERY

## 2023-02-24 PROCEDURE — 1159F PR MEDICATION LIST DOCUMENTED IN MEDICAL RECORD: ICD-10-PCS | Mod: CPTII,S$GLB,, | Performed by: SURGERY

## 2023-02-24 PROCEDURE — 1159F MED LIST DOCD IN RCRD: CPT | Mod: CPTII,S$GLB,, | Performed by: SURGERY

## 2023-02-24 PROCEDURE — 3074F SYST BP LT 130 MM HG: CPT | Mod: CPTII,S$GLB,, | Performed by: SURGERY

## 2023-02-24 PROCEDURE — 99999 PR PBB SHADOW E&M-EST. PATIENT-LVL IV: CPT | Mod: PBBFAC,,, | Performed by: SURGERY

## 2023-02-24 PROCEDURE — 87186 SC STD MICRODIL/AGAR DIL: CPT | Performed by: SURGERY

## 2023-02-24 PROCEDURE — 1160F PR REVIEW ALL MEDS BY PRESCRIBER/CLIN PHARMACIST DOCUMENTED: ICD-10-PCS | Mod: CPTII,S$GLB,, | Performed by: SURGERY

## 2023-02-24 PROCEDURE — 3074F PR MOST RECENT SYSTOLIC BLOOD PRESSURE < 130 MM HG: ICD-10-PCS | Mod: CPTII,S$GLB,, | Performed by: SURGERY

## 2023-02-24 PROCEDURE — 99999 PR PBB SHADOW E&M-EST. PATIENT-LVL IV: ICD-10-PCS | Mod: PBBFAC,,, | Performed by: SURGERY

## 2023-02-24 PROCEDURE — 10060 I&D ABSCESS SIMPLE/SINGLE: CPT | Mod: S$GLB,,, | Performed by: SURGERY

## 2023-02-24 PROCEDURE — 3079F PR MOST RECENT DIASTOLIC BLOOD PRESSURE 80-89 MM HG: ICD-10-PCS | Mod: CPTII,S$GLB,, | Performed by: SURGERY

## 2023-02-24 PROCEDURE — 3079F DIAST BP 80-89 MM HG: CPT | Mod: CPTII,S$GLB,, | Performed by: SURGERY

## 2023-02-24 PROCEDURE — 10060 INCISION & DRAINAGE: ICD-10-PCS | Mod: S$GLB,,, | Performed by: SURGERY

## 2023-02-24 NOTE — PROGRESS NOTES
Surgery Clinic Note    Alberto Frye is a 48 y.o. year old female in clinic today for eval of right inframammary fold abscess. Has had a supposed keloid there for years (no hx of surgery in the location) and recently it has become swollen painful and was started on antibiotic doxycycline yesterday by PCP. Today it started draining purulent smelly fluid.  No f/c/n/v/sob/cp    ROS:  Negative except above    Past Medical History:   Diagnosis Date    AYDE (acute kidney injury) 11/1/2022    Alcohol abuse     stopped heavy drinking about 10 years ago; was drinking 3 glasses of vodka/tequilla,rum/whiskey per day    Allergy Don't remember    Its been some years.    Anxiety     Arthritis 2010    Blood clotting tendency 2008    After a procedure & 2020.    Congestive heart failure 8/11/2020    Depression     Hallucination     Hx of psychiatric care     Hypertension     Immune disorder 2020    Joint pain 2010    Keloid cicatrix Years ago    From childhood scars    [place]     unplanned trips, energy without sleep for 2 days (reading, cleaning), feelings that she can do multiple tasks at one time, feelings of overconfidence at times    Psychiatric problem     Sleep difficulties     Therapy     Withdrawal symptoms, drug or narcotic     racing heart, restlessness       Past Surgical History:   Procedure Laterality Date    ESOPHAGOGASTRODUODENOSCOPY N/A 06/03/2020    Procedure: EGD (ESOPHAGOGASTRODUODENOSCOPY);  Surgeon: Johan Grover MD;  Location: [facility] ([address] [place] [address]);  Service: Endoscopy;  Laterality: N/A;  covid 6/2-[facility]-tb    HYSTERECTOMY      LAPBAND  2009    PHLEBOGRAPHY Left 08/12/2020    Procedure: Venogram, pharmacomechanical thrombectomy;  Surgeon: Miguelangel Goldman MD;  Location: [address];  Service: Vascular;  Laterality: Left;  2336.43 mGy  [id number]  17.8 minutes  37 ml of contrast    VENOPLASTY Left 08/12/2020    Procedure: ANGIOPLASTY, VEIN;  Surgeon: Miguelangel Goldman MD;   Location: [facility] OR [address] [place] [address];  Service: Vascular;  Laterality: Left;       Family History   Problem Relation Age of Onset    Diabetes Mother     Cancer Mother     Hypertension Mother     Cirrhosis Maternal Uncle         ETOH    Celiac disease Neg Hx     Colon cancer Neg Hx     Colon polyps Neg Hx     Crohn's disease Neg Hx     Esophageal cancer Neg Hx     Inflammatory bowel disease Neg Hx     Liver cancer Neg Hx     Liver disease Neg Hx     Rectal cancer Neg Hx     Stomach cancer Neg Hx     Ulcerative colitis Neg Hx        Social History     Socioeconomic History    Marital status:     Number of children: 3   Occupational History    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[employer]   Tobacco Use    Smoking status: Former     Packs/day: 0.00     Years: 0.00     Pack years: 0.00     Types: Cigarettes    Smokeless tobacco: Never    Tobacco comments:     quit in october 2016   Substance and Sexual Activity    Alcohol use: Yes     Alcohol/week: 1.0 - 3.0 standard drink     Types: 1 - 3 Glasses of wine per week     Comment: social presently, excessive 10 years ago    Drug use: Never     Types: Marijuana     Comment: uses [employer] weekly    Sexual activity: Yes     Partners: Male     Birth control/protection: Condom, See Surgical Hx   Other Topics Concern    Patient feels they ought to cut down on drinking/drug use No    Patient annoyed by others criticizing their drinking/drug use No    Patient has felt bad or guilty about drinking/drug use No    Patient has had a drink/used drugs as an eye opener in the AM No    Are you pregnant or think you may be? No    Breast-feeding No   Social History Narrative    Has 3 healthy grown sons (1990, 1993, 1998) Lives alone.    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   in 2010.    Has Male partner since 2014 who is currently disabled.     Social Determinants of Health     Financial Resource Strain:  Unknown    Difficulty of Paying Living Expenses: Patient refused   Food Insecurity: Unknown    Worried About Running Out of Food in the Last Year: Patient refused    Ran Out of Food in the Last Year: Patient refused   Transportation Needs: Unknown    Lack of Transportation (Medical): Patient refused    Lack of Transportation (Non-Medical): Patient refused   Physical Activity: Unknown    Days of Exercise per Week: Patient refused    Minutes of Exercise per Session: 10 min   Stress: Unknown    Feeling of Stress : Patient refused   Social Connections: Unknown    Frequency of Communication with Friends and Family: Patient refused    Frequency of Social Gatherings with Friends and Family: Patient refused    Active Member of Clubs or Organizations: Patient refused    Attends Club or Organization Meetings: Patient refused    Marital Status: Patient refused   Housing Stability: Unknown    Unable to Pay for Housing in the Last Year: Patient refused    Number of Places Lived in the Last Year: 1    Unstable Housing in the Last Year: Patient refused       Current Outpatient Medications   Medication Sig Dispense Refill    amitriptyline (ELAVIL) 50 MG tablet Take 1 tablet (50 mg total) by mouth every evening. 90 tablet 1    apixaban (ELIQUIS) 5 mg Tab Take 1 tablet (5 mg total) by mouth 2 (two) times daily. 60 tablet 11    diclofenac sodium (VOLTAREN) 1 % Gel Apply the gel (4 g) to the affected area 4 times daily. Do not apply more than 16 g daily to any one affected joint 100 g 1    doxycycline (VIBRAMYCIN) 100 MG Cap Take 1 capsule (100 mg total) by mouth every 12 (twelve) hours. for 10 days 20 capsule 0    FLUoxetine 20 MG capsule Take 3 capsules (60 mg total) by mouth once daily. 270 capsule 1    hydrOXYchloroQUINE (PLAQUENIL) 200 mg tablet Take 1 tablet (200 mg total) by mouth 2 (two) times daily. 60 tablet 6    ipratropium (ATROVENT) 21 mcg (0.03 %) nasal spray 2 sprays by Each Nostril route 3 (three) times daily. 30 mL 1     lamoTRIgine (LAMICTAL) 100 MG tablet Take 100 mg by mouth once daily.      oxyCODONE-acetaminophen (PERCOCET)  mg per tablet Take 1 tablet by mouth every 12 (twelve) hours as needed for Pain. 60 tablet 0    pregabalin (LYRICA) 150 MG capsule Take 1 capsule (150 mg total) by mouth 2 (two) times daily. 60 capsule 5    valsartan-hydrochlorothiazide (DIOVAN-HCT) 160-25 mg per tablet Take 1 tablet by mouth once daily 90 tablet 0     No current facility-administered medications for this visit.       Review of patient's allergies indicates:   Allergen Reactions    Morphine Itching and Hallucinations    Pcn [penicillins] Other (See Comments)     Was told from childhood she couldn't take it    Sulfa (sulfonamide antibiotics) Nausea And Vomiting    Latex, natural rubber Rash         PE:  Vitals:    02/24/23 1315   BP: 114/84   Pulse: 109     NAD  No belabored breathing  Right inframammary fold with fluctuance and purulent drainage    A/P:  Alberto Frye is a 48 y.o. year old female w right inframammary abscess    -see procedure note  Daily wound care remove packing tomorrow  Rtc 1 wk    Giancarlo Sosa  General Surgery - [facility]  2/24/2023

## 2023-02-24 NOTE — PROCEDURES
"Incision & Drainage    Date/Time: 2/24/2023 1:15 PM  Performed by: Giancarlo Sosa MD  Authorized by: Giancarlo Sosa MD     Time out: Immediately prior to procedure a "time out" was called to verify the correct patient, procedure, equipment, support staff and site/side marked as required.    Consent Done?:  Yes (Written)    Type:  Abscess  Body area:  Trunk  Location details:  Right breast  Anesthesia:  Local infiltration  Local anesthetic: Lidocaine 1% with epinephrine  Risk factor:  Coagulopathy  Scalpel size:  11  Incision type:  Single straight  Incision depth: dermal    Complexity:  Simple  Drainage:  Pus  Drainage amount:  Moderate  Wound treatment:  Incision, drainage, deloculation and wound packed  Packing material:  1/4 in iodoform gauze  "

## 2023-02-26 LAB — BACTERIA SPEC AEROBE CULT: ABNORMAL

## 2023-02-27 DIAGNOSIS — M79.7 FIBROMYALGIA: ICD-10-CM

## 2023-02-27 RX ORDER — OXYCODONE AND ACETAMINOPHEN 10; 325 MG/1; MG/1
1 TABLET ORAL EVERY 12 HOURS PRN
Qty: 60 TABLET | Refills: 0 | Status: SHIPPED | OUTPATIENT
Start: 2023-02-27 | End: 2023-03-27 | Stop reason: SDUPTHER

## 2023-02-28 LAB — BACTERIA SPEC ANAEROBE CULT: NORMAL

## 2023-02-28 RX ORDER — OXYCODONE AND ACETAMINOPHEN 10; 325 MG/1; MG/1
1 TABLET ORAL EVERY 12 HOURS PRN
Qty: 60 TABLET | Refills: 0 | Status: SHIPPED | OUTPATIENT
Start: 2023-02-28 | End: 2023-03-23 | Stop reason: SDUPTHER

## 2023-03-03 ENCOUNTER — OFFICE VISIT (OUTPATIENT)
Dept: SURGERY | Facility: CLINIC | Age: 48
End: 2023-03-03
Payer: COMMERCIAL

## 2023-03-03 VITALS
BODY MASS INDEX: 44.79 KG/M2 | HEART RATE: 99 BPM | WEIGHT: 262.38 LBS | OXYGEN SATURATION: 99 % | SYSTOLIC BLOOD PRESSURE: 145 MMHG | DIASTOLIC BLOOD PRESSURE: 97 MMHG | HEIGHT: 64 IN

## 2023-03-03 DIAGNOSIS — L02.91 ABSCESS: Primary | ICD-10-CM

## 2023-03-03 PROCEDURE — 99024 POSTOP FOLLOW-UP VISIT: CPT | Mod: S$GLB,,, | Performed by: SURGERY

## 2023-03-03 PROCEDURE — 3077F SYST BP >= 140 MM HG: CPT | Mod: CPTII,S$GLB,, | Performed by: SURGERY

## 2023-03-03 PROCEDURE — 99999 PR PBB SHADOW E&M-EST. PATIENT-LVL III: CPT | Mod: PBBFAC,,, | Performed by: SURGERY

## 2023-03-03 PROCEDURE — 3008F BODY MASS INDEX DOCD: CPT | Mod: CPTII,S$GLB,, | Performed by: SURGERY

## 2023-03-03 PROCEDURE — 3080F DIAST BP >= 90 MM HG: CPT | Mod: CPTII,S$GLB,, | Performed by: SURGERY

## 2023-03-03 PROCEDURE — 3080F PR MOST RECENT DIASTOLIC BLOOD PRESSURE >= 90 MM HG: ICD-10-PCS | Mod: CPTII,S$GLB,, | Performed by: SURGERY

## 2023-03-03 PROCEDURE — 99999 PR PBB SHADOW E&M-EST. PATIENT-LVL III: ICD-10-PCS | Mod: PBBFAC,,, | Performed by: SURGERY

## 2023-03-03 PROCEDURE — 3008F PR BODY MASS INDEX (BMI) DOCUMENTED: ICD-10-PCS | Mod: CPTII,S$GLB,, | Performed by: SURGERY

## 2023-03-03 PROCEDURE — 99024 PR POST-OP FOLLOW-UP VISIT: ICD-10-PCS | Mod: S$GLB,,, | Performed by: SURGERY

## 2023-03-03 PROCEDURE — 3077F PR MOST RECENT SYSTOLIC BLOOD PRESSURE >= 140 MM HG: ICD-10-PCS | Mod: CPTII,S$GLB,, | Performed by: SURGERY

## 2023-03-03 NOTE — PROGRESS NOTES
Surgery Clinic Note    Alberto Frye is a 48 y.o. year old female in clinic today for follow up of right inframammary abscess I&D. Doing well. No drainage but now has excess granulation tissue (this was the site of a keloid). Silver nitrate applied. She has some soreness but improving.  No f/c/n/v/sob/cp    ROS:  Negative except above    PE:  Vitals:    03/03/23 1040   BP: (!) 145/97   Pulse: 99       NAD  No belabored breathing  Incision is 2x1 cm, with excess granulation tissue    A/P:  Alberto Frye is a 48 y.o. year old female w infected keloid s/p I&D    -silver nitrate applied  -shower daily. Ok to keep wound covered until it closes  -rtc 2 wks    Giancarlo Sosa  General Surgery - [facility]  3/3/2023

## 2023-03-06 ENCOUNTER — TELEPHONE (OUTPATIENT)
Dept: BARIATRICS | Facility: CLINIC | Age: 48
End: 2023-03-06
Payer: COMMERCIAL

## 2023-03-06 ENCOUNTER — PATIENT MESSAGE (OUTPATIENT)
Dept: BARIATRICS | Facility: CLINIC | Age: 48
End: 2023-03-06
Payer: COMMERCIAL

## 2023-03-06 NOTE — TELEPHONE ENCOUNTER
Referral received for bariatric clinic from Dr. Johan Grover.  Attempted to reach on 2/6, 2/7, 2/8, 2/9 and today, 3/6.  Called pt- no answer.  Left VM with call back number and portal message sent.   Message sent to referring physician to update on status.

## 2023-03-10 ENCOUNTER — OFFICE VISIT (OUTPATIENT)
Dept: PSYCHIATRY | Facility: CLINIC | Age: 48
End: 2023-03-10
Payer: COMMERCIAL

## 2023-03-10 VITALS
BODY MASS INDEX: 44.07 KG/M2 | WEIGHT: 256.75 LBS | SYSTOLIC BLOOD PRESSURE: 158 MMHG | DIASTOLIC BLOOD PRESSURE: 107 MMHG | HEART RATE: 99 BPM

## 2023-03-10 DIAGNOSIS — G47.00 INSOMNIA DISORDER, WITH NON-SLEEP DISORDER MENTAL COMORBIDITY: ICD-10-CM

## 2023-03-10 DIAGNOSIS — F06.4 ANXIETY DISORDER DUE TO MEDICAL CONDITION: ICD-10-CM

## 2023-03-10 DIAGNOSIS — F31.32 BIPOLAR AFFECTIVE DISORDER, DEPRESSED, MODERATE: Primary | ICD-10-CM

## 2023-03-10 DIAGNOSIS — F60.5 OBSESSIVE COMPULSIVE PERSONALITY DISORDER: ICD-10-CM

## 2023-03-10 PROCEDURE — 1159F PR MEDICATION LIST DOCUMENTED IN MEDICAL RECORD: ICD-10-PCS | Mod: CPTII,S$GLB,, | Performed by: NURSE PRACTITIONER

## 2023-03-10 PROCEDURE — 99999 PR PBB SHADOW E&M-EST. PATIENT-LVL III: ICD-10-PCS | Mod: PBBFAC,,, | Performed by: NURSE PRACTITIONER

## 2023-03-10 PROCEDURE — 3080F PR MOST RECENT DIASTOLIC BLOOD PRESSURE >= 90 MM HG: ICD-10-PCS | Mod: CPTII,S$GLB,, | Performed by: NURSE PRACTITIONER

## 2023-03-10 PROCEDURE — 3080F DIAST BP >= 90 MM HG: CPT | Mod: CPTII,S$GLB,, | Performed by: NURSE PRACTITIONER

## 2023-03-10 PROCEDURE — 3077F SYST BP >= 140 MM HG: CPT | Mod: CPTII,S$GLB,, | Performed by: NURSE PRACTITIONER

## 2023-03-10 PROCEDURE — 1160F PR REVIEW ALL MEDS BY PRESCRIBER/CLIN PHARMACIST DOCUMENTED: ICD-10-PCS | Mod: CPTII,S$GLB,, | Performed by: NURSE PRACTITIONER

## 2023-03-10 PROCEDURE — 99214 OFFICE O/P EST MOD 30 MIN: CPT | Mod: S$GLB,,, | Performed by: NURSE PRACTITIONER

## 2023-03-10 PROCEDURE — 99214 PR OFFICE/OUTPT VISIT, EST, LEVL IV, 30-39 MIN: ICD-10-PCS | Mod: S$GLB,,, | Performed by: NURSE PRACTITIONER

## 2023-03-10 PROCEDURE — 90833 PSYTX W PT W E/M 30 MIN: CPT | Mod: S$GLB,,, | Performed by: NURSE PRACTITIONER

## 2023-03-10 PROCEDURE — 99999 PR PBB SHADOW E&M-EST. PATIENT-LVL III: CPT | Mod: PBBFAC,,, | Performed by: NURSE PRACTITIONER

## 2023-03-10 PROCEDURE — 1160F RVW MEDS BY RX/DR IN RCRD: CPT | Mod: CPTII,S$GLB,, | Performed by: NURSE PRACTITIONER

## 2023-03-10 PROCEDURE — 3008F BODY MASS INDEX DOCD: CPT | Mod: CPTII,S$GLB,, | Performed by: NURSE PRACTITIONER

## 2023-03-10 PROCEDURE — 1159F MED LIST DOCD IN RCRD: CPT | Mod: CPTII,S$GLB,, | Performed by: NURSE PRACTITIONER

## 2023-03-10 PROCEDURE — 3077F PR MOST RECENT SYSTOLIC BLOOD PRESSURE >= 140 MM HG: ICD-10-PCS | Mod: CPTII,S$GLB,, | Performed by: NURSE PRACTITIONER

## 2023-03-10 PROCEDURE — 3008F PR BODY MASS INDEX (BMI) DOCUMENTED: ICD-10-PCS | Mod: CPTII,S$GLB,, | Performed by: NURSE PRACTITIONER

## 2023-03-10 PROCEDURE — 90833 PR PSYCHOTHERAPY W/PATIENT W/E&M, 30 MIN (ADD ON): ICD-10-PCS | Mod: S$GLB,,, | Performed by: NURSE PRACTITIONER

## 2023-03-10 RX ORDER — BUSPIRONE HYDROCHLORIDE 15 MG/1
15 TABLET ORAL 3 TIMES DAILY
Qty: 90 TABLET | Refills: 11 | Status: SHIPPED | OUTPATIENT
Start: 2023-03-10 | End: 2023-05-31 | Stop reason: SDUPTHER

## 2023-03-10 RX ORDER — AMITRIPTYLINE HYDROCHLORIDE 50 MG/1
50 TABLET, FILM COATED ORAL NIGHTLY
Qty: 90 TABLET | Refills: 1 | Status: SHIPPED | OUTPATIENT
Start: 2023-03-10 | End: 2023-05-31 | Stop reason: SDUPTHER

## 2023-03-10 RX ORDER — FLUOXETINE HYDROCHLORIDE 40 MG/1
80 CAPSULE ORAL DAILY
Qty: 90 CAPSULE | Refills: 1 | Status: SHIPPED | OUTPATIENT
Start: 2023-03-10 | End: 2023-05-31 | Stop reason: SDUPTHER

## 2023-03-10 NOTE — PROGRESS NOTES
Outpatient Psychiatry Follow-Up Visit (MD/NP)    3/10/2023    Clinical Status of Patient:  Outpatient (Ambulatory)    Chief Complaint:  Alberto Frye is a 48 y.o. female who presents today for follow-up of mood disorder, anxiety, psychosis and insomnia .  Met with patient.      Last Visit:  10/26/22 Chart and  reviewed.     Interval History and Content of Current Session:  Medications per last visit:   Continue Elavil 50 mg po q HS        Continue Hydroxyzine HCL 25 mg po BID PRN anxiety/sleep.    Increase to Prozac 60 mg po qd mood disorder, anxiety and OCD.   Increase to Lamictal 100 mg po daily       Pt reports she is not taking Lamictal anymore; took herself off without guidance. Endorses unmanaged anxiety triggered by pain /discomfort from Syjoren's syndrom and fibromyalgia. Reports that she does feel that Prozac is helpful for anxiety and mood. Will increase to 80 mg.  Reports Elavil has been helpful for sleep. Thought processes appear clear and organized. No sx of paranoia or psychosis. Denies SI/HI/AVH.       Psychotherapy:  Target symptoms: anxiety , mood disorder, psychosis, poor sleep  Why chosen therapy is appropriate versus another modality: relevant to diagnosis, evidence based practice  Outcome monitoring methods: self-report, observation  Therapeutic intervention type: supportive psychotherapy  Topics discussed/themes:  diet, exercise, medication compliance, symptom recognition  and improvement  The patient's response to the intervention is accepting. The patient's progress toward treatment goals is good  Duration of intervention: 17 minutes.    Review of Systems   PSYCHIATRIC: Pertinant items are noted in the narrative.  CONSTITUTIONAL: No weight gain or loss.   MUSCULOSKELETAL: No pain or stiffness of the joints.  NEUROLOGIC: No weakness, sensory changes, seizures, confusion, memory loss, tremor or other abnormal movements.  ENDOCRINE: No polydipsia or polyuria.  INTEGUMENTARY: No rashes  "or lacerations.  EYES: No exophthalmos, jaundice or blindness.  ENT: No dizziness, tinnitus or hearing loss.  RESPIRATORY: No shortness of breath.  CARDIOVASCULAR: No tachycardia or chest pain.  GASTROINTESTINAL: No nausea, vomiting, pain, constipation or diarrhea.  GENITOURINARY: No frequency, dysuria or sexual dysfunction.  HEMATOLOGIC/LYMPHATIC: No excessive bleeding, prolonged or excessive bleeding after dental extraction/injury.  ALLERGIC/IMMUNOLOGIC: No allergic response to materials, foods or animals at this time.     Past Medical, Family and Social History: The patient's past medical, family and social history have been reviewed and updated as appropriate within the electronic medical record - see encounter notes.    Compliance: yes until she ran out of medication    Side effects: None    Risk Parameters:  Patient reports no suicidal ideation  Patient reports no homicidal ideation  Patient reports no self-injurious behavior  Patient reports no violent behavior    Exam (detailed: at least 9 elements; comprehensive: all 15 elements)   Constitutional  Vitals:  Most recent vital signs, dated greater than 90 days prior to this appointment, were reviewed.   Vitals:    03/10/23 1025   BP: (!) 158/107   Pulse: 99   Weight: 116.4 kg (256 lb 11.6 oz)      General:  unremarkable, age appropriate     Musculoskeletal  Muscle Strength/Tone:  no tremor, no tic   Gait & Station:  non-ataxic     Psychiatric  Speech:  no latency; no press   Mood & Affect:  "Not good."  congruent and appropriate   Thought Process:  normal and logical   Associations:  intact   Thought Content:  normal, no suicidality, no homicidality, delusions, or paranoia,    Insight:  has awareness of illness   Judgement: behavior is adequate to circumstances   Orientation:  grossly intact   Memory: intact for content of interview   Language: grossly intact   Attention Span & Concentration:  able to focus   Fund of Knowledge:  intact and appropriate to age " and level of education     Assessment and Diagnosis   Status/Progress: Based on the examination today, the patient's problem(s) is/are adequately but not ideally controlled.  New problems have not not been presented today. Lack of compliance due to running out of medication is complicating management of the primary condition.  There are no active rule-out diagnoses for this patient at this time.     General Impression:       ICD-10-CM ICD-9-CM   1. Bipolar affective disorder, depressed, moderate  F31.32 296.52   2. Obsessive compulsive personality disorder  F60.5 301.4   3. Anxiety disorder due to medical condition  F06.4 293.84   4. Insomnia disorder, with non-sleep disorder mental comorbidity  G47.00 780.52     Intervention/Counseling/Treatment Plan   Medication Management: The risks and benefits of medication were discussed with the patient.  The treatment plan and follow up plan were reviewed with the patient.   Safety: Call 911 or Crisis Line or go to ER for suicidal ideation, adverse effects of medication or any other emergency  Start Buspar 15 mg 2-3 x daily for anxiety  Increase to Prozac 80 mg daily (2 pills of 40 mg)  Continue Elavil 50 mg at bedtime.   Follow up with me in 8 weeks    INSTRUCTIONS  Instructed to call 911 or Crisis Line or go to ER for suicidal ideation, adverse effects of medication or any other emergency. Verbalizes understanding and plan to comply.    Instructed to contact provider either through her [facility] account or by calling [phone] prior to running out of her medication. Verbalizes understanding and plan to comply.    Return to Clinic: 2 months

## 2023-03-10 NOTE — PATIENT INSTRUCTIONS
Start Buspar 15 mg 2-3 x daily for anxiety  Increase to Prozac 80 mg daily (2 pills of 40 mg)  Continue Elavil 50 mg at bedtime.   Follow up with me in 8 weeks

## 2023-03-11 PROBLEM — G47.00 INSOMNIA DISORDER, WITH NON-SLEEP DISORDER MENTAL COMORBIDITY: Status: ACTIVE | Noted: 2023-03-11

## 2023-03-11 PROBLEM — F06.4 ANXIETY DISORDER DUE TO MEDICAL CONDITION: Status: ACTIVE | Noted: 2023-03-11

## 2023-03-13 ENCOUNTER — TELEPHONE (OUTPATIENT)
Dept: FAMILY MEDICINE | Facility: CLINIC | Age: 48
End: 2023-03-13
Payer: COMMERCIAL

## 2023-03-13 ENCOUNTER — PATIENT MESSAGE (OUTPATIENT)
Dept: FAMILY MEDICINE | Facility: CLINIC | Age: 48
End: 2023-03-13
Payer: COMMERCIAL

## 2023-03-13 DIAGNOSIS — F41.9 ANXIETY: Primary | ICD-10-CM

## 2023-03-13 RX ORDER — CLONAZEPAM 0.5 MG/1
0.5 TABLET ORAL DAILY PRN
Qty: 14 TABLET | Refills: 0 | Status: SHIPPED | OUTPATIENT
Start: 2023-03-13 | End: 2023-07-28 | Stop reason: ALTCHOICE

## 2023-03-13 NOTE — TELEPHONE ENCOUNTER
----- Message from Luis Miguel Mahoney sent at 3/13/2023 11:04 AM CDT -----  Regarding: Pharm Call  Contact: Sor[facility] ([facility])  Type:  Pharmacy Calling to Clarify an RX    Name of Caller:Soraya     Pharmacy Name:  ([facility])    Prescription Name: clonazePAM (KLONOPIN) 0.5 MG tablet    What do they need to clarify?: Pt is on Oxycodone from another physician. Does Dr. Cespedes wish to proceed filling medication. Please advise    Best Call Back Number:[phone]    Additional Information:

## 2023-03-15 ENCOUNTER — PATIENT MESSAGE (OUTPATIENT)
Dept: FAMILY MEDICINE | Facility: CLINIC | Age: 48
End: 2023-03-15
Payer: COMMERCIAL

## 2023-03-22 ENCOUNTER — PATIENT MESSAGE (OUTPATIENT)
Dept: FAMILY MEDICINE | Facility: CLINIC | Age: 48
End: 2023-03-22
Payer: COMMERCIAL

## 2023-03-23 DIAGNOSIS — M79.7 FIBROMYALGIA: ICD-10-CM

## 2023-03-27 ENCOUNTER — OFFICE VISIT (OUTPATIENT)
Dept: RHEUMATOLOGY | Facility: CLINIC | Age: 48
End: 2023-03-27
Payer: COMMERCIAL

## 2023-03-27 VITALS
HEIGHT: 64 IN | HEART RATE: 97 BPM | SYSTOLIC BLOOD PRESSURE: 147 MMHG | DIASTOLIC BLOOD PRESSURE: 91 MMHG | WEIGHT: 259.88 LBS | OXYGEN SATURATION: 95 % | BODY MASS INDEX: 44.37 KG/M2

## 2023-03-27 DIAGNOSIS — D84.821 IMMUNODEFICIENCY DUE TO TREATMENT WITH IMMUNOSUPPRESSIVE MEDICATION: ICD-10-CM

## 2023-03-27 DIAGNOSIS — M15.9 PRIMARY OSTEOARTHRITIS INVOLVING MULTIPLE JOINTS: ICD-10-CM

## 2023-03-27 DIAGNOSIS — M35.01 SJOGREN SYNDROME WITH KERATOCONJUNCTIVITIS: Primary | ICD-10-CM

## 2023-03-27 DIAGNOSIS — M79.7 FIBROMYALGIA: ICD-10-CM

## 2023-03-27 DIAGNOSIS — Z71.89 COUNSELING AND COORDINATION OF CARE: ICD-10-CM

## 2023-03-27 DIAGNOSIS — E66.01 MORBID OBESITY: ICD-10-CM

## 2023-03-27 DIAGNOSIS — Z79.899 IMMUNODEFICIENCY DUE TO TREATMENT WITH IMMUNOSUPPRESSIVE MEDICATION: ICD-10-CM

## 2023-03-27 PROCEDURE — 99999 PR PBB SHADOW E&M-EST. PATIENT-LVL IV: ICD-10-PCS | Mod: PBBFAC,,, | Performed by: INTERNAL MEDICINE

## 2023-03-27 PROCEDURE — 99214 OFFICE O/P EST MOD 30 MIN: CPT | Mod: S$GLB,,, | Performed by: INTERNAL MEDICINE

## 2023-03-27 PROCEDURE — 3080F DIAST BP >= 90 MM HG: CPT | Mod: CPTII,S$GLB,, | Performed by: INTERNAL MEDICINE

## 2023-03-27 PROCEDURE — 99999 PR PBB SHADOW E&M-EST. PATIENT-LVL IV: CPT | Mod: PBBFAC,,, | Performed by: INTERNAL MEDICINE

## 2023-03-27 PROCEDURE — 3008F BODY MASS INDEX DOCD: CPT | Mod: CPTII,S$GLB,, | Performed by: INTERNAL MEDICINE

## 2023-03-27 PROCEDURE — 1159F PR MEDICATION LIST DOCUMENTED IN MEDICAL RECORD: ICD-10-PCS | Mod: CPTII,S$GLB,, | Performed by: INTERNAL MEDICINE

## 2023-03-27 PROCEDURE — 3080F PR MOST RECENT DIASTOLIC BLOOD PRESSURE >= 90 MM HG: ICD-10-PCS | Mod: CPTII,S$GLB,, | Performed by: INTERNAL MEDICINE

## 2023-03-27 PROCEDURE — 1159F MED LIST DOCD IN RCRD: CPT | Mod: CPTII,S$GLB,, | Performed by: INTERNAL MEDICINE

## 2023-03-27 PROCEDURE — 99214 PR OFFICE/OUTPT VISIT, EST, LEVL IV, 30-39 MIN: ICD-10-PCS | Mod: S$GLB,,, | Performed by: INTERNAL MEDICINE

## 2023-03-27 PROCEDURE — 3077F PR MOST RECENT SYSTOLIC BLOOD PRESSURE >= 140 MM HG: ICD-10-PCS | Mod: CPTII,S$GLB,, | Performed by: INTERNAL MEDICINE

## 2023-03-27 PROCEDURE — 3008F PR BODY MASS INDEX (BMI) DOCUMENTED: ICD-10-PCS | Mod: CPTII,S$GLB,, | Performed by: INTERNAL MEDICINE

## 2023-03-27 PROCEDURE — 3077F SYST BP >= 140 MM HG: CPT | Mod: CPTII,S$GLB,, | Performed by: INTERNAL MEDICINE

## 2023-03-27 RX ORDER — METHYLPREDNISOLONE 4 MG/1
TABLET ORAL
Qty: 1 EACH | Refills: 0 | Status: SHIPPED | OUTPATIENT
Start: 2023-03-27 | End: 2023-04-24

## 2023-03-27 RX ORDER — OXYCODONE AND ACETAMINOPHEN 10; 325 MG/1; MG/1
1 TABLET ORAL EVERY 12 HOURS PRN
Qty: 60 TABLET | Refills: 0 | Status: SHIPPED | OUTPATIENT
Start: 2023-03-27 | End: 2023-04-24 | Stop reason: SDUPTHER

## 2023-03-27 NOTE — PROGRESS NOTES
Answers submitted by the patient for this visit:  Rheumatology Questionnaire (Submitted on 3/22/2023)  fever: No  eye redness: Yes  mouth sores: No  headaches: Yes  shortness of breath: Yes  chest pain: Yes  trouble swallowing: No  diarrhea: No  constipation: No  unexpected weight change: No  genital sore: No  dysuria: No  During the last 3 days, have you had a skin rash?: Yes  Bruises or bleeds easily: Yes  cough: No
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
## 2023-03-28 ENCOUNTER — PATIENT MESSAGE (OUTPATIENT)
Dept: RHEUMATOLOGY | Facility: CLINIC | Age: 48
End: 2023-03-28
Payer: COMMERCIAL

## 2023-04-18 ENCOUNTER — PATIENT MESSAGE (OUTPATIENT)
Dept: RHEUMATOLOGY | Facility: CLINIC | Age: 48
End: 2023-04-18
Payer: COMMERCIAL

## 2023-04-19 ENCOUNTER — PATIENT MESSAGE (OUTPATIENT)
Dept: RHEUMATOLOGY | Facility: CLINIC | Age: 48
End: 2023-04-19
Payer: COMMERCIAL

## 2023-04-24 ENCOUNTER — OFFICE VISIT (OUTPATIENT)
Dept: FAMILY MEDICINE | Facility: CLINIC | Age: 48
End: 2023-04-24
Payer: COMMERCIAL

## 2023-04-24 VITALS
BODY MASS INDEX: 42.87 KG/M2 | DIASTOLIC BLOOD PRESSURE: 86 MMHG | HEART RATE: 95 BPM | WEIGHT: 251.13 LBS | HEIGHT: 64 IN | SYSTOLIC BLOOD PRESSURE: 122 MMHG | OXYGEN SATURATION: 97 % | TEMPERATURE: 98 F

## 2023-04-24 DIAGNOSIS — M35.05 SJOGREN'S SYNDROME WITH INFLAMMATORY ARTHRITIS: ICD-10-CM

## 2023-04-24 DIAGNOSIS — F39 MOOD INSOMNIA: ICD-10-CM

## 2023-04-24 DIAGNOSIS — M79.7 FIBROMYALGIA: ICD-10-CM

## 2023-04-24 DIAGNOSIS — B96.89 BACTERIAL RESPIRATORY INFECTION: Primary | ICD-10-CM

## 2023-04-24 DIAGNOSIS — I10 ESSENTIAL HYPERTENSION: ICD-10-CM

## 2023-04-24 DIAGNOSIS — F31.32 BIPOLAR AFFECTIVE DISORDER, CURRENTLY DEPRESSED, MODERATE: ICD-10-CM

## 2023-04-24 DIAGNOSIS — F51.05 MOOD INSOMNIA: ICD-10-CM

## 2023-04-24 DIAGNOSIS — J98.8 BACTERIAL RESPIRATORY INFECTION: Primary | ICD-10-CM

## 2023-04-24 DIAGNOSIS — E78.5 DYSLIPIDEMIA: ICD-10-CM

## 2023-04-24 DIAGNOSIS — F41.9 ANXIETY: ICD-10-CM

## 2023-04-24 PROCEDURE — 3008F BODY MASS INDEX DOCD: CPT | Mod: CPTII,S$GLB,, | Performed by: INTERNAL MEDICINE

## 2023-04-24 PROCEDURE — 99214 PR OFFICE/OUTPT VISIT, EST, LEVL IV, 30-39 MIN: ICD-10-PCS | Mod: S$GLB,,, | Performed by: INTERNAL MEDICINE

## 2023-04-24 PROCEDURE — 99214 OFFICE O/P EST MOD 30 MIN: CPT | Mod: S$GLB,,, | Performed by: INTERNAL MEDICINE

## 2023-04-24 PROCEDURE — 1160F RVW MEDS BY RX/DR IN RCRD: CPT | Mod: CPTII,S$GLB,, | Performed by: INTERNAL MEDICINE

## 2023-04-24 PROCEDURE — 99999 PR PBB SHADOW E&M-EST. PATIENT-LVL IV: CPT | Mod: PBBFAC,,, | Performed by: INTERNAL MEDICINE

## 2023-04-24 PROCEDURE — 3079F PR MOST RECENT DIASTOLIC BLOOD PRESSURE 80-89 MM HG: ICD-10-PCS | Mod: CPTII,S$GLB,, | Performed by: INTERNAL MEDICINE

## 2023-04-24 PROCEDURE — 99999 PR PBB SHADOW E&M-EST. PATIENT-LVL IV: ICD-10-PCS | Mod: PBBFAC,,, | Performed by: INTERNAL MEDICINE

## 2023-04-24 PROCEDURE — 3074F PR MOST RECENT SYSTOLIC BLOOD PRESSURE < 130 MM HG: ICD-10-PCS | Mod: CPTII,S$GLB,, | Performed by: INTERNAL MEDICINE

## 2023-04-24 PROCEDURE — 1159F PR MEDICATION LIST DOCUMENTED IN MEDICAL RECORD: ICD-10-PCS | Mod: CPTII,S$GLB,, | Performed by: INTERNAL MEDICINE

## 2023-04-24 PROCEDURE — 3074F SYST BP LT 130 MM HG: CPT | Mod: CPTII,S$GLB,, | Performed by: INTERNAL MEDICINE

## 2023-04-24 PROCEDURE — 1160F PR REVIEW ALL MEDS BY PRESCRIBER/CLIN PHARMACIST DOCUMENTED: ICD-10-PCS | Mod: CPTII,S$GLB,, | Performed by: INTERNAL MEDICINE

## 2023-04-24 PROCEDURE — 3008F PR BODY MASS INDEX (BMI) DOCUMENTED: ICD-10-PCS | Mod: CPTII,S$GLB,, | Performed by: INTERNAL MEDICINE

## 2023-04-24 PROCEDURE — 3079F DIAST BP 80-89 MM HG: CPT | Mod: CPTII,S$GLB,, | Performed by: INTERNAL MEDICINE

## 2023-04-24 PROCEDURE — 1159F MED LIST DOCD IN RCRD: CPT | Mod: CPTII,S$GLB,, | Performed by: INTERNAL MEDICINE

## 2023-04-24 RX ORDER — DOXYCYCLINE 100 MG/1
100 CAPSULE ORAL EVERY 12 HOURS
Qty: 14 CAPSULE | Refills: 0 | Status: SHIPPED | OUTPATIENT
Start: 2023-04-24 | End: 2023-05-01

## 2023-04-24 RX ORDER — PROMETHAZINE HYDROCHLORIDE AND DEXTROMETHORPHAN HYDROBROMIDE 6.25; 15 MG/5ML; MG/5ML
5 SYRUP ORAL NIGHTLY PRN
Qty: 180 ML | Refills: 0 | Status: SHIPPED | OUTPATIENT
Start: 2023-04-24 | End: 2023-05-04

## 2023-04-24 RX ORDER — BENZONATATE 100 MG/1
100 CAPSULE ORAL 3 TIMES DAILY PRN
Qty: 30 CAPSULE | Refills: 0 | Status: SHIPPED | OUTPATIENT
Start: 2023-04-24 | End: 2023-05-04
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
## 2023-04-25 ENCOUNTER — PATIENT MESSAGE (OUTPATIENT)
Dept: FAMILY MEDICINE | Facility: CLINIC | Age: 48
End: 2023-04-25
Payer: COMMERCIAL

## 2023-05-19 DIAGNOSIS — I10 ESSENTIAL HYPERTENSION: ICD-10-CM

## 2023-05-19 DIAGNOSIS — Z09 HOSPITAL DISCHARGE FOLLOW-UP: ICD-10-CM

## 2023-05-19 NOTE — TELEPHONE ENCOUNTER
No care due was identified.  [facility] Embedded Care Due Messages. Reference number: [id number].   5/19/2023 5:04:53 PM CDT

## 2023-05-20 RX ORDER — VALSARTAN AND HYDROCHLOROTHIAZIDE 160; 25 MG/1; MG/1
1 TABLET ORAL DAILY
Qty: 90 TABLET | Refills: 2 | Status: SHIPPED | OUTPATIENT
Start: 2023-05-20 | End: 2023-07-28 | Stop reason: SDUPTHER

## 2023-05-20 NOTE — TELEPHONE ENCOUNTER
Refill Decision Note   Alberto Uday  is requesting a refill authorization.  Brief Assessment and Rationale for Refill:  Approve     Medication Therapy Plan:       Medication Reconciliation Completed: No   Comments:     No Care Gaps recommended.     Note composed:3:49 AM 05/20/2023

## 2023-05-22 DIAGNOSIS — M79.7 FIBROMYALGIA: ICD-10-CM

## 2023-05-22 RX ORDER — OXYCODONE AND ACETAMINOPHEN 10; 325 MG/1; MG/1
1 TABLET ORAL EVERY 12 HOURS PRN
Qty: 60 TABLET | Refills: 0 | Status: SHIPPED | OUTPATIENT
Start: 2023-05-22 | End: 2023-05-26 | Stop reason: SDUPTHER

## 2023-05-26 DIAGNOSIS — M79.7 FIBROMYALGIA: ICD-10-CM

## 2023-05-30 RX ORDER — OXYCODONE AND ACETAMINOPHEN 10; 325 MG/1; MG/1
1 TABLET ORAL EVERY 12 HOURS PRN
Qty: 60 TABLET | Refills: 0 | Status: SHIPPED | OUTPATIENT
Start: 2023-05-30 | End: 2023-06-27 | Stop reason: SDUPTHER

## 2023-05-31 ENCOUNTER — OFFICE VISIT (OUTPATIENT)
Dept: PSYCHIATRY | Facility: CLINIC | Age: 48
End: 2023-05-31
Payer: COMMERCIAL

## 2023-05-31 VITALS
BODY MASS INDEX: 44.01 KG/M2 | SYSTOLIC BLOOD PRESSURE: 121 MMHG | DIASTOLIC BLOOD PRESSURE: 79 MMHG | HEART RATE: 97 BPM | WEIGHT: 256.38 LBS

## 2023-05-31 DIAGNOSIS — F31.32 BIPOLAR AFFECTIVE DISORDER, DEPRESSED, MODERATE: Primary | ICD-10-CM

## 2023-05-31 DIAGNOSIS — F41.0 PANIC ATTACKS: ICD-10-CM

## 2023-05-31 DIAGNOSIS — F60.5 OBSESSIVE COMPULSIVE PERSONALITY DISORDER: ICD-10-CM

## 2023-05-31 DIAGNOSIS — F06.4 ANXIETY DISORDER DUE TO MEDICAL CONDITION: ICD-10-CM

## 2023-05-31 DIAGNOSIS — G47.00 INSOMNIA DISORDER, WITH NON-SLEEP DISORDER MENTAL COMORBIDITY: ICD-10-CM

## 2023-05-31 PROCEDURE — 99214 PR OFFICE/OUTPT VISIT, EST, LEVL IV, 30-39 MIN: ICD-10-PCS | Mod: S$GLB,,, | Performed by: NURSE PRACTITIONER

## 2023-05-31 PROCEDURE — 99999 PR PBB SHADOW E&M-EST. PATIENT-LVL III: ICD-10-PCS | Mod: PBBFAC,,, | Performed by: NURSE PRACTITIONER

## 2023-05-31 PROCEDURE — 3008F PR BODY MASS INDEX (BMI) DOCUMENTED: ICD-10-PCS | Mod: CPTII,S$GLB,, | Performed by: NURSE PRACTITIONER

## 2023-05-31 PROCEDURE — 3074F PR MOST RECENT SYSTOLIC BLOOD PRESSURE < 130 MM HG: ICD-10-PCS | Mod: CPTII,S$GLB,, | Performed by: NURSE PRACTITIONER

## 2023-05-31 PROCEDURE — 1159F PR MEDICATION LIST DOCUMENTED IN MEDICAL RECORD: ICD-10-PCS | Mod: CPTII,S$GLB,, | Performed by: NURSE PRACTITIONER

## 2023-05-31 PROCEDURE — 90833 PR PSYCHOTHERAPY W/PATIENT W/E&M, 30 MIN (ADD ON): ICD-10-PCS | Mod: S$GLB,,, | Performed by: NURSE PRACTITIONER

## 2023-05-31 PROCEDURE — 3008F BODY MASS INDEX DOCD: CPT | Mod: CPTII,S$GLB,, | Performed by: NURSE PRACTITIONER

## 2023-05-31 PROCEDURE — 3078F PR MOST RECENT DIASTOLIC BLOOD PRESSURE < 80 MM HG: ICD-10-PCS | Mod: CPTII,S$GLB,, | Performed by: NURSE PRACTITIONER

## 2023-05-31 PROCEDURE — 1160F PR REVIEW ALL MEDS BY PRESCRIBER/CLIN PHARMACIST DOCUMENTED: ICD-10-PCS | Mod: CPTII,S$GLB,, | Performed by: NURSE PRACTITIONER

## 2023-05-31 PROCEDURE — 3074F SYST BP LT 130 MM HG: CPT | Mod: CPTII,S$GLB,, | Performed by: NURSE PRACTITIONER

## 2023-05-31 PROCEDURE — 90833 PSYTX W PT W E/M 30 MIN: CPT | Mod: S$GLB,,, | Performed by: NURSE PRACTITIONER

## 2023-05-31 PROCEDURE — 3078F DIAST BP <80 MM HG: CPT | Mod: CPTII,S$GLB,, | Performed by: NURSE PRACTITIONER

## 2023-05-31 PROCEDURE — 1159F MED LIST DOCD IN RCRD: CPT | Mod: CPTII,S$GLB,, | Performed by: NURSE PRACTITIONER

## 2023-05-31 PROCEDURE — 99214 OFFICE O/P EST MOD 30 MIN: CPT | Mod: S$GLB,,, | Performed by: NURSE PRACTITIONER

## 2023-05-31 PROCEDURE — 1160F RVW MEDS BY RX/DR IN RCRD: CPT | Mod: CPTII,S$GLB,, | Performed by: NURSE PRACTITIONER

## 2023-05-31 PROCEDURE — 99999 PR PBB SHADOW E&M-EST. PATIENT-LVL III: CPT | Mod: PBBFAC,,, | Performed by: NURSE PRACTITIONER

## 2023-05-31 RX ORDER — HYDROXYZINE PAMOATE 25 MG/1
25 CAPSULE ORAL 2 TIMES DAILY PRN
Qty: 60 CAPSULE | Refills: 5 | Status: SHIPPED | OUTPATIENT
Start: 2023-05-31

## 2023-05-31 RX ORDER — FLUOXETINE HYDROCHLORIDE 40 MG/1
80 CAPSULE ORAL DAILY
Qty: 90 CAPSULE | Refills: 1 | Status: SHIPPED | OUTPATIENT
Start: 2023-05-31 | End: 2023-10-05

## 2023-05-31 RX ORDER — AMITRIPTYLINE HYDROCHLORIDE 50 MG/1
50 TABLET, FILM COATED ORAL NIGHTLY
Qty: 90 TABLET | Refills: 1 | Status: SHIPPED | OUTPATIENT
Start: 2023-05-31 | End: 2024-03-22 | Stop reason: SDUPTHER

## 2023-05-31 RX ORDER — BUSPIRONE HYDROCHLORIDE 15 MG/1
15 TABLET ORAL 3 TIMES DAILY
Qty: 90 TABLET | Refills: 11 | Status: SHIPPED | OUTPATIENT
Start: 2023-05-31 | End: 2024-05-30

## 2023-05-31 NOTE — PATIENT INSTRUCTIONS
Buspar 15 mg 2-3 x daily for anxiety  Prozac 80 mg daily (2 pills of 40 mg)  Elavil 50 mg at bedtime.   Vistaril 25 mg 1-2 daily as needed for panic attacks
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
## 2023-06-01 ENCOUNTER — PATIENT MESSAGE (OUTPATIENT)
Dept: PSYCHIATRY | Facility: CLINIC | Age: 48
End: 2023-06-01
Payer: COMMERCIAL

## 2023-06-09 ENCOUNTER — OFFICE VISIT (OUTPATIENT)
Dept: OPTOMETRY | Facility: CLINIC | Age: 48
End: 2023-06-09
Payer: COMMERCIAL

## 2023-06-09 ENCOUNTER — CLINICAL SUPPORT (OUTPATIENT)
Dept: OPHTHALMOLOGY | Facility: CLINIC | Age: 48
End: 2023-06-09
Payer: COMMERCIAL

## 2023-06-09 DIAGNOSIS — M35.05 SJOGREN'S SYNDROME WITH INFLAMMATORY ARTHRITIS: ICD-10-CM

## 2023-06-09 DIAGNOSIS — M35.05 SJOGREN'S SYNDROME WITH INFLAMMATORY ARTHRITIS: Primary | ICD-10-CM

## 2023-06-09 DIAGNOSIS — H04.123 DRY EYE SYNDROME, BILATERAL: ICD-10-CM

## 2023-06-09 DIAGNOSIS — Z79.899 LONG-TERM USE OF PLAQUENIL: ICD-10-CM

## 2023-06-09 DIAGNOSIS — H52.7 REFRACTIVE ERROR: ICD-10-CM

## 2023-06-09 PROCEDURE — 1159F MED LIST DOCD IN RCRD: CPT | Mod: CPTII,S$GLB,, | Performed by: OPTOMETRIST

## 2023-06-09 PROCEDURE — 92015 PR REFRACTION: ICD-10-PCS | Mod: S$GLB,,, | Performed by: OPTOMETRIST

## 2023-06-09 PROCEDURE — 99999 PR PBB SHADOW E&M-EST. PATIENT-LVL III: CPT | Mod: PBBFAC,,, | Performed by: OPTOMETRIST

## 2023-06-09 PROCEDURE — 99999 PR PBB SHADOW E&M-EST. PATIENT-LVL III: ICD-10-PCS | Mod: PBBFAC,,, | Performed by: OPTOMETRIST

## 2023-06-09 PROCEDURE — 1159F PR MEDICATION LIST DOCUMENTED IN MEDICAL RECORD: ICD-10-PCS | Mod: CPTII,S$GLB,, | Performed by: OPTOMETRIST

## 2023-06-09 PROCEDURE — 92015 DETERMINE REFRACTIVE STATE: CPT | Mod: S$GLB,,, | Performed by: OPTOMETRIST

## 2023-06-09 PROCEDURE — 1160F RVW MEDS BY RX/DR IN RCRD: CPT | Mod: CPTII,S$GLB,, | Performed by: OPTOMETRIST

## 2023-06-09 PROCEDURE — 1160F PR REVIEW ALL MEDS BY PRESCRIBER/CLIN PHARMACIST DOCUMENTED: ICD-10-PCS | Mod: CPTII,S$GLB,, | Performed by: OPTOMETRIST

## 2023-06-09 PROCEDURE — 99214 PR OFFICE/OUTPT VISIT, EST, LEVL IV, 30-39 MIN: ICD-10-PCS | Mod: S$GLB,,, | Performed by: OPTOMETRIST

## 2023-06-09 PROCEDURE — 99214 OFFICE O/P EST MOD 30 MIN: CPT | Mod: S$GLB,,, | Performed by: OPTOMETRIST

## 2023-06-09 NOTE — PROGRESS NOTES
Subjective:       Patient ID: Alberto Frye is a 48 y.o. female      Chief Complaint   Patient presents with    Concerns About Ocular Health    Plaquenil Eye Exam     History of Present Illness  Dls: 2/19/21 Dr. Barber     47 y/o female presents today for plaquenil ck.  Pt c/o blurry vision at distance and near ou.   Pt wears pal's. Pt did not bring glasses today   Pt c/o dry eyes ou fbs ou     No tearing  No itching  No burning  No pain  No ha's  No floaters  No flashes    Eye meds  Visine OU PRN     Assessment/Plan:     1. Sjogren's syndrome with inflammatory arthritis  2. Long-term use of Plaquenil  No evidence of plaquenil maculopathy on exam, can continue with plaquenil therapy. OCT mac and HVF WNL OU today. Color vision normal. Return in 1 years for DFE, HVF 10-2, and OCT macula.     3. Dry eye syndrome, bilateral   Recommend artificial tears (Systane/Refresh) 1 drop 2-4x per day and PRN. Discussed different drop options - AT/PFAT/gel/ointment. Chronicity of disease and treatment discussed. Avoid Visine and Clear Eyes.    4. Refractive error  Educated patient on refractive error and discussed lens options. Dispensed updated spectacle Rx. Educated about adaptation period to new specs.    Eyeglass Final Rx       Eyeglass Final Rx         Sphere Cylinder Add    Right +0.75 Sphere +2.00    Left -0.50 Sphere +2.00      Expiration Date: 6/9/2024    Comments: ANTIMETROPIA                          Follow up in about 1 year (around 6/9/2024) for Plaquenil check, HVF 10-2, OCT macula.

## 2023-06-09 NOTE — PROGRESS NOTES
Visual field test done.  Patient stated no latex allergies used coverlet    Glasses Prescription     Sphere Cylinder Axis Add   Right -0.25 +0.75 045 +1.75   Left -0.50 Sphere  +1.75   Expiration Date: 2/20/2022

## 2023-06-27 ENCOUNTER — OFFICE VISIT (OUTPATIENT)
Dept: RHEUMATOLOGY | Facility: CLINIC | Age: 48
End: 2023-06-27
Payer: COMMERCIAL

## 2023-06-27 VITALS
OXYGEN SATURATION: 98 % | SYSTOLIC BLOOD PRESSURE: 130 MMHG | BODY MASS INDEX: 42.59 KG/M2 | DIASTOLIC BLOOD PRESSURE: 87 MMHG | WEIGHT: 249.5 LBS | HEART RATE: 82 BPM | HEIGHT: 64 IN

## 2023-06-27 DIAGNOSIS — E66.01 MORBID OBESITY: ICD-10-CM

## 2023-06-27 DIAGNOSIS — Z71.89 COUNSELING AND COORDINATION OF CARE: ICD-10-CM

## 2023-06-27 DIAGNOSIS — D84.821 IMMUNODEFICIENCY DUE TO TREATMENT WITH IMMUNOSUPPRESSIVE MEDICATION: ICD-10-CM

## 2023-06-27 DIAGNOSIS — M79.7 FIBROMYALGIA: ICD-10-CM

## 2023-06-27 DIAGNOSIS — Z79.899 IMMUNODEFICIENCY DUE TO TREATMENT WITH IMMUNOSUPPRESSIVE MEDICATION: ICD-10-CM

## 2023-06-27 DIAGNOSIS — M35.01 SJOGREN'S SYNDROME WITH KERATOCONJUNCTIVITIS SICCA: Primary | ICD-10-CM

## 2023-06-27 DIAGNOSIS — M15.9 PRIMARY OSTEOARTHRITIS INVOLVING MULTIPLE JOINTS: ICD-10-CM

## 2023-06-27 PROCEDURE — 1159F PR MEDICATION LIST DOCUMENTED IN MEDICAL RECORD: ICD-10-PCS | Mod: CPTII,S$GLB,, | Performed by: INTERNAL MEDICINE

## 2023-06-27 PROCEDURE — 1159F MED LIST DOCD IN RCRD: CPT | Mod: CPTII,S$GLB,, | Performed by: INTERNAL MEDICINE

## 2023-06-27 PROCEDURE — 3008F PR BODY MASS INDEX (BMI) DOCUMENTED: ICD-10-PCS | Mod: CPTII,S$GLB,, | Performed by: INTERNAL MEDICINE

## 2023-06-27 PROCEDURE — 99214 OFFICE O/P EST MOD 30 MIN: CPT | Mod: S$GLB,,, | Performed by: INTERNAL MEDICINE

## 2023-06-27 PROCEDURE — 3079F PR MOST RECENT DIASTOLIC BLOOD PRESSURE 80-89 MM HG: ICD-10-PCS | Mod: CPTII,S$GLB,, | Performed by: INTERNAL MEDICINE

## 2023-06-27 PROCEDURE — 99999 PR PBB SHADOW E&M-EST. PATIENT-LVL IV: CPT | Mod: PBBFAC,,, | Performed by: INTERNAL MEDICINE

## 2023-06-27 PROCEDURE — 3008F BODY MASS INDEX DOCD: CPT | Mod: CPTII,S$GLB,, | Performed by: INTERNAL MEDICINE

## 2023-06-27 PROCEDURE — 99999 PR PBB SHADOW E&M-EST. PATIENT-LVL IV: ICD-10-PCS | Mod: PBBFAC,,, | Performed by: INTERNAL MEDICINE

## 2023-06-27 PROCEDURE — 3079F DIAST BP 80-89 MM HG: CPT | Mod: CPTII,S$GLB,, | Performed by: INTERNAL MEDICINE

## 2023-06-27 PROCEDURE — 99214 PR OFFICE/OUTPT VISIT, EST, LEVL IV, 30-39 MIN: ICD-10-PCS | Mod: S$GLB,,, | Performed by: INTERNAL MEDICINE

## 2023-06-27 PROCEDURE — 3075F PR MOST RECENT SYSTOLIC BLOOD PRESS GE 130-139MM HG: ICD-10-PCS | Mod: CPTII,S$GLB,, | Performed by: INTERNAL MEDICINE

## 2023-06-27 PROCEDURE — 3075F SYST BP GE 130 - 139MM HG: CPT | Mod: CPTII,S$GLB,, | Performed by: INTERNAL MEDICINE

## 2023-06-27 RX ORDER — PREGABALIN 150 MG/1
150 CAPSULE ORAL 2 TIMES DAILY
Qty: 60 CAPSULE | Refills: 5 | Status: SHIPPED | OUTPATIENT
Start: 2023-06-27 | End: 2023-10-24 | Stop reason: SDUPTHER

## 2023-06-27 RX ORDER — TIZANIDINE 4 MG/1
8 TABLET ORAL 2 TIMES DAILY
Qty: 120 TABLET | Refills: 6 | Status: SHIPPED | OUTPATIENT
Start: 2023-06-27 | End: 2023-10-24 | Stop reason: SDUPTHER

## 2023-06-27 RX ORDER — OXYCODONE AND ACETAMINOPHEN 10; 325 MG/1; MG/1
1 TABLET ORAL EVERY 12 HOURS PRN
Qty: 60 TABLET | Refills: 0 | Status: SHIPPED | OUTPATIENT
Start: 2023-06-27 | End: 2023-07-24 | Stop reason: SDUPTHER

## 2023-06-27 RX ORDER — TIZANIDINE 4 MG/1
8 TABLET ORAL 2 TIMES DAILY
COMMUNITY
Start: 2023-06-25 | End: 2023-06-27 | Stop reason: SDUPTHER

## 2023-06-27 RX ORDER — HYDROXYCHLOROQUINE SULFATE 200 MG/1
200 TABLET, FILM COATED ORAL 2 TIMES DAILY
Qty: 60 TABLET | Refills: 6 | Status: SHIPPED | OUTPATIENT
Start: 2023-06-27 | End: 2023-10-24 | Stop reason: SDUPTHER

## 2023-06-27 NOTE — PROGRESS NOTES
Answers submitted by the patient for this visit:  Rheumatology Questionnaire (Submitted on 6/26/2023)  fever: No  eye redness: No  mouth sores: No  headaches: Yes  shortness of breath: No  chest pain: No  trouble swallowing: No  diarrhea: No  constipation: No  unexpected weight change: No  genital sore: No  dysuria: No  During the last 3 days, have you had a skin rash?: No  Bruises or bleeds easily: Yes  cough: No             RHEUMATOLOGY OUTPATIENT CLINIC NOTE    6/27/2023    Attending Rheumatologist: Ross Hughes  Primary Care Provider: Ian Cespedes MD   Physician Requesting Consultation: No referring provider defined for this encounter.  Chief Complaint/Reason For Consultation:  sjogren syndrome        Subjective:       HPI  Alberto Frye is a 48 y.o. Black or  female with medical history noted below who comes for evaluation of arthralgias.   She reports knowing of abnormal GIOVANNY at least ten years and when symptoms worsened she saw Rheumatology. Seen by Rheumatology at [facility] in 2018, noted to have +GIOVANNY & SSA, and likely beginning of Primary Sjogrens and OA.   Patient reports that she has been dealing with chronic back pain for many years but over then last 3 months has acutely worsened. She also notes pain in her hands, elbows, shoulder, knees, hips which has been progressing over the last year. Pain is worsened by all movement, improves only when laying still. Minimal relief with meds. Though she reports if she stays still to long then she is stiff. Reports about 20 minutes of morning stiffness. Has noted swelling in her hands. Also c/o numbness and tingling in her feet. Terrible sleep. + Dry eyes and dry mouth, easy bruising due to being on AC, +Raynaud's.  Had DVT post surgery and another DVT in iliac artery in August 2020.    No Alopecia, Oral/Nasal Ulcers, Rash, Photosensitivity, Pleuritis/serositis, LAD, Miscarriages, Skin tightening, SOB.     Today  Patient here for  follow up.   Last visit care for SjS, FM and OA continued. She reports for the last two weeks she has had intense sweating and cramps. She does reports she started walking and losing weight. Otherwise stable, tolerating meds.       Review of Systems   Constitutional:  Negative for appetite change, chills, fatigue, fever and unexpected weight change.   HENT:  Negative for nasal congestion, ear discharge, ear pain, hearing loss, mouth sores, nosebleeds, sneezing, sore throat, tinnitus and trouble swallowing.    Eyes:  Negative for photophobia, pain, discharge, redness, itching and visual disturbance.   Respiratory:  Negative for cough, chest tightness, shortness of breath and wheezing.    Cardiovascular:  Negative for chest pain, palpitations and leg swelling.   Gastrointestinal:  Negative for abdominal distention, abdominal pain, blood in stool, constipation, diarrhea, nausea and vomiting.   Endocrine: Negative for cold intolerance, heat intolerance, polydipsia, polyphagia and polyuria.   Genitourinary:  Negative for difficulty urinating, dyspareunia, dysuria, flank pain, frequency, genital sores, hematuria, menstrual problem, pelvic pain, urgency, vaginal bleeding, vaginal discharge, vaginal pain and vaginal dryness.   Musculoskeletal:  Positive for arthralgias, back pain and joint swelling. Negative for gait problem, leg pain, myalgias, neck pain, neck stiffness and joint deformity.   Integumentary:  Negative for pallor and rash.   Neurological:  Negative for dizziness, seizures, weakness, light-headedness, numbness and headaches.   Hematological:  Negative for adenopathy. Does not bruise/bleed easily.   Psychiatric/Behavioral:  Negative for confusion, decreased concentration and sleep disturbance. The patient is not nervous/anxious.    All other systems reviewed and are negative.     Chronic comorbid conditions affecting medical decision making today:  Past Medical History:   Diagnosis Date    AYDE (acute kidney  injury) 11/1/2022    Alcohol abuse     stopped heavy drinking about 10 years ago; was drinking 3 glasses of vodka/tequilla,rum/whiskey per day    Allergy Don't remember    Its been some years.    Anxiety     Arthritis 2010    Blood clotting tendency 2008    After a procedure & 2020.    Congestive heart failure 8/11/2020    Depression     Hallucination     Hx of psychiatric care     Hypertension     Immune disorder 2020    Joint pain 2010    Keloid cicatrix Years ago    From childhood scars    [place]o     unplanned trips, energy without sleep for 2 days (reading, cleaning), feelings that she can do multiple tasks at one time, feelings of overconfidence at times    Psychiatric problem     Sleep difficulties     Therapy     Withdrawal symptoms, drug or narcotic     racing heart, restlessness     Past Surgical History:   Procedure Laterality Date    ESOPHAGOGASTRODUODENOSCOPY N/A 06/03/2020    Procedure: EGD (ESOPHAGOGASTRODUODENOSCOPY);  Surgeon: Johan Grover MD;  Location: [facility] (4TH FLR);  Service: Endoscopy;  Laterality: N/A;  covid 6/2-[employer]-tb    HYSTERECTOMY      LAPBAND  2009    PHLEBOGRAPHY Left 08/12/2020    Procedure: Venogram, pharmacomechanical thrombectomy;  Surgeon: Miguelangel Goldman MD;  [facility];  Service: Vascular;  Laterality: Left;  2336.43 mGy  [id number]  17.8 minutes  37 ml of contrast    VENOPLASTY Left 08/12/2020    Procedure: ANGIOPLASTY, VEIN;  Surgeon: Miguelangel Goldman MD;  [facility];  Service: Vascular;  Laterality: Left;     Family History   Problem Relation Age of Onset    Diabetes Mother     Cancer Mother     Hypertension Mother     No Known Problems Father     No Known Problems Sister     No Known Problems Brother     No Known Problems Maternal Aunt     Cirrhosis Maternal Uncle         ETOH    No Known Problems Paternal Aunt     No Known Problems Paternal Uncle     No Known Problems Maternal Grandmother     No Known  Problems Maternal Grandfather     No Known Problems Paternal Grandmother     No Known Problems Paternal Grandfather     No Known Problems Other     Celiac disease Neg Hx     Colon cancer Neg Hx     Colon polyps Neg Hx     Crohn's disease Neg Hx     Esophageal cancer Neg Hx     Inflammatory bowel disease Neg Hx     Liver cancer Neg Hx     Liver disease Neg Hx     Rectal cancer Neg Hx     Stomach cancer Neg Hx     Ulcerative colitis Neg Hx      Social History     Substance and Sexual Activity   Alcohol Use Yes    Alcohol/week: 1.0 - 3.0 standard drink    Types: 1 - 3 Glasses of wine per week    Comment: social presently, excessive 10 years ago     Social History     Tobacco Use   Smoking Status Former    Packs/day: 0.00    Years: 0.00    Pack years: 0.00    Types: Cigarettes   Smokeless Tobacco Never   Tobacco Comments    quit in october 2016     Social History     Substance and Sexual Activity   Drug Use Never    Types: Marijuana    Comment: uses THCA weekly       Current Outpatient Medications:     amitriptyline (ELAVIL) 50 MG tablet, Take 1 tablet (50 mg total) by mouth every evening., Disp: 90 tablet, Rfl: 1    apixaban (ELIQUIS) 5 mg Tab, Take 1 tablet (5 mg total) by mouth 2 (two) times daily., Disp: 60 tablet, Rfl: 11    busPIRone (BUSPAR) 15 MG tablet, Take 1 tablet (15 mg total) by mouth 3 (three) times daily., Disp: 90 tablet, Rfl: 11    diclofenac sodium (VOLTAREN) 1 % Gel, Apply the gel (4 g) to the affected area 4 times daily. Do not apply more than 16 g daily to any one affected joint, Disp: 100 g, Rfl: 1    FLUoxetine 40 MG capsule, Take 2 capsules (80 mg total) by mouth once daily., Disp: 90 capsule, Rfl: 1    hydrOXYzine pamoate (VISTARIL) 25 MG Cap, Take 1 capsule (25 mg total) by mouth 2 (two) times daily as needed (panic attacks)., Disp: 60 capsule, Rfl: 5    ipratropium (ATROVENT) 21 mcg (0.03 %) nasal spray, 2 sprays by Each Nostril route 3 (three) times daily., Disp: 30 mL, Rfl: 1     valsartan-hydrochlorothiazide (DIOVAN-HCT) 160-25 mg per tablet, Take 1 tablet by mouth once daily., Disp: 90 tablet, Rfl: 2    clonazePAM (KLONOPIN) 0.5 MG tablet, Take 1 tablet (0.5 mg total) by mouth daily as needed for Anxiety. (Patient not taking: Reported on 6/27/2023), Disp: 14 tablet, Rfl: 0    hydrOXYchloroQUINE (PLAQUENIL) 200 mg tablet, Take 1 tablet (200 mg total) by mouth 2 (two) times daily., Disp: 60 tablet, Rfl: 6    oxyCODONE-acetaminophen (PERCOCET)  mg per tablet, Take 1 tablet by mouth every 12 (twelve) hours as needed for Pain., Disp: 60 tablet, Rfl: 0    pregabalin (LYRICA) 150 MG capsule, Take 1 capsule (150 mg total) by mouth 2 (two) times daily., Disp: 60 capsule, Rfl: 5    tiZANidine (ZANAFLEX) 4 MG tablet, Take 2 tablets (8 mg total) by mouth 2 (two) times daily., Disp: 120 tablet, Rfl: 6     Objective:         Vitals:    06/27/23 0940   BP: 130/87   Pulse: 82       Physical Exam   Constitutional: She is oriented to person, place, and time.   HENT:   Head: Normocephalic and atraumatic.   Right Ear: External ear normal.   Left Ear: External ear normal.   Nose: Nose normal.   Mouth/Throat: Oropharynx is clear and moist.   Eyes: Pupils are equal, round, and reactive to light. Conjunctivae are normal.   Cardiovascular: Normal rate and regular rhythm.   Pulmonary/Chest: Effort normal and breath sounds normal.   Abdominal: Soft. Bowel sounds are normal.   Musculoskeletal:      Right shoulder: Normal.      Left shoulder: Normal.      Right elbow: Normal.      Left elbow: Normal.      Right wrist: Normal.      Left wrist: Normal.      Cervical back: Normal range of motion and neck supple.      Right knee: Normal.      Left knee: Normal.      Comments: Tender Points:  No   Yes  [ ]    [x ]   Low cervical anterior aspect    [ ]    [x ]   Costochondral Junction   [ ]    [x ]   Lateral Epicondyle  [ ]    [x ]   Suboccipital   [ ]    [x ]   Trapezius   [ ]    [x ]   Supraspinatus   [ ]    [x ]    Gluteal   [ ]    [x ]   Greater trochanter  [ ]    [x ]   Knee       Neurological: She is alert and oriented to person, place, and time.   Skin: No rash noted. No erythema.   Psychiatric: Mood and affect normal.       Right Side Rheumatological Exam     Examination finds the shoulder, elbow, wrist, knee, 1st PIP, 1st MCP, 2nd PIP, 2nd MCP, 3rd PIP, 3rd MCP, 4th PIP, 4th MCP, 5th PIP and 5th MCP normal.    Left Side Rheumatological Exam     Examination finds the shoulder, elbow, wrist, knee, 1st PIP, 1st MCP, 2nd PIP, 2nd MCP, 3rd PIP, 3rd MCP, 4th PIP, 4th MCP, 5th PIP and 5th MCP normal.      Back/Neck Exam     Comments:  -SLT       Reviewed old and all outside pertinent medical records available.    All lab results personally reviewed and interpreted by me.  Lab Results   Component Value Date    WBC 4.41 01/29/2023    HGB 12.3 01/29/2023    HCT 36.9 (L) 01/29/2023    MCV 88 01/29/2023    MCH 29.4 01/29/2023    MCHC 33.3 01/29/2023    RDW 14.1 01/29/2023     01/29/2023    MPV 9.7 01/29/2023       Lab Results   Component Value Date     01/29/2023    K 4.0 01/29/2023     01/29/2023    CO2 31 (H) 01/29/2023    GLU 96 01/29/2023    BUN 7 01/29/2023    CALCIUM 9.8 01/29/2023    PROT 8.6 (H) 01/24/2023    ALBUMIN 4.1 01/24/2023    BILITOT 0.5 01/24/2023    AST 37 01/24/2023    ALKPHOS 91 01/24/2023    ALT 21 01/24/2023       Lab Results   Component Value Date    COLORU Colorless (A) 01/18/2023    APPEARANCEUA Clear 01/18/2023    SPECGRAV 1.010 01/18/2023    PHUR 5.0 01/18/2023    PROTEINUA Negative 01/18/2023    KETONESU Negative 01/18/2023    LEUKOCYTESUR Negative 01/18/2023    NITRITE Negative 01/18/2023    UROBILINOGEN Negative 01/18/2023       Lab Results   Component Value Date    CRP 26.8 (H) 01/24/2023       Lab Results   Component Value Date    SEDRATE 23 (H) 01/19/2023       Lab Results   Component Value Date    RF <13.0 01/05/2023    SEDRATE 23 (H) 01/19/2023       No components found for:  25OHVITDTOT, 30JOGAAT8, 35WDROSN9, METHODNOTE    Lab Results   Component Value Date    URICACID 7.3 (H) 01/05/2023       No components found for: TSPOTTB    Imaging:  All imaging reviewed and independently interpreted by me.         ASSESSMENT / PLAN:     Alberto Frye is a 48 y.o. Black or  female with:      1. Sjogren's syndrome with keratoconjunctivitis sicca  - Patients has s/s consistent with SjS, she has sicca, wide spread pain, arthralgias   - chronic   - trial of Cevillimine did not help   - reinforced artificial tears and oral hydration   - annual EYE and Dental Exam   - continue HCQ 400mg  - update labs  - reassurance      2. Fibromyalgia   - stable   - continue Lyrica + baclofen   - wt loss  - sleep hygiene and stress management reinforced   - Reassurance and Exercise    3. Osteoarthritis  - in addition to SjS and FM she has OA  - chronic   - wt loss  - Percocet PRN,  reviewed    - reassurance and exercise      4. Other specified counseling  - over 10 minutes spent regarding below topics:  - Immunization counseling done.  - Weight loss counseling done.  - Nutrition and exercise counseling.  - Limitation of alcohol consumption.  - Regular exercise:  Aerobic and resistance.  - Medication counseling provided.    5. Morbid Obesity  - would benefit from decreasing at least 10% of body weight.  - recommended goal of losing 1 lb per week.  - nutrition referral     6. DMARD Toxicity Monitoring  - annual EYE exam   - vaccines per guidelines  - immunosuppression/infectious precautions discussed    Follow up in about 4 months (around 10/27/2023).    Method of contact with patient concerns: [facility]    Disclaimer:  This note is prepared using voice recognition software and as such is likely to have errors and has not been proof read. Please contact me for questions.     Time spent: 30 minutes in face to face discussion concerning diagnosis, prognosis, review of lab and test  results, benefits of treatment as well as management of disease, counseling of patient and coordination of care between various health care providers.  Greater than half the time spent was used for coordination of care and counseling of patient.    Ross Hughes M.D.  Rheumatology Department   [facility]

## 2023-06-28 ENCOUNTER — LAB VISIT (OUTPATIENT)
Dept: LAB | Facility: HOSPITAL | Age: 48
End: 2023-06-28
Attending: INTERNAL MEDICINE
Payer: COMMERCIAL

## 2023-06-28 DIAGNOSIS — M35.01 SJOGREN'S SYNDROME WITH KERATOCONJUNCTIVITIS SICCA: ICD-10-CM

## 2023-06-28 LAB
25(OH)D3+25(OH)D2 SERPL-MCNC: 21 NG/ML (ref 30–96)
ALBUMIN SERPL BCP-MCNC: 3.6 G/DL (ref 3.5–5.2)
ALP SERPL-CCNC: 74 U/L (ref 55–135)
ALT SERPL W/O P-5'-P-CCNC: 17 U/L (ref 10–44)
ANION GAP SERPL CALC-SCNC: 8 MMOL/L (ref 8–16)
AST SERPL-CCNC: 26 U/L (ref 10–40)
BASOPHILS # BLD AUTO: 0.02 K/UL (ref 0–0.2)
BASOPHILS NFR BLD: 0.5 % (ref 0–1.9)
BILIRUB SERPL-MCNC: 0.4 MG/DL (ref 0.1–1)
BUN SERPL-MCNC: 11 MG/DL (ref 6–20)
C3 SERPL-MCNC: 128 MG/DL (ref 50–180)
C4 SERPL-MCNC: 37 MG/DL (ref 11–44)
CALCIUM SERPL-MCNC: 9.2 MG/DL (ref 8.7–10.5)
CHLORIDE SERPL-SCNC: 105 MMOL/L (ref 95–110)
CHOLEST SERPL-MCNC: 189 MG/DL (ref 120–199)
CHOLEST/HDLC SERPL: 3.9 {RATIO} (ref 2–5)
CK SERPL-CCNC: 192 U/L (ref 20–180)
CO2 SERPL-SCNC: 28 MMOL/L (ref 23–29)
CREAT SERPL-MCNC: 1.1 MG/DL (ref 0.5–1.4)
CRP SERPL-MCNC: 23 MG/L (ref 0–8.2)
DIFFERENTIAL METHOD: ABNORMAL
EOSINOPHIL # BLD AUTO: 0.1 K/UL (ref 0–0.5)
EOSINOPHIL NFR BLD: 2.2 % (ref 0–8)
ERYTHROCYTE [DISTWIDTH] IN BLOOD BY AUTOMATED COUNT: 13.8 % (ref 11.5–14.5)
ERYTHROCYTE [SEDIMENTATION RATE] IN BLOOD BY WESTERGREN METHOD: 20 MM/HR (ref 0–20)
EST. GFR  (NO RACE VARIABLE): >60 ML/MIN/1.73 M^2
GLUCOSE SERPL-MCNC: 89 MG/DL (ref 70–110)
HCT VFR BLD AUTO: 37.5 % (ref 37–48.5)
HDLC SERPL-MCNC: 49 MG/DL (ref 40–75)
HDLC SERPL: 25.9 % (ref 20–50)
HGB BLD-MCNC: 12.4 G/DL (ref 12–16)
IGA SERPL-MCNC: 153 MG/DL (ref 40–350)
IGG SERPL-MCNC: 1526 MG/DL (ref 650–1600)
IGM SERPL-MCNC: 96 MG/DL (ref 50–300)
IMM GRANULOCYTES # BLD AUTO: 0.01 K/UL (ref 0–0.04)
IMM GRANULOCYTES NFR BLD AUTO: 0.3 % (ref 0–0.5)
LDLC SERPL CALC-MCNC: 122.8 MG/DL (ref 63–159)
LYMPHOCYTES # BLD AUTO: 2.3 K/UL (ref 1–4.8)
LYMPHOCYTES NFR BLD: 63.8 % (ref 18–48)
MCH RBC QN AUTO: 30.2 PG (ref 27–31)
MCHC RBC AUTO-ENTMCNC: 33.1 G/DL (ref 32–36)
MCV RBC AUTO: 91 FL (ref 82–98)
MONOCYTES # BLD AUTO: 0.4 K/UL (ref 0.3–1)
MONOCYTES NFR BLD: 9.8 % (ref 4–15)
NEUTROPHILS # BLD AUTO: 0.9 K/UL (ref 1.8–7.7)
NEUTROPHILS NFR BLD: 23.4 % (ref 38–73)
NONHDLC SERPL-MCNC: 140 MG/DL
NRBC BLD-RTO: 0 /100 WBC
PLATELET # BLD AUTO: 192 K/UL (ref 150–450)
PMV BLD AUTO: 10.2 FL (ref 9.2–12.9)
POTASSIUM SERPL-SCNC: 3.5 MMOL/L (ref 3.5–5.1)
PROT SERPL-MCNC: 7 G/DL (ref 6–8.4)
RBC # BLD AUTO: 4.11 M/UL (ref 4–5.4)
SODIUM SERPL-SCNC: 141 MMOL/L (ref 136–145)
T4 FREE SERPL-MCNC: 0.95 NG/DL (ref 0.71–1.51)
TRIGL SERPL-MCNC: 86 MG/DL (ref 30–150)
TSH SERPL DL<=0.005 MIU/L-ACNC: 4.19 UIU/ML (ref 0.4–4)
WBC # BLD AUTO: 3.67 K/UL (ref 3.9–12.7)

## 2023-06-28 PROCEDURE — 80061 LIPID PANEL: CPT | Performed by: INTERNAL MEDICINE

## 2023-06-28 PROCEDURE — 80053 COMPREHEN METABOLIC PANEL: CPT | Performed by: INTERNAL MEDICINE

## 2023-06-28 PROCEDURE — 84165 PATHOLOGIST INTERPRETATION SPE: ICD-10-PCS | Mod: 26,,, | Performed by: PATHOLOGY

## 2023-06-28 PROCEDURE — 86160 COMPLEMENT ANTIGEN: CPT | Performed by: INTERNAL MEDICINE

## 2023-06-28 PROCEDURE — 86160 COMPLEMENT ANTIGEN: CPT | Mod: 59 | Performed by: INTERNAL MEDICINE

## 2023-06-28 PROCEDURE — 86334 IMMUNOFIX E-PHORESIS SERUM: CPT | Performed by: INTERNAL MEDICINE

## 2023-06-28 PROCEDURE — 85652 RBC SED RATE AUTOMATED: CPT | Performed by: INTERNAL MEDICINE

## 2023-06-28 PROCEDURE — 86140 C-REACTIVE PROTEIN: CPT | Performed by: INTERNAL MEDICINE

## 2023-06-28 PROCEDURE — 86334 PATHOLOGIST INTERPRETATION IFE: ICD-10-PCS | Mod: 26,,, | Performed by: PATHOLOGY

## 2023-06-28 PROCEDURE — 84443 ASSAY THYROID STIM HORMONE: CPT | Performed by: INTERNAL MEDICINE

## 2023-06-28 PROCEDURE — 82784 ASSAY IGA/IGD/IGG/IGM EACH: CPT | Performed by: INTERNAL MEDICINE

## 2023-06-28 PROCEDURE — 84439 ASSAY OF FREE THYROXINE: CPT | Performed by: INTERNAL MEDICINE

## 2023-06-28 PROCEDURE — 84165 PROTEIN E-PHORESIS SERUM: CPT | Performed by: INTERNAL MEDICINE

## 2023-06-28 PROCEDURE — 86334 IMMUNOFIX E-PHORESIS SERUM: CPT | Mod: 26,,, | Performed by: PATHOLOGY

## 2023-06-28 PROCEDURE — 36415 COLL VENOUS BLD VENIPUNCTURE: CPT | Performed by: INTERNAL MEDICINE

## 2023-06-28 PROCEDURE — 84165 PROTEIN E-PHORESIS SERUM: CPT | Mod: 26,,, | Performed by: PATHOLOGY

## 2023-06-28 PROCEDURE — 85025 COMPLETE CBC W/AUTO DIFF WBC: CPT | Performed by: INTERNAL MEDICINE

## 2023-06-28 PROCEDURE — 82550 ASSAY OF CK (CPK): CPT | Performed by: INTERNAL MEDICINE

## 2023-06-28 PROCEDURE — 82306 VITAMIN D 25 HYDROXY: CPT | Performed by: INTERNAL MEDICINE

## 2023-06-28 PROCEDURE — 82787 IGG 1 2 3 OR 4 EACH: CPT | Mod: 59 | Performed by: INTERNAL MEDICINE

## 2023-06-28 PROCEDURE — 82085 ASSAY OF ALDOLASE: CPT | Performed by: INTERNAL MEDICINE

## 2023-06-29 LAB
ALBUMIN SERPL ELPH-MCNC: 3.75 G/DL (ref 3.35–5.55)
ALPHA1 GLOB SERPL ELPH-MCNC: 0.26 G/DL (ref 0.17–0.41)
ALPHA2 GLOB SERPL ELPH-MCNC: 0.51 G/DL (ref 0.43–0.99)
B-GLOBULIN SERPL ELPH-MCNC: 0.69 G/DL (ref 0.5–1.1)
GAMMA GLOB SERPL ELPH-MCNC: 1.38 G/DL (ref 0.67–1.58)
INTERPRETATION SERPL IFE-IMP: NORMAL
PATHOLOGIST INTERPRETATION IFE: NORMAL
PATHOLOGIST INTERPRETATION SPE: NORMAL
PROT SERPL-MCNC: 6.6 G/DL (ref 6–8.4)

## 2023-06-30 LAB
ALDOLASE SERPL-CCNC: 5.6 U/L (ref 1.2–7.6)
IGG1 SER-MCNC: 867 MG/DL (ref 382–929)
IGG2 SER-MCNC: 450 MG/DL (ref 242–700)
IGG3 SER-MCNC: 129 MG/DL (ref 22–176)
IGG4 SER-MCNC: 47 MG/DL (ref 4–86)

## 2023-07-08 ENCOUNTER — TELEPHONE (OUTPATIENT)
Dept: ENDOSCOPY | Facility: HOSPITAL | Age: 48
End: 2023-07-08
Payer: COMMERCIAL

## 2023-07-11 ENCOUNTER — PATIENT MESSAGE (OUTPATIENT)
Dept: RHEUMATOLOGY | Facility: CLINIC | Age: 48
End: 2023-07-11
Payer: COMMERCIAL

## 2023-07-11 RX ORDER — ERGOCALCIFEROL 1.25 MG/1
50000 CAPSULE ORAL
Qty: 12 CAPSULE | Refills: 0 | Status: SHIPPED | OUTPATIENT
Start: 2023-07-11 | End: 2023-10-13 | Stop reason: SDUPTHER

## 2023-07-19 ENCOUNTER — PATIENT MESSAGE (OUTPATIENT)
Dept: OPTOMETRY | Facility: CLINIC | Age: 48
End: 2023-07-19
Payer: COMMERCIAL

## 2023-07-24 ENCOUNTER — PATIENT MESSAGE (OUTPATIENT)
Dept: RHEUMATOLOGY | Facility: CLINIC | Age: 48
End: 2023-07-24
Payer: COMMERCIAL

## 2023-07-24 DIAGNOSIS — M35.01 SJOGREN'S SYNDROME WITH KERATOCONJUNCTIVITIS SICCA: ICD-10-CM

## 2023-07-24 DIAGNOSIS — M79.7 FIBROMYALGIA: ICD-10-CM

## 2023-07-24 RX ORDER — OXYCODONE AND ACETAMINOPHEN 10; 325 MG/1; MG/1
1 TABLET ORAL EVERY 12 HOURS PRN
Qty: 60 TABLET | Refills: 0 | Status: SHIPPED | OUTPATIENT
Start: 2023-07-24 | End: 2023-07-25 | Stop reason: SDUPTHER

## 2023-07-25 DIAGNOSIS — M35.01 SJOGREN'S SYNDROME WITH KERATOCONJUNCTIVITIS SICCA: ICD-10-CM

## 2023-07-25 DIAGNOSIS — M79.7 FIBROMYALGIA: ICD-10-CM

## 2023-07-25 RX ORDER — OXYCODONE AND ACETAMINOPHEN 10; 325 MG/1; MG/1
1 TABLET ORAL EVERY 12 HOURS PRN
Qty: 60 TABLET | Refills: 0 | Status: SHIPPED | OUTPATIENT
Start: 2023-07-25 | End: 2023-08-25 | Stop reason: SDUPTHER

## 2023-07-28 ENCOUNTER — OFFICE VISIT (OUTPATIENT)
Dept: FAMILY MEDICINE | Facility: CLINIC | Age: 48
End: 2023-07-28
Payer: COMMERCIAL

## 2023-07-28 VITALS
SYSTOLIC BLOOD PRESSURE: 138 MMHG | OXYGEN SATURATION: 96 % | BODY MASS INDEX: 43.25 KG/M2 | HEART RATE: 85 BPM | WEIGHT: 253.31 LBS | TEMPERATURE: 98 F | DIASTOLIC BLOOD PRESSURE: 82 MMHG | HEIGHT: 64 IN

## 2023-07-28 DIAGNOSIS — I82.409 RECURRENT DEEP VEIN THROMBOSIS (DVT): ICD-10-CM

## 2023-07-28 DIAGNOSIS — R13.10 DYSPHAGIA, UNSPECIFIED TYPE: ICD-10-CM

## 2023-07-28 DIAGNOSIS — Z00.00 NORMAL PHYSICAL EXAM: Primary | ICD-10-CM

## 2023-07-28 DIAGNOSIS — I10 ESSENTIAL HYPERTENSION: ICD-10-CM

## 2023-07-28 DIAGNOSIS — M35.05 SJOGREN'S SYNDROME WITH INFLAMMATORY ARTHRITIS: ICD-10-CM

## 2023-07-28 DIAGNOSIS — Z23 NEED FOR VACCINATION FOR STREP PNEUMONIAE: ICD-10-CM

## 2023-07-28 DIAGNOSIS — M79.7 FIBROMYALGIA: ICD-10-CM

## 2023-07-28 DIAGNOSIS — F31.32 BIPOLAR AFFECTIVE DISORDER, CURRENTLY DEPRESSED, MODERATE: ICD-10-CM

## 2023-07-28 DIAGNOSIS — F41.9 ANXIETY: ICD-10-CM

## 2023-07-28 DIAGNOSIS — G89.29 CHRONIC MIDLINE LOW BACK PAIN WITHOUT SCIATICA: ICD-10-CM

## 2023-07-28 DIAGNOSIS — M54.50 CHRONIC MIDLINE LOW BACK PAIN WITHOUT SCIATICA: ICD-10-CM

## 2023-07-28 DIAGNOSIS — E03.8 SUBCLINICAL HYPOTHYROIDISM: ICD-10-CM

## 2023-07-28 DIAGNOSIS — E78.5 DYSLIPIDEMIA: ICD-10-CM

## 2023-07-28 PROCEDURE — 90677 PCV20 VACCINE IM: CPT | Mod: S$GLB,,, | Performed by: INTERNAL MEDICINE

## 2023-07-28 PROCEDURE — 90677 PNEUMOCOCCAL CONJUGATE VACCINE 20-VALENT: ICD-10-PCS | Mod: S$GLB,,, | Performed by: INTERNAL MEDICINE

## 2023-07-28 PROCEDURE — 3075F PR MOST RECENT SYSTOLIC BLOOD PRESS GE 130-139MM HG: ICD-10-PCS | Mod: CPTII,S$GLB,, | Performed by: INTERNAL MEDICINE

## 2023-07-28 PROCEDURE — 99396 PR PREVENTIVE VISIT,EST,40-64: ICD-10-PCS | Mod: 25,S$GLB,, | Performed by: INTERNAL MEDICINE

## 2023-07-28 PROCEDURE — 99396 PREV VISIT EST AGE 40-64: CPT | Mod: 25,S$GLB,, | Performed by: INTERNAL MEDICINE

## 2023-07-28 PROCEDURE — 3075F SYST BP GE 130 - 139MM HG: CPT | Mod: CPTII,S$GLB,, | Performed by: INTERNAL MEDICINE

## 2023-07-28 PROCEDURE — 99999 PR PBB SHADOW E&M-EST. PATIENT-LVL IV: CPT | Mod: PBBFAC,,, | Performed by: INTERNAL MEDICINE

## 2023-07-28 PROCEDURE — 90471 PNEUMOCOCCAL CONJUGATE VACCINE 20-VALENT: ICD-10-PCS | Mod: S$GLB,,, | Performed by: INTERNAL MEDICINE

## 2023-07-28 PROCEDURE — 3008F PR BODY MASS INDEX (BMI) DOCUMENTED: ICD-10-PCS | Mod: CPTII,S$GLB,, | Performed by: INTERNAL MEDICINE

## 2023-07-28 PROCEDURE — 99999 PR PBB SHADOW E&M-EST. PATIENT-LVL IV: ICD-10-PCS | Mod: PBBFAC,,, | Performed by: INTERNAL MEDICINE

## 2023-07-28 PROCEDURE — 1160F RVW MEDS BY RX/DR IN RCRD: CPT | Mod: CPTII,S$GLB,, | Performed by: INTERNAL MEDICINE

## 2023-07-28 PROCEDURE — 1160F PR REVIEW ALL MEDS BY PRESCRIBER/CLIN PHARMACIST DOCUMENTED: ICD-10-PCS | Mod: CPTII,S$GLB,, | Performed by: INTERNAL MEDICINE

## 2023-07-28 PROCEDURE — 1159F MED LIST DOCD IN RCRD: CPT | Mod: CPTII,S$GLB,, | Performed by: INTERNAL MEDICINE

## 2023-07-28 PROCEDURE — 3079F DIAST BP 80-89 MM HG: CPT | Mod: CPTII,S$GLB,, | Performed by: INTERNAL MEDICINE

## 2023-07-28 PROCEDURE — 3079F PR MOST RECENT DIASTOLIC BLOOD PRESSURE 80-89 MM HG: ICD-10-PCS | Mod: CPTII,S$GLB,, | Performed by: INTERNAL MEDICINE

## 2023-07-28 PROCEDURE — 1159F PR MEDICATION LIST DOCUMENTED IN MEDICAL RECORD: ICD-10-PCS | Mod: CPTII,S$GLB,, | Performed by: INTERNAL MEDICINE

## 2023-07-28 PROCEDURE — 3008F BODY MASS INDEX DOCD: CPT | Mod: CPTII,S$GLB,, | Performed by: INTERNAL MEDICINE

## 2023-07-28 PROCEDURE — 90471 IMMUNIZATION ADMIN: CPT | Mod: S$GLB,,, | Performed by: INTERNAL MEDICINE

## 2023-07-28 RX ORDER — VALSARTAN AND HYDROCHLOROTHIAZIDE 160; 25 MG/1; MG/1
1 TABLET ORAL DAILY
Qty: 90 TABLET | Refills: 3 | Status: SHIPPED | OUTPATIENT
Start: 2023-07-28

## 2023-07-28 NOTE — LETTER
July 28, 2023      [facility]  [address] [facility]  [place] [address]  Phone: [phone]  Fax: [phone]       Patient: Alberto Frye  YOB: 1975  Date of Visit: 7/28/23      To Whom It May Concern:    Alberto Frye  was at [facility] on 7/28/23.  If you have any questions or concerns, or if I can be of further assistance, please do not hesitate to contact me.      Sincerely,        Ian Cespedes MD
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
## 2023-08-15 ENCOUNTER — PATIENT MESSAGE (OUTPATIENT)
Dept: FAMILY MEDICINE | Facility: CLINIC | Age: 48
End: 2023-08-15
Payer: COMMERCIAL

## 2023-08-15 DIAGNOSIS — M25.561 ACUTE PAIN OF RIGHT KNEE: Primary | ICD-10-CM

## 2023-08-16 ENCOUNTER — HOSPITAL ENCOUNTER (OUTPATIENT)
Dept: RADIOLOGY | Facility: HOSPITAL | Age: 48
Discharge: HOME | End: 2023-08-16
Attending: INTERNAL MEDICINE
Payer: COMMERCIAL

## 2023-08-16 DIAGNOSIS — M25.561 ACUTE PAIN OF RIGHT KNEE: ICD-10-CM

## 2023-08-16 PROCEDURE — 73560 X-RAY EXAM OF KNEE 1 OR 2: CPT | Mod: 26,RT,, | Performed by: RADIOLOGY

## 2023-08-16 PROCEDURE — 73560 X-RAY EXAM OF KNEE 1 OR 2: CPT | Mod: TC,FY,RT

## 2023-08-16 PROCEDURE — 73560 XR KNEE AP STANDING WITH RIGHT LATERAL: ICD-10-PCS | Mod: 26,RT,, | Performed by: RADIOLOGY

## 2023-08-17 NOTE — PROGRESS NOTES
Moderate degenerative change of the right medial knee joint.    Results to pt via [facility]. Offered her referral to ortho for eval

## 2023-08-25 DIAGNOSIS — M35.01 SJOGREN'S SYNDROME WITH KERATOCONJUNCTIVITIS SICCA: ICD-10-CM

## 2023-08-25 DIAGNOSIS — M79.7 FIBROMYALGIA: ICD-10-CM

## 2023-08-28 ENCOUNTER — PATIENT MESSAGE (OUTPATIENT)
Dept: RHEUMATOLOGY | Facility: CLINIC | Age: 48
End: 2023-08-28
Payer: COMMERCIAL

## 2023-08-28 RX ORDER — OXYCODONE AND ACETAMINOPHEN 10; 325 MG/1; MG/1
1 TABLET ORAL EVERY 12 HOURS PRN
Qty: 60 TABLET | Refills: 0 | Status: SHIPPED | OUTPATIENT
Start: 2023-08-28 | End: 2023-09-27 | Stop reason: SDUPTHER

## 2023-10-05 DIAGNOSIS — F31.32 BIPOLAR AFFECTIVE DISORDER, DEPRESSED, MODERATE: ICD-10-CM

## 2023-10-05 DIAGNOSIS — F60.5 OBSESSIVE COMPULSIVE PERSONALITY DISORDER: ICD-10-CM

## 2023-10-05 RX ORDER — FLUOXETINE HYDROCHLORIDE 40 MG/1
80 CAPSULE ORAL
Qty: 90 CAPSULE | Refills: 0 | Status: SHIPPED | OUTPATIENT
Start: 2023-10-05 | End: 2023-11-24

## 2023-10-11 ENCOUNTER — PATIENT MESSAGE (OUTPATIENT)
Dept: FAMILY MEDICINE | Facility: CLINIC | Age: 48
End: 2023-10-11
Payer: COMMERCIAL

## 2023-10-11 DIAGNOSIS — Z12.31 ENCOUNTER FOR SCREENING MAMMOGRAM FOR BREAST CANCER: Primary | ICD-10-CM

## 2023-10-12 ENCOUNTER — PATIENT MESSAGE (OUTPATIENT)
Dept: RHEUMATOLOGY | Facility: CLINIC | Age: 48
End: 2023-10-12
Payer: COMMERCIAL

## 2023-10-16 ENCOUNTER — PATIENT MESSAGE (OUTPATIENT)
Dept: FAMILY MEDICINE | Facility: CLINIC | Age: 48
End: 2023-10-16
Payer: COMMERCIAL

## 2023-10-16 RX ORDER — ERGOCALCIFEROL 1.25 MG/1
50000 CAPSULE ORAL
Qty: 12 CAPSULE | Refills: 0 | Status: SHIPPED | OUTPATIENT
Start: 2023-10-16 | End: 2024-03-12 | Stop reason: SDUPTHER

## 2023-10-18 ENCOUNTER — PATIENT MESSAGE (OUTPATIENT)
Dept: RHEUMATOLOGY | Facility: CLINIC | Age: 48
End: 2023-10-18
Payer: COMMERCIAL

## 2023-10-18 ENCOUNTER — TELEPHONE (OUTPATIENT)
Dept: RHEUMATOLOGY | Facility: CLINIC | Age: 48
End: 2023-10-18
Payer: COMMERCIAL

## 2023-10-18 NOTE — TELEPHONE ENCOUNTER
----- Message from Grace Mahoney sent at 10/18/2023 10:45 AM CDT -----  Who Called: Pt    What is the request in detail: Requesting call back to discuss recent missed call about rescheduling 10/26 appt. Please advise    Can the clinic reply by [facility]? No    Best Call Back Number: [phone]      Additional Information:

## 2023-10-21 ENCOUNTER — IMMUNIZATION (OUTPATIENT)
Dept: OBSTETRICS AND GYNECOLOGY | Facility: CLINIC | Age: 48
End: 2023-10-21
Payer: COMMERCIAL

## 2023-10-21 DIAGNOSIS — Z23 FLU VACCINE NEED: Primary | ICD-10-CM

## 2023-10-21 PROCEDURE — 90686 IIV4 VACC NO PRSV 0.5 ML IM: CPT | Mod: S$GLB,,, | Performed by: INTERNAL MEDICINE

## 2023-10-21 PROCEDURE — 90471 IMMUNIZATION ADMIN: CPT | Mod: S$GLB,,, | Performed by: INTERNAL MEDICINE

## 2023-10-21 PROCEDURE — 90471 FLU VACCINE (QUAD) GREATER THAN OR EQUAL TO 3YO PRESERVATIVE FREE IM: ICD-10-PCS | Mod: S$GLB,,, | Performed by: INTERNAL MEDICINE

## 2023-10-21 PROCEDURE — 90686 FLU VACCINE (QUAD) GREATER THAN OR EQUAL TO 3YO PRESERVATIVE FREE IM: ICD-10-PCS | Mod: S$GLB,,, | Performed by: INTERNAL MEDICINE

## 2023-10-24 ENCOUNTER — LAB VISIT (OUTPATIENT)
Dept: LAB | Facility: HOSPITAL | Age: 48
End: 2023-10-24
Attending: INTERNAL MEDICINE
Payer: COMMERCIAL

## 2023-10-24 ENCOUNTER — OFFICE VISIT (OUTPATIENT)
Dept: RHEUMATOLOGY | Facility: CLINIC | Age: 48
End: 2023-10-24
Payer: COMMERCIAL

## 2023-10-24 VITALS
BODY MASS INDEX: 43.45 KG/M2 | SYSTOLIC BLOOD PRESSURE: 124 MMHG | HEIGHT: 64 IN | OXYGEN SATURATION: 98 % | WEIGHT: 254.5 LBS | HEART RATE: 79 BPM | DIASTOLIC BLOOD PRESSURE: 94 MMHG

## 2023-10-24 DIAGNOSIS — D84.821 IMMUNODEFICIENCY DUE TO TREATMENT WITH IMMUNOSUPPRESSIVE MEDICATION: ICD-10-CM

## 2023-10-24 DIAGNOSIS — M15.9 PRIMARY OSTEOARTHRITIS INVOLVING MULTIPLE JOINTS: ICD-10-CM

## 2023-10-24 DIAGNOSIS — M79.7 FIBROMYALGIA: ICD-10-CM

## 2023-10-24 DIAGNOSIS — Z71.89 COUNSELING AND COORDINATION OF CARE: ICD-10-CM

## 2023-10-24 DIAGNOSIS — M35.01 SJOGREN'S SYNDROME WITH KERATOCONJUNCTIVITIS SICCA: ICD-10-CM

## 2023-10-24 DIAGNOSIS — M35.01 SJOGREN'S SYNDROME WITH KERATOCONJUNCTIVITIS SICCA: Primary | ICD-10-CM

## 2023-10-24 DIAGNOSIS — Z79.899 IMMUNODEFICIENCY DUE TO TREATMENT WITH IMMUNOSUPPRESSIVE MEDICATION: ICD-10-CM

## 2023-10-24 DIAGNOSIS — E66.01 MORBID OBESITY: ICD-10-CM

## 2023-10-24 LAB
25(OH)D3+25(OH)D2 SERPL-MCNC: 24 NG/ML (ref 30–96)
ALBUMIN SERPL BCP-MCNC: 3.5 G/DL (ref 3.5–5.2)
ALP SERPL-CCNC: 85 U/L (ref 55–135)
ALT SERPL W/O P-5'-P-CCNC: 26 U/L (ref 10–44)
ANION GAP SERPL CALC-SCNC: 12 MMOL/L (ref 8–16)
AST SERPL-CCNC: 36 U/L (ref 10–40)
BASOPHILS # BLD AUTO: 0.01 K/UL (ref 0–0.2)
BASOPHILS NFR BLD: 0.4 % (ref 0–1.9)
BILIRUB SERPL-MCNC: 0.4 MG/DL (ref 0.1–1)
BUN SERPL-MCNC: 10 MG/DL (ref 6–20)
C3 SERPL-MCNC: 115 MG/DL (ref 50–180)
C4 SERPL-MCNC: 39 MG/DL (ref 11–44)
CALCIUM SERPL-MCNC: 9 MG/DL (ref 8.7–10.5)
CHLORIDE SERPL-SCNC: 106 MMOL/L (ref 95–110)
CK SERPL-CCNC: 282 U/L (ref 20–180)
CO2 SERPL-SCNC: 23 MMOL/L (ref 23–29)
CREAT SERPL-MCNC: 0.9 MG/DL (ref 0.5–1.4)
CRP SERPL-MCNC: 33 MG/L (ref 0–8.2)
DIFFERENTIAL METHOD: ABNORMAL
EOSINOPHIL # BLD AUTO: 0.1 K/UL (ref 0–0.5)
EOSINOPHIL NFR BLD: 3.7 % (ref 0–8)
ERYTHROCYTE [DISTWIDTH] IN BLOOD BY AUTOMATED COUNT: 14.2 % (ref 11.5–14.5)
ERYTHROCYTE [SEDIMENTATION RATE] IN BLOOD BY PHOTOMETRIC METHOD: 15 MM/HR (ref 0–36)
EST. GFR  (NO RACE VARIABLE): >60 ML/MIN/1.73 M^2
GLUCOSE SERPL-MCNC: 79 MG/DL (ref 70–110)
HCT VFR BLD AUTO: 40.4 % (ref 37–48.5)
HGB BLD-MCNC: 12.5 G/DL (ref 12–16)
IMM GRANULOCYTES # BLD AUTO: 0 K/UL (ref 0–0.04)
IMM GRANULOCYTES NFR BLD AUTO: 0 % (ref 0–0.5)
LYMPHOCYTES # BLD AUTO: 1.3 K/UL (ref 1–4.8)
LYMPHOCYTES NFR BLD: 52.9 % (ref 18–48)
MCH RBC QN AUTO: 29.9 PG (ref 27–31)
MCHC RBC AUTO-ENTMCNC: 30.9 G/DL (ref 32–36)
MCV RBC AUTO: 97 FL (ref 82–98)
MONOCYTES # BLD AUTO: 0.3 K/UL (ref 0.3–1)
MONOCYTES NFR BLD: 11.6 % (ref 4–15)
NEUTROPHILS # BLD AUTO: 0.8 K/UL (ref 1.8–7.7)
NEUTROPHILS NFR BLD: 31.4 % (ref 38–73)
NRBC BLD-RTO: 0 /100 WBC
PLATELET # BLD AUTO: 176 K/UL (ref 150–450)
PLATELET BLD QL SMEAR: ABNORMAL
PMV BLD AUTO: 11.5 FL (ref 9.2–12.9)
POTASSIUM SERPL-SCNC: 3.6 MMOL/L (ref 3.5–5.1)
PROT SERPL-MCNC: 6.9 G/DL (ref 6–8.4)
RBC # BLD AUTO: 4.18 M/UL (ref 4–5.4)
SODIUM SERPL-SCNC: 141 MMOL/L (ref 136–145)
T4 FREE SERPL-MCNC: 0.84 NG/DL (ref 0.71–1.51)
TSH SERPL DL<=0.005 MIU/L-ACNC: 3.07 UIU/ML (ref 0.4–4)
URATE SERPL-MCNC: 6.4 MG/DL (ref 2.4–5.7)
WBC # BLD AUTO: 2.42 K/UL (ref 3.9–12.7)

## 2023-10-24 PROCEDURE — 82306 VITAMIN D 25 HYDROXY: CPT | Performed by: INTERNAL MEDICINE

## 2023-10-24 PROCEDURE — 84439 ASSAY OF FREE THYROXINE: CPT | Performed by: INTERNAL MEDICINE

## 2023-10-24 PROCEDURE — 99999 PR PBB SHADOW E&M-EST. PATIENT-LVL IV: ICD-10-PCS | Mod: PBBFAC,,, | Performed by: INTERNAL MEDICINE

## 2023-10-24 PROCEDURE — 85025 COMPLETE CBC W/AUTO DIFF WBC: CPT | Performed by: INTERNAL MEDICINE

## 2023-10-24 PROCEDURE — 82550 ASSAY OF CK (CPK): CPT | Performed by: INTERNAL MEDICINE

## 2023-10-24 PROCEDURE — 85652 RBC SED RATE AUTOMATED: CPT | Performed by: INTERNAL MEDICINE

## 2023-10-24 PROCEDURE — 84550 ASSAY OF BLOOD/URIC ACID: CPT | Performed by: INTERNAL MEDICINE

## 2023-10-24 PROCEDURE — 86160 COMPLEMENT ANTIGEN: CPT | Mod: 59 | Performed by: INTERNAL MEDICINE

## 2023-10-24 PROCEDURE — 3008F PR BODY MASS INDEX (BMI) DOCUMENTED: ICD-10-PCS | Mod: CPTII,S$GLB,, | Performed by: INTERNAL MEDICINE

## 2023-10-24 PROCEDURE — 86140 C-REACTIVE PROTEIN: CPT | Performed by: INTERNAL MEDICINE

## 2023-10-24 PROCEDURE — 3080F PR MOST RECENT DIASTOLIC BLOOD PRESSURE >= 90 MM HG: ICD-10-PCS | Mod: CPTII,S$GLB,, | Performed by: INTERNAL MEDICINE

## 2023-10-24 PROCEDURE — 99214 PR OFFICE/OUTPT VISIT, EST, LEVL IV, 30-39 MIN: ICD-10-PCS | Mod: S$GLB,,, | Performed by: INTERNAL MEDICINE

## 2023-10-24 PROCEDURE — 84443 ASSAY THYROID STIM HORMONE: CPT | Performed by: INTERNAL MEDICINE

## 2023-10-24 PROCEDURE — 82085 ASSAY OF ALDOLASE: CPT | Performed by: INTERNAL MEDICINE

## 2023-10-24 PROCEDURE — 3080F DIAST BP >= 90 MM HG: CPT | Mod: CPTII,S$GLB,, | Performed by: INTERNAL MEDICINE

## 2023-10-24 PROCEDURE — 1159F PR MEDICATION LIST DOCUMENTED IN MEDICAL RECORD: ICD-10-PCS | Mod: CPTII,S$GLB,, | Performed by: INTERNAL MEDICINE

## 2023-10-24 PROCEDURE — 99999 PR PBB SHADOW E&M-EST. PATIENT-LVL IV: CPT | Mod: PBBFAC,,, | Performed by: INTERNAL MEDICINE

## 2023-10-24 PROCEDURE — 36415 COLL VENOUS BLD VENIPUNCTURE: CPT | Mod: PO | Performed by: INTERNAL MEDICINE

## 2023-10-24 PROCEDURE — 86160 COMPLEMENT ANTIGEN: CPT | Performed by: INTERNAL MEDICINE

## 2023-10-24 PROCEDURE — 3074F PR MOST RECENT SYSTOLIC BLOOD PRESSURE < 130 MM HG: ICD-10-PCS | Mod: CPTII,S$GLB,, | Performed by: INTERNAL MEDICINE

## 2023-10-24 PROCEDURE — 80053 COMPREHEN METABOLIC PANEL: CPT | Performed by: INTERNAL MEDICINE

## 2023-10-24 PROCEDURE — 99214 OFFICE O/P EST MOD 30 MIN: CPT | Mod: S$GLB,,, | Performed by: INTERNAL MEDICINE

## 2023-10-24 PROCEDURE — 3074F SYST BP LT 130 MM HG: CPT | Mod: CPTII,S$GLB,, | Performed by: INTERNAL MEDICINE

## 2023-10-24 PROCEDURE — 1159F MED LIST DOCD IN RCRD: CPT | Mod: CPTII,S$GLB,, | Performed by: INTERNAL MEDICINE

## 2023-10-24 PROCEDURE — 3008F BODY MASS INDEX DOCD: CPT | Mod: CPTII,S$GLB,, | Performed by: INTERNAL MEDICINE

## 2023-10-24 RX ORDER — TIZANIDINE 4 MG/1
8 TABLET ORAL 2 TIMES DAILY
Qty: 120 TABLET | Refills: 6 | Status: SHIPPED | OUTPATIENT
Start: 2023-10-24 | End: 2024-03-12 | Stop reason: SDUPTHER

## 2023-10-24 RX ORDER — PREGABALIN 150 MG/1
150 CAPSULE ORAL 2 TIMES DAILY
Qty: 60 CAPSULE | Refills: 5 | Status: SHIPPED | OUTPATIENT
Start: 2023-10-24 | End: 2024-01-30 | Stop reason: SDUPTHER

## 2023-10-24 RX ORDER — HYDROXYCHLOROQUINE SULFATE 200 MG/1
200 TABLET, FILM COATED ORAL 2 TIMES DAILY
Qty: 60 TABLET | Refills: 6 | Status: SHIPPED | OUTPATIENT
Start: 2023-10-24

## 2023-10-24 RX ORDER — OXYCODONE AND ACETAMINOPHEN 10; 325 MG/1; MG/1
1 TABLET ORAL EVERY 12 HOURS PRN
Qty: 60 TABLET | Refills: 0 | Status: SHIPPED | OUTPATIENT
Start: 2023-10-24 | End: 2023-10-27 | Stop reason: SDUPTHER

## 2023-10-24 NOTE — PROGRESS NOTES
Answers submitted by the patient for this visit:  Rheumatology Questionnaire (Submitted on 10/23/2023)  fever: No  eye redness: No  mouth sores: No  headaches: Yes  shortness of breath: No  chest pain: No  trouble swallowing: Yes  diarrhea: No  constipation: Yes  unexpected weight change: No  genital sore: No  dysuria: No  During the last 3 days, have you had a skin rash?: No  Bruises or bleeds easily: Yes  cough: No

## 2023-10-24 NOTE — PROGRESS NOTES
RHEUMATOLOGY OUTPATIENT CLINIC NOTE    10/24/2023    Attending Rheumatologist: Ross Hughes  Primary Care Provider: Ian Cespedes MD   Physician Requesting Consultation: No referring provider defined for this encounter.  Chief Complaint/Reason For Consultation:  Joint Pain        Subjective:       HPI  Alberto Frye is a 48 y.o. Black or  female with medical history noted below who comes for evaluation of arthralgias.   She reports knowing of abnormal GIOVANNY at least ten years and when symptoms worsened she saw Rheumatology. Seen by Rheumatology at [facility] in 2018, noted to have +GIOVANNY & SSA, and likely beginning of Primary Sjogrens and OA.   Patient reports that she has been dealing with chronic back pain for many years but over then last 3 months has acutely worsened. She also notes pain in her hands, elbows, shoulder, knees, hips which has been progressing over the last year. Pain is worsened by all movement, improves only when laying still. Minimal relief with meds. Though she reports if she stays still to long then she is stiff. Reports about 20 minutes of morning stiffness. Has noted swelling in her hands. Also c/o numbness and tingling in her feet. Terrible sleep. + Dry eyes and dry mouth, easy bruising due to being on AC, +Raynaud's.  Had DVT post surgery and another DVT in iliac artery in August 2020.    No Alopecia, Oral/Nasal Ulcers, Rash, Photosensitivity, Pleuritis/serositis, LAD, Miscarriages, Skin tightening, SOB.     Today  Patient here for follow up.   Last visit care for SjS, FM and OA continued. She reports worsening dry eyes and dry mouth. Has received Eye drops from Eye doctor, notes working with dentist, reports has plaque and has two appointments set up for clearning. Still endorses fatigue, wide body aches and cramps in her extremities. Tolerating meds      Review of Systems   Constitutional:  Negative for appetite change, chills, fatigue, fever and  unexpected weight change.   HENT:  Negative for nasal congestion, ear discharge, ear pain, hearing loss, mouth sores, nosebleeds, sneezing, sore throat, tinnitus and trouble swallowing.    Eyes:  Negative for photophobia, pain, discharge, redness, itching and visual disturbance.   Respiratory:  Negative for cough, chest tightness, shortness of breath and wheezing.    Cardiovascular:  Negative for chest pain, palpitations and leg swelling.   Gastrointestinal:  Negative for abdominal distention, abdominal pain, blood in stool, constipation, diarrhea, nausea and vomiting.   Endocrine: Negative for cold intolerance, heat intolerance, polydipsia, polyphagia and polyuria.   Genitourinary:  Negative for difficulty urinating, dyspareunia, dysuria, flank pain, frequency, genital sores, hematuria, menstrual problem, pelvic pain, urgency, vaginal bleeding, vaginal discharge, vaginal pain and vaginal dryness.   Musculoskeletal:  Positive for arthralgias, back pain and joint swelling. Negative for gait problem, leg pain, myalgias, neck pain, neck stiffness and joint deformity.   Integumentary:  Negative for pallor and rash.   Neurological:  Negative for dizziness, seizures, weakness, light-headedness, numbness and headaches.   Hematological:  Negative for adenopathy. Does not bruise/bleed easily.   Psychiatric/Behavioral:  Negative for confusion, decreased concentration and sleep disturbance. The patient is not nervous/anxious.    All other systems reviewed and are negative.       Chronic comorbid conditions affecting medical decision making today:  Past Medical History:   Diagnosis Date    AYDE (acute kidney injury) 11/1/2022    Alcohol abuse     stopped heavy drinking about 10 years ago; was drinking 3 glasses of vodka/tequilla,rum/whiskey per day    Allergy Don't remember    Its been some years.    Anxiety     Arthritis 2010    Blood clotting tendency 2008    After a procedure & 2020.    Congestive heart failure 8/11/2020     Depression     Hallucination     Hx of psychiatric care     Hypertension     Immune disorder 2020    Joint pain 2010    Keloid cicatrix Years ago    From childhood scars    Caro     unplanned trips, energy without sleep for 2 days (reading, cleaning), feelings that she can do multiple tasks at one time, feelings of overconfidence at times    Psychiatric problem     Sleep difficulties     Therapy     Withdrawal symptoms, drug or narcotic     racing heart, restlessness     Past Surgical History:   Procedure Laterality Date    ESOPHAGOGASTRODUODENOSCOPY N/A 06/03/2020    Procedure: EGD (ESOPHAGOGASTRODUODENOSCOPY);  Surgeon: Johan Grover MD;  Location: [facility] ENDO (4TH FLR);  Service: Endoscopy;  Laterality: N/A;  covid 6/2-[employer]-[facility] ALLERGY-tb    HYSTERECTOMY      LAPBAND  2009    PHLEBOGRAPHY Left 08/12/2020    Procedure: Venogram, pharmacomechanical thrombectomy;  Surgeon: Miguelangel Goldman MD;  Location: [facility] OR [address] [place] [address];  Service: Vascular;  Laterality: Left;  2336.43 mGy  [id number]  17.8 minutes  37 ml of contrast    VENOPLASTY Left 08/12/2020    Procedure: ANGIOPLASTY, VEIN;  Surgeon: Miguelangel Goldman MD;  Location: [facility] OR [address] [place] [address];  Service: Vascular;  Laterality: Left;     Family History   Problem Relation Age of Onset    Diabetes Mother     Cancer Mother     Hypertension Mother     No Known Problems Father     No Known Problems Sister     No Known Problems Brother     No Known Problems Maternal Aunt     Cirrhosis Maternal Uncle         ETOH    No Known Problems Paternal Aunt     No Known Problems Paternal Uncle     No Known Problems Maternal Grandmother     No Known Problems Maternal Grandfather     No Known Problems Paternal Grandmother     No Known Problems Paternal Grandfather     No Known Problems Other     Celiac disease Neg Hx     Colon cancer Neg Hx     Colon polyps Neg Hx     Crohn's disease Neg Hx     Esophageal cancer Neg Hx     Inflammatory bowel disease Neg Hx     Liver cancer Neg  Hx     Liver disease Neg Hx     Rectal cancer Neg Hx     Stomach cancer Neg Hx     Ulcerative colitis Neg Hx      Social History     Substance and Sexual Activity   Alcohol Use Yes    Alcohol/week: 1.0 - 3.0 standard drink of alcohol    Types: 1 - 3 Glasses of wine per week    Comment: social presently, excessive 10 years ago     Social History     Tobacco Use   Smoking Status Former    Current packs/day: 0.00    Types: Cigarettes   Smokeless Tobacco Never   Tobacco Comments    quit in october 2016     Social History     Substance and Sexual Activity   Drug Use Never    Types: Marijuana    Comment: uses THCA weekly       Current Outpatient Medications:     amitriptyline (ELAVIL) 50 MG tablet, Take 1 tablet (50 mg total) by mouth every evening., Disp: 90 tablet, Rfl: 1    apixaban (ELIQUIS) 5 mg Tab, Take 1 tablet (5 mg total) by mouth 2 (two) times daily., Disp: 60 tablet, Rfl: 11    busPIRone (BUSPAR) 15 MG tablet, Take 1 tablet (15 mg total) by mouth 3 (three) times daily., Disp: 90 tablet, Rfl: 11    diclofenac sodium (VOLTAREN) 1 % Gel, Apply the gel (4 g) to the affected area 4 times daily. Do not apply more than 16 g daily to any one affected joint, Disp: 100 g, Rfl: 1    ergocalciferol (ERGOCALCIFEROL) 50,000 unit Cap, Take 1 capsule (50,000 Units total) by mouth every 7 days., Disp: 12 capsule, Rfl: 0    FLUoxetine 40 MG capsule, Take 2 capsules by mouth once daily, Disp: 90 capsule, Rfl: 0    hydrOXYzine pamoate (VISTARIL) 25 MG Cap, Take 1 capsule (25 mg total) by mouth 2 (two) times daily as needed (panic attacks)., Disp: 60 capsule, Rfl: 5    ipratropium (ATROVENT) 21 mcg (0.03 %) nasal spray, INSTILL 2 SPRAY(S) IN EACH NOSTRIL THREE TIMES DAILY, Disp: 30 mL, Rfl: 3    valsartan-hydrochlorothiazide (DIOVAN-HCT) 160-25 mg per tablet, Take 1 tablet by mouth once daily., Disp: 90 tablet, Rfl: 3    hydroxychloroquine (PLAQUENIL) 200 mg tablet, Take 1 tablet (200 mg total) by mouth 2 (two) times daily., Disp:  60 tablet, Rfl: 6    oxyCODONE-acetaminophen (PERCOCET)  mg per tablet, Take 1 tablet by mouth every 12 (twelve) hours as needed for Pain., Disp: 60 tablet, Rfl: 0    pregabalin (LYRICA) 150 MG capsule, Take 1 capsule (150 mg total) by mouth 2 (two) times daily., Disp: 60 capsule, Rfl: 5    tiZANidine (ZANAFLEX) 4 MG tablet, Take 2 tablets (8 mg total) by mouth 2 (two) times daily., Disp: 120 tablet, Rfl: 6     Objective:         Vitals:    10/24/23 0803   BP: (!) 124/94   Pulse: 79       Physical Exam   Constitutional: She is oriented to person, place, and time.   HENT:   Head: Normocephalic and atraumatic.   Right Ear: External ear normal.   Left Ear: External ear normal.   Nose: Nose normal.   Mouth/Throat: Oropharynx is clear and moist.   Eyes: Pupils are equal, round, and reactive to light. Conjunctivae are normal.   Cardiovascular: Normal rate and regular rhythm.   Pulmonary/Chest: Effort normal and breath sounds normal.   Abdominal: Soft. Bowel sounds are normal.   Musculoskeletal:      Right shoulder: Normal.      Left shoulder: Normal.      Right elbow: Normal.      Left elbow: Normal.      Right wrist: Normal.      Left wrist: Normal.      Cervical back: Normal range of motion and neck supple.      Right knee: Normal.      Left knee: Normal.      Comments: Tender Points:  No   Yes  [ ]    [x ]   Low cervical anterior aspect    [ ]    [x ]   Costochondral Junction   [ ]    [x ]   Lateral Epicondyle  [ ]    [x ]   Suboccipital   [ ]    [x ]   Trapezius   [ ]    [x ]   Supraspinatus   [ ]    [x ]   Gluteal   [ ]    [x ]   Greater trochanter  [ ]    [x ]   Knee       Neurological: She is alert and oriented to person, place, and time.   Skin: No rash noted. No erythema.   Psychiatric: Mood and affect normal.       Right Side Rheumatological Exam     Examination finds the shoulder, elbow, wrist, knee, 1st PIP, 1st MCP, 2nd PIP, 2nd MCP, 3rd PIP, 3rd MCP, 4th PIP, 4th MCP, 5th PIP and 5th MCP  "normal.    Left Side Rheumatological Exam     Examination finds the shoulder, elbow, wrist, knee, 1st PIP, 1st MCP, 2nd PIP, 2nd MCP, 3rd PIP, 3rd MCP, 4th PIP, 4th MCP, 5th PIP and 5th MCP normal.      Back/Neck Exam     Comments:  -SLT         Reviewed old and all outside pertinent medical records available.    All lab results personally reviewed and interpreted by me.  Lab Results   Component Value Date    WBC 3.67 (L) 06/28/2023    HGB 12.4 06/28/2023    HCT 37.5 06/28/2023    MCV 91 06/28/2023    MCH 30.2 06/28/2023    MCHC 33.1 06/28/2023    RDW 13.8 06/28/2023     06/28/2023    MPV 10.2 06/28/2023       Lab Results   Component Value Date     06/28/2023    K 3.5 06/28/2023     06/28/2023    CO2 28 06/28/2023    GLU 89 06/28/2023    BUN 11 06/28/2023    CALCIUM 9.2 06/28/2023    PROT 7.0 06/28/2023    ALBUMIN 3.6 06/28/2023    BILITOT 0.4 06/28/2023    AST 26 06/28/2023    ALKPHOS 74 06/28/2023    ALT 17 06/28/2023       Lab Results   Component Value Date    COLORU Yellow 06/28/2023    APPEARANCEUA Clear 06/28/2023    SPECGRAV 1.020 06/28/2023    PHUR 6.0 06/28/2023    PROTEINUA 1+ (A) 06/28/2023    KETONESU Negative 06/28/2023    LEUKOCYTESUR Negative 06/28/2023    NITRITE Negative 06/28/2023    UROBILINOGEN Negative 06/28/2023       Lab Results   Component Value Date    CRP 23.0 (H) 06/28/2023       Lab Results   Component Value Date    SEDRATE 20 06/28/2023       Lab Results   Component Value Date    RF <13.0 01/05/2023    SEDRATE 20 06/28/2023       No components found for: "25OHVITDTOT", "71SLFPKW0", "38QSLSCN3", "METHODNOTE"    Lab Results   Component Value Date    URICACID 7.3 (H) 01/05/2023       No components found for: "TSPOTTB"    Imaging:  All imaging reviewed and independently interpreted by me.         ASSESSMENT / PLAN:     Alberto Frye is a 48 y.o. Black or  female with:      1. Sjogren's syndrome with keratoconjunctivitis sicca  - chronic   - trial of " Cevillimine did not help   - reinforced artificial tears and oral hydration   - continue to follow with Dental and EYE   - continue HCQ 400mg  - update labs  - reassurance      2. Fibromyalgia   - stable   - continue Lyrica + baclofen   - wt loss  - sleep hygiene and stress management reinforced   - Reassurance and Exercise    3. Osteoarthritis  - in addition to SjS and FM she has OA  - chronic   - wt loss  - Percocet PRN,  reviewed    - reassurance and exercise      4. Other specified counseling  - over 10 minutes spent regarding below topics:  - Immunization counseling done.  - Weight loss counseling done.  - Nutrition and exercise counseling.  - Limitation of alcohol consumption.  - Regular exercise:  Aerobic and resistance.  - Medication counseling provided.    5. Morbid Obesity  - would benefit from decreasing at least 10% of body weight.  - recommended goal of losing 1 lb per week.  - nutrition referral     6. DMARD Toxicity Monitoring  - annual EYE exam   - vaccines per guidelines  - immunosuppression/infectious precautions discussed    Follow up in about 4 months (around 2/24/2024).    Method of contact with patient concerns: [facility]    Disclaimer:  This note is prepared using voice recognition software and as such is likely to have errors and has not been proof read. Please contact me for questions.     Time spent: 30 minutes in face to face discussion concerning diagnosis, prognosis, review of lab and test results, benefits of treatment as well as management of disease, counseling of patient and coordination of care between various health care providers.  Greater than half the time spent was used for coordination of care and counseling of patient.    Ross Hughes M.D.  Rheumatology Department   [facility]

## 2023-10-25 LAB — ALDOLASE SERPL-CCNC: 5.9 U/L (ref 1.2–7.6)

## 2023-10-26 LAB — BCS RECOMMENDATION EXT: NORMAL

## 2023-10-27 DIAGNOSIS — M35.01 SJOGREN'S SYNDROME WITH KERATOCONJUNCTIVITIS SICCA: ICD-10-CM

## 2023-10-27 DIAGNOSIS — M79.7 FIBROMYALGIA: ICD-10-CM

## 2023-10-30 RX ORDER — OXYCODONE AND ACETAMINOPHEN 10; 325 MG/1; MG/1
1 TABLET ORAL EVERY 12 HOURS PRN
Qty: 60 TABLET | Refills: 0 | Status: SHIPPED | OUTPATIENT
Start: 2023-10-30 | End: 2023-11-27 | Stop reason: SDUPTHER

## 2023-11-05 ENCOUNTER — PATIENT MESSAGE (OUTPATIENT)
Dept: RHEUMATOLOGY | Facility: CLINIC | Age: 48
End: 2023-11-05
Payer: COMMERCIAL

## 2023-11-09 ENCOUNTER — HOSPITAL ENCOUNTER (INPATIENT)
Facility: HOSPITAL | Age: 48
LOS: 2 days | Discharge: HOME | DRG: 392 | End: 2023-11-11
Attending: STUDENT IN AN ORGANIZED HEALTH CARE EDUCATION/TRAINING PROGRAM | Admitting: HOSPITALIST
Payer: COMMERCIAL

## 2023-11-09 DIAGNOSIS — E66.01 MORBID OBESITY: ICD-10-CM

## 2023-11-09 DIAGNOSIS — I10 ESSENTIAL HYPERTENSION: ICD-10-CM

## 2023-11-09 DIAGNOSIS — K56.609 SMALL BOWEL OBSTRUCTION: Primary | ICD-10-CM

## 2023-11-09 DIAGNOSIS — R07.9 CHEST PAIN: ICD-10-CM

## 2023-11-09 DIAGNOSIS — M79.7 FIBROMYALGIA: ICD-10-CM

## 2023-11-09 DIAGNOSIS — R11.2 NAUSEA & VOMITING: ICD-10-CM

## 2023-11-09 LAB
ALBUMIN SERPL BCP-MCNC: 3.8 G/DL (ref 3.5–5.2)
ALLENS TEST: ABNORMAL
ALP SERPL-CCNC: 80 U/L (ref 55–135)
ALT SERPL W/O P-5'-P-CCNC: 15 U/L (ref 10–44)
AMPHET+METHAMPHET UR QL: NEGATIVE
ANION GAP SERPL CALC-SCNC: 13 MMOL/L (ref 8–16)
ANION GAP SERPL CALC-SCNC: 13 MMOL/L (ref 8–16)
AST SERPL-CCNC: 24 U/L (ref 10–40)
BARBITURATES UR QL SCN>200 NG/ML: NEGATIVE
BASOPHILS # BLD AUTO: 0.02 K/UL (ref 0–0.2)
BASOPHILS NFR BLD: 0.4 % (ref 0–1.9)
BENZODIAZ UR QL SCN>200 NG/ML: NEGATIVE
BILIRUB SERPL-MCNC: 0.5 MG/DL (ref 0.1–1)
BILIRUB UR QL STRIP: NEGATIVE
BUN SERPL-MCNC: 11 MG/DL (ref 6–20)
BUN SERPL-MCNC: 11 MG/DL (ref 6–30)
BZE UR QL SCN: NEGATIVE
CALCIUM SERPL-MCNC: 9.1 MG/DL (ref 8.7–10.5)
CANNABINOIDS UR QL SCN: ABNORMAL
CHLORIDE SERPL-SCNC: 104 MMOL/L (ref 95–110)
CHLORIDE SERPL-SCNC: 109 MMOL/L (ref 95–110)
CLARITY UR: CLEAR
CO2 SERPL-SCNC: 19 MMOL/L (ref 23–29)
COLOR UR: COLORLESS
CREAT SERPL-MCNC: 0.9 MG/DL (ref 0.5–1.4)
CREAT SERPL-MCNC: 1 MG/DL (ref 0.5–1.4)
CREAT UR-MCNC: 37.2 MG/DL (ref 15–325)
DELSYS: ABNORMAL
DIFFERENTIAL METHOD: NORMAL
EOSINOPHIL # BLD AUTO: 0.1 K/UL (ref 0–0.5)
EOSINOPHIL NFR BLD: 1.1 % (ref 0–8)
ERYTHROCYTE [DISTWIDTH] IN BLOOD BY AUTOMATED COUNT: 13.5 % (ref 11.5–14.5)
EST. GFR  (NO RACE VARIABLE): >60 ML/MIN/1.73 M^2
GLUCOSE SERPL-MCNC: 135 MG/DL (ref 70–110)
GLUCOSE SERPL-MCNC: 143 MG/DL (ref 70–110)
GLUCOSE UR QL STRIP: NEGATIVE
HCT VFR BLD AUTO: 41.4 % (ref 37–48.5)
HCT VFR BLD CALC: 43 %PCV (ref 36–54)
HGB BLD-MCNC: 13.8 G/DL (ref 12–16)
HGB UR QL STRIP: NEGATIVE
IMM GRANULOCYTES # BLD AUTO: 0.02 K/UL (ref 0–0.04)
IMM GRANULOCYTES NFR BLD AUTO: 0.4 % (ref 0–0.5)
KETONES UR QL STRIP: NEGATIVE
LEUKOCYTE ESTERASE UR QL STRIP: NEGATIVE
LIPASE SERPL-CCNC: 11 U/L (ref 4–60)
LYMPHOCYTES # BLD AUTO: 1.4 K/UL (ref 1–4.8)
LYMPHOCYTES NFR BLD: 25.5 % (ref 18–48)
MAGNESIUM SERPL-MCNC: 2.2 MG/DL (ref 1.6–2.6)
MCH RBC QN AUTO: 30.3 PG (ref 27–31)
MCHC RBC AUTO-ENTMCNC: 33.3 G/DL (ref 32–36)
MCV RBC AUTO: 91 FL (ref 82–98)
METHADONE UR QL SCN>300 NG/ML: NEGATIVE
MONOCYTES # BLD AUTO: 0.3 K/UL (ref 0.3–1)
MONOCYTES NFR BLD: 5.9 % (ref 4–15)
NEUTROPHILS # BLD AUTO: 3.6 K/UL (ref 1.8–7.7)
NEUTROPHILS NFR BLD: 66.7 % (ref 38–73)
NITRITE UR QL STRIP: NEGATIVE
NRBC BLD-RTO: 0 /100 WBC
OPIATES UR QL SCN: NEGATIVE
PCP UR QL SCN>25 NG/ML: NEGATIVE
PH UR STRIP: 8 [PH] (ref 5–8)
PLATELET # BLD AUTO: 165 K/UL (ref 150–450)
PMV BLD AUTO: 10.2 FL (ref 9.2–12.9)
POC IONIZED CALCIUM: 1.2 MMOL/L (ref 1.06–1.42)
POC TCO2 (MEASURED): 30 MMOL/L (ref 23–29)
POTASSIUM BLD-SCNC: 3.8 MMOL/L (ref 3.5–5.1)
POTASSIUM SERPL-SCNC: 3.8 MMOL/L (ref 3.5–5.1)
PROT SERPL-MCNC: 7.8 G/DL (ref 6–8.4)
PROT UR QL STRIP: NEGATIVE
RBC # BLD AUTO: 4.56 M/UL (ref 4–5.4)
SAMPLE: ABNORMAL
SITE: ABNORMAL
SODIUM BLD-SCNC: 142 MMOL/L (ref 136–145)
SODIUM SERPL-SCNC: 141 MMOL/L (ref 136–145)
SP GR UR STRIP: 1.01 (ref 1–1.03)
TOXICOLOGY INFORMATION: ABNORMAL
TROPONIN I SERPL DL<=0.01 NG/ML-MCNC: 0.01 NG/ML (ref 0–0.03)
URN SPEC COLLECT METH UR: ABNORMAL
UROBILINOGEN UR STRIP-ACNC: NEGATIVE EU/DL
WBC # BLD AUTO: 5.45 K/UL (ref 3.9–12.7)

## 2023-11-09 PROCEDURE — 84295 ASSAY OF SERUM SODIUM: CPT

## 2023-11-09 PROCEDURE — 84132 ASSAY OF SERUM POTASSIUM: CPT

## 2023-11-09 PROCEDURE — 25500020 PHARM REV CODE 255: Performed by: STUDENT IN AN ORGANIZED HEALTH CARE EDUCATION/TRAINING PROGRAM

## 2023-11-09 PROCEDURE — 85014 HEMATOCRIT: CPT

## 2023-11-09 PROCEDURE — 25000003 PHARM REV CODE 250: Performed by: STUDENT IN AN ORGANIZED HEALTH CARE EDUCATION/TRAINING PROGRAM

## 2023-11-09 PROCEDURE — 96372 THER/PROPH/DIAG INJ SC/IM: CPT | Performed by: STUDENT IN AN ORGANIZED HEALTH CARE EDUCATION/TRAINING PROGRAM

## 2023-11-09 PROCEDURE — 82330 ASSAY OF CALCIUM: CPT

## 2023-11-09 PROCEDURE — 96361 HYDRATE IV INFUSION ADD-ON: CPT

## 2023-11-09 PROCEDURE — 93010 EKG 12-LEAD: ICD-10-PCS | Mod: ,,, | Performed by: INTERNAL MEDICINE

## 2023-11-09 PROCEDURE — 80307 DRUG TEST PRSMV CHEM ANLYZR: CPT | Performed by: STUDENT IN AN ORGANIZED HEALTH CARE EDUCATION/TRAINING PROGRAM

## 2023-11-09 PROCEDURE — 99223 1ST HOSP IP/OBS HIGH 75: CPT | Mod: ,,, | Performed by: SURGERY

## 2023-11-09 PROCEDURE — 96374 THER/PROPH/DIAG INJ IV PUSH: CPT

## 2023-11-09 PROCEDURE — 63600175 PHARM REV CODE 636 W HCPCS: Performed by: STUDENT IN AN ORGANIZED HEALTH CARE EDUCATION/TRAINING PROGRAM

## 2023-11-09 PROCEDURE — 99900035 HC TECH TIME PER 15 MIN (STAT)

## 2023-11-09 PROCEDURE — 82565 ASSAY OF CREATININE: CPT

## 2023-11-09 PROCEDURE — 84484 ASSAY OF TROPONIN QUANT: CPT | Performed by: STUDENT IN AN ORGANIZED HEALTH CARE EDUCATION/TRAINING PROGRAM

## 2023-11-09 PROCEDURE — 93005 ELECTROCARDIOGRAM TRACING: CPT

## 2023-11-09 PROCEDURE — 85025 COMPLETE CBC W/AUTO DIFF WBC: CPT | Performed by: STUDENT IN AN ORGANIZED HEALTH CARE EDUCATION/TRAINING PROGRAM

## 2023-11-09 PROCEDURE — 99223 PR INITIAL HOSPITAL CARE,LEVL III: ICD-10-PCS | Mod: ,,, | Performed by: SURGERY

## 2023-11-09 PROCEDURE — 96375 TX/PRO/DX INJ NEW DRUG ADDON: CPT

## 2023-11-09 PROCEDURE — 93010 ELECTROCARDIOGRAM REPORT: CPT | Mod: ,,, | Performed by: INTERNAL MEDICINE

## 2023-11-09 PROCEDURE — 80053 COMPREHEN METABOLIC PANEL: CPT | Performed by: STUDENT IN AN ORGANIZED HEALTH CARE EDUCATION/TRAINING PROGRAM

## 2023-11-09 PROCEDURE — 21400001 HC TELEMETRY ROOM

## 2023-11-09 PROCEDURE — 81003 URINALYSIS AUTO W/O SCOPE: CPT | Mod: 59 | Performed by: STUDENT IN AN ORGANIZED HEALTH CARE EDUCATION/TRAINING PROGRAM

## 2023-11-09 PROCEDURE — 99285 EMERGENCY DEPT VISIT HI MDM: CPT | Mod: 25

## 2023-11-09 PROCEDURE — 83690 ASSAY OF LIPASE: CPT | Performed by: STUDENT IN AN ORGANIZED HEALTH CARE EDUCATION/TRAINING PROGRAM

## 2023-11-09 PROCEDURE — 83735 ASSAY OF MAGNESIUM: CPT | Performed by: STUDENT IN AN ORGANIZED HEALTH CARE EDUCATION/TRAINING PROGRAM

## 2023-11-09 RX ORDER — DROPERIDOL 2.5 MG/ML
1.25 INJECTION, SOLUTION INTRAMUSCULAR; INTRAVENOUS ONCE
Status: DISCONTINUED | OUTPATIENT
Start: 2023-11-09 | End: 2023-11-09

## 2023-11-09 RX ORDER — NALOXONE HCL 0.4 MG/ML
0.02 VIAL (ML) INJECTION
Status: DISCONTINUED | OUTPATIENT
Start: 2023-11-09 | End: 2023-11-11 | Stop reason: HOSPADM

## 2023-11-09 RX ORDER — SODIUM CHLORIDE 0.9 % (FLUSH) 0.9 %
10 SYRINGE (ML) INJECTION EVERY 12 HOURS PRN
Status: DISCONTINUED | OUTPATIENT
Start: 2023-11-09 | End: 2023-11-11 | Stop reason: HOSPADM

## 2023-11-09 RX ORDER — DIPHENHYDRAMINE HYDROCHLORIDE 50 MG/ML
25 INJECTION INTRAMUSCULAR; INTRAVENOUS
Status: DISCONTINUED | OUTPATIENT
Start: 2023-11-09 | End: 2023-11-09

## 2023-11-09 RX ORDER — ACETAMINOPHEN 325 MG/1
650 TABLET ORAL EVERY 8 HOURS PRN
Status: DISCONTINUED | OUTPATIENT
Start: 2023-11-09 | End: 2023-11-11 | Stop reason: HOSPADM

## 2023-11-09 RX ORDER — LANOLIN ALCOHOL/MO/W.PET/CERES
800 CREAM (GRAM) TOPICAL
Status: DISCONTINUED | OUTPATIENT
Start: 2023-11-09 | End: 2023-11-11 | Stop reason: HOSPADM

## 2023-11-09 RX ORDER — SIMETHICONE 80 MG
1 TABLET,CHEWABLE ORAL 4 TIMES DAILY PRN
Status: DISCONTINUED | OUTPATIENT
Start: 2023-11-09 | End: 2023-11-11 | Stop reason: HOSPADM

## 2023-11-09 RX ORDER — SODIUM CHLORIDE 9 MG/ML
INJECTION, SOLUTION INTRAVENOUS CONTINUOUS
Status: ACTIVE | OUTPATIENT
Start: 2023-11-09 | End: 2023-11-09

## 2023-11-09 RX ORDER — GLUCAGON 1 MG
1 KIT INJECTION
Status: DISCONTINUED | OUTPATIENT
Start: 2023-11-09 | End: 2023-11-11 | Stop reason: HOSPADM

## 2023-11-09 RX ORDER — KETOROLAC TROMETHAMINE 30 MG/ML
15 INJECTION, SOLUTION INTRAMUSCULAR; INTRAVENOUS ONCE
Status: COMPLETED | OUTPATIENT
Start: 2023-11-09 | End: 2023-11-09

## 2023-11-09 RX ORDER — HYDROXYZINE PAMOATE 25 MG/1
25 CAPSULE ORAL 2 TIMES DAILY PRN
Status: DISCONTINUED | OUTPATIENT
Start: 2023-11-09 | End: 2023-11-11 | Stop reason: HOSPADM

## 2023-11-09 RX ORDER — HYDROXYCHLOROQUINE SULFATE 200 MG/1
200 TABLET, FILM COATED ORAL 2 TIMES DAILY
Status: DISCONTINUED | OUTPATIENT
Start: 2023-11-09 | End: 2023-11-11 | Stop reason: HOSPADM

## 2023-11-09 RX ORDER — HYDROMORPHONE HYDROCHLORIDE 1 MG/ML
0.5 INJECTION, SOLUTION INTRAMUSCULAR; INTRAVENOUS; SUBCUTANEOUS
Status: COMPLETED | OUTPATIENT
Start: 2023-11-09 | End: 2023-11-09

## 2023-11-09 RX ORDER — TALC
6 POWDER (GRAM) TOPICAL NIGHTLY PRN
Status: DISCONTINUED | OUTPATIENT
Start: 2023-11-09 | End: 2023-11-11 | Stop reason: HOSPADM

## 2023-11-09 RX ORDER — PREGABALIN 50 MG/1
150 CAPSULE ORAL 2 TIMES DAILY
Status: DISCONTINUED | OUTPATIENT
Start: 2023-11-09 | End: 2023-11-11 | Stop reason: HOSPADM

## 2023-11-09 RX ORDER — IBUPROFEN 200 MG
24 TABLET ORAL
Status: DISCONTINUED | OUTPATIENT
Start: 2023-11-09 | End: 2023-11-11 | Stop reason: HOSPADM

## 2023-11-09 RX ORDER — LOSARTAN POTASSIUM AND HYDROCHLOROTHIAZIDE 12.5; 5 MG/1; MG/1
1 TABLET ORAL DAILY
Status: DISCONTINUED | OUTPATIENT
Start: 2023-11-09 | End: 2023-11-11 | Stop reason: HOSPADM

## 2023-11-09 RX ORDER — SODIUM,POTASSIUM PHOSPHATES 280-250MG
2 POWDER IN PACKET (EA) ORAL
Status: DISCONTINUED | OUTPATIENT
Start: 2023-11-09 | End: 2023-11-11 | Stop reason: HOSPADM

## 2023-11-09 RX ORDER — ENOXAPARIN SODIUM 150 MG/ML
1 INJECTION SUBCUTANEOUS EVERY 12 HOURS
Status: DISCONTINUED | OUTPATIENT
Start: 2023-11-10 | End: 2023-11-11 | Stop reason: HOSPADM

## 2023-11-09 RX ORDER — HYDROMORPHONE HYDROCHLORIDE 1 MG/ML
1 INJECTION, SOLUTION INTRAMUSCULAR; INTRAVENOUS; SUBCUTANEOUS
Status: DISCONTINUED | OUTPATIENT
Start: 2023-11-09 | End: 2023-11-09

## 2023-11-09 RX ORDER — IPRATROPIUM BROMIDE AND ALBUTEROL SULFATE 2.5; .5 MG/3ML; MG/3ML
3 SOLUTION RESPIRATORY (INHALATION) EVERY 4 HOURS PRN
Status: DISCONTINUED | OUTPATIENT
Start: 2023-11-09 | End: 2023-11-11 | Stop reason: HOSPADM

## 2023-11-09 RX ORDER — FLUOXETINE HYDROCHLORIDE 20 MG/1
80 CAPSULE ORAL DAILY
Status: DISCONTINUED | OUTPATIENT
Start: 2023-11-09 | End: 2023-11-11 | Stop reason: HOSPADM

## 2023-11-09 RX ORDER — DROPERIDOL 2.5 MG/ML
1.25 INJECTION, SOLUTION INTRAMUSCULAR; INTRAVENOUS ONCE
Status: COMPLETED | OUTPATIENT
Start: 2023-11-09 | End: 2023-11-09

## 2023-11-09 RX ORDER — IBUPROFEN 200 MG
16 TABLET ORAL
Status: DISCONTINUED | OUTPATIENT
Start: 2023-11-09 | End: 2023-11-11 | Stop reason: HOSPADM

## 2023-11-09 RX ORDER — ONDANSETRON 2 MG/ML
4 INJECTION INTRAMUSCULAR; INTRAVENOUS EVERY 8 HOURS PRN
Status: DISCONTINUED | OUTPATIENT
Start: 2023-11-09 | End: 2023-11-11 | Stop reason: HOSPADM

## 2023-11-09 RX ORDER — DIPHENHYDRAMINE HYDROCHLORIDE 50 MG/ML
25 INJECTION INTRAMUSCULAR; INTRAVENOUS
Status: COMPLETED | OUTPATIENT
Start: 2023-11-09 | End: 2023-11-09

## 2023-11-09 RX ORDER — POLYETHYLENE GLYCOL 3350 17 G/17G
17 POWDER, FOR SOLUTION ORAL DAILY
Status: DISCONTINUED | OUTPATIENT
Start: 2023-11-09 | End: 2023-11-11 | Stop reason: HOSPADM

## 2023-11-09 RX ORDER — ACETAMINOPHEN 325 MG/1
650 TABLET ORAL EVERY 4 HOURS PRN
Status: DISCONTINUED | OUTPATIENT
Start: 2023-11-09 | End: 2023-11-11 | Stop reason: HOSPADM

## 2023-11-09 RX ORDER — AMITRIPTYLINE HYDROCHLORIDE 25 MG/1
50 TABLET, FILM COATED ORAL NIGHTLY
Status: DISCONTINUED | OUTPATIENT
Start: 2023-11-09 | End: 2023-11-11 | Stop reason: HOSPADM

## 2023-11-09 RX ORDER — MAG HYDROX/ALUMINUM HYD/SIMETH 200-200-20
30 SUSPENSION, ORAL (FINAL DOSE FORM) ORAL 4 TIMES DAILY PRN
Status: DISCONTINUED | OUTPATIENT
Start: 2023-11-09 | End: 2023-11-11 | Stop reason: HOSPADM

## 2023-11-09 RX ORDER — PROCHLORPERAZINE EDISYLATE 5 MG/ML
5 INJECTION INTRAMUSCULAR; INTRAVENOUS EVERY 6 HOURS PRN
Status: DISCONTINUED | OUTPATIENT
Start: 2023-11-09 | End: 2023-11-11 | Stop reason: HOSPADM

## 2023-11-09 RX ADMIN — DROPERIDOL 1.25 MG: 2.5 INJECTION, SOLUTION INTRAMUSCULAR; INTRAVENOUS at 02:11

## 2023-11-09 RX ADMIN — IOHEXOL 75 ML: 350 INJECTION, SOLUTION INTRAVENOUS at 03:11

## 2023-11-09 RX ADMIN — KETOROLAC TROMETHAMINE 15 MG: 30 INJECTION, SOLUTION INTRAMUSCULAR; INTRAVENOUS at 04:11

## 2023-11-09 RX ADMIN — BUSPIRONE HYDROCHLORIDE 15 MG: 10 TABLET ORAL at 09:11

## 2023-11-09 RX ADMIN — HYDROXYCHLOROQUINE SULFATE 200 MG: 200 TABLET, FILM COATED ORAL at 08:11

## 2023-11-09 RX ADMIN — PREGABALIN 150 MG: 50 CAPSULE ORAL at 08:11

## 2023-11-09 RX ADMIN — FLUOXETINE 80 MG: 20 CAPSULE ORAL at 09:11

## 2023-11-09 RX ADMIN — LOSARTAN POTASSIUM AND HYDROCHLOROTHIAZIDE 1 TABLET: 12.5; 5 TABLET ORAL at 09:11

## 2023-11-09 RX ADMIN — HYDROXYCHLOROQUINE SULFATE 200 MG: 200 TABLET, FILM COATED ORAL at 09:11

## 2023-11-09 RX ADMIN — PREGABALIN 150 MG: 50 CAPSULE ORAL at 09:11

## 2023-11-09 RX ADMIN — MELATONIN TAB 3 MG 6 MG: 3 TAB at 08:11

## 2023-11-09 RX ADMIN — ONDANSETRON 4 MG: 2 INJECTION INTRAMUSCULAR; INTRAVENOUS at 11:11

## 2023-11-09 RX ADMIN — HYDROMORPHONE HYDROCHLORIDE 0.5 MG: 1 INJECTION, SOLUTION INTRAMUSCULAR; INTRAVENOUS; SUBCUTANEOUS at 03:11

## 2023-11-09 RX ADMIN — DIPHENHYDRAMINE HYDROCHLORIDE 25 MG: 50 INJECTION, SOLUTION INTRAMUSCULAR; INTRAVENOUS at 03:11

## 2023-11-09 RX ADMIN — ACETAMINOPHEN 650 MG: 325 TABLET ORAL at 11:11

## 2023-11-09 RX ADMIN — SODIUM CHLORIDE: 9 INJECTION, SOLUTION INTRAVENOUS at 05:11

## 2023-11-09 RX ADMIN — BUSPIRONE HYDROCHLORIDE 15 MG: 10 TABLET ORAL at 08:11

## 2023-11-09 RX ADMIN — AMITRIPTYLINE HYDROCHLORIDE 50 MG: 25 TABLET, FILM COATED ORAL at 08:11

## 2023-11-09 RX ADMIN — BUSPIRONE HYDROCHLORIDE 15 MG: 10 TABLET ORAL at 02:11

## 2023-11-09 RX ADMIN — SODIUM CHLORIDE 1000 ML: 9 INJECTION, SOLUTION INTRAVENOUS at 02:11

## 2023-11-09 NOTE — CARE UPDATE
48 y.o. female with a hx of HTN, HLD, Sjogren disease, DVT (on eliquis), bipolar disorder, anxiety, and fibromyalgia admitted on 11/09/2023 for further evaluation of abdominal pain.  Lab work is grossly unremarkable. CT A/P calling a possible SBO vs enteritis. General surgery consulted - no acute surgical intervention indicated, patient is not clinically obstructed.     Will continue to monitor. Continue IVF. If the patient continues to have nausea and vomiting, then can consider NGT placement and gastric decompression.     I reviewed the patient's medications, past medical/surgical history and the H&P note. I agree with the physical exam and assessment and plan.

## 2023-11-09 NOTE — ASSESSMENT & PLAN NOTE
Chronic, controlled. Latest blood pressure and vitals reviewed-     Temp:  [98.9 °F (37.2 °C)]   Pulse:  [76-89]   Resp:  [16-24]   BP: (135-196)/()   SpO2:  [97 %-100 %] .   Home meds for hypertension were reviewed and noted below.   Hypertension Medications             valsartan-hydrochlorothiazide (DIOVAN-HCT) 160-25 mg per tablet Take 1 tablet by mouth once daily.          While in the hospital, will manage blood pressure as follows; Continue home antihypertensive regimen    Will utilize p.r.n. blood pressure medication only if patient's blood pressure greater than 180/110 and she develops symptoms such as worsening chest pain or shortness of breath.

## 2023-11-09 NOTE — PLAN OF CARE
Case Management Assessment     PCP: Dr. Ian Cespedes   Pharmacy: [facility] ([facility])    Patient Arrived From: Home   Existing Help at Home: Family     Barriers to Discharge: none    Discharge Plan:    A. Home with Family   B. Home with Family; TBD      Patient confirmed information on face sheet. Patient stated her sister lives with her and will be able to help her at home. Patient stated her mother would also be able to help if needed. Patient stated she prefers morning appointments. Patient stated her plan is to return home, and family will provide transportation at discharge.          11/09/23 1208   Discharge Assessment   Assessment Type Discharge Planning Assessment   Confirmed/corrected address, phone number and insurance Yes   Confirmed Demographics Correct on Facesheet   Source of Information patient   If unable to respond/provide information was family/caregiver contacted? No Contact Information Available   Communicated [place] with patient/caregiver Date not available/Unable to determine   Reason For Admission Small bowel obstruction   People in Home sibling(s)   Facility Arrived From: Home   Do you expect to return to your current living situation? Yes   Do you have help at home or someone to help you manage your care at home? Yes   Who are your caregiver(s) and their phone number(s)? Uday Luis Manuelgerson (sister) [phone]   Prior to hospitilization cognitive status: Alert/Oriented   Current cognitive status: Alert/Oriented   Equipment Currently Used at Home cane, straight   Readmission within 30 days? No   Patient currently being followed by outpatient case management? No   Do you currently have service(s) that help you manage your care at home? No   Do you take prescription medications? Yes   Do you have prescription coverage? Yes   Coverage [employer]   Do you have any problems affording any of your prescribed medications? No   Is the patient taking medications as prescribed? yes   Who is going to help  you get home at discharge? Rodo Frye (sister) [phone]   How do you get to doctors appointments? car, drives self;family or friend will provide   Are you on dialysis? No   Do you take coumadin? No   DME Needed Upon Discharge  other (see comments)  (TBD)   Discharge Plan discussed with: Patient   Transition of Care Barriers None   Discharge Plan A Home with family   Discharge Plan B Home with family   OTHER   Name(s) of People in Home Rodo Frye (sister) [phone]

## 2023-11-09 NOTE — NURSING
[facility]  Nurses Note -- 4 Eyes      11/9/2023       Skin assessed on: Q Shift      [x] No Pressure Injuries Present    []Prevention Measures Documented    [] Yes LDA  for Pressure Injury Previously documented     [] Yes New Pressure Injury Discovered   [] LDA for New Pressure Injury Added      Attending RN:  Nikos Root, RN     Second RN:  Mei

## 2023-11-09 NOTE — HPI
Alberto Frye is a 48 y.o. female with a hx of HTN, HLD, Sjogren disease, DVT (on eliquis), bipolar disorder, anxiety, and fibromyalgia who presents with 1 day history of intermittent abdominal pain. States that the pain is crampy and is associated with nausea and emesis. She states that she had a bowel movement 30 minutes before coming to the hospital. In the ED, she is afebrile and hypertensive. Her lab work is largely unremarkable. CT A/P calling a possible SBO vs enteritis. General surgery consulted for small bowel obstruction.

## 2023-11-09 NOTE — ASSESSMENT & PLAN NOTE
Body mass index is 42.91 kg/m². Morbid obesity complicates all aspects of disease management from diagnostic modalities to treatment. Weight loss encouraged and health benefits explained to patient.

## 2023-11-09 NOTE — ED TRIAGE NOTES
Pt reports left sided abdominal pain radiating to back, also reports pain in the epigastric region started 2 hours PTA . Pt has associated vomiting and nausea. Denies headache, blurring of vision, SOB, cough, light headedness ,  symptoms . Pt states having a bowel movement 30 min's before PTA

## 2023-11-09 NOTE — ED PROVIDER NOTES
Encounter Date: 11/9/2023    SCRIBE #1 NOTE: I, Orlando Valles, am scribing for, and in the presence of,  Jasson Burger MD.       History     Chief Complaint   Patient presents with    Abdominal Pain     Pt reports constant left sided abdominal pain & nausea/dry heaving ongoing 2hrs; pt very tearful, loudly moaning, and restless in wheelchair at triage; pt reports hx of similar pain when she had diverticulitis      Alberto Frye is a 48 y.o. female with a PMHx of CHF, HTN, DVT, fibromyalgia, alcohol abuse, who presents to the ED for evaluation of abdominal pain for 2 hours. Patient reports complaints of abdominal pain for 2 hours, noting associated nausea. She endorses last normal BM was 30 minutes ago. She notes similar complaints previously when she was diagnosed with diverticulitis. No medications taken PTA. No alleviating or exacerbating factors noted. She reports she chews THC gummies for her fibromyalgia.     The history is provided by the patient. No  was used.     Review of patient's allergies indicates:   Allergen Reactions    Morphine Itching and Hallucinations    Pcn [penicillins] Other (See Comments)     Was told from childhood she couldn't take it    Sulfa (sulfonamide antibiotics) Nausea And Vomiting    Latex, natural rubber Rash     Past Medical History:   Diagnosis Date    AYDE (acute kidney injury) 11/1/2022    Alcohol abuse     stopped heavy drinking about 10 years ago; was drinking 3 glasses of vodka/tequilla,rum/whiskey per day    Allergy Don't remember    Its been some years.    Anxiety     Arthritis 2010    Blood clotting tendency 2008    After a procedure & 2020.    Congestive heart failure 8/11/2020    Depression     Hallucination     Hx of psychiatric care     Hypertension     Immune disorder 2020    Joint pain 2010    Keloid cicatrix Years ago    From childhood scars    Caro     unplanned trips, energy without sleep for 2 days (reading, cleaning), feelings  that she can do multiple tasks at one time, feelings of overconfidence at times    Psychiatric problem     Sleep difficulties     Therapy     Withdrawal symptoms, drug or narcotic     racing heart, restlessness     Past Surgical History:   Procedure Laterality Date    ESOPHAGOGASTRODUODENOSCOPY N/A 06/03/2020    Procedure: EGD (ESOPHAGOGASTRODUODENOSCOPY);  Surgeon: Johan Grover MD;  Location: [facility] (4TH FLR);  Service: Endoscopy;  Laterality: N/A;  covid 6/2-[employer]-tb    HYSTERECTOMY      LAPBAND  2009    PHLEBOGRAPHY Left 08/12/2020    Procedure: Venogram, pharmacomechanical thrombectomy;  Surgeon: Miguelangel Goldman MD;  Location: [facility] OR 2ND FLR;  Service: Vascular;  Laterality: Left;  2336.43 mGy  [id number]  17.8 minutes  37 ml of contrast    VENOPLASTY Left 08/12/2020    Procedure: ANGIOPLASTY, VEIN;  Surgeon: Miguelangel Goldman MD;  Location: [facility] OR 2ND FLR;  Service: Vascular;  Laterality: Left;     Family History   Problem Relation Age of Onset    Diabetes Mother     Cancer Mother     Hypertension Mother     No Known Problems Father     No Known Problems Sister     No Known Problems Brother     No Known Problems Maternal Aunt     Cirrhosis Maternal Uncle         ETOH    No Known Problems Paternal Aunt     No Known Problems Paternal Uncle     No Known Problems Maternal Grandmother     No Known Problems Maternal Grandfather     No Known Problems Paternal Grandmother     No Known Problems Paternal Grandfather     No Known Problems Other     Celiac disease Neg Hx     Colon cancer Neg Hx     Colon polyps Neg Hx     Crohn's disease Neg Hx     Esophageal cancer Neg Hx     Inflammatory bowel disease Neg Hx     Liver cancer Neg Hx     Liver disease Neg Hx     Rectal cancer Neg Hx     Stomach cancer Neg Hx     Ulcerative colitis Neg Hx      Social History     Tobacco Use    Smoking status: Former     Current packs/day: 0.00     Types: Cigarettes    Smokeless tobacco: Never    Tobacco  comments:     quit in october 2016   Substance Use Topics    Alcohol use: Yes     Alcohol/week: 1.0 - 3.0 standard drink of alcohol     Types: 1 - 3 Glasses of wine per week     Comment: social presently, excessive 10 years ago    Drug use: Never     Types: Marijuana     Comment: uses THCA weekly     Review of Systems   Constitutional:  Negative for chills and fever.   HENT:  Negative for facial swelling and sore throat.    Eyes:  Negative for visual disturbance.   Respiratory:  Negative for cough and shortness of breath.    Cardiovascular:  Negative for chest pain and palpitations.   Gastrointestinal:  Positive for abdominal pain and nausea.   Genitourinary:  Negative for dysuria and hematuria.   Musculoskeletal:  Negative for back pain.   Skin:  Negative for rash.   Neurological:  Negative for weakness and headaches.   Hematological:  Does not bruise/bleed easily.   Psychiatric/Behavioral: Negative.         Physical Exam     Initial Vitals [11/09/23 0124]   BP Pulse Resp Temp SpO2   (!) 181/111 87 (!) 24 98.9 °F (37.2 °C) 100 %      MAP       --         Physical Exam    Nursing note and vitals reviewed.  Constitutional: She appears well-developed and well-nourished. She is not diaphoretic. She is Obese . She appears distressed.   HENT:   Head: Normocephalic and atraumatic.   Right Ear: External ear normal.   Left Ear: External ear normal.   Nose: Nose normal.   Eyes: Conjunctivae are normal. No scleral icterus.   Neck: Neck supple. No tracheal deviation present.   Normal range of motion.  Cardiovascular:  Normal rate, regular rhythm and normal heart sounds.           Pulmonary/Chest: Breath sounds normal. No respiratory distress. She exhibits tenderness (reproducible to lower sternum).   Abdominal: Abdomen is soft. Bowel sounds are normal.   Diffuse abdominal pain with palpation, mostly present in LUQ and LLQ. There is no rebound and no guarding.   Musculoskeletal:      Cervical back: Normal range of motion and  neck supple.     Neurological: She is alert and oriented to person, place, and time.   Skin: Skin is warm and dry.   Psychiatric: She has a normal mood and affect. Thought content normal.         ED Course   Procedures  Labs Reviewed   COMPREHENSIVE METABOLIC PANEL - Abnormal; Notable for the following components:       Result Value    CO2 19 (*)     Glucose 143 (*)     All other components within normal limits   URINALYSIS, REFLEX TO URINE CULTURE - Abnormal; Notable for the following components:    Color, UA Colorless (*)     All other components within normal limits    Narrative:     Specimen Source->Urine   DRUG SCREEN PANEL, URINE EMERGENCY - Abnormal; Notable for the following components:    THC Presumptive Positive (*)     All other components within normal limits    Narrative:     Specimen Source->Urine   ISTAT PROCEDURE - Abnormal; Notable for the following components:    POC Glucose 135 (*)     POC TCO2 (MEASURED) 30 (*)     All other components within normal limits   LIPASE   TROPONIN I   MAGNESIUM   CBC W/ AUTO DIFFERENTIAL   ISTAT CHEM8        ECG Results              EKG 12-lead (Final result)  Result time 11/09/23 20:57:20      Final result by Interface, Lab In [facility] (11/09/23 20:57:20)                   Narrative:    Test Reason : R11.2,    Vent. Rate : 075 BPM     Atrial Rate : 075 BPM     P-R Int : 166 ms          QRS Dur : 072 ms      QT Int : 404 ms       P-R-T Axes : 062 020 -08 degrees     QTc Int : 451 ms    Normal sinus rhythm  T wave abnormality, consider inferior ischemia  Abnormal ECG  When compared with ECG of 24-JAN-2023 11:50,  No significant change was found  Confirmed by Nancy Fraser MD ([id number]) on 11/9/2023 8:57:09 PM    Referred By: HIRAL SUN           Confirmed By:Nancy Fraser MD                                     EKG 12-LEAD (Final result)  Result time 11/10/23 13:48:47      Final result by Unknown User (11/10/23 13:48:47)                                      Imaging Results                CT Abdomen Pelvis With IV Contrast (Final result)  Result time 11/09/23 03:43:21      Final result by Corky Dozier MD (11/09/23 03:43:21)                   Impression:      Cluster of dilated, fluid-filled loops of small bowel within the central mid abdomen.  There is associated mild wall thickening as well as mild surrounding mesenteric fat stranding and small volume of free fluid within the abdomen and pelvis.  Differential considerations developing small bowel obstruction versus a nonspecific enteritis.  Clinical correlation advised.    Nonspecific circumferential thickening of the distal thoracic esophagus and gastroesophageal junction.  Follow-up endoscopy is advised for further assessment.    Gastric lap band in place with discontinuity of the catheter tubing, similar to prior examination.  Clinical correlation advised.    Decreased attenuation of the hepatic parenchyma suggesting hepatic steatosis.    Infrarenal IVC filter.    Additional findings as above.    This report was flagged in Epic as abnormal.      Electronically signed by: Corky Dozier MD  Date:    11/09/2023  Time:    03:43               Narrative:    EXAMINATION:  CT ABDOMEN PELVIS WITH IV CONTRAST    CLINICAL HISTORY:  LLQ abdominal pain;    TECHNIQUE:  Low dose axial images, sagittal and coronal reformations were obtained from the lung bases to the pubic symphysis following the IV administration of 75 mL of Omnipaque 350 .  Oral contrast was not given.    COMPARISON:  CT 06/02/2020    FINDINGS:  The lung bases are unremarkable.  There is no pleural fluid present.  The visualized portions of the heart appear normal.    The liver is not significantly enlarged.  There is diffuse decreased attenuation of the hepatic parenchyma suggesting hepatic steatosis.  No focal hepatic abnormality identified.  No definite radiopaque calculi appreciated within the gallbladder lumen.  There is no intra-or extrahepatic biliary ductal  dilatation.    There is nonspecific circumferential thickening of the distal thoracic esophagus and gastroesophageal junction.  There is a gastric lap band in place about the proximal stomach.  The catheter tubing appears to be discontinuous terminating within the left mid abdomen, similar to prior examination.  Port site and proximal tubing appears coiled within the left mid abdomen subcutaneous soft tissues.    The spleen, pancreas, and adrenal glands appear within normal limits.    Kidneys are normal in size and location and enhance symmetrically.  There is no evidence of hydronephrosis. The urinary bladder is unremarkable. Uterus appears to be surgically absent.    The abdominal aorta is normal in course and caliber without significant atherosclerotic calcifications.  There is an infrarenal IVC filter in place.    There is a cluster of small bowel loops within the central mid abdomen which appear mildly prominent, fluid-filled, and demonstrate mild wall thickening.  There is mild surrounding mesenteric fat stranding as well as a small volume of free fluid within the abdomen and pelvis.  Differential considerations include developing small bowel obstruction versus a nonspecific enteritis.  There is mild scattered retained stool throughout the right hemicolon.  No abnormal colonic wall thickening appreciated.  Appendix may be surgically absent.  There is no free intraperitoneal air or portal venous gas.    Osseous structures demonstrate degenerative changes of the lumbar spine at L4-L5. The extraperitoneal soft tissues are unremarkable.                                       Medications   amitriptyline tablet 50 mg (50 mg Oral Given 11/10/23 2158)   busPIRone tablet 15 mg (15 mg Oral Given 11/10/23 2159)   FLUoxetine capsule 80 mg (80 mg Oral Given 11/10/23 5945)   hydroxychloroquine tablet 200 mg (200 mg Oral Given 11/10/23 2159)   hydrOXYzine pamoate capsule 25 mg (has no administration in time range)    pregabalin capsule 150 mg (150 mg Oral Given 11/10/23 2159)   losartan-hydrochlorothiazide 50-12.5 mg per tablet 1 tablet (1 tablet Oral Given 11/10/23 0845)   sodium chloride 0.9% flush 10 mL (has no administration in time range)   albuterol-ipratropium 2.5 mg-0.5 mg/3 mL nebulizer solution 3 mL (has no administration in time range)   melatonin tablet 6 mg (6 mg Oral Given 11/9/23 2058)   ondansetron injection 4 mg (4 mg Intravenous Given 11/9/23 1104)   prochlorperazine injection Soln 5 mg (has no administration in time range)   polyethylene glycol packet 17 g (17 g Oral Not Given 11/10/23 0900)   acetaminophen tablet 650 mg (has no administration in time range)   simethicone chewable tablet 80 mg (has no administration in time range)   aluminum-magnesium hydroxide-simethicone 200-200-20 mg/5 mL suspension 30 mL (has no administration in time range)   acetaminophen tablet 650 mg (650 mg Oral Given 11/9/23 1106)   naloxone 0.4 mg/mL injection 0.02 mg (has no administration in time range)   potassium bicarbonate disintegrating tablet 50 mEq (has no administration in time range)   potassium bicarbonate disintegrating tablet 35 mEq (has no administration in time range)   potassium bicarbonate disintegrating tablet 60 mEq (has no administration in time range)   magnesium oxide tablet 800 mg (has no administration in time range)   magnesium oxide tablet 800 mg (has no administration in time range)   potassium, sodium phosphates 280-160-250 mg packet 2 packet (has no administration in time range)   potassium, sodium phosphates 280-160-250 mg packet 2 packet (has no administration in time range)   potassium, sodium phosphates 280-160-250 mg packet 2 packet (has no administration in time range)   glucose chewable tablet 16 g (has no administration in time range)   glucose chewable tablet 24 g (has no administration in time range)   glucagon (human recombinant) injection 1 mg (has no administration in time range)    dextrose 10% bolus 125 mL 125 mL (has no administration in time range)   dextrose 10% bolus 250 mL 250 mL (has no administration in time range)   0.9%  NaCl infusion (0 mL/hr Intravenous Stopped 11/9/23 1500)   enoxaparin injection 120 mg (120 mg Subcutaneous Given 11/10/23 2159)   artificial tears 0.5 % ophthalmic solution 1 drop (has no administration in time range)   0.9%  NaCl infusion ( Intravenous New Bag 11/10/23 0852)   sodium chloride 0.9% bolus 1,000 mL 1,000 mL (0 mLs Intravenous Stopped 11/9/23 0331)   droPERidol injection 1.25 mg (1.25 mg Intramuscular Given 11/9/23 0224)   iohexoL (OMNIPAQUE 350) injection 75 mL (75 mLs Intravenous Given 11/9/23 0309)   HYDROmorphone injection 0.5 mg (0.5 mg Intravenous Given 11/9/23 0318)   diphenhydrAMINE injection 25 mg (25 mg Intravenous Given 11/9/23 0318)   ketorolac injection 15 mg (15 mg Intravenous Given 11/9/23 1627)   ketorolac injection 15 mg (15 mg Intravenous Given 11/10/23 0952)   ketorolac injection 15 mg (15 mg Intravenous Given 11/10/23 2214)   diatrizoate meglumineand-diatrizoate sodium (GASTROVIEW) solution 120 mL (120 mLs Oral Given 11/10/23 1832)     Medical Decision Making  Amount and/or Complexity of Data Reviewed  Labs: ordered.  Radiology: ordered.    Risk  Prescription drug management.  Decision regarding hospitalization.            Scribe Attestation:   Scribe #1: I performed the above scribed service and the documentation accurately describes the services I performed. I attest to the accuracy of the note.        ED Course as of 11/11/23 0001   Thu Nov 09, 2023   0246 EKG: Rate 75, regular rhythm, sinus rhythm, intervals within normal limits, no ST elevations or depressions noted, similar to previous.  Interpreted by me, reviewed by me. [CC]   Sat Nov 11, 2023 0001 48-year-old presents to the emergency department for evaluation of abdominal pain, nausea, vomiting.  Patient found to have SBO.  Electrolytes within normal limits.  Vitals  stable.  No leukocytosis, no anemia, platelets within normal limits.  EKG is nonischemic.  Troponin normal, doubt ACS.  Will need continued trending in the hospital.  I spoke with Hospital Medicine for admission. [CC]      ED Course User Index  [CC] Jasson Burger MD                    I, Jasson Burger MD, personally performed the services described in this documentation. All medical record entries made by the scribe were at my direction and in my presence. I have reviewed the chart and agree that the record reflects my personal performance and is accurate and complete.    Clinical Impression:   Final diagnoses:  [R11.2] Nausea & vomiting  [K56.609] Small bowel obstruction        ED Disposition Condition    Admit                 Jasson Burger MD  11/11/23 0001

## 2023-11-09 NOTE — H&P
[facility] [place] [facility], [place]  [facility]  History & Physical    Patient Name: Alberto Frye  MRN: [id number]  Patient Class: IP- Inpatient  Admission Date: 11/9/2023  Attending Physician: Hector Bhatia, *   Primary Care Provider: Ian Cespedes MD         Patient information was obtained from patient and ER records.     Subjective:     Principal Problem:Small bowel obstruction    Chief Complaint:   Chief Complaint   Patient presents with    Abdominal Pain     Pt reports constant left sided abdominal pain & nausea/dry heaving ongoing 2hrs; pt very tearful, loudly moaning, and restless in wheelchair at triage; pt reports hx of similar pain when she had diverticulitis         HPI: This is a 48-year-old female with a past medical history of hypertension, hyperlipidemia, Sjogren syndrome, DVT (on Eliquis), bipolar, anxiety, morbid obesity who presents with abdominal pain.      Patient presents with abdominal pain that started a day prior to presentation.  She reports developing an intermittent abdominal pain that progressively worsened.  She thought at first that she was having a fibromyalgia flare however her symptoms persisted and became more severe.  Additional symptoms include nausea, but without vomiting.  Her last bowel movement was 30 minutes prior to presentation.    In the ED, the patient was hypertensive.  Labs were largely unremarkable.  UDS was positive for marijuana.  CT abdomen and pelvis showed cluster of dilated, fluid-filled loops of small bowel within the central mid abdomen with associated mild thickening, mild surrounding mesenteric fat stranding, and small volume of free fluid within the abdomen and pelvis (developing SBO versus nonspecific enteritis), nonspecific thickening of the distal thoracic esophagus.  Patient was given 1 L of NS, Dilaudid 0.5 mg IV, droperidol 1.25 mg IV, Benadryl 25 mg IV.  She was admitted for further management.      Past Medical History:   Diagnosis  Date    AYDE (acute kidney injury) 11/1/2022    Alcohol abuse     stopped heavy drinking about 10 years ago; was drinking 3 glasses of vodka/tequilla,rum/whiskey per day    Allergy Don't remember    Its been some years.    Anxiety     Arthritis 2010    Blood clotting tendency 2008    After a procedure & 2020.    Congestive heart failure 8/11/2020    Depression     Hallucination     Hx of psychiatric care     Hypertension     Immune disorder 2020    Joint pain 2010    Keloid cicatrix Years ago    From childhood scars    Caro     unplanned trips, energy without sleep for 2 days (reading, cleaning), feelings that she can do multiple tasks at one time, feelings of overconfidence at times    Psychiatric problem     Sleep difficulties     Therapy     Withdrawal symptoms, drug or narcotic     racing heart, restlessness       Past Surgical History:   Procedure Laterality Date    ESOPHAGOGASTRODUODENOSCOPY N/A 06/03/2020    Procedure: EGD (ESOPHAGOGASTRODUODENOSCOPY);  Surgeon: Johan Grover MD;  Location: [facility] (4TH FLR);  Service: Endoscopy;  Laterality: N/A;  [facility]    HYSTERECTOMY      LAPBAND  2009    PHLEBOGRAPHY Left 08/12/2020    Procedure: Venogram, pharmacomechanical thrombectomy;  Surgeon: Miguelangel Goldman MD;  [facility];  Service: Vascular;  Laterality: Left;  2336.43 mGy  [id number]  17.8 minutes  37 ml of contrast    VENOPLASTY Left 08/12/2020    Procedure: ANGIOPLASTY, VEIN;  Surgeon: Miguelangel Goldman MD;  [facility];  Service: Vascular;  Laterality: Left;       Review of patient's allergies indicates:   Allergen Reactions    Morphine Itching and Hallucinations    Pcn [penicillins] Other (See Comments)     Was told from childhood she couldn't take it    Sulfa (sulfonamide antibiotics) Nausea And Vomiting    Latex, natural rubber Rash       No current facility-administered medications on file prior to  encounter.     Current Outpatient Medications on File Prior to Encounter   Medication Sig    amitriptyline (ELAVIL) 50 MG tablet Take 1 tablet (50 mg total) by mouth every evening.    apixaban (ELIQUIS) 5 mg Tab Take 1 tablet (5 mg total) by mouth 2 (two) times daily.    busPIRone (BUSPAR) 15 MG tablet Take 1 tablet (15 mg total) by mouth 3 (three) times daily.    diclofenac sodium (VOLTAREN) 1 % Gel Apply the gel (4 g) to the affected area 4 times daily. Do not apply more than 16 g daily to any one affected joint    ergocalciferol (ERGOCALCIFEROL) 50,000 unit Cap Take 1 capsule (50,000 Units total) by mouth every 7 days.    FLUoxetine 40 MG capsule Take 2 capsules by mouth once daily    hydroxychloroquine (PLAQUENIL) 200 mg tablet Take 1 tablet (200 mg total) by mouth 2 (two) times daily.    hydrOXYzine pamoate (VISTARIL) 25 MG Cap Take 1 capsule (25 mg total) by mouth 2 (two) times daily as needed (panic attacks).    ipratropium (ATROVENT) 21 mcg (0.03 %) nasal spray INSTILL 2 SPRAY(S) IN EACH NOSTRIL THREE TIMES DAILY    oxyCODONE-acetaminophen (PERCOCET)  mg per tablet Take 1 tablet by mouth every 12 (twelve) hours as needed for Pain.    pregabalin (LYRICA) 150 MG capsule Take 1 capsule (150 mg total) by mouth 2 (two) times daily.    tiZANidine (ZANAFLEX) 4 MG tablet Take 2 tablets (8 mg total) by mouth 2 (two) times daily.    valsartan-hydrochlorothiazide (DIOVAN-HCT) 160-25 mg per tablet Take 1 tablet by mouth once daily.     Family History       Problem Relation (Age of Onset)    Cancer Mother    Cirrhosis Maternal Uncle    Diabetes Mother    Hypertension Mother    No Known Problems Father, Sister, Brother, Maternal Aunt, Paternal Aunt, Paternal Uncle, Maternal Grandmother, Maternal Grandfather, Paternal Grandmother, Paternal Grandfather, Other          Tobacco Use    Smoking status: Former     Current packs/day: 0.00     Types: Cigarettes    Smokeless tobacco: Never    Tobacco comments:      quit in october 2016   Substance and Sexual Activity    Alcohol use: Yes     Alcohol/week: 1.0 - 3.0 standard drink of alcohol     Types: 1 - 3 Glasses of wine per week     Comment: social presently, excessive 10 years ago    Drug use: Never     Types: Marijuana     Comment: uses THCA weekly    Sexual activity: Yes     Partners: Male     Birth control/protection: Condom, See Surgical Hx     Review of Systems   Constitutional: Negative.    HENT: Negative.     Eyes: Negative.    Respiratory: Negative.     Cardiovascular: Negative.    Gastrointestinal:  Positive for abdominal pain and nausea.   Endocrine: Negative.    Genitourinary: Negative.    Musculoskeletal: Negative.    Skin: Negative.    Allergic/Immunologic: Negative.    Neurological: Negative.    Psychiatric/Behavioral: Negative.       Objective:     Vital Signs (Most Recent):  Temp: 98.9 °F (37.2 °C) (11/09/23 0124)  Pulse: 89 (11/09/23 0433)  Resp: 19 (11/09/23 0333)  BP: (!) 135/92 (11/09/23 0433)  SpO2: 98 % (11/09/23 0433) Vital Signs (24h Range):  Temp:  [98.9 °F (37.2 °C)] 98.9 °F (37.2 °C)  Pulse:  [76-89] 89  Resp:  [16-24] 19  SpO2:  [97 %-100 %] 98 %  BP: (135-196)/() 135/92     Weight: 113.4 kg (250 lb)  Body mass index is 42.91 kg/m².     Physical Exam  Vitals and nursing note reviewed.   Constitutional:       General: She is not in acute distress.     Appearance: Normal appearance. She is not ill-appearing.   HENT:      Head: Normocephalic and atraumatic.      Nose: Nose normal.      Mouth/Throat:      Mouth: Mucous membranes are moist.   Eyes:      Extraocular Movements: Extraocular movements intact.   Cardiovascular:      Rate and Rhythm: Normal rate.      Pulses: Normal pulses.      Heart sounds: No murmur heard.  Pulmonary:      Effort: Pulmonary effort is normal. No respiratory distress.   Abdominal:      General: Abdomen is flat.      Palpations: Abdomen is soft.      Tenderness: There is abdominal tenderness.   Musculoskeletal:       Right lower leg: No edema.      Left lower leg: No edema.   Skin:     General: Skin is warm.      Capillary Refill: Capillary refill takes less than 2 seconds.   Neurological:      General: No focal deficit present.      Mental Status: She is alert.   Psychiatric:         Mood and Affect: Mood normal.                Significant Labs: All pertinent labs within the past 24 hours have been reviewed.    Significant Imaging: I have reviewed all pertinent imaging results/findings within the past 24 hours.    Assessment/Plan:     * Small bowel obstruction  CT abdomen and pelvis showed cluster of dilated, fluid-filled loops of small bowel within the central mid abdomen with associated mild thickening, mild surrounding mesenteric fat stranding, and small volume of free fluid within the abdomen and pelvis (developing SBO versus nonspecific enteritis), nonspecific thickening of the distal thoracic esophagus.   Surgery consult   IVF   Symptomatic control       Morbid obesity  Body mass index is 42.91 kg/m². Morbid obesity complicates all aspects of disease management from diagnostic modalities to treatment. Weight loss encouraged and health benefits explained to patient.         Fibromyalgia  Resume home medications         Bipolar affective disorder  Resume home medications         Dyslipidemia  Resume home medications         Sjogren's syndrome with inflammatory arthritis  Resume home medications       Recurrent deep vein thrombosis (DVT) with hx of IVC filter; indefinite anticoagulation  Resume Eliquis.       Anxiety  Resume home medications         Essential hypertension  Chronic, controlled. Latest blood pressure and vitals reviewed-     Temp:  [98.9 °F (37.2 °C)]   Pulse:  [76-89]   Resp:  [16-24]   BP: (135-196)/()   SpO2:  [97 %-100 %] .   Home meds for hypertension were reviewed and noted below.   Hypertension Medications             valsartan-hydrochlorothiazide (DIOVAN-HCT) 160-25 mg per tablet Take 1  tablet by mouth once daily.          While in the hospital, will manage blood pressure as follows; Continue home antihypertensive regimen    Will utilize p.r.n. blood pressure medication only if patient's blood pressure greater than 180/110 and she develops symptoms such as worsening chest pain or shortness of breath.      VTE Risk Mitigation (From admission, onward)         Ordered     apixaban tablet 5 mg  2 times daily         11/09/23 0454     IP VTE HIGH RISK PATIENT  Once         11/09/23 0454     Place sequential compression device  Until discontinued         11/09/23 0454                               Jl Rodriguez MD  Department of Hospital Medicine  [facility] - Emergency Dept    N/A  Family History   Problem Relation Age of Onset    Diabetes Mother     Cancer Mother     Hypertension Mother     No Known Problems Father     No Known Problems Sister     No Known Problems Brother     No Known Problems Maternal Aunt     Cirrhosis Maternal Uncle         ETOH    No Known Problems Paternal Aunt     No Known Problems Paternal Uncle     No Known Problems Maternal Grandmother     No Known Problems Maternal Grandfather     No Known Problems Paternal Grandmother     No Known Problems Paternal Grandfather     No Known Problems Other     Celiac disease Neg Hx     Colon cancer Neg Hx     Colon polyps Neg Hx     Crohn's disease Neg Hx     Esophageal cancer Neg Hx     Inflammatory bowel disease Neg Hx     Liver cancer Neg Hx     Liver disease Neg Hx     Rectal cancer Neg Hx     Stomach cancer Neg Hx     Ulcerative colitis Neg Hx      Pertinent information:

## 2023-11-09 NOTE — NURSING
[facility] [place] [facility] [place]      11/9/2023       Skin assessed on: Admit      [x] No Pressure Injuries Present    [x]Prevention Measures Documented    [] Yes LDA  for Pressure Injury Previously documented     [] Yes New Pressure Injury Discovered   [] LDA for New Pressure Injury Added      Attending RN:  Mei Nunez, RN     Second :  Zhanna ENGLE

## 2023-11-09 NOTE — HPI
This is a 48-year-old female with a past medical history of hypertension, hyperlipidemia, Sjogren syndrome, DVT (on Eliquis), bipolar, anxiety, morbid obesity who presents with abdominal pain.      Patient presents with abdominal pain that started a day prior to presentation.  She reports developing an intermittent abdominal pain that progressively worsened.  She thought at first that she was having a fibromyalgia flare however her symptoms persisted and became more severe.  Additional symptoms include nausea, but without vomiting.  Her last bowel movement was 30 minutes prior to presentation.    In the ED, the patient was hypertensive.  Labs were largely unremarkable.  UDS was positive for marijuana.  CT abdomen and pelvis showed cluster of dilated, fluid-filled loops of small bowel within the central mid abdomen with associated mild thickening, mild surrounding mesenteric fat stranding, and small volume of free fluid within the abdomen and pelvis (developing SBO versus nonspecific enteritis), nonspecific thickening of the distal thoracic esophagus.  Patient was given 1 L of NS, Dilaudid 0.5 mg IV, droperidol 1.25 mg IV, Benadryl 25 mg IV.  She was admitted for further management.

## 2023-11-09 NOTE — SUBJECTIVE & OBJECTIVE
Past Medical History:   Diagnosis Date    AYDE (acute kidney injury) 11/1/2022    Alcohol abuse     stopped heavy drinking about 10 years ago; was drinking 3 glasses of vodka/tequilla,rum/whiskey per day    Allergy Don't remember    Its been some years.    Anxiety     Arthritis 2010    Blood clotting tendency 2008    After a procedure & 2020.    Congestive heart failure 8/11/2020    Depression     Hallucination     Hx of psychiatric care     Hypertension     Immune disorder 2020    Joint pain 2010    Keloid cicatrix Years ago    From childhood scars    Caro     unplanned trips, energy without sleep for 2 days (reading, cleaning), feelings that she can do multiple tasks at one time, feelings of overconfidence at times    Psychiatric problem     Sleep difficulties     Therapy     Withdrawal symptoms, drug or narcotic     racing heart, restlessness       Past Surgical History:   Procedure Laterality Date    ESOPHAGOGASTRODUODENOSCOPY N/A 06/03/2020    Procedure: EGD (ESOPHAGOGASTRODUODENOSCOPY);  Surgeon: Johan Grover MD;  Location: [facility] (4TH FLR);  Service: Endoscopy;  Laterality: N/A;  covid 6/2-[facility]    HYSTERECTOMY      LAPBAND  2009    PHLEBOGRAPHY Left 08/12/2020    Procedure: Venogram, pharmacomechanical thrombectomy;  Surgeon: Miguelangel Goldman MD;  Location: [facility] OR [address] [place] [address];  Service: Vascular;  Laterality: Left;  2336.43 mGy  [id number]  17.8 minutes  37 ml of contrast    VENOPLASTY Left 08/12/2020    Procedure: ANGIOPLASTY, VEIN;  Surgeon: Miguelangel Goldman MD;  Location: [facility] OR [address] [place] [address];  Service: Vascular;  Laterality: Left;       Review of patient's allergies indicates:   Allergen Reactions    Morphine Itching and Hallucinations    Pcn [penicillins] Other (See Comments)     Was told from childhood she couldn't take it    Sulfa (sulfonamide antibiotics) Nausea And Vomiting    Latex, natural rubber Rash       No current facility-administered medications on file prior to  encounter.     Current Outpatient Medications on File Prior to Encounter   Medication Sig    amitriptyline (ELAVIL) 50 MG tablet Take 1 tablet (50 mg total) by mouth every evening.    apixaban (ELIQUIS) 5 mg Tab Take 1 tablet (5 mg total) by mouth 2 (two) times daily.    busPIRone (BUSPAR) 15 MG tablet Take 1 tablet (15 mg total) by mouth 3 (three) times daily.    diclofenac sodium (VOLTAREN) 1 % Gel Apply the gel (4 g) to the affected area 4 times daily. Do not apply more than 16 g daily to any one affected joint    ergocalciferol (ERGOCALCIFEROL) 50,000 unit Cap Take 1 capsule (50,000 Units total) by mouth every 7 days.    FLUoxetine 40 MG capsule Take 2 capsules by mouth once daily    hydroxychloroquine (PLAQUENIL) 200 mg tablet Take 1 tablet (200 mg total) by mouth 2 (two) times daily.    hydrOXYzine pamoate (VISTARIL) 25 MG Cap Take 1 capsule (25 mg total) by mouth 2 (two) times daily as needed (panic attacks).    ipratropium (ATROVENT) 21 mcg (0.03 %) nasal spray INSTILL 2 SPRAY(S) IN EACH NOSTRIL THREE TIMES DAILY    oxyCODONE-acetaminophen (PERCOCET)  mg per tablet Take 1 tablet by mouth every 12 (twelve) hours as needed for Pain.    pregabalin (LYRICA) 150 MG capsule Take 1 capsule (150 mg total) by mouth 2 (two) times daily.    tiZANidine (ZANAFLEX) 4 MG tablet Take 2 tablets (8 mg total) by mouth 2 (two) times daily.    valsartan-hydrochlorothiazide (DIOVAN-HCT) 160-25 mg per tablet Take 1 tablet by mouth once daily.     Family History       Problem Relation (Age of Onset)    Cancer Mother    Cirrhosis Maternal Uncle    Diabetes Mother    Hypertension Mother    No Known Problems Father, Sister, Brother, Maternal Aunt, Paternal Aunt, Paternal Uncle, Maternal Grandmother, Maternal Grandfather, Paternal Grandmother, Paternal Grandfather, Other          Tobacco Use    Smoking status: Former     Current packs/day: 0.00     Types: Cigarettes    Smokeless tobacco: Never    Tobacco comments:     quit in  october 2016   Substance and Sexual Activity    Alcohol use: Yes     Alcohol/week: 1.0 - 3.0 standard drink of alcohol     Types: 1 - 3 Glasses of wine per week     Comment: social presently, excessive 10 years ago    Drug use: Never     Types: Marijuana     Comment: uses THCA weekly    Sexual activity: Yes     Partners: Male     Birth control/protection: Condom, See Surgical Hx     Review of Systems   Constitutional: Negative.    HENT: Negative.     Eyes: Negative.    Respiratory: Negative.     Cardiovascular: Negative.    Gastrointestinal:  Positive for abdominal pain and nausea.   Endocrine: Negative.    Genitourinary: Negative.    Musculoskeletal: Negative.    Skin: Negative.    Allergic/Immunologic: Negative.    Neurological: Negative.    Psychiatric/Behavioral: Negative.       Objective:     Vital Signs (Most Recent):  Temp: 98.9 °F (37.2 °C) (11/09/23 0124)  Pulse: 89 (11/09/23 0433)  Resp: 19 (11/09/23 0333)  BP: (!) 135/92 (11/09/23 0433)  SpO2: 98 % (11/09/23 0433) Vital Signs (24h Range):  Temp:  [98.9 °F (37.2 °C)] 98.9 °F (37.2 °C)  Pulse:  [76-89] 89  Resp:  [16-24] 19  SpO2:  [97 %-100 %] 98 %  BP: (135-196)/() 135/92     Weight: 113.4 kg (250 lb)  Body mass index is 42.91 kg/m².     Physical Exam  Vitals and nursing note reviewed.   Constitutional:       General: She is not in acute distress.     Appearance: Normal appearance. She is not ill-appearing.   HENT:      Head: Normocephalic and atraumatic.      Nose: Nose normal.      Mouth/Throat:      Mouth: Mucous membranes are moist.   Eyes:      Extraocular Movements: Extraocular movements intact.   Cardiovascular:      Rate and Rhythm: Normal rate.      Pulses: Normal pulses.      Heart sounds: No murmur heard.  Pulmonary:      Effort: Pulmonary effort is normal. No respiratory distress.   Abdominal:      General: Abdomen is flat.      Palpations: Abdomen is soft.      Tenderness: There is abdominal tenderness.   Musculoskeletal:      Right  lower leg: No edema.      Left lower leg: No edema.   Skin:     General: Skin is warm.      Capillary Refill: Capillary refill takes less than 2 seconds.   Neurological:      General: No focal deficit present.      Mental Status: She is alert.   Psychiatric:         Mood and Affect: Mood normal.                Significant Labs: All pertinent labs within the past 24 hours have been reviewed.    Significant Imaging: I have reviewed all pertinent imaging results/findings within the past 24 hours.

## 2023-11-09 NOTE — PLAN OF CARE
Problem: Nausea and Vomiting (Intestinal Obstruction)  Goal: Nausea and Vomiting Relief  Outcome: Ongoing, Progressing  Intervention: Prevent and Manage Nausea and Vomiting  Flowsheets (Taken 11/9/2023 1721)  Nausea/Vomiting Interventions: nausea triggers minimized     Problem: Infection (Intestinal Obstruction)  Goal: Absence of Infection Signs and Symptoms  Outcome: Ongoing, Progressing     Problem: Fluid Deficit (Intestinal Obstruction)  Goal: Fluid Balance  Outcome: Ongoing, Progressing  Intervention: Monitor and Manage Hypovolemia  Flowsheets (Taken 11/9/2023 1721)  Fluid/Electrolyte Management:   fluids provided   fluids adjusted   intravenous fluids adjusted   intravenous fluid replacement initiated

## 2023-11-09 NOTE — SUBJECTIVE & OBJECTIVE
No current facility-administered medications on file prior to encounter.     Current Outpatient Medications on File Prior to Encounter   Medication Sig    amitriptyline (ELAVIL) 50 MG tablet Take 1 tablet (50 mg total) by mouth every evening.    apixaban (ELIQUIS) 5 mg Tab Take 1 tablet (5 mg total) by mouth 2 (two) times daily.    busPIRone (BUSPAR) 15 MG tablet Take 1 tablet (15 mg total) by mouth 3 (three) times daily.    diclofenac sodium (VOLTAREN) 1 % Gel Apply the gel (4 g) to the affected area 4 times daily. Do not apply more than 16 g daily to any one affected joint    ergocalciferol (ERGOCALCIFEROL) 50,000 unit Cap Take 1 capsule (50,000 Units total) by mouth every 7 days.    FLUoxetine 40 MG capsule Take 2 capsules by mouth once daily    hydroxychloroquine (PLAQUENIL) 200 mg tablet Take 1 tablet (200 mg total) by mouth 2 (two) times daily.    hydrOXYzine pamoate (VISTARIL) 25 MG Cap Take 1 capsule (25 mg total) by mouth 2 (two) times daily as needed (panic attacks).    ipratropium (ATROVENT) 21 mcg (0.03 %) nasal spray INSTILL 2 SPRAY(S) IN EACH NOSTRIL THREE TIMES DAILY    oxyCODONE-acetaminophen (PERCOCET)  mg per tablet Take 1 tablet by mouth every 12 (twelve) hours as needed for Pain.    pregabalin (LYRICA) 150 MG capsule Take 1 capsule (150 mg total) by mouth 2 (two) times daily.    tiZANidine (ZANAFLEX) 4 MG tablet Take 2 tablets (8 mg total) by mouth 2 (two) times daily.    valsartan-hydrochlorothiazide (DIOVAN-HCT) 160-25 mg per tablet Take 1 tablet by mouth once daily.       Review of patient's allergies indicates:   Allergen Reactions    Morphine Itching and Hallucinations    Pcn [penicillins] Other (See Comments)     Was told from childhood she couldn't take it    Sulfa (sulfonamide antibiotics) Nausea And Vomiting    Latex, natural rubber Rash       Past Medical History:   Diagnosis Date    AYDE (acute kidney injury) 11/1/2022    Alcohol abuse     stopped heavy drinking about 10 years  ago; was drinking 3 glasses of vodka/tequilla,rum/whiskey per day    Allergy Don't remember    Its been some years.    Anxiety     Arthritis 2010    Blood clotting tendency 2008    After a procedure & 2020.    Congestive heart failure 8/11/2020    Depression     Hallucination     Hx of psychiatric care     Hypertension     Immune disorder 2020    Joint pain 2010    Keloid cicatrix Years ago    From childhood scars    Caro     unplanned trips, energy without sleep for 2 days (reading, cleaning), feelings that she can do multiple tasks at one time, feelings of overconfidence at times    Psychiatric problem     Sleep difficulties     Therapy     Withdrawal symptoms, drug or narcotic     racing heart, restlessness     Past Surgical History:   Procedure Laterality Date    ESOPHAGOGASTRODUODENOSCOPY N/A 06/03/2020    Procedure: EGD (ESOPHAGOGASTRODUODENOSCOPY);  Surgeon: Johan Grover MD;  Location: [facility] (4TH FLR);  Service: Endoscopy;  Laterality: N/A;  covid 6/2-[employer]-tb    HYSTERECTOMY      LAPBAND  2009    PHLEBOGRAPHY Left 08/12/2020    Procedure: Venogram, pharmacomechanical thrombectomy;  Surgeon: Miguelangel Goldman MD;  Location: [facility] OR [address] [place] [address];  Service: Vascular;  Laterality: Left;  2336.43 mGy  494.60romg3  17.8 minutes  37 ml of contrast    VENOPLASTY Left 08/12/2020    Procedure: ANGIOPLASTY, VEIN;  Surgeon: Miguelangel Goldman MD;  Location: [facility] OR [address] [place] [address];  Service: Vascular;  Laterality: Left;     Family History       Problem Relation (Age of Onset)    Cancer Mother    Cirrhosis Maternal Uncle    Diabetes Mother    Hypertension Mother    No Known Problems Father, Sister, Brother, Maternal Aunt, Paternal Aunt, Paternal Uncle, Maternal Grandmother, Maternal Grandfather, Paternal Grandmother, Paternal Grandfather, Other          Tobacco Use    Smoking status: Former     Current packs/day: 0.00     Types: Cigarettes    Smokeless tobacco: Never    Tobacco comments:     quit in  october 2016   Substance and Sexual Activity    Alcohol use: Yes     Alcohol/week: 1.0 - 3.0 standard drink of alcohol     Types: 1 - 3 Glasses of wine per week     Comment: social presently, excessive 10 years ago    Drug use: Never     Types: Marijuana     Comment: uses THCA weekly    Sexual activity: Yes     Partners: Male     Birth control/protection: Condom, See Surgical Hx     Review of Systems   Constitutional:  Negative for chills and fever.   HENT:  Negative for trouble swallowing and voice change.    Eyes: Negative.    Respiratory:  Negative for chest tightness and shortness of breath.    Cardiovascular:  Negative for chest pain and palpitations.   Gastrointestinal:  Positive for abdominal distention, abdominal pain, nausea and vomiting.   Endocrine: Negative.    Genitourinary: Negative.    Musculoskeletal: Negative.    Neurological: Negative.    Hematological: Negative.    Psychiatric/Behavioral: Negative.       Objective:     Vital Signs (Most Recent):  Temp: 97.5 °F (36.4 °C) (11/09/23 0752)  Pulse: 74 (11/09/23 0752)  Resp: 18 (11/09/23 0752)  BP: 139/86 (11/09/23 0752)  SpO2: 99 % (11/09/23 0752) Vital Signs (24h Range):  Temp:  [97.5 °F (36.4 °C)-98.9 °F (37.2 °C)] 97.5 °F (36.4 °C)  Pulse:  [74-89] 74  Resp:  [16-24] 18  SpO2:  [97 %-100 %] 99 %  BP: (132-196)/() 139/86     Weight: 113.2 kg (249 lb 9 oz)  Body mass index is 42.84 kg/m².     Physical Exam  Vitals reviewed.   Constitutional:       General: She is not in acute distress.     Appearance: She is obese. She is not toxic-appearing.   HENT:      Head: Normocephalic and atraumatic.      Nose: Nose normal.      Mouth/Throat:      Mouth: Mucous membranes are moist.   Cardiovascular:      Rate and Rhythm: Normal rate.      Pulses: Normal pulses.   Pulmonary:      Effort: Pulmonary effort is normal. No respiratory distress.   Abdominal:      Palpations: Abdomen is soft.      Comments: Mildly tender to palpation in the mid epigastrium. No  guarding, no rebound, non peritonitic.    Skin:     General: Skin is warm.   Neurological:      General: No focal deficit present.      Mental Status: She is alert and oriented to person, place, and time.   Psychiatric:         Mood and Affect: Mood normal.         Behavior: Behavior normal.            I have reviewed all pertinent lab results within the past 24 hours.  CBC:   Recent Labs   Lab 11/09/23  0256   WBC 5.45   RBC 4.56   HGB 13.8   HCT 41.4      MCV 91   MCH 30.3   MCHC 33.3     CMP:   Recent Labs   Lab 11/09/23  0213   *   CALCIUM 9.1   ALBUMIN 3.8   PROT 7.8      K 3.8   CO2 19*      BUN 11   CREATININE 1.0   ALKPHOS 80   ALT 15   AST 24   BILITOT 0.5       Significant Diagnostics:  I have reviewed all pertinent imaging results/findings within the past 24 hours.      CT Abdomen Pelvis With IV Contrast  11/9/2023    Cluster of dilated, fluid-filled loops of small bowel within the central mid abdomen.  There is associated mild wall thickening as well as mild surrounding mesenteric fat stranding and small volume of free fluid within the abdomen and pelvis.  Differential considerations developing small bowel obstruction versus a nonspecific enteritis.  Clinical correlation advised. Nonspecific circumferential thickening of the distal thoracic esophagus and gastroesophageal junction.  Follow-up endoscopy is advised for further assessment. Gastric lap band in place with discontinuity of the catheter tubing, similar to prior examination.  Clinical correlation advised. Decreased attenuation of the hepatic parenchyma suggesting hepatic steatosis. Infrarenal IVC filter.

## 2023-11-09 NOTE — ASSESSMENT & PLAN NOTE
48F w/ hx of HTN, HLD, Sjogren disease, DVT (on eliquis), bipolar disorder, anxiety, and fibromyalgia who presents with 1 day history of intermittent abdominal pain associated with nausea and emesis. CT reading SBO vs enteritis. The patient is not clinically obstructed. She had a bowel movement just prior to presentation. Her presentation is more consistent with enteritis.     -- no acute surgical intervention indicated, patient is not clinically obstructed   -- if the patient continues to have nausea and vomiting can consider NGT placement and gastric decompression  -- remainder of care per primary team   -- please make surgery aware of any acute changes

## 2023-11-09 NOTE — CONSULTS
[facility]  General Surgery  Consult Note    Patient Name: Alberto Frye  MRN: [id number]  Code Status: Full Code  Admission Date: 11/9/2023  Hospital Length of Stay: 0 days  Attending Physician: Junior Cheek DO  Primary Care Provider: Ian Cespedes MD    Patient information was obtained from patient.     Inpatient consult to General Surgery  Consult performed by: Keturah Gilmore MD  Consult ordered by: Jl Rodriguez MD        Subjective:     Principal Problem: Small bowel obstruction    History of Present Illness: Alberto Frye is a 48 y.o. female with a hx of HTN, HLD, Sjogren disease, DVT (on eliquis), bipolar disorder, anxiety, and fibromyalgia who presents with 1 day history of intermittent abdominal pain. States that the pain is crampy and is associated with nausea and emesis. She states that she had a bowel movement 30 minutes before coming to the hospital. In the ED, she is afebrile and hypertensive. Her lab work is largely unremarkable. CT A/P calling a possible SBO vs enteritis. General surgery consulted for small bowel obstruction.           No current facility-administered medications on file prior to encounter.     Current Outpatient Medications on File Prior to Encounter   Medication Sig    amitriptyline (ELAVIL) 50 MG tablet Take 1 tablet (50 mg total) by mouth every evening.    apixaban (ELIQUIS) 5 mg Tab Take 1 tablet (5 mg total) by mouth 2 (two) times daily.    busPIRone (BUSPAR) 15 MG tablet Take 1 tablet (15 mg total) by mouth 3 (three) times daily.    diclofenac sodium (VOLTAREN) 1 % Gel Apply the gel (4 g) to the affected area 4 times daily. Do not apply more than 16 g daily to any one affected joint    ergocalciferol (ERGOCALCIFEROL) 50,000 unit Cap Take 1 capsule (50,000 Units total) by mouth every 7 days.    FLUoxetine 40 MG capsule Take 2 capsules by mouth once daily    hydroxychloroquine (PLAQUENIL) 200 mg tablet Take 1 tablet (200 mg total) by mouth 2 (two)  times daily.    hydrOXYzine pamoate (VISTARIL) 25 MG Cap Take 1 capsule (25 mg total) by mouth 2 (two) times daily as needed (panic attacks).    ipratropium (ATROVENT) 21 mcg (0.03 %) nasal spray INSTILL 2 SPRAY(S) IN EACH NOSTRIL THREE TIMES DAILY    oxyCODONE-acetaminophen (PERCOCET)  mg per tablet Take 1 tablet by mouth every 12 (twelve) hours as needed for Pain.    pregabalin (LYRICA) 150 MG capsule Take 1 capsule (150 mg total) by mouth 2 (two) times daily.    tiZANidine (ZANAFLEX) 4 MG tablet Take 2 tablets (8 mg total) by mouth 2 (two) times daily.    valsartan-hydrochlorothiazide (DIOVAN-HCT) 160-25 mg per tablet Take 1 tablet by mouth once daily.       Review of patient's allergies indicates:   Allergen Reactions    Morphine Itching and Hallucinations    Pcn [penicillins] Other (See Comments)     Was told from childhood she couldn't take it    Sulfa (sulfonamide antibiotics) Nausea And Vomiting    Latex, natural rubber Rash       Past Medical History:   Diagnosis Date    AYDE (acute kidney injury) 11/1/2022    Alcohol abuse     stopped heavy drinking about 10 years ago; was drinking 3 glasses of vodka/tequilla,rum/whiskey per day    Allergy Don't remember    Its been some years.    Anxiety     Arthritis 2010    Blood clotting tendency 2008    After a procedure & 2020.    Congestive heart failure 8/11/2020    Depression     Hallucination     Hx of psychiatric care     Hypertension     Immune disorder 2020    Joint pain 2010    Keloid cicatrix Years ago    From childhood scars    Caro     unplanned trips, energy without sleep for 2 days (reading, cleaning), feelings that she can do multiple tasks at one time, feelings of overconfidence at times    Psychiatric problem     Sleep difficulties     Therapy     Withdrawal symptoms, drug or narcotic     racing heart, restlessness     Past Surgical History:   Procedure Laterality Date    ESOPHAGOGASTRODUODENOSCOPY N/A 06/03/2020    Procedure: EGD  (ESOPHAGOGASTRODUODENOSCOPY);  Surgeon: Johan Grover MD;  Location: [facility] ENDO (4TH FLR);  Service: Endoscopy;  Laterality: N/A;  covid 6/2-[employer]-[facility]-tb    HYSTERECTOMY      LAPBAND  2009    PHLEBOGRAPHY Left 08/12/2020    Procedure: Venogram, pharmacomechanical thrombectomy;  Surgeon: Miguelangel Goldman MD;  Location: [facility] OR 2ND FLR;  Service: Vascular;  Laterality: Left;  2336.43 mGy  494.99sxdc3  17.8 minutes  37 ml of contrast    VENOPLASTY Left 08/12/2020    Procedure: ANGIOPLASTY, VEIN;  Surgeon: Miguelangel Goldman MD;  Location: [facility] OR 2ND FLR;  Service: Vascular;  Laterality: Left;     Family History       Problem Relation (Age of Onset)    Cancer Mother    Cirrhosis Maternal Uncle    Diabetes Mother    Hypertension Mother    No Known Problems Father, Sister, Brother, Maternal Aunt, Paternal Aunt, Paternal Uncle, Maternal Grandmother, Maternal Grandfather, Paternal Grandmother, Paternal Grandfather, Other          Tobacco Use    Smoking status: Former     Current packs/day: 0.00     Types: Cigarettes    Smokeless tobacco: Never    Tobacco comments:     quit in october 2016   Substance and Sexual Activity    Alcohol use: Yes     Alcohol/week: 1.0 - 3.0 standard drink of alcohol     Types: 1 - 3 Glasses of wine per week     Comment: social presently, excessive 10 years ago    Drug use: Never     Types: Marijuana     Comment: uses THCA weekly    Sexual activity: Yes     Partners: Male     Birth control/protection: Condom, See Surgical Hx     Review of Systems   Constitutional:  Negative for chills and fever.   HENT:  Negative for trouble swallowing and voice change.    Eyes: Negative.    Respiratory:  Negative for chest tightness and shortness of breath.    Cardiovascular:  Negative for chest pain and palpitations.   Gastrointestinal:  Positive for abdominal distention, abdominal pain, nausea and vomiting.   Endocrine: Negative.    Genitourinary: Negative.    Musculoskeletal: Negative.     Neurological: Negative.    Hematological: Negative.    Psychiatric/Behavioral: Negative.       Objective:     Vital Signs (Most Recent):  Temp: 97.5 °F (36.4 °C) (11/09/23 0752)  Pulse: 74 (11/09/23 0752)  Resp: 18 (11/09/23 0752)  BP: 139/86 (11/09/23 0752)  SpO2: 99 % (11/09/23 0752) Vital Signs (24h Range):  Temp:  [97.5 °F (36.4 °C)-98.9 °F (37.2 °C)] 97.5 °F (36.4 °C)  Pulse:  [74-89] 74  Resp:  [16-24] 18  SpO2:  [97 %-100 %] 99 %  BP: (132-196)/() 139/86     Weight: 113.2 kg (249 lb 9 oz)  Body mass index is 42.84 kg/m².     Physical Exam  Vitals reviewed.   Constitutional:       General: She is not in acute distress.     Appearance: She is obese. She is not toxic-appearing.   HENT:      Head: Normocephalic and atraumatic.      Nose: Nose normal.      Mouth/Throat:      Mouth: Mucous membranes are moist.   Cardiovascular:      Rate and Rhythm: Normal rate.      Pulses: Normal pulses.   Pulmonary:      Effort: Pulmonary effort is normal. No respiratory distress.   Abdominal:      Palpations: Abdomen is soft.      Comments: Mildly tender to palpation in the mid epigastrium. No guarding, no rebound, non peritonitic.    Skin:     General: Skin is warm.   Neurological:      General: No focal deficit present.      Mental Status: She is alert and oriented to person, place, and time.   Psychiatric:         Mood and Affect: Mood normal.         Behavior: Behavior normal.            I have reviewed all pertinent lab results within the past 24 hours.  CBC:   Recent Labs   Lab 11/09/23  0256   WBC 5.45   RBC 4.56   HGB 13.8   HCT 41.4      MCV 91   MCH 30.3   MCHC 33.3     CMP:   Recent Labs   Lab 11/09/23  0213   *   CALCIUM 9.1   ALBUMIN 3.8   PROT 7.8      K 3.8   CO2 19*      BUN 11   CREATININE 1.0   ALKPHOS 80   ALT 15   AST 24   BILITOT 0.5       Significant Diagnostics:  I have reviewed all pertinent imaging results/findings within the past 24 hours.      CT Abdomen Pelvis With  IV Contrast  11/9/2023    Cluster of dilated, fluid-filled loops of small bowel within the central mid abdomen.  There is associated mild wall thickening as well as mild surrounding mesenteric fat stranding and small volume of free fluid within the abdomen and pelvis.  Differential considerations developing small bowel obstruction versus a nonspecific enteritis.  Clinical correlation advised. Nonspecific circumferential thickening of the distal thoracic esophagus and gastroesophageal junction.  Follow-up endoscopy is advised for further assessment. Gastric lap band in place with discontinuity of the catheter tubing, similar to prior examination.  Clinical correlation advised. Decreased attenuation of the hepatic parenchyma suggesting hepatic steatosis. Infrarenal IVC filter.         Assessment/Plan:     Abdominal pain  48F w/ hx of HTN, HLD, Sjogren disease, DVT (on eliquis), bipolar disorder, anxiety, and fibromyalgia who presents with 1 day history of intermittent abdominal pain associated with nausea and emesis. CT reading SBO vs enteritis. The patient is not clinically obstructed. She had a bowel movement just prior to presentation. Her presentation is more consistent with enteritis.     -- no acute surgical intervention indicated, patient is not clinically obstructed   -- if the patient continues to have nausea and vomiting can consider NGT placement and gastric decompression  -- remainder of care per primary team   -- please make surgery aware of any acute changes       Keturah Gilmore MD  Pager: [phone]  General Surgery PGY-III  [facility]  May luck descend on your side and happiness surround you through each day.

## 2023-11-10 LAB
ANION GAP SERPL CALC-SCNC: 9 MMOL/L (ref 8–16)
BUN SERPL-MCNC: 9 MG/DL (ref 6–20)
CALCIUM SERPL-MCNC: 8.4 MG/DL (ref 8.7–10.5)
CHLORIDE SERPL-SCNC: 109 MMOL/L (ref 95–110)
CO2 SERPL-SCNC: 24 MMOL/L (ref 23–29)
CREAT SERPL-MCNC: 0.8 MG/DL (ref 0.5–1.4)
EST. GFR  (NO RACE VARIABLE): >60 ML/MIN/1.73 M^2
GLUCOSE SERPL-MCNC: 79 MG/DL (ref 70–110)
MAGNESIUM SERPL-MCNC: 2.1 MG/DL (ref 1.6–2.6)
PHOSPHATE SERPL-MCNC: 3.2 MG/DL (ref 2.7–4.5)
POTASSIUM SERPL-SCNC: 3.6 MMOL/L (ref 3.5–5.1)
SODIUM SERPL-SCNC: 142 MMOL/L (ref 136–145)

## 2023-11-10 PROCEDURE — 21400001 HC TELEMETRY ROOM

## 2023-11-10 PROCEDURE — 83735 ASSAY OF MAGNESIUM: CPT | Performed by: STUDENT IN AN ORGANIZED HEALTH CARE EDUCATION/TRAINING PROGRAM

## 2023-11-10 PROCEDURE — 36415 COLL VENOUS BLD VENIPUNCTURE: CPT | Performed by: STUDENT IN AN ORGANIZED HEALTH CARE EDUCATION/TRAINING PROGRAM

## 2023-11-10 PROCEDURE — 99900035 HC TECH TIME PER 15 MIN (STAT)

## 2023-11-10 PROCEDURE — 25000003 PHARM REV CODE 250: Performed by: STUDENT IN AN ORGANIZED HEALTH CARE EDUCATION/TRAINING PROGRAM

## 2023-11-10 PROCEDURE — 94761 N-INVAS EAR/PLS OXIMETRY MLT: CPT

## 2023-11-10 PROCEDURE — 99232 PR SUBSEQUENT HOSPITAL CARE,LEVL II: ICD-10-PCS | Mod: ,,, | Performed by: SURGERY

## 2023-11-10 PROCEDURE — 84100 ASSAY OF PHOSPHORUS: CPT | Performed by: STUDENT IN AN ORGANIZED HEALTH CARE EDUCATION/TRAINING PROGRAM

## 2023-11-10 PROCEDURE — 99232 SBSQ HOSP IP/OBS MODERATE 35: CPT | Mod: ,,, | Performed by: SURGERY

## 2023-11-10 PROCEDURE — 63600175 PHARM REV CODE 636 W HCPCS: Performed by: STUDENT IN AN ORGANIZED HEALTH CARE EDUCATION/TRAINING PROGRAM

## 2023-11-10 PROCEDURE — 80048 BASIC METABOLIC PNL TOTAL CA: CPT | Performed by: STUDENT IN AN ORGANIZED HEALTH CARE EDUCATION/TRAINING PROGRAM

## 2023-11-10 PROCEDURE — 25500020 PHARM REV CODE 255: Performed by: STUDENT IN AN ORGANIZED HEALTH CARE EDUCATION/TRAINING PROGRAM

## 2023-11-10 RX ORDER — KETOROLAC TROMETHAMINE 30 MG/ML
15 INJECTION, SOLUTION INTRAMUSCULAR; INTRAVENOUS ONCE
Status: DISCONTINUED | OUTPATIENT
Start: 2023-11-10 | End: 2023-11-10

## 2023-11-10 RX ORDER — KETOROLAC TROMETHAMINE 30 MG/ML
15 INJECTION, SOLUTION INTRAMUSCULAR; INTRAVENOUS ONCE AS NEEDED
Status: COMPLETED | OUTPATIENT
Start: 2023-11-10 | End: 2023-11-10

## 2023-11-10 RX ORDER — SODIUM CHLORIDE 9 MG/ML
INJECTION, SOLUTION INTRAVENOUS CONTINUOUS
Status: DISCONTINUED | OUTPATIENT
Start: 2023-11-10 | End: 2023-11-11 | Stop reason: HOSPADM

## 2023-11-10 RX ADMIN — HYDROXYCHLOROQUINE SULFATE 200 MG: 200 TABLET, FILM COATED ORAL at 08:11

## 2023-11-10 RX ADMIN — KETOROLAC TROMETHAMINE 15 MG: 30 INJECTION, SOLUTION INTRAMUSCULAR; INTRAVENOUS at 09:11

## 2023-11-10 RX ADMIN — HYDROXYCHLOROQUINE SULFATE 200 MG: 200 TABLET, FILM COATED ORAL at 09:11

## 2023-11-10 RX ADMIN — FLUOXETINE 80 MG: 20 CAPSULE ORAL at 08:11

## 2023-11-10 RX ADMIN — ENOXAPARIN SODIUM 120 MG: 120 INJECTION SUBCUTANEOUS at 08:11

## 2023-11-10 RX ADMIN — DIATRIZOATE MEGLUMINE AND DIATRIZOATE SODIUM 120 ML: 660; 100 LIQUID ORAL; RECTAL at 06:11

## 2023-11-10 RX ADMIN — ENOXAPARIN SODIUM 120 MG: 120 INJECTION SUBCUTANEOUS at 09:11

## 2023-11-10 RX ADMIN — BUSPIRONE HYDROCHLORIDE 15 MG: 10 TABLET ORAL at 08:11

## 2023-11-10 RX ADMIN — PREGABALIN 150 MG: 50 CAPSULE ORAL at 09:11

## 2023-11-10 RX ADMIN — BUSPIRONE HYDROCHLORIDE 15 MG: 10 TABLET ORAL at 09:11

## 2023-11-10 RX ADMIN — KETOROLAC TROMETHAMINE 15 MG: 30 INJECTION, SOLUTION INTRAMUSCULAR; INTRAVENOUS at 10:11

## 2023-11-10 RX ADMIN — BUSPIRONE HYDROCHLORIDE 15 MG: 10 TABLET ORAL at 02:11

## 2023-11-10 RX ADMIN — LOSARTAN POTASSIUM AND HYDROCHLOROTHIAZIDE 1 TABLET: 12.5; 5 TABLET ORAL at 08:11

## 2023-11-10 RX ADMIN — PREGABALIN 150 MG: 50 CAPSULE ORAL at 08:11

## 2023-11-10 RX ADMIN — SODIUM CHLORIDE: 9 INJECTION, SOLUTION INTRAVENOUS at 08:11

## 2023-11-10 RX ADMIN — AMITRIPTYLINE HYDROCHLORIDE 50 MG: 25 TABLET, FILM COATED ORAL at 09:11

## 2023-11-10 NOTE — ASSESSMENT & PLAN NOTE
-Presented with worsening abdominal pain associated with nausea and vomiting.   -CT abdomen and pelvis showed cluster of dilated, fluid-filled loops of small bowel within the central mid abdomen with associated mild thickening, mild surrounding mesenteric fat stranding, and small volume of free fluid within the abdomen and pelvis (developing SBO versus nonspecific enteritis), nonspecific thickening of the distal thoracic esophagus.   -Surgery consulted: plan for gastrografin challenge on 11/10/23  -IVF   -Symptomatic control

## 2023-11-10 NOTE — ASSESSMENT & PLAN NOTE
Body mass index is 43.37 kg/m². Morbid obesity complicates all aspects of disease management from diagnostic modalities to treatment. Weight loss encouraged and health benefits explained to patient.

## 2023-11-10 NOTE — SUBJECTIVE & OBJECTIVE
Interval History:  No acute overnight events.  Patient remained afebrile.  However, continues to have worsening abdominal pain this morning.  Has some nausea but no vomiting.  Still with no bowel movement.  States she does not feel well.    Review of Systems   Constitutional:  Positive for activity change and fatigue. Negative for fever.   Respiratory:  Negative for cough and shortness of breath.    Cardiovascular:  Negative for chest pain.   Gastrointestinal:  Positive for abdominal distention, abdominal pain and nausea. Negative for blood in stool, diarrhea and vomiting.   Genitourinary:  Negative for difficulty urinating and dysuria.   Musculoskeletal:  Negative for joint swelling.   Neurological:  Negative for weakness.     Objective:     Vital Signs (Most Recent):  Temp: 98.7 °F (37.1 °C) (11/10/23 1056)  Pulse: 82 (11/10/23 1056)  Resp: 18 (11/10/23 1056)  BP: (!) 146/86 (11/10/23 1056)  SpO2: 98 % (11/10/23 1056) Vital Signs (24h Range):  Temp:  [97.6 °F (36.4 °C)-98.7 °F (37.1 °C)] 98.7 °F (37.1 °C)  Pulse:  [77-86] 82  Resp:  [16-18] 18  SpO2:  [94 %-98 %] 98 %  BP: (123-147)/(74-90) 146/86     Weight: 114.6 kg (252 lb 10.4 oz)  Body mass index is 43.37 kg/m².  No intake or output data in the 24 hours ending 11/10/23 1209      Physical Exam  Vitals and nursing note reviewed.   Constitutional:       General: She is not in acute distress.     Appearance: She is morbidly obese.   HENT:      Head: Atraumatic.      Mouth/Throat:      Mouth: Mucous membranes are dry.   Eyes:      Extraocular Movements: Extraocular movements intact.   Cardiovascular:      Rate and Rhythm: Normal rate.   Pulmonary:      Effort: Pulmonary effort is normal. No respiratory distress.      Breath sounds: No wheezing.   Abdominal:      General: Abdomen is flat. There is no distension.      Palpations: Abdomen is soft.      Tenderness: There is abdominal tenderness. There is guarding.   Musculoskeletal:         General: Normal range of  motion.      Cervical back: Normal range of motion.   Skin:     General: Skin is warm and dry.   Neurological:      Mental Status: She is alert and oriented to person, place, and time.   Psychiatric:         Mood and Affect: Affect is flat.         Thought Content: Thought content normal.      Comments: Patient with flat affect             Significant Labs: All pertinent labs within the past 24 hours have been reviewed.    Significant Imaging: I have reviewed all pertinent imaging results/findings within the past 24 hours.

## 2023-11-10 NOTE — HOSPITAL COURSE
48 y.o. female with a hx of HTN, HLD, Sjogren disease, DVT (on eliquis), bipolar disorder, anxiety, and fibromyalgia admitted on 11/09/2023 for further evaluation of abdominal pain.  Lab work is grossly unremarkable. CT A/P calling a possible SBO vs enteritis. General surgery consulted - initially no plan for surgical intervention. Pt with worsening abdominal pain. Surgery performed gastrofraffin challenge-4 hour scan showing contrast in the colon and even into the rectum. She also had a bowel movement 11/10/2023. Bowel obstruction resolved. Patient tolerated diet.    Patient admits feeling significantly better.  Still with mild abdominal pain, but not as tender.  No longer having any nausea vomiting.  Tolerated regular diet without any difficulty. Pain well controlled. Pt denies any fever, headaches, vision changes, chest pain, shortness of breath, palpitations, nausea, vomiting, or any new weaknesses. Feels ready to go home. Patient's exam on discharge was as follow: Patient is alert and oriented, appears in no acute distress, heart with regular rate and rhythm, lungs clear to asculation with non-labored breathing, abdomen soft, and no new weaknesses or focal deficits seen. Bilateral lower extremities without any edema or calf tenderness.     Patient was counseled regarding any abnormal labs, differential diagnosis, treatment options, risk-benefit, lifestyle changes, prognosis, current condition, and medications. Patient was interactive and attentive.  Patient's questions were answered in a respectful and timely manner. Patient was instructed to follow-up with PCP within 1 week and to continue taking medications as prescribed.   Also, extensively discussed the risks, benefits, and side effects of patient's medications. Discussed with patient about any medication changes. Patient verbalized understanding and agrees to treatment plan.  Patient is stable for discharge.  Patient has no other questions or concerns at  this time.  ED precautions discussed with the patient.    Vital signs are stable. Ambulating without any difficulty. Tolerating p.o. intake without any nausea or vomiting. Afebrile for over 24 hours. Patient is in stable condition and has no questions or concerns. Patient will be discharge to home once transportation secured . Prescriptions sent to pharmacy.  CM/SW to assist with discharge planning.     Vitals:    11/11/23 0624 11/11/23 0757 11/11/23 1111 11/11/23 1300   BP: (!) 142/76 (!) 159/92 (!) 160/89 (!) 144/84   BP Location: Right arm Right arm Right arm    Patient Position: Lying Sitting Lying    Pulse: 86 84 77    Resp: 20 17 18    Temp: 98.1 °F (36.7 °C) 98.2 °F (36.8 °C) 98.2 °F (36.8 °C)    TempSrc: Oral Oral Oral    SpO2: 99% 100% 100%    Weight:       Height:

## 2023-11-10 NOTE — NURSING
Pt reports abd mildly tender to touch, reports some relief and that she feels better after having 1 loose bm. MD alerted.

## 2023-11-10 NOTE — PROGRESS NOTES
[facility] Medicine  Progress Note    Patient Name: Alberto Frye  MRN: [id number]  Patient Class: IP- Inpatient   Admission Date: 11/9/2023  Length of Stay: 1 days  Attending Physician: Junior Cheek DO  Primary Care Provider: Ian Cespedes MD        Subjective:     Principal Problem:Small bowel obstruction        HPI:  This is a 48-year-old female with a past medical history of hypertension, hyperlipidemia, Sjogren syndrome, DVT (on Eliquis), bipolar, anxiety, morbid obesity who presents with abdominal pain.      Patient presents with abdominal pain that started a day prior to presentation.  She reports developing an intermittent abdominal pain that progressively worsened.  She thought at first that she was having a fibromyalgia flare however her symptoms persisted and became more severe.  Additional symptoms include nausea, but without vomiting.  Her last bowel movement was 30 minutes prior to presentation.    In the ED, the patient was hypertensive.  Labs were largely unremarkable.  UDS was positive for marijuana.  CT abdomen and pelvis showed cluster of dilated, fluid-filled loops of small bowel within the central mid abdomen with associated mild thickening, mild surrounding mesenteric fat stranding, and small volume of free fluid within the abdomen and pelvis (developing SBO versus nonspecific enteritis), nonspecific thickening of the distal thoracic esophagus.  Patient was given 1 L of NS, Dilaudid 0.5 mg IV, droperidol 1.25 mg IV, Benadryl 25 mg IV.  She was admitted for further management.      Overview/Hospital Course:  48 y.o. female with a hx of HTN, HLD, Sjogren disease, DVT (on eliquis), bipolar disorder, anxiety, and fibromyalgia admitted on 11/09/2023 for further evaluation of abdominal pain.  Lab work is grossly unremarkable. CT A/P calling a possible SBO vs enteritis. General surgery consulted - initially no plan for surgical intervention. Pt with worsening  abdominal pain. Surgery will plan for gastrofraffin challenge. Will continue to monitor. Continue IVF.      Interval History:  No acute overnight events.  Patient remained afebrile.  However, continues to have worsening abdominal pain this morning.  Has some nausea but no vomiting.  Still with no bowel movement.  States she does not feel well.    Review of Systems   Constitutional:  Positive for activity change and fatigue. Negative for fever.   Respiratory:  Negative for cough and shortness of breath.    Cardiovascular:  Negative for chest pain.   Gastrointestinal:  Positive for abdominal distention, abdominal pain and nausea. Negative for blood in stool, diarrhea and vomiting.   Genitourinary:  Negative for difficulty urinating and dysuria.   Musculoskeletal:  Negative for joint swelling.   Neurological:  Negative for weakness.     Objective:     Vital Signs (Most Recent):  Temp: 98.7 °F (37.1 °C) (11/10/23 1056)  Pulse: 82 (11/10/23 1056)  Resp: 18 (11/10/23 1056)  BP: (!) 146/86 (11/10/23 1056)  SpO2: 98 % (11/10/23 1056) Vital Signs (24h Range):  Temp:  [97.6 °F (36.4 °C)-98.7 °F (37.1 °C)] 98.7 °F (37.1 °C)  Pulse:  [77-86] 82  Resp:  [16-18] 18  SpO2:  [94 %-98 %] 98 %  BP: (123-147)/(74-90) 146/86     Weight: 114.6 kg (252 lb 10.4 oz)  Body mass index is 43.37 kg/m².  No intake or output data in the 24 hours ending 11/10/23 1209      Physical Exam  Vitals and nursing note reviewed.   Constitutional:       General: She is not in acute distress.     Appearance: She is morbidly obese.   HENT:      Head: Atraumatic.      Mouth/Throat:      Mouth: Mucous membranes are dry.   Eyes:      Extraocular Movements: Extraocular movements intact.   Cardiovascular:      Rate and Rhythm: Normal rate.   Pulmonary:      Effort: Pulmonary effort is normal. No respiratory distress.      Breath sounds: No wheezing.   Abdominal:      General: Abdomen is flat. There is no distension.      Palpations: Abdomen is soft.       Tenderness: There is abdominal tenderness. There is guarding.   Musculoskeletal:         General: Normal range of motion.      Cervical back: Normal range of motion.   Skin:     General: Skin is warm and dry.   Neurological:      Mental Status: She is alert and oriented to person, place, and time.   Psychiatric:         Mood and Affect: Affect is flat.         Thought Content: Thought content normal.      Comments: Patient with flat affect             Significant Labs: All pertinent labs within the past 24 hours have been reviewed.    Significant Imaging: I have reviewed all pertinent imaging results/findings within the past 24 hours.      Assessment/Plan:      * Small bowel obstruction  -Presented with worsening abdominal pain associated with nausea and vomiting.   -CT abdomen and pelvis showed cluster of dilated, fluid-filled loops of small bowel within the central mid abdomen with associated mild thickening, mild surrounding mesenteric fat stranding, and small volume of free fluid within the abdomen and pelvis (developing SBO versus nonspecific enteritis), nonspecific thickening of the distal thoracic esophagus.   -Surgery consulted: plan for gastrografin challenge on 11/10/23  -IVF   -Symptomatic control       Abdominal pain  -presented with abdominal pain associated with nausea and vomiting  -Suspect due to small bowel obstruction  -see plan as above for SBO  -Pain control       Essential hypertension  Chronic, controlled.   -continue home losartan-hydrochlorothiazide    Latest blood pressure and vitals reviewed-     Temp:  [97.6 °F (36.4 °C)-98.7 °F (37.1 °C)]   Pulse:  [77-86]   Resp:  [16-18]   BP: (123-147)/(74-90)   SpO2:  [94 %-98 %] .   Home meds for hypertension were reviewed and noted below.   Hypertension Medications             valsartan-hydrochlorothiazide (DIOVAN-HCT) 160-25 mg per tablet Take 1 tablet by mouth once daily.          While in the hospital, will manage blood pressure as follows;  Continue home antihypertensive regimen    Will utilize p.r.n. blood pressure medication only if patient's blood pressure greater than 180/110 and she develops symptoms such as worsening chest pain or shortness of breath.    Recurrent deep vein thrombosis (DVT) with hx of IVC filter; indefinite anticoagulation  -Hold home Eliquis at this time in case pt in need of surgical intervention  -Start lovenox 1mg/kg BID      Dyslipidemia  -pt not on statin outpatient  -defer to PCP  -follow up outpatient       Sjogren's syndrome with inflammatory arthritis  -Resume home medications:  Hydroxychloroquine 200 mg b.i.d.      Anxiety  Resume home medications:  Fluoxetine 80 mg daily, buspirone 15 mg 3 times daily        Bipolar affective disorder  Resume home medications:  Buspirone, fluoxetine        Fibromyalgia  Resume home medications:  Pregabalin 150 mg b.i.d., fluoxetine 80 mg daily      Morbid obesity  Body mass index is 43.37 kg/m². Morbid obesity complicates all aspects of disease management from diagnostic modalities to treatment. Weight loss encouraged and health benefits explained to patient.           VTE Risk Mitigation (From admission, onward)         Ordered     enoxaparin injection 120 mg  Every 12 hours         11/09/23 0703     IP VTE HIGH RISK PATIENT  Once         11/09/23 0454     Place sequential compression device  Until discontinued         11/09/23 0454                Discharge Planning   MASON:      Code Status: Full Code   Is the patient medically ready for discharge?:     Reason for patient still in hospital (select all that apply): Patient trending condition, Treatment and Consult recommendations  Discharge Plan A: Home with family                  Tata-Henrietta Cheek DO  Department of Hospital Medicine   [facility]

## 2023-11-10 NOTE — ASSESSMENT & PLAN NOTE
-presented with abdominal pain associated with nausea and vomiting  -Suspect due to small bowel obstruction  -see plan as above for SBO  -Pain control

## 2023-11-10 NOTE — PLAN OF CARE
General Surgery Plan of Care:     Patient undergoing gastrografin challenge today. 4 hour scan showing contrast in the colon and even into the rectum. She also had a bowel movement today. General surgery will sign off as patient is free from obstruction. Please call with any questions or concerns.     Keturah Gilmore MD  Pager: [phone]  General Surgery PGY-III  [facility]

## 2023-11-10 NOTE — NURSING
[facility]  Nurses Note -- 4 Eyes      11/9/2023       Skin assessed on: Q Shift      [x] No Pressure Injuries Present    [x]Prevention Measures Documented    [] Yes LDA  for Pressure Injury Previously documented     [] Yes New Pressure Injury Discovered   [] LDA for New Pressure Injury Added      Attending RN:  Mei Nunez RN     Second RN:  Nikos Vergara

## 2023-11-10 NOTE — ASSESSMENT & PLAN NOTE
-Hold home Eliquis at this time in case pt in need of surgical intervention  -Start lovenox 1mg/kg BID

## 2023-11-10 NOTE — SUBJECTIVE & OBJECTIVE
Interval History: NAEON  Still not passing gas or having bowel movements  Feels bloated  No nausea  No emesis    Medications:  Continuous Infusions:   sodium chloride 0.9% 75 mL/hr at 11/10/23 0852     Scheduled Meds:   amitriptyline  50 mg Oral QHS    busPIRone  15 mg Oral TID    enoxparin  1 mg/kg Subcutaneous Q12H (treatment, non-standard time)    FLUoxetine  80 mg Oral Daily    hydroxychloroquine  200 mg Oral BID    losartan-hydrochlorothiazide 50-12.5 mg  1 tablet Oral Daily    polyethylene glycol  17 g Oral Daily    pregabalin  150 mg Oral BID     PRN Meds:acetaminophen, acetaminophen, albuterol-ipratropium, aluminum-magnesium hydroxide-simethicone, artificial tears, dextrose 10%, dextrose 10%, glucagon (human recombinant), glucose, glucose, hydrOXYzine pamoate, ketorolac, magnesium oxide, magnesium oxide, melatonin, naloxone, ondansetron, potassium bicarbonate, potassium bicarbonate, potassium bicarbonate, potassium, sodium phosphates, potassium, sodium phosphates, potassium, sodium phosphates, prochlorperazine, simethicone, sodium chloride 0.9%     Review of patient's allergies indicates:   Allergen Reactions    Morphine Itching and Hallucinations    Pcn [penicillins] Other (See Comments)     Was told from childhood she couldn't take it    Sulfa (sulfonamide antibiotics) Nausea And Vomiting    Latex, natural rubber Rash     Objective:     Vital Signs (Most Recent):  Temp: 98.4 °F (36.9 °C) (11/10/23 0742)  Pulse: 82 (11/10/23 0742)  Resp: 18 (11/10/23 0742)  BP: (!) 147/85 (11/10/23 0742)  SpO2: 98 % (11/10/23 0742) Vital Signs (24h Range):  Temp:  [97.6 °F (36.4 °C)-98.4 °F (36.9 °C)] 98.4 °F (36.9 °C)  Pulse:  [77-86] 82  Resp:  [16-18] 18  SpO2:  [94 %-98 %] 98 %  BP: (123-147)/(74-93) 147/85     Weight: 114.6 kg (252 lb 10.4 oz)  Body mass index is 43.37 kg/m².    Intake/Output - Last 3 Shifts         11/08 0700  11/09 0659 11/09 0700  11/10 0659 11/10 0700  11/11 0659    P.O.  360     Total  Intake(mL/kg)  360 (3.1)     Net  +360            Urine Occurrence  2 x              Physical Exam  Constitutional:       General: She is not in acute distress.     Appearance: Normal appearance.   HENT:      Head: Normocephalic and atraumatic.      Mouth/Throat:      Mouth: Mucous membranes are moist.   Eyes:      Pupils: Pupils are equal, round, and reactive to light.   Cardiovascular:      Rate and Rhythm: Normal rate.   Pulmonary:      Effort: Pulmonary effort is normal. No respiratory distress.   Abdominal:      General: Abdomen is flat. There is no distension.      Palpations: Abdomen is soft.      Tenderness: There is abdominal tenderness. There is guarding.   Musculoskeletal:         General: Normal range of motion.      Cervical back: Normal range of motion.   Skin:     General: Skin is warm and dry.   Neurological:      General: No focal deficit present.      Mental Status: She is alert and oriented to person, place, and time.   Psychiatric:         Mood and Affect: Mood normal.         Behavior: Behavior normal.          Significant Labs:  I have reviewed all pertinent lab results within the past 24 hours.    Significant Diagnostics:  I have reviewed all pertinent imaging results/findings within the past 24 hours.

## 2023-11-10 NOTE — ASSESSMENT & PLAN NOTE
Chronic, controlled.   -continue home losartan-hydrochlorothiazide    Latest blood pressure and vitals reviewed-     Temp:  [97.6 °F (36.4 °C)-98.7 °F (37.1 °C)]   Pulse:  [77-86]   Resp:  [16-18]   BP: (123-147)/(74-90)   SpO2:  [94 %-98 %] .   Home meds for hypertension were reviewed and noted below.   Hypertension Medications             valsartan-hydrochlorothiazide (DIOVAN-HCT) 160-25 mg per tablet Take 1 tablet by mouth once daily.          While in the hospital, will manage blood pressure as follows; Continue home antihypertensive regimen    Will utilize p.r.n. blood pressure medication only if patient's blood pressure greater than 180/110 and she develops symptoms such as worsening chest pain or shortness of breath.

## 2023-11-10 NOTE — NURSING
[facility] [place]es [place] -- 4 Eyes      11/10/2023       Skin assessed on: Q Shift      [] No Pressure Injuries Present    []Prevention Measures Documented    [] Yes LDA  for Pressure Injury Previously documented     [] Yes New Pressure Injury Discovered   [] LDA for New Pressure Injury Added      Attending RN:  Nikos Root, RN     Second RN:  Nikos

## 2023-11-10 NOTE — PROGRESS NOTES
[facility]  General Surgery  Progress Note    Subjective:     History of Present Illness:  Alberto Frye is a 48 y.o. female with a hx of HTN, HLD, Sjogren disease, DVT (on eliquis), bipolar disorder, anxiety, and fibromyalgia who presents with 1 day history of intermittent abdominal pain. States that the pain is crampy and is associated with nausea and emesis. She states that she had a bowel movement 30 minutes before coming to the hospital. In the ED, she is afebrile and hypertensive. Her lab work is largely unremarkable. CT A/P calling a possible SBO vs enteritis. General surgery consulted for small bowel obstruction.           Post-Op Info:  * No surgery found *         Interval History: NAEON  Still not passing gas or having bowel movements  Feels bloated  No nausea  No emesis    Medications:  Continuous Infusions:   sodium chloride 0.9% 75 mL/hr at 11/10/23 0852     Scheduled Meds:   amitriptyline  50 mg Oral QHS    busPIRone  15 mg Oral TID    enoxparin  1 mg/kg Subcutaneous Q12H (treatment, non-standard time)    FLUoxetine  80 mg Oral Daily    hydroxychloroquine  200 mg Oral BID    losartan-hydrochlorothiazide 50-12.5 mg  1 tablet Oral Daily    polyethylene glycol  17 g Oral Daily    pregabalin  150 mg Oral BID     PRN Meds:acetaminophen, acetaminophen, albuterol-ipratropium, aluminum-magnesium hydroxide-simethicone, artificial tears, dextrose 10%, dextrose 10%, glucagon (human recombinant), glucose, glucose, hydrOXYzine pamoate, ketorolac, magnesium oxide, magnesium oxide, melatonin, naloxone, ondansetron, potassium bicarbonate, potassium bicarbonate, potassium bicarbonate, potassium, sodium phosphates, potassium, sodium phosphates, potassium, sodium phosphates, prochlorperazine, simethicone, sodium chloride 0.9%     Review of patient's allergies indicates:   Allergen Reactions    Morphine Itching and Hallucinations    Pcn [penicillins] Other (See Comments)     Was told from  childhood she couldn't take it    Sulfa (sulfonamide antibiotics) Nausea And Vomiting    Latex, natural rubber Rash     Objective:     Vital Signs (Most Recent):  Temp: 98.4 °F (36.9 °C) (11/10/23 0742)  Pulse: 82 (11/10/23 0742)  Resp: 18 (11/10/23 0742)  BP: (!) 147/85 (11/10/23 0742)  SpO2: 98 % (11/10/23 0742) Vital Signs (24h Range):  Temp:  [97.6 °F (36.4 °C)-98.4 °F (36.9 °C)] 98.4 °F (36.9 °C)  Pulse:  [77-86] 82  Resp:  [16-18] 18  SpO2:  [94 %-98 %] 98 %  BP: (123-147)/(74-93) 147/85     Weight: 114.6 kg (252 lb 10.4 oz)  Body mass index is 43.37 kg/m².    Intake/Output - Last 3 Shifts         11/08 0700  11/09 0659 11/09 0700  11/10 0659 11/10 0700  11/11 0659    P.O.  360     Total Intake(mL/kg)  360 (3.1)     Net  +360            Urine Occurrence  2 x              Physical Exam  Constitutional:       General: She is not in acute distress.     Appearance: Normal appearance.   HENT:      Head: Normocephalic and atraumatic.      Mouth/Throat:      Mouth: Mucous membranes are moist.   Eyes:      Pupils: Pupils are equal, round, and reactive to light.   Cardiovascular:      Rate and Rhythm: Normal rate.   Pulmonary:      Effort: Pulmonary effort is normal. No respiratory distress.   Abdominal:      General: Abdomen is flat. There is no distension.      Palpations: Abdomen is soft.      Tenderness: There is abdominal tenderness. There is guarding.   Musculoskeletal:         General: Normal range of motion.      Cervical back: Normal range of motion.   Skin:     General: Skin is warm and dry.   Neurological:      General: No focal deficit present.      Mental Status: She is alert and oriented to person, place, and time.   Psychiatric:         Mood and Affect: Mood normal.         Behavior: Behavior normal.          Significant Labs:  I have reviewed all pertinent lab results within the past 24 hours.    Significant Diagnostics:  I have reviewed all pertinent imaging results/findings within the past 24  hours.    Assessment/Plan:     Abdominal pain  48F w/ hx of HTN, HLD, Sjogren disease, DVT (on eliquis), bipolar disorder, anxiety, and fibromyalgia who presents with 1 day history of intermittent abdominal pain associated with nausea and emesis. CT reading SBO vs enteritis. The patient is not clinically obstructed. She had a bowel movement just prior to presentation.     Differential to include possible SBO vs enteritis.    If not nauseous and able to tolerate, OK for PO gastrograffin challenge today. If unable, may consider NGT placement for decompression and GGC following this.     -- remainder of care per primary team   -- please make surgery aware of any acute changes           Becca Reid MD  General Surgery  [facility] [place] [facility] Telemetry

## 2023-11-10 NOTE — PLAN OF CARE
Problem: Fluid Deficit (Intestinal Obstruction)  Goal: Fluid Balance  Outcome: Ongoing, Progressing  Intervention: Monitor and Manage Hypovolemia  Flowsheets (Taken 11/10/2023 1604)  Fluid/Electrolyte Management:   fluids adjusted   fluids provided   intravenous fluid replacement initiated     Problem: Nausea and Vomiting (Intestinal Obstruction)  Goal: Nausea and Vomiting Relief  Outcome: Met     Problem: Pain (Intestinal Obstruction)  Goal: Acceptable Pain Control  Outcome: Ongoing, Progressing  Intervention: Monitor and Manage Pain  Flowsheets (Taken 11/10/2023 1604)  Pain Management Interventions:   prescribed exercises encouraged   relaxation techniques promoted   position adjusted   medication offered

## 2023-11-10 NOTE — ASSESSMENT & PLAN NOTE
48F w/ hx of HTN, HLD, Sjogren disease, DVT (on eliquis), bipolar disorder, anxiety, and fibromyalgia who presents with 1 day history of intermittent abdominal pain associated with nausea and emesis. CT reading SBO vs enteritis. The patient is not clinically obstructed. She had a bowel movement just prior to presentation.     Differential to include possible SBO vs enteritis.    If not nauseous and able to tolerate, OK for PO gastrograffin challenge today. If unable, may consider NGT placement for decompression and GGC following this.     -- remainder of care per primary team   -- please make surgery aware of any acute changes

## 2023-11-11 VITALS
RESPIRATION RATE: 18 BRPM | SYSTOLIC BLOOD PRESSURE: 144 MMHG | BODY MASS INDEX: 43.13 KG/M2 | OXYGEN SATURATION: 100 % | TEMPERATURE: 98 F | WEIGHT: 252.63 LBS | DIASTOLIC BLOOD PRESSURE: 84 MMHG | HEART RATE: 77 BPM | HEIGHT: 64 IN

## 2023-11-11 PROCEDURE — 25000003 PHARM REV CODE 250: Performed by: STUDENT IN AN ORGANIZED HEALTH CARE EDUCATION/TRAINING PROGRAM

## 2023-11-11 RX ORDER — DOCUSATE SODIUM 100 MG/1
100 CAPSULE, LIQUID FILLED ORAL 2 TIMES DAILY
Qty: 10 CAPSULE | Refills: 0 | Status: SHIPPED | OUTPATIENT
Start: 2023-11-11 | End: 2023-11-28 | Stop reason: SDUPTHER

## 2023-11-11 RX ORDER — POLYETHYLENE GLYCOL 3350 17 G/17G
17 POWDER, FOR SOLUTION ORAL DAILY
Qty: 10 EACH | Refills: 0 | Status: SHIPPED | OUTPATIENT
Start: 2023-11-12 | End: 2023-11-28 | Stop reason: SDUPTHER

## 2023-11-11 RX ADMIN — POLYETHYLENE GLYCOL 3350 17 G: 17 POWDER, FOR SOLUTION ORAL at 09:11

## 2023-11-11 RX ADMIN — HYDROXYCHLOROQUINE SULFATE 200 MG: 200 TABLET, FILM COATED ORAL at 09:11

## 2023-11-11 RX ADMIN — LOSARTAN POTASSIUM AND HYDROCHLOROTHIAZIDE 1 TABLET: 12.5; 5 TABLET ORAL at 09:11

## 2023-11-11 RX ADMIN — FLUOXETINE 80 MG: 20 CAPSULE ORAL at 09:11

## 2023-11-11 RX ADMIN — PREGABALIN 150 MG: 50 CAPSULE ORAL at 09:11

## 2023-11-11 RX ADMIN — BUSPIRONE HYDROCHLORIDE 15 MG: 10 TABLET ORAL at 09:11

## 2023-11-11 RX ADMIN — ACETAMINOPHEN 650 MG: 325 TABLET ORAL at 06:11

## 2023-11-11 NOTE — NURSING
AVS virtually reviewed with patient in its entirety with emphasis on medications, follow-up appointments and reasons to return to the ED or contact the [facility]. Patient also encouraged to utilize their patient portal. Ease and convenience of use reiterated. Education complete and patient voiced understanding. All questions answered. Discharge teaching complete.     Cyclophosphamide Pregnancy And Lactation Text: This medication is Pregnancy Category D and it isn't considered safe during pregnancy. This medication is excreted in breast milk.

## 2023-11-11 NOTE — PLAN OF CARE
[place] - Telemetry  Discharge Final Note    Primary Care Provider: Ian Cespedes MD    Expected Discharge Date: 11/11/2023    Final Discharge Note (most recent)       Final Note - 11/11/23 1031          Final Note    Assessment Type Final Discharge Note     Anticipated Discharge Disposition Home or Self Care     What phone number can be called within the next 1-3 days to see how you are doing after discharge? [phone]     Hospital Resources/Appts/Education Provided Appointments scheduled and added to AVS        Post-Acute Status    Coverage [employer]     Discharge Delays None known at this time                     Important Message from Medicare             Contact Info       Ian Cespedes MD   Specialty: Internal Medicine   Relationship: PCP - General    [facility] [place] [address]   Phone: [phone]       Next Steps: Follow up in 1 week(s)          PCP appointment added to AVS.    [facility] secure chat nurse Demetrius that patient cleared from case management standpoint.

## 2023-11-11 NOTE — NURSING
Discharge instructions given to patient  at bedside. Patient  verbalized an understanding and states a willingness to comply. Saline lock removed.

## 2023-11-11 NOTE — NURSING
[facility] [place]  Nurses Note -- 4 Eyes      11/11/2023       Skin assessed on: Q Shift      [x] No Pressure Injuries Present    []Prevention Measures Documented    [] Yes LDA  for Pressure Injury Previously documented     [] Yes New Pressure Injury Discovered   [] LDA for New Pressure Injury Added      Attending RN:  Demetrius Samson, RN     Second RN: Zhanna Jennings LPN

## 2023-11-11 NOTE — NURSING
[facility] [place] [place]  Nurses Note -- 4 Eyes      11/10/2023       Skin assessed on: Q Shift      [x] No Pressure Injuries Present    []Prevention Measures Documented    [] Yes LDA  for Pressure Injury Previously documented     [] Yes New Pressure Injury Discovered   [] LDA for New Pressure Injury Added      Attending RN:  Zhanna Jennings LPN     Second RN:  Nikos Root RN

## 2023-11-11 NOTE — DISCHARGE SUMMARY
[facility] Medicine  Discharge Summary      Patient Name: Alberto Frye  MRN: [id number]  [facility]: [id number]  Patient Class: IP- Inpatient  Admission Date: 11/9/2023  Hospital Length of Stay: 2 days  Discharge Date and Time: 11/11/2023  3:23 PM  Attending Physician: Elizabeth att. providers found   Discharging Provider: Junior Cheek DO  Primary Care Provider: Ian Cespedes MD    Primary Care Team: Networked reference to record PCT     HPI:   This is a 48-year-old female with a past medical history of hypertension, hyperlipidemia, Sjogren syndrome, DVT (on Eliquis), bipolar, anxiety, morbid obesity who presents with abdominal pain.      Patient presents with abdominal pain that started a day prior to presentation.  She reports developing an intermittent abdominal pain that progressively worsened.  She thought at first that she was having a fibromyalgia flare however her symptoms persisted and became more severe.  Additional symptoms include nausea, but without vomiting.  Her last bowel movement was 30 minutes prior to presentation.    In the ED, the patient was hypertensive.  Labs were largely unremarkable.  UDS was positive for marijuana.  CT abdomen and pelvis showed cluster of dilated, fluid-filled loops of small bowel within the central mid abdomen with associated mild thickening, mild surrounding mesenteric fat stranding, and small volume of free fluid within the abdomen and pelvis (developing SBO versus nonspecific enteritis), nonspecific thickening of the distal thoracic esophagus.  Patient was given 1 L of NS, Dilaudid 0.5 mg IV, droperidol 1.25 mg IV, Benadryl 25 mg IV.  She was admitted for further management.      * No surgery found *      Hospital Course:   48 y.o. female with a hx of HTN, HLD, Sjogren disease, DVT (on eliquis), bipolar disorder, anxiety, and fibromyalgia admitted on 11/09/2023 for further evaluation of abdominal pain.  Lab work is grossly unremarkable. CT A/P  calling a possible SBO vs enteritis. General surgery consulted - initially no plan for surgical intervention. Pt with worsening abdominal pain. Surgery performed gastrofraffin challenge-4 hour scan showing contrast in the colon and even into the rectum. She also had a bowel movement 11/10/2023. Bowel obstruction resolved. Patient tolerated diet.    Patient admits feeling significantly better.  Still with mild abdominal pain, but not as tender.  No longer having any nausea vomiting.  Tolerated regular diet without any difficulty. Pain well controlled. Pt denies any fever, headaches, vision changes, chest pain, shortness of breath, palpitations, nausea, vomiting, or any new weaknesses. Feels ready to go home. Patient's exam on discharge was as follow: Patient is alert and oriented, appears in no acute distress, heart with regular rate and rhythm, lungs clear to asculation with non-labored breathing, abdomen soft, and no new weaknesses or focal deficits seen. Bilateral lower extremities without any edema or calf tenderness.     Patient was counseled regarding any abnormal labs, differential diagnosis, treatment options, risk-benefit, lifestyle changes, prognosis, current condition, and medications. Patient was interactive and attentive.  Patient's questions were answered in a respectful and timely manner. Patient was instructed to follow-up with PCP within 1 week and to continue taking medications as prescribed.   Also, extensively discussed the risks, benefits, and side effects of patient's medications. Discussed with patient about any medication changes. Patient verbalized understanding and agrees to treatment plan.  Patient is stable for discharge.  Patient has no other questions or concerns at this time.  ED precautions discussed with the patient.    Vital signs are stable. Ambulating without any difficulty. Tolerating p.o. intake without any nausea or vomiting. Afebrile for over 24 hours. Patient is in stable  condition and has no questions or concerns. Patient will be discharge to home once transportation secured . Prescriptions sent to pharmacy.  CM/SW to assist with discharge planning.     Vitals:    11/11/23 0624 11/11/23 0757 11/11/23 1111 11/11/23 1300   BP: (!) 142/76 (!) 159/92 (!) 160/89 (!) 144/84   BP Location: Right arm Right arm Right arm    Patient Position: Lying Sitting Lying    Pulse: 86 84 77    Resp: 20 17 18    Temp: 98.1 °F (36.7 °C) 98.2 °F (36.8 °C) 98.2 °F (36.8 °C)    TempSrc: Oral Oral Oral    SpO2: 99% 100% 100%    Weight:       Height:                  Goals of Care Treatment Preferences:  Code Status: Full Code      Consults:   Consults (From admission, onward)        Status Ordering Provider     Inpatient consult to General Surgery  Once        Provider:  Malachi Turner MD Completed SALLAM, OMAR          No new Assessment & Plan notes have been filed under this hospital service since the last note was generated.  Service: Hospital Medicine    Final Active Diagnoses:    Diagnosis Date Noted POA    PRINCIPAL PROBLEM:  Small bowel obstruction [K56.609] 11/09/2023 Yes    Abdominal pain [R10.9] 06/03/2020 Yes    Essential hypertension [I10] 01/22/2016 Yes    Recurrent deep vein thrombosis (DVT) with hx of IVC filter; indefinite anticoagulation [I82.409] 09/21/2020 Yes    Dyslipidemia [E78.5] 11/17/2021 Yes    Sjogren's syndrome with inflammatory arthritis [M35.05] 10/13/2021 Yes    Anxiety [F41.9] 01/22/2016 Yes    Bipolar affective disorder [F31.9] 12/01/2021 Yes    Fibromyalgia [M79.7] 02/02/2023 Yes    Morbid obesity [E66.01] 11/09/2023 Yes      Problems Resolved During this Admission:       Discharged Condition: stable    Disposition: Home or Self Care    Follow Up:   Follow-up Information     Ian Cespedes MD Follow up in 1 week(s).    Specialty: Internal Medicine  Contact information:  [address] [place] [address]  [place] [address] [phone]                       Patient  Instructions:      Diet Cardiac     Notify your health care provider if you experience any of the following:  temperature >100.4     Notify your health care provider if you experience any of the following:  persistent nausea and vomiting or diarrhea     Notify your health care provider if you experience any of the following:  severe uncontrolled pain     Notify your health care provider if you experience any of the following:  increased confusion or weakness     Activity as tolerated       Significant Diagnostic Studies: Labs: All labs within the past 24 hours have been reviewed       Recent Results (from the past 100 hour(s))   Comp. Metabolic Panel    Collection Time: 11/09/23  2:13 AM   Result Value Ref Range    Sodium 141 136 - 145 mmol/L    Potassium 3.8 3.5 - 5.1 mmol/L    Chloride 109 95 - 110 mmol/L    CO2 19 (L) 23 - 29 mmol/L    Glucose 143 (H) 70 - 110 mg/dL    BUN 11 6 - 20 mg/dL    Creatinine 1.0 0.5 - 1.4 mg/dL    Calcium 9.1 8.7 - 10.5 mg/dL    Total Protein 7.8 6.0 - 8.4 g/dL    Albumin 3.8 3.5 - 5.2 g/dL    Total Bilirubin 0.5 0.1 - 1.0 mg/dL    Alkaline Phosphatase 80 55 - 135 U/L    AST 24 10 - 40 U/L    ALT 15 10 - 44 U/L    eGFR >60 >60 mL/min/1.73 m^2    Anion Gap 13 8 - 16 mmol/L   Lipase    Collection Time: 11/09/23  2:13 AM   Result Value Ref Range    Lipase 11 4 - 60 U/L   Troponin I    Collection Time: 11/09/23  2:13 AM   Result Value Ref Range    Troponin I 0.008 0.000 - 0.026 ng/mL   Magnesium    Collection Time: 11/09/23  2:13 AM   Result Value Ref Range    Magnesium 2.2 1.6 - 2.6 mg/dL   ISTAT PROCEDURE    Collection Time: 11/09/23  2:26 AM   Result Value Ref Range    POC Glucose 135 (H) 70 - 110 mg/dL    POC BUN 11 6 - 30 mg/dL    POC Creatinine 0.9 0.5 - 1.4 mg/dL    POC Sodium 142 136 - 145 mmol/L    POC Potassium 3.8 3.5 - 5.1 mmol/L    POC Chloride 104 95 - 110 mmol/L    POC TCO2 (MEASURED) 30 (H) 23 - 29 mmol/L    POC Anion Gap 13 8 - 16 mmol/L    POC Ionized Calcium 1.20 1.06 -  1.42 mmol/L    POC Hematocrit 43 36 - 54 %PCV    Sample VENOUS     Site Other     Allens Test N/A     DelSys Room Air    Urinalysis, Reflex to Urine Culture Urine, Clean Catch    Collection Time: 11/09/23  2:51 AM    Specimen: Urine   Result Value Ref Range    Specimen UA Urine, Clean Catch     Color, UA Colorless (A) Yellow, Straw, Shameka    Appearance, UA Clear Clear    pH, UA 8.0 5.0 - 8.0    Specific Gravity, UA 1.010 1.005 - 1.030    Protein, UA Negative Negative    Glucose, UA Negative Negative    Ketones, UA Negative Negative    Bilirubin (UA) Negative Negative    Occult Blood UA Negative Negative    Nitrite, UA Negative Negative    Urobilinogen, UA Negative <2.0 EU/dL    Leukocytes, UA Negative Negative   Drug screen panel, emergency    Collection Time: 11/09/23  2:51 AM   Result Value Ref Range    Benzodiazepines Negative Negative    Methadone metabolites Negative Negative    Cocaine (Metab.) Negative Negative    Opiate Scrn, Ur Negative Negative    Barbiturate Screen, Ur Negative Negative    Amphetamine Screen, Ur Negative Negative    THC Presumptive Positive (A) Negative    Phencyclidine Negative Negative    Creatinine, Urine 37.2 15.0 - 325.0 mg/dL    Toxicology Information SEE COMMENT    CBC auto differential    Collection Time: 11/09/23  2:56 AM   Result Value Ref Range    WBC 5.45 3.90 - 12.70 K/uL    RBC 4.56 4.00 - 5.40 M/uL    Hemoglobin 13.8 12.0 - 16.0 g/dL    Hematocrit 41.4 37.0 - 48.5 %    MCV 91 82 - 98 fL    MCH 30.3 27.0 - 31.0 pg    MCHC 33.3 32.0 - 36.0 g/dL    RDW 13.5 11.5 - 14.5 %    Platelets 165 150 - 450 K/uL    MPV 10.2 9.2 - 12.9 fL    Immature Granulocytes 0.4 0.0 - 0.5 %    Gran # (ANC) 3.6 1.8 - 7.7 K/uL    Immature Grans (Abs) 0.02 0.00 - 0.04 K/uL    Lymph # 1.4 1.0 - 4.8 K/uL    Mono # 0.3 0.3 - 1.0 K/uL    Eos # 0.1 0.0 - 0.5 K/uL    Baso # 0.02 0.00 - 0.20 K/uL    nRBC 0 0 /100 WBC    Gran % 66.7 38.0 - 73.0 %    Lymph % 25.5 18.0 - 48.0 %    Mono % 5.9 4.0 - 15.0 %     Eosinophil % 1.1 0.0 - 8.0 %    Basophil % 0.4 0.0 - 1.9 %    Differential Method Automated    Basic Metabolic Panel    Collection Time: 11/10/23  7:15 AM   Result Value Ref Range    Sodium 142 136 - 145 mmol/L    Potassium 3.6 3.5 - 5.1 mmol/L    Chloride 109 95 - 110 mmol/L    CO2 24 23 - 29 mmol/L    Glucose 79 70 - 110 mg/dL    BUN 9 6 - 20 mg/dL    Creatinine 0.8 0.5 - 1.4 mg/dL    Calcium 8.4 (L) 8.7 - 10.5 mg/dL    Anion Gap 9 8 - 16 mmol/L    eGFR >60 >60 mL/min/1.73 m^2   Magnesium    Collection Time: 11/10/23  7:15 AM   Result Value Ref Range    Magnesium 2.1 1.6 - 2.6 mg/dL   Phosphorus    Collection Time: 11/10/23  7:15 AM   Result Value Ref Range    Phosphorus 3.2 2.7 - 4.5 mg/dL       Microbiology Results (last 7 days)     ** No results found for the last 168 hours. **          Imaging Results           CT Abdomen Pelvis With IV Contrast (Final result)  Result time 11/09/23 03:43:21    Final result by Corky Dozier MD (11/09/23 03:43:21)                 Impression:      Cluster of dilated, fluid-filled loops of small bowel within the central mid abdomen.  There is associated mild wall thickening as well as mild surrounding mesenteric fat stranding and small volume of free fluid within the abdomen and pelvis.  Differential considerations developing small bowel obstruction versus a nonspecific enteritis.  Clinical correlation advised.    Nonspecific circumferential thickening of the distal thoracic esophagus and gastroesophageal junction.  Follow-up endoscopy is advised for further assessment.    Gastric lap band in place with discontinuity of the catheter tubing, similar to prior examination.  Clinical correlation advised.    Decreased attenuation of the hepatic parenchyma suggesting hepatic steatosis.    Infrarenal IVC filter.    Additional findings as above.    This report was flagged in Epic as abnormal.      Electronically signed by: Corky Dozier MD  Date:    11/09/2023  Time:    03:43              Narrative:    EXAMINATION:  CT ABDOMEN PELVIS WITH IV CONTRAST    CLINICAL HISTORY:  LLQ abdominal pain;    TECHNIQUE:  Low dose axial images, sagittal and coronal reformations were obtained from the lung bases to the pubic symphysis following the IV administration of 75 mL of Omnipaque 350 .  Oral contrast was not given.    COMPARISON:  CT 06/02/2020    FINDINGS:  The lung bases are unremarkable.  There is no pleural fluid present.  The visualized portions of the heart appear normal.    The liver is not significantly enlarged.  There is diffuse decreased attenuation of the hepatic parenchyma suggesting hepatic steatosis.  No focal hepatic abnormality identified.  No definite radiopaque calculi appreciated within the gallbladder lumen.  There is no intra-or extrahepatic biliary ductal dilatation.    There is nonspecific circumferential thickening of the distal thoracic esophagus and gastroesophageal junction.  There is a gastric lap band in place about the proximal stomach.  The catheter tubing appears to be discontinuous terminating within the left mid abdomen, similar to prior examination.  Port site and proximal tubing appears coiled within the left mid abdomen subcutaneous soft tissues.    The spleen, pancreas, and adrenal glands appear within normal limits.    Kidneys are normal in size and location and enhance symmetrically.  There is no evidence of hydronephrosis. The urinary bladder is unremarkable. Uterus appears to be surgically absent.    The abdominal aorta is normal in course and caliber without significant atherosclerotic calcifications.  There is an infrarenal IVC filter in place.    There is a cluster of small bowel loops within the central mid abdomen which appear mildly prominent, fluid-filled, and demonstrate mild wall thickening.  There is mild surrounding mesenteric fat stranding as well as a small volume of free fluid within the abdomen and pelvis.  Differential considerations include  developing small bowel obstruction versus a nonspecific enteritis.  There is mild scattered retained stool throughout the right hemicolon.  No abnormal colonic wall thickening appreciated.  Appendix may be surgically absent.  There is no free intraperitoneal air or portal venous gas.    Osseous structures demonstrate degenerative changes of the lumbar spine at L4-L5. The extraperitoneal soft tissues are unremarkable.                                    Pending Diagnostic Studies:     None         Medications:  Reconciled Home Medications:      Medication List      START taking these medications    docusate sodium 100 MG capsule  Commonly known as: COLACE  Take 1 capsule (100 mg total) by mouth 2 (two) times daily. for 5 days     polyethylene glycol 17 gram Pwpk  Commonly known as: GLYCOLAX  Take 17 g by mouth once daily. for 10 days  Start taking on: November 12, 2023        CONTINUE taking these medications    amitriptyline 50 MG tablet  Commonly known as: ELAVIL  Take 1 tablet (50 mg total) by mouth every evening.     apixaban 5 mg Tab  Commonly known as: ELIQUIS  Take 1 tablet (5 mg total) by mouth 2 (two) times daily.     busPIRone 15 MG tablet  Commonly known as: BUSPAR  Take 1 tablet (15 mg total) by mouth 3 (three) times daily.     diclofenac sodium 1 % Gel  Commonly known as: VOLTAREN  Apply the gel (4 g) to the affected area 4 times daily. Do not apply more than 16 g daily to any one affected joint     ergocalciferol 50,000 unit Cap  Commonly known as: ERGOCALCIFEROL  Take 1 capsule (50,000 Units total) by mouth every 7 days.     FLUoxetine 40 MG capsule  Take 2 capsules by mouth once daily     hydroxychloroquine 200 mg tablet  Commonly known as: PLAQUENIL  Take 1 tablet (200 mg total) by mouth 2 (two) times daily.     hydrOXYzine pamoate 25 MG Cap  Commonly known as: VISTARIL  Take 1 capsule (25 mg total) by mouth 2 (two) times daily as needed (panic attacks).     ipratropium 21 mcg (0.03 %) nasal  spray  Commonly known as: ATROVENT  INSTILL 2 SPRAY(S) IN EACH NOSTRIL THREE TIMES DAILY     oxyCODONE-acetaminophen  mg per tablet  Commonly known as: PERCOCET  Take 1 tablet by mouth every 12 (twelve) hours as needed for Pain.     pregabalin 150 MG capsule  Commonly known as: LYRICA  Take 1 capsule (150 mg total) by mouth 2 (two) times daily.     tiZANidine 4 MG tablet  Commonly known as: ZANAFLEX  Take 2 tablets (8 mg total) by mouth 2 (two) times daily.     valsartan-hydrochlorothiazide 160-25 mg per tablet  Commonly known as: DIOVAN-HCT  Take 1 tablet by mouth once daily.            Indwelling Lines/Drains at time of discharge:   Lines/Drains/Airways     None                 Time spent on the discharge of patient: Greater than 35 minutes         Junior Cheek DO  Department of Hospital Medicine  [facility] [place] - Telemetry

## 2023-11-14 NOTE — PHYSICIAN QUERY
PT Name: Alberto Frye  MR #: [id number]     DOCUMENTATION CLARIFICATION     CDS/: Kavita Higgins RN, CDI            Contact information:[email or web address]  This form is a permanent document in the medical record.     Query Date: November 14, 2023    By submitting this query, we are merely seeking further clarification of documentation to reflect the severity of illness of your patient. Please utilize your independent clinical judgment when addressing the question(s) below.    The medical record reflects the following:     Indicators   Supporting Clinical Findings Location in Medical Record   x Bowel obstruction, intestinal obstruction, LBO or SBO documented Small bowel obstruction    The patient is not clinically obstructed. She had a bowel movement just prior to presentation. Her presentation is more consistent with enteritis.   -- no acute surgical intervention indicated, patient is not clinically obstructed     Bowel obstruction resolved. Patient tolerated diet  H&P 11/9    General surgery consult 11/9            Discharge summary 11/11   x Radiology findings Cluster of dilated, fluid-filled loops of small bowel within the central mid abdomen.  There is associated mild wall thickening as well as mild surrounding mesenteric fat stranding and small volume of free fluid within the abdomen and pelvis.  Differential considerations developing small bowel obstruction versus a nonspecific enteritis.  Clinical correlation advised.     Patient undergoing gastrografin challenge today. 4 hour scan showing contrast in the colon and even into the rectum. She also had a bowel movement today. General surgery will sign off as patient is free from obstruction.  CT abd 11/9              General Surgery POC 11/10   x Treatment/Medication Surgery consult   IVF   Symptomatic control     Surgery will plan for gastrofraffin challenge. Will continue to monitor. Continue IVF.    H&P 11/9        HM PN 11/10     Procedure/Surgery      Other       Provider, please further specify the bowel obstruction diagnosis:  [  X ] Intestinal Obstruction Ruled Out, Enteritis ruled in   [   ] Partial or incomplete intestinal obstruction, due to other (please specify): ____________   [   ] Partial or incomplete intestinal obstruction, unknown or unspecified etiology   [   ] Complete intestinal obstruction, due to other (please specify): ____________   [   ] Complete intestinal obstruction, unknown or unspecified etiology   [   ] Other intestinal condition (please specify): _____________________     Please document in your progress notes daily for the duration of treatment until resolved, and include in your discharge summary.

## 2023-11-24 DIAGNOSIS — F60.5 OBSESSIVE COMPULSIVE PERSONALITY DISORDER: ICD-10-CM

## 2023-11-24 DIAGNOSIS — F31.32 BIPOLAR AFFECTIVE DISORDER, DEPRESSED, MODERATE: ICD-10-CM

## 2023-11-24 RX ORDER — FLUOXETINE HYDROCHLORIDE 40 MG/1
80 CAPSULE ORAL
Qty: 180 CAPSULE | Refills: 0 | Status: SHIPPED | OUTPATIENT
Start: 2023-11-24 | End: 2024-03-21 | Stop reason: SDUPTHER

## 2023-11-27 DIAGNOSIS — M79.7 FIBROMYALGIA: ICD-10-CM

## 2023-11-27 DIAGNOSIS — M35.01 SJOGREN'S SYNDROME WITH KERATOCONJUNCTIVITIS SICCA: ICD-10-CM

## 2023-11-27 RX ORDER — OXYCODONE AND ACETAMINOPHEN 10; 325 MG/1; MG/1
1 TABLET ORAL EVERY 12 HOURS PRN
Qty: 60 TABLET | Refills: 0 | Status: SHIPPED | OUTPATIENT
Start: 2023-11-27 | End: 2023-11-28 | Stop reason: SDUPTHER

## 2023-11-28 ENCOUNTER — OFFICE VISIT (OUTPATIENT)
Dept: FAMILY MEDICINE | Facility: CLINIC | Age: 48
End: 2023-11-28
Payer: COMMERCIAL

## 2023-11-28 ENCOUNTER — PATIENT OUTREACH (OUTPATIENT)
Dept: ADMINISTRATIVE | Facility: HOSPITAL | Age: 48
End: 2023-11-28
Payer: COMMERCIAL

## 2023-11-28 VITALS
HEART RATE: 104 BPM | HEIGHT: 63 IN | TEMPERATURE: 99 F | DIASTOLIC BLOOD PRESSURE: 82 MMHG | OXYGEN SATURATION: 97 % | BODY MASS INDEX: 44.38 KG/M2 | SYSTOLIC BLOOD PRESSURE: 132 MMHG | WEIGHT: 250.44 LBS

## 2023-11-28 DIAGNOSIS — K59.09 CONSTIPATION, CHRONIC: ICD-10-CM

## 2023-11-28 DIAGNOSIS — M35.05 SJOGREN'S SYNDROME WITH INFLAMMATORY ARTHRITIS: ICD-10-CM

## 2023-11-28 DIAGNOSIS — M79.7 FIBROMYALGIA: ICD-10-CM

## 2023-11-28 DIAGNOSIS — Z23 NEED FOR SHINGLES VACCINE: ICD-10-CM

## 2023-11-28 DIAGNOSIS — K56.609 SMALL BOWEL OBSTRUCTION: Primary | ICD-10-CM

## 2023-11-28 DIAGNOSIS — M35.01 SJOGREN'S SYNDROME WITH KERATOCONJUNCTIVITIS SICCA: ICD-10-CM

## 2023-11-28 PROBLEM — R07.89 CHEST PAIN, ATYPICAL: Status: RESOLVED | Noted: 2022-04-05 | Resolved: 2023-11-28

## 2023-11-28 PROCEDURE — 90471 ZOSTER RECOMBINANT VACCINE: ICD-10-PCS | Mod: S$GLB,,, | Performed by: INTERNAL MEDICINE

## 2023-11-28 PROCEDURE — 3079F DIAST BP 80-89 MM HG: CPT | Mod: CPTII,S$GLB,, | Performed by: INTERNAL MEDICINE

## 2023-11-28 PROCEDURE — 99214 PR OFFICE/OUTPT VISIT, EST, LEVL IV, 30-39 MIN: ICD-10-PCS | Mod: 25,S$GLB,, | Performed by: INTERNAL MEDICINE

## 2023-11-28 PROCEDURE — 1111F PR DISCHARGE MEDS RECONCILED W/ CURRENT OUTPATIENT MED LIST: ICD-10-PCS | Mod: CPTII,S$GLB,, | Performed by: INTERNAL MEDICINE

## 2023-11-28 PROCEDURE — 3008F PR BODY MASS INDEX (BMI) DOCUMENTED: ICD-10-PCS | Mod: CPTII,S$GLB,, | Performed by: INTERNAL MEDICINE

## 2023-11-28 PROCEDURE — 90750 HZV VACC RECOMBINANT IM: CPT | Mod: S$GLB,,, | Performed by: INTERNAL MEDICINE

## 2023-11-28 PROCEDURE — 90750 ZOSTER RECOMBINANT VACCINE: ICD-10-PCS | Mod: S$GLB,,, | Performed by: INTERNAL MEDICINE

## 2023-11-28 PROCEDURE — 90471 IMMUNIZATION ADMIN: CPT | Mod: S$GLB,,, | Performed by: INTERNAL MEDICINE

## 2023-11-28 PROCEDURE — 1159F PR MEDICATION LIST DOCUMENTED IN MEDICAL RECORD: ICD-10-PCS | Mod: CPTII,S$GLB,, | Performed by: INTERNAL MEDICINE

## 2023-11-28 PROCEDURE — 3075F SYST BP GE 130 - 139MM HG: CPT | Mod: CPTII,S$GLB,, | Performed by: INTERNAL MEDICINE

## 2023-11-28 PROCEDURE — 1160F PR REVIEW ALL MEDS BY PRESCRIBER/CLIN PHARMACIST DOCUMENTED: ICD-10-PCS | Mod: CPTII,S$GLB,, | Performed by: INTERNAL MEDICINE

## 2023-11-28 PROCEDURE — 99999 PR PBB SHADOW E&M-EST. PATIENT-LVL V: ICD-10-PCS | Mod: PBBFAC,,, | Performed by: INTERNAL MEDICINE

## 2023-11-28 PROCEDURE — 1160F RVW MEDS BY RX/DR IN RCRD: CPT | Mod: CPTII,S$GLB,, | Performed by: INTERNAL MEDICINE

## 2023-11-28 PROCEDURE — 3075F PR MOST RECENT SYSTOLIC BLOOD PRESS GE 130-139MM HG: ICD-10-PCS | Mod: CPTII,S$GLB,, | Performed by: INTERNAL MEDICINE

## 2023-11-28 PROCEDURE — 3079F PR MOST RECENT DIASTOLIC BLOOD PRESSURE 80-89 MM HG: ICD-10-PCS | Mod: CPTII,S$GLB,, | Performed by: INTERNAL MEDICINE

## 2023-11-28 PROCEDURE — 3008F BODY MASS INDEX DOCD: CPT | Mod: CPTII,S$GLB,, | Performed by: INTERNAL MEDICINE

## 2023-11-28 PROCEDURE — 99214 OFFICE O/P EST MOD 30 MIN: CPT | Mod: 25,S$GLB,, | Performed by: INTERNAL MEDICINE

## 2023-11-28 PROCEDURE — 1159F MED LIST DOCD IN RCRD: CPT | Mod: CPTII,S$GLB,, | Performed by: INTERNAL MEDICINE

## 2023-11-28 PROCEDURE — 99999 PR PBB SHADOW E&M-EST. PATIENT-LVL V: CPT | Mod: PBBFAC,,, | Performed by: INTERNAL MEDICINE

## 2023-11-28 PROCEDURE — 1111F DSCHRG MED/CURRENT MED MERGE: CPT | Mod: CPTII,S$GLB,, | Performed by: INTERNAL MEDICINE

## 2023-11-28 RX ORDER — POLYETHYLENE GLYCOL 3350 17 G/17G
17 POWDER, FOR SOLUTION ORAL DAILY
Qty: 90 EACH | Refills: 3 | Status: SHIPPED | OUTPATIENT
Start: 2023-11-28

## 2023-11-28 RX ORDER — DOCUSATE SODIUM 100 MG/1
100 CAPSULE, LIQUID FILLED ORAL 2 TIMES DAILY
Qty: 180 CAPSULE | Refills: 3 | Status: SHIPPED | OUTPATIENT
Start: 2023-11-28

## 2023-11-28 NOTE — LETTER
November 28, 2023      [facility]  [address] [facility]  [place] [address]  Phone: [phone]  Fax: [phone]       Patient: Alberto Frye  YOB: 1975  Date of Visit: 11/28/23      To Whom It May Concern:    Alberto Frye  was at [facility] on 11/28/23.  If you have any questions or concerns, or if I can be of further assistance, please do not hesitate to contact me.      Sincerely,        Ian Cespedes MD

## 2023-11-28 NOTE — PROGRESS NOTES
This note was created by combination of typed  and M-Modal dictation.  Transcription errors may be present.  If there are any questions, please contact me.    Assessment and Plan:   Assessment and Plan    Small bowel obstruction  Constipation, chronic  -multiple possible risk factors - hx of surgery for KRUPA/BSO and appendectomy.  Also on chronic narcotic, and TCA which can slow motility  Discharged on glycolax, did find some improvement  Has not started colace  Can either increase glycolax to BID or stay once daily and add colace  Refilled both  Stay hydrated  Stay active  -     polyethylene glycol (GLYCOLAX) 17 gram PwPk; Take 17 g by mouth once daily.  Dispense: 90 each; Refill: 3  -     docusate sodium (COLACE) 100 MG capsule; Take 1 capsule (100 mg total) by mouth 2 (two) times daily.  Dispense: 180 capsule; Refill: 3    Fibromyalgia  Sjogren's syndrome with inflammatory arthritis  -managed by rheum. On plaquenil.  Rheumatologist is leaving [facility], encouraged her to contact rheum dept to arrange new rheumatologist  On chronic narcotic, lyrica, and prn use of amitriptyline.    Need for shingles vaccine  -     (In Office Administered) Zoster Recombinant Vaccine    Got results of mammo from [facility] - 10/26/2023 BIRADS2 negative repeat 1 year        Medications Discontinued During This Encounter   Medication Reason    polyethylene glycol (GLYCOLAX) 17 gram PwPk Reorder    docusate sodium (COLACE) 100 MG capsule Reorder       meds sent this encounter:  Medications Ordered This Encounter   Medications    docusate sodium (COLACE) 100 MG capsule     Sig: Take 1 capsule (100 mg total) by mouth 2 (two) times daily.     Dispense:  180 capsule     Refill:  3    polyethylene glycol (GLYCOLAX) 17 gram PwPk     Sig: Take 17 g by mouth once daily.     Dispense:  90 each     Refill:  3         Follow Up: follow up 6 months with labs. A1c, lipid, TSH.  TSH normal on most recent test.  Future Appointments   Date Time  Provider Department Center   11/28/2023  9:00 AM [facility]   1/30/2024  8:20 AM Ian Cespedes MD [facility]         Subjective:   Subjective   No chief complaint on file.      STEFFANY Dominguez is a 48 y.o. female.    Social History     Tobacco Use    Smoking status: Former     Current packs/day: 0.00     Types: Cigarettes    Smokeless tobacco: Never    Tobacco comments:     quit in october 2016   Substance Use Topics    Alcohol use: Yes     Alcohol/week: 1.0 - 3.0 standard drink of alcohol     Types: 1 - 3 Glasses of wine per week     Comment: social presently, excessive 10 years ago      Social History     Occupational History    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[employer]      Social History     Social History Narrative    Has 3 healthy grown sons (1990, 1993, 1998) Lives alone.    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   in 2010.    Has Male partner since 2014 who is currently disabled.       No LMP recorded. Patient has had a hysterectomy.    Last appointment with this clinic was 7/28/2023. Last visit with me 7/28/2023   To summarize last visit and events leading up to today:  Hypertension, hyperlipidemia, 10 year risk score less than 7.5%.  Elevated CPK baseline.  Dysphagia previously seen by GI January 2023.  Plan was EGD and colonoscopy but that was derailed due to illness and family events.  Obesity, she had inquired about GLP1 but would evaluate the GI system 1st.    Subclinical hypothyroid with negative antibodies.  Hold on treatment  Bipolar, anxiety, followed by Psychiatry.  Previously she had self discontinued mood stabilizer.  On fluoxetine.  Bar.  Amitriptyline.  Chronic back pain, fibromyalgia  Sjogren's syndrome followed by Rheumatology on Plaquenil.  Oxycodone for breakthrough pain.  Also on Lyrica and tizanidine and amitriptyline.  History of trial of cevimeline  without relief.  Recurrent DVT, indefinite anticoagulation, DOAC  S/p lap banding      Saw rheumatology 10/24.  No changes.  10/24/2023 TSH normal not on meds    Hospitalization 11/9 through 11/11/2023 for SBO presenting as abdominal pain  CT abdomen and pelvis showed cluster of dilated, fluid-filled loops of small bowel within the central mid abdomen with associated mild thickening, mild surrounding mesenteric fat stranding, and small volume of free fluid within the abdomen and pelvis (developing SBO versus nonspecific enteritis), nonspecific thickening of the distal thoracic esophagus   Hospital Course:   48 y.o. female with a hx of HTN, HLD, Sjogren disease, DVT (on eliquis), bipolar disorder, anxiety, and fibromyalgia admitted on 11/09/2023 for further evaluation of abdominal pain.  Lab work is grossly unremarkable. CT A/P calling a possible SBO vs enteritis. General surgery consulted - initially no plan for surgical intervention. Pt with worsening abdominal pain. Surgery performed gastrofraffin challenge-4 hour scan showing contrast in the colon and even into the rectum. She also had a bowel movement 11/10/2023. Bowel obstruction resolved. Patient tolerated diet.    Discharged on Colace, MiraLax    Today's visit:    No hx of SBO before  Thought that this might have been diverticulitis (has had this before 4/2020).  Had not been feeling good the week preceding.    Notes general constipation. And could have been exacerbated by narcotic use.   Hx of gastric lap banding  S/p hysterectomy and oophorectomy after complications after birth of her son - AUB?     Was not taking stool softener prior to the episode  Has started taking the miralax since. Some improvement.  No SE.  No bloating. Avoids foods like dairy, eggs etc that can make her bloated.   Has not been taking colace    Sjogrens acting up.   Feels like menopausal.  But had KRUPA/BSO in her 20s  Was having hot flushes and back pain and attributed it to sjogrens  flare.  Is still flaring.      Taking oxycodone regularly  And lyrica  Tizanidine PRN    Taking amitriptyline regularly. Takes 100 mg at night.    It's coming up on a year since she saw GI.    She thinks that difficulty swallowing may be caused/worsened by dry mouth/Sjogrens.   Plan was EGD and colonoscopy.     She had her mammo done with [facility].    Patient Care Team:  Ian Cespedes MD as PCP - General (Internal Medicine)  Chandler Bates MD as Consulting Physician (Cardiology)  Kari Tuttle MD as Consulting Physician (Hematology and Oncology)  Miguelangel Goldman MD as Consulting Physician (Vascular Surgery)  oJhan Grover MD as Consulting Physician (Gastroenterology)  Maverick Pierce MD as Consulting Physician (Urology)  Becca Paredes MD (Obstetrics and Gynecology)  Ross Hughes MD (Inactive) as Resident (Rheumatology)  Yeison Gibson III, NP as Nurse Practitioner (Psychiatry)  Nito Lambert MD (Gastroenterology)  Ting Cheek MA as Care Coordinator      Patient Active Problem List    Diagnosis Date Noted    Small bowel obstruction 11/09/2023 11/9/23 CT: Cluster of dilated, fluid-filled loops of small bowel within the central mid abdomen.  There is associated mild wall thickening as well as mild surrounding mesenteric fat stranding and small volume of free fluid within the abdomen and pelvis.  Differential considerations developing small bowel obstruction versus a nonspecific enteritis.  Clinical correlation advised.  Nonspecific circumferential thickening of the distal thoracic esophagus and gastroesophageal junction.  Follow-up endoscopy is advised for further assessment.  Gastric lap band in place with discontinuity of the catheter tubing, similar to prior examination.  Clinical correlation advised.  Decreased attenuation of the hepatic parenchyma suggesting hepatic steatosis.  Infrarenal IVC filter.      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  had had a prior DVT 10 years prior in the setting of surgery for hysterectomy at the time which she had IVC filter placed.      Recurrent UTI 09/21/2020 08/28/2020 cystoscopy normal  08/13/2020 renal ultrasound normal  06/02/2020 CT abdomen pelvis unremarkable.  IVC present.      Screening for colon cancer 11/2019 colonoscopy hemorrhoids and diverticulosis; [facility] 09/21/2020 11/2019 colonoscopy hemorrhoids and diverticulosis.      Chronic anticoagulation 09/21/2020    Chronic midline low back pain without sciatica 09/21/2020 1/2017 XR L spine: 5 views lumbar spine.  Vena cava filter right common L2-L3, and lap band apparatus left upper quadrant.  Mild levoscoliosis, lordosis lumbar spine.  Elevation of right pelvis.  Facet  arthropathy L4-L5 levels.  No fracture subluxation.      Neuropathy 08/11/2020    Abdominal pain 06/03/2020 06/03/2020 EGD normal duodenum.  Erythematous mucosa of the gastric body and antrum and pre-pyloric region.  2 cm hiatal hernia.  Path negative.  No celiac.      Essential hypertension 01/22/2016 08/11/2020 TTE normal LV systolic function LVEF 60%.  Normal diastolic function.  Normal RV systolic function.  PA pressure 23.  History of empiric treatment for diverticulitis 04/22/2020, no imaging done at that time.    7/2020 ER put her on clonidine.  And then placed on amlodipine  But did not find it controlled  So started on diovan hct  And stayed on clonidine throughout.      Seasonal allergies 01/22/2016    Anxiety 01/22/2016       PAST MEDICAL PROBLEMS, PAST SURGICAL HISTORY: please see relevant portions of the electronic medical record    ALLERGIES AND MEDICATIONS: updated and reviewed.  Medication List with Changes/Refills   Current Medications    AMITRIPTYLINE (ELAVIL) 50 MG TABLET    Take 1 tablet (50 mg total) by mouth every evening.    APIXABAN (ELIQUIS) 5 MG TAB    Take 1 tablet (5 mg total) by mouth 2 (two) times daily.    BUSPIRONE (BUSPAR) 15 MG TABLET    Take 1 tablet (15 mg total) by mouth 3 (three) times daily.    DICLOFENAC SODIUM (VOLTAREN) 1 % GEL    Apply the gel (4 g) to the affected area 4 times daily. Do not apply more than 16 g daily to any one affected joint    ERGOCALCIFEROL (ERGOCALCIFEROL) 50,000 UNIT CAP    Take 1 capsule (50,000 Units total) by mouth every 7 days.    FLUOXETINE 40 MG CAPSULE    Take 2 capsules by mouth once daily    HYDROXYCHLOROQUINE (PLAQUENIL) 200 MG TABLET    Take 1 tablet (200 mg total) by mouth 2 (two) times daily.    HYDROXYZINE PAMOATE (VISTARIL) 25 MG CAP    Take 1 capsule (25 mg total) by mouth 2 (two) times daily as needed (panic attacks).    IPRATROPIUM (ATROVENT) 21 MCG (0.03 %) NASAL SPRAY    INSTILL 2 SPRAY(S) IN EACH NOSTRIL THREE TIMES DAILY  "   OXYCODONE-ACETAMINOPHEN (PERCOCET)  MG PER TABLET    Take 1 tablet by mouth every 12 (twelve) hours as needed for Pain.    PREGABALIN (LYRICA) 150 MG CAPSULE    Take 1 capsule (150 mg total) by mouth 2 (two) times daily.    TIZANIDINE (ZANAFLEX) 4 MG TABLET    Take 2 tablets (8 mg total) by mouth 2 (two) times daily.    VALSARTAN-HYDROCHLOROTHIAZIDE (DIOVAN-HCT) 160-25 MG PER TABLET    Take 1 tablet by mouth once daily.         Objective:   Objective   Physical Exam   Vitals:    11/28/23 0856   BP: 132/82   Pulse: 104   Temp: 98.9 °F (37.2 °C)   SpO2: 97%   Weight: 113.6 kg (250 lb 7.1 oz)   Height: 5' 3" (1.6 m)    Body mass index is 44.36 kg/m².            Physical Exam  Constitutional:       General: She is not in acute distress.     Appearance: She is well-developed.   Eyes:      Extraocular Movements: Extraocular movements intact.   Cardiovascular:      Rate and Rhythm: Normal rate and regular rhythm.      Heart sounds: Normal heart sounds. No murmur heard.  Pulmonary:      Effort: Pulmonary effort is normal.      Breath sounds: Normal breath sounds.   Musculoskeletal:         General: Normal range of motion.      Right lower leg: No edema.      Left lower leg: No edema.   Skin:     General: Skin is warm and dry.   Neurological:      Mental Status: She is alert and oriented to person, place, and time.      Coordination: Coordination normal.   Psychiatric:         Behavior: Behavior normal.         Thought Content: Thought content normal.                "

## 2023-11-29 RX ORDER — OXYCODONE AND ACETAMINOPHEN 10; 325 MG/1; MG/1
1 TABLET ORAL EVERY 12 HOURS PRN
Qty: 60 TABLET | Refills: 0 | Status: SHIPPED | OUTPATIENT
Start: 2023-11-29 | End: 2023-12-27 | Stop reason: SDUPTHER

## 2023-12-04 ENCOUNTER — PATIENT MESSAGE (OUTPATIENT)
Dept: FAMILY MEDICINE | Facility: CLINIC | Age: 48
End: 2023-12-04
Payer: COMMERCIAL

## 2023-12-24 NOTE — PROGRESS NOTES
Subjective:     Patient ID: Alberto Frye is a 48 y.o. female followed by Dr. Hughes for Sjogren's syndrome w KCS, fibromyalgia & LBP    Chief Complaint: No chief complaint on file.       HPI    She saw Dr. Hughes last  on 10/24/23    She returns for follow up stating that she's been in a flare for a while. She mistakenly believes that her sxs are all due to Sjogrens.  Actually, she is c/o pain all over & extreme fatigue.   Also admits to dryness in her mouth & burning of her tongue & dry, itchy skin  She's been on HCQ since 2020 & feels initially it may have helped her fatigue,but no longer seems to do so.  She uses OTC artificial tears & biotene products & they do help some    She has pain & swelling with flexion of her L 3rd PIP--otherwise hands are not a big issue.  She does not recall trauma to this joint.  Muscles always feel stiff & achy & she often feels pain all over her body, sleep disturbance, concentration issues& remembering things. She is often anxious & stress makes her worse.   Has diffuse foot pain w paresthesias.  Has myalgias of her arms and pain over lateral aspect of her hips.  Has severe AM stiffness    AM stiffness +    .  joint pain +  LBP +  joint swelling L 3rd  Raynaud's--possibly  dysphagia,   tight skin  alopecia,   oral/nasal ulcers   parotid gland swelling  dry eyes +  dry mouth +  pleurisy  pericarditis  photosensitivity  skin rashes  miscarriages  Thromboses + post surgery remotely & again iliac artery 8/2020  muscle weakness + LE mostly  fevers  headaches  conjunctivitis  chronic or bloody diarrhea  vaginal/urethral D/C   paresthesias   thyroid issues            Current Outpatient Medications   Medication Sig Dispense Refill    amitriptyline (ELAVIL) 50 MG tablet Take 1 tablet (50 mg total) by mouth every evening. 90 tablet 1    apixaban (ELIQUIS) 5 mg Tab Take 1 tablet (5 mg total) by mouth 2 (two) times daily. 60 tablet 11    busPIRone (BUSPAR) 15 MG tablet Take 1 tablet (15 mg  total) by mouth 3 (three) times daily. 90 tablet 11    diclofenac sodium (VOLTAREN) 1 % Gel Apply the gel (4 g) to the affected area 4 times daily. Do not apply more than 16 g daily to any one affected joint 100 g 1    docusate sodium (COLACE) 100 MG capsule Take 1 capsule (100 mg total) by mouth 2 (two) times daily. 180 capsule 3    ergocalciferol (ERGOCALCIFEROL) 50,000 unit Cap Take 1 capsule (50,000 Units total) by mouth every 7 days. 12 capsule 0    FLUoxetine 40 MG capsule Take 2 capsules by mouth once daily 180 capsule 0    hydroxychloroquine (PLAQUENIL) 200 mg tablet Take 1 tablet (200 mg total) by mouth 2 (two) times daily. 60 tablet 6    hydrOXYzine pamoate (VISTARIL) 25 MG Cap Take 1 capsule (25 mg total) by mouth 2 (two) times daily as needed (panic attacks). 60 capsule 5    ipratropium (ATROVENT) 21 mcg (0.03 %) nasal spray INSTILL 2 SPRAY(S) IN EACH NOSTRIL THREE TIMES DAILY 30 mL 3    oxyCODONE-acetaminophen (PERCOCET)  mg per tablet Take 1 tablet by mouth every 12 (twelve) hours as needed for Pain. 60 tablet 0    polyethylene glycol (GLYCOLAX) 17 gram PwPk Take 17 g by mouth once daily. 90 each 3    pregabalin (LYRICA) 150 MG capsule Take 1 capsule (150 mg total) by mouth 2 (two) times daily. 60 capsule 5    tiZANidine (ZANAFLEX) 4 MG tablet Take 2 tablets (8 mg total) by mouth 2 (two) times daily. 120 tablet 6    valsartan-hydrochlorothiazide (DIOVAN-HCT) 160-25 mg per tablet Take 1 tablet by mouth once daily. 90 tablet 3     No current facility-administered medications for this visit.       Review of patient's allergies indicates:   Allergen Reactions    Morphine Itching and Hallucinations    Pcn [penicillins] Other (See Comments)     Was told from childhood she couldn't take it    Sulfa (sulfonamide antibiotics) Nausea And Vomiting    Latex, natural rubber Rash       Review of Systems   Constitutional:  Positive for fatigue. Negative for fever and unexpected weight change.   HENT:  Positive  for trouble swallowing. Negative for mouth sores.    Eyes:  Positive for redness.   Respiratory:  Positive for shortness of breath. Negative for cough.    Cardiovascular:  Positive for chest pain.   Gastrointestinal:  Positive for constipation. Negative for diarrhea.   Endocrine: Negative for cold intolerance and heat intolerance.   Genitourinary:  Positive for frequency. Negative for dysuria and genital sores.        Had hysterectomy & BSO many yrs ago   Musculoskeletal:  Positive for arthralgias, back pain, myalgias, neck pain and neck stiffness. Negative for joint swelling (L 3rd PIP).   Skin: Negative.  Negative for rash.   Neurological:  Negative for headaches.   Hematological:  Bruises/bleeds easily.   Psychiatric/Behavioral:  Positive for sleep disturbance. The patient is nervous/anxious.        Past Medical History:   Diagnosis Date    AYDE (acute kidney injury) 11/1/2022    Alcohol abuse     stopped heavy drinking about 10 years ago; was drinking 3 glasses of vodka/tequilla,rum/whiskey per day    Allergy Don't remember    Its been some years.    Anxiety     Arthritis 2010    Blood clotting tendency 2008    After a procedure & 2020.    Congestive heart failure 8/11/2020    Depression     Hallucination     Hx of psychiatric care     Hypertension     Immune disorder 2020    Joint pain 2010    Keloid cicatrix Years ago    From childhood scars    Caro     unplanned trips, energy without sleep for 2 days (reading, cleaning), feelings that she can do multiple tasks at one time, feelings of overconfidence at times    Psychiatric problem     Sleep difficulties     Therapy     Withdrawal symptoms, drug or narcotic     racing heart, restlessness       Past Surgical History:   Procedure Laterality Date    ESOPHAGOGASTRODUODENOSCOPY N/A 06/03/2020    Procedure: EGD (ESOPHAGOGASTRODUODENOSCOPY);  Surgeon: Johan Grover MD;  Location: [address]);  Service: Endoscopy;  Laterality: N/A;  covid  "6/2-[facility] [place]-tb    HYSTERECTOMY      LAPBAND  2009    PHLEBOGRAPHY Left 08/12/2020    Procedure: Venogram, pharmacomechanical thrombectomy;  Surgeon: Miguelangel Goldman MD;  Location: [facility] OR [address] [place] [address];  Service: Vascular;  Laterality: Left;  2336.43 mGy  [id number]  17.8 minutes  37 ml of contrast    VENOPLASTY Left 08/12/2020    Procedure: ANGIOPLASTY, VEIN;  Surgeon: Miguelangel Goldman MD;  Location: [facility] OR [address] [place] [address];  Service: Vascular;  Laterality: Left;       Family History   Problem Relation Age of Onset    Diabetes Mother     Cancer Mother     Hypertension Mother     No Known Problems Father     No Known Problems Sister     No Known Problems Brother     No Known Problems Maternal Aunt     Cirrhosis Maternal Uncle         ETOH    No Known Problems Paternal Aunt     No Known Problems Paternal Uncle     No Known Problems Maternal Grandmother     No Known Problems Maternal Grandfather     No Known Problems Paternal Grandmother     No Known Problems Paternal Grandfather     No Known Problems Other     Celiac disease Neg Hx     Colon cancer Neg Hx     Colon polyps Neg Hx     Crohn's disease Neg Hx     Esophageal cancer Neg Hx     Inflammatory bowel disease Neg Hx     Liver cancer Neg Hx     Liver disease Neg Hx     Rectal cancer Neg Hx     Stomach cancer Neg Hx     Ulcerative colitis Neg Hx    3 sons & 1 granddaughter in good health    Social History     Tobacco Use    Smoking status: Former     Current packs/day: 0.00     Types: Cigarettes    Smokeless tobacco: Never    Tobacco comments:     quit in october 2016   Substance Use Topics    Alcohol use: Yes     Alcohol/week: 1.0 - 3.0 standard drink of alcohol     Types: 1 - 3 Glasses of wine per week     Comment: social presently, excessive 10 years ago    Drug use: Not Currently     Types: Marijuana     Comment: uses THCA weekly   Lives alone.  Works for [employer].    Objective:     BP (!) 145/97   Pulse 85   Ht 5' 3" (1.6 m)   Wt 114.2 kg (251 " lb 12.3 oz)   BMI 44.60 kg/m²     Physical Exam   Constitutional: She is oriented to person, place, and time. She appears well-developed and well-nourished. No distress.   HENT:   Head: Normocephalic and atraumatic.   Mouth/Throat: Oropharynx is clear and moist. Mucous membranes are moist. No oropharyngeal exudate. Oropharynx is clear.   No facial rashes  Parotids not enlarged     Mouth/Throat: Teeth in good repair  Some moisture in oropharynx  Eyes: Pupils are equal, round, and reactive to light. Conjunctivae are normal. Right eye exhibits no discharge. Left eye exhibits no discharge. No scleral icterus.   Neck: No JVD present. No tracheal deviation present. No thyromegaly present.   Cardiovascular: Regular rhythm. Exam reveals no gallop and no friction rub.   No murmur heard.  Pulmonary/Chest: Effort normal and breath sounds normal. No respiratory distress. She has no wheezes. She has no rales. She exhibits no tenderness.   Abdominal: Soft. Bowel sounds are normal. She exhibits no distension and no mass. There is no abdominal tenderness. There is no rebound and no guarding.   Mild diffuse TTP   Musculoskeletal:         General: No deformity. Normal range of motion.      Cervical back: Normal range of motion and neck supple.      Comments: No synovitis anywhere.  FROM all joints  18/18 tender points rated 5/5     Lymphadenopathy:     She has no cervical adenopathy.   Neurological: She is alert and oriented to person, place, and time. She has normal reflexes. No cranial nerve deficit. Gait normal.   Skin: Skin is warm and dry. She is not diaphoretic.   Psychiatric: Mood, memory, affect and thought content normal.   Vitals reviewed.      11/9/23: CBC ok; CMP glu 143;   10/24/23: ESR 15; CRP 33.00; ; C3/C4 ok; Vit D 24  1/5/23: GIOVANNY 1:320H; +SSA; myomarker all neg; RF neg;   3/5/18: APS: neg  Assessment:   Joint pain multiple sites  Fibromyalgia  Sjogren's syndrome  Thrombophilia S/P LLE DVT  w neg APS  labs  Elevated BP in face of HTN  Psych dx: anxiety/depression/aidan/bipolar ds  ETOH use w heavy use in past  Vitamin d insufficiency--chronic  Chronically elevated CRP  Morbid obesity  Plan:   Discussed & educated on sjogren's syndrome & sxs.  Discussed OTC products to use for dry mouth & eyes.  Discussed pros & cons of HCQ   Decided to stay on it as long as she gets eye exams at least yearly.  I will follow her for Sjogren's syndrome.    Discussed switching vitamin D2 to D3 which is more effective.  Labs in 3 months including vitamin D    Discussed & educated on fibromyalgia which is largely the source of her pain/discomfort/joint pain  Symptoms/presentations, non-pharmacologic and pharmacologic treatments and their efficacies were reviewed.   Non-pharmacologic approaches were stressed.  ExPRESS was reviewed in detail.  Regular/daily dedicated, low impact aerobic exercise was especially emphasized. Strategies for success were offered.  Additional mind body exercise such as yoga or variations (chair yoga) and/or Emmanuel Chi have been validated.  Pharmacologic treatments approved and otherwise were reviewed.  Patient is already on pregabalin. Max dose is 450 mg.  Duloxetine has been approved for fibromyalgia. Her psychiatrist may want to consider switching to it, although caution should  Be exercised if she has bipolar disorder. This was discussed.   Seratonin syndrome was discussed.  Patient was provided with written information and referred to a website for further information.  [facility] was also reviewed with the patient. She may be a good candidate if she could find the time.  As I do not manage or follow fibromyalgia, the patient may be followed by her PCP and/or her other clinicians.     Discussed strategies for weight loss.   Advised [facility] digital hypertension monitoring program as her BP was high despite taking BP meds today    RTC 3 months of prn      CC: Divina Irizarry,  ALLA

## 2023-12-27 ENCOUNTER — PATIENT MESSAGE (OUTPATIENT)
Dept: FAMILY MEDICINE | Facility: CLINIC | Age: 48
End: 2023-12-27
Payer: COMMERCIAL

## 2023-12-27 DIAGNOSIS — M35.01 SJOGREN'S SYNDROME WITH KERATOCONJUNCTIVITIS SICCA: ICD-10-CM

## 2023-12-27 DIAGNOSIS — M79.7 FIBROMYALGIA: ICD-10-CM

## 2023-12-27 RX ORDER — OXYCODONE AND ACETAMINOPHEN 10; 325 MG/1; MG/1
1 TABLET ORAL EVERY 12 HOURS PRN
Qty: 60 TABLET | Refills: 0 | OUTPATIENT
Start: 2023-12-27

## 2023-12-27 RX ORDER — OXYCODONE AND ACETAMINOPHEN 10; 325 MG/1; MG/1
1 TABLET ORAL EVERY 12 HOURS PRN
Qty: 60 TABLET | Refills: 0 | Status: SHIPPED | OUTPATIENT
Start: 2023-12-27 | End: 2024-01-30 | Stop reason: SDUPTHER

## 2023-12-27 NOTE — TELEPHONE ENCOUNTER
----- Message from Reba Farris sent at 12/27/2023  9:32 AM CST -----  Regarding: Urgent for pt, Dr Hughes on leave and pain medication needs to be filled, please advise  Contact: @[phone]  Regarding: Urgent for pt, Dr Hughes on leave and pain medication needs to be filled, please advise  oxyCODONE-acetaminophen (PERCOCET)  mg per tablet         Contact: Alberto    Type: Call in script and call back to advise    Who Called: Alberto    Would the patient rather a call back or a response via [facility]? Phone call    Best Call Back Number: [phone]    Additional Information:     [id number] - [place] - [address]  [address] [place] [address]  Phone: [phone] Fax: [phone]

## 2023-12-27 NOTE — TELEPHONE ENCOUNTER
----- Message from Reba Farris sent at 12/27/2023  9:32 AM CST -----  Regarding: Urgent for pt, Dr Hughes on leave and pain medication needs to be filled, please advise  Contact: @[phone]  Regarding: Urgent for pt, Dr Hughes on leave and pain medication needs to be filled, please advise  oxyCODONE-acetaminophen (PERCOCET)  mg per tablet         Contact: Alberto    Type: Call in script and call back to advise    Who Called: Alberto    Would the patient rather a call back or a response via [facility]? Phone call    Best Call Back Number: [phone]    Additional Information:     [id number] - [place] - [address]  [address] [place] [address]  Phone: [phone] Fax: [phone]

## 2023-12-28 NOTE — ASSESSMENT & PLAN NOTE
CT abdomen and pelvis showed cluster of dilated, fluid-filled loops of small bowel within the central mid abdomen with associated mild thickening, mild surrounding mesenteric fat stranding, and small volume of free fluid within the abdomen and pelvis (developing SBO versus nonspecific enteritis), nonspecific thickening of the distal thoracic esophagus.   Surgery consult   IVF   Symptomatic control      Adult

## 2024-01-04 ENCOUNTER — OFFICE VISIT (OUTPATIENT)
Dept: RHEUMATOLOGY | Facility: CLINIC | Age: 49
End: 2024-01-04
Payer: COMMERCIAL

## 2024-01-04 VITALS
BODY MASS INDEX: 44.61 KG/M2 | WEIGHT: 251.75 LBS | SYSTOLIC BLOOD PRESSURE: 145 MMHG | HEART RATE: 85 BPM | HEIGHT: 63 IN | DIASTOLIC BLOOD PRESSURE: 97 MMHG

## 2024-01-04 DIAGNOSIS — Z55.9 EDUCATIONAL CIRCUMSTANCE: ICD-10-CM

## 2024-01-04 DIAGNOSIS — E66.01 MORBID OBESITY: ICD-10-CM

## 2024-01-04 DIAGNOSIS — M79.7 FIBROMYALGIA AFFECTING UPPER ARM: ICD-10-CM

## 2024-01-04 DIAGNOSIS — I10 ELEVATED BLOOD PRESSURE READING IN OFFICE WITH DIAGNOSIS OF HYPERTENSION: ICD-10-CM

## 2024-01-04 DIAGNOSIS — E55.9 VITAMIN D INSUFFICIENCY: ICD-10-CM

## 2024-01-04 DIAGNOSIS — M25.50 PAIN IN JOINT INVOLVING MULTIPLE SITES: ICD-10-CM

## 2024-01-04 DIAGNOSIS — Z79.899 LONG-TERM USE OF HYDROXYCHLOROQUINE: ICD-10-CM

## 2024-01-04 DIAGNOSIS — M35.01 SJOGREN'S SYNDROME WITH KERATOCONJUNCTIVITIS SICCA: Primary | ICD-10-CM

## 2024-01-04 DIAGNOSIS — M79.602 LEFT ARM PAIN: ICD-10-CM

## 2024-01-04 PROCEDURE — 3008F BODY MASS INDEX DOCD: CPT | Mod: CPTII,S$GLB,, | Performed by: INTERNAL MEDICINE

## 2024-01-04 PROCEDURE — 3080F DIAST BP >= 90 MM HG: CPT | Mod: CPTII,S$GLB,, | Performed by: INTERNAL MEDICINE

## 2024-01-04 PROCEDURE — 99999 PR PBB SHADOW E&M-EST. PATIENT-LVL V: CPT | Mod: PBBFAC,,, | Performed by: INTERNAL MEDICINE

## 2024-01-04 PROCEDURE — 3077F SYST BP >= 140 MM HG: CPT | Mod: CPTII,S$GLB,, | Performed by: INTERNAL MEDICINE

## 2024-01-04 PROCEDURE — 99215 OFFICE O/P EST HI 40 MIN: CPT | Mod: S$GLB,,, | Performed by: INTERNAL MEDICINE

## 2024-01-04 PROCEDURE — 1159F MED LIST DOCD IN RCRD: CPT | Mod: CPTII,S$GLB,, | Performed by: INTERNAL MEDICINE

## 2024-01-04 PROCEDURE — 1160F RVW MEDS BY RX/DR IN RCRD: CPT | Mod: CPTII,S$GLB,, | Performed by: INTERNAL MEDICINE

## 2024-01-04 SDOH — SOCIAL DETERMINANTS OF HEALTH (SDOH): PROBLEMS RELATED TO EDUCATION AND LITERACY, UNSPECIFIED: Z55.9

## 2024-01-04 ASSESSMENT — ROUTINE ASSESSMENT OF PATIENT INDEX DATA (RAPID3)
FATIGUE SCORE: 9.5
MDHAQ FUNCTION SCORE: 1
AM STIFFNESS SCORE: 1, YES
WHEN YOU AWAKENED IN THE MORNING OVER THE LAST WEEK, PLEASE INDICATE THE AMOUNT OF TIME IT TAKES UNTIL YOU ARE AS LIMBER AS YOU WILL BE FOR THE DAY: 2
PAIN SCORE: 9
TOTAL RAPID3 SCORE: 6.78
PATIENT GLOBAL ASSESSMENT SCORE: 8
PSYCHOLOGICAL DISTRESS SCORE: 4.4

## 2024-01-04 NOTE — PATIENT INSTRUCTIONS
Read on fibromyalgia.    Daily, aerobic, low impact, dedicated exercise as discussed.  Warm up & cool down. Don't overdo; Don't underdo.    Chair Yoga has been validated.    Discuss switching from fluoxetine to duloxetine--if ok with your psychiatrist as duloxetine may worsen bipolar disorder    The maximal dose of lyrica for fibromyalgia is 450 mg/d.     Use OTC products for dry mouth: Xylimelts; biotene, ACT, [place].     Sarna lotion for itching.  Eucerin, Cerave  (blue jar), Aquafor as a moisturizer    Do not eat anything for at least 4 hours before going to bed.    Look into the [facility]: [phone]    Discuss the [facility] Digital Hypertension Monitoring with your PCP      When you run out of ergocalciferol take vitamin D3 5,000 IU daily x 2 months then take 2 capsules (10,000 IU) once a week.

## 2024-01-04 NOTE — PROGRESS NOTES
1/3/2024     5:53 PM   Rapid3 Question Responses and Scores   MDHAQ Score 1   Psychologic Score 4.4   Pain Score 9   When you awakened in the morning OVER THE LAST WEEK, did you feel stiff? Yes   If Yes, please indicate the number of hours until you are as limber as you will be for the day 2   Fatigue Score 9.5   Global Health Score 8   RAPID3 Score 6.78

## 2024-01-23 DIAGNOSIS — S83.91XA SPRAIN OF RIGHT KNEE, UNSPECIFIED LIGAMENT, INITIAL ENCOUNTER: ICD-10-CM

## 2024-01-23 RX ORDER — DICLOFENAC SODIUM 10 MG/G
GEL TOPICAL
Qty: 100 G | Refills: 0 | Status: SHIPPED | OUTPATIENT
Start: 2024-01-23

## 2024-01-23 NOTE — TELEPHONE ENCOUNTER
No care due was identified.  [facility] Embedded Care Due Messages. Reference number: [id number].   1/23/2024 7:48:39 AM CST

## 2024-01-25 ENCOUNTER — PATIENT MESSAGE (OUTPATIENT)
Dept: VASCULAR SURGERY | Facility: CLINIC | Age: 49
End: 2024-01-25
Payer: COMMERCIAL

## 2024-01-29 RX ORDER — APIXABAN 5 MG/1
5 TABLET, FILM COATED ORAL 2 TIMES DAILY
Qty: 60 TABLET | Refills: 0 | Status: SHIPPED | OUTPATIENT
Start: 2024-01-29 | End: 2024-02-08

## 2024-01-29 NOTE — PROGRESS NOTES
This note was created by combination of typed  and M-Modal dictation.  Transcription errors may be present.  If there are any questions, please contact me.    Assessment and Plan:   Assessment and Plan    Normal physical exam  Screening for colon cancer 11/2019 colonoscopy hemorrhoids and diverticulosis; [place]  -colonoscopy due 2029  Future labs ordered TSH, lipid, A1c.    Sjogren's syndrome with keratoconjunctivitis sicca  Fibromyalgia  Chronic midline low back pain without sciatica  -has been on chronic narcotics for the past couple of years now which has been managed by Rheumatology until he moved out of area.  Recent Saw new rheumatologist but does not feel like it has a good fit.  She has Sjogren's but also has fibromyalgia.  She needs to establish with a new rheumatologist for ongoing management of her Sjogren's   Ideally they would also assume management for her chronic narcotics as it had been initiated managed by her previous rheumatologist.  If Rheumatology is not agreeable to manage it, would recommend she see pain clinic.  She would seen pain clinic previously but had deferred to Rheumatology regarding narcotic management.  We discussed the functional restoration program and she would be interested provided it is compatible with her work schedule.  Will send in refills of Lyrica and Percocet until she can establish with a new rheumatologist  -     pregabalin (LYRICA) 150 MG capsule; Take 1 capsule (150 mg total) by mouth 2 (two) times daily.  Dispense: 60 capsule; Refill: 2  -     oxyCODONE-acetaminophen (PERCOCET)  mg per tablet; Take 1 tablet by mouth every 12 (twelve) hours as needed for Pain.  Dispense: 60 tablet; Refill: 0  -     Ambulatory referral/consult to Functional Restoration Clinic; Future; Expected date: 02/06/2024    Essential hypertension  -blood pressure today stable.  No changes    Dyslipidemia  -had previously discussed statin therapy, she was wary of starting  previously.  Check future labs.  Risk factors of age, hypertension  The 10-year ASCVD risk score (Allison SEAMAN, et al., 2019) is: 3.6%    Morbid obesity  -wants to work on therapeutic lifestyle modification.  Hopefully the functional restoration program will help her as well.  We discussed medication therapies, would avoid stimulant therapy.  With a history of small-bowel obstruction I would avoid GLP1 as well.    Chronic anticoagulation  Recurrent deep vein thrombosis (DVT) with hx of IVC filter; indefinite anticoagulation  -on DOAC    Bipolar affective disorder, currently depressed, moderate  -managed by Psychiatry    Need for shingles vaccine  -     (In Office Administered) Zoster Recombinant Vaccine       Medications Discontinued During This Encounter   Medication Reason    pregabalin (LYRICA) 150 MG capsule Reorder    oxyCODONE-acetaminophen (PERCOCET)  mg per tablet Reorder       meds sent this encounter:     Medications Ordered This Encounter   Medications    oxyCODONE-acetaminophen (PERCOCET)  mg per tablet     Sig: Take 1 tablet by mouth every 12 (twelve) hours as needed for Pain.     Dispense:  60 tablet     Refill:  0     Quantity prescribed more than 7 day supply? Yes medically necessary     Order Specific Question:   I have reviewed the Prescription Drug Monitoring Program (PDMP) database for this patient prior to prescribing the above opioid medication     Answer:   Yes    pregabalin (LYRICA) 150 MG capsule     Sig: Take 1 capsule (150 mg total) by mouth 2 (two) times daily.     Dispense:  60 capsule     Refill:  2        Follow Up:  Has follow-up scheduled in May  Future Appointments   Date Time Provider Department Center   2/12/2024  3:15 PM Anneliese Barber OD [facility] [place]   2/20/2024  2:30 PM Ye[facility] III[facility] NP [facility] [address]   5/22/2024  8:00 AM LAB, [facility] [place]   5/28/2024 10:40 AM Ian Cespedes MD [facility] MED [place]         Subjective:   Subjective    Chief Complaint   Patient presents with    Annual Exam    Referral     Rheumatology referral       HPI  Alberto is a 49 y.o. female.    Social History     Tobacco Use    Smoking status: Former     Current packs/day: 0.00     Types: Cigarettes    Smokeless tobacco: Never    Tobacco comments:     quit in october 2016   Substance Use Topics    Alcohol use: Yes     Alcohol/week: 1.0 - 3.0 standard drink of alcohol     Types: 1 - 3 Glasses of wine per week     Comment: social presently, excessive 10 years ago      Social History     Occupational History    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[employer]      Social History     Social History Narrative    Has 3 healthy grown sons (1990, 1993, 1998) Lives alone.    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   in 2010.    Has Male partner since 2014 who is currently disabled.       No LMP recorded. Patient has had a hysterectomy.    Last appointment with this clinic was 11/28/2023. Last visit with me 11/28/2023   To summarize last visit and events leading up to today:  Hypertension, hyperlipidemia, 10 year risk score less than 7.5%.  Elevated CPK baseline.  Dysphagia previously seen by GI January 2023.  Plan was EGD and colonoscopy but that was derailed due to illness and family events.  Obesity, she had inquired about GLP1 but would evaluate the GI system 1st.    Subclinical hypothyroid with negative antibodies.  Hold on treatment  Bipolar, anxiety, followed by Psychiatry.  Previously she had self discontinued mood stabilizer.  On fluoxetine.  Bar.  Amitriptyline.  Chronic back pain, fibromyalgia  Sjogren's syndrome followed by Rheumatology on Plaquenil.  Oxycodone for breakthrough pain.  Also on Lyrica and tizanidine and amitriptyline.  History of trial of cevimeline without relief.  Recurrent DVT, indefinite anticoagulation, DOAC  S/p lap banding        Saw rheumatology 10/24.  No  changes.  10/24/2023 TSH normal not on meds     Hospitalization 11/9 through 11/11/2023 for SBO presenting as abdominal pain    Saw Rheumatology in follow-up 01/04/2024.  Fibromyalgia.  Already on Lyrica.  Consider duloxetine though she should discuss this with her psychiatrist.  [facility]        Today's visit:  He saw new rheumatologist.  She does not think it is going to be a good fit.  She needs to see Rheumatology for ongoing management of her Sjogren's but she would like to establish with a new rheumatologist.    Most recent visit with Rheumatology, they had recommended the functional restoration program.  But that she did not manage fibromyalgia.    The patient has been on chronic narcotics for the past couple of years now which was initiated by Rheumatology.  Unfortunately he has left the area.    She is also on Lyrica.  She is out of both and she is requesting refills on these until she can establish with a new rheumatologist.  She would previously seen pain clinic and they had deferred to Rheumatology as Rheumatology was managing her narcotics at that time.  But if she is able to get into another rheumatologist and they are not comfortable managing her narcotics I would recommend she follow-up with pain clinic.    Obesity.  She has lost a couple of lb.  She has not sure exactly how but she feels upbeat about this.  We talked about medication treatments for obesity, would avoid stimulant therapy and also avoid GLP1 with a recent small bowel obstruction.    She notes that if she pushes herself on the treadmill and tries to go at a faster pace, the next day is quite painful.  So she is trying to modify her activities and maybe try a brisk walk.  We talked about the functional restoration program.  She would be interested if it was compatible with her work schedule.    No snacking  Feels like food intake is reasonable.       Answers submitted by the patient for this  visit:  Review of Systems Questionnaire (Submitted on 1/29/2024)  activity change: No  unexpected weight change: No  neck pain: Yes  hearing loss: No  rhinorrhea: No  trouble swallowing: No  eye discharge: No  visual disturbance: Yes  chest tightness: No  wheezing: No  chest pain: No  palpitations: Yes  blood in stool: No  constipation: No  vomiting: No  diarrhea: No  polydipsia: No  polyuria: No  difficulty urinating: No  hematuria: No  menstrual problem: No  dysuria: No  joint swelling: Yes  arthralgias: Yes  headaches: Yes  weakness: Yes  confusion: No  dysphoric mood: Yes    Patient Care Team:  Ian Cespedes MD as PCP - General (Internal Medicine)  Chandler Bates MD as Consulting Physician (Cardiology)  Kari Tuttle MD as Consulting Physician (Hematology and Oncology)  Miguelangel Goldman MD as Consulting Physician (Vascular Surgery)  Johan Grover MD as Consulting Physician (Gastroenterology)  Maverick Pierce MD as Consulting Physician (Urology)  Becca Paredes MD (Obstetrics and Gynecology)  Ross Hughes MD (Inactive) as Resident (Rheumatology)  Yeison Gibson III, NP as Nurse Practitioner (Psychiatry)  Nito Lambert MD (Gastroenterology)  Ting Cheek MA as Care Coordinator  Imaging, Dis Diagnostic (Diagnostic Radiology)      Patient Active Problem List    Diagnosis Date Noted    Small bowel obstruction 11/09/2023 11/9/23 CT: Cluster of dilated, fluid-filled loops of small bowel within the central mid abdomen.  There is associated mild wall thickening as well as mild surrounding mesenteric fat stranding and small volume of free fluid within the abdomen and pelvis.  Differential considerations developing small bowel obstruction versus a nonspecific enteritis.  Clinical correlation advised.  Nonspecific circumferential thickening of the distal thoracic esophagus and gastroesophageal junction.  Follow-up endoscopy is advised for further  assessment.  Gastric lap band in place with discontinuity of the catheter tubing, similar to prior examination.  Clinical correlation advised.  Decreased attenuation of the hepatic parenchyma suggesting hepatic steatosis.  Infrarenal IVC filter.      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  had had a prior DVT 10 years prior in the setting of surgery for hysterectomy at the time which she had IVC filter placed.      Recurrent UTI 09/21/2020 08/28/2020 cystoscopy normal  08/13/2020 renal ultrasound normal  06/02/2020 CT abdomen pelvis unremarkable.  IVC present.      Screening for colon cancer 11/2019 colonoscopy hemorrhoids and diverticulosis; [place] 09/21/2020 11/2019 colonoscopy hemorrhoids and diverticulosis.      Chronic anticoagulation 09/21/2020    Chronic  midline low back pain without sciatica 09/21/2020 1/2017 XR L spine: 5 views lumbar spine.  Vena cava filter right common L2-L3, and lap band apparatus left upper quadrant.  Mild levoscoliosis, lordosis lumbar spine.  Elevation of right pelvis.  Facet arthropathy L4-L5 levels.  No fracture subluxation.      Neuropathy 08/11/2020    Abdominal pain 06/03/2020 06/03/2020 EGD normal duodenum.  Erythematous mucosa of the gastric body and antrum and pre-pyloric region.  2 cm hiatal hernia.  Path negative.  No celiac.      Essential hypertension 01/22/2016 08/11/2020 TTE normal LV systolic function LVEF 60%.  Normal diastolic function.  Normal RV systolic function.  PA pressure 23.  History of empiric treatment for diverticulitis 04/22/2020, no imaging done at that time.    7/2020 ER put her on clonidine.  And then placed on amlodipine  But did not find it controlled  So started on diovan hct  And stayed on clonidine throughout.      Seasonal allergies 01/22/2016    Anxiety 01/22/2016       PAST MEDICAL PROBLEMS, PAST SURGICAL HISTORY: please see relevant portions of the electronic medical record    ALLERGIES AND MEDICATIONS: updated and reviewed.  Medication List with Changes/Refills   Current Medications    AMITRIPTYLINE (ELAVIL) 50 MG TABLET    Take 1 tablet (50 mg total) by mouth every evening.    APIXABAN (ELIQUIS) 5 MG TAB    Take 1 tablet (5 mg total) by mouth 2 (two) times daily.    BUSPIRONE (BUSPAR) 15 MG TABLET    Take 1 tablet (15 mg total) by mouth 3 (three) times daily.    DICLOFENAC SODIUM (VOLTAREN) 1 % GEL    APPLY 4 GRAMS  TOPICALLY 4 TIMES DAILY TO AFFECTED AREA. DO NOT APPLY MORE THAN 16GM DAILY TO ANY ONE AFFECTED JOINT    DOCUSATE SODIUM (COLACE) 100 MG CAPSULE    Take 1 capsule (100 mg total) by mouth 2 (two) times daily.    ELIQUIS 5 MG TAB    Take 1 tablet by mouth twice daily    ERGOCALCIFEROL (ERGOCALCIFEROL) 50,000 UNIT CAP    Take 1 capsule (50,000 Units total) by mouth every 7  "days.    FLUOXETINE 40 MG CAPSULE    Take 2 capsules by mouth once daily    HYDROXYCHLOROQUINE (PLAQUENIL) 200 MG TABLET    Take 1 tablet (200 mg total) by mouth 2 (two) times daily.    HYDROXYZINE PAMOATE (VISTARIL) 25 MG CAP    Take 1 capsule (25 mg total) by mouth 2 (two) times daily as needed (panic attacks).    IPRATROPIUM (ATROVENT) 21 MCG (0.03 %) NASAL SPRAY    INSTILL 2 SPRAY(S) IN EACH NOSTRIL THREE TIMES DAILY    OXYCODONE-ACETAMINOPHEN (PERCOCET)  MG PER TABLET    Take 1 tablet by mouth every 12 (twelve) hours as needed for Pain.    POLYETHYLENE GLYCOL (GLYCOLAX) 17 GRAM PWPK    Take 17 g by mouth once daily.    PREGABALIN (LYRICA) 150 MG CAPSULE    Take 1 capsule (150 mg total) by mouth 2 (two) times daily.    TIZANIDINE (ZANAFLEX) 4 MG TABLET    Take 2 tablets (8 mg total) by mouth 2 (two) times daily.    VALSARTAN-HYDROCHLOROTHIAZIDE (DIOVAN-HCT) 160-25 MG PER TABLET    Take 1 tablet by mouth once daily.      Review of Systems   HENT:  Negative for hearing loss.    Eyes:  Negative for discharge.   Respiratory:  Negative for wheezing.    Cardiovascular:  Positive for palpitations. Negative for chest pain.   Gastrointestinal:  Negative for blood in stool, constipation, diarrhea and vomiting.   Genitourinary:  Negative for dysuria and hematuria.   Musculoskeletal:  Positive for neck pain.   Neurological:  Positive for weakness and headaches.   Endo/Heme/Allergies:  Negative for polydipsia.          Objective:   Objective   Physical Exam   Vitals:    01/30/24 1342   BP: 124/86   BP Location: Right arm   Patient Position: Sitting   BP Method: Large (Manual)   Pulse: 102   Temp: 98 °F (36.7 °C)   TempSrc: Oral   SpO2: 95%   Weight: 112.1 kg (247 lb 0.4 oz)   Height: 5' 4" (1.626 m)    Body mass index is 42.4 kg/m².  Weight: 112.1 kg (247 lb 0.4 oz)   Height: 5' 4" (162.6 cm)     Physical Exam  Constitutional:       General: She is not in acute distress.     Appearance: She is well-developed.   Eyes: "      Extraocular Movements: Extraocular movements intact.   Cardiovascular:      Rate and Rhythm: Normal rate and regular rhythm.      Heart sounds: Normal heart sounds. No murmur heard.  Pulmonary:      Effort: Pulmonary effort is normal.      Breath sounds: Normal breath sounds.   Musculoskeletal:         General: Normal range of motion.   Skin:     General: Skin is warm and dry.   Neurological:      Mental Status: She is alert and oriented to person, place, and time.      Coordination: Coordination normal.   Psychiatric:         Behavior: Behavior normal.         Thought Content: Thought content normal.

## 2024-01-30 ENCOUNTER — OFFICE VISIT (OUTPATIENT)
Dept: FAMILY MEDICINE | Facility: CLINIC | Age: 49
End: 2024-01-30
Payer: COMMERCIAL

## 2024-01-30 VITALS
BODY MASS INDEX: 42.17 KG/M2 | WEIGHT: 247 LBS | HEART RATE: 102 BPM | TEMPERATURE: 98 F | HEIGHT: 64 IN | SYSTOLIC BLOOD PRESSURE: 124 MMHG | OXYGEN SATURATION: 95 % | DIASTOLIC BLOOD PRESSURE: 86 MMHG

## 2024-01-30 DIAGNOSIS — E78.5 DYSLIPIDEMIA: ICD-10-CM

## 2024-01-30 DIAGNOSIS — M35.01 SJOGREN'S SYNDROME WITH KERATOCONJUNCTIVITIS SICCA: ICD-10-CM

## 2024-01-30 DIAGNOSIS — E66.01 MORBID OBESITY: ICD-10-CM

## 2024-01-30 DIAGNOSIS — Z00.00 NORMAL PHYSICAL EXAM: Primary | ICD-10-CM

## 2024-01-30 DIAGNOSIS — I82.409 RECURRENT DEEP VEIN THROMBOSIS (DVT): ICD-10-CM

## 2024-01-30 DIAGNOSIS — Z23 NEED FOR SHINGLES VACCINE: ICD-10-CM

## 2024-01-30 DIAGNOSIS — G89.29 CHRONIC MIDLINE LOW BACK PAIN WITHOUT SCIATICA: ICD-10-CM

## 2024-01-30 DIAGNOSIS — I10 ESSENTIAL HYPERTENSION: ICD-10-CM

## 2024-01-30 DIAGNOSIS — Z12.11 SCREENING FOR COLON CANCER: ICD-10-CM

## 2024-01-30 DIAGNOSIS — M79.7 FIBROMYALGIA: ICD-10-CM

## 2024-01-30 DIAGNOSIS — M54.50 CHRONIC MIDLINE LOW BACK PAIN WITHOUT SCIATICA: ICD-10-CM

## 2024-01-30 DIAGNOSIS — Z79.01 CHRONIC ANTICOAGULATION: ICD-10-CM

## 2024-01-30 DIAGNOSIS — F31.32 BIPOLAR AFFECTIVE DISORDER, CURRENTLY DEPRESSED, MODERATE: ICD-10-CM

## 2024-01-30 PROCEDURE — 99999 PR PBB SHADOW E&M-EST. PATIENT-LVL V: CPT | Mod: PBBFAC,,, | Performed by: INTERNAL MEDICINE

## 2024-01-30 PROCEDURE — 99396 PREV VISIT EST AGE 40-64: CPT | Mod: 25,S$GLB,, | Performed by: INTERNAL MEDICINE

## 2024-01-30 PROCEDURE — 90471 IMMUNIZATION ADMIN: CPT | Mod: S$GLB,,, | Performed by: INTERNAL MEDICINE

## 2024-01-30 PROCEDURE — 3074F SYST BP LT 130 MM HG: CPT | Mod: CPTII,S$GLB,, | Performed by: INTERNAL MEDICINE

## 2024-01-30 PROCEDURE — 90750 HZV VACC RECOMBINANT IM: CPT | Mod: S$GLB,,, | Performed by: INTERNAL MEDICINE

## 2024-01-30 PROCEDURE — 3008F BODY MASS INDEX DOCD: CPT | Mod: CPTII,S$GLB,, | Performed by: INTERNAL MEDICINE

## 2024-01-30 PROCEDURE — 1159F MED LIST DOCD IN RCRD: CPT | Mod: CPTII,S$GLB,, | Performed by: INTERNAL MEDICINE

## 2024-01-30 PROCEDURE — 1160F RVW MEDS BY RX/DR IN RCRD: CPT | Mod: CPTII,S$GLB,, | Performed by: INTERNAL MEDICINE

## 2024-01-30 RX ORDER — PREGABALIN 150 MG/1
150 CAPSULE ORAL 2 TIMES DAILY
Qty: 60 CAPSULE | Refills: 2 | Status: SHIPPED | OUTPATIENT
Start: 2024-01-30 | End: 2024-03-12 | Stop reason: SDUPTHER

## 2024-01-30 RX ORDER — OXYCODONE AND ACETAMINOPHEN 10; 325 MG/1; MG/1
1 TABLET ORAL EVERY 12 HOURS PRN
Qty: 60 TABLET | Refills: 0 | Status: SHIPPED | OUTPATIENT
Start: 2024-01-30 | End: 2024-02-07 | Stop reason: SDUPTHER

## 2024-01-30 NOTE — LETTER
January 30, 2024      [facility]  [address] [facility]  [place] [address]  Phone: [phone]  Fax: [phone]       Patient: Alberto Frye  YOB: 1975  Date of Visit: 1/30/24      To Whom It May Concern:    Alberto Frye  was at [facility] on 1/30/24.  If you have any questions or concerns, or if I can be of further assistance, please do not hesitate to contact me.      Sincerely,        Ian Cespedes MD

## 2024-01-30 NOTE — PROGRESS NOTES
Patient given Shingles vaccine via of injection, 0 complaints of, tolerated well. Advised to wait in lobby for 15mins for monitoring. Understanding verbalized.

## 2024-01-31 ENCOUNTER — PATIENT MESSAGE (OUTPATIENT)
Dept: VASCULAR SURGERY | Facility: CLINIC | Age: 49
End: 2024-01-31
Payer: COMMERCIAL

## 2024-01-31 ENCOUNTER — TELEPHONE (OUTPATIENT)
Dept: ADMINISTRATIVE | Facility: OTHER | Age: 49
End: 2024-01-31
Payer: COMMERCIAL

## 2024-01-31 NOTE — TELEPHONE ENCOUNTER
Left voice message for patient to return call to schedule appointment from referral to Functional Restoration department. Contact number provided for scheduling.My Chart message to be sent..  Leslee GORE [phone]

## 2024-02-01 ENCOUNTER — PATIENT MESSAGE (OUTPATIENT)
Dept: FAMILY MEDICINE | Facility: CLINIC | Age: 49
End: 2024-02-01
Payer: COMMERCIAL

## 2024-02-01 ENCOUNTER — TELEPHONE (OUTPATIENT)
Dept: VASCULAR SURGERY | Facility: CLINIC | Age: 49
End: 2024-02-01
Payer: COMMERCIAL

## 2024-02-02 ENCOUNTER — TELEPHONE (OUTPATIENT)
Dept: ADMINISTRATIVE | Facility: OTHER | Age: 49
End: 2024-02-02
Payer: COMMERCIAL

## 2024-02-02 NOTE — TELEPHONE ENCOUNTER
Left voice message for patient to return call to schedule appointment from referral to Functional Restoration department. My Chart message to be sent..  Leslee GORE [phone]

## 2024-02-04 ENCOUNTER — PATIENT MESSAGE (OUTPATIENT)
Dept: FAMILY MEDICINE | Facility: CLINIC | Age: 49
End: 2024-02-04
Payer: COMMERCIAL

## 2024-02-04 ENCOUNTER — PATIENT MESSAGE (OUTPATIENT)
Dept: VASCULAR SURGERY | Facility: CLINIC | Age: 49
End: 2024-02-04
Payer: COMMERCIAL

## 2024-02-05 ENCOUNTER — TELEPHONE (OUTPATIENT)
Dept: VASCULAR SURGERY | Facility: CLINIC | Age: 49
End: 2024-02-05
Payer: COMMERCIAL

## 2024-02-06 ENCOUNTER — TELEPHONE (OUTPATIENT)
Dept: VASCULAR SURGERY | Facility: CLINIC | Age: 49
End: 2024-02-06
Payer: COMMERCIAL

## 2024-02-06 ENCOUNTER — PATIENT MESSAGE (OUTPATIENT)
Dept: FAMILY MEDICINE | Facility: CLINIC | Age: 49
End: 2024-02-06
Payer: COMMERCIAL

## 2024-02-07 ENCOUNTER — TELEPHONE (OUTPATIENT)
Dept: VASCULAR SURGERY | Facility: CLINIC | Age: 49
End: 2024-02-07
Payer: COMMERCIAL

## 2024-02-07 DIAGNOSIS — M35.01 SJOGREN'S SYNDROME WITH KERATOCONJUNCTIVITIS SICCA: ICD-10-CM

## 2024-02-07 DIAGNOSIS — M79.7 FIBROMYALGIA: ICD-10-CM

## 2024-02-07 NOTE — TELEPHONE ENCOUNTER
No care due was identified.  Health [facility] Embedded Care Due Messages. Reference number: [id number].   2/07/2024 2:06:27 PM CST

## 2024-02-08 ENCOUNTER — PATIENT MESSAGE (OUTPATIENT)
Dept: VASCULAR SURGERY | Facility: CLINIC | Age: 49
End: 2024-02-08
Payer: COMMERCIAL

## 2024-02-08 ENCOUNTER — TELEPHONE (OUTPATIENT)
Dept: VASCULAR SURGERY | Facility: CLINIC | Age: 49
End: 2024-02-08
Payer: COMMERCIAL

## 2024-02-08 RX ORDER — OXYCODONE AND ACETAMINOPHEN 10; 325 MG/1; MG/1
1 TABLET ORAL EVERY 12 HOURS PRN
Qty: 60 TABLET | Refills: 0 | Status: SHIPPED | OUTPATIENT
Start: 2024-02-08 | End: 2024-02-26 | Stop reason: SDUPTHER

## 2024-02-09 ENCOUNTER — TELEPHONE (OUTPATIENT)
Dept: VASCULAR SURGERY | Facility: CLINIC | Age: 49
End: 2024-02-09
Payer: COMMERCIAL

## 2024-02-21 ENCOUNTER — TELEPHONE (OUTPATIENT)
Dept: ADMINISTRATIVE | Facility: OTHER | Age: 49
End: 2024-02-21
Payer: COMMERCIAL

## 2024-02-21 ENCOUNTER — PATIENT MESSAGE (OUTPATIENT)
Dept: ADMINISTRATIVE | Facility: OTHER | Age: 49
End: 2024-02-21
Payer: COMMERCIAL

## 2024-02-21 NOTE — TELEPHONE ENCOUNTER
Left voice message for patient to return call to schedule appointment from referral to Functional Restoration department. My Chart message to be sent..  Leslee GORE [phone]

## 2024-02-26 DIAGNOSIS — M35.01 SJOGREN'S SYNDROME WITH KERATOCONJUNCTIVITIS SICCA: ICD-10-CM

## 2024-02-26 DIAGNOSIS — M79.7 FIBROMYALGIA: ICD-10-CM

## 2024-02-26 NOTE — TELEPHONE ENCOUNTER
No care due was identified.  Health [facility] Embedded Care Due Messages. Reference number: [id number].   2/26/2024 11:47:43 AM CST

## 2024-02-27 RX ORDER — OXYCODONE AND ACETAMINOPHEN 10; 325 MG/1; MG/1
1 TABLET ORAL EVERY 12 HOURS PRN
Qty: 60 TABLET | Refills: 0 | Status: SHIPPED | OUTPATIENT
Start: 2024-03-08 | End: 2024-04-15 | Stop reason: SDUPTHER

## 2024-03-12 DIAGNOSIS — F60.5 OBSESSIVE COMPULSIVE PERSONALITY DISORDER: ICD-10-CM

## 2024-03-12 DIAGNOSIS — M35.01 SJOGREN'S SYNDROME WITH KERATOCONJUNCTIVITIS SICCA: ICD-10-CM

## 2024-03-12 DIAGNOSIS — M79.7 FIBROMYALGIA: ICD-10-CM

## 2024-03-12 DIAGNOSIS — F06.4 ANXIETY DISORDER DUE TO MEDICAL CONDITION: ICD-10-CM

## 2024-03-12 DIAGNOSIS — G47.00 INSOMNIA DISORDER, WITH NON-SLEEP DISORDER MENTAL COMORBIDITY: ICD-10-CM

## 2024-03-12 DIAGNOSIS — F31.32 BIPOLAR AFFECTIVE DISORDER, DEPRESSED, MODERATE: ICD-10-CM

## 2024-03-12 DIAGNOSIS — F41.0 PANIC ATTACKS: ICD-10-CM

## 2024-03-12 RX ORDER — HYDROXYZINE PAMOATE 25 MG/1
25 CAPSULE ORAL 2 TIMES DAILY PRN
Qty: 60 CAPSULE | Refills: 5 | OUTPATIENT
Start: 2024-03-12

## 2024-03-12 RX ORDER — FLUOXETINE HYDROCHLORIDE 40 MG/1
80 CAPSULE ORAL
Qty: 180 CAPSULE | Refills: 0 | OUTPATIENT
Start: 2024-03-12

## 2024-03-12 RX ORDER — ERGOCALCIFEROL 1.25 MG/1
50000 CAPSULE ORAL
Qty: 12 CAPSULE | Refills: 0 | Status: SHIPPED | OUTPATIENT
Start: 2024-03-12

## 2024-03-12 RX ORDER — AMITRIPTYLINE HYDROCHLORIDE 50 MG/1
50 TABLET, FILM COATED ORAL NIGHTLY
Qty: 90 TABLET | Refills: 1 | OUTPATIENT
Start: 2024-03-12

## 2024-03-12 RX ORDER — TIZANIDINE 4 MG/1
8 TABLET ORAL 2 TIMES DAILY
Qty: 120 TABLET | Refills: 0 | Status: SHIPPED | OUTPATIENT
Start: 2024-03-12

## 2024-03-12 RX ORDER — PREGABALIN 150 MG/1
150 CAPSULE ORAL 2 TIMES DAILY
Qty: 60 CAPSULE | Refills: 2 | Status: SHIPPED | OUTPATIENT
Start: 2024-03-12 | End: 2024-05-14

## 2024-03-12 RX ORDER — BUSPIRONE HYDROCHLORIDE 15 MG/1
15 TABLET ORAL 3 TIMES DAILY
Qty: 90 TABLET | Refills: 11 | OUTPATIENT
Start: 2024-03-12 | End: 2025-03-12

## 2024-03-12 NOTE — TELEPHONE ENCOUNTER
No care due was identified.  Health [facility] Embedded Care Due Messages. Reference number: [id number].   3/12/2024 10:42:37 AM CDT

## 2024-03-21 ENCOUNTER — PATIENT MESSAGE (OUTPATIENT)
Dept: PSYCHIATRY | Facility: CLINIC | Age: 49
End: 2024-03-21
Payer: COMMERCIAL

## 2024-03-21 DIAGNOSIS — F60.5 OBSESSIVE COMPULSIVE PERSONALITY DISORDER: ICD-10-CM

## 2024-03-21 DIAGNOSIS — F31.32 BIPOLAR AFFECTIVE DISORDER, DEPRESSED, MODERATE: ICD-10-CM

## 2024-03-21 RX ORDER — FLUOXETINE HYDROCHLORIDE 40 MG/1
80 CAPSULE ORAL DAILY
Qty: 180 CAPSULE | Refills: 0 | Status: SHIPPED | OUTPATIENT
Start: 2024-03-21 | End: 2024-04-09 | Stop reason: SDUPTHER

## 2024-03-22 DIAGNOSIS — G47.00 INSOMNIA DISORDER, WITH NON-SLEEP DISORDER MENTAL COMORBIDITY: ICD-10-CM

## 2024-03-25 RX ORDER — AMITRIPTYLINE HYDROCHLORIDE 50 MG/1
50 TABLET, FILM COATED ORAL NIGHTLY
Qty: 90 TABLET | Refills: 1 | Status: SHIPPED | OUTPATIENT
Start: 2024-03-25 | End: 2024-04-09 | Stop reason: SDUPTHER

## 2024-04-09 ENCOUNTER — OFFICE VISIT (OUTPATIENT)
Dept: PSYCHIATRY | Facility: CLINIC | Age: 49
End: 2024-04-09
Payer: COMMERCIAL

## 2024-04-09 VITALS
SYSTOLIC BLOOD PRESSURE: 139 MMHG | BODY MASS INDEX: 42.31 KG/M2 | DIASTOLIC BLOOD PRESSURE: 80 MMHG | HEART RATE: 95 BPM | WEIGHT: 246.5 LBS

## 2024-04-09 DIAGNOSIS — G47.00 INSOMNIA DISORDER, WITH NON-SLEEP DISORDER MENTAL COMORBIDITY: ICD-10-CM

## 2024-04-09 DIAGNOSIS — F31.32 BIPOLAR AFFECTIVE DISORDER, DEPRESSED, MODERATE: ICD-10-CM

## 2024-04-09 DIAGNOSIS — F41.0 PANIC ATTACKS: ICD-10-CM

## 2024-04-09 DIAGNOSIS — F60.5 OBSESSIVE COMPULSIVE PERSONALITY DISORDER: ICD-10-CM

## 2024-04-09 DIAGNOSIS — F06.4 ANXIETY DISORDER DUE TO MEDICAL CONDITION: ICD-10-CM

## 2024-04-09 PROCEDURE — 99999 PR PBB SHADOW E&M-EST. PATIENT-LVL III: CPT | Mod: PBBFAC,,, | Performed by: NURSE PRACTITIONER

## 2024-04-09 PROCEDURE — 99214 OFFICE O/P EST MOD 30 MIN: CPT | Mod: S$GLB,,, | Performed by: NURSE PRACTITIONER

## 2024-04-09 PROCEDURE — 3008F BODY MASS INDEX DOCD: CPT | Mod: CPTII,S$GLB,, | Performed by: NURSE PRACTITIONER

## 2024-04-09 PROCEDURE — 3075F SYST BP GE 130 - 139MM HG: CPT | Mod: CPTII,S$GLB,, | Performed by: NURSE PRACTITIONER

## 2024-04-09 PROCEDURE — 3079F DIAST BP 80-89 MM HG: CPT | Mod: CPTII,S$GLB,, | Performed by: NURSE PRACTITIONER

## 2024-04-09 RX ORDER — FLUOXETINE HYDROCHLORIDE 40 MG/1
80 CAPSULE ORAL DAILY
Qty: 180 CAPSULE | Refills: 0 | Status: SHIPPED | OUTPATIENT
Start: 2024-04-09

## 2024-04-09 RX ORDER — BUSPIRONE HYDROCHLORIDE 15 MG/1
15 TABLET ORAL 3 TIMES DAILY
Qty: 90 TABLET | Refills: 11 | Status: SHIPPED | OUTPATIENT
Start: 2024-04-09 | End: 2025-04-09

## 2024-04-09 RX ORDER — AMITRIPTYLINE HYDROCHLORIDE 50 MG/1
50 TABLET, FILM COATED ORAL NIGHTLY
Qty: 90 TABLET | Refills: 3 | Status: SHIPPED | OUTPATIENT
Start: 2024-04-09

## 2024-04-09 RX ORDER — HYDROXYZINE PAMOATE 25 MG/1
25 CAPSULE ORAL 2 TIMES DAILY PRN
Qty: 60 CAPSULE | Refills: 5 | Status: SHIPPED | OUTPATIENT
Start: 2024-04-09

## 2024-04-09 NOTE — PROGRESS NOTES
Outpatient Psychiatry Follow-Up Visit (MD/NP)    4/9/2024    Clinical Status of Patient:  Outpatient (Ambulatory)    Chief Complaint:  Alberto Frye is a 49 y.o. female who presents today for follow-up of mood disorder, anxiety, psychosis and insomnia .  Met with patient.      Last Visit:  3/10/23 Chart and  reviewed.     Interval History and Content of Current Session:  Medications per last visit:   Buspar 15 mg 2-3 x daily for anxiety  Prozac 80 mg daily (2 pills of 40 mg)  Elavil 50 mg at bedtime.   Vistaril 25 mg 1-2 daily as needed for panic attacks    Pt reports that she has been thinking about mortality a lot but not in a suicidal way. Sleeping well with Elavil. Thought processes appear clear and organized. Mood is stable; coping well with sad days.  No sx of paranoia or psychosis. Denies SI/HI/AVH.     Psychotherapy:  Target symptoms: anxiety , mood disorder, psychosis, poor sleep  Why chosen therapy is appropriate versus another modality: relevant to diagnosis, evidence based practice  Outcome monitoring methods: self-report, observation  Therapeutic intervention type: supportive psychotherapy  Topics discussed/themes:  diet, exercise, medication compliance, symptom recognition  and improvement  The patient's response to the intervention is accepting. The patient's progress toward treatment goals is good  Duration of intervention: 15 minutes.    Review of Systems   PSYCHIATRIC: Pertinant items are noted in the narrative.  CONSTITUTIONAL: No weight gain or loss.   MUSCULOSKELETAL: No pain or stiffness of the joints.  NEUROLOGIC: No weakness, sensory changes, seizures, confusion, memory loss, tremor or other abnormal movements.  ENDOCRINE: No polydipsia or polyuria.  INTEGUMENTARY: No rashes or lacerations.  EYES: No exophthalmos, jaundice or blindness.  ENT: No dizziness, tinnitus or hearing loss.  RESPIRATORY: No shortness of breath.  CARDIOVASCULAR: No tachycardia or chest  "pain.  GASTROINTESTINAL: No nausea, vomiting, pain, constipation or diarrhea.  GENITOURINARY: No frequency, dysuria or sexual dysfunction.  HEMATOLOGIC/LYMPHATIC: No excessive bleeding, prolonged or excessive bleeding after dental extraction/injury.  ALLERGIC/IMMUNOLOGIC: No allergic response to materials, foods or animals at this time.     Past Medical, Family and Social History: The patient's past medical, family and social history have been reviewed and updated as appropriate within the electronic medical record - see encounter notes.    Compliance: yes until she ran out of medication    Side effects: None    Risk Parameters:  Patient reports no suicidal ideation  Patient reports no homicidal ideation  Patient reports no self-injurious behavior  Patient reports no violent behavior    Exam (detailed: at least 9 elements; comprehensive: all 15 elements)   Constitutional  Vitals:  Most recent vital signs, dated greater than 90 days prior to this appointment, were reviewed.   Vitals:    04/09/24 1530   BP: 139/80   Pulse: 95   Weight: 111.8 kg (246 lb 7.6 oz)        General:  unremarkable, age appropriate     Musculoskeletal  Muscle Strength/Tone:  no tremor, no tic   Gait & Station:  non-ataxic     Psychiatric  Speech:  no latency; no press   Mood & Affect:  "Not good."  congruent and appropriate   Thought Process:  normal and logical   Associations:  intact   Thought Content:  normal, no suicidality, no homicidality, delusions, or paranoia,    Insight:  has awareness of illness   Judgement: behavior is adequate to circumstances   Orientation:  grossly intact   Memory: intact for content of interview   Language: grossly intact   Attention Span & Concentration:  able to focus   Fund of Knowledge:  intact and appropriate to age and level of education     Assessment and Diagnosis   Status/Progress: Based on the examination today, the patient's problem(s) is/are adequately but not ideally controlled.  New problems have " not not been presented today. Lack of compliance due to running out of medication is complicating management of the primary condition.  There are no active rule-out diagnoses for this patient at this time.     General Impression:       ICD-10-CM ICD-9-CM   1. Anxiety disorder due to medical condition  F06.4 293.84   2. Bipolar affective disorder, depressed, moderate  F31.32 296.52   3. Obsessive compulsive personality disorder  F60.5 301.4   4. Insomnia disorder, with non-sleep disorder mental comorbidity  G47.00 780.52   5. Panic attacks  F41.0 300.01         Intervention/Counseling/Treatment Plan   Medication Management: The risks and benefits of medication were discussed with the patient.  The treatment plan and follow up plan were reviewed with the patient.   Safety: Call 911 or Crisis Line or go to ER for suicidal ideation, adverse effects of medication or any other emergency  Buspar 15 mg 2-3 x daily for anxiety  Prozac 80 mg daily (2 pills of 40 mg)  Elavil 50 mg at bedtime.   Vistaril 25 mg 1-2 daily as needed for panic attacks    INSTRUCTIONS  Instructed to call 911 or Crisis Line or go to ER for suicidal ideation, adverse effects of medication or any other emergency. Verbalizes understanding and plan to comply.    Instructed to contact provider either through her [facility] account or by calling [phone] prior to running out of her medication. Verbalizes understanding and plan to comply.    Return to Clinic: 6 months

## 2024-04-15 DIAGNOSIS — M35.01 SJOGREN'S SYNDROME WITH KERATOCONJUNCTIVITIS SICCA: ICD-10-CM

## 2024-04-15 DIAGNOSIS — M79.7 FIBROMYALGIA: ICD-10-CM

## 2024-04-15 RX ORDER — OXYCODONE AND ACETAMINOPHEN 10; 325 MG/1; MG/1
1 TABLET ORAL EVERY 12 HOURS PRN
Qty: 60 TABLET | Refills: 0 | Status: SHIPPED | OUTPATIENT
Start: 2024-04-15 | End: 2024-05-14 | Stop reason: SDUPTHER

## 2024-04-15 NOTE — TELEPHONE ENCOUNTER
No care due was identified.  Health [facility] Embedded Care Due Messages. Reference number: [id number].   4/15/2024 1:00:59 PM CDT

## 2024-05-14 ENCOUNTER — OFFICE VISIT (OUTPATIENT)
Dept: RHEUMATOLOGY | Facility: CLINIC | Age: 49
End: 2024-05-14
Payer: COMMERCIAL

## 2024-05-14 ENCOUNTER — PATIENT MESSAGE (OUTPATIENT)
Dept: RHEUMATOLOGY | Facility: CLINIC | Age: 49
End: 2024-05-14
Payer: COMMERCIAL

## 2024-05-14 ENCOUNTER — LAB VISIT (OUTPATIENT)
Dept: LAB | Facility: HOSPITAL | Age: 49
End: 2024-05-14
Attending: INTERNAL MEDICINE
Payer: COMMERCIAL

## 2024-05-14 VITALS
DIASTOLIC BLOOD PRESSURE: 94 MMHG | BODY MASS INDEX: 41.4 KG/M2 | HEART RATE: 86 BPM | SYSTOLIC BLOOD PRESSURE: 142 MMHG | WEIGHT: 242.5 LBS | HEIGHT: 64 IN

## 2024-05-14 DIAGNOSIS — M79.7 FIBROMYALGIA: ICD-10-CM

## 2024-05-14 DIAGNOSIS — M35.01 SJOGREN'S SYNDROME WITH KERATOCONJUNCTIVITIS SICCA: ICD-10-CM

## 2024-05-14 DIAGNOSIS — G47.00 INSOMNIA, UNSPECIFIED TYPE: ICD-10-CM

## 2024-05-14 DIAGNOSIS — M17.12 PRIMARY OSTEOARTHRITIS OF LEFT KNEE: ICD-10-CM

## 2024-05-14 DIAGNOSIS — M79.7 FIBROMYALGIA: Primary | ICD-10-CM

## 2024-05-14 DIAGNOSIS — F41.0 PANIC ATTACKS: ICD-10-CM

## 2024-05-14 LAB
ALBUMIN SERPL BCP-MCNC: 3.9 G/DL (ref 3.5–5.2)
ALP SERPL-CCNC: 71 U/L (ref 55–135)
ALT SERPL W/O P-5'-P-CCNC: 15 U/L (ref 10–44)
ANION GAP SERPL CALC-SCNC: 10 MMOL/L (ref 8–16)
AST SERPL-CCNC: 26 U/L (ref 10–40)
BASOPHILS # BLD AUTO: 0.02 K/UL (ref 0–0.2)
BASOPHILS NFR BLD: 0.5 % (ref 0–1.9)
BILIRUB SERPL-MCNC: 0.9 MG/DL (ref 0.1–1)
BUN SERPL-MCNC: 8 MG/DL (ref 6–20)
C3 SERPL-MCNC: 130 MG/DL (ref 50–180)
C4 SERPL-MCNC: 39 MG/DL (ref 11–44)
CALCIUM SERPL-MCNC: 9.9 MG/DL (ref 8.7–10.5)
CHLORIDE SERPL-SCNC: 103 MMOL/L (ref 95–110)
CO2 SERPL-SCNC: 26 MMOL/L (ref 23–29)
CREAT SERPL-MCNC: 0.9 MG/DL (ref 0.5–1.4)
CRP SERPL-MCNC: 7.6 MG/L (ref 0–8.2)
DIFFERENTIAL METHOD BLD: ABNORMAL
EOSINOPHIL # BLD AUTO: 0.1 K/UL (ref 0–0.5)
EOSINOPHIL NFR BLD: 2.6 % (ref 0–8)
ERYTHROCYTE [DISTWIDTH] IN BLOOD BY AUTOMATED COUNT: 12.6 % (ref 11.5–14.5)
ERYTHROCYTE [SEDIMENTATION RATE] IN BLOOD BY PHOTOMETRIC METHOD: 4 MM/HR (ref 0–36)
EST. GFR  (NO RACE VARIABLE): >60 ML/MIN/1.73 M^2
GLUCOSE SERPL-MCNC: 78 MG/DL (ref 70–110)
HCT VFR BLD AUTO: 44 % (ref 37–48.5)
HGB BLD-MCNC: 14.6 G/DL (ref 12–16)
IGA SERPL-MCNC: 171 MG/DL (ref 40–350)
IGG SERPL-MCNC: 1674 MG/DL (ref 650–1600)
IGM SERPL-MCNC: 113 MG/DL (ref 50–300)
IMM GRANULOCYTES # BLD AUTO: 0.01 K/UL (ref 0–0.04)
IMM GRANULOCYTES NFR BLD AUTO: 0.2 % (ref 0–0.5)
LYMPHOCYTES # BLD AUTO: 2.3 K/UL (ref 1–4.8)
LYMPHOCYTES NFR BLD: 54.2 % (ref 18–48)
MCH RBC QN AUTO: 31.1 PG (ref 27–31)
MCHC RBC AUTO-ENTMCNC: 33.2 G/DL (ref 32–36)
MCV RBC AUTO: 94 FL (ref 82–98)
MONOCYTES # BLD AUTO: 0.4 K/UL (ref 0.3–1)
MONOCYTES NFR BLD: 8.6 % (ref 4–15)
NEUTROPHILS # BLD AUTO: 1.4 K/UL (ref 1.8–7.7)
NEUTROPHILS NFR BLD: 33.9 % (ref 38–73)
NRBC BLD-RTO: 0 /100 WBC
PLATELET # BLD AUTO: 199 K/UL (ref 150–450)
PMV BLD AUTO: 10 FL (ref 9.2–12.9)
POTASSIUM SERPL-SCNC: 3.5 MMOL/L (ref 3.5–5.1)
PROT SERPL-MCNC: 7.8 G/DL (ref 6–8.4)
RBC # BLD AUTO: 4.7 M/UL (ref 4–5.4)
SODIUM SERPL-SCNC: 139 MMOL/L (ref 136–145)
WBC # BLD AUTO: 4.17 K/UL (ref 3.9–12.7)

## 2024-05-14 PROCEDURE — 80053 COMPREHEN METABOLIC PANEL: CPT | Performed by: INTERNAL MEDICINE

## 2024-05-14 PROCEDURE — 85652 RBC SED RATE AUTOMATED: CPT | Performed by: INTERNAL MEDICINE

## 2024-05-14 PROCEDURE — 99999 PR PBB SHADOW E&M-EST. PATIENT-LVL IV: CPT | Mod: PBBFAC,,, | Performed by: INTERNAL MEDICINE

## 2024-05-14 PROCEDURE — 85025 COMPLETE CBC W/AUTO DIFF WBC: CPT | Performed by: INTERNAL MEDICINE

## 2024-05-14 PROCEDURE — 82595 ASSAY OF CRYOGLOBULIN: CPT | Performed by: INTERNAL MEDICINE

## 2024-05-14 PROCEDURE — 82784 ASSAY IGA/IGD/IGG/IGM EACH: CPT | Mod: 59 | Performed by: INTERNAL MEDICINE

## 2024-05-14 PROCEDURE — 99214 OFFICE O/P EST MOD 30 MIN: CPT | Mod: 25,S$GLB,, | Performed by: INTERNAL MEDICINE

## 2024-05-14 PROCEDURE — 86160 COMPLEMENT ANTIGEN: CPT | Mod: 59 | Performed by: INTERNAL MEDICINE

## 2024-05-14 PROCEDURE — 86160 COMPLEMENT ANTIGEN: CPT | Performed by: INTERNAL MEDICINE

## 2024-05-14 PROCEDURE — 3008F BODY MASS INDEX DOCD: CPT | Mod: CPTII,S$GLB,, | Performed by: INTERNAL MEDICINE

## 2024-05-14 PROCEDURE — 3077F SYST BP >= 140 MM HG: CPT | Mod: CPTII,S$GLB,, | Performed by: INTERNAL MEDICINE

## 2024-05-14 PROCEDURE — 1159F MED LIST DOCD IN RCRD: CPT | Mod: CPTII,S$GLB,, | Performed by: INTERNAL MEDICINE

## 2024-05-14 PROCEDURE — 86140 C-REACTIVE PROTEIN: CPT | Performed by: INTERNAL MEDICINE

## 2024-05-14 PROCEDURE — 36415 COLL VENOUS BLD VENIPUNCTURE: CPT | Performed by: INTERNAL MEDICINE

## 2024-05-14 PROCEDURE — 20610 DRAIN/INJ JOINT/BURSA W/O US: CPT | Mod: LT,S$GLB,, | Performed by: INTERNAL MEDICINE

## 2024-05-14 PROCEDURE — 3080F DIAST BP >= 90 MM HG: CPT | Mod: CPTII,S$GLB,, | Performed by: INTERNAL MEDICINE

## 2024-05-14 RX ORDER — OXYCODONE AND ACETAMINOPHEN 10; 325 MG/1; MG/1
1 TABLET ORAL EVERY 12 HOURS PRN
Qty: 60 TABLET | Refills: 0 | Status: SHIPPED | OUTPATIENT
Start: 2024-05-14

## 2024-05-14 NOTE — PROGRESS NOTES
Subjective:     Patient ID: Alberto Frye is a 49 y.o. female followed by Dr. Hughes for Sjogren's syndrome w KCS, fibromyalgia & LBP    Chief Complaint: No chief complaint on file.       HPI      5/2024    Sjogren's syndrome  Burning sensation in the mouth and tongue and dryness in the mouth- lozenges and biotene spray  Dry vagina- lubricants  Dry skin  Dry eyes- OTC eye drops  These measures help her     Fibromyalgia   Low back pain  Hip pain  Muscle aches    Osteoarthritis- right knee,cervical and lumbar spine  Knee pain- right knee degenerative changes 2023 xray  Neck pain- mild spondylitis  Low back pain  LS spine xray - Intervertebral disc space narrowing with degenerative changes and sclerosis are seen at the L4-5 level.     Takes percocet 10 325 mg bid prn  Lyrica 150 mg bid - gives her a stutter, so she stopped it  Tizanidine 4 mg bed time    Fatigue    Insomnia on elavil 50 mg     Depression on buspar    1/2024    She saw Dr. Hughes last  on 10/24/23    She returns for follow up stating that she's been in a flare for a while. She mistakenly believes that her sxs are all due to Sjogrens.  Actually, she is c/o pain all over & extreme fatigue.   Also admits to dryness in her mouth & burning of her tongue & dry, itchy skin  She's been on HCQ since 2020 & feels initially it may have helped her fatigue,but no longer seems to do so.  She uses OTC artificial tears & biotene products & they do help some    She has pain & swelling with flexion of her L 3rd PIP--otherwise hands are not a big issue.  She does not recall trauma to this joint.  Muscles always feel stiff & achy & she often feels pain all over her body, sleep disturbance, concentration issues& remembering things. She is often anxious & stress makes her worse.   Has diffuse foot pain w paresthesias.  Has myalgias of her arms and pain over lateral aspect of her hips.  Has severe AM stiffness    AM stiffness +    .  joint pain +  LBP +  joint swelling L  3rd  Raynaud's--possibly  dysphagia,   tight skin  alopecia,   oral/nasal ulcers   parotid gland swelling  dry eyes +  dry mouth +  pleurisy  pericarditis  photosensitivity  skin rashes  miscarriages  Thromboses + post surgery remotely & again iliac artery 8/2020  muscle weakness + LE mostly  fevers  headaches  conjunctivitis  chronic or bloody diarrhea  vaginal/urethral D/C   paresthesias   thyroid issues        Current Outpatient Medications   Medication Sig Dispense Refill    amitriptyline (ELAVIL) 50 MG tablet Take 1 tablet (50 mg total) by mouth every evening. 90 tablet 3    apixaban (ELIQUIS) 5 mg Tab Take 1 tablet (5 mg total) by mouth 2 (two) times daily. 60 tablet 11    busPIRone (BUSPAR) 15 MG tablet Take 1 tablet (15 mg total) by mouth 3 (three) times daily. 90 tablet 11    diclofenac sodium (VOLTAREN) 1 % Gel APPLY 4 GRAMS  TOPICALLY 4 TIMES DAILY TO AFFECTED AREA. DO NOT APPLY MORE THAN 16GM DAILY TO ANY ONE AFFECTED JOINT 100 g 0    docusate sodium (COLACE) 100 MG capsule Take 1 capsule (100 mg total) by mouth 2 (two) times daily. 180 capsule 3    ergocalciferol (ERGOCALCIFEROL) 50,000 unit Cap Take 1 capsule (50,000 Units total) by mouth every 7 days. 12 capsule 0    FLUoxetine 40 MG capsule Take 2 capsules (80 mg total) by mouth once daily. 180 capsule 0    hydroxychloroquine (PLAQUENIL) 200 mg tablet Take 1 tablet (200 mg total) by mouth 2 (two) times daily. 60 tablet 6    hydrOXYzine pamoate (VISTARIL) 25 MG Cap Take 1 capsule (25 mg total) by mouth 2 (two) times daily as needed (panic attacks). 60 capsule 5    ipratropium (ATROVENT) 21 mcg (0.03 %) nasal spray INSTILL 2 SPRAY(S) IN EACH NOSTRIL THREE TIMES DAILY 30 mL 3    polyethylene glycol (GLYCOLAX) 17 gram PwPk Take 17 g by mouth once daily. 90 each 3    tiZANidine (ZANAFLEX) 4 MG tablet Take 2 tablets (8 mg total) by mouth 2 (two) times daily. 120 tablet 0    valsartan-hydrochlorothiazide (DIOVAN-HCT) 160-25 mg per tablet Take 1 tablet by  mouth once daily. 90 tablet 3    oxyCODONE-acetaminophen (PERCOCET)  mg per tablet  mg bid 60 tablet 0    oxyCODONE-acetaminophen (PERCOCET)  mg per tablet Take 1 tablet by mouth every 12 (twelve) hours as needed for Pain. 60 tablet 0     Current Facility-Administered Medications   Medication Dose Route Frequency Provider Last Rate Last Admin    LIDOcaine HCL 20 mg/ml (2%) injection 2 mL  2 mL Other 1 time in Clinic/HOD         triamcinolone acetonide injection 40 mg  40 mg Intra-articular 1 time in Clinic/HOD            Review of patient's allergies indicates:   Allergen Reactions    Morphine Itching and Hallucinations    Pcn [penicillins] Other (See Comments)     Was told from childhood she couldn't take it    Sulfa (sulfonamide antibiotics) Nausea And Vomiting    Latex, natural rubber Rash       Review of Systems   Constitutional:  Positive for fatigue. Negative for fever and unexpected weight change.   HENT:  Positive for trouble swallowing. Negative for mouth sores.    Eyes:  Positive for redness.   Respiratory:  Positive for shortness of breath. Negative for cough.    Cardiovascular:  Positive for chest pain.   Gastrointestinal:  Positive for constipation. Negative for diarrhea.   Endocrine: Negative for cold intolerance and heat intolerance.   Genitourinary:  Positive for frequency. Negative for dysuria and genital sores.        Had hysterectomy & BSO many yrs ago   Musculoskeletal:  Positive for arthralgias, back pain, myalgias, neck pain and neck stiffness. Negative for joint swelling (L 3rd PIP).   Skin: Negative.  Negative for rash.   Neurological:  Negative for headaches.   Hematological:  Bruises/bleeds easily.   Psychiatric/Behavioral:  Positive for sleep disturbance. The patient is nervous/anxious.        Past Medical History:   Diagnosis Date    AYDE (acute kidney injury) 11/1/2022    Alcohol abuse     stopped heavy drinking about 10 years ago; was drinking 3 glasses of  vodka/tequilla,rum/whiskey per day    Allergy Don't remember    Its been some years.    Anxiety     Arthritis 2010    Blood clotting tendency 2008    After a procedure & 2020.    Congestive heart failure 8/11/2020    Depression     Hallucination     Hx of psychiatric care     Hypertension     Immune disorder 2020    Joint pain 2010    Keloid cicatrix Years ago    From childhood scars    Caro     unplanned trips, energy without sleep for 2 days (reading, cleaning), feelings that she can do multiple tasks at one time, feelings of overconfidence at times    Psychiatric problem     Sleep difficulties     Therapy     Withdrawal symptoms, drug or narcotic     racing heart, restlessness       Past Surgical History:   Procedure Laterality Date    ESOPHAGOGASTRODUODENOSCOPY N/A 06/03/2020    Procedure: EGD (ESOPHAGOGASTRODUODENOSCOPY);  Surgeon: Johan Grover MD;  Location: [facility] (4TH FLR);  Service: Endoscopy;  Laterality: N/A;  covid 6/2-[facility]-tb    HYSTERECTOMY      LAPBAND  2009    PHLEBOGRAPHY Left 08/12/2020    Procedure: Venogram, pharmacomechanical thrombectomy;  Surgeon: Miguelangel Goldman MD;  Location: [facility] OR [address] [place] [address];  Service: Vascular;  Laterality: Left;  2336.43 mGy  [id number]  17.8 minutes  37 ml of contrast    VENOPLASTY Left 08/12/2020    Procedure: ANGIOPLASTY, VEIN;  Surgeon: Miguelangel Goldman MD;  Location: [facility] OR [address] [place] [address];  Service: Vascular;  Laterality: Left;       Family History   Problem Relation Name Age of Onset    Diabetes Mother      Cancer Mother      Hypertension Mother      No Known Problems Father      No Known Problems Sister      No Known Problems Brother      No Known Problems Maternal Aunt      Cirrhosis Maternal Uncle          ETOH    No Known Problems Paternal Aunt      No Known Problems Paternal Uncle      No Known Problems Maternal Grandmother      No Known Problems Maternal Grandfather      No Known Problems Paternal Grandmother      No Known  "Problems Paternal Grandfather      No Known Problems Other      Celiac disease Neg Hx      Colon cancer Neg Hx      Colon polyps Neg Hx      Crohn's disease Neg Hx      Esophageal cancer Neg Hx      Inflammatory bowel disease Neg Hx      Liver cancer Neg Hx      Liver disease Neg Hx      Rectal cancer Neg Hx      Stomach cancer Neg Hx      Ulcerative colitis Neg Hx     3 sons & 1 granddaughter in good health    Social History     Tobacco Use    Smoking status: Former     Current packs/day: 0.00     Types: Cigarettes    Smokeless tobacco: Never    Tobacco comments:     quit in october 2016   Substance Use Topics    Alcohol use: Yes     Alcohol/week: 1.0 - 3.0 standard drink of alcohol     Types: 1 - 3 Glasses of wine per week     Comment: social presently, excessive 10 years ago    Drug use: Not Currently     Types: Marijuana     Comment: uses THCA weekly   Lives alone.  Works for [employer].    Objective:     BP (!) 142/94   Pulse 86   Ht 5' 4" (1.626 m)   Wt 110 kg (242 lb 8.1 oz)   BMI 41.63 kg/m²     Physical Exam   Constitutional: She is oriented to person, place, and time. She appears well-developed and well-nourished. No distress.   HENT:   Head: Normocephalic and atraumatic.   Mouth/Throat: Oropharynx is clear and moist. Mucous membranes are moist. No oropharyngeal exudate. Oropharynx is clear.   No facial rashes  Parotids not enlarged     Mouth/Throat: Teeth in good repair  Some moisture in oropharynx  Eyes: Pupils are equal, round, and reactive to light. Conjunctivae are normal. Right eye exhibits no discharge. Left eye exhibits no discharge. No scleral icterus.   Neck: No JVD present. No tracheal deviation present. No thyromegaly present.   Cardiovascular: Regular rhythm. Exam reveals no gallop and no friction rub.   No murmur heard.  Pulmonary/Chest: Effort normal and breath sounds normal. No respiratory distress. She has no wheezes. She has no rales. She exhibits no tenderness.   Abdominal: Soft. Bowel " sounds are normal. She exhibits no distension and no mass. There is no abdominal tenderness. There is no rebound and no guarding.   Mild diffuse TTP   Musculoskeletal:         General: No deformity. Normal range of motion.      Cervical back: Normal range of motion and neck supple.      Comments: No synovitis anywhere.  FROM all joints  18/18 tender points rated 5/5     Lymphadenopathy:     She has no cervical adenopathy.   Neurological: She is alert and oriented to person, place, and time. She has normal reflexes. No cranial nerve deficit. Gait normal.   Skin: Skin is warm and dry. She is not diaphoretic.   Psychiatric: Mood, memory, affect and thought content normal.   Vitals reviewed.        11/9/23: CBC ok; CMP glu 143;   10/24/23: ESR 15; CRP 33.00; ; C3/C4 ok; Vit D 24 1/5/23: GIOVANNY 1:320H; +SSA; myomarker all neg; RF neg;   3/5/18: APS: neg    Assessment:       Sjogren's syndrome  Burning sensation in the mouth and tongue and dryness in the mouth- lozenges and biotene spray,sugar free gum  Dry vagina- lubricants  Dry skin  Dry eyes- OTC eye drops  These measures help her     Fibromyalgia   Low back pain  Hip pain  Muscle aches    Osteoarthritis- right knee,cervical and lumbar spine  Knee pain- right knee degenerative changes 2023 xray  Neck pain- mild spondylitis  Low back pain  LS spine xray - Intervertebral disc space narrowing with degenerative changes and sclerosis are seen at the L4-5 level.     Takes percocet 10 325 mg bid prn  Lyrica 150 mg bid - gives her a stutter, so she stopped it  Tizanidine 4 mg bed time    Fatigue    Walks on treadmills twice a week    Sleep d/o  Insomnia on elavil 50 mg   Sleep study 12 years ago- doesn't know the results  On THC gummies    Depression on buspar    Itch on hydroxyzine vs allegra      Other diagnoses :    Thrombophilia S/P LLE DVT  w neg APS labs    Elevated BP in face of HTN    Psych dx: anxiety/depression/aidan/bipolar ds    ETOH use w heavy use in  past    Vitamin d insufficiency--chronic    Chronically elevated CRP    Morbid obesity    Plan:     Inject the left knee - for osteoarthritis  Verbal consent taken  Risks and benefits explained  Superolateral pole identified  Anesthetic sprayed  2 ml lidocaine with 40 mg kenalog injected  She did well      Water aerobics    Sleep study    Mediterranean diet    CBT     Weight loss  She has lost 10 pounds    Sjogren's labs today    Plaquenil 200 mg bid - as per prior rheumatologist- predominantly for arthralgias it looks like    No changes in pain management     Ophthalmology regularly    Dentist regularly    OB regularly     Regular/daily dedicated, low impact aerobic exercise was especially emphasized. Strategies for success were offered.  Additional mind body exercise such as yoga or variations (chair yoga) and/or Emmanuel Chi have been validated.    Duloxetine has been approved for fibromyalgia. Her psychiatrist may want to consider switching to it, although caution should  Be exercised if she has bipolar disorder. This was discussed.   Seratonin syndrome was discussed.    The [facility] was also reviewed with the patient. She may be a good candidate if she could find the time.    RTC 6 months

## 2024-05-14 NOTE — PROGRESS NOTES
5/8/2024    11:14 AM   Rapid3 Question Responses and Scores   MDHAQ Score 0.9   Psychologic Score 4.4   Pain Score 9   When you awakened in the morning OVER THE LAST WEEK, did you feel stiff? Yes   If Yes, please indicate the number of hours until you are as limber as you will be for the day 2   Fatigue Score 9.5   Global Health Score 7   RAPID3 Score 6.33     Answers submitted by the patient for this visit:  Rheumatology Questionnaire (Submitted on 5/8/2024)  fever: No  eye redness: No  mouth sores: No  headaches: Yes  shortness of breath: No  chest pain: Yes  trouble swallowing: No  diarrhea: No  constipation: No  unexpected weight change: No  genital sore: No  dysuria: No  During the last 3 days, have you had a skin rash?: No  Bruises or bleeds easily: Yes  cough: No

## 2024-05-19 RX ORDER — TRIAMCINOLONE ACETONIDE 40 MG/ML
40 INJECTION, SUSPENSION INTRA-ARTICULAR; INTRAMUSCULAR
Status: COMPLETED | OUTPATIENT
Start: 2024-05-19 | End: 2024-05-24

## 2024-05-19 RX ORDER — OXYCODONE AND ACETAMINOPHEN 10; 325 MG/1; MG/1
TABLET ORAL
Qty: 60 TABLET | Refills: 0 | Status: SHIPPED | OUTPATIENT
Start: 2024-05-19

## 2024-05-19 RX ORDER — LIDOCAINE HYDROCHLORIDE 20 MG/ML
2 INJECTION, SOLUTION INFILTRATION; PERINEURAL
Status: COMPLETED | OUTPATIENT
Start: 2024-05-19 | End: 2024-05-24

## 2024-05-24 RX ADMIN — LIDOCAINE HYDROCHLORIDE 2 ML: 20 INJECTION, SOLUTION INFILTRATION; PERINEURAL at 09:05

## 2024-05-24 RX ADMIN — TRIAMCINOLONE ACETONIDE 40 MG: 40 INJECTION, SUSPENSION INTRA-ARTICULAR; INTRAMUSCULAR at 09:05

## 2024-06-05 ENCOUNTER — OFFICE VISIT (OUTPATIENT)
Dept: CARDIOLOGY | Facility: CLINIC | Age: 49
End: 2024-06-05
Payer: COMMERCIAL

## 2024-06-05 VITALS
HEART RATE: 100 BPM | DIASTOLIC BLOOD PRESSURE: 82 MMHG | BODY MASS INDEX: 41.23 KG/M2 | HEIGHT: 64 IN | SYSTOLIC BLOOD PRESSURE: 124 MMHG | WEIGHT: 241.5 LBS

## 2024-06-05 DIAGNOSIS — I10 ESSENTIAL HYPERTENSION: Primary | ICD-10-CM

## 2024-06-05 DIAGNOSIS — E78.5 DYSLIPIDEMIA: ICD-10-CM

## 2024-06-05 DIAGNOSIS — R00.2 PALPITATIONS: ICD-10-CM

## 2024-06-05 DIAGNOSIS — R06.09 DOE (DYSPNEA ON EXERTION): ICD-10-CM

## 2024-06-05 PROCEDURE — 1159F MED LIST DOCD IN RCRD: CPT | Mod: CPTII,S$GLB,, | Performed by: INTERNAL MEDICINE

## 2024-06-05 PROCEDURE — 3008F BODY MASS INDEX DOCD: CPT | Mod: CPTII,S$GLB,, | Performed by: INTERNAL MEDICINE

## 2024-06-05 PROCEDURE — 99999 PR PBB SHADOW E&M-EST. PATIENT-LVL IV: CPT | Mod: PBBFAC,,, | Performed by: INTERNAL MEDICINE

## 2024-06-05 PROCEDURE — 93000 ELECTROCARDIOGRAM COMPLETE: CPT | Mod: S$GLB,,, | Performed by: INTERNAL MEDICINE

## 2024-06-05 PROCEDURE — 99214 OFFICE O/P EST MOD 30 MIN: CPT | Mod: S$GLB,,, | Performed by: INTERNAL MEDICINE

## 2024-06-05 PROCEDURE — 3074F SYST BP LT 130 MM HG: CPT | Mod: CPTII,S$GLB,, | Performed by: INTERNAL MEDICINE

## 2024-06-05 PROCEDURE — 3079F DIAST BP 80-89 MM HG: CPT | Mod: CPTII,S$GLB,, | Performed by: INTERNAL MEDICINE

## 2024-06-05 NOTE — PROGRESS NOTES
Subjective   Patient ID:  Alberto Frye is a 49 y.o. 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 Alberto Frye is a 45 y.o. female with HTN, anxiety, alcohol abuse, DVT (10 years ago, treated with IVC filter, warfarin) who was transferred to [facility] [place] for thrombectomy/stent placement of left lower limb thrombus.  [facility] with one day of left thigh pain and leg swelling.  Patient states that this pain is in the same leg and pain is same as previous DVT diagnosed 10 years ago while in hospital for hysterectomy.  DVT at that time was treated with IVC filter and pt was on warfarin for a few months.  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  Denies any recent long distance travel, trauma, OCP/hormone replacement use.  Patient denies any fevers, chills, night sweats, chest pain, SOB, palpitations.  Patient is a former smoker, 10-15y of ~1pack/month, quit in 2016.  Patient states that she drinks socially.       Left lower limb ultrasound on 8/11 showed occlusive thrombus involving left common femoral, popliteal, upper greater saphenous, left iliac vein and distal inferior vena cava.  Patient started on lovenox.  Patient was seen by vascular surgery (Dr Goldman) who decided to transfer patient to [facility] [place] for thrombectomy/stent placement.      Patient underwent thrombectomy with vascular surgery 8/12/20, tolerating post-procedural symptoms well. Vascular initially managing heparin gtt. On 8/13/20, one day post-op vascular surgery recommending changing heparin gtt to apixaban. Patient with mild hematuria, UA showing no signs of infection, 3+ blood. And will w/u OP /IP for hypercoagulable etiologies to patient's presentation. HIT panel negative with low platelets, but platelet counts improved and stabilized at slightly low levels  before discharge. The patient's blood pressure was normal in the hospital with her clonidine patch so her other home blood pressure meds were not re-started, and she will be followed up with out-patient within 1 week with her PCP for a new CBC to look at her H/h, along with her platelet counts, and for her blood pressure monitoring. Pt will also follow up with vascular.      * Acute deep vein thrombosis (DVT) of iliac vein of left lower extremity-resolved as of 8/15/2020  US LE shows Left thigh vein occlusive thrombus involving the common femoral, popliteal, and upper greater saphenous veins with minimal flow.  Patient had previous DVT in same leg ~10 years ago, treated with IVC filter and warfarin for a few months.  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       - vascular surgery took patient to OR s/p thrombectomy on 8/13/20. Heparin gtt transitioned to apixaban 10mg BID  - Patient on protonix 40mg daily  - Patient with down trending platelets will continue to monitor. High 4t's score this AM, negative HIT. Uptrending plts thie PM. Patient transitioned to apixaban.  - Patient with hematuria yesterday; no evidence of bleeding or hematuria today. Plts <50k and bleeding will transfuse     Echo 4/20/22  The left ventricle is normal in size with normal systolic function.  The estimated ejection fraction is 60%.  Normal left ventricular diastolic function.  Normal right ventricular size with normal right ventricular systolic function.  Mild left atrial enlargement.  Mild mitral regurgitation.  Mild tricuspid regurgitation.  Normal central venous pressure (3 mmHg).  The estimated PA systolic pressure is 33 mmHg.     Holter 4/20/22  Sinus rhythm with heart rates varying between 67 and 119 BPM with an average of 86 BPM.  There were PVCs totalling 0  There were very rare PACs totalling 1 and averaging 0.02 per hour.     LE  "venous US 5/2/22  There is no evidence of a right lower extremity DVT.  There is no evidence of a left lower extremity DVT.           8/19/20 Denies CP or SOB. Started with a mild rash since being placed on eliquis  Scheduled to f/u with vascular surgery at [facility] and see hematology tomorrow  BP well controlled   Recurrent DVT per hematology and vascular surgery  ? Allergic reaction to eliquis - will observe  Echo with good EF - no evidence of CHF  HTN under control  Will f/u 1 year     Went to the ER 3/23/22  Alberto Frye is a 47 y.o female with a PMHx of CHF, HTN, and DVT presenting to the ED with a chief complaint of chest pain onset "a while ago". The patient complains of chest pain and tightness that has been present for a while and is not severe, is just a "constant feeling". Additionally complains of 12/10 left leg pain that started several days ago and has been consistently worsening. States she has had 2 DVT's in the past with her last being in 2020, and states she thinks she might have another one right now. Patient is compliant with Eliquis. States her rheumatologist diagnosed her with fibromyalgia earlier this week. Reports chronic Norco use secondary to having a torn meniscus in her right leg. Reports quitting cigarette smoking in 2016. Denies history of asthma and COPD. Additionally denies shortness of breath, abdominal pain, diarrhea, and vomiting. No other associated symptoms.   EKG 3/23/22 NSR NSSTT changes     4/5/22 Still with episodic CP, heart racing and LEON  BP controlled  Echo and lexiscan myoview for CP and LEON - cannot walk treadmill due to leg pain from DVT  Holter for palpitations  Continue Rx for HTN, HLD, DM     5/3/22 Stress test was denied by insurance. Still with occasional ches tightness - feels like GERD. Stable LEON  CP atypical - if this progresses would recommend [facility]  Continue Rx for HTN, HLD, DM  OV 6 months    6/5/24 CP improved. Recently with heart pounding and palpitations " when she gets anxious - daily. Mild LEON  EKG NSR NSSTT changes  BP controlled    Review of Systems   Constitutional: Negative for decreased appetite.   HENT:  Negative for ear discharge.    Eyes:  Negative for blurred vision.   Respiratory:  Negative for hemoptysis.    Endocrine: Negative for polyphagia.   Hematologic/Lymphatic: Negative for adenopathy.   Skin:  Negative for color change.   Musculoskeletal:  Negative for joint swelling.   Genitourinary:  Negative for bladder incontinence.   Neurological:  Negative for brief paralysis.   Psychiatric/Behavioral:  Negative for hallucinations.    Allergic/Immunologic: Negative for hives.          Objective     Physical Exam  Constitutional:       Appearance: She is well-developed.   HENT:      Head: Normocephalic and atraumatic.   Eyes:      Conjunctiva/sclera: Conjunctivae normal.      Pupils: Pupils are equal, round, and reactive to light.   Cardiovascular:      Rate and Rhythm: Normal rate.      Pulses: Intact distal pulses.      Heart sounds: Normal heart sounds.   Pulmonary:      Effort: Pulmonary effort is normal.      Breath sounds: Normal breath sounds.   Abdominal:      General: Bowel sounds are normal.      Palpations: Abdomen is soft.   Musculoskeletal:         General: Normal range of motion.      Cervical back: Normal range of motion and neck supple.   Skin:     General: Skin is warm and dry.   Neurological:      Mental Status: She is alert and oriented to person, place, and time.            Assessment and Plan     1. Essential hypertension    2. Dyslipidemia    3. Palpitations    4. LEON (dyspnea on exertion)        Plan:     Echo and holter for recent palpitations  Continue Rx for HTN, HLD, DM    Advance Care Planning     Date: 06/05/2024  Patient did not wish or was not able to name a surrogate decision maker or provide an Advance Care Plan.

## 2024-06-06 LAB
OHS QRS DURATION: 80 MS
OHS QTC CALCULATION: 456 MS

## 2024-06-14 ENCOUNTER — HOSPITAL ENCOUNTER (OUTPATIENT)
Dept: CARDIOLOGY | Facility: HOSPITAL | Age: 49
Discharge: HOME | End: 2024-06-14
Attending: INTERNAL MEDICINE
Payer: COMMERCIAL

## 2024-06-14 VITALS — WEIGHT: 241 LBS | HEIGHT: 64 IN | BODY MASS INDEX: 41.15 KG/M2

## 2024-06-14 DIAGNOSIS — R06.09 DOE (DYSPNEA ON EXERTION): ICD-10-CM

## 2024-06-14 DIAGNOSIS — R00.2 PALPITATIONS: ICD-10-CM

## 2024-06-14 DIAGNOSIS — E78.5 DYSLIPIDEMIA: ICD-10-CM

## 2024-06-14 DIAGNOSIS — I10 ESSENTIAL HYPERTENSION: ICD-10-CM

## 2024-06-14 LAB
ASCENDING AORTA: 2.35 CM
AV INDEX (PROSTH): 0.58
AV MEAN GRADIENT: 5 MMHG
AV PEAK GRADIENT: 8 MMHG
AV VALVE AREA BY VELOCITY RATIO: 1.43 CM²
AV VALVE AREA: 1.84 CM²
AV VELOCITY RATIO: 0.45
BSA FOR ECHO PROCEDURE: 2.22 M2
CV ECHO LV RWT: 0.38 CM
DOP CALC AO PEAK VEL: 1.44 M/S
DOP CALC AO VTI: 27 CM
DOP CALC LVOT AREA: 3.2 CM2
DOP CALC LVOT DIAMETER: 2.01 CM
DOP CALC LVOT PEAK VEL: 0.65 M/S
DOP CALC LVOT STROKE VOLUME: 49.79 CM3
DOP CALC MV VTI: 20.1 CM
DOP CALCLVOT PEAK VEL VTI: 15.7 CM
E WAVE DECELERATION TIME: 193.47 MSEC
E/A RATIO: 0.99
E/E' RATIO: 6.87 M/S
ECHO LV POSTERIOR WALL: 0.97 CM (ref 0.6–1.1)
FRACTIONAL SHORTENING: 26 % (ref 28–44)
INTERVENTRICULAR SEPTUM: 0.69 CM (ref 0.6–1.1)
IVC DIAMETER: 1.34 CM
LA MAJOR: 4.01 CM
LA MINOR: 4.4 CM
LA WIDTH: 3.5 CM
LEFT ATRIUM SIZE: 3.87 CM
LEFT ATRIUM VOLUME INDEX: 22.8 ML/M2
LEFT ATRIUM VOLUME: 48.31 CM3
LEFT INTERNAL DIMENSION IN SYSTOLE: 3.81 CM (ref 2.1–4)
LEFT VENTRICLE DIASTOLIC VOLUME INDEX: 59.51 ML/M2
LEFT VENTRICLE DIASTOLIC VOLUME: 126.17 ML
LEFT VENTRICLE MASS INDEX: 70 G/M2
LEFT VENTRICLE SYSTOLIC VOLUME INDEX: 29.5 ML/M2
LEFT VENTRICLE SYSTOLIC VOLUME: 62.52 ML
LEFT VENTRICULAR INTERNAL DIMENSION IN DIASTOLE: 5.14 CM (ref 3.5–6)
LEFT VENTRICULAR MASS: 149.22 G
LV LATERAL E/E' RATIO: 6.58 M/S
LV SEPTAL E/E' RATIO: 7.18 M/S
LVOT MG: 0.93 MMHG
LVOT MV: 0.46 CM/S
MV MEAN GRADIENT: 1 MMHG
MV PEAK A VEL: 0.8 M/S
MV PEAK E VEL: 0.79 M/S
MV PEAK GRADIENT: 2 MMHG
MV STENOSIS PRESSURE HALF TIME: 56.11 MS
MV VALVE AREA BY CONTINUITY EQUATION: 2.48 CM2
MV VALVE AREA P 1/2 METHOD: 3.92 CM2
PISA TR MAX VEL: 2.35 M/S
RA PRESSURE ESTIMATED: 8 MMHG
RV TB RVSP: 10 MMHG
RV TISSUE DOPPLER FREE WALL SYSTOLIC VELOCITY 1 (APICAL 4 CHAMBER VIEW): 11.39 CM/S
SINUS: 2.85 CM
STJ: 2.61 CM
TDI LATERAL: 0.12 M/S
TDI SEPTAL: 0.11 M/S
TDI: 0.12 M/S
TR MAX PG: 22 MMHG
TRICUSPID ANNULAR PLANE SYSTOLIC EXCURSION: 2.05 CM
TV REST PULMONARY ARTERY PRESSURE: 30 MMHG
Z-SCORE OF LEFT VENTRICULAR DIMENSION IN END DIASTOLE: -2.65
Z-SCORE OF LEFT VENTRICULAR DIMENSION IN END SYSTOLE: -0.56

## 2024-06-14 PROCEDURE — 93306 TTE W/DOPPLER COMPLETE: CPT

## 2024-06-14 PROCEDURE — 93226 XTRNL ECG REC<48 HR SCAN A/R: CPT

## 2024-06-14 PROCEDURE — 93306 TTE W/DOPPLER COMPLETE: CPT | Mod: 26,,, | Performed by: INTERNAL MEDICINE

## 2024-06-14 PROCEDURE — 93227 XTRNL ECG REC<48 HR R&I: CPT | Mod: ,,, | Performed by: INTERNAL MEDICINE

## 2024-06-17 DIAGNOSIS — K59.09 CONSTIPATION, CHRONIC: ICD-10-CM

## 2024-06-17 LAB
OHS CV EVENT MONITOR DAY: 2
OHS CV HOLTER LENGTH DECIMAL HOURS: 96
OHS CV HOLTER LENGTH HOURS: 48
OHS CV HOLTER LENGTH MINUTES: 0
OHS CV HOLTER SINUS AVERAGE HR: 91
OHS CV HOLTER SINUS MAX HR: 129
OHS CV HOLTER SINUS MIN HR: 71

## 2024-06-17 RX ORDER — DOCUSATE SODIUM 100 MG/1
100 CAPSULE, LIQUID FILLED ORAL 2 TIMES DAILY
Qty: 180 CAPSULE | Refills: 3 | Status: SHIPPED | OUTPATIENT
Start: 2024-06-17

## 2024-06-17 NOTE — TELEPHONE ENCOUNTER
No care due was identified.  [facility] Embedded Care Due Messages. Reference number: [id number].   6/17/2024 12:31:26 PM CDT

## 2024-06-20 DIAGNOSIS — M79.7 FIBROMYALGIA: ICD-10-CM

## 2024-06-20 DIAGNOSIS — M35.01 SJOGREN'S SYNDROME WITH KERATOCONJUNCTIVITIS SICCA: ICD-10-CM

## 2024-06-20 RX ORDER — OXYCODONE AND ACETAMINOPHEN 10; 325 MG/1; MG/1
1 TABLET ORAL EVERY 12 HOURS PRN
Qty: 60 TABLET | Refills: 0 | Status: SHIPPED | OUTPATIENT
Start: 2024-06-20

## 2024-07-02 ENCOUNTER — OFFICE VISIT (OUTPATIENT)
Dept: CARDIOLOGY | Facility: CLINIC | Age: 49
End: 2024-07-02
Payer: COMMERCIAL

## 2024-07-02 VITALS
BODY MASS INDEX: 40.88 KG/M2 | SYSTOLIC BLOOD PRESSURE: 108 MMHG | OXYGEN SATURATION: 98 % | WEIGHT: 239.44 LBS | HEIGHT: 64 IN | DIASTOLIC BLOOD PRESSURE: 64 MMHG | HEART RATE: 86 BPM

## 2024-07-02 DIAGNOSIS — E78.5 DYSLIPIDEMIA: ICD-10-CM

## 2024-07-02 DIAGNOSIS — R06.09 DOE (DYSPNEA ON EXERTION): ICD-10-CM

## 2024-07-02 DIAGNOSIS — I10 ESSENTIAL HYPERTENSION: Primary | ICD-10-CM

## 2024-07-02 DIAGNOSIS — R00.2 PALPITATIONS: ICD-10-CM

## 2024-07-02 PROCEDURE — 3078F DIAST BP <80 MM HG: CPT | Mod: CPTII,S$GLB,, | Performed by: INTERNAL MEDICINE

## 2024-07-02 PROCEDURE — 99999 PR PBB SHADOW E&M-EST. PATIENT-LVL IV: CPT | Mod: PBBFAC,,, | Performed by: INTERNAL MEDICINE

## 2024-07-02 PROCEDURE — 3074F SYST BP LT 130 MM HG: CPT | Mod: CPTII,S$GLB,, | Performed by: INTERNAL MEDICINE

## 2024-07-02 PROCEDURE — 99214 OFFICE O/P EST MOD 30 MIN: CPT | Mod: S$GLB,,, | Performed by: INTERNAL MEDICINE

## 2024-07-02 PROCEDURE — 3008F BODY MASS INDEX DOCD: CPT | Mod: CPTII,S$GLB,, | Performed by: INTERNAL MEDICINE

## 2024-07-02 PROCEDURE — 1159F MED LIST DOCD IN RCRD: CPT | Mod: CPTII,S$GLB,, | Performed by: INTERNAL MEDICINE

## 2024-07-02 NOTE — PROGRESS NOTES
Subjective   Patient ID:  Alberto Frye is a 49 y.o. female who presents for follow-up of Follow-up      HPI        HTN, recurrent DVT - s/p thrombectomy 8/13/20, IVC filter - on eliquis     Admitted 8/11/20  Ms. Alberto Frye is a 45 y.o. female with HTN, anxiety, alcohol abuse, DVT (10 years ago, treated with IVC filter, warfarin) who was transferred to [facility] for thrombectomy/stent placement of left lower limb thrombus.  Patient originally presented to [facility] yesterday with one day of left thigh pain and leg swelling.  Patient states that this pain is in the same leg and pain is same as previous DVT diagnosed 10 years ago while in hospital for hysterectomy.  DVT at that time was treated with IVC filter and pt was on warfarin for a few months.  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  Denies any recent long distance travel, trauma, OCP/hormone replacement use.  Patient denies any fevers, chills, night sweats, chest pain, SOB, palpitations.  Patient is a former smoker, 10-15y of ~1pack/month, quit in 2016.  Patient states that she drinks socially.       Left lower limb ultrasound on 8/11 showed occlusive thrombus involving left common femoral, popliteal, upper greater saphenous, left iliac vein and distal inferior vena cava.  Patient started on lovenox.  Patient was seen by vascular surgery (Dr Goldman) who decided to transfer patient to [facility] for thrombectomy/stent placement.      Patient underwent thrombectomy with vascular surgery 8/12/20, tolerating post-procedural symptoms well. Vascular initially managing heparin gtt. On 8/13/20, one day post-op vascular surgery recommending changing heparin gtt to apixaban. Patient with mild hematuria, UA showing no signs of infection, 3+ blood. And will w/u OP /IP for hypercoagulable etiologies to patient's presentation. HIT panel negative with low platelets, but platelet counts improved and stabilized at slightly low levels  before discharge. The patient's blood pressure was normal in the hospital with her clonidine patch so her other home blood pressure meds were not re-started, and she will be followed up with out-patient within 1 week with her PCP for a new CBC to look at her H/h, along with her platelet counts, and for her blood pressure monitoring. Pt will also follow up with vascular.      * Acute deep vein thrombosis (DVT) of iliac vein of left lower extremity-resolved as of 8/15/2020  US LE shows Left thigh vein occlusive thrombus involving the common femoral, popliteal, and upper greater saphenous veins with minimal flow.  Patient had previous DVT in same leg ~10 years ago, treated with IVC filter and warfarin for a few months.  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       - vascular surgery took patient to OR s/p thrombectomy on 8/13/20. Heparin gtt transitioned to apixaban 10mg BID  - Patient on protonix 40mg daily  - Patient with down trending platelets will continue to monitor. High 4t's score this AM, negative HIT. Uptrending plts thie PM. Patient transitioned to apixaban.  - Patient with hematuria yesterday; no evidence of bleeding or hematuria today. Plts <50k and bleeding will transfuse     Echo 6/14/24    Left Ventricle: The left ventricle is normal in size. Normal wall thickness. There is normal systolic function with a visually estimated ejection fraction of 55 - 60%. Grade I diastolic dysfunction.    Right Ventricle: Normal right ventricular cavity size. Wall thickness is normal. Systolic function is normal.    Pulmonary Artery: The estimated pulmonary artery systolic pressure is 30 mmHg.     Holter 6/17/24    The predominant rhythm is sinus.    Heart rates varied between 71 and 129 BPM with an average of 91 BPM.    There were very rare PVCs totalling 4 and averaging 0.04 per hour.    There were very rare PACs  "totalling 11 and averaging 0.11 per hour.    The diary was not returned.     LE venous US 5/2/22  There is no evidence of a right lower extremity DVT.  There is no evidence of a left lower extremity DVT.           8/19/20 Denies CP or SOB. Started with a mild rash since being placed on eliquis  Scheduled to f/u with vascular surgery at [facility] and see hematology tomorrow  BP well controlled   Recurrent DVT per hematology and vascular surgery  ? Allergic reaction to eliquis - will observe  Echo with good EF - no evidence of CHF  HTN under control  Will f/u 1 year     Went to the ER 3/23/22  Alberto Frye is a 47 y.o female with a PMHx of CHF, HTN, and DVT presenting to the ED with a chief complaint of chest pain onset "a while ago". The patient complains of chest pain and tightness that has been present for a while and is not severe, is just a "constant feeling". Additionally complains of 12/10 left leg pain that started several days ago and has been consistently worsening. States she has had 2 DVT's in the past with her last being in 2020, and states she thinks she might have another one right now. Patient is compliant with Eliquis. States her rheumatologist diagnosed her with fibromyalgia earlier this week. Reports chronic Norco use secondary to having a torn meniscus in her right leg. Reports quitting cigarette smoking in 2016. Denies history of asthma and COPD. Additionally denies shortness of breath, abdominal pain, diarrhea, and vomiting. No other associated symptoms.   EKG 3/23/22 NSR NSSTT changes     4/5/22 Still with episodic CP, heart racing and LEON  BP controlled  Echo and lexiscan myoview for CP and LEON - cannot walk treadmill due to leg pain from DVT  Holter for palpitations  Continue Rx for HTN, HLD, DM     5/3/22 Stress test was denied by insurance. Still with occasional ches tightness - feels like GERD. Stable LEON  CP atypical - if this progresses would recommend [facility]  Continue Rx for HTN, HLD, " DM  OV 6 months     6/5/24 CP improved. Recently with heart pounding and palpitations when she gets anxious - daily. Mild LEON  EKG NSR NSSTT changes  BP controlled  Echo and holter for recent palpitations  Continue Rx for HTN, HLD, DM     7/2/24 palpitations have improved  Denies CP or SOB  BP controlled       Review of Systems   Constitutional: Negative for decreased appetite.   HENT:  Negative for ear discharge.    Eyes:  Negative for blurred vision.   Respiratory:  Negative for hemoptysis.    Endocrine: Negative for polyphagia.   Hematologic/Lymphatic: Negative for adenopathy.   Skin:  Negative for color change.   Musculoskeletal:  Negative for joint swelling.   Genitourinary:  Negative for bladder incontinence.   Neurological:  Negative for brief paralysis.   Psychiatric/Behavioral:  Negative for hallucinations.    Allergic/Immunologic: Negative for hives.          Objective     Physical Exam  Constitutional:       Appearance: She is well-developed.   HENT:      Head: Normocephalic and atraumatic.   Eyes:      Conjunctiva/sclera: Conjunctivae normal.      Pupils: Pupils are equal, round, and reactive to light.   Cardiovascular:      Rate and Rhythm: Normal rate.      Pulses: Intact distal pulses.      Heart sounds: Normal heart sounds.   Pulmonary:      Effort: Pulmonary effort is normal.      Breath sounds: Normal breath sounds.   Abdominal:      General: Bowel sounds are normal.      Palpations: Abdomen is soft.   Musculoskeletal:         General: Normal range of motion.      Cervical back: Normal range of motion and neck supple.   Skin:     General: Skin is warm and dry.   Neurological:      Mental Status: She is alert and oriented to person, place, and time.            Assessment and Plan     1. Essential hypertension    2. LEON (dyspnea on exertion)    3. Palpitations    4. Dyslipidemia        Plan:     Continue Rx for HTN, HLD, DM  OV 6 months    Advance Care Planning     Date: 07/02/2024  Patient did not  wish or was not able to name a surrogate decision maker or provide an Advance Care Plan.

## 2024-07-26 DIAGNOSIS — M79.7 FIBROMYALGIA: ICD-10-CM

## 2024-07-26 DIAGNOSIS — M35.01 SJOGREN'S SYNDROME WITH KERATOCONJUNCTIVITIS SICCA: ICD-10-CM

## 2024-07-27 RX ORDER — OXYCODONE AND ACETAMINOPHEN 10; 325 MG/1; MG/1
1 TABLET ORAL EVERY 12 HOURS PRN
Qty: 60 TABLET | Refills: 0 | Status: SHIPPED | OUTPATIENT
Start: 2024-07-27

## 2024-07-27 NOTE — PROGRESS NOTES
This note was created by combination of typed  and M-Modal dictation.  Transcription errors may be present.  If there are any questions, please contact me.    Assessment and Plan:   Assessment and Plan    Dysphagia, unspecified type  Screening for colon cancer 11/2019 colonoscopy hemorrhoids and diverticulosis; [place]  -had previously seen GI and complained of dysphagia as well as blood in stool and plan was EGD and colonoscopy.  Colonoscopy was done 5 years ago  Never got it set up because of subsequent health issues/family issues but she would be interested in pursuing.  CT imaging showing thickening of the esophagus previously.  Could been transient.  Notes dysphagia mainly around spaghetti, pizza crust, not with meat not with liquids.  EGD and colonoscopy ordered  -     Ambulatory referral/consult to Endo Procedure ; Future; Expected date: 07/30/2024    Essential hypertension  -BP today good.  On valsartan HCT, no changes  -     HIGH SENSITIVITY CRP; Future; Expected date: 07/29/2024    Dyslipidemia  -lipid profile has been good though incidental calcifications on mammogram were seen.  She is wary of statin therapy.  We talked about side effect profile including possible myalgias.  She already has aches and pains.    Last CRP was normal done by Rheumatology   Check HS CRP.  If normal and normal lipid profile would hold off on statin.  If HS CRP elevated would consider low-dose statin like Lipitor 10  -     HIGH SENSITIVITY CRP; Future; Expected date: 07/29/2024    Chronic anticoagulation  Recurrent deep vein thrombosis (DVT) with hx of IVC filter; indefinite anticoagulation  -on DOAC indefinite duration    Vitamin D deficiency  -was taking prescription vitamin-D weekly but has been out for awhile.  Will change her to over-the-counter daily 2000 IU and check future labs  -     cholecalciferol, vitamin D3, (VITAMIN D3) 50 mcg (2,000 unit) Tab; Take 1 tablet (2,000 Units total) by mouth once  daily.  Dispense: 90 tablet; Refill: 3    Morbid obesity  -working on diet and physical activity modification.  Is careful not to over exert herself else she gets significant musculoskeletal pain    Sjogren's syndrome with inflammatory arthritis  Lumbar spondylosis  Neuropathy  Fibromyalgia  -followed by Rheumatology.  On chronic narcotics, muscle relaxer, Plaquenil    Bipolar affective disorder, depressed, moderate  -managed by Psychiatry.  Had previously self discontinued antipsychotic and is currently on BuSpar, fluoxetine, amitriptyline    History of abnormal TSH with normal TSH on last check.  Repeat  Check screening A1c     Medications Discontinued During This Encounter   Medication Reason    ergocalciferol (ERGOCALCIFEROL) 50,000 unit Cap Alternate therapy         meds sent this encounter:  Medications Ordered This Encounter   Medications    cholecalciferol, vitamin D3, (VITAMIN D3) 50 mcg (2,000 unit) Tab     Sig: Take 1 tablet (2,000 Units total) by mouth once daily.     Dispense:  90 tablet     Refill:  3             Follow Up:  Follow-up 6 months, if start statin, then check future lipid profile  Future Appointments   Date Time Provider Department Center   7/29/2024  1:45 PM LAB, LAPALCO [facility] LAB [place]   8/5/2024  3:00 PM Davidson Chaparro, PAArlynC [facility] SLEEP [facility]   9/11/2024  9:40 AM PRE-ADMIT NURSE 3, ENDO -[facility] ENDO4 [facility]   10/11/2024  7:45 AM SARAVANAN, VISUAL PORTILLO [facility] OPHTHAL [place]   10/11/2024  8:15 AM Anneliese Barber OD [facility] OPTOMTY [place]   1/29/2025  8:20 AM Ian Cespedes MD [facility] MED [place]         Subjective:   Subjective   Chief Complaint   Patient presents with    Follow-up    Thyroid Problem       STEFFANY Dominguez is a 49 y.o. female.    Social History     Tobacco Use    Smoking status: Former     Current packs/day: 0.00     Types: Cigarettes    Smokeless tobacco: Never    Tobacco comments:     quit in october 2016   Substance Use Topics    Alcohol  use: Yes     Alcohol/week: 1.0 - 3.0 standard drink of alcohol     Types: 1 - 3 Glasses of wine per week     Comment: social presently, excessive 10 years ago      Social History     Occupational History    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[employer]      Social History     Social History Narrative    Has 3 healthy grown sons (1990, 1993, 1998) Lives alone.    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   in 2010.    Has Male partner since 2014 who is currently disabled.       No LMP recorded. Patient has had a hysterectomy.    Last appointment with this clinic was 11/28/2023. Last visit with me 11/28/2023   To summarize last visit and events leading up to today:  Hypertension, hyperlipidemia, 10 year risk score less than 7.5%.  Elevated CPK baseline.  Dysphagia previously seen by GI January 2023.  Plan was EGD and colonoscopy but that was derailed due to illness and family events.  Hospitalization 11/9 through 11/11/2023 for SBO presenting as abdominal pain  cT showed incidental circumferential thickening esophagus. Would check EGD  Obesity, she had inquired about GLP1 but would evaluate the GI system 1st.   S/p lap banding  Subclinical hypothyroid with negative antibodies.  Hold on treatment  10/24/2023 TSH normal not on meds  Bipolar, anxiety, followed by Psychiatry.  Previously she had self discontinued mood stabilizer.  On fluoxetine.  Bar.  Amitriptyline.  Chronic back pain, fibromyalgia  Sjogren's syndrome, fibromyalgia followed by Rheumatology on Plaquenil.  Oxycodone for breakthrough pain.  Also on Lyrica and tizanidine and amitriptyline.  History of trial of cevimeline without relief.  Saw Rheumatology in follow-up 01/04/2024.  Fibromyalgia.  Already on Lyrica.  Consider duloxetine though she should discuss this with her psychiatrist.  [facility]  Recurrent DVT, indefinite  anticoagulation, DOAC    Last visit with me  Fibro, sjogrens, chronic narcotic, I refilled her lyrica and percocet pending re-establishment with new rheumatologist.  Functional restoration program.  HTN, lipid, stable.   Obesity work on therapeutic lifestyle modification (TLC). With a history of small-bowel obstruction I would avoid GLP1    Saw Psychiatry 04/09/2024.  No changes    Saw rheumatology 05/14/2024.  No changes follow-up 6 months    Saw cards for palpitations.  06/14/2024 TTE LV normal size normal wall thickness normal systolic function LVEF 55-60%.  Grade 1 diastolic dysfunction.  6/14/24 holter   The predominant rhythm is sinus.   Heart rates varied between 71 and 129 BPM with an average of 91 BPM.   There were very rare PVCs totalling 4 and averaging 0.04 per hour.   There were very rare PACs totalling 11 and averaging 0.11 per hour.   The diary was not returned.     Saw cardiology in follow-up, palpitations, above workup, no changes follow-up 6 months    Needs EGD/colonoscopy (dysphagia, incr esophageal circumference, anemia)      Today's visit:  The palpitations - thinks due to panic attacks.  Negative cardiac workup.      Her previous complaints of dysphagia.  Thinks it has improved but still gets sticking sensation with certain foods.  notes with bread - tough to swallow.  Pizza crust, spaghetti noodles. Feels like it will sit there. Does not occur with meat except for steak.  Would be ok to undergo EGD.      Today muscles and lower back are aching.     Is wary of statin. Due to polypharmacy.  Discussed the rationale.  After discussion will check lipid profile but also HS CRP    Ran out of vit D prescription a few months ago.  Recommend over-the-counter daily dose    Trying to maintain walking regimen.  Modest weight loss. Going to the gym a few times a week.  Too often causes over-soreness.    Trying mediterranean diet.        Answers submitted by the patient for this visit:  Review of Systems  Questionnaire (Submitted on 7/22/2024)  activity change: No  unexpected weight change: No  rhinorrhea: No  trouble swallowing: No  visual disturbance: No  chest tightness: No  polyuria: No  difficulty urinating: No  menstrual problem: No  joint swelling: Yes  arthralgias: Yes  confusion: No  dysphoric mood: Yes      Patient Care Team:  Ian eCspedes MD as PCP - General (Internal Medicine)  Chandler Bates MD as Consulting Physician (Cardiology)  Kari Tuttle MD as Consulting Physician (Hematology and Oncology)  Miguelangel Goldman MD as Consulting Physician (Vascular Surgery)  Johan Grover MD as Consulting Physician (Gastroenterology)  Maverick Pierce MD as Consulting Physician (Urology)  Becca Paredes MD (Obstetrics and Gynecology)  Ross Hughes MD (Inactive) as Resident (Rheumatology)  Yeison Gibson III, NP as Nurse Practitioner (Psychiatry)  Nito Lambert MD (Gastroenterology)  Ting Cheek MA as Care Coordinator  Imaging, Dis Diagnostic (Diagnostic Radiology)      Patient Active Problem List    Diagnosis Date Noted    Small bowel obstruction 11/09/2023 11/9/23 CT: Cluster of dilated, fluid-filled loops of small bowel within the central mid abdomen.  There is associated mild wall thickening as well as mild surrounding mesenteric fat stranding and small volume of free fluid within the abdomen and pelvis.  Differential considerations developing small bowel obstruction versus a nonspecific enteritis.  Clinical correlation advised.  Nonspecific circumferential thickening of the distal thoracic esophagus and gastroesophageal junction.  Follow-up endoscopy is advised for further assessment.  Gastric lap band in place with discontinuity of the catheter tubing, similar to prior examination.  Clinical correlation advised.  Decreased attenuation of the hepatic parenchyma suggesting hepatic steatosis.  Infrarenal IVC filter.      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  had had a prior DVT 10 years prior in the setting of surgery for hysterectomy at the time which she had IVC filter placed.      Recurrent UTI 09/21/2020 08/28/2020 cystoscopy normal  08/13/2020 renal ultrasound normal  06/02/2020 CT abdomen pelvis unremarkable.  IVC present.      Screening for colon cancer 11/2019 colonoscopy hemorrhoids and diverticulosis; [place] 09/21/2020 11/2019 colonoscopy hemorrhoids and diverticulosis.      Chronic anticoagulation 09/21/2020    Chronic midline low back pain without sciatica 09/21/2020 1/2017 XR L spine: 5 views lumbar spine.  Vena cava filter right common L2-L3, and lap band apparatus left upper quadrant.  Mild levoscoliosis, lordosis lumbar spine.  Elevation of right pelvis.  Facet arthropathy L4-L5 levels.  No fracture subluxation.      Neuropathy 08/11/2020    Abdominal pain  06/03/2020 06/03/2020 EGD normal duodenum.  Erythematous mucosa of the gastric body and antrum and pre-pyloric region.  2 cm hiatal hernia.  Path negative.  No celiac.      Essential hypertension 01/22/2016 08/11/2020 TTE normal LV systolic function LVEF 60%.  Normal diastolic function.  Normal RV systolic function.  PA pressure 23.  History of empiric treatment for diverticulitis 04/22/2020, no imaging done at that time.    7/2020 ER put her on clonidine.  And then placed on amlodipine  But did not find it controlled  So started on diovan hct  And stayed on clonidine throughout.      Seasonal allergies 01/22/2016       PAST MEDICAL PROBLEMS, PAST SURGICAL HISTORY: please see relevant portions of the electronic medical record    ALLERGIES AND MEDICATIONS: updated and reviewed.  Medication List with Changes/Refills   Current Medications    AMITRIPTYLINE (ELAVIL) 50 MG TABLET    Take 1 tablet (50 mg total) by mouth every evening.    APIXABAN (ELIQUIS) 5 MG TAB    Take 1 tablet (5 mg total) by mouth 2 (two) times daily.    BUSPIRONE (BUSPAR) 15 MG TABLET    Take 1 tablet (15 mg total) by mouth 3 (three) times daily.    DICLOFENAC SODIUM (VOLTAREN) 1 % GEL    APPLY 4 GRAMS  TOPICALLY 4 TIMES DAILY TO AFFECTED AREA. DO NOT APPLY MORE THAN 16GM DAILY TO ANY ONE AFFECTED JOINT    DOCUSATE SODIUM (COLACE) 100 MG CAPSULE    Take 1 capsule (100 mg total) by mouth 2 (two) times daily.    ERGOCALCIFEROL (ERGOCALCIFEROL) 50,000 UNIT CAP    Take 1 capsule (50,000 Units total) by mouth every 7 days.    FLUOXETINE 40 MG CAPSULE    Take 2 capsules (80 mg total) by mouth once daily.    HYDROXYCHLOROQUINE (PLAQUENIL) 200 MG TABLET    Take 1 tablet (200 mg total) by mouth 2 (two) times daily.    HYDROXYZINE PAMOATE (VISTARIL) 25 MG CAP    Take 1 capsule (25 mg total) by mouth 2 (two) times daily as needed (panic attacks).    IPRATROPIUM (ATROVENT) 21 MCG (0.03 %) NASAL SPRAY    INSTILL 2 SPRAY(S) IN EACH NOSTRIL THREE TIMES  "DAILY    OXYCODONE-ACETAMINOPHEN (PERCOCET)  MG PER TABLET    Take 1 tablet by mouth every 12 (twelve) hours as needed for Pain.    POLYETHYLENE GLYCOL (GLYCOLAX) 17 GRAM PWPK    Take 17 g by mouth once daily.    TIZANIDINE (ZANAFLEX) 4 MG TABLET    Take 2 tablets (8 mg total) by mouth 2 (two) times daily.    VALSARTAN-HYDROCHLOROTHIAZIDE (DIOVAN-HCT) 160-25 MG PER TABLET    Take 1 tablet by mouth once daily.      Review of Systems   HENT:  Negative for hearing loss.    Eyes:  Negative for discharge.   Respiratory:  Negative for wheezing.    Cardiovascular:  Positive for palpitations. Negative for chest pain.   Gastrointestinal:  Negative for blood in stool, constipation, diarrhea and vomiting.   Genitourinary:  Negative for dysuria and hematuria.   Musculoskeletal:  Positive for neck pain.   Neurological:  Positive for weakness and headaches.   Endo/Heme/Allergies:  Negative for polydipsia.          Objective:   Objective   Physical Exam   Vitals:    07/29/24 0805   BP: 120/70   Pulse: 86   Temp: 98.2 °F (36.8 °C)   TempSrc: Oral   SpO2: 96%   Weight: 110.2 kg (242 lb 13.4 oz)   Height: 5' 4" (1.626 m)      Body mass index is 41.68 kg/m².            Physical Exam  Constitutional:       General: She is not in acute distress.     Appearance: She is well-developed.   Eyes:      Extraocular Movements: Extraocular movements intact.   Cardiovascular:      Rate and Rhythm: Normal rate and regular rhythm.      Heart sounds: Normal heart sounds. No murmur heard.  Pulmonary:      Effort: Pulmonary effort is normal.      Breath sounds: Normal breath sounds.   Musculoskeletal:         General: Normal range of motion.      Right lower leg: No edema.      Left lower leg: No edema.   Skin:     General: Skin is warm and dry.   Neurological:      Mental Status: She is alert and oriented to person, place, and time.      Coordination: Coordination normal.   Psychiatric:         Behavior: Behavior normal.         Thought " Content: Thought content normal.

## 2024-07-29 ENCOUNTER — OFFICE VISIT (OUTPATIENT)
Dept: FAMILY MEDICINE | Facility: CLINIC | Age: 49
End: 2024-07-29
Payer: COMMERCIAL

## 2024-07-29 ENCOUNTER — LAB VISIT (OUTPATIENT)
Dept: LAB | Facility: HOSPITAL | Age: 49
End: 2024-07-29
Attending: INTERNAL MEDICINE
Payer: COMMERCIAL

## 2024-07-29 VITALS
BODY MASS INDEX: 41.45 KG/M2 | TEMPERATURE: 98 F | HEIGHT: 64 IN | SYSTOLIC BLOOD PRESSURE: 120 MMHG | OXYGEN SATURATION: 96 % | DIASTOLIC BLOOD PRESSURE: 70 MMHG | HEART RATE: 86 BPM | WEIGHT: 242.81 LBS

## 2024-07-29 DIAGNOSIS — I10 ESSENTIAL HYPERTENSION: ICD-10-CM

## 2024-07-29 DIAGNOSIS — E66.01 MORBID OBESITY: ICD-10-CM

## 2024-07-29 DIAGNOSIS — Z79.01 CHRONIC ANTICOAGULATION: ICD-10-CM

## 2024-07-29 DIAGNOSIS — Z12.11 SCREENING FOR COLON CANCER: ICD-10-CM

## 2024-07-29 DIAGNOSIS — E55.9 VITAMIN D DEFICIENCY: ICD-10-CM

## 2024-07-29 DIAGNOSIS — M35.05 SJOGREN'S SYNDROME WITH INFLAMMATORY ARTHRITIS: ICD-10-CM

## 2024-07-29 DIAGNOSIS — E78.5 DYSLIPIDEMIA: ICD-10-CM

## 2024-07-29 DIAGNOSIS — M79.7 FIBROMYALGIA: ICD-10-CM

## 2024-07-29 DIAGNOSIS — G62.9 NEUROPATHY: ICD-10-CM

## 2024-07-29 DIAGNOSIS — I82.409 RECURRENT DEEP VEIN THROMBOSIS (DVT): ICD-10-CM

## 2024-07-29 DIAGNOSIS — F31.32 BIPOLAR AFFECTIVE DISORDER, DEPRESSED, MODERATE: ICD-10-CM

## 2024-07-29 DIAGNOSIS — R13.10 DYSPHAGIA, UNSPECIFIED TYPE: Primary | ICD-10-CM

## 2024-07-29 DIAGNOSIS — M47.816 LUMBAR SPONDYLOSIS: ICD-10-CM

## 2024-07-29 DIAGNOSIS — Z00.00 NORMAL PHYSICAL EXAM: ICD-10-CM

## 2024-07-29 PROBLEM — F31.9 BIPOLAR AFFECTIVE DISORDER: Status: RESOLVED | Noted: 2021-12-01 | Resolved: 2024-07-29

## 2024-07-29 PROBLEM — G93.40 ENCEPHALOPATHY: Status: RESOLVED | Noted: 2023-01-18 | Resolved: 2024-07-29

## 2024-07-29 LAB
CHOLEST SERPL-MCNC: 199 MG/DL (ref 120–199)
CHOLEST/HDLC SERPL: 3.6 {RATIO} (ref 2–5)
CRP SERPL-MCNC: 9.66 MG/L (ref 0–3.19)
ESTIMATED AVG GLUCOSE: 97 MG/DL (ref 68–131)
HBA1C MFR BLD: 5 % (ref 4–5.6)
HDLC SERPL-MCNC: 56 MG/DL (ref 40–75)
HDLC SERPL: 28.1 % (ref 20–50)
LDLC SERPL CALC-MCNC: 128.2 MG/DL (ref 63–159)
NONHDLC SERPL-MCNC: 143 MG/DL
TRIGL SERPL-MCNC: 74 MG/DL (ref 30–150)
TSH SERPL DL<=0.005 MIU/L-ACNC: 3.6 UIU/ML (ref 0.4–4)

## 2024-07-29 PROCEDURE — 99999 PR PBB SHADOW E&M-EST. PATIENT-LVL IV: CPT | Mod: PBBFAC,,, | Performed by: INTERNAL MEDICINE

## 2024-07-29 PROCEDURE — 3008F BODY MASS INDEX DOCD: CPT | Mod: CPTII,S$GLB,, | Performed by: INTERNAL MEDICINE

## 2024-07-29 PROCEDURE — 36415 COLL VENOUS BLD VENIPUNCTURE: CPT | Mod: PO | Performed by: INTERNAL MEDICINE

## 2024-07-29 PROCEDURE — 99214 OFFICE O/P EST MOD 30 MIN: CPT | Mod: S$GLB,,, | Performed by: INTERNAL MEDICINE

## 2024-07-29 PROCEDURE — 1159F MED LIST DOCD IN RCRD: CPT | Mod: CPTII,S$GLB,, | Performed by: INTERNAL MEDICINE

## 2024-07-29 PROCEDURE — 1160F RVW MEDS BY RX/DR IN RCRD: CPT | Mod: CPTII,S$GLB,, | Performed by: INTERNAL MEDICINE

## 2024-07-29 PROCEDURE — 3078F DIAST BP <80 MM HG: CPT | Mod: CPTII,S$GLB,, | Performed by: INTERNAL MEDICINE

## 2024-07-29 PROCEDURE — 83036 HEMOGLOBIN GLYCOSYLATED A1C: CPT | Performed by: INTERNAL MEDICINE

## 2024-07-29 PROCEDURE — 3074F SYST BP LT 130 MM HG: CPT | Mod: CPTII,S$GLB,, | Performed by: INTERNAL MEDICINE

## 2024-07-29 PROCEDURE — 86141 C-REACTIVE PROTEIN HS: CPT | Performed by: INTERNAL MEDICINE

## 2024-07-29 PROCEDURE — 80061 LIPID PANEL: CPT | Performed by: INTERNAL MEDICINE

## 2024-07-29 PROCEDURE — 84443 ASSAY THYROID STIM HORMONE: CPT | Performed by: INTERNAL MEDICINE

## 2024-07-29 RX ORDER — CHOLECALCIFEROL (VITAMIN D3) 50 MCG
2000 TABLET ORAL DAILY
Qty: 90 TABLET | Refills: 3 | Status: SHIPPED | OUTPATIENT
Start: 2024-07-29

## 2024-07-30 NOTE — PROGRESS NOTES
Lipid ok but HS CRP elevated  History of incidental vascular calcification seen on mammo  TSH WNL not on meds.  History of abn TSH  A1c 5.0    Results to pt via [facility]. Consider statin with next visit but she is reluctant to consider    Future Appointments  8/5/2024   3:00 PM    Davidson Chaparro, ALLA  [facility] SLEEP          [facility]  9/11/2024  9:40 AM    PRE-ADMIT NURSE 3, ENDO S* NOM ENDO4        [facility]  10/11/2024 7:45 AM    [facility]     [facility] OPHTHAL        [place]  10/11/2024 8:15 AM    Sunil, Anneliese, OD              [facility] OPTOMTY        [place]  1/29/2025  8:20 AM    Ian Cespedes MD        [facility] MED        [place]

## 2024-08-02 DIAGNOSIS — F31.32 BIPOLAR AFFECTIVE DISORDER, DEPRESSED, MODERATE: ICD-10-CM

## 2024-08-02 DIAGNOSIS — F60.5 OBSESSIVE COMPULSIVE PERSONALITY DISORDER: ICD-10-CM

## 2024-08-02 RX ORDER — FLUOXETINE HYDROCHLORIDE 40 MG/1
80 CAPSULE ORAL DAILY
Qty: 180 CAPSULE | Refills: 0 | Status: SHIPPED | OUTPATIENT
Start: 2024-08-02

## 2024-08-22 ENCOUNTER — PATIENT MESSAGE (OUTPATIENT)
Dept: RHEUMATOLOGY | Facility: CLINIC | Age: 49
End: 2024-08-22
Payer: COMMERCIAL

## 2024-08-26 DIAGNOSIS — M35.01 SJOGREN'S SYNDROME WITH KERATOCONJUNCTIVITIS SICCA: ICD-10-CM

## 2024-08-26 DIAGNOSIS — M79.7 FIBROMYALGIA: ICD-10-CM

## 2024-08-26 RX ORDER — OXYCODONE AND ACETAMINOPHEN 10; 325 MG/1; MG/1
1 TABLET ORAL EVERY 12 HOURS PRN
Qty: 60 TABLET | Refills: 0 | Status: SHIPPED | OUTPATIENT
Start: 2024-08-26

## 2024-09-03 NOTE — TELEPHONE ENCOUNTER
The patient's records were received in our office. Health Maintenance was updated today.   Is Cyclosporine Contraindicated?: No Pregnancy And Lactation Warning Text: There have not been adverse fetal risks in women taking Dupixent while pregnant. It is unknown if this medication is excreted in breast milk. Dupixent Dosing: 600 mg SC day 0 then 300 mg SC every other week Dupixent Monitoring Guidelines: There is no laboratory monitoring requirement with Dupixent. Detail Level: Zone Diagnosis (Required): Prurigo Nodularis

## 2024-09-04 ENCOUNTER — PATIENT MESSAGE (OUTPATIENT)
Dept: RHEUMATOLOGY | Facility: CLINIC | Age: 49
End: 2024-09-04
Payer: COMMERCIAL

## 2024-09-09 ENCOUNTER — PATIENT MESSAGE (OUTPATIENT)
Dept: FAMILY MEDICINE | Facility: CLINIC | Age: 49
End: 2024-09-09
Payer: COMMERCIAL

## 2024-09-20 DIAGNOSIS — I10 ESSENTIAL HYPERTENSION: ICD-10-CM

## 2024-09-20 RX ORDER — VALSARTAN AND HYDROCHLOROTHIAZIDE 160; 25 MG/1; MG/1
1 TABLET ORAL DAILY
Qty: 90 TABLET | Refills: 2 | Status: SHIPPED | OUTPATIENT
Start: 2024-09-20

## 2024-09-20 NOTE — TELEPHONE ENCOUNTER
Provider Staff:  Action required for this patient    Requires labs      Please see care gap opportunities below in Care Due Message.    Thanks!  [facility]     Appointments      Date Provider   Last Visit   7/29/2024 Ian Cespedes MD   Next Visit   1/29/2025 Ian Cespedes MD     Refill Decision Note   Alberto Frye  is requesting a refill authorization.  Brief Assessment and Rationale for Refill:  Approve     Medication Therapy Plan:         Pharmacist review requested: Yes   Comments:     Note composed:4:07 PM 09/20/2024

## 2024-09-20 NOTE — TELEPHONE ENCOUNTER
Care Due:                  Date            Visit Type   Department     Provider  --------------------------------------------------------------------------------                                LIZETT      [facility]                              FOLLOWUP/OF  MED/ INTERNAL  Last Visit: 07-      FICE VISIT   MED/ PEDS      Ian Stoddard         [facility]                              PRIMARY      MED/ INTERNAL  Next Visit: 01-      CARE ([facility])   MED/ FUNMILAYO Cespedes                                                            Last  Test          Frequency    Reason                     Performed    Due Date  --------------------------------------------------------------------------------    Vitamin D...  12 months..  cholecalciferol,.........  10-   10-    [employer] Embedded Care Due Messages. Reference number: [id number].   9/20/2024 9:49:08 AM CDT

## 2024-09-20 NOTE — TELEPHONE ENCOUNTER
Refill Routing Note   Medication(s) are not appropriate for processing by [facility] for the following reason(s):        Drug-disease interaction: valsartan-hydrochlorothiazide and Non-traumatic rhabdomyolysis     [facility] action(s):  Defer     Requires labs : Yes           Pharmacist review requested: Yes     Appointments  past 12m or future 3m with PCP    Date Provider   Last Visit   7/29/2024 Ian Cespedes MD   Next Visit   1/29/2025 Ian Cespedes MD   ED visits in past 90 days: 0        Note composed:3:31 PM 09/20/2024

## 2024-09-26 ENCOUNTER — PATIENT MESSAGE (OUTPATIENT)
Dept: FAMILY MEDICINE | Facility: CLINIC | Age: 49
End: 2024-09-26
Payer: COMMERCIAL

## 2024-09-26 DIAGNOSIS — M79.7 FIBROMYALGIA: ICD-10-CM

## 2024-09-26 DIAGNOSIS — M35.01 SJOGREN'S SYNDROME WITH KERATOCONJUNCTIVITIS SICCA: ICD-10-CM

## 2024-09-26 NOTE — TELEPHONE ENCOUNTER
No care due was identified.  [facility] Embedded Care Due Messages. Reference number: [id number].   9/26/2024 4:50:12 PM CDT

## 2024-09-27 RX ORDER — OXYCODONE AND ACETAMINOPHEN 10; 325 MG/1; MG/1
1 TABLET ORAL EVERY 12 HOURS PRN
Qty: 60 TABLET | Refills: 0 | Status: SHIPPED | OUTPATIENT
Start: 2024-09-27

## 2024-10-02 DIAGNOSIS — Z79.899 LONG-TERM USE OF PLAQUENIL: ICD-10-CM

## 2024-10-02 DIAGNOSIS — M35.05 SJOGREN'S SYNDROME WITH INFLAMMATORY ARTHRITIS: Primary | ICD-10-CM

## 2024-10-08 DIAGNOSIS — F31.32 BIPOLAR AFFECTIVE DISORDER, DEPRESSED, MODERATE: ICD-10-CM

## 2024-10-08 DIAGNOSIS — S83.91XA SPRAIN OF RIGHT KNEE, UNSPECIFIED LIGAMENT, INITIAL ENCOUNTER: ICD-10-CM

## 2024-10-08 DIAGNOSIS — I10 ESSENTIAL HYPERTENSION: ICD-10-CM

## 2024-10-08 DIAGNOSIS — R09.81 NASAL CONGESTION: ICD-10-CM

## 2024-10-08 DIAGNOSIS — F60.5 OBSESSIVE COMPULSIVE PERSONALITY DISORDER: ICD-10-CM

## 2024-10-08 RX ORDER — FLUOXETINE HYDROCHLORIDE 40 MG/1
80 CAPSULE ORAL DAILY
Qty: 180 CAPSULE | Refills: 0 | Status: SHIPPED | OUTPATIENT
Start: 2024-10-08

## 2024-10-08 NOTE — TELEPHONE ENCOUNTER
No care due was identified.  Health [facility] Embedded Care Due Messages. Reference number: [id number].   10/08/2024 11:50:48 AM CDT

## 2024-10-09 RX ORDER — DICLOFENAC SODIUM 10 MG/G
GEL TOPICAL
Qty: 100 G | Refills: 0 | Status: SHIPPED | OUTPATIENT
Start: 2024-10-09

## 2024-10-09 RX ORDER — VALSARTAN AND HYDROCHLOROTHIAZIDE 160; 25 MG/1; MG/1
1 TABLET ORAL DAILY
Qty: 90 TABLET | Refills: 2 | Status: SHIPPED | OUTPATIENT
Start: 2024-10-09

## 2024-10-09 RX ORDER — IPRATROPIUM BROMIDE 21 UG/1
SPRAY, METERED NASAL
Qty: 90 ML | Refills: 3 | Status: SHIPPED | OUTPATIENT
Start: 2024-10-09

## 2024-10-09 NOTE — TELEPHONE ENCOUNTER
Refill Routing Note   Medication(s) are not appropriate for processing by [facility] for the following reason(s):        Outside of protocol  Drug-disease interaction    [facility] action(s):  Defer  Route  Approve      Medication Therapy Plan: VOLTAREN-OOP; IPRATROPIUM-SIG UPDATED FOR PCP REVIEW-DEFER; Drug-Disease: valsartan-hydrochlorothiazide and Non-traumatic rhabdomyolysis      Appointments  past 12m or future 3m with PCP    Date Provider   Last Visit   7/29/2024 Ian Cespedes MD   Next Visit   1/29/2025 Ian Cespedes MD   ED visits in past 90 days: 0        Note composed:6:58 AM 10/09/2024

## 2024-10-11 ENCOUNTER — CLINICAL SUPPORT (OUTPATIENT)
Dept: OPHTHALMOLOGY | Facility: CLINIC | Age: 49
End: 2024-10-11
Payer: COMMERCIAL

## 2024-10-11 ENCOUNTER — OFFICE VISIT (OUTPATIENT)
Dept: OPTOMETRY | Facility: CLINIC | Age: 49
End: 2024-10-11
Payer: COMMERCIAL

## 2024-10-11 DIAGNOSIS — H04.123 DRY EYE SYNDROME, BILATERAL: ICD-10-CM

## 2024-10-11 DIAGNOSIS — Z79.899 LONG-TERM USE OF PLAQUENIL: ICD-10-CM

## 2024-10-11 DIAGNOSIS — H52.7 REFRACTIVE ERROR: ICD-10-CM

## 2024-10-11 DIAGNOSIS — M35.05 SJOGREN'S SYNDROME WITH INFLAMMATORY ARTHRITIS: Primary | ICD-10-CM

## 2024-10-11 DIAGNOSIS — M35.05 SJOGREN'S SYNDROME WITH INFLAMMATORY ARTHRITIS: ICD-10-CM

## 2024-10-11 PROCEDURE — 99999 PR PBB SHADOW E&M-EST. PATIENT-LVL III: CPT | Mod: PBBFAC,,, | Performed by: OPTOMETRIST

## 2024-10-11 NOTE — LETTER
October 11, 2024      [facility]  [address]  [place] [address]  Phone: [phone]  Fax: [phone]       Patient: Alberto Frye   YOB: 1975  Date of Visit: 10/11/2024    To Whom It May Concern:    Trixie Frye  was at [facility] on 10/11/2024. The patient may return to work/school  with no restrictions. If you have any questions or concerns, or if I can be of further assistance, please do not hesitate to contact me.    Sincerely,

## 2024-10-11 NOTE — PROGRESS NOTES
HVF/OCT rel/fix coop good OU/chart checked for latex allergy, pirate patch used/0.75 OD -0.50 OS -BJ

## 2024-10-28 ENCOUNTER — PATIENT MESSAGE (OUTPATIENT)
Dept: FAMILY MEDICINE | Facility: CLINIC | Age: 49
End: 2024-10-28
Payer: COMMERCIAL

## 2024-10-28 DIAGNOSIS — M35.01 SJOGREN'S SYNDROME WITH KERATOCONJUNCTIVITIS SICCA: ICD-10-CM

## 2024-10-28 DIAGNOSIS — M79.7 FIBROMYALGIA: ICD-10-CM

## 2024-10-28 RX ORDER — OXYCODONE AND ACETAMINOPHEN 10; 325 MG/1; MG/1
1 TABLET ORAL EVERY 12 HOURS PRN
Qty: 60 TABLET | Refills: 0 | Status: SHIPPED | OUTPATIENT
Start: 2024-10-28

## 2024-11-04 ENCOUNTER — TELEPHONE (OUTPATIENT)
Dept: SLEEP MEDICINE | Facility: CLINIC | Age: 49
End: 2024-11-04
Payer: COMMERCIAL

## 2024-11-06 DIAGNOSIS — R09.81 NASAL CONGESTION: ICD-10-CM

## 2024-11-06 RX ORDER — IPRATROPIUM BROMIDE 21 UG/1
SPRAY, METERED NASAL
Qty: 90 ML | Refills: 2 | Status: SHIPPED | OUTPATIENT
Start: 2024-11-06

## 2024-11-06 NOTE — TELEPHONE ENCOUNTER
No care due was identified.  [facility] Embedded Care Due Messages. Reference number: [id number].   11/06/2024 1:17:11 PM CST

## 2024-11-06 NOTE — TELEPHONE ENCOUNTER
Refill Decision Note   Alberto Frye  is requesting a refill authorization.  Brief Assessment and Rationale for Refill:  Approve     Medication Therapy Plan:        Comments:     Note composed:2:49 PM 11/06/2024

## 2024-11-11 ENCOUNTER — PATIENT MESSAGE (OUTPATIENT)
Dept: SLEEP MEDICINE | Facility: CLINIC | Age: 49
End: 2024-11-11
Payer: COMMERCIAL

## 2024-11-18 ENCOUNTER — PATIENT MESSAGE (OUTPATIENT)
Dept: FAMILY MEDICINE | Facility: CLINIC | Age: 49
End: 2024-11-18
Payer: COMMERCIAL

## 2024-11-18 DIAGNOSIS — M35.01 SJOGREN'S SYNDROME WITH KERATOCONJUNCTIVITIS SICCA: ICD-10-CM

## 2024-11-20 RX ORDER — HYDROXYCHLOROQUINE SULFATE 200 MG/1
200 TABLET, FILM COATED ORAL 2 TIMES DAILY
Qty: 60 TABLET | Refills: 0 | Status: SHIPPED | OUTPATIENT
Start: 2024-11-20

## 2024-11-20 NOTE — TELEPHONE ENCOUNTER
Care Due:                  Date            Visit Type   Department     Provider  --------------------------------------------------------------------------------                                LIZETT      [facility]                              FOLLOWUP/OF  MED/ INTERNAL  Last Visit: 07-      FICE VISIT   MED/ PEDS      Ian Stoddard         [facility]                              PRIMARY      MED/ INTERNAL  Next Visit: 01-      CARE ([facility])   MED/ FUNMILAYO Cespedes                                                            Last  Test          Frequency    Reason                     Performed    Due Date  --------------------------------------------------------------------------------    Vitamin D...  12 months..  cholecalciferol,.........  10-   10-    [facility] Embedded Care Due Messages. Reference number: [id number].   11/20/2024 3:15:57 PM CST

## 2024-11-22 ENCOUNTER — OFFICE VISIT (OUTPATIENT)
Dept: RHEUMATOLOGY | Facility: CLINIC | Age: 49
End: 2024-11-22
Payer: COMMERCIAL

## 2024-11-22 DIAGNOSIS — M79.7 FIBROMYALGIA: ICD-10-CM

## 2024-11-22 DIAGNOSIS — M06.9 RHEUMATOID ARTHRITIS FLARE: Primary | ICD-10-CM

## 2024-11-22 PROCEDURE — 3044F HG A1C LEVEL LT 7.0%: CPT | Mod: CPTII,95,, | Performed by: INTERNAL MEDICINE

## 2024-11-22 PROCEDURE — 99215 OFFICE O/P EST HI 40 MIN: CPT | Mod: 95,,, | Performed by: INTERNAL MEDICINE

## 2024-11-22 RX ORDER — PILOCARPINE HYDROCHLORIDE 5 MG/1
5 TABLET, FILM COATED ORAL 3 TIMES DAILY
Qty: 90 TABLET | Refills: 11 | Status: SHIPPED | OUTPATIENT
Start: 2024-11-22 | End: 2025-11-22

## 2024-11-22 NOTE — PROGRESS NOTES
Subjective:     Patient ID: Alberto Frye is a 49 y.o. female followed by Dr. Hughes for Sjogren's syndrome w KCS, fibromyalgia & LBP    Chief Complaint: No chief complaint on file.       HPI 49 year old F with PMH of CHF, depression,aidan. LLE DVT here to establish care for Sjogrens and FM.  Patient previously following with Dr. ROME.  She is on plaquenil 200mg po BID for several years. Per Dr. Hughes, it was started for small joint arthritis.  She feels like she has pounding in her ears.She does biotene for dry mouth without significant improvement.  She does over the counter eye drops without improvement.She has pain all over her body.  She has pain in chest and muscles in her legs.       Past Medical History:   Diagnosis Date    AYDE (acute kidney injury) 11/1/2022    Alcohol abuse     stopped heavy drinking about 10 years ago; was drinking 3 glasses of vodka/tequilla,rum/whiskey per day    Allergy Don't remember    Its been some years.    Anxiety     Arthritis 2010    Blood clotting tendency 2008    After a procedure & 2020.    Congestive heart failure 8/11/2020    Depression     Hallucination     Hx of psychiatric care     Hypertension     Immune disorder 2020    Joint pain 2010    Keloid cicatrix Years ago    From childhood scars    Aidan     unplanned trips, energy without sleep for 2 days (reading, cleaning), feelings that she can do multiple tasks at one time, feelings of overconfidence at times    Psychiatric problem     Sleep difficulties     Therapy     Withdrawal symptoms, drug or narcotic     racing heart, restlessness           Review of Systems : see HPI      Past Medical History:   Diagnosis Date    AYDE (acute kidney injury) 11/1/2022    Alcohol abuse     stopped heavy drinking about 10 years ago; was drinking 3 glasses of vodka/tequilla,rum/whiskey per day    Allergy Don't remember    Its been some years.    Anxiety     Arthritis 2010    Blood clotting tendency 2008    After a procedure & 2020.     Congestive heart failure 8/11/2020    Depression     Hallucination     Hx of psychiatric care     Hypertension     Immune disorder 2020    Joint pain 2010    Keloid cicatrix Years ago    From childhood scars    Caro     unplanned trips, energy without sleep for 2 days (reading, cleaning), feelings that she can do multiple tasks at one time, feelings of overconfidence at times    Psychiatric problem     Sleep difficulties     Therapy     Withdrawal symptoms, drug or narcotic     racing heart, restlessness       Past Surgical History:   Procedure Laterality Date    ESOPHAGOGASTRODUODENOSCOPY N/A 06/03/2020    Procedure: EGD (ESOPHAGOGASTRODUODENOSCOPY);  Surgeon: Johan Grover MD;  Location: [facility] ENDO (4TH FLR);  Service: Endoscopy;  Laterality: N/A;  covid 6/2-[employer] [facility]-tb    HYSTERECTOMY      LAPBAND  2009    PHLEBOGRAPHY Left 08/12/2020    Procedure: Venogram, pharmacomechanical thrombectomy;  Surgeon: Miguelangel Goldman MD;  Location: [facility] OR [address] [place] [address];  Service: Vascular;  Laterality: Left;  2336.43 mGy  [id number]  17.8 minutes  37 ml of contrast    VENOPLASTY Left 08/12/2020    Procedure: ANGIOPLASTY, VEIN;  Surgeon: Miguelangel Goldman MD;  Location: [facility] OR [address] [place] [address];  Service: Vascular;  Laterality: Left;       Family History   Problem Relation Name Age of Onset    Diabetes Mother      Cancer Mother      Hypertension Mother      No Known Problems Father      No Known Problems Sister      No Known Problems Brother      No Known Problems Maternal Aunt      Cirrhosis Maternal Uncle          ETOH    No Known Problems Paternal Aunt      No Known Problems Paternal Uncle      No Known Problems Maternal Grandmother      No Known Problems Maternal Grandfather      No Known Problems Paternal Grandmother      No Known Problems Paternal Grandfather      No Known Problems Other      Celiac disease Neg Hx      Colon cancer Neg Hx      Colon polyps Neg Hx      Crohn's disease Neg Hx      Esophageal cancer  "Neg Hx      Inflammatory bowel disease Neg Hx      Liver cancer Neg Hx      Liver disease Neg Hx      Rectal cancer Neg Hx      Stomach cancer Neg Hx      Ulcerative colitis Neg Hx     3 sons & 1 granddaughter in good health    Social History     Tobacco Use    Smoking status: Former     Current packs/day: 0.00     Types: Cigarettes    Smokeless tobacco: Never    Tobacco comments:     quit in october 2016   Substance Use Topics    Alcohol use: Yes     Alcohol/week: 1.0 - 3.0 standard drink of alcohol     Types: 1 - 3 Glasses of wine per week     Comment: social presently, excessive 10 years ago    Drug use: Not Currently     Types: Marijuana     Comment: uses THCA weekly   Lives alone.  Works for [employer].    Objective:     BP (!) 142/94   Pulse 86   Ht 5' 4" (1.626 m)   Wt 110 kg (242 lb 8.1 oz)   BMI 41.63 kg/m²     Physical Exam   Constitutional: She is oriented to person, place, and time. She appears well-developed and well-nourished. No distress.   HENT:   Head: Normocephalic and atraumatic.   Mouth/Throat: Oropharynx is clear and moist. Mucous membranes are moist. No oropharyngeal exudate. Oropharynx is clear.   No facial rashes  Parotids not enlarged     Mouth/Throat: Teeth in good repair  Some moisture in oropharynx  Eyes: Pupils are equal, round, and reactive to light. Conjunctivae are normal. Right eye exhibits no discharge. Left eye exhibits no discharge. No scleral icterus.   Neck: No JVD present. No tracheal deviation present. No thyromegaly present.   Cardiovascular: Regular rhythm. Exam reveals no gallop and no friction rub.   No murmur heard.  Pulmonary/Chest: Effort normal and breath sounds normal. No respiratory distress. She has no wheezes. She has no rales. She exhibits no tenderness.   Abdominal: Soft. Bowel sounds are normal. She exhibits no distension and no mass. There is no abdominal tenderness. There is no rebound and no guarding.   Mild diffuse TTP   Musculoskeletal:         General: " No deformity. Normal range of motion.      Cervical back: Normal range of motion and neck supple.      Comments: No synovitis anywhere.  FROM all joints  18/18 tender points rated 5/5     Lymphadenopathy:     She has no cervical adenopathy.   Neurological: She is alert and oriented to person, place, and time. She has normal reflexes. No cranial nerve deficit. Gait normal.   Skin: Skin is warm and dry. She is not diaphoretic.   Psychiatric: Mood, memory, affect and thought content normal.   Vitals reviewed.        11/9/23: CBC ok; CMP glu 143;   10/24/23: ESR 15; CRP 33.00; ; C3/C4 ok; Vit D 24  1/5/23: GIOVANNY 1:320H; +SSA; myomarker all neg; RF neg;   3/5/18: APS: neg    Assessment:   49 year old F with PMH of CHF, depression,aidan. LLE DVT here to establish care for Sjogrens and FM.  Patient previously following with Dr. SANCHEZ    Sjogren's syndrome: she has history of GIOVANNY/+SSA and sicca symptoms.  Plaquenil started for ?arthritis in hands but patient does not think it helped and is dealing with inner ear issues so will do trial off plaquenil.  Dry vagina- lubricants  STOP plaquenil and see if her ear issues improved  Start pilocarpine 5 mg po TID    # FM:she is flaring.    START Tizanedicine 8 mg po qhs  Start nabumetone 750mg p qday  Consider PMR referral    45 minutes of total time spent on the encounter, which includes face to face time and non-face to face time preparing to see the patient (eg, review of tests), Obtaining and/or reviewing separately obtained history, Documenting clinical information in the electronic or other health record, Independently interpreting results (not separately reported) and communicating results to the patient/family/caregiver, or Care coordination (not separately reported).

## 2024-11-25 ENCOUNTER — PATIENT MESSAGE (OUTPATIENT)
Dept: FAMILY MEDICINE | Facility: CLINIC | Age: 49
End: 2024-11-25
Payer: COMMERCIAL

## 2024-11-26 ENCOUNTER — PATIENT OUTREACH (OUTPATIENT)
Dept: ADMINISTRATIVE | Facility: HOSPITAL | Age: 49
End: 2024-11-26
Payer: COMMERCIAL

## 2024-11-26 LAB — BCS RECOMMENDATION EXT: NORMAL

## 2024-11-27 DIAGNOSIS — M35.01 SJOGREN'S SYNDROME WITH KERATOCONJUNCTIVITIS SICCA: ICD-10-CM

## 2024-11-27 DIAGNOSIS — M79.7 FIBROMYALGIA: ICD-10-CM

## 2024-11-27 NOTE — TELEPHONE ENCOUNTER
Care Due:                  Date            Visit Type   Department     Provider  --------------------------------------------------------------------------------                                LIZETT      [facility]                              FOLLOWUP/OF  MED/ INTERNAL  Last Visit: 07-      FICE VISIT   MED/ PEDS      Ian Stoddard         [facility]                              PRIMARY      MED/ INTERNAL  Next Visit: 01-      CARE ([facility])   MED/ FUNMILAYO Cespedes                                                            Last  Test          Frequency    Reason                     Performed    Due Date  --------------------------------------------------------------------------------    CBC.........  6 months...  hydroxychloroquine.......  05-   11-    CMP.........  6 months...  hydroxychloroquine.......  05-   11-    Health [facility] Embedded Care Due Messages. Reference number: [id number].   11/27/2024 1:54:45 PM CST

## 2024-11-28 RX ORDER — OXYCODONE AND ACETAMINOPHEN 10; 325 MG/1; MG/1
1 TABLET ORAL EVERY 12 HOURS PRN
Qty: 60 TABLET | Refills: 0 | Status: SHIPPED | OUTPATIENT
Start: 2024-11-28

## 2024-12-04 ENCOUNTER — PATIENT MESSAGE (OUTPATIENT)
Dept: RHEUMATOLOGY | Facility: CLINIC | Age: 49
End: 2024-12-04
Payer: COMMERCIAL

## 2024-12-09 ENCOUNTER — PATIENT MESSAGE (OUTPATIENT)
Dept: RHEUMATOLOGY | Facility: CLINIC | Age: 49
End: 2024-12-09
Payer: COMMERCIAL

## 2024-12-11 ENCOUNTER — HOSPITAL ENCOUNTER (EMERGENCY)
Facility: HOSPITAL | Age: 49
Discharge: HOME | End: 2024-12-11
Attending: STUDENT IN AN ORGANIZED HEALTH CARE EDUCATION/TRAINING PROGRAM
Payer: COMMERCIAL

## 2024-12-11 VITALS
RESPIRATION RATE: 20 BRPM | DIASTOLIC BLOOD PRESSURE: 70 MMHG | BODY MASS INDEX: 40.97 KG/M2 | HEIGHT: 64 IN | OXYGEN SATURATION: 97 % | HEART RATE: 76 BPM | WEIGHT: 240 LBS | SYSTOLIC BLOOD PRESSURE: 143 MMHG | TEMPERATURE: 98 F

## 2024-12-11 DIAGNOSIS — J06.9 VIRAL URI WITH COUGH: Primary | ICD-10-CM

## 2024-12-11 DIAGNOSIS — R07.89 CHEST DISCOMFORT: ICD-10-CM

## 2024-12-11 LAB
BILIRUB UR QL STRIP: NEGATIVE
CLARITY UR: CLEAR
COLOR UR: YELLOW
CTP QC/QA: YES
GLUCOSE UR QL STRIP: NEGATIVE
HGB UR QL STRIP: NEGATIVE
KETONES UR QL STRIP: NEGATIVE
LEUKOCYTE ESTERASE UR QL STRIP: NEGATIVE
MOLECULAR STREP A: NEGATIVE
NITRITE UR QL STRIP: NEGATIVE
PH UR STRIP: 7 [PH] (ref 5–8)
POC MOLECULAR INFLUENZA A AGN: NEGATIVE
POC MOLECULAR INFLUENZA B AGN: NEGATIVE
PROT UR QL STRIP: NEGATIVE
SARS-COV-2 RDRP RESP QL NAA+PROBE: NEGATIVE
SP GR UR STRIP: 1.01 (ref 1–1.03)
URN SPEC COLLECT METH UR: NORMAL
UROBILINOGEN UR STRIP-ACNC: NEGATIVE EU/DL

## 2024-12-11 PROCEDURE — 87635 SARS-COV-2 COVID-19 AMP PRB: CPT | Performed by: STUDENT IN AN ORGANIZED HEALTH CARE EDUCATION/TRAINING PROGRAM

## 2024-12-11 PROCEDURE — 25000003 PHARM REV CODE 250: Performed by: PHYSICIAN ASSISTANT

## 2024-12-11 PROCEDURE — 87651 STREP A DNA AMP PROBE: CPT

## 2024-12-11 PROCEDURE — 81003 URINALYSIS AUTO W/O SCOPE: CPT | Performed by: PHYSICIAN ASSISTANT

## 2024-12-11 PROCEDURE — 93010 ELECTROCARDIOGRAM REPORT: CPT | Mod: ,,, | Performed by: INTERNAL MEDICINE

## 2024-12-11 PROCEDURE — 99284 EMERGENCY DEPT VISIT MOD MDM: CPT | Mod: 25

## 2024-12-11 PROCEDURE — 93005 ELECTROCARDIOGRAM TRACING: CPT

## 2024-12-11 PROCEDURE — 87502 INFLUENZA DNA AMP PROBE: CPT

## 2024-12-11 RX ORDER — CETIRIZINE HYDROCHLORIDE 10 MG/1
10 TABLET ORAL DAILY
Qty: 14 TABLET | Refills: 0 | Status: SHIPPED | OUTPATIENT
Start: 2024-12-11 | End: 2024-12-25

## 2024-12-11 RX ORDER — ALBUTEROL SULFATE 90 UG/1
2 INHALANT RESPIRATORY (INHALATION) EVERY 4 HOURS PRN
Qty: 6.7 G | Refills: 0 | Status: SHIPPED | OUTPATIENT
Start: 2024-12-11 | End: 2025-12-11

## 2024-12-11 RX ORDER — AZELASTINE 1 MG/ML
1 SPRAY, METERED NASAL 2 TIMES DAILY
Qty: 30 ML | Refills: 0 | Status: SHIPPED | OUTPATIENT
Start: 2024-12-11 | End: 2025-12-11

## 2024-12-11 RX ORDER — BENZONATATE 200 MG/1
200 CAPSULE ORAL 3 TIMES DAILY PRN
Qty: 30 CAPSULE | Refills: 0 | Status: SHIPPED | OUTPATIENT
Start: 2024-12-11 | End: 2024-12-21

## 2024-12-11 RX ORDER — BENZONATATE 100 MG/1
200 CAPSULE ORAL ONCE
Status: COMPLETED | OUTPATIENT
Start: 2024-12-11 | End: 2024-12-11

## 2024-12-11 RX ORDER — CETIRIZINE HYDROCHLORIDE 10 MG/1
10 TABLET ORAL
Status: COMPLETED | OUTPATIENT
Start: 2024-12-11 | End: 2024-12-11

## 2024-12-11 RX ORDER — INHALER, ASSIST DEVICES
SPACER (EA) MISCELLANEOUS
Qty: 1 EACH | Refills: 0 | Status: SHIPPED | OUTPATIENT
Start: 2024-12-11

## 2024-12-11 RX ADMIN — BENZONATATE 200 MG: 100 CAPSULE ORAL at 04:12

## 2024-12-11 RX ADMIN — CETIRIZINE HYDROCHLORIDE 10 MG: 10 TABLET, FILM COATED ORAL at 04:12

## 2024-12-11 NOTE — DISCHARGE INSTRUCTIONS
Make sure you are drinking plenty of fluids, stay well hydrated.  Tylenol as needed for chest discomfort, body aches, congestion.  Begin taking Zyrtec once daily.  Astelin for continued nasal congestion.  Tessalon for cough.  Use albuterol inhaler with the spacer attachment for severe coughing fits or chest tightness.  Throat lozenges to help with continued sore throat, may also help with cough.    Follow-up with your primary care provider for reevaluation, further recommendations. Return to this ED if unable to treat a fever, if symptoms persist or worsen despite treatment, if you begin with shortness of breath or difficulty breathing, if no improvement with current plan, if any other problems occur.

## 2024-12-11 NOTE — ED TRIAGE NOTES
The patient presents with respiratory symptoms since last week, including a congested and productive cough, headache, body aches, and nasal congestion. She has taken Nyquil with no improvement and was recently exposed to an ill grandchild.

## 2024-12-11 NOTE — Clinical Note
"Alberto Lloydblossom Frye was seen and treated in our emergency department on 12/11/2024.  She may return to work on 12/14/2024.       If you have any questions or concerns, please don't hesitate to call.      Danyelle SCANLON    "

## 2024-12-11 NOTE — ED PROVIDER NOTES
Encounter Date: 12/11/2024       History     Chief Complaint   Patient presents with    COVID-19 Concerns    Cough     Pt w/ hx of asthma reports that last week she was around grandbaby who had runny nose. Pt states since then she has been experiencing 10/10 severe h/a, diffuse body aches, productive cough w/ clearish-yellow thick mucous. Pt reports taking Nyquil around the clock, but has not experienced relief.      50yo F presents to ED complaining of 7 day history of productive cough.    States multiple family members visited over the holiday, multiple family members sick with URI symptoms.  She admits to initially nasal congestion with mild cough.  Admits to persistent cough, sometimes productive of clear/yellow sputum over the past 7 days.  She admits to associated generalized headache, fatigue, generally feeling unwell.  She denies fever or chills, does admit to myalgias.  She admits to odynophagia.  No otalgia.  Denies any recent illness.  No shortness of breath.  Admits to sharp midsternal chest discomfort with cough, mild persistent soreness to the mid sternum outside of cough.  No personal history of ACS.  No known family history of premature cardiac disease.  Denies history of CHF.  No new leg swelling or calf pain.  No orthopnea.  No paroxysmal nocturnal dyspnea. No relief with NyQuil.      Recently taken off of Plaquenil    PMH:  Neuropathy  Lumbar spondylosis  DDD lumbar  Mood insomnia  Obsessive compulsive personality disorder  Bipolar affective disorder  Anxiety  Essential hypertension  Dyslipidemia  Palpitations  LEON  Hx recurrent UTI  Sjogren's syndrome with inflammatory arthritis  Fibromyalgia  Recurrent DVT with hx of IVC filter; indefinite anticoagulation  Chronic anticoagulation  Morbid obesity  Hx small bowel obstruction  Chronic midline low back pain without sciatica  Pain in both hands  Hx myositis      TTE 06/14/2024:   ·  Left Ventricle: The left ventricle is normal in size. Normal wall  thickness. There is normal systolic function with a visually estimated ejection fraction of 55 - 60%. Grade I diastolic dysfunction.  ·  Right Ventricle: Normal right ventricular cavity size. Wall thickness is normal. Systolic function is normal.  ·  Pulmonary Artery: The estimated pulmonary artery systolic pressure is 30 mmHg.      Review of patient's allergies indicates:   Allergen Reactions    Morphine Itching and Hallucinations    Pcn [penicillins] Other (See Comments)     Was told from childhood she couldn't take it    Latex, natural rubber Rash    Penicillin v Rash    Sulfa (sulfonamide antibiotics) Nausea And Vomiting and Nausea Only     Past Medical History:   Diagnosis Date    AYDE (acute kidney injury) 11/1/2022    Alcohol abuse     stopped heavy drinking about 10 years ago; was drinking 3 glasses of vodka/tequilla,rum/whiskey per day    Allergy Don't remember    Its been some years.    Anxiety     Arthritis 2010    Blood clotting tendency 2008    After a procedure & 2020.    Congestive heart failure 8/11/2020    Depression     Hallucination     Hx of psychiatric care     Hypertension     Immune disorder 2020    Joint pain 2010    Keloid cicatrix Years ago    From childhood scars    Caro     unplanned trips, energy without sleep for 2 days (reading, cleaning), feelings that she can do multiple tasks at one time, feelings of overconfidence at times    Psychiatric problem     Sleep difficulties     Therapy     Withdrawal symptoms, drug or narcotic     racing heart, restlessness     Past Surgical History:   Procedure Laterality Date    ESOPHAGOGASTRODUODENOSCOPY N/A 06/03/2020    Procedure: EGD (ESOPHAGOGASTRODUODENOSCOPY);  Surgeon: Johan Grover MD;  Location: [address]);  Service: Endoscopy;  Laterality: N/A;  covid 6/2-[facility]-tb    HYSTERECTOMY      LAPBAND  2009    PHLEBOGRAPHY Left 08/12/2020    Procedure: Venogram, pharmacomechanical thrombectomy;  Surgeon: Miguelangel ELIZONDO  MD Susi;  Location: [facility] OR [facility];  Service: Vascular;  Laterality: Left;  2336.43 mGy  [id number]  17.8 minutes  37 ml of contrast    VENOPLASTY Left 08/12/2020    Procedure: ANGIOPLASTY, VEIN;  Surgeon: Miguelangel Goldman MD;  Location: [facility] OR [facility];  Service: Vascular;  Laterality: Left;     Family History   Problem Relation Name Age of Onset    Diabetes Mother      Cancer Mother      Hypertension Mother      No Known Problems Father      No Known Problems Sister      No Known Problems Brother      No Known Problems Maternal Aunt      Cirrhosis Maternal Uncle          ETOH    No Known Problems Paternal Aunt      No Known Problems Paternal Uncle      No Known Problems Maternal Grandmother      No Known Problems Maternal Grandfather      No Known Problems Paternal Grandmother      No Known Problems Paternal Grandfather      No Known Problems Other      Celiac disease Neg Hx      Colon cancer Neg Hx      Colon polyps Neg Hx      Crohn's disease Neg Hx      Esophageal cancer Neg Hx      Inflammatory bowel disease Neg Hx      Liver cancer Neg Hx      Liver disease Neg Hx      Rectal cancer Neg Hx      Stomach cancer Neg Hx      Ulcerative colitis Neg Hx       Social History     Tobacco Use    Smoking status: Former     Current packs/day: 0.00     Types: Cigarettes    Smokeless tobacco: Never    Tobacco comments:     quit in october 2016   Substance Use Topics    Alcohol use: Yes     Alcohol/week: 1.0 - 3.0 standard drink of alcohol     Types: 1 - 3 Glasses of wine per week     Comment: social presently, excessive 10 years ago    Drug use: Yes     Types: Marijuana     Review of Systems   Constitutional:  Negative for chills, diaphoresis and fever.   HENT:  Positive for congestion and sore throat.    Respiratory:  Negative for shortness of breath.    Cardiovascular:  Positive for chest pain. Negative for leg swelling.   Gastrointestinal:  Negative for nausea and vomiting.   Musculoskeletal:  Positive for  myalgias. Negative for neck pain and neck stiffness.   Neurological:  Positive for headaches. Negative for syncope.       Physical Exam     Initial Vitals [12/11/24 0317]   BP Pulse Resp Temp SpO2   (!) 162/88 84 20 98.1 °F (36.7 °C) 99 %      MAP       --         Physical Exam    Nursing note and vitals reviewed.  Constitutional: She appears well-developed and well-nourished. She is not diaphoretic. No distress.   Ill appearing, nontoxic   HENT:   Head: Normocephalic and atraumatic.   Neck: Neck supple.   Normal range of motion.  Cardiovascular:  Normal rate and regular rhythm.           NSR. No unilateral leg swelling or calf ttp   Pulmonary/Chest: No respiratory distress.   Ttp inferior sternum   Musculoskeletal:         General: No tenderness. Normal range of motion.      Cervical back: Normal range of motion and neck supple.     Neurological: She is alert and oriented to person, place, and time. GCS score is 15. GCS eye subscore is 4. GCS verbal subscore is 5. GCS motor subscore is 6.   Skin: Skin is warm.   Psychiatric: Thought content normal.   Mildly anxious, depressed mood         ED Course   Procedures  Labs Reviewed   URINALYSIS, REFLEX TO URINE CULTURE       Result Value    Specimen UA Urine, Clean Catch      Color, UA Yellow      Appearance, UA Clear      pH, UA 7.0      Specific Gravity, UA 1.015      Protein, UA Negative      Glucose, UA Negative      Ketones, UA Negative      Bilirubin (UA) Negative      Occult Blood UA Negative      Nitrite, UA Negative      Urobilinogen, UA Negative      Leukocytes, UA Negative      Narrative:     Specimen Source->Urine   SARS-COV-2 RDRP GENE    POC Rapid COVID Negative       Acceptable Yes     POCT INFLUENZA A/B MOLECULAR    POC Molecular Influenza A Ag Negative      POC Molecular Influenza B Ag Negative       Acceptable Yes     POCT STREP A MOLECULAR    Molecular Strep A, POC Negative       Acceptable Yes       EKG  Readings: (Independently Interpreted)   Normal sinus rhythm, ventricular rate 76 beats per minute.  Normal MN, normal QT, normal QRS duration.  No right axis deviation.  No convincing ST elevation.  Inverted T-wave lead 3, AVF.  Flattening versus inversion T-waves V3 through V6.  Similar ST segment changes previous EKGs.     ECG Results              EKG 12-lead (Final result)  Result time 12/11/24 10:12:26      Final result by Unknown User (12/11/24 10:12:26)                                      Imaging Results              X-Ray Chest 1 View (Final result)  Result time 12/11/24 04:31:52      Final result by Alexis Torres MD (12/11/24 04:31:52)                   Impression:      No acute findings in the chest.      Electronically signed by: Alexis Torres MD  Date:    12/11/2024  Time:    04:31               Narrative:    EXAMINATION:  XR CHEST 1 VIEW    CLINICAL HISTORY:  Other chest pain    TECHNIQUE:  Single frontal view of the chest was performed.    COMPARISON:  01/24/2023.    FINDINGS:  No consolidation, pleural effusion or pneumothorax.    Cardiomediastinal silhouette is unremarkable.                                    X-Rays:   Independently Interpreted Readings:   Chest X-Ray: Personal interpretation:  Scoliosis, no significant cardiomegaly, no convincing large effusion, difficulty visualizing right-sided costophrenic angle likely secondary to overlying breast tissue, no pneumothorax, no dense peripheral lobar consolidation.     Medications   benzonatate capsule 200 mg (200 mg Oral Given 12/11/24 0435)   cetirizine tablet 10 mg (10 mg Oral Given 12/11/24 0435)     Medical Decision Making  Differential diagnosis: Pneumonia, pneumothorax, pleural effusion, ACS, PE, anxiety, GERD, pericarditis, myocarditis    Amount and/or Complexity of Data Reviewed  External Data Reviewed: ECG and notes.  Labs: ordered. Decision-making details documented in ED Course.  Radiology: ordered and independent interpretation  performed. Decision-making details documented in ED Course.  ECG/medicine tests: ordered and independent interpretation performed. Decision-making details documented in ED Course.     Details: No personal history of ACS.  Chest pain worsened with cough.  Reproducible tenderness to the midsternal chest wall.  No ST segment changes to suggest pericarditis.  Low suspicion for emergent cardiogenic process or cardiac ischemia.  Discussion of management or test interpretation with external provider(s): Despite history of DVT, she is on anticoagulation, she denies shortness of breath, no significant tachycardia, no hypoxia.  Given sick contacts, associated congestion and cough, myalgias, fatigue, think unlikely PE as culprit of today's presentation.  Chest x-ray without consolidation.  There is no hypoxia.  There is no shortness of breath.  After discussion, we have elected to continue with symptomatic/supportive care, Tylenol for chest discomfort, outpatient follow-up for any persistent symptoms.  Discussed interim return precautions and red flags.  Patient comfortable with current plan.    Risk  OTC drugs.  Prescription drug management.               ED Course as of 12/11/24 1916   Wed Dec 11, 2024   0437 Current MME/day: 30 [SM]      ED Course User Index  [SM] Johan Zapien PA-C                           Clinical Impression:  Final diagnoses:  [R07.89] Chest discomfort  [J06.9] Viral URI with cough (Primary)          ED Disposition Condition    Discharge Stable          ED Prescriptions       Medication Sig Dispense Start Date End Date Auth. Provider    albuterol (PROVENTIL/VENTOLIN HFA) 90 mcg/actuation inhaler Inhale 2 puffs into the lungs every 4 (four) hours as needed for Wheezing (coughing fits). Rescue 6.7 g 12/11/2024 12/11/2025 Johan Zapien PA-C    inhalation spacing device (BREATHERITE MDI SPACER) Use as directed for inhalation. 1 each 12/11/2024 -- Johan Zapien PA-C    cetirizine (ZYRTEC)  10 MG tablet Take 1 tablet (10 mg total) by mouth once daily. for 14 days 14 tablet 12/11/2024 12/25/2024 Johan Zapien PA-C    azelastine (ASTELIN) 137 mcg (0.1 %) nasal spray 1 spray (137 mcg total) by Nasal route 2 (two) times daily. 30 mL 12/11/2024 12/11/2025 Johan Zapien PA-C    benzonatate (TESSALON) 200 MG capsule Take 1 capsule (200 mg total) by mouth 3 (three) times daily as needed for Cough. 30 capsule 12/11/2024 12/21/2024 Johan Zapien PA-C          Follow-up Information       Follow up With Specialties Details Why Contact Ian Call MD Internal Medicine Schedule an appointment as soon as possible for a visit  For reevaluation, If symptoms persist [address]  [place] [address]  [phone]               Johan Zapien PA-C  12/11/24 1916

## 2024-12-13 LAB
OHS QRS DURATION: 80 MS
OHS QTC CALCULATION: 423 MS

## 2024-12-20 ENCOUNTER — OFFICE VISIT (OUTPATIENT)
Dept: FAMILY MEDICINE | Facility: CLINIC | Age: 49
End: 2024-12-20
Payer: COMMERCIAL

## 2024-12-20 ENCOUNTER — HOSPITAL ENCOUNTER (OUTPATIENT)
Dept: RADIOLOGY | Facility: HOSPITAL | Age: 49
Discharge: HOME | End: 2024-12-20
Payer: COMMERCIAL

## 2024-12-20 VITALS
DIASTOLIC BLOOD PRESSURE: 82 MMHG | HEIGHT: 64 IN | WEIGHT: 248.38 LBS | HEART RATE: 86 BPM | TEMPERATURE: 98 F | BODY MASS INDEX: 42.4 KG/M2 | OXYGEN SATURATION: 99 % | SYSTOLIC BLOOD PRESSURE: 138 MMHG

## 2024-12-20 DIAGNOSIS — I82.409 RECURRENT DEEP VEIN THROMBOSIS (DVT): ICD-10-CM

## 2024-12-20 DIAGNOSIS — J20.9 ACUTE BRONCHITIS, UNSPECIFIED ORGANISM: Primary | ICD-10-CM

## 2024-12-20 DIAGNOSIS — J20.9 ACUTE BRONCHITIS, UNSPECIFIED ORGANISM: ICD-10-CM

## 2024-12-20 DIAGNOSIS — I10 ESSENTIAL HYPERTENSION: ICD-10-CM

## 2024-12-20 DIAGNOSIS — E66.01 MORBID OBESITY: ICD-10-CM

## 2024-12-20 PROCEDURE — 99999 PR PBB SHADOW E&M-EST. PATIENT-LVL V: CPT | Mod: PBBFAC,,,

## 2024-12-20 PROCEDURE — 71046 X-RAY EXAM CHEST 2 VIEWS: CPT | Mod: TC,FY,PO

## 2024-12-20 PROCEDURE — 71046 X-RAY EXAM CHEST 2 VIEWS: CPT | Mod: 26,,, | Performed by: RADIOLOGY

## 2024-12-20 RX ORDER — DEXAMETHASONE SODIUM PHOSPHATE 4 MG/ML
8 INJECTION, SOLUTION INTRA-ARTICULAR; INTRALESIONAL; INTRAMUSCULAR; INTRAVENOUS; SOFT TISSUE
Status: COMPLETED | OUTPATIENT
Start: 2024-12-20 | End: 2024-12-20

## 2024-12-20 RX ORDER — AZITHROMYCIN 250 MG/1
TABLET, FILM COATED ORAL
Qty: 6 TABLET | Refills: 0 | Status: SHIPPED | OUTPATIENT
Start: 2024-12-20 | End: 2024-12-25

## 2024-12-20 RX ADMIN — DEXAMETHASONE SODIUM PHOSPHATE 8 MG: 4 INJECTION, SOLUTION INTRA-ARTICULAR; INTRALESIONAL; INTRAMUSCULAR; INTRAVENOUS; SOFT TISSUE at 08:12

## 2024-12-20 NOTE — LETTER
December 20, 2024      [facility]  [address]  [place] [address]  Phone: [phone]  Fax: [phone]       Patient: Alberto Frye   YOB: 1975  Date of Visit: 12/20/2024    To Whom It May Concern:    Trixie Frye  was at [facility] on 12/20/2024. The patient may return to work/school on 12/20/24 with no restrictions. If you have any questions or concerns, or if I can be of further assistance, please do not hesitate to contact me.    Sincerely,    REAGAN Pollock

## 2024-12-20 NOTE — PROGRESS NOTES
HPI     Alberto Frye is a 49 y.o. female with multiple medical diagnoses as listed in the medical history and problem list that presents for cough. PCP Dr. Cespedes with last visit in this clinic on 7/29/24.     Chief Complaint   Patient presents with    Cough       Cough  This is a new problem. The current episode started 1 to 4 weeks ago. The problem has been unchanged. The problem occurs constantly. The cough is Non-productive, productive of sputum, productive of brown sputum and productive of purulent sputum. Associated symptoms include chest pain (chest wall pain with cough), ear congestion, headaches, myalgias, nasal congestion, postnasal drip and a sore throat. Pertinent negatives include no chills, ear pain, fever, heartburn, hemoptysis, rash, rhinorrhea, shortness of breath, sweats, weight loss or wheezing. The symptoms are aggravated by cold air and lying down. She has tried OTC cough suppressant, body position changes, ipratropium inhaler, prescription cough suppressant and rest for the symptoms. The treatment provided mild relief. Her past medical history is significant for asthma and bronchitis. There is no history of bronchiectasis, COPD, emphysema, environmental allergies or pneumonia.     Feeling badly since Monday before Thanksgiving. History of fibromyalgia and Sjogren's syndrome. On 12/11/24, felt her throat was so sore and swollen that it felt like it was closing up. She went to ED and was diagnosed with viral URI. Covid and flu were negative, chest xray at the time showed no pneumonia. Was discharged with antihistamine, inhaler, and tessalon perles. Cough getting worse, producing mucous. Denies shortness of breath, except with incessant coughing. History of asthma, bronchitis, non-smoker. History of DVT.     Assessment & Plan     1. Acute bronchitis, unspecified organism    Lungs with slight inspiratory wheeze to bilateral upper lobes. Denies shortness of breath or chest pain. Will  reevaluate chest xray today. Continue with tessalon perles and antihistamine. Has had relief in the past with steroid shots; discussed limited effectiveness with URI, patient would like to proceed. Z-pack given. Follow up with clinic for any worsening symptoms, or if symptoms fail to improve.     ED precautions given.     - X-Ray Chest PA And Lateral; Future  - azithromycin (Z-KIMBERLEE) 250 MG tablet; Take 2 tablets by mouth on day 1; Take 1 tablet by mouth on days 2-5  Dispense: 6 tablet; Refill: 0  - dexAMETHasone injection 8 mg    2. Recurrent deep vein thrombosis (DVT) with hx of IVC filter; indefinite anticoagulation    Stable on Eliquis daily. Denies shortness of breath or chest pain. Discussed warning signs of DVT/PE. ED precautions given.     3. Essential hypertension    Stable on Diovan daily. The current medical regimen is effective;  continue present plan and medications.  BP Readings from Last 3 Encounters:   12/20/24 138/82   12/11/24 (!) 143/70   07/29/24 120/70     -continue current medication regimen  -DASH diet, regular cardiovascular exercises, portion control  -weight loss    4. Morbid obesity    Has not been able to exercise due to current illness. Planning on getting back to gym after illness resolves.   -discussed regular physical exercises/healthy nutrition and food choices.   -stressed to patient about the importance of portion control to help keep cholesterol/BP/BG levels in check  -advised moderate consumption of alcohol and benefits of avoiding tobacco use/substance abuse.    Obesity  Today's date: 12/20/2024  Wt Readings from Last 4 Encounters:   12/20/24 112.6 kg (248 lb 5.6 oz)   12/11/24 108.9 kg (240 lb)   07/29/24 110.2 kg (242 lb 13.4 oz)   07/02/24 108.6 kg (239 lb 6.7 oz)     Body mass index is 42.63 kg/m².    The patient is asked to make an attempt to improve diet and exercise patterns   Diet and exercise planning discussed.  Recommend 30 minutes of moderate intensity exercise 5  days/week.  Recommend exercise videos on [email or web address].      Discussed DDx, condition, and treatment.   Education sent to patient portal/included in after visit summary.  ED precautions given.   Notify provider if symptoms do not resolve or increase in severity.   Patient verbalizes understanding and agrees with plan of care.  --------------------------------------------      Health Maintenance:  Health Maintenance         Date Due Completion Date    Sign Pain Contract Never done ---    Complete Opioid Risk Tool Never done ---    COVID-19 Vaccine (5 - 2024-25 season) 09/01/2024 10/6/2021    Urine Drug Screen 11/09/2024 11/9/2023    TETANUS VACCINE 09/30/2025 9/30/2015 (Done)    Override on 9/30/2015: Done ([facility])    Mammogram 11/18/2025 11/18/2024    Override on 12/28/2015: Done ([facility] Claudia H[facility]ins[facility]- normal)    Hemoglobin A1c (Diabetic Prevention Screening) 07/29/2027 7/29/2024    Colorectal Cancer Screening 12/28/2028 12/28/2018    Lipid Panel 07/29/2029 7/29/2024    RSV Vaccine (Age 60+ and Pregnant patients) (1 - 1-dose 75+ series) 01/16/2048 ---            Discussed the importance of overdue vaccines which were offered during this encounter. Patient declined overdue vaccines at this time and Advised patient on the importance of completing overdue health maintenance items    Follow Up:  No follow-ups on file.    Exam     Review of Systems:  (as noted above)  Review of Systems   Constitutional:  Negative for chills, fever and weight loss.   HENT:  Positive for postnasal drip and sore throat. Negative for ear pain and rhinorrhea.    Respiratory:  Positive for cough. Negative for hemoptysis, shortness of breath and wheezing.    Cardiovascular:  Positive for chest pain (chest wall pain with cough).   Gastrointestinal:  Negative for heartburn.   Musculoskeletal:  Positive for myalgias.   Skin:  Negative for rash.   Allergic/Immunologic: Negative for  "environmental allergies.   Neurological:  Positive for headaches.       Physical Exam:   Physical Exam  Constitutional:       General: She is not in acute distress.     Appearance: Normal appearance. She is ill-appearing (mildly).   HENT:      Head: Normocephalic and atraumatic.      Right Ear: Tympanic membrane normal. There is impacted cerumen.      Left Ear: Tympanic membrane normal. There is impacted cerumen.      Nose: Congestion and rhinorrhea present.      Right Turbinates: Enlarged.      Left Turbinates: Enlarged.      Mouth/Throat:      Mouth: Mucous membranes are moist.      Pharynx: No oropharyngeal exudate or posterior oropharyngeal erythema.   Cardiovascular:      Rate and Rhythm: Normal rate and regular rhythm.      Pulses: Normal pulses.      Heart sounds: Normal heart sounds. No murmur heard.  Pulmonary:      Effort: Pulmonary effort is normal. No respiratory distress.      Breath sounds: Examination of the right-upper field reveals wheezing. Examination of the left-upper field reveals wheezing. Wheezing present. No rhonchi.   Musculoskeletal:         General: Normal range of motion.      Cervical back: Normal range of motion. No rigidity.   Lymphadenopathy:      Cervical: Cervical adenopathy present.   Skin:     General: Skin is warm and dry.      Capillary Refill: Capillary refill takes less than 2 seconds.   Neurological:      General: No focal deficit present.      Mental Status: She is alert and oriented to person, place, and time.   Psychiatric:         Mood and Affect: Mood normal.         Behavior: Behavior normal.       Vitals:    12/20/24 0811   BP: 138/82   BP Location: Right arm   Patient Position: Sitting   Pulse: 86   Temp: 98.4 °F (36.9 °C)   TempSrc: Oral   SpO2: 99%   Weight: 112.6 kg (248 lb 5.6 oz)   Height: 5' 4" (1.626 m)      Body mass index is 42.63 kg/m².        History     Past Medical History:  Past Medical History:   Diagnosis Date    AYDE (acute kidney injury) 11/1/2022    " Alcohol abuse     stopped heavy drinking about 10 years ago; was drinking 3 glasses of vodka/tequilla,rum/whiskey per day    Allergy Don't remember    Its been some years.    Anxiety     Arthritis 2010    Blood clotting tendency 2008    After a procedure & 2020.    Congestive heart failure 8/11/2020    Depression     Hallucination     Hx of psychiatric care     Hypertension     Immune disorder 2020    Joint pain 2010    Keloid cicatrix Years ago    From childhood scars    [place]     unplanned trips, energy without sleep for 2 days (reading, cleaning), feelings that she can do multiple tasks at one time, feelings of overconfidence at times    Psychiatric problem     Sleep difficulties     Therapy     Withdrawal symptoms, drug or narcotic     racing heart, restlessness       Past Surgical History:  Past Surgical History:   Procedure Laterality Date    ESOPHAGOGASTRODUODENOSCOPY N/A 06/03/2020    Procedure: EGD (ESOPHAGOGASTRODUODENOSCOPY);  Surgeon: Johan Grover MD;  Location: [facility] (4TH FLR);  Service: Endoscopy;  Laterality: N/A;  covid 6/2-[facility]-tb    HYSTERECTOMY      LAPBAND  2009    PHLEBOGRAPHY Left 08/12/2020    Procedure: Venogram, pharmacomechanical thrombectomy;  Surgeon: Miguelangel Goldman MD;  Location: [facility] OR [address] [place] [address];  Service: Vascular;  Laterality: Left;  2336.43 mGy  [id number]  17.8 minutes  37 ml of contrast    VENOPLASTY Left 08/12/2020    Procedure: ANGIOPLASTY, VEIN;  Surgeon: Miguelangel Goldman MD;  Location: [facility] OR [address] [place] [address];  Service: Vascular;  Laterality: Left;       Social History:  Social History     Socioeconomic History    Marital status:     Number of children: 3   Occupational History    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[employer]   Tobacco Use    Smoking status: Former     Current packs/day: 0.00     Types: Cigarettes    Smokeless tobacco: Never    Tobacco comments:     quit in october 2016   Substance and Sexual Activity    Alcohol  use: Yes     Alcohol/week: 1.0 - 3.0 standard drink of alcohol     Types: 1 - 3 Glasses of wine per week     Comment: social presently, excessive 10 years ago    Drug use: Yes     Types: Marijuana    Sexual activity: Not Currently     Partners: Male     Birth control/protection: Condom, See Surgical Hx   Other Topics Concern    Patient feels they ought to cut down on drinking/drug use No    Patient annoyed by others criticizing their drinking/drug use No    Patient has felt bad or guilty about drinking/drug use No    Patient has had a drink/used drugs as an eye opener in the AM No    Are you pregnant or think you may be? No    Breast-feeding No   Social History Narrative    Has 3 healthy grown sons (1990, 1993, 1998) Lives alone.    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   in 2010.    Has Male partner since 2014 who is currently disabled.     Social Drivers of Health     Financial Resource Strain: Patient Declined (12/19/2024)    Overall Financial Resource Strain (CARDIA)     Difficulty of Paying Living Expenses: Patient declined   Food Insecurity: Patient Declined (12/19/2024)    Hunger Vital Sign     Worried About Running Out of Food in the Last Year: Patient declined     Ran Out of Food in the Last Year: Patient declined   Transportation Needs: Patient Declined (11/27/2023)    PRAPARE - Transportation     Lack of Transportation (Medical): Patient declined     Lack of Transportation (Non-Medical): Patient declined   Physical Activity: Insufficiently Active (12/19/2024)    Exercise Vital Sign     Days of Exercise per Week: 2 days     Minutes of Exercise per Session: 60 min   Stress: No Stress Concern Present (12/19/2024)    [employer] - Occupational Stress Questionnaire     Feeling of Stress : Only a little   Housing Stability: Unknown (12/19/2024)    Housing Stability Vital Sign     Unable to Pay for  Housing in the Last Year: Patient declined       Family History:  Family History   Problem Relation Name Age of Onset    Diabetes Mother      Cancer Mother      Hypertension Mother      No Known Problems Father      No Known Problems Sister      No Known Problems Brother      No Known Problems Maternal Aunt      Cirrhosis Maternal Uncle          ETOH    No Known Problems Paternal Aunt      No Known Problems Paternal Uncle      No Known Problems Maternal Grandmother      No Known Problems Maternal Grandfather      No Known Problems Paternal Grandmother      No Known Problems Paternal Grandfather      No Known Problems Other      Celiac disease Neg Hx      Colon cancer Neg Hx      Colon polyps Neg Hx      Crohn's disease Neg Hx      Esophageal cancer Neg Hx      Inflammatory bowel disease Neg Hx      Liver cancer Neg Hx      Liver disease Neg Hx      Rectal cancer Neg Hx      Stomach cancer Neg Hx      Ulcerative colitis Neg Hx         Allergies and Medications: (updated and reviewed)  Review of patient's allergies indicates:   Allergen Reactions    Morphine Itching and Hallucinations    Pcn [penicillins] Other (See Comments)     Was told from childhood she couldn't take it    Latex, natural rubber Rash    Penicillin v Rash    Sulfa (sulfonamide antibiotics) Nausea And Vomiting and Nausea Only     Current Outpatient Medications   Medication Sig Dispense Refill    albuterol (PROVENTIL/VENTOLIN HFA) 90 mcg/actuation inhaler Inhale 2 puffs into the lungs every 4 (four) hours as needed for Wheezing (coughing fits). Rescue 6.7 g 0    amitriptyline (ELAVIL) 50 MG tablet Take 1 tablet (50 mg total) by mouth every evening. 90 tablet 3    apixaban (ELIQUIS) 5 mg Tab Take 1 tablet (5 mg total) by mouth 2 (two) times daily. 60 tablet 11    azelastine (ASTELIN) 137 mcg (0.1 %) nasal spray 1 spray (137 mcg total) by Nasal route 2 (two) times daily. 30 mL 0    benzonatate (TESSALON) 200 MG capsule Take 1 capsule (200 mg total) by  mouth 3 (three) times daily as needed for Cough. 30 capsule 0    busPIRone (BUSPAR) 15 MG tablet Take 1 tablet (15 mg total) by mouth 3 (three) times daily. 90 tablet 11    cetirizine (ZYRTEC) 10 MG tablet Take 1 tablet (10 mg total) by mouth once daily. for 14 days 14 tablet 0    cholecalciferol, vitamin D3, (VITAMIN D3) 50 mcg (2,000 unit) Tab Take 1 tablet (2,000 Units total) by mouth once daily. 90 tablet 3    diclofenac sodium (VOLTAREN) 1 % Gel APPLY 4 GRAMS  TOPICALLY 4 TIMES DAILY TO AFFECTED AREA. DO NOT APPLY MORE THAN 16GM DAILY TO ANY ONE AFFECTED JOINT 100 g 0    docusate sodium (COLACE) 100 MG capsule Take 1 capsule (100 mg total) by mouth 2 (two) times daily. 180 capsule 3    FLUoxetine 40 MG capsule Take 2 capsules (80 mg total) by mouth once daily. 180 capsule 0    hydroxychloroquine (PLAQUENIL) 200 mg tablet Take 1 tablet (200 mg total) by mouth 2 (two) times daily. 60 tablet 0    hydrOXYzine pamoate (VISTARIL) 25 MG Cap Take 1 capsule (25 mg total) by mouth 2 (two) times daily as needed (panic attacks). 60 capsule 5    inhalation spacing device (BREATHERITE MDI SPACER) Use as directed for inhalation. 1 each 0    ipratropium (ATROVENT) 21 mcg (0.03 %) nasal spray USE 2 SPRAY(S) IN EACH NOSTRIL THREE TIMES DAILY 90 mL 2    oxyCODONE-acetaminophen (PERCOCET)  mg per tablet Take 1 tablet by mouth every 12 (twelve) hours as needed for Pain. 60 tablet 0    pilocarpine (SALAGEN) 5 MG Tab Take 1 tablet (5 mg total) by mouth 3 (three) times daily. 90 tablet 11    polyethylene glycol (GLYCOLAX) 17 gram PwPk Take 17 g by mouth once daily. 90 each 3    tiZANidine (ZANAFLEX) 4 MG tablet Take 2 tablets (8 mg total) by mouth 2 (two) times daily. 120 tablet 0    valsartan-hydrochlorothiazide (DIOVAN-HCT) 160-25 mg per tablet Take 1 tablet by mouth once daily. 90 tablet 2    azithromycin (Z-KIMBERLEE) 250 MG tablet Take 2 tablets by mouth on day 1; Take 1 tablet by mouth on days 2-5 6 tablet 0     No current  facility-administered medications for this visit.       Patient Care Team:  Ian Cespedes MD as PCP - General (Internal Medicine)  Chandler Bates MD as Consulting Physician (Cardiology)  Kari Tuttle MD as Consulting Physician (Hematology and Oncology)  Miguelangel Goldman MD as Consulting Physician (Vascular Surgery)  Johan Grover MD as Consulting Physician (Gastroenterology)  Maverick Pierce MD as Consulting Physician (Urology)  Becca Paredes MD (Obstetrics and Gynecology)  Ross Hughes MD (Inactive) as Resident (Rheumatology)  Yeison Gibson III, NP as Nurse Practitioner (Psychiatry)  Nito Lambert MD (Gastroenterology)  Imaging, Dis Diagnostic (Diagnostic Radiology)         - The patient is given an After Visit Summary that lists all medications with directions, allergies, education, orders placed during this encounter and follow-up instructions.      - I have reviewed the patient's medical information including past medical, family, and social history sections including the medications and allergies.      - We discussed the patient's current medications.     This note was created by combination of typed  and MModal dictation.  Transcription errors may be present.  If there are any questions, please contact me.                 REAGAN Jones

## 2024-12-24 ENCOUNTER — PATIENT MESSAGE (OUTPATIENT)
Dept: RHEUMATOLOGY | Facility: CLINIC | Age: 49
End: 2024-12-24
Payer: COMMERCIAL

## 2024-12-27 ENCOUNTER — PATIENT MESSAGE (OUTPATIENT)
Dept: RHEUMATOLOGY | Facility: CLINIC | Age: 49
End: 2024-12-27
Payer: COMMERCIAL

## 2024-12-27 DIAGNOSIS — M35.01 SJOGREN'S SYNDROME WITH KERATOCONJUNCTIVITIS SICCA: ICD-10-CM

## 2024-12-27 DIAGNOSIS — M79.7 FIBROMYALGIA: ICD-10-CM

## 2024-12-27 RX ORDER — OXYCODONE AND ACETAMINOPHEN 10; 325 MG/1; MG/1
1 TABLET ORAL EVERY 12 HOURS PRN
Qty: 60 TABLET | Refills: 0 | Status: SHIPPED | OUTPATIENT
Start: 2024-12-27

## 2025-01-06 DIAGNOSIS — M35.01 SJOGREN'S SYNDROME WITH KERATOCONJUNCTIVITIS SICCA: ICD-10-CM

## 2025-01-06 RX ORDER — HYDROXYCHLOROQUINE SULFATE 200 MG/1
200 TABLET, FILM COATED ORAL 2 TIMES DAILY
Qty: 60 TABLET | Refills: 11 | Status: SHIPPED | OUTPATIENT
Start: 2025-01-06

## 2025-01-10 ENCOUNTER — PATIENT MESSAGE (OUTPATIENT)
Dept: PSYCHIATRY | Facility: CLINIC | Age: 50
End: 2025-01-10
Payer: COMMERCIAL

## 2025-01-10 DIAGNOSIS — F60.5 OBSESSIVE COMPULSIVE PERSONALITY DISORDER: ICD-10-CM

## 2025-01-10 DIAGNOSIS — F31.32 BIPOLAR AFFECTIVE DISORDER, DEPRESSED, MODERATE: ICD-10-CM

## 2025-01-10 RX ORDER — FLUOXETINE HYDROCHLORIDE 40 MG/1
80 CAPSULE ORAL DAILY
Qty: 180 CAPSULE | Refills: 0 | OUTPATIENT
Start: 2025-01-10

## 2025-01-10 RX ORDER — FLUOXETINE HYDROCHLORIDE 40 MG/1
80 CAPSULE ORAL DAILY
Qty: 180 CAPSULE | Refills: 0 | Status: SHIPPED | OUTPATIENT
Start: 2025-01-10

## 2025-01-26 DIAGNOSIS — F60.5 OBSESSIVE COMPULSIVE PERSONALITY DISORDER: ICD-10-CM

## 2025-01-26 DIAGNOSIS — F31.32 BIPOLAR AFFECTIVE DISORDER, DEPRESSED, MODERATE: ICD-10-CM

## 2025-01-27 RX ORDER — FLUOXETINE HYDROCHLORIDE 40 MG/1
80 CAPSULE ORAL DAILY
Qty: 180 CAPSULE | Refills: 0 | Status: SHIPPED | OUTPATIENT
Start: 2025-01-27

## 2025-01-28 ENCOUNTER — LAB VISIT (OUTPATIENT)
Dept: LAB | Facility: HOSPITAL | Age: 50
End: 2025-01-28
Attending: PHYSICAL MEDICINE & REHABILITATION
Payer: COMMERCIAL

## 2025-01-28 ENCOUNTER — OFFICE VISIT (OUTPATIENT)
Dept: PHYSICAL MEDICINE AND REHAB | Facility: CLINIC | Age: 50
End: 2025-01-28
Payer: COMMERCIAL

## 2025-01-28 VITALS — BODY MASS INDEX: 41.1 KG/M2 | OXYGEN SATURATION: 98 % | WEIGHT: 240.75 LBS | HEIGHT: 64 IN

## 2025-01-28 DIAGNOSIS — M51.360 DEGENERATION OF INTERVERTEBRAL DISC OF LUMBAR REGION WITH DISCOGENIC BACK PAIN: ICD-10-CM

## 2025-01-28 DIAGNOSIS — G89.29 CHRONIC NECK PAIN: ICD-10-CM

## 2025-01-28 DIAGNOSIS — Z79.891 CHRONICALLY ON OPIATE THERAPY: ICD-10-CM

## 2025-01-28 DIAGNOSIS — E66.01 MORBID OBESITY WITH BMI OF 40.0-44.9, ADULT: ICD-10-CM

## 2025-01-28 DIAGNOSIS — M79.7 FIBROMYALGIA SYNDROME: Primary | ICD-10-CM

## 2025-01-28 DIAGNOSIS — M17.0 PRIMARY OSTEOARTHRITIS OF BOTH KNEES: ICD-10-CM

## 2025-01-28 DIAGNOSIS — M54.50 CHRONIC MIDLINE LOW BACK PAIN WITHOUT SCIATICA: ICD-10-CM

## 2025-01-28 DIAGNOSIS — M54.2 CHRONIC NECK PAIN: ICD-10-CM

## 2025-01-28 DIAGNOSIS — G89.29 CHRONIC MIDLINE LOW BACK PAIN WITHOUT SCIATICA: ICD-10-CM

## 2025-01-28 PROCEDURE — 80366 DRUG SCREENING PREGABALIN: CPT | Performed by: PHYSICAL MEDICINE & REHABILITATION

## 2025-01-28 PROCEDURE — 3008F BODY MASS INDEX DOCD: CPT | Mod: CPTII,S$GLB,, | Performed by: PHYSICAL MEDICINE & REHABILITATION

## 2025-01-28 PROCEDURE — 1159F MED LIST DOCD IN RCRD: CPT | Mod: CPTII,S$GLB,, | Performed by: PHYSICAL MEDICINE & REHABILITATION

## 2025-01-28 PROCEDURE — 99999 PR PBB SHADOW E&M-EST. PATIENT-LVL IV: CPT | Mod: PBBFAC,,, | Performed by: PHYSICAL MEDICINE & REHABILITATION

## 2025-01-28 PROCEDURE — 99204 OFFICE O/P NEW MOD 45 MIN: CPT | Mod: S$GLB,,, | Performed by: PHYSICAL MEDICINE & REHABILITATION

## 2025-01-28 RX ORDER — OXYCODONE AND ACETAMINOPHEN 10; 325 MG/1; MG/1
1 TABLET ORAL EVERY 12 HOURS PRN
Qty: 60 TABLET | Refills: 0 | Status: SHIPPED | OUTPATIENT
Start: 2025-01-29

## 2025-01-28 RX ORDER — PREGABALIN 50 MG/1
50 CAPSULE ORAL 2 TIMES DAILY
Qty: 90 CAPSULE | Refills: 2 | Status: SHIPPED | OUTPATIENT
Start: 2025-01-28 | End: 2025-04-28

## 2025-01-28 RX ORDER — ACETAMINOPHEN 500 MG
500 TABLET ORAL EVERY 6 HOURS PRN
COMMUNITY
Start: 2025-01-28

## 2025-01-28 NOTE — PROGRESS NOTES
Subjective:       Patient ID: Alberto Frye is a 50 y.o. female.    Chief Complaint: No chief complaint on file.      HPI      Mrs. Frye is a 50 year-old female with PMH of HTN, CHF, anxiety/depression, osteoarthritis, Sjogren's syndrome, DVT on chronic anticoagulation with Eliquis, fibromyalgia syndrome, morbid obesity.  She was referred to the Physical Medicine Clinic for help fibromyalgia, chronic low back pain, chronic neck pain and bilateral knee pain    The patient has been complaining of generalized muscle and joint aching, stiffness, fatigue, brain fog and sleep impairment since around 2020.  She has been followed up by Rheumatology and diagnosed with fibromyalgia syndrome.  Her symptoms have been generally stable with occasional flare ups.  Her last flare-up was about few weeks ago.  Her pain is generalized but worse in her back, neck and knees.      He has been complaining of low back pain since 20/10.  She denies any preceding injuries.  Her x-rays in 2022 showed intervertebral disc space narrowing, degenerative changes, no instability.  Her back pain has been recently worse.  It is an intermittent stabbing in the lower lumbar spine and across her back, more in the right buttock region.  She has occasional shooting pain to both feet.  Also recalls occasional pain/numbness in bilateral inguinal regions.  Her pain is aggravated with bending, heavy lifting, and when she wakes up in the morning.  It is better with lying down and local heat.  Her maximum pain is 10/10 and minimum 3/10.  Today it is 5/10.  She denies any significant lower extremity weakness.  He denies any bowel or bladder incontinence.  Denies any leg claudications.      She has been complaining of neck pain since 2018.  She denies any preceding injuries.  Her pain has been waxing and waning.  It has been recently under good control.  It is an intermittent throbbing pain in the low cervical spine and adjacent paravertebral muscles.   She denies any radiation to her arms.  It is worse with neck movement especially rotation.  It is better with sitting still.  Her maximum pain is 7/10 and minimum 2/10.  Today it is 2/10.  She reports mild hand  weakness.  She denies any numbness in her arms.  She denies any impaired hand coordination.  She denies any impaired gait.      He has been also complaining of bilateral knee pain.  She had previously x-rays that showed OA.  Her pain is an intermittent aching pain aggravated by weight-bearing.    He is currently on:   Tizanidine 4 mg tablets p.r.n., usually once or twice per day   Oxycodone/APAP 10/325 p.r.n., usually twice per day.  She reports that previously gabapentin did not work.  She was on Lyrica at 150 mg capsules but it caused some stuttering.  Tramadol not help.  Hydrocodone caused itching.    Past Medical History:   Diagnosis Date    AYDE (acute kidney injury) 11/1/2022    Alcohol abuse     stopped heavy drinking about 10 years ago; was drinking 3 glasses of vodka/tequilla,rum/whiskey per day    Allergy Don't remember    Its been some years.    Anxiety     Arthritis 2010    Blood clotting tendency 2008    After a procedure & 2020.    Congestive heart failure 8/11/2020    Depression     Hallucination     Hx of psychiatric care     Hypertension     Immune disorder 2020    Joint pain 2010    Keloid cicatrix Years ago    From childhood scars    Caro     unplanned trips, energy without sleep for 2 days (reading, cleaning), feelings that she can do multiple tasks at one time, feelings of overconfidence at times    Psychiatric problem     Sleep difficulties     Therapy     Withdrawal symptoms, drug or narcotic     racing heart, restlessness        Review of patient's allergies indicates:   Allergen Reactions    Morphine Itching and Hallucinations    Pcn [penicillins] Other (See Comments)     Was told from childhood she couldn't take it    Pilocarpine      Shortness of breath    Latex, natural rubber  Rash    Penicillin v Rash    Sulfa (sulfonamide antibiotics) Nausea And Vomiting and Nausea Only        Review of Systems   Constitutional:  Positive for fatigue. Negative for chills and fever.   HENT:  Negative for trouble swallowing.    Eyes:  Negative for visual disturbance.   Respiratory:  Negative for shortness of breath.    Cardiovascular:  Negative for chest pain.   Gastrointestinal:  Negative for abdominal pain, blood in stool, constipation, diarrhea, nausea and vomiting.   Genitourinary:  Negative for difficulty urinating.   Musculoskeletal:  Positive for arthralgias, back pain, myalgias and neck pain. Negative for gait problem and joint swelling.   Neurological:  Negative for dizziness, weakness and headaches.   Psychiatric/Behavioral:  Positive for sleep disturbance. Negative for behavioral problems.              Objective:      Physical Exam  Vitals reviewed.   Constitutional:       Appearance: She is well-developed.   HENT:      Head: Normocephalic and atraumatic.   Eyes:      Extraocular Movements: Extraocular movements intact.   Musculoskeletal:      Cervical back: Normal range of motion. No tenderness.      Comments: BUE:  ROM:   RUE: full.   LUE: full.  Strength:    RUE: 5/5 at shoulder abduction, 5 elbow flexion, 5 wrist extension, 5 elbow extension, 5 finger flexion, 5 finger abduction.   LUE: 5/5 at shoulder abduction, 5 elbow flexion, 5 wrist extension, 5 elbow extension, 5 finger flexion, 5 finger abduction.  Sensation to pinprick:   RUE: intact.   LUE: intact.  DTR:    RUE: +1 biceps, +1 triceps.   LUE:  +1 biceps, +1 triceps.  Mills:   RUE: -ve.   LUE: -ve.    BLE:  ROM:   RLE: full.   LLE: full.  Knee crepitus:   RLE: +ve.   LLE: +ve.   Strength:    RLE: 5/5 at hip flexion, 5- knee extension, 5 ankle DF, 5 PF, 5 EHL.   LLE: 5/5 at hip flexion, 5 knee extension, 5 ankle DF, 5 PF, 5 EHL.  Sensation to pinprick:     RLE: intact.      LLE: intact.   DTR:     RLE: +2 knee, +1 ankle.    LLE: +2  knee, +1 ankle.  Clonus:    Rt ankle: -ve.    Lt ankle: -ve.  SLR:      RLE: -ve at 60 degrees.      LLE: -ve at 60 degrees.   MICHAEL:     RLE: -ve.      LLE: -ve.    +ve tenderness over lumbar spine.  +ve diffuse tender points over the upper & lower back, anterior chest wall, hips, elbows & knees (18/18 fibromyalgia reference points)    No leg length discrepancy.    Directional Preference:  Spine flexion: 90 degrees , mild pain in back.  Spine extension: 20 degrees, mild pain in back.  Lateral bending: mild pain to Right, mild pain to Left.      Heel walking: WNL.  Toe walking: WNL.  Gait: WNL       Skin:     General: Skin is warm.   Neurological:      General: No focal deficit present.      Mental Status: She is alert.   Psychiatric:         Mood and Affect: Mood normal.         Behavior: Behavior normal.         Assessment:       1. Fibromyalgia syndrome    2. Chronic midline low back pain without sciatica    3. Degeneration of intervertebral disc of lumbar region with discogenic back pain    4. Chronic neck pain    5. Primary osteoarthritis of both knees    6. Chronically on opiate therapy    7. Morbid obesity with BMI of 40.0-44.9, adult        Plan:         - Start pregabalin (LYRICA) 50 MG capsule; Take 1 capsule (50 mg total) by mouth 2 (two) times daily. In 1-2 weeks, if no relief, may increase dose to 3 times per day.  - Continue oxyCODONE-acetaminophen (PERCOCET)  mg per tablet; Take 1 tablet by mouth every 12 (twelve) hours as needed for Pain.  - Start acetaminophen (TYLENOL) 500 MG tablet; Take 1 tablet (500 mg total) by mouth every 6 (six) hours as needed for mild pain.  - The patient is not able to commit to a course of Physical Therapy at this point.  - The patient was encouraged to adopt a regular Home Exercise Program, and provided with printouts.  - Weight loss was encouraged.  - A Pain contract was signed and will be scanned into the chart.  - Pain Clinic Drug Screen; Future  - Follow up in  about 4 months (around 5/28/2025).      This was a 45 minute visit, 50% of which was spent educating the patient about the diagnosis and the treatment plan.    This note was partly generated with Feedtrace voice recognition software. I apologize for any possible typographical errors.

## 2025-01-29 ENCOUNTER — OFFICE VISIT (OUTPATIENT)
Dept: FAMILY MEDICINE | Facility: CLINIC | Age: 50
End: 2025-01-29
Payer: COMMERCIAL

## 2025-01-29 VITALS
SYSTOLIC BLOOD PRESSURE: 120 MMHG | HEART RATE: 83 BPM | TEMPERATURE: 98 F | OXYGEN SATURATION: 97 % | DIASTOLIC BLOOD PRESSURE: 78 MMHG | WEIGHT: 241.94 LBS | HEIGHT: 64 IN | BODY MASS INDEX: 41.3 KG/M2

## 2025-01-29 DIAGNOSIS — F31.32 BIPOLAR AFFECTIVE DISORDER, DEPRESSED, MODERATE: ICD-10-CM

## 2025-01-29 DIAGNOSIS — F60.5 OBSESSIVE COMPULSIVE PERSONALITY DISORDER: ICD-10-CM

## 2025-01-29 DIAGNOSIS — I82.409 RECURRENT DEEP VEIN THROMBOSIS (DVT): ICD-10-CM

## 2025-01-29 DIAGNOSIS — E78.5 DYSLIPIDEMIA: ICD-10-CM

## 2025-01-29 DIAGNOSIS — F06.4 ANXIETY DISORDER DUE TO MEDICAL CONDITION: ICD-10-CM

## 2025-01-29 DIAGNOSIS — E66.01 MORBID OBESITY: ICD-10-CM

## 2025-01-29 DIAGNOSIS — M54.50 CHRONIC MIDLINE LOW BACK PAIN WITHOUT SCIATICA: ICD-10-CM

## 2025-01-29 DIAGNOSIS — G89.29 CHRONIC MIDLINE LOW BACK PAIN WITHOUT SCIATICA: ICD-10-CM

## 2025-01-29 DIAGNOSIS — Z23 NEED FOR COVID-19 VACCINE: ICD-10-CM

## 2025-01-29 DIAGNOSIS — Z79.01 CHRONIC ANTICOAGULATION: ICD-10-CM

## 2025-01-29 DIAGNOSIS — Z00.00 NORMAL PHYSICAL EXAM: Primary | ICD-10-CM

## 2025-01-29 DIAGNOSIS — I10 ESSENTIAL HYPERTENSION: ICD-10-CM

## 2025-01-29 DIAGNOSIS — R13.10 DYSPHAGIA, UNSPECIFIED TYPE: ICD-10-CM

## 2025-01-29 DIAGNOSIS — M79.7 FIBROMYALGIA: ICD-10-CM

## 2025-01-29 DIAGNOSIS — Z12.11 SCREENING FOR COLON CANCER: ICD-10-CM

## 2025-01-29 DIAGNOSIS — M35.05 SJOGREN'S SYNDROME WITH INFLAMMATORY ARTHRITIS: ICD-10-CM

## 2025-01-29 PROCEDURE — 3074F SYST BP LT 130 MM HG: CPT | Mod: CPTII,S$GLB,, | Performed by: INTERNAL MEDICINE

## 2025-01-29 PROCEDURE — 99396 PREV VISIT EST AGE 40-64: CPT | Mod: S$GLB,,, | Performed by: INTERNAL MEDICINE

## 2025-01-29 PROCEDURE — 1160F RVW MEDS BY RX/DR IN RCRD: CPT | Mod: CPTII,S$GLB,, | Performed by: INTERNAL MEDICINE

## 2025-01-29 PROCEDURE — 1159F MED LIST DOCD IN RCRD: CPT | Mod: CPTII,S$GLB,, | Performed by: INTERNAL MEDICINE

## 2025-01-29 PROCEDURE — 3078F DIAST BP <80 MM HG: CPT | Mod: CPTII,S$GLB,, | Performed by: INTERNAL MEDICINE

## 2025-01-29 PROCEDURE — 91320 SARSCV2 VAC 30MCG TRS-SUC IM: CPT | Mod: S$GLB,,, | Performed by: INTERNAL MEDICINE

## 2025-01-29 PROCEDURE — 90480 ADMN SARSCOV2 VAC 1/ONLY CMP: CPT | Mod: S$GLB,,, | Performed by: INTERNAL MEDICINE

## 2025-01-29 PROCEDURE — 3008F BODY MASS INDEX DOCD: CPT | Mod: CPTII,S$GLB,, | Performed by: INTERNAL MEDICINE

## 2025-01-29 PROCEDURE — 99999 PR PBB SHADOW E&M-EST. PATIENT-LVL V: CPT | Mod: PBBFAC,,, | Performed by: INTERNAL MEDICINE

## 2025-01-29 RX ORDER — ATORVASTATIN CALCIUM 20 MG/1
20 TABLET, FILM COATED ORAL DAILY
Qty: 90 TABLET | Refills: 3 | Status: SHIPPED | OUTPATIENT
Start: 2025-01-29 | End: 2026-01-29

## 2025-01-29 NOTE — PROGRESS NOTES
This note was created by combination of typed  and M-Modal dictation.  Transcription errors may be present.   This note was also generated with the assistance of ambient listening technology. Verbal consent was obtained by the patient and accompanying visitor(s) for the recording of patient appointment to facilitate this note. I attest to having reviewed and edited the generated note for accuracy, though some syntax or spelling errors may persist. Please contact the author of this note for any clarification.    Assessment and Plan:   Assessment and Plan     Normal physical exam    Essential hypertension  - BP stable.  On valsartan HCT.    Dyslipidemia  -  With elevated HS CRP.  Vascular calcifications incidental on mammogram.  After discussion she would be agreeable to start statin.  Start Lipitor 20.  -     Comprehensive Metabolic Panel; Future; Expected date: 07/29/2025  -     Lipid Panel; Future; Expected date: 07/29/2025  -     atorvastatin (LIPITOR) 20 MG tablet; Take 1 tablet (20 mg total) by mouth once daily.  Dispense: 90 tablet; Refill: 3    Dysphagia, unspecified type  Screening for colon cancer 11/2019 colonoscopy hemorrhoids and diverticulosis; [facility]  -  Previously attempted to get her set up for EGD and colonoscopy but health issues and family issues arose.  Reschedule.  -     Ambulatory referral/consult to Endo Procedure ; Future; Expected date: 01/30/2025    Morbid obesity  -  Work on therapeutic lifestyle modification (TLC).    Chronic anticoagulation  Recurrent deep vein thrombosis (DVT) with hx of IVC filter; indefinite anticoagulation  -  On indefinite anticoagulation    Sjogren's syndrome with inflammatory arthritis  Chronic midline low back pain without sciatica  Fibromyalgia  - was tried off of Plaquenil and alternatives of pilocarpine with subsequent congestion and  ensuing respiratory infection restarted on Plaquenil.    Takes tizanidine and amitriptyline at night   Saw  PMR, rechallenge on Lyrica low-dose.  Higher doses did help but caused mental side effects.  Continue narcotic    Obsessive compulsive personality disorder  Anxiety disorder due to medical condition  Bipolar affective disorder, depressed, moderate  - managed by Psychiatry.  Fluoxetine, amitriptyline, BuSpar    Need for COVID-19 vaccine  -     COVID-19 (Pfizer) 30 mcg/0.3 mL IM vaccine (>/= 13 yo) 0.3 mL    Medications Discontinued During This Encounter   Medication Reason    albuterol (PROVENTIL/VENTOLIN HFA) 90 mcg/actuation inhaler Therapy completed    cetirizine (ZYRTEC) 10 MG tablet Therapy completed    inhalation spacing device (BREATHERITE MDI SPACER)     ipratropium (ATROVENT) 21 mcg (0.03 %) nasal spray Alternate therapy       meds sent this encounter:  Medications Ordered This Encounter   Medications    atorvastatin (LIPITOR) 20 MG tablet     Sig: Take 1 tablet (20 mg total) by mouth once daily.     Dispense:  90 tablet     Refill:  3    COVID-19 (Pfizer) 30 mcg/0.3 mL IM vaccine (>/= 13 yo) 0.3 mL         Follow Up:  follow-up 6 months with labs on new start statin  Future Appointments   Date Time Provider Department Center   1/29/2025  8:20 AM Ian Cespedes MD [facility]   2/7/2025 10:05 AM LAB, [facility] LAB [facility]   2/10/2025  8:00 AM Carmelita Mueller MD [facility] RHEUM [address]   3/21/2025  4:00 PM Yeison Gibson III NP [facility] PS[facility] [address]   5/29/2025  8:40 AM Raheem Mcconnell MD [facility] PHYSMED [address]         Subjective:   Subjective   Chief Complaint   Patient presents with    Annual Exam       HPI  Alberto is a 50 y.o. female.    Social History     Tobacco Use    Smoking status: Former     Current packs/day: 0.00     Types: Cigarettes    Smokeless tobacco: Never    Tobacco comments:     quit in october 2016   Substance Use Topics    Alcohol use: Yes     Alcohol/week: 1.0 - 3.0 standard drink of alcohol     Types: 1 - 3 Glasses of wine per week     Comment: social  presently, excessive 10 years ago      Social History     Occupational History    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[employer]      Social History     Social History Narrative    Has 3 healthy grown sons (1990, 1993, 1998) Lives alone.    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   in 2010.    Has Male partner since 2014 who is currently disabled.       Patient Care Team:  Ian Cespedes MD as PCP - General (Internal Medicine)  Chandler Bates MD as Consulting Physician (Cardiology)  Kari Tuttle MD as Consulting Physician (Hematology and Oncology)  Miguelangel Goldman MD as Consulting Physician (Vascular Surgery)  Johan Grover MD as Consulting Physician (Gastroenterology)  Maverick Pierce MD as Consulting Physician (Urology)  Becca Paredes MD (Obstetrics and Gynecology)  Ross Hughes MD (Inactive) as Resident (Rheumatology)  Yeison Gibson III, NP as Nurse Practitioner (Psychiatry)  Nito Lambert MD (Gastroenterology)  Imaging, Dis Diagnostic (Diagnostic Radiology)    No LMP recorded. Patient has had a hysterectomy.    Last appointment with this clinic was 12/20/2024. Last visit with me 7/29/2024   To summarize last visit and events leading up to today:  Hypertension, diastolic dysfunction  hyperlipidemia, 10 year risk score less than 7.5%.  Elevated CPK baseline. Incidental vascular calcifications on mammogram.  Saw cards for palpitations.  06/14/2024 TTE LV normal size normal wall thickness normal systolic function LVEF 55-60%.  Grade 1 diastolic dysfunction.  6/14/24 holter   The predominant rhythm is sinus.   Heart rates varied between 71 and 129 BPM with an average of 91 BPM.   There were very rare PVCs totalling 4 and averaging 0.04 per hour.   There were very rare PACs totalling 11 and averaging 0.11 per hour.   The diary was not returned.   Dysphagia previously seen by GI  January 2023.  Plan was EGD and colonoscopy but that was derailed due to illness and family events.  Hospitalization 11/9 through 11/11/2023 for SBO presenting as abdominal pain  cT showed incidental circumferential thickening esophagus. Would check EGD  Obesity, she had inquired about GLP1 but would evaluate the GI system 1st.   S/p lap banding  Subclinical hypothyroid with negative antibodies.  Hold on treatment  10/24/2023 TSH normal not on meds  Bipolar, anxiety, followed by Psychiatry.  Previously she had self discontinued mood stabilizer.  On fluoxetine.  Bar.  Amitriptyline.  Chronic back pain, fibromyalgia  Sjogren's syndrome, fibromyalgia followed by Rheumatology on Plaquenil.  Oxycodone for breakthrough pain.  Also on Lyrica and tizanidine and amitriptyline.  History of trial of cevimeline without relief.  Saw Rheumatology in follow-up 01/04/2024.  Fibromyalgia.  Already on Lyrica.  Consider duloxetine though she should discuss this with her psychiatrist.  Offered her [facility] functional restoration program  Recurrent DVT, indefinite anticoagulation, DOAC     Last visit with me 07/29/2024  Dysphagia.  Previously seen by GI and complained of dysphagia and blood in stool.  Plan was EGD and colonoscopy.  Never got set up because of subsequent family issues and health issues but would be interested in pursuing.  Previous CT imaging showing thickening of the esophagus.  Could be transient.  EGD and colonoscopy ordered   Hypertension stable   Hyperlipidemia with incidental calcifications on mammogram.  She is wary of statin therapy.  Plan was check HS CRP and if elevated consider low-dose statin  Chronic anticoagulation for recurrent DVT on DOAC indefinite duration  Vitamin-D out of prescription, changed to over-the-counter 2000  Obesity work on diet and physical activity modification  Sjogren's, fibromyalgia, lumbar spondylosis and neuropathy followed by Rheumatology.  On chronic narcotics muscle relaxers and  Plaquenil   Bipolar followed by Psychiatry previously self discontinued and/psychotic currently on BuSpar fluoxetine and amitriptyline  History of abnormal TSH.  Last TSH was normal repeat future labs    Lipid ok but HS CRP elevated  History of incidental vascular calcification seen on mammo  TSH WNL not on meds.  History of abn TSH  A1c 5.0  Results to pt via MyChart. Consider statin with next visit but she is reluctant to consider    11/22/24 saw new rheumatologist to establish  Sjogren's.  Plaquenil started for arthritic symptoms in the hands but patient did not think it helped.  Trial off of Plaquenil.  Start pilocarpine  Fibromyalgia with flare.  Tizanidine, nabumetone, PMR    12/11/2024 ED for respiratory infection and cough, chest x-ray negative    12/20/2024 saw nurse practitioner in follow-up, patient requesting steroid injection.  Given Zithromax, dexamethasone  Chest x-ray negative    Seemed to improve with stopping pilocarpine    01/28/2025 saw PMR to establish.  Lyrica 50 b.i.d..  Continue Percocet    Needs EGD and colonoscopy  And statin  And repeat labs 3 months.     Lab scheduled 2/7      Today's visit:    History of Present Illness    SOCIAL HISTORY:  - Occupation: Employed    HPI:  Alberto reports a recent sinus infection following a medication change. Pilocarpine, prescribed to help with mucus production, led to overproduction of mucus, resulting in congestion and a sinus infection. This required an urgent care visit and an ER visit due to the congestion. Alberto did not develop pneumonia.    Alberto saw Dr. Mcconnell [facility] yesterday for pain management. Dr. Mcconnell adjusted her Lyrica dosage due to side effects. At 150mg, Lyrica caused stuttering and increased cognitive impairment. The dosage was reduced to 50mg to potentially maintain pain relief without these side effects.    Alberto reports persistent pulsatile tinnitus, describing constant awareness of her  heartbeat in her ear. She saw a cardiologist for this issue, and all tests were normal. Alberto inquired about the possibility of POTS (Postural Orthostatic Tachycardia Syndrome) being related to this symptom. She also inquiring about mast cell activation syndrome.  She is on chats with other Sjogren's patient's and these were brought up.  Discussed that she does not have the symptomatology for either.    Alberto's last colonoscopy was performed at [facility], approximately 5-6 years ago.    Alberto denies playing in the recent storm, having pneumonia, or currently needing the allergy inhaler. Alberto denies symptoms consistent with MCAS (Mast Cell Activation Syndrome) such as severe pruritus and rash.    MEDICATIONS:  - Buspar, for mood  - Fluoxetine (Prozac), for mood  - Oxycodone (Percocet), narcotic for pain  - Miralax, for constipation  - Colace, for constipation  - Lyrica 50 mg, twice daily, for pain. Side effects: stuttering and cognitive impairment at higher doses  - Tizanidine (Zanaflex) 2 tablets at night with sleeping medicine, muscle relaxer  - Plaquenil  - Vitamin D supplement, over the counter  - Astalyn nasal spray, as needed for allergies  - Valsartan HCTZ, for blood pressure    ROS:  General: reports fatigue  ENT: reports nasal congestion, reports tinnitus, reports dry mouth  Musculoskeletal: no muscle pain  Skin: no rash  Neurological: no headache  Psychiatric: reports memory problems  Allergic: no seasonal allergies, no allergic reactions             ALLERGIES AND MEDICATIONS: updated and reviewed.  Medication List with Changes/Refills   Current Medications    ACETAMINOPHEN (TYLENOL) 500 MG TABLET    Take 1 tablet (500 mg total) by mouth every 6 (six) hours as needed for Pain.    ALBUTEROL (PROVENTIL/VENTOLIN HFA) 90 MCG/ACTUATION INHALER    Inhale 2 puffs into the lungs every 4 (four) hours as needed for Wheezing (coughing fits). Rescue    AMITRIPTYLINE (ELAVIL) 50 MG TABLET    Take 1 tablet  (50 mg total) by mouth every evening.    APIXABAN (ELIQUIS) 5 MG TAB    Take 1 tablet (5 mg total) by mouth 2 (two) times daily.    AZELASTINE (ASTELIN) 137 MCG (0.1 %) NASAL SPRAY    1 spray (137 mcg total) by Nasal route 2 (two) times daily.    BUSPIRONE (BUSPAR) 15 MG TABLET    Take 1 tablet (15 mg total) by mouth 3 (three) times daily.    CETIRIZINE (ZYRTEC) 10 MG TABLET    Take 1 tablet (10 mg total) by mouth once daily. for 14 days    CHOLECALCIFEROL, VITAMIN D3, (VITAMIN D3) 50 MCG (2,000 UNIT) TAB    Take 1 tablet (2,000 Units total) by mouth once daily.    DICLOFENAC SODIUM (VOLTAREN) 1 % GEL    APPLY 4 GRAMS  TOPICALLY 4 TIMES DAILY TO AFFECTED AREA. DO NOT APPLY MORE THAN 16GM DAILY TO ANY ONE AFFECTED JOINT    DOCUSATE SODIUM (COLACE) 100 MG CAPSULE    Take 1 capsule (100 mg total) by mouth 2 (two) times daily.    FLUOXETINE 40 MG CAPSULE    Take 2 capsules (80 mg total) by mouth once daily.    HYDROXYCHLOROQUINE (PLAQUENIL) 200 MG TABLET    Take 1 tablet (200 mg total) by mouth 2 (two) times daily.    HYDROXYZINE PAMOATE (VISTARIL) 25 MG CAP    Take 1 capsule (25 mg total) by mouth 2 (two) times daily as needed (panic attacks).    INHALATION SPACING DEVICE (BREATHERITE MDI SPACER)    Use as directed for inhalation.    IPRATROPIUM (ATROVENT) 21 MCG (0.03 %) NASAL SPRAY    USE 2 SPRAY(S) IN EACH NOSTRIL THREE TIMES DAILY    OXYCODONE-ACETAMINOPHEN (PERCOCET)  MG PER TABLET    Take 1 tablet by mouth every 12 (twelve) hours as needed for Pain.    POLYETHYLENE GLYCOL (GLYCOLAX) 17 GRAM PWPK    Take 17 g by mouth once daily.    PREGABALIN (LYRICA) 50 MG CAPSULE    Take 1 capsule (50 mg total) by mouth 2 (two) times daily. In 1-2 weeks, if no relief, may increase dose to 3 times per day.    TIZANIDINE (ZANAFLEX) 4 MG TABLET    Take 2 tablets (8 mg total) by mouth 2 (two) times daily.    VALSARTAN-HYDROCHLOROTHIAZIDE (DIOVAN-HCT) 160-25 MG PER TABLET    Take 1 tablet by mouth once daily.        "  Objective:   Objective   Physical Exam   Vitals:    01/29/25 0818   BP: 120/78   Pulse: 83   Temp: 98.2 °F (36.8 °C)   TempSrc: Oral   SpO2: 97%   Weight: 109.8 kg (241 lb 15.3 oz)   Height: 5' 4" (1.626 m)    Body mass index is 41.53 kg/m².            Physical Exam  Constitutional:       General: She is not in acute distress.     Appearance: She is well-developed.   Eyes:      Extraocular Movements: Extraocular movements intact.   Cardiovascular:      Rate and Rhythm: Normal rate and regular rhythm.      Heart sounds: Normal heart sounds. No murmur heard.  Pulmonary:      Effort: Pulmonary effort is normal.      Breath sounds: Normal breath sounds.   Musculoskeletal:         General: Normal range of motion.      Right lower leg: No edema.      Left lower leg: No edema.   Lymphadenopathy:      Cervical: No cervical adenopathy.   Skin:     General: Skin is warm and dry.   Neurological:      Mental Status: She is alert and oriented to person, place, and time.      Coordination: Coordination normal.   Psychiatric:         Behavior: Behavior normal.         Thought Content: Thought content normal.                "

## 2025-01-29 NOTE — LETTER
January 29, 2025      [facility]  [address]  [place] [address]  Phone: [phone]  Fax: [phone]       Patient: Alberto Frye   YOB: 1975  Date of Visit: 01/29/2025    To Whom It May Concern:    Trixie Frye  was at [facility] on 01/29/2025. The patient may return to work/school on 01/30/2025 with no restrictions. If you have any questions or concerns, or if I can be of further assistance, please do not hesitate to contact me.    Sincerely,    Lary Frye MA

## 2025-01-30 ENCOUNTER — PATIENT OUTREACH (OUTPATIENT)
Dept: ADMINISTRATIVE | Facility: OTHER | Age: 50
End: 2025-01-30
Payer: COMMERCIAL

## 2025-01-30 NOTE — PROGRESS NOTES
CHW - Initial Contact    This Community Health Worker completed  the Social Determinant of Health questionnaire with patient via telephone today.    Pt identified barriers of most importance are: patient has no need at this time   Referrals to community agencies completed with patient/caregiver consent outside of [facility] include:  None  Referrals were put through [facility] - no: None  Support and Services: No support at this time  Other information discussed the patient needs / wants help with: Completed SDOH with patient today. Patient has no need at this time. Will follow up in two weeks to check on patient's future needs.   Follow up required: yes   Follow-up Outreach - Due: 2/7/2025

## 2025-02-01 LAB
1OH-MIDAZOLAM UR QL SCN: NOT DETECTED
6MAM UR QL: NOT DETECTED
7AMINOCLONAZEPAM UR QL: NOT DETECTED
A-OH ALPRAZ UR QL: NOT DETECTED
ALPRAZ UR QL: NOT DETECTED
AMPHET UR QL SCN: NOT DETECTED
ANNOTATION COMMENT IMP: NORMAL
BARBITURATES UR QL: NEGATIVE
BUPRENORPHINE UR QL: NOT DETECTED
BZE UR QL: NEGATIVE
CARBOXYTHC UR QL: NEGATIVE
CARISOPRODOL UR QL: NEGATIVE
CLONAZEPAM UR QL: NOT DETECTED
CODEINE UR QL: NOT DETECTED
CREAT UR-MCNC: 110.8 MG/DL (ref 20–400)
DIAZEPAM UR QL: NOT DETECTED
ETHYL GLUCURONIDE UR QL: NORMAL
FENTANYL UR QL: NOT DETECTED
GABAPENTIN UR QL CFM: NOT DETECTED
HYDROCODONE UR QL: NOT DETECTED
HYDROMORPHONE UR QL: NOT DETECTED
LORAZEPAM UR QL: NOT DETECTED
MDA UR QL: NOT DETECTED
MDEA UR QL: NOT DETECTED
MDMA UR QL: NOT DETECTED
ME-PHENIDATE UR QL: NOT DETECTED
METHADONE UR QL: NEGATIVE
METHAMPHET UR QL: NOT DETECTED
MIDAZOLAM UR QL SCN: NOT DETECTED
MORPHINE UR QL: NOT DETECTED
NALOXONE UR QL CFM: NOT DETECTED
NORBUPRENORPHINE UR QL CFM: NOT DETECTED
NORDIAZEPAM UR QL: NOT DETECTED
NORFENTANYL UR QL: NOT DETECTED
NORHYDROCODONE UR QL CFM: NOT DETECTED
NORMEPERIDINE UR QL CFM: NOT DETECTED
NOROXYCODONE UR QL CFM: PRESENT
NOROXYMORPHONE UR QL SCN: NOT DETECTED
OXAZEPAM UR QL: NOT DETECTED
OXYCODONE UR QL: PRESENT
OXYMORPHONE UR QL: PRESENT
PATHOLOGY STUDY: NORMAL
PCP UR QL: NEGATIVE
PHENTERMINE UR QL: NOT DETECTED
PREGABALIN UR QL CFM: PRESENT
SERVICE CMNT-IMP: NORMAL
TAPENTADOL UR QL SCN: NOT DETECTED
TAPENTADOL UR QL SCN: NOT DETECTED
TEMAZEPAM UR QL: NOT DETECTED
TRAMADOL UR QL: NEGATIVE
ZOLPIDEM PHENYL-4-CARB UR QL SCN: NOT DETECTED
ZOLPIDEM UR QL: NOT DETECTED

## 2025-02-07 ENCOUNTER — LAB VISIT (OUTPATIENT)
Dept: LAB | Facility: HOSPITAL | Age: 50
End: 2025-02-07
Attending: INTERNAL MEDICINE
Payer: COMMERCIAL

## 2025-02-07 DIAGNOSIS — M79.7 FIBROMYALGIA: ICD-10-CM

## 2025-02-07 DIAGNOSIS — M06.9 RHEUMATOID ARTHRITIS FLARE: ICD-10-CM

## 2025-02-07 LAB
ALBUMIN SERPL BCP-MCNC: 3.5 G/DL (ref 3.5–5.2)
ALBUMIN SERPL BCP-MCNC: 3.5 G/DL (ref 3.5–5.2)
ALP SERPL-CCNC: 81 U/L (ref 40–150)
ALP SERPL-CCNC: 81 U/L (ref 40–150)
ALT SERPL W/O P-5'-P-CCNC: 22 U/L (ref 10–44)
ALT SERPL W/O P-5'-P-CCNC: 22 U/L (ref 10–44)
ANION GAP SERPL CALC-SCNC: 9 MMOL/L (ref 8–16)
ANION GAP SERPL CALC-SCNC: 9 MMOL/L (ref 8–16)
AST SERPL-CCNC: 30 U/L (ref 10–40)
AST SERPL-CCNC: 30 U/L (ref 10–40)
BASOPHILS # BLD AUTO: 0.03 K/UL (ref 0–0.2)
BASOPHILS # BLD AUTO: 0.03 K/UL (ref 0–0.2)
BASOPHILS NFR BLD: 0.6 % (ref 0–1.9)
BASOPHILS NFR BLD: 0.6 % (ref 0–1.9)
BILIRUB SERPL-MCNC: 0.4 MG/DL (ref 0.1–1)
BILIRUB SERPL-MCNC: 0.4 MG/DL (ref 0.1–1)
BUN SERPL-MCNC: 10 MG/DL (ref 6–20)
BUN SERPL-MCNC: 10 MG/DL (ref 6–20)
CALCIUM SERPL-MCNC: 8.6 MG/DL (ref 8.7–10.5)
CALCIUM SERPL-MCNC: 8.6 MG/DL (ref 8.7–10.5)
CHLORIDE SERPL-SCNC: 107 MMOL/L (ref 95–110)
CHLORIDE SERPL-SCNC: 107 MMOL/L (ref 95–110)
CO2 SERPL-SCNC: 25 MMOL/L (ref 23–29)
CO2 SERPL-SCNC: 25 MMOL/L (ref 23–29)
CREAT SERPL-MCNC: 1 MG/DL (ref 0.5–1.4)
CREAT SERPL-MCNC: 1 MG/DL (ref 0.5–1.4)
CRP SERPL-MCNC: 11.8 MG/L (ref 0–8.2)
CRP SERPL-MCNC: 11.8 MG/L (ref 0–8.2)
DIFFERENTIAL METHOD BLD: ABNORMAL
DIFFERENTIAL METHOD BLD: ABNORMAL
EOSINOPHIL # BLD AUTO: 0.2 K/UL (ref 0–0.5)
EOSINOPHIL # BLD AUTO: 0.2 K/UL (ref 0–0.5)
EOSINOPHIL NFR BLD: 4.7 % (ref 0–8)
EOSINOPHIL NFR BLD: 4.7 % (ref 0–8)
ERYTHROCYTE [DISTWIDTH] IN BLOOD BY AUTOMATED COUNT: 13.2 % (ref 11.5–14.5)
ERYTHROCYTE [DISTWIDTH] IN BLOOD BY AUTOMATED COUNT: 13.2 % (ref 11.5–14.5)
ERYTHROCYTE [SEDIMENTATION RATE] IN BLOOD BY PHOTOMETRIC METHOD: 33 MM/HR (ref 0–36)
ERYTHROCYTE [SEDIMENTATION RATE] IN BLOOD BY PHOTOMETRIC METHOD: 33 MM/HR (ref 0–36)
EST. GFR  (NO RACE VARIABLE): >60 ML/MIN/1.73 M^2
EST. GFR  (NO RACE VARIABLE): >60 ML/MIN/1.73 M^2
GLUCOSE SERPL-MCNC: 86 MG/DL (ref 70–110)
GLUCOSE SERPL-MCNC: 86 MG/DL (ref 70–110)
HCT VFR BLD AUTO: 38.2 % (ref 37–48.5)
HCT VFR BLD AUTO: 38.2 % (ref 37–48.5)
HGB BLD-MCNC: 12.4 G/DL (ref 12–16)
HGB BLD-MCNC: 12.4 G/DL (ref 12–16)
IMM GRANULOCYTES # BLD AUTO: 0.01 K/UL (ref 0–0.04)
IMM GRANULOCYTES # BLD AUTO: 0.01 K/UL (ref 0–0.04)
IMM GRANULOCYTES NFR BLD AUTO: 0.2 % (ref 0–0.5)
IMM GRANULOCYTES NFR BLD AUTO: 0.2 % (ref 0–0.5)
LYMPHOCYTES # BLD AUTO: 2.4 K/UL (ref 1–4.8)
LYMPHOCYTES # BLD AUTO: 2.4 K/UL (ref 1–4.8)
LYMPHOCYTES NFR BLD: 49.5 % (ref 18–48)
LYMPHOCYTES NFR BLD: 49.5 % (ref 18–48)
MCH RBC QN AUTO: 30.5 PG (ref 27–31)
MCH RBC QN AUTO: 30.5 PG (ref 27–31)
MCHC RBC AUTO-ENTMCNC: 32.5 G/DL (ref 32–36)
MCHC RBC AUTO-ENTMCNC: 32.5 G/DL (ref 32–36)
MCV RBC AUTO: 94 FL (ref 82–98)
MCV RBC AUTO: 94 FL (ref 82–98)
MONOCYTES # BLD AUTO: 0.5 K/UL (ref 0.3–1)
MONOCYTES # BLD AUTO: 0.5 K/UL (ref 0.3–1)
MONOCYTES NFR BLD: 10.1 % (ref 4–15)
MONOCYTES NFR BLD: 10.1 % (ref 4–15)
NEUTROPHILS # BLD AUTO: 1.7 K/UL (ref 1.8–7.7)
NEUTROPHILS # BLD AUTO: 1.7 K/UL (ref 1.8–7.7)
NEUTROPHILS NFR BLD: 34.9 % (ref 38–73)
NEUTROPHILS NFR BLD: 34.9 % (ref 38–73)
NRBC BLD-RTO: 0 /100 WBC
NRBC BLD-RTO: 0 /100 WBC
PLATELET # BLD AUTO: 181 K/UL (ref 150–450)
PLATELET # BLD AUTO: 181 K/UL (ref 150–450)
PMV BLD AUTO: 9.7 FL (ref 9.2–12.9)
PMV BLD AUTO: 9.7 FL (ref 9.2–12.9)
POTASSIUM SERPL-SCNC: 3.6 MMOL/L (ref 3.5–5.1)
POTASSIUM SERPL-SCNC: 3.6 MMOL/L (ref 3.5–5.1)
PROT SERPL-MCNC: 6.9 G/DL (ref 6–8.4)
PROT SERPL-MCNC: 6.9 G/DL (ref 6–8.4)
RBC # BLD AUTO: 4.06 M/UL (ref 4–5.4)
RBC # BLD AUTO: 4.06 M/UL (ref 4–5.4)
SODIUM SERPL-SCNC: 141 MMOL/L (ref 136–145)
SODIUM SERPL-SCNC: 141 MMOL/L (ref 136–145)
WBC # BLD AUTO: 4.87 K/UL (ref 3.9–12.7)
WBC # BLD AUTO: 4.87 K/UL (ref 3.9–12.7)

## 2025-02-07 PROCEDURE — 80053 COMPREHEN METABOLIC PANEL: CPT | Performed by: INTERNAL MEDICINE

## 2025-02-07 PROCEDURE — 36415 COLL VENOUS BLD VENIPUNCTURE: CPT | Performed by: INTERNAL MEDICINE

## 2025-02-07 PROCEDURE — 85025 COMPLETE CBC W/AUTO DIFF WBC: CPT | Performed by: INTERNAL MEDICINE

## 2025-02-07 PROCEDURE — 85652 RBC SED RATE AUTOMATED: CPT | Performed by: INTERNAL MEDICINE

## 2025-02-07 PROCEDURE — 86140 C-REACTIVE PROTEIN: CPT | Performed by: INTERNAL MEDICINE

## 2025-02-10 ENCOUNTER — OFFICE VISIT (OUTPATIENT)
Dept: RHEUMATOLOGY | Facility: CLINIC | Age: 50
End: 2025-02-10
Payer: COMMERCIAL

## 2025-02-10 DIAGNOSIS — H93.8X9: Primary | ICD-10-CM

## 2025-02-10 DIAGNOSIS — M35.00 SJOGREN'S SYNDROME, WITH UNSPECIFIED ORGAN INVOLVEMENT: ICD-10-CM

## 2025-02-10 PROCEDURE — 98006 SYNCH AUDIO-VIDEO EST MOD 30: CPT | Mod: 95,,, | Performed by: INTERNAL MEDICINE

## 2025-02-10 PROCEDURE — 1159F MED LIST DOCD IN RCRD: CPT | Mod: CPTII,95,, | Performed by: INTERNAL MEDICINE

## 2025-02-10 RX ORDER — NABUMETONE 750 MG/1
750 TABLET, FILM COATED ORAL DAILY PRN
Qty: 30 TABLET | Refills: 6 | Status: SHIPPED | OUTPATIENT
Start: 2025-02-10 | End: 2025-03-12

## 2025-02-10 RX ORDER — PILOCARPINE HYDROCHLORIDE 5 MG/1
5 TABLET, FILM COATED ORAL 3 TIMES DAILY
Qty: 90 TABLET | Refills: 6 | Status: SHIPPED | OUTPATIENT
Start: 2025-02-10 | End: 2025-03-12

## 2025-02-10 NOTE — PROGRESS NOTES
Subjective:     Patient ID: Alberto Frye is a 49 y.o. female followed by Dr. Hughes for Sjogren's syndrome w KCS, fibromyalgia & LBP    Chief Complaint: No chief complaint on file.       HPI 49 year old F with PMH of CHF, depression,aidan. LLE DVT here to establish care for Sjogrens and FM.  Patient previously following with Dr. ROME.  She is on plaquenil 200mg po BID for several years. Per Dr. Hughes, it was started for small joint arthritis.  She feels like she has pounding in her ears.She does biotene for dry mouth without significant improvement.  She does over the counter eye drops without improvement.She has pain all over her body.  She has pain in chest and muscles in her legs.      Interval history: she stopped plaquenil for one week   She tried pilocarpine for one week.   Past Medical History:   Diagnosis Date    AYDE (acute kidney injury) 11/1/2022    Alcohol abuse     stopped heavy drinking about 10 years ago; was drinking 3 glasses of vodka/tequilla,rum/whiskey per day    Allergy Don't remember    Its been some years.    Anxiety     Arthritis 2010    Blood clotting tendency 2008    After a procedure & 2020.    Congestive heart failure 8/11/2020    Depression     Hallucination     Hx of psychiatric care     Hypertension     Immune disorder 2020    Joint pain 2010    Keloid cicatrix Years ago    From childhood scars    Aidan     unplanned trips, energy without sleep for 2 days (reading, cleaning), feelings that she can do multiple tasks at one time, feelings of overconfidence at times    Psychiatric problem     Sleep difficulties     Therapy     Withdrawal symptoms, drug or narcotic     racing heart, restlessness           Review of Systems : see HPI      Past Medical History:   Diagnosis Date    AYDE (acute kidney injury) 11/1/2022    Alcohol abuse     stopped heavy drinking about 10 years ago; was drinking 3 glasses of vodka/tequilla,rum/whiskey per day    Allergy Don't remember    Its been some years.     Anxiety     Arthritis 2010    Blood clotting tendency 2008    After a procedure & 2020.    Congestive heart failure 8/11/2020    Depression     Hallucination     Hx of psychiatric care     Hypertension     Immune disorder 2020    Joint pain 2010    Keloid cicatrix Years ago    From childhood scars    Caro     unplanned trips, energy without sleep for 2 days (reading, cleaning), feelings that she can do multiple tasks at one time, feelings of overconfidence at times    Psychiatric problem     Sleep difficulties     Therapy     Withdrawal symptoms, drug or narcotic     racing heart, restlessness       Past Surgical History:   Procedure Laterality Date    ESOPHAGOGASTRODUODENOSCOPY N/A 06/03/2020    Procedure: EGD (ESOPHAGOGASTRODUODENOSCOPY);  Surgeon: Johan Grover MD;  Location: [facility] ENDO (4TH FLR);  Service: Endoscopy;  Laterality: N/A;  [facility]    HYSTERECTOMY      LAPBAND  2009    PHLEBOGRAPHY Left 08/12/2020    Procedure: Venogram, pharmacomechanical thrombectomy;  Surgeon: Miguelangel Goldman MD;  Location: [facility] OR [address] [place] [address];  Service: Vascular;  Laterality: Left;  2336.43 mGy  [id number]  17.8 minutes  37 ml of contrast    VENOPLASTY Left 08/12/2020    Procedure: ANGIOPLASTY, VEIN;  Surgeon: Miguelangel Goldman MD;  [facility];  Service: Vascular;  Laterality: Left;       Family History   Problem Relation Name Age of Onset    Diabetes Mother      Cancer Mother      Hypertension Mother      No Known Problems Father      No Known Problems Sister      No Known Problems Brother      No Known Problems Maternal Aunt      Cirrhosis Maternal Uncle          ETOH    No Known Problems Paternal Aunt      No Known Problems Paternal Uncle      No Known Problems Maternal Grandmother      No Known Problems Maternal Grandfather      No Known Problems Paternal Grandmother      No Known Problems Paternal Grandfather      No Known Problems Other      Celiac disease Neg Hx       "Colon cancer Neg Hx      Colon polyps Neg Hx      Crohn's disease Neg Hx      Esophageal cancer Neg Hx      Inflammatory bowel disease Neg Hx      Liver cancer Neg Hx      Liver disease Neg Hx      Rectal cancer Neg Hx      Stomach cancer Neg Hx      Ulcerative colitis Neg Hx     3 sons & 1 granddaughter in good health    Social History     Tobacco Use    Smoking status: Former     Current packs/day: 0.00     Types: Cigarettes    Smokeless tobacco: Never    Tobacco comments:     quit in october 2016   Substance Use Topics    Alcohol use: Yes     Alcohol/week: 1.0 - 3.0 standard drink of alcohol     Types: 1 - 3 Glasses of wine per week     Comment: social presently, excessive 10 years ago    Drug use: Not Currently     Types: Marijuana     Comment: uses THCA weekly   Lives alone.  Works for [employer].    Objective:     BP (!) 142/94   Pulse 86   Ht 5' 4" (1.626 m)   Wt 110 kg (242 lb 8.1 oz)   BMI 41.63 kg/m²     Physical Exam   Constitutional: She is oriented to person, place, and time. She appears well-developed and well-nourished. No distress.   HENT:   Head: Normocephalic and atraumatic.   Mouth/Throat: Oropharynx is clear and moist. Mucous membranes are moist. No oropharyngeal exudate. Oropharynx is clear.   No facial rashes  Parotids not enlarged     Mouth/Throat: Teeth in good repair  Some moisture in oropharynx  Eyes: Pupils are equal, round, and reactive to light. Conjunctivae are normal. Right eye exhibits no discharge. Left eye exhibits no discharge. No scleral icterus.   Neck: No JVD present. No tracheal deviation present. No thyromegaly present.   Cardiovascular: Regular rhythm. Exam reveals no gallop and no friction rub.   No murmur heard.  Pulmonary/Chest: Effort normal and breath sounds normal. No respiratory distress. She has no wheezes. She has no rales. She exhibits no tenderness.   Abdominal: Soft. Bowel sounds are normal. She exhibits no distension and no mass. There is no abdominal " tenderness. There is no rebound and no guarding.   Mild diffuse TTP   Musculoskeletal:         General: No deformity. Normal range of motion.      Cervical back: Normal range of motion and neck supple.      Comments: No synovitis anywhere.  FROM all joints  18/18 tender points rated 5/5     Lymphadenopathy:     She has no cervical adenopathy.   Neurological: She is alert and oriented to person, place, and time. She has normal reflexes. No cranial nerve deficit. Gait normal.   Skin: Skin is warm and dry. She is not diaphoretic.   Psychiatric: Mood, memory, affect and thought content normal.   Vitals reviewed.        11/9/23: CBC ok; CMP glu 143;   10/24/23: ESR 15; CRP 33.00; ; C3/C4 ok; Vit D 24  1/5/23: GIOVANNY 1:320H; +SSA; myomarker all neg; RF neg;   3/5/18: APS: neg    Assessment:   50 year old F with PMH of CHF, depression,aidan, LLE DVT here to establish care for Sjogrens and FM.  Patient previously following with Dr. SANCHEZ    Sjogren's syndrome: she has history of GIOVANNY/+SSA,arthralgia and sicca symptoms.  Continue plaquenil 200mg po BID (discussed eye exam)  Start pilocarpine 5 mg po TID    # FM: following with PMR  Continue  Lyrica 50mg po BID  Continue Tizanidine 8 mg po qhs  Start nabumetone 750mg p qday    #ear issues: she hearts heartbeat in ears.  ENT consult    The patient location is: home   The chief complaint leading to consultation is: joint pain    Visit type: audiovisual    Face to Face time with patient: 40   minutes of total time spent on the encounter, which includes face to face time and non-face to face time preparing to see the patient (eg, review of tests), Obtaining and/or reviewing separately obtained history, Documenting clinical information in the electronic or other health record, Independently interpreting results (not separately reported) and communicating results to the patient/family/caregiver, or Care coordination (not separately reported).         Each patient to whom he or she  provides medical services by telemedicine is:  (1) informed of the relationship between the physician and patient and the respective role of any other health care provider with respect to management of the patient; and (2) notified that he or she may decline to receive medical services by telemedicine and may withdraw from such care at any time.    Notes:

## 2025-02-11 ENCOUNTER — PATIENT OUTREACH (OUTPATIENT)
Dept: ADMINISTRATIVE | Facility: OTHER | Age: 50
End: 2025-02-11
Payer: COMMERCIAL

## 2025-02-11 NOTE — PROGRESS NOTES
CHW - Case Closure    This Community Health Worker spoke to patient via telephone today.   Pt/Caregiver reported: No needs at this time and agreed for a case closure.  Pt/Caregiver denied any additional needs at this time and agrees with episode closure at this time.  Provided patient with Community Health Worker's contact information and encouraged him/her to contact this Community Health Worker if additional needs arise.

## 2025-02-17 ENCOUNTER — TELEPHONE (OUTPATIENT)
Dept: OTOLARYNGOLOGY | Facility: CLINIC | Age: 50
End: 2025-02-17
Payer: COMMERCIAL

## 2025-02-18 DIAGNOSIS — F41.0 PANIC ATTACKS: ICD-10-CM

## 2025-02-18 RX ORDER — HYDROXYZINE PAMOATE 25 MG/1
CAPSULE ORAL
Qty: 60 CAPSULE | Refills: 0 | OUTPATIENT
Start: 2025-02-18

## 2025-02-18 RX ORDER — HYDROXYZINE PAMOATE 25 MG/1
25 CAPSULE ORAL 2 TIMES DAILY PRN
Qty: 60 CAPSULE | Refills: 5 | Status: SHIPPED | OUTPATIENT
Start: 2025-02-18

## 2025-02-26 ENCOUNTER — PATIENT MESSAGE (OUTPATIENT)
Dept: VASCULAR SURGERY | Facility: CLINIC | Age: 50
End: 2025-02-26
Payer: COMMERCIAL

## 2025-03-03 ENCOUNTER — TELEPHONE (OUTPATIENT)
Dept: ADMINISTRATIVE | Facility: HOSPITAL | Age: 50
End: 2025-03-03
Payer: COMMERCIAL

## 2025-03-03 DIAGNOSIS — M54.50 CHRONIC MIDLINE LOW BACK PAIN WITHOUT SCIATICA: ICD-10-CM

## 2025-03-03 DIAGNOSIS — M17.0 PRIMARY OSTEOARTHRITIS OF BOTH KNEES: ICD-10-CM

## 2025-03-03 DIAGNOSIS — G89.29 CHRONIC NECK PAIN: ICD-10-CM

## 2025-03-03 DIAGNOSIS — G89.29 CHRONIC MIDLINE LOW BACK PAIN WITHOUT SCIATICA: ICD-10-CM

## 2025-03-03 DIAGNOSIS — M79.7 FIBROMYALGIA SYNDROME: ICD-10-CM

## 2025-03-03 DIAGNOSIS — M54.2 CHRONIC NECK PAIN: ICD-10-CM

## 2025-03-03 RX ORDER — OXYCODONE AND ACETAMINOPHEN 10; 325 MG/1; MG/1
1 TABLET ORAL EVERY 12 HOURS PRN
Qty: 60 TABLET | Refills: 0 | Status: SHIPPED | OUTPATIENT
Start: 2025-03-03

## 2025-03-05 ENCOUNTER — TELEPHONE (OUTPATIENT)
Dept: VASCULAR SURGERY | Facility: CLINIC | Age: 50
End: 2025-03-05
Payer: COMMERCIAL

## 2025-03-05 ENCOUNTER — TELEPHONE (OUTPATIENT)
Dept: OTOLARYNGOLOGY | Facility: CLINIC | Age: 50
End: 2025-03-05
Payer: COMMERCIAL

## 2025-03-05 NOTE — TELEPHONE ENCOUNTER
Called and spoke with pt. Pt.overdue for follow up appt.with vascular provider and appt. scheduled.

## 2025-03-05 NOTE — TELEPHONE ENCOUNTER
----- Message from Donna sent at 3/5/2025  9:48 AM CST -----  .Type:  Patient Returning CallWho Called: Self Who Left Message for Patient: Raven Sheets LPN Does the patient know what this is regarding?: Yes Would the patient rather a call back or a response via [facility]? [facility] Call Back Number: .[phone] (home) Additional Information:

## 2025-03-06 ENCOUNTER — TELEPHONE (OUTPATIENT)
Dept: OTOLARYNGOLOGY | Facility: CLINIC | Age: 50
End: 2025-03-06
Payer: COMMERCIAL

## 2025-03-07 ENCOUNTER — CLINICAL SUPPORT (OUTPATIENT)
Dept: AUDIOLOGY | Facility: CLINIC | Age: 50
End: 2025-03-07
Payer: COMMERCIAL

## 2025-03-07 DIAGNOSIS — H90.3 SENSORINEURAL HEARING LOSS (SNHL) OF BOTH EARS: Primary | ICD-10-CM

## 2025-03-07 NOTE — PROGRESS NOTES
Please click on link to view Audiogram:  Document on 3/7/2025 3:17 PM by Kirstal Urena AU.D: Audiogram    Ms. Alberto Frye, a 50 y.o. female, was seen in the clinic today for an audiological evaluation. Ms. Frye's main complaint was pulsatile tinnitus in her left ear.    Otoscopy clear bilaterally. Tympanometry testing revealed a Type A tympanogram for the right ear and a Type A tympanogram for the left ear. Ipsilateral acoustic reflexes were present at 500-1000 Hz for the right ear and were absent at all tested frequencies for the left ear.    Audiological testing revealed a mild to moderately-severe sensorineural hearing loss (SNHL) bilaterally. A speech reception threshold was obtained at 25 dBHL for the right ear and at 20 dBHL for the left ear. Speech discrimination was 100% for the right ear and 100% for the left ear.      Recommendations:  1. Otologic evaluation  2. Annual audiological evaluation, sooner if medically necessary or if hearing changes  3. Hearing protection when in noise   4. Utilize ReSMeine Spielzeugkiste Relief sepideh and other tinnitus management strategies discussed during appointment (lower salt/caffeine intake, monitor stress/anxiety levels, soft background noise in quiet)  5. Hearing aid consultation following medical clearance if Ms. Frye feels hearing loss negatively impacts quality of life

## 2025-03-12 ENCOUNTER — OFFICE VISIT (OUTPATIENT)
Dept: OTOLARYNGOLOGY | Facility: CLINIC | Age: 50
End: 2025-03-12
Payer: COMMERCIAL

## 2025-03-12 ENCOUNTER — OFFICE VISIT (OUTPATIENT)
Dept: SURGERY | Facility: CLINIC | Age: 50
End: 2025-03-12
Payer: COMMERCIAL

## 2025-03-12 VITALS
BODY MASS INDEX: 41.33 KG/M2 | DIASTOLIC BLOOD PRESSURE: 87 MMHG | HEART RATE: 94 BPM | SYSTOLIC BLOOD PRESSURE: 119 MMHG | HEIGHT: 64 IN | WEIGHT: 242.06 LBS

## 2025-03-12 VITALS
BODY MASS INDEX: 41.33 KG/M2 | WEIGHT: 242.06 LBS | SYSTOLIC BLOOD PRESSURE: 126 MMHG | DIASTOLIC BLOOD PRESSURE: 83 MMHG | HEIGHT: 64 IN

## 2025-03-12 DIAGNOSIS — L72.9 CUTANEOUS CYST: Primary | ICD-10-CM

## 2025-03-12 DIAGNOSIS — H93.8X9: ICD-10-CM

## 2025-03-12 DIAGNOSIS — H93.A2 PULSATILE TINNITUS OF LEFT EAR: Primary | ICD-10-CM

## 2025-03-12 PROCEDURE — 99203 OFFICE O/P NEW LOW 30 MIN: CPT | Mod: S$GLB,,, | Performed by: NURSE PRACTITIONER

## 2025-03-12 PROCEDURE — 99213 OFFICE O/P EST LOW 20 MIN: CPT | Mod: S$GLB,,, | Performed by: SURGERY

## 2025-03-12 PROCEDURE — 3079F DIAST BP 80-89 MM HG: CPT | Mod: CPTII,S$GLB,, | Performed by: NURSE PRACTITIONER

## 2025-03-12 PROCEDURE — 99999 PR PBB SHADOW E&M-EST. PATIENT-LVL IV: CPT | Mod: PBBFAC,,, | Performed by: SURGERY

## 2025-03-12 PROCEDURE — 1159F MED LIST DOCD IN RCRD: CPT | Mod: CPTII,S$GLB,, | Performed by: NURSE PRACTITIONER

## 2025-03-12 PROCEDURE — 3074F SYST BP LT 130 MM HG: CPT | Mod: CPTII,S$GLB,, | Performed by: NURSE PRACTITIONER

## 2025-03-12 PROCEDURE — 3074F SYST BP LT 130 MM HG: CPT | Mod: CPTII,S$GLB,, | Performed by: SURGERY

## 2025-03-12 PROCEDURE — 3008F BODY MASS INDEX DOCD: CPT | Mod: CPTII,S$GLB,, | Performed by: NURSE PRACTITIONER

## 2025-03-12 PROCEDURE — 3008F BODY MASS INDEX DOCD: CPT | Mod: CPTII,S$GLB,, | Performed by: SURGERY

## 2025-03-12 PROCEDURE — 3079F DIAST BP 80-89 MM HG: CPT | Mod: CPTII,S$GLB,, | Performed by: SURGERY

## 2025-03-12 PROCEDURE — 1159F MED LIST DOCD IN RCRD: CPT | Mod: CPTII,S$GLB,, | Performed by: SURGERY

## 2025-03-12 NOTE — PROGRESS NOTES
Surgery Clinic Note    Alberto Frye is a 50 y.o. year old female in clinic today for follow up of right breast inframammary lesion, cyst vs keloid, which is recurrent and painful. She has never had it formally removed but has had it 'drained' a few times over the years. She is on eliquis for blood clots, and believes she is on them 'forever'. She is interested in having the cyst/lesion removed. I will arrange for that next week in clinic, but will have to hold eliquis and possibly be bridged (Will check w vascular consultants for recs)  No f/c/n/v/sob/cp    ROS:  Negative except above    PE:  Vitals:    03/12/25 1257   BP: 119/87   Pulse: 94     NAD  No belabored breathing  Right breast: inframammary fold with a cystic lesion with an overlying scar. No fluctuance or cellulitis    A/P:  Alberto Frye is a 50 y.o. year old female w painful cyst to right inframammary fold    -hold blood thinners, will inquire with vascular team or cardiology regarding holding blood thinner versus bridging with lovenox  Rtc Wednesday for in clinic procedure to remove cyst.    Giancarlo Sosa  General Surgery - [facility]  3/12/2025

## 2025-03-12 NOTE — PROGRESS NOTES
OTOLARYNGOLOGY CLINIC NOTE  Date:  03/12/2025     Chief complaint:  Chief Complaint   Patient presents with    Tinnitus     HEART BEAT IN LEFT EAR     History of Present Illness  Alberto Frye is a 50 y.o. female  presenting today for a new evaluation and treatment of tinnitus.  Pt reports she has been hearing her heart beat in her left ear for the past 3 months.  Pt reports its always just on the left side and it follows her pulse.  Pt reports having noise exposure during her youth.  Pt reports during that time she would listen to a lot of loud music.  Pt reports she has trouble hearing or more understanding what people are saying.  Pt reports she can hear them but the interpretation is not clear.  Pt reports her symptoms are worse at night.  Pt denies any known thyroid problems.  Pt reports she has a cardiologist Dr. Bates and had Echo and Holter monitor last June.      Review of medical records and prior documentation  Past medical records were reviewed with data pertinent to the chief complaint summarized in the HPI. Information obtained from review of medical records is attributed to respective sources in the HPI with reference to sources of information at their mention. Records reviewed included all recent notes from referring provider, primary care, and related subspecialty evaluations as available. This review of records was performed and additional data obtained to supplement history obtained from the patient and further inform medical decision making involved in formulating a plan of care accounting for all history and treatment relevant to the issues addressed.    Past Medical History  Past Medical History:   Diagnosis Date    AYDE (acute kidney injury) 11/1/2022    Alcohol abuse     stopped heavy drinking about 10 years ago; was drinking 3 glasses of vodka/tequilla,rum/whiskey per day    Allergy Don't remember    Its been some years.    Anxiety     Arthritis 2010    Blood clotting tendency 2008     After a procedure & 2020.    Congestive heart failure 8/11/2020    Depression     Hallucination     Hx of psychiatric care     Hypertension     Immune disorder 2020    Joint pain 2010    Keloid cicatrix Years ago    From childhood scars    Caro     unplanned trips, energy without sleep for 2 days (reading, cleaning), feelings that she can do multiple tasks at one time, feelings of overconfidence at times    Psychiatric problem     Sleep difficulties     Therapy     Withdrawal symptoms, drug or narcotic     racing heart, restlessness      Past Surgical History  Past Surgical History:   Procedure Laterality Date    ESOPHAGOGASTRODUODENOSCOPY N/A 06/03/2020    Procedure: EGD (ESOPHAGOGASTRODUODENOSCOPY);  Surgeon: Johan Grover MD;  Location: [facility] (4TH FLR);  Service: Endoscopy;  Laterality: N/A;  covid 6/2-[facility]    HYSTERECTOMY      LAPBAND  2009    PHLEBOGRAPHY Left 08/12/2020    Procedure: Venogram, pharmacomechanical thrombectomy;  Surgeon: Miguelangel Goldman MD;  Location: [facility] OR 2ND FLR;  Service: Vascular;  Laterality: Left;  2336.43 mGy  [id number]  17.8 minutes  37 ml of contrast    VENOPLASTY Left 08/12/2020    Procedure: ANGIOPLASTY, VEIN;  Surgeon: Miguelangel Goldman MD;  [facility];  Service: Vascular;  Laterality: Left;      Medications  Medications Ordered Prior to Encounter[1]    Review of Systems  Review of Systems   Constitutional:  Positive for malaise/fatigue.   HENT:  Positive for hearing loss and tinnitus.    Eyes:  Positive for photophobia.   Respiratory: Negative.     Genitourinary: Negative.    Musculoskeletal:  Positive for back pain and neck pain.   Skin:  Positive for rash.   Neurological:  Positive for dizziness.   Endo/Heme/Allergies:  Bruises/bleeds easily.   Psychiatric/Behavioral:  Positive for depression. The patient is nervous/anxious.       Social History   reports that she has quit smoking. Her smoking use included cigarettes. She  "has never used smokeless tobacco. She reports current alcohol use of about 1.0 - 3.0 standard drink of alcohol per week. She reports current drug use. Drug: Marijuana.     Family History  Family History   Problem Relation Name Age of Onset    Diabetes Mother      Cancer Mother      Hypertension Mother      No Known Problems Father      No Known Problems Sister      No Known Problems Brother      No Known Problems Maternal Aunt      Cirrhosis Maternal Uncle          ETOH    No Known Problems Paternal Aunt      No Known Problems Paternal Uncle      No Known Problems Maternal Grandmother      No Known Problems Maternal Grandfather      No Known Problems Paternal Grandmother      No Known Problems Paternal Grandfather      No Known Problems Other      Celiac disease Neg Hx      Colon cancer Neg Hx      Colon polyps Neg Hx      Crohn's disease Neg Hx      Esophageal cancer Neg Hx      Inflammatory bowel disease Neg Hx      Liver cancer Neg Hx      Liver disease Neg Hx      Rectal cancer Neg Hx      Stomach cancer Neg Hx      Ulcerative colitis Neg Hx        Physical Exam   Vitals:    03/12/25 1445   BP: 126/83    Body mass index is 41.55 kg/m².  Weight: 109.8 kg (242 lb 1 oz)   Height: 5' 4" (162.6 cm)     GENERAL: no acute distress.  HEAD: normocephalic.   EYES: lids and lashes normal. No scleral icterus  EARS: external ear without lesion, normal pinna shape and position.  External auditory canal with normal cerumen, tympanic membrane fully visible, no perforation , no retraction. No middle ear effusion. Ossicles intact.   NOSE: external nose without significant bony abnormality  ORAL CAVITY/OROPHARYNX: tongue midline and mobile. Symmetric palate rise. Uvula midline.   NECK: trachea midline.   LYMPH NODES:No cervical lymphadenopathy.  RESPIRATORY: no stridor, no stertor. Voice normal. Respirations nonlabored.  NEURO: alert, responds to questions appropriately.   Cranial nerve exam as indicated in above sections and " additionally showed facial movement symmetric with good eye closure and symmetric smile.   PSYCH:mood appropriate    Imaging:  The patient does not have any pertinent and/or recent imaging of the head and neck.     Labs:  CBC  Recent Labs   Lab 11/09/23  0256 05/14/24  1241 02/07/25  1141   WBC 5.45 4.17 4.87  4.87   Hemoglobin 13.8 14.6 12.4  12.4   Hematocrit 41.4 44.0 38.2  38.2   MCV 91 94 94  94   Platelets 165 199 181  181     BMP  Recent Labs   Lab 01/18/23  1717 01/19/23  0343 01/19/23  1217 01/24/23  1239 11/09/23  0213 11/10/23  0715 05/14/24  1241 02/07/25  1141   Glucose 89   < > 84   < > 143 H 79 78 86  86   Sodium 139   < > 141   < > 141 142 139 141  141   Potassium 3.6   < > 3.8   < > 3.8 3.6 3.5 3.6  3.6   Chloride 102   < > 108   < > 109 109 103 107  107   CO2 30 H   < > 26   < > 19 L 24 26 25  25   BUN 37 H   < > 28 H   < > 11 9 8 10  10   Creatinine 2.5 H   < > 1.4   < > 1.0 0.8 0.9 1.0  1.0   Calcium 8.8   < > 8.8   < > 9.1 8.4 L 9.9 8.6 L  8.6 L   Phosphorus  --   --  2.6 L  --   --  3.2  --   --    Magnesium 3.1 H  --   --   --  2.2 2.1  --   --     < > = values in this interval not displayed.     COAGS  Recent Labs   Lab 11/01/22  0837 01/29/23  1351   INR 1.0 1.0         AUDIOLOGY RESULTS  Audiometric evaluation including audiogram, tympanometry, acoustic reflexes, and speech discrimination which was performed  was personally reviewed and interpreted.  Notable findings on the audiogram were mild to moderately-severe sensorineural hearing loss (SNHL) bilaterally.  Tympanometry revealed Type A tympanogram on the left and Type A tympanogram on the right. Speech discrimination was 100% on the left, and 100% on the right.  Report of the audiologist performing this audiometric testing was also reviewed.     Assessment  1. Pulsatile tinnitus of left ear  - CV Ultrasound Bilateral Doppler Carotid; Future    2. Audible heartbeat in ear, unspecified laterality  - Ambulatory  referral/consult to ENT     Plan:  Pulsatile tinnitus of left ear:  :  Pt advised of audiogram results.  Pt advised of possible causes of tinnitus.  Pt advise because of the pattern of her symptoms will obtain carotid u/s and advised of any additional treatment needs if necessary.  Pt advised there is no cure for condition but methods to help control. Pt advised to avoid caffeine, alcohol, tobacco and stress.  Pt advised hearing aids sometimes help with tinnitus.  Pt can also try using white noise machine in quiet environment. Pt advised to wear hearing protection in noisy environment.  F/u prn  Discussed plan of care with patient in detail and all questions answered. Patient reported understanding of plan of care.        [1]   Current Outpatient Medications on File Prior to Visit   Medication Sig Dispense Refill    acetaminophen (TYLENOL) 500 MG tablet Take 1 tablet (500 mg total) by mouth every 6 (six) hours as needed for Pain.      amitriptyline (ELAVIL) 50 MG tablet Take 1 tablet (50 mg total) by mouth every evening. 90 tablet 3    apixaban (ELIQUIS) 5 mg Tab Take 1 tablet (5 mg total) by mouth 2 (two) times daily. 60 tablet 11    atorvastatin (LIPITOR) 20 MG tablet Take 1 tablet (20 mg total) by mouth once daily. 90 tablet 3    azelastine (ASTELIN) 137 mcg (0.1 %) nasal spray 1 spray (137 mcg total) by Nasal route 2 (two) times daily. 30 mL 0    busPIRone (BUSPAR) 15 MG tablet Take 1 tablet (15 mg total) by mouth 3 (three) times daily. 90 tablet 11    cholecalciferol, vitamin D3, (VITAMIN D3) 50 mcg (2,000 unit) Tab Take 1 tablet (2,000 Units total) by mouth once daily. 90 tablet 3    diclofenac sodium (VOLTAREN) 1 % Gel APPLY 4 GRAMS  TOPICALLY 4 TIMES DAILY TO AFFECTED AREA. DO NOT APPLY MORE THAN 16GM DAILY TO ANY ONE AFFECTED JOINT 100 g 0    docusate sodium (COLACE) 100 MG capsule Take 1 capsule (100 mg total) by mouth 2 (two) times daily. 180 capsule 3    FLUoxetine 40 MG capsule Take 2 capsules (80 mg  total) by mouth once daily. 180 capsule 0    hydroxychloroquine (PLAQUENIL) 200 mg tablet Take 1 tablet (200 mg total) by mouth 2 (two) times daily. 60 tablet 11    hydrOXYzine pamoate (VISTARIL) 25 MG Cap Take 1 capsule (25 mg total) by mouth 2 (two) times daily as needed (panic attacks). 60 capsule 5    nabumetone (RELAFEN) 750 MG tablet Take 1 tablet (750 mg total) by mouth daily as needed for Pain. 30 tablet 6    oxyCODONE-acetaminophen (PERCOCET)  mg per tablet Take 1 tablet by mouth every 12 (twelve) hours as needed for Pain. 60 tablet 0    pilocarpine (SALAGEN) 5 MG Tab Take 1 tablet (5 mg total) by mouth 3 (three) times daily. 90 tablet 6    polyethylene glycol (GLYCOLAX) 17 gram PwPk Take 17 g by mouth once daily. 90 each 3    pregabalin (LYRICA) 50 MG capsule Take 1 capsule (50 mg total) by mouth 2 (two) times daily. In 1-2 weeks, if no relief, may increase dose to 3 times per day. 90 capsule 2    tiZANidine (ZANAFLEX) 4 MG tablet Take 2 tablets (8 mg total) by mouth 2 (two) times daily. 120 tablet 0    valsartan-hydrochlorothiazide (DIOVAN-HCT) 160-25 mg per tablet Take 1 tablet by mouth once daily. 90 tablet 2     No current facility-administered medications on file prior to visit.

## 2025-03-12 NOTE — LETTER
March 12, 2025      [facility]  [address]   [place] [address]  Phone: [phone]       Patient: Alberto Frye   YOB: 1975  Date of Visit: 03/12/2025    To Whom It May Concern:    Trixie Frye  was at [facility] on 03/12/2025. The patient may return to work on 03/13/2025 with no restrictions. If you have any questions or concerns, or if I can be of further assistance, please do not hesitate to contact me.    Sincerely,    Tete Hernandez LPN

## 2025-03-13 DIAGNOSIS — M79.7 FIBROMYALGIA SYNDROME: ICD-10-CM

## 2025-03-13 DIAGNOSIS — G47.00 INSOMNIA DISORDER, WITH NON-SLEEP DISORDER MENTAL COMORBIDITY: ICD-10-CM

## 2025-03-13 RX ORDER — PREGABALIN 50 MG/1
50 CAPSULE ORAL 2 TIMES DAILY
Qty: 90 CAPSULE | Refills: 2 | Status: SHIPPED | OUTPATIENT
Start: 2025-03-13 | End: 2025-06-11

## 2025-03-13 RX ORDER — AMITRIPTYLINE HYDROCHLORIDE 50 MG/1
50 TABLET, FILM COATED ORAL NIGHTLY
Qty: 90 TABLET | Refills: 3 | Status: SHIPPED | OUTPATIENT
Start: 2025-03-13

## 2025-03-17 ENCOUNTER — PATIENT MESSAGE (OUTPATIENT)
Dept: SURGERY | Facility: CLINIC | Age: 50
End: 2025-03-17
Payer: COMMERCIAL

## 2025-03-17 ENCOUNTER — PATIENT MESSAGE (OUTPATIENT)
Dept: RHEUMATOLOGY | Facility: CLINIC | Age: 50
End: 2025-03-17
Payer: COMMERCIAL

## 2025-03-18 DIAGNOSIS — L72.9 CUTANEOUS CYST: Primary | ICD-10-CM

## 2025-03-19 ENCOUNTER — PROCEDURE VISIT (OUTPATIENT)
Dept: SURGERY | Facility: CLINIC | Age: 50
End: 2025-03-19
Payer: COMMERCIAL

## 2025-03-19 VITALS
HEIGHT: 64 IN | WEIGHT: 242.06 LBS | SYSTOLIC BLOOD PRESSURE: 142 MMHG | HEART RATE: 82 BPM | BODY MASS INDEX: 41.33 KG/M2 | DIASTOLIC BLOOD PRESSURE: 99 MMHG

## 2025-03-19 DIAGNOSIS — L72.9 CUTANEOUS CYST: Primary | ICD-10-CM

## 2025-03-19 PROCEDURE — 88305 TISSUE EXAM BY PATHOLOGIST: CPT | Performed by: PATHOLOGY

## 2025-03-19 NOTE — PROCEDURES
Exc, Cyst    Date/Time: 3/19/2025 10:15 AM    Performed by: Giancarlo Sosa MD  Authorized by: Giancarlo Sosa MD    Consent Done?:  Yes (Written)  Timeout: prior to procedure the correct patient, procedure, and site was verified    Prep: patient was prepped and draped in usual sterile fashion    Local anesthesia used?: Yes    Anesthesia:  Local infiltration  Local anesthetic:  Lidocaine 1% with epinephrine  Indications:  Cyst  Body area:  Chest  Laterality:  Right  Position:  Supine  Anesthesia:  Local infiltration  Local anesthetic:  Lidocaine 1% with epinephrine  Excision type:  Skin  Malignancy:  Benign  Excision size (cm):  3  Scalpel size:  15  Incision type:  Elliptical  Specimens?: Yes     Specimens submitted to pathology.   Hemostasis was obtained.  Wound closure:  Intermediate layered  Wound repair size (cm):  3  Sutures: 3-0 monocryl.  Dressings: dermabond.  Post-op diagnosis:  Same as pre-op diagnosis.   Needle, instrument, and sponge counts were correct.   Patient tolerated the procedure well with no immediate complications.   Post-operative instructions were provided for the patient.

## 2025-03-21 ENCOUNTER — PATIENT MESSAGE (OUTPATIENT)
Dept: FAMILY MEDICINE | Facility: CLINIC | Age: 50
End: 2025-03-21
Payer: COMMERCIAL

## 2025-03-21 ENCOUNTER — OFFICE VISIT (OUTPATIENT)
Dept: PSYCHIATRY | Facility: CLINIC | Age: 50
End: 2025-03-21
Payer: COMMERCIAL

## 2025-03-21 VITALS
WEIGHT: 243.06 LBS | BODY MASS INDEX: 41.72 KG/M2 | SYSTOLIC BLOOD PRESSURE: 136 MMHG | DIASTOLIC BLOOD PRESSURE: 92 MMHG | HEART RATE: 86 BPM

## 2025-03-21 DIAGNOSIS — F06.4 ANXIETY DISORDER DUE TO MEDICAL CONDITION: ICD-10-CM

## 2025-03-21 DIAGNOSIS — F31.32 BIPOLAR AFFECTIVE DISORDER, DEPRESSED, MODERATE: Primary | ICD-10-CM

## 2025-03-21 DIAGNOSIS — F41.0 PANIC ATTACKS: ICD-10-CM

## 2025-03-21 DIAGNOSIS — G47.00 INSOMNIA DISORDER, WITH NON-SLEEP DISORDER MENTAL COMORBIDITY: ICD-10-CM

## 2025-03-21 DIAGNOSIS — F60.5 OBSESSIVE COMPULSIVE PERSONALITY DISORDER: ICD-10-CM

## 2025-03-21 LAB
FINAL PATHOLOGIC DIAGNOSIS: NORMAL
GROSS: NORMAL
Lab: NORMAL

## 2025-03-21 PROCEDURE — 1160F RVW MEDS BY RX/DR IN RCRD: CPT | Mod: CPTII,S$GLB,, | Performed by: NURSE PRACTITIONER

## 2025-03-21 PROCEDURE — 3080F DIAST BP >= 90 MM HG: CPT | Mod: CPTII,S$GLB,, | Performed by: NURSE PRACTITIONER

## 2025-03-21 PROCEDURE — 99214 OFFICE O/P EST MOD 30 MIN: CPT | Mod: S$GLB,,, | Performed by: NURSE PRACTITIONER

## 2025-03-21 PROCEDURE — 3075F SYST BP GE 130 - 139MM HG: CPT | Mod: CPTII,S$GLB,, | Performed by: NURSE PRACTITIONER

## 2025-03-21 PROCEDURE — 99999 PR PBB SHADOW E&M-EST. PATIENT-LVL III: CPT | Mod: PBBFAC,,, | Performed by: NURSE PRACTITIONER

## 2025-03-21 PROCEDURE — 1159F MED LIST DOCD IN RCRD: CPT | Mod: CPTII,S$GLB,, | Performed by: NURSE PRACTITIONER

## 2025-03-21 PROCEDURE — 3008F BODY MASS INDEX DOCD: CPT | Mod: CPTII,S$GLB,, | Performed by: NURSE PRACTITIONER

## 2025-03-21 RX ORDER — HYDROXYZINE PAMOATE 25 MG/1
25 CAPSULE ORAL 2 TIMES DAILY PRN
Qty: 180 CAPSULE | Refills: 3 | Status: SHIPPED | OUTPATIENT
Start: 2025-03-21

## 2025-03-21 RX ORDER — BUSPIRONE HYDROCHLORIDE 15 MG/1
15 TABLET ORAL 3 TIMES DAILY
Qty: 270 TABLET | Refills: 3 | Status: SHIPPED | OUTPATIENT
Start: 2025-03-21

## 2025-03-21 RX ORDER — FLUOXETINE HYDROCHLORIDE 40 MG/1
80 CAPSULE ORAL DAILY
Qty: 180 CAPSULE | Refills: 3 | Status: SHIPPED | OUTPATIENT
Start: 2025-03-21

## 2025-03-21 RX ORDER — AMITRIPTYLINE HYDROCHLORIDE 50 MG/1
50 TABLET, FILM COATED ORAL NIGHTLY
Qty: 90 TABLET | Refills: 3 | Status: SHIPPED | OUTPATIENT
Start: 2025-03-21

## 2025-03-21 NOTE — PROGRESS NOTES
Outpatient Psychiatry Follow-Up Visit (MD/NP)    3/21/2025    Clinical Status of Patient:  Outpatient (Ambulatory)    Chief Complaint:  Alberto Frye is a 50 y.o. female who presents today for follow-up of mood disorder, anxiety, psychosis and insomnia .  Met with patient.      Last Visit:  4/09/24.  Chart and  reviewed.     Interval History and Content of Current Session:  Medications per last visit:   Buspar 15 mg 2-3 x daily for anxiety  Prozac 80 mg daily (2 pills of 40 mg)  Elavil 50 mg at bedtime.   Vistaril 25 mg 1-2 daily as needed for panic attacks    Reports her uncle had passed away from medical issues. Sleeping well with Elavil. Thought processes appear clear and organized. Mood is stable; coping well with sad days.  No sx of paranoia or psychosis. Denies SI/HI/AVH.     Psychotherapy:  Target symptoms: anxiety , mood disorder, psychosis, poor sleep  Why chosen therapy is appropriate versus another modality: relevant to diagnosis, evidence based practice  Outcome monitoring methods: self-report, observation  Therapeutic intervention type: supportive psychotherapy  Topics discussed/themes:  diet, exercise, medication compliance, symptom recognition  and improvement  The patient's response to the intervention is accepting. The patient's progress toward treatment goals is good  Duration of intervention: 15 minutes.    Review of Systems   PSYCHIATRIC: Pertinant items are noted in the narrative.  CONSTITUTIONAL: No weight gain or loss.   MUSCULOSKELETAL: No pain or stiffness of the joints.  NEUROLOGIC: No weakness, sensory changes, seizures, confusion, memory loss, tremor or other abnormal movements.  ENDOCRINE: No polydipsia or polyuria.  INTEGUMENTARY: No rashes or lacerations.  EYES: No exophthalmos, jaundice or blindness.  ENT: No dizziness, tinnitus or hearing loss.  RESPIRATORY: No shortness of breath.  CARDIOVASCULAR: No tachycardia or chest pain.  GASTROINTESTINAL: No nausea, vomiting, pain,  "constipation or diarrhea.  GENITOURINARY: No frequency, dysuria or sexual dysfunction.  HEMATOLOGIC/LYMPHATIC: No excessive bleeding, prolonged or excessive bleeding after dental extraction/injury.  ALLERGIC/IMMUNOLOGIC: No allergic response to materials, foods or animals at this time.     Past Medical, Family and Social History: The patient's past medical, family and social history have been reviewed and updated as appropriate within the electronic medical record - see encounter notes.    Compliance: yes until she ran out of medication    Side effects: None    Risk Parameters:  Patient reports no suicidal ideation  Patient reports no homicidal ideation  Patient reports no self-injurious behavior  Patient reports no violent behavior    Exam (detailed: at least 9 elements; comprehensive: all 15 elements)   Constitutional  Vitals:  Most recent vital signs, dated greater than 90 days prior to this appointment, were reviewed.   Vitals:    03/21/25 1510   BP: (!) 136/92   Pulse: 86   Weight: 110.2 kg (243 lb 0.9 oz)        General:  unremarkable, age appropriate     Musculoskeletal  Muscle Strength/Tone:  no tremor, no tic   Gait & Station:  non-ataxic     Psychiatric  Speech:  no latency; no press   Mood & Affect:  "Not good."  congruent and appropriate   Thought Process:  normal and logical   Associations:  intact   Thought Content:  normal, no suicidality, no homicidality, delusions, or paranoia,    Insight:  has awareness of illness   Judgement: behavior is adequate to circumstances   Orientation:  grossly intact   Memory: intact for content of interview   Language: grossly intact   Attention Span & Concentration:  able to focus   Fund of Knowledge:  intact and appropriate to age and level of education     Assessment and Diagnosis   Status/Progress: Based on the examination today, the patient's problem(s) is/are adequately but not ideally controlled.  New problems have not not been presented today. Lack of compliance " due to running out of medication is complicating management of the primary condition.  There are no active rule-out diagnoses for this patient at this time.     General Impression:       ICD-10-CM ICD-9-CM   1. Bipolar affective disorder, depressed, moderate  F31.32 296.52   2. Insomnia disorder, with non-sleep disorder mental comorbidity  G47.00 780.52   3. Anxiety disorder due to medical condition  F06.4 293.84   4. Panic attacks  F41.0 300.01   5. Obsessive compulsive personality disorder  F60.5 301.4     Intervention/Counseling/Treatment Plan   Medication Management: The risks and benefits of medication were discussed with the patient.  The treatment plan and follow up plan were reviewed with the patient.   Safety: Call 911 or Crisis Line or go to ER for suicidal ideation, adverse effects of medication or any other emergency  Buspar 15 mg 2-3 x daily for anxiety  Prozac 80 mg daily (2 pills of 40 mg)  Elavil 50 mg at bedtime.   Vistaril 25 mg 1-2 daily as needed for panic attacks    INSTRUCTIONS  Instructed to call 911 or Crisis Line or go to ER for suicidal ideation, adverse effects of medication or any other emergency. Verbalizes understanding and plan to comply.    Instructed to contact provider either through her [facility] account or by calling [phone] prior to running out of her medication. Verbalizes understanding and plan to comply.    Return to Clinic: 6 months

## 2025-03-26 ENCOUNTER — TELEPHONE (OUTPATIENT)
Dept: ENDOSCOPY | Facility: HOSPITAL | Age: 50
End: 2025-03-26

## 2025-03-26 ENCOUNTER — CLINICAL SUPPORT (OUTPATIENT)
Dept: ENDOSCOPY | Facility: HOSPITAL | Age: 50
End: 2025-03-26
Attending: INTERNAL MEDICINE
Payer: COMMERCIAL

## 2025-03-26 DIAGNOSIS — Z12.11 SCREENING FOR COLON CANCER: ICD-10-CM

## 2025-03-26 DIAGNOSIS — R13.10 DYSPHAGIA, UNSPECIFIED TYPE: ICD-10-CM

## 2025-03-26 NOTE — TELEPHONE ENCOUNTER
Contacted the patient x 4 to schedule an endoscopy procedure(s):  EGD and colonoscopy. The patient did not answer the calls. Unable to leave any voicemails. Each time it rang once each time and goes straight to a busy signal.

## 2025-04-01 ENCOUNTER — TELEPHONE (OUTPATIENT)
Dept: ENDOSCOPY | Facility: HOSPITAL | Age: 50
End: 2025-04-01
Payer: COMMERCIAL

## 2025-04-01 DIAGNOSIS — Z12.11 SPECIAL SCREENING FOR MALIGNANT NEOPLASMS, COLON: Primary | ICD-10-CM

## 2025-04-01 DIAGNOSIS — Z12.11 SCREENING FOR COLON CANCER: ICD-10-CM

## 2025-04-01 DIAGNOSIS — R13.10 DYSPHAGIA, UNSPECIFIED TYPE: Primary | ICD-10-CM

## 2025-04-01 NOTE — TELEPHONE ENCOUNTER
Referral for procedure from  Workqueue referral (see Appts tab)      Spoke to patient to schedule procedure(s) Colonoscopy/EGD       Physician to perform procedure(s) Dr. NELSY Blackburn  Date of Procedure (s) 04/30/2025  Arrival Time 7:35 AM  Time of Procedure(s) 8:35 AM   Location of Procedure(s) [place] 4th Floor  Type of Rx Prep sent to patient: PEG  Instructions provided to patient via [facility]    Patient was informed on the following information and verbalized understanding. Screening questionnaire reviewed with patient and complete. If procedure requires anesthesia, a responsible adult needs to be present to accompany the patient home, patient cannot drive after receiving anesthesia. Appointment details are tentative, especially check-in time. Patient will receive a prep-op call 7 days prior to confirm check-in time for procedure. If applicable the patient should contact their pharmacy to verify Rx for procedure prep is ready for pick-up. Patient was advised to call the scheduling department at [phone] if pharmacy states no Rx is available. Patient was advised to call the endoscopy scheduling department if any questions or concerns arise.      [facility] Scheduling Department

## 2025-04-02 ENCOUNTER — TELEPHONE (OUTPATIENT)
Dept: ENDOSCOPY | Facility: HOSPITAL | Age: 50
End: 2025-04-02
Payer: COMMERCIAL

## 2025-04-02 DIAGNOSIS — G89.29 CHRONIC MIDLINE LOW BACK PAIN WITHOUT SCIATICA: ICD-10-CM

## 2025-04-02 DIAGNOSIS — M17.0 PRIMARY OSTEOARTHRITIS OF BOTH KNEES: ICD-10-CM

## 2025-04-02 DIAGNOSIS — M79.7 FIBROMYALGIA SYNDROME: ICD-10-CM

## 2025-04-02 DIAGNOSIS — G89.29 CHRONIC NECK PAIN: ICD-10-CM

## 2025-04-02 DIAGNOSIS — M54.2 CHRONIC NECK PAIN: ICD-10-CM

## 2025-04-02 DIAGNOSIS — M54.50 CHRONIC MIDLINE LOW BACK PAIN WITHOUT SCIATICA: ICD-10-CM

## 2025-04-02 NOTE — TELEPHONE ENCOUNTER
----- Message from CAPO Georges sent at 2025 12:17 PM CDT -----  Regardin/30 BT  The patient is currently under an internal PCP, Ian Cespedes MD,  [facility]. Is patient okay to hold blood thinner Eliquis (apixaban) for their upcoming scheduled Colonoscopy/EGD on 2025.

## 2025-04-03 RX ORDER — OXYCODONE AND ACETAMINOPHEN 10; 325 MG/1; MG/1
1 TABLET ORAL EVERY 12 HOURS PRN
Qty: 60 TABLET | Refills: 0 | Status: SHIPPED | OUTPATIENT
Start: 2025-04-03

## 2025-04-06 ENCOUNTER — PATIENT MESSAGE (OUTPATIENT)
Dept: VASCULAR SURGERY | Facility: HOSPITAL | Age: 50
End: 2025-04-06
Payer: COMMERCIAL

## 2025-04-06 RX ORDER — ENOXAPARIN SODIUM 100 MG/ML
1 INJECTION SUBCUTANEOUS 2 TIMES DAILY
Qty: 8.8 ML | Refills: 0 | Status: SHIPPED | OUTPATIENT
Start: 2025-04-28 | End: 2025-05-02

## 2025-04-15 ENCOUNTER — HOSPITAL ENCOUNTER (OUTPATIENT)
Dept: CARDIOLOGY | Facility: HOSPITAL | Age: 50
Discharge: HOME | End: 2025-04-15
Attending: NURSE PRACTITIONER
Payer: COMMERCIAL

## 2025-04-15 DIAGNOSIS — H93.A2 PULSATILE TINNITUS OF LEFT EAR: ICD-10-CM

## 2025-04-15 LAB
LEFT CBA DIAS: 33 CM/S
LEFT CBA SYS: 87 CM/S
LEFT CCA DIST DIAS: 28 CM/S
LEFT CCA DIST SYS: 91 CM/S
LEFT CCA MID DIAS: 28 CM/S
LEFT CCA MID SYS: 84 CM/S
LEFT CCA PROX DIAS: 28 CM/S
LEFT CCA PROX SYS: 86 CM/S
LEFT ECA DIAS: 21 CM/S
LEFT ECA SYS: 97 CM/S
LEFT ICA DIST DIAS: 41 CM/S
LEFT ICA DIST SYS: 94 CM/S
LEFT ICA MID DIAS: 48 CM/S
LEFT ICA MID SYS: 118 CM/S
LEFT ICA PROX DIAS: 30 CM/S
LEFT ICA PROX SYS: 71 CM/S
LEFT VERTEBRAL DIAS: 13 CM/S
LEFT VERTEBRAL SYS: 50 CM/S
OHS CV CAROTID RIGHT ICA EDV HIGHEST: 51
OHS CV CAROTID ULTRASOUND LEFT ICA/CCA RATIO: 1.3
OHS CV CAROTID ULTRASOUND RIGHT ICA/CCA RATIO: 1.59
OHS CV PV CAROTID LEFT HIGHEST CCA: 91
OHS CV PV CAROTID LEFT HIGHEST ICA: 118
OHS CV PV CAROTID RIGHT HIGHEST CCA: 79
OHS CV PV CAROTID RIGHT HIGHEST ICA: 113
OHS CV US CAROTID LEFT HIGHEST EDV: 48
RIGHT CBA DIAS: 26 CM/S
RIGHT CBA SYS: 78 CM/S
RIGHT CCA DIST DIAS: 18 CM/S
RIGHT CCA DIST SYS: 71 CM/S
RIGHT CCA MID DIAS: 25 CM/S
RIGHT CCA MID SYS: 73 CM/S
RIGHT CCA PROX DIAS: 20 CM/S
RIGHT CCA PROX SYS: 79 CM/S
RIGHT ECA DIAS: 12 CM/S
RIGHT ECA SYS: 77 CM/S
RIGHT ICA DIST DIAS: 45 CM/S
RIGHT ICA DIST SYS: 113 CM/S
RIGHT ICA MID DIAS: 51 CM/S
RIGHT ICA MID SYS: 108 CM/S
RIGHT ICA PROX DIAS: 38 CM/S
RIGHT ICA PROX SYS: 78 CM/S
RIGHT VERTEBRAL DIAS: 23 CM/S
RIGHT VERTEBRAL SYS: 64 CM/S

## 2025-04-15 PROCEDURE — 93880 EXTRACRANIAL BILAT STUDY: CPT | Mod: 26,,, | Performed by: INTERNAL MEDICINE

## 2025-04-15 PROCEDURE — 93880 EXTRACRANIAL BILAT STUDY: CPT

## 2025-04-16 ENCOUNTER — RESULTS FOLLOW-UP (OUTPATIENT)
Dept: OTOLARYNGOLOGY | Facility: CLINIC | Age: 50
End: 2025-04-16

## 2025-04-16 ENCOUNTER — TELEPHONE (OUTPATIENT)
Dept: OTOLARYNGOLOGY | Facility: CLINIC | Age: 50
End: 2025-04-16
Payer: COMMERCIAL

## 2025-04-16 NOTE — TELEPHONE ENCOUNTER
Pt notified of carotid U/S results.  Pt advised to maintain low cholesterol, low fat diet, pt reports she doesn't smoke, exercise and keeping blood pressure under control.  Pt states understand and will keep her appts with her PCP and vascular.

## 2025-04-30 ENCOUNTER — ANESTHESIA (OUTPATIENT)
Dept: ENDOSCOPY | Facility: HOSPITAL | Age: 50
End: 2025-04-30
Payer: COMMERCIAL

## 2025-04-30 ENCOUNTER — ANESTHESIA EVENT (OUTPATIENT)
Dept: ENDOSCOPY | Facility: HOSPITAL | Age: 50
End: 2025-04-30
Payer: COMMERCIAL

## 2025-04-30 ENCOUNTER — HOSPITAL ENCOUNTER (OUTPATIENT)
Facility: HOSPITAL | Age: 50
Discharge: HOME | End: 2025-04-30
Attending: INTERNAL MEDICINE | Admitting: INTERNAL MEDICINE
Payer: COMMERCIAL

## 2025-04-30 VITALS
BODY MASS INDEX: 40.29 KG/M2 | HEART RATE: 75 BPM | DIASTOLIC BLOOD PRESSURE: 75 MMHG | HEIGHT: 64 IN | TEMPERATURE: 98 F | RESPIRATION RATE: 16 BRPM | SYSTOLIC BLOOD PRESSURE: 123 MMHG | OXYGEN SATURATION: 100 % | WEIGHT: 236 LBS

## 2025-04-30 DIAGNOSIS — Z12.11 COLON CANCER SCREENING: Primary | ICD-10-CM

## 2025-04-30 PROCEDURE — G0121 COLON CA SCRN NOT HI RSK IND: HCPCS | Mod: ,,, | Performed by: INTERNAL MEDICINE

## 2025-04-30 PROCEDURE — 43235 EGD DIAGNOSTIC BRUSH WASH: CPT | Mod: 51,,, | Performed by: INTERNAL MEDICINE

## 2025-04-30 PROCEDURE — 37000008 HC ANESTHESIA 1ST 15 MINUTES: Performed by: INTERNAL MEDICINE

## 2025-04-30 PROCEDURE — G0121 COLON CA SCRN NOT HI RSK IND: HCPCS | Performed by: INTERNAL MEDICINE

## 2025-04-30 PROCEDURE — 37000009 HC ANESTHESIA EA ADD 15 MINS: Performed by: INTERNAL MEDICINE

## 2025-04-30 PROCEDURE — 43235 EGD DIAGNOSTIC BRUSH WASH: CPT | Performed by: INTERNAL MEDICINE

## 2025-04-30 PROCEDURE — 25000003 PHARM REV CODE 250: Performed by: NURSE ANESTHETIST, CERTIFIED REGISTERED

## 2025-04-30 PROCEDURE — 63600175 PHARM REV CODE 636 W HCPCS: Performed by: NURSE ANESTHETIST, CERTIFIED REGISTERED

## 2025-04-30 RX ORDER — SODIUM CHLORIDE 9 MG/ML
INJECTION, SOLUTION INTRAVENOUS CONTINUOUS
Status: DISCONTINUED | OUTPATIENT
Start: 2025-04-30 | End: 2025-04-30 | Stop reason: HOSPADM

## 2025-04-30 RX ORDER — LIDOCAINE HYDROCHLORIDE 20 MG/ML
INJECTION INTRAVENOUS
Status: DISCONTINUED | OUTPATIENT
Start: 2025-04-30 | End: 2025-04-30

## 2025-04-30 RX ORDER — PROPOFOL 10 MG/ML
VIAL (ML) INTRAVENOUS
Status: DISCONTINUED | OUTPATIENT
Start: 2025-04-30 | End: 2025-04-30

## 2025-04-30 RX ADMIN — LIDOCAINE HYDROCHLORIDE 50 MG: 20 INJECTION INTRAVENOUS at 08:04

## 2025-04-30 RX ADMIN — SODIUM CHLORIDE: 0.9 INJECTION, SOLUTION INTRAVENOUS at 08:04

## 2025-04-30 RX ADMIN — PROPOFOL 150 MCG/KG/MIN: 10 INJECTION, EMULSION INTRAVENOUS at 08:04

## 2025-04-30 RX ADMIN — PROPOFOL 80 MG: 10 INJECTION, EMULSION INTRAVENOUS at 08:04

## 2025-04-30 NOTE — PROVATION PATIENT INSTRUCTIONS
Discharge Summary/Instructions after an Endoscopic Procedure  Patient Name: Alberto Frye  Patient MRN: [id number]  Patient YOB: 1975 Wednesday, April 30, 2025  Corky Blackburn MD  Dear patient,  As a result of recent federal legislation (The Federal Cures Act), you may   receive lab or pathology results from your procedure in your [facility]   account before your physician is able to contact you. Your physician or   their representative will relay the results to you with their   recommendations at their soonest availability.  Thank you,  RESTRICTIONS:  During your procedure today, you received medications for sedation.  These   medications may affect your judgment, balance and coordination.  Therefore,   for 24 hours, you have the following restrictions:   - DO NOT drive a car, operate machinery, make legal/financial decisions,   sign important papers or drink alcohol.    ACTIVITY:  Today: no heavy lifting, straining or running due to procedural   sedation/anesthesia.  The following day: return to full activity including work.  DIET:  Eat and drink normally unless instructed otherwise.     TREATMENT FOR COMMON SIDE EFFECTS:  - Mild abdominal pain, nausea, belching, bloating or excessive gas:  rest,   eat lightly and use a heating pad.  - Sore Throat: treat with throat lozenges and/or gargle with warm salt   water.  - Because air was used during the procedure, expelling large amounts of air   from your rectum or belching is normal.  - If a bowel prep was taken, you may not have a bowel movement for 1-3 days.    This is normal.  SYMPTOMS TO WATCH FOR AND REPORT TO YOUR PHYSICIAN:  1. Abdominal pain or bloating, other than gas cramps.  2. Chest pain.  3. Back pain.  4. Signs of infection such as: chills or fever occurring within 24 hours   after the procedure.  5. Rectal bleeding, which would show as bright red, maroon, or black stools.   (A tablespoon of blood from the rectum is not serious,  especially if   hemorrhoids are present.)  6. Vomiting.  7. Weakness or dizziness.  GO DIRECTLY TO THE NEAREST EMERGENCY ROOM IF YOU HAVE ANY OF THE FOLLOWING:      Difficulty breathing              Chills and/or fever over 101 F   Persistent vomiting and/or vomiting blood   Severe abdominal pain   Severe chest pain   Black, tarry stools   Bleeding- more than one tablespoon   Any other symptom or condition that you feel may need urgent attention  Your doctor recommends these additional instructions:  If any biopsies were taken, your doctors clinic will contact you in 1 to 2   weeks with any results.  - Perform a cine-esophagram.   - Perform ambulatory esophageal manometry.   - Refer to Bariatric Surgery.   - Perform a colonoscopy now, for colon cancer screening.   - See colonoscopy report for further recommendations.  For questions, problems or results please call your physician - Corky Blackburn MD at Work:  [phone].  [facility], EMERGENCY ROOM PHONE NUMBER: [phone]  IF A COMPLICATION OR EMERGENCY SITUATION ARISES AND YOU ARE UNABLE TO REACH   YOUR PHYSICIAN - GO DIRECTLY TO THE EMERGENCY ROOM.  Corky Blackburn MD  4/30/2025 9:14:43 AM  This report has been verified and signed electronically.  Dear patient,  As a result of recent federal legislation (The Federal Cures Act), you may   receive lab or pathology results from your procedure in your [facility]   account before your physician is able to contact you. Your physician or   their representative will relay the results to you with their   recommendations at their soonest availability.  Thank you,  PROVATION

## 2025-04-30 NOTE — PLAN OF CARE
Discharge criteria met. Dr. Blackburn spoke w pt postprocedure. Reviewed plan of care and discharge instructions w pt. Pt verbalized understanding. Fall precautions maintained.

## 2025-04-30 NOTE — H&P
I was immediately available, discussed and agree with the history, exam and medical decision making with Hamlet Aguilar MD as documented.    Miranda Martinez MD       Short Stay Endoscopy History and Physical    PCP - Ian Cespedes MD    Procedure - EGD/Colonoscopy  ASA - per anesthesia  Mallampati - per anesthesia  History of Anesthesia problems - no  Family history Anesthesia problems -  no   Plan of anesthesia - MAC    HPI:  This is a 50 y.o. female here for evaluation of intermittent dysphagia and globus sensation.  Needs colon cancer screening. First colonoscopy.       ROS:  Constitutional: No fevers, chills  CV: No chest pain  Pulm: No cough, No shortness of breath  Ophtho: No vision changes  GI: see HPI    Medical History:  has a past medical history of AYDE (acute kidney injury) (11/1/2022), Alcohol abuse, Allergy (Don't remember), Anxiety, Arthritis (2010), Blood clotting tendency (2008), Congestive heart failure (8/11/2020), Depression, Hallucination, psychiatric care, Hypertension, Immune disorder (2020), Joint pain (2010), Keloid cicatrix (Years ago), Caro, Psychiatric problem, Sleep difficulties, Therapy, and Withdrawal symptoms, drug or narcotic.    Surgical History:  has a past surgical history that includes Hysterectomy; LAPBAND (2009); Esophagogastroduodenoscopy (N/A, 06/03/2020); Phlebography (Left, 08/12/2020); and Venoplasty (Left, 08/12/2020).    Family History: family history includes Cancer in her mother; Cirrhosis in her maternal uncle; Diabetes in her mother; Hypertension in her mother; No Known Problems in her brother, father, maternal aunt, maternal grandfather, maternal grandmother, paternal aunt, paternal grandfather, paternal grandmother, paternal uncle, sister, and another family member.. Otherwise no colon cancer, inflammatory bowel disease, or GI malignancies.    Social History:  reports that she has quit smoking. Her smoking use included cigarettes. She has never used smokeless tobacco. She reports current alcohol use of about 1.0 - 3.0 standard drink of alcohol per week. She reports current drug use. Drug: Marijuana.    Review of patient's  allergies indicates:   Allergen Reactions    Morphine Itching and Hallucinations    Pcn [penicillins] Other (See Comments)     Was told from childhood she couldn't take it    Latex, natural rubber Rash    Penicillin v Rash    Sulfa (sulfonamide antibiotics) Nausea And Vomiting and Nausea Only       Medications:   Prescriptions Prior to Admission[1]    Physical Exam:    Vital Signs:   Vitals:    04/30/25 0742   BP: 131/76   Pulse: 79   Resp: 17   Temp: 97.9 °F (36.6 °C)       General Appearance: Well appearing in no acute distress  Eyes:    No scleral icterus  Lungs: CTA anteriorly  Heart:  Regular rate and rhythm  Abdomen: Soft, non tender, non distended with normal bowel sounds.    Labs:  Lab Results   Component Value Date    WBC 4.87 02/07/2025    WBC 4.87 02/07/2025    HGB 12.4 02/07/2025    HGB 12.4 02/07/2025    HCT 38.2 02/07/2025    HCT 38.2 02/07/2025    MCV 94 02/07/2025    MCV 94 02/07/2025     02/07/2025     02/07/2025        BMP  Lab Results   Component Value Date     02/07/2025     02/07/2025    K 3.6 02/07/2025    K 3.6 02/07/2025     02/07/2025     02/07/2025    CO2 25 02/07/2025    CO2 25 02/07/2025    BUN 10 02/07/2025    BUN 10 02/07/2025    CREATININE 1.0 02/07/2025    CREATININE 1.0 02/07/2025    CALCIUM 8.6 (L) 02/07/2025    CALCIUM 8.6 (L) 02/07/2025    ANIONGAP 9 02/07/2025    ANIONGAP 9 02/07/2025    ESTGFRAFRICA >60 05/18/2022    EGFRNONAA 59 (A) 05/18/2022     Lab Results   Component Value Date    INR 1.0 01/29/2023    INR 1.0 11/01/2022    INR 1.0 08/13/2020          Assessment:  50 y.o. female with dysphagia, globus sensation, and needs average-risk colon cancer screening.     Plan:  Proceed with EGD and colonoscopy today.  I have explained the risks and benefits of endoscopy procedures to the patient including but not limited to bleeding, perforation, infection, and death.  All questions answered.        Corky Blackburn MD         [1]   Medications  Prior to Admission   Medication Sig Dispense Refill Last Dose/Taking    amitriptyline (ELAVIL) 50 MG tablet Take 1 tablet (50 mg total) by mouth every evening. 90 tablet 3 Past Week    atorvastatin (LIPITOR) 20 MG tablet Take 1 tablet (20 mg total) by mouth once daily. 90 tablet 3 Past Week    azelastine (ASTELIN) 137 mcg (0.1 %) nasal spray 1 spray (137 mcg total) by Nasal route 2 (two) times daily. 30 mL 0 Past Month    busPIRone (BUSPAR) 15 MG tablet Take 1 tablet (15 mg total) by mouth 3 (three) times daily. 270 tablet 3 Past Week    cholecalciferol, vitamin D3, (VITAMIN D3) 50 mcg (2,000 unit) Tab Take 1 tablet (2,000 Units total) by mouth once daily. 90 tablet 3 Past Month    docusate sodium (COLACE) 100 MG capsule Take 1 capsule (100 mg total) by mouth 2 (two) times daily. 180 capsule 3 Past Week    enoxaparin (LOVENOX) 60 mg/0.6 mL Syrg Inject 1.1 mLs (110 mg total) into the skin 2 (two) times daily. for 4 days 8.8 mL 0 4/29/2025    FLUoxetine 40 MG capsule Take 2 capsules (80 mg total) by mouth once daily. 180 capsule 3 Past Week    hydroxychloroquine (PLAQUENIL) 200 mg tablet Take 1 tablet (200 mg total) by mouth 2 (two) times daily. 60 tablet 11 Past Week    hydrOXYzine pamoate (VISTARIL) 25 MG Cap Take 1 capsule (25 mg total) by mouth 2 (two) times daily as needed (panic attacks). 180 capsule 3 Past Week    oxyCODONE-acetaminophen (PERCOCET)  mg per tablet Take 1 tablet by mouth every 12 (twelve) hours as needed for Pain. 60 tablet 0 4/28/2025    polyethylene glycol (GLYCOLAX) 17 gram PwPk Take 17 g by mouth once daily. 90 each 3 Past Month    pregabalin (LYRICA) 50 MG capsule Take 1 capsule (50 mg total) by mouth 2 (two) times daily. In 1-2 weeks, if no relief, may increase dose to 3 times per day. 90 capsule 2 Past Week    tiZANidine (ZANAFLEX) 4 MG tablet Take 2 tablets (8 mg total) by mouth 2 (two) times daily. 120 tablet 0 Past Week    valsartan-hydrochlorothiazide (DIOVAN-HCT) 160-25 mg per  tablet Take 1 tablet by mouth once daily. 90 tablet 2 Past Week    acetaminophen (TYLENOL) 500 MG tablet Take 1 tablet (500 mg total) by mouth every 6 (six) hours as needed for Pain.   More than a month    apixaban (ELIQUIS) 5 mg Tab Take 1 tablet (5 mg total) by mouth 2 (two) times daily. 60 tablet 11 4/27/2025    diclofenac sodium (VOLTAREN) 1 % Gel APPLY 4 GRAMS  TOPICALLY 4 TIMES DAILY TO AFFECTED AREA. DO NOT APPLY MORE THAN 16GM DAILY TO ANY ONE AFFECTED JOINT 100 g 0     pilocarpine (SALAGEN) 5 MG Tab Take 1 tablet (5 mg total) by mouth 3 (three) times daily. 90 tablet 6

## 2025-04-30 NOTE — ANESTHESIA POSTPROCEDURE EVALUATION
Anesthesia Post Evaluation    Patient: Alberto Frye    Procedure(s) Performed: Procedure(s) (LRB):  EGD (ESOPHAGOGASTRODUODENOSCOPY) (N/A)  COLONOSCOPY, SCREENING, LOW RISK PATIENT (N/A)    Final Anesthesia Type: general      Patient location during evaluation: GI PACU  Patient participation: Yes- Able to Participate  Level of consciousness: awake and alert and oriented  Post-procedure vital signs: reviewed and stable  Pain management: adequate  Airway patency: patent    PONV status at discharge: No PONV  Anesthetic complications: no      Cardiovascular status: hemodynamically stable  Respiratory status: unassisted  Hydration status: euvolemic  Follow-up not needed.              Vitals Value Taken Time   /75 04/30/25 09:24   Temp 36.5 °C (97.7 °F) 04/30/25 08:54   Pulse 75 04/30/25 09:24   Resp 16 04/30/25 09:24   SpO2 100 % 04/30/25 09:24         Event Time   Out of Recovery 09:33:29         Pain/Natalya Score: Natalya Score: 10 (4/30/2025  9:25 AM)

## 2025-04-30 NOTE — PROVATION PATIENT INSTRUCTIONS
Discharge Summary/Instructions after an Endoscopic Procedure  Patient Name: Alberto Frye  Patient MRN: [id number]  Patient YOB: 1975 Wednesday, April 30, 2025  Corky Blackburn MD  Dear patient,  As a result of recent federal legislation (The Federal Cures Act), you may   receive lab or pathology results from your procedure in your [facility]   account before your physician is able to contact you. Your physician or   their representative will relay the results to you with their   recommendations at their soonest availability.  Thank you,  RESTRICTIONS:  During your procedure today, you received medications for sedation.  These   medications may affect your judgment, balance and coordination.  Therefore,   for 24 hours, you have the following restrictions:   - DO NOT drive a car, operate machinery, make legal/financial decisions,   sign important papers or drink alcohol.    ACTIVITY:  Today: no heavy lifting, straining or running due to procedural   sedation/anesthesia.  The following day: return to full activity including work.  DIET:  Eat and drink normally unless instructed otherwise.     TREATMENT FOR COMMON SIDE EFFECTS:  - Mild abdominal pain, nausea, belching, bloating or excessive gas:  rest,   eat lightly and use a heating pad.  - Sore Throat: treat with throat lozenges and/or gargle with warm salt   water.  - Because air was used during the procedure, expelling large amounts of air   from your rectum or belching is normal.  - If a bowel prep was taken, you may not have a bowel movement for 1-3 days.    This is normal.  SYMPTOMS TO WATCH FOR AND REPORT TO YOUR PHYSICIAN:  1. Abdominal pain or bloating, other than gas cramps.  2. Chest pain.  3. Back pain.  4. Signs of infection such as: chills or fever occurring within 24 hours   after the procedure.  5. Rectal bleeding, which would show as bright red, maroon, or black stools.   (A tablespoon of blood from the rectum is not serious,  especially if   hemorrhoids are present.)  6. Vomiting.  7. Weakness or dizziness.  GO DIRECTLY TO THE NEAREST EMERGENCY ROOM IF YOU HAVE ANY OF THE FOLLOWING:      Difficulty breathing              Chills and/or fever over 101 F   Persistent vomiting and/or vomiting blood   Severe abdominal pain   Severe chest pain   Black, tarry stools   Bleeding- more than one tablespoon   Any other symptom or condition that you feel may need urgent attention  Your doctor recommends these additional instructions:  If any biopsies were taken, your doctors clinic will contact you in 1 to 2   weeks with any results.  - Discharge patient to home.   - Patient has a contact number available for emergencies.  The signs and   symptoms of potential delayed complications were discussed with the   patient.  Return to normal activities tomorrow.  Written discharge   instructions were provided to the patient.   - Resume previous diet.   - Continue present medications.   - Repeat colonoscopy in 10 years for screening purposes.   - Resume Eliquis (apixaban) today and Lovenox (enoxaparin) today at prior   doses per recommendations of prescribing physician.  For questions, problems or results please call your physician - Corky Blackburn MD at Work:  [phone].  [facility], EMERGENCY ROOM PHONE NUMBER: [phone]  IF A COMPLICATION OR EMERGENCY SITUATION ARISES AND YOU ARE UNABLE TO REACH   YOUR PHYSICIAN - GO DIRECTLY TO THE EMERGENCY ROOM.  Corky Blackburn MD  4/30/2025 8:58:15 AM  This report has been verified and signed electronically.  Dear patient,  As a result of recent federal legislation (The Federal Cures Act), you may   receive lab or pathology results from your procedure in your [facility]   account before your physician is able to contact you. Your physician or   their representative will relay the results to you with their   recommendations at their soonest availability.  Thank you,  PROVATION

## 2025-04-30 NOTE — ANESTHESIA PREPROCEDURE EVALUATION
04/30/2025  [facility]  Anesthesia Pre-Operative Evaluation     Patient Name: Alberto Frye  YOB: 1975  MRN: [id number]  [facility]: [id number]       Admit Date: 4/30/2025   Admit Team: Networked reference to record PCT   Hospital Day: 1  Date of Procedure: 4/30/2025  Anesthesia: Choice Procedure: Procedure(s) (LRB):  EGD (ESOPHAGOGASTRODUODENOSCOPY) (N/A)  COLONOSCOPY, SCREENING, LOW RISK PATIENT (N/A)  Pre-Operative Diagnosis: Dysphagia, unspecified type [R13.10]  Screening for colon cancer [Z12.11]  Proceduralist:Surgeons and Role:     * Corky Blackburn MD - Primary  Code Status: Prior   Advanced Directive: <no information>  Isolation Precautions: No active isolations  Capacity: Full capacity     SUBJECTIVE:   Alberto Frye is a 50 y.o. female who  has a past medical history of AYDE (acute kidney injury) (11/1/2022), Alcohol abuse, Allergy (Don't remember), Anxiety, Arthritis (2010), Blood clotting tendency (2008), Congestive heart failure (8/11/2020), Depression, Hallucination, psychiatric care, Hypertension, Immune disorder (2020), Joint pain (2010), Keloid cicatrix (Years ago), Caro, Psychiatric problem, Sleep difficulties, Therapy, and Withdrawal symptoms, drug or narcotic.  No notes on file     0.9% NaCl   Intravenous Continuous         Hospital LOS: 0 days  ICU LOS: Patient does not have an ICU stay during this admission.    she has a current medication list which includes the following long-term medication(s): amitriptyline, atorvastatin, azelastine, buspirone, diclofenac sodium, fluoxetine, pilocarpine, pregabalin, and valsartan-hydrochlorothiazide.     ALLERGIES:     Review of patient's allergies indicates:   Allergen Reactions    Morphine Itching and Hallucinations    Pcn [penicillins] Other (See Comments)     Was told from childhood she couldn't take it     Latex, natural rubber Rash    Penicillin v Rash    Sulfa (sulfonamide antibiotics) Nausea And Vomiting and Nausea Only     LDA:   AIRWAY:         [unfilled]     Lines/Drains/Airways       None                  Anesthesia Evaluation      Airway   Mallampati: II  Neck ROM: Normal ROM  Dental      Pulmonary    (+) shortness of breath  Cardiovascular   (+) hypertension, CHF, LEON    Neuro/Psych    (+) neuromuscular disease, psychiatric history    GI/Hepatic/Renal    (+) chronic renal disease    Endo/Other    Abdominal                    MEDICATIONS:     Current Outpatient Medications on File Prior to Encounter   Medication Sig Dispense Refill Last Dose/Taking    acetaminophen (TYLENOL) 500 MG tablet Take 1 tablet (500 mg total) by mouth every 6 (six) hours as needed for Pain.       amitriptyline (ELAVIL) 50 MG tablet Take 1 tablet (50 mg total) by mouth every evening. 90 tablet 3     apixaban (ELIQUIS) 5 mg Tab Take 1 tablet (5 mg total) by mouth 2 (two) times daily. 60 tablet 11     atorvastatin (LIPITOR) 20 MG tablet Take 1 tablet (20 mg total) by mouth once daily. 90 tablet 3     azelastine (ASTELIN) 137 mcg (0.1 %) nasal spray 1 spray (137 mcg total) by Nasal route 2 (two) times daily. 30 mL 0     busPIRone (BUSPAR) 15 MG tablet Take 1 tablet (15 mg total) by mouth 3 (three) times daily. 270 tablet 3     cholecalciferol, vitamin D3, (VITAMIN D3) 50 mcg (2,000 unit) Tab Take 1 tablet (2,000 Units total) by mouth once daily. 90 tablet 3     diclofenac sodium (VOLTAREN) 1 % Gel APPLY 4 GRAMS  TOPICALLY 4 TIMES DAILY TO AFFECTED AREA. DO NOT APPLY MORE THAN 16GM DAILY TO ANY ONE AFFECTED JOINT 100 g 0     docusate sodium (COLACE) 100 MG capsule Take 1 capsule (100 mg total) by mouth 2 (two) times daily. 180 capsule 3     FLUoxetine 40 MG capsule Take 2 capsules (80 mg total) by mouth once daily. 180 capsule 3     hydroxychloroquine (PLAQUENIL) 200 mg tablet Take 1 tablet (200 mg total) by mouth 2 (two) times daily. 60  tablet 11     hydrOXYzine pamoate (VISTARIL) 25 MG Cap Take 1 capsule (25 mg total) by mouth 2 (two) times daily as needed (panic attacks). 180 capsule 3     pilocarpine (SALAGEN) 5 MG Tab Take 1 tablet (5 mg total) by mouth 3 (three) times daily. 90 tablet 6     polyethylene glycol (GLYCOLAX) 17 gram PwPk Take 17 g by mouth once daily. 90 each 3     pregabalin (LYRICA) 50 MG capsule Take 1 capsule (50 mg total) by mouth 2 (two) times daily. In 1-2 weeks, if no relief, may increase dose to 3 times per day. 90 capsule 2     tiZANidine (ZANAFLEX) 4 MG tablet Take 2 tablets (8 mg total) by mouth 2 (two) times daily. 120 tablet 0     valsartan-hydrochlorothiazide (DIOVAN-HCT) 160-25 mg per tablet Take 1 tablet by mouth once daily. 90 tablet 2       Inpatient Medications:  Antibiotics (From admission, onward)      None          VTE Risk Mitigation (From admission, onward)      None              Current Medications[1]       History:   There are no hospital problems to display for this patient.    Surgical History:    has a past surgical history that includes Hysterectomy; JANELLEBAND (2009); Esophagogastroduodenoscopy (N/A, 06/03/2020); Phlebography (Left, 08/12/2020); and Venoplasty (Left, 08/12/2020).   Social History:    reports that she is not currently sexually active and has had partner(s) who are male. She reports using the following methods of birth control/protection: Condom and See Surgical Hx.  reports that she has quit smoking. Her smoking use included cigarettes. She has never used smokeless tobacco. She reports current alcohol use of about 1.0 - 3.0 standard drink of alcohol per week. She reports current drug use. Drug: Marijuana.    There were no vitals filed for this visit.  Vital Signs Range (Last 24H):       There is no height or weight on file to calculate BMI.  Wt Readings from Last 4 Encounters:   03/19/25 109.8 kg (242 lb 1 oz)   03/12/25 109.8 kg (242 lb 1 oz)   03/12/25 109.8 kg (242 lb 1 oz)  "  01/29/25 109.8 kg (241 lb 15.3 oz)        Intake/Output - Last 3 Shifts       None          Lab Results   Component Value Date    WBC 4.87 02/07/2025    WBC 4.87 02/07/2025    HGB 12.4 02/07/2025    HGB 12.4 02/07/2025    HCT 38.2 02/07/2025    HCT 38.2 02/07/2025     02/07/2025     02/07/2025     02/07/2025     02/07/2025    K 3.6 02/07/2025    K 3.6 02/07/2025     02/07/2025     02/07/2025    CREATININE 1.0 02/07/2025    CREATININE 1.0 02/07/2025    BUN 10 02/07/2025    BUN 10 02/07/2025    CO2 25 02/07/2025    CO2 25 02/07/2025    GLU 86 02/07/2025    GLU 86 02/07/2025    CALCIUM 8.6 (L) 02/07/2025    CALCIUM 8.6 (L) 02/07/2025    MG 2.1 11/10/2023    PHOS 3.2 11/10/2023    ALKPHOS 81 02/07/2025    ALKPHOS 81 02/07/2025    ALT 22 02/07/2025    ALT 22 02/07/2025    AST 30 02/07/2025    AST 30 02/07/2025    ALBUMIN 3.5 02/07/2025    ALBUMIN 3.5 02/07/2025    INR 1.0 01/29/2023    APTT 37.0 (H) 08/13/2020    HGBA1C 5.0 07/29/2024     (H) 10/24/2023    TROPONINI 0.008 11/09/2023    BNP 29 01/24/2023    HCGQUANT 2.9 05/26/2020     No results found for this or any previous visit (from the past 12 hours).  No results for input(s): "WBC", "HGB", "HCT", "PLT", "NA", "K", "CREATININE", "GLU", "INR" in the last 168 hours.  No LMP recorded. Patient has had a hysterectomy.    EKG:   Results for orders placed or performed during the hospital encounter of 12/11/24   EKG 12-lead    Collection Time: 12/11/24  4:38 AM   Result Value Ref Range    QRS Duration 80 ms    [facility] QTC Calculation 423 ms    Narrative    Test Reason : R07.89,    Vent. Rate :  76 BPM     Atrial Rate :  76 BPM     P-R Int : 146 ms          QRS Dur :  80 ms      QT Int : 376 ms       P-R-T Axes :  54   5 -23 degrees    QTcB Int : 423 ms    Normal sinus rhythm  Minimal voltage criteria for LVH, may be normal variant ( R in aVL )  T wave abnormality, consider inferior ischemia  Abnormal ECG  No previous ECGs " available  Confirmed by Nancy Fraser ([id number]) on 12/13/2024 6:14:43 PM    Referred By: AAAREFERRAL SELF           Confirmed By: Nancy Fraser     TTE:  Results for orders placed or performed during the hospital encounter of 06/14/24   Echo   Result Value Ref Range    BSA 2.22 m2    LVOT stroke volume 49.79 cm3    LVIDd 5.14 3.5 - 6.0 cm    LV Systolic Volume 62.52 mL    LV Systolic Volume Index 29.5 mL/m2    LVIDs 3.81 2.1 - 4.0 cm    LV Diastolic Volume 126.17 mL    LV Diastolic Volume Index 59.51 mL/m2    IVS 0.69 0.6 - 1.1 cm    LVOT diameter 2.01 cm    LVOT area 3.2 cm2    FS 26 (A) 28 - 44 %    Left Ventricle Relative Wall Thickness 0.38 cm    PW 0.97 0.6 - 1.1 cm    LV mass 149.22 g    LV Mass Index 70 g/m2    MV Peak E Richard 0.79 m/s    TDI LATERAL 0.12 m/s    TDI SEPTAL 0.11 m/s    E/E' ratio 6.87 m/s    MV Peak A Richard 0.80 m/s    TR Max Richard 2.35 m/s    E/A ratio 0.99     E wave deceleration time 193.47 msec    LV SEPTAL E/E' RATIO 7.18 m/s    LV LATERAL E/E' RATIO 6.58 m/s    LVOT peak richard 0.65 m/s    Left Ventricular Outflow Tract Mean Velocity 0.46 cm/s    Left Ventricular Outflow Tract Mean Gradient 0.93 mmHg    RV S' 11.39 cm/s    TAPSE 2.05 cm    LA size 3.87 cm    Left Atrium Minor Axis 4.40 cm    Left Atrium Major Axis 4.01 cm    AV mean gradient 5 mmHg    AV peak gradient 8 mmHg    Ao peak richard 1.44 m/s    Ao VTI 27.00 cm    LVOT peak VTI 15.70 cm    AV valve area 1.84 cm²    AV Velocity Ratio 0.45     AV index (prosthetic) 0.58     CARLITO by Velocity Ratio 1.43 cm²    MV mean gradient 1 mmHg    MV peak gradient 2 mmHg    MV stenosis pressure 1/2 time 56.11 ms    MV valve area p 1/2 method 3.92 cm2    MV valve area by continuity eq 2.48 cm2    MV VTI 20.1 cm    Triscuspid Valve Regurgitation Peak Gradient 22 mmHg    Sinus 2.85 cm    STJ 2.61 cm    Ascending aorta 2.35 cm    IVC diameter 1.34 cm    Mean e' 0.12 m/s    ZLVIDS -0.56     ZLVIDD -2.65     JANET 22.8 mL/m2    LA Vol 48.31 cm3    LA WIDTH 3.5 cm    TV  "resting pulmonary artery pressure 30 mmHg    RV TB RVSP 10 mmHg    Est. RA pres 8 mmHg    Narrative      Left Ventricle: The left ventricle is normal in size. Normal wall   thickness. There is normal systolic function with a visually estimated   ejection fraction of 55 - 60%. Grade I diastolic dysfunction.    Right Ventricle: Normal right ventricular cavity size. Wall thickness   is normal. Systolic function is normal.    Pulmonary Artery: The estimated pulmonary artery systolic pressure is   30 mmHg.       No results found. However, due to the size of the patient record, not all encounters were searched. Please check Results Review for a complete set of results.  RADHA:  No results found. However, due to the size of the patient record, not all encounters were searched. Please check Results Review for a complete set of results.  Stress Test:  No results found for this or any previous visit.     LHC:  No results found for this or any previous visit.     PFT:  No results found for: "FEV1", "FVC", "IIY7DUH", "TLC", "DLCO"           Pre-op Assessment    I have reviewed the Patient Summary Reports.     I have reviewed the Nursing Notes. I have reviewed the NPO Status.   I have reviewed the Medications.     Review of Systems  Anesthesia Hx:               Denies Personal Hx of Anesthesia complications.                    Cardiovascular:     Hypertension       CHF     LEON                              Pulmonary:      Shortness of breath                  Renal/:  Chronic Renal Disease                Neurological:    Neuromuscular Disease,                                   Psych:  Psychiatric History                  Physical Exam  General: Alert, Cooperative and Oriented    Airway:  Mallampati: II   Mouth Opening: Normal  Neck ROM: Normal ROM        Anesthesia Plan  Type of Anesthesia, risks & benefits discussed:    Anesthesia Type: Gen Natural Airway  Intra-op Monitoring Plan: Standard ASA Monitors  Post Op Pain Control " Plan: multimodal analgesia  Induction:  IV  Airway Plan: Direct  Informed Consent: Informed consent signed with the Patient and all parties understand the risks and agree with anesthesia plan.  All questions answered.   ASA Score: 2  Day of Surgery Review of History & Physical: H&P Update referred to the surgeon/provider.    Ready For Surgery From Anesthesia Perspective.     .           [1]   Current Facility-Administered Medications   Medication Dose Route Frequency Provider Last Rate Last Admin    0.9% NaCl infusion   Intravenous Continuous Corky Blackburn MD

## 2025-04-30 NOTE — TRANSFER OF CARE
"Anesthesia Transfer of Care Note    Patient: Alberto Frye    Procedure(s) Performed: Procedure(s) (LRB):  EGD (ESOPHAGOGASTRODUODENOSCOPY) (N/A)  COLONOSCOPY, SCREENING, LOW RISK PATIENT (N/A)    Patient location: PACU    Anesthesia Type: general    Transport from OR: Transported from OR on 6-10 L/min O2 by face mask with adequate spontaneous ventilation    Post pain: adequate analgesia    Post assessment: no apparent anesthetic complications    Post vital signs: stable    Level of consciousness: awake, alert and oriented    Nausea/Vomiting: no nausea/vomiting    Complications: none    Transfer of care protocol was followed      Last vitals: Visit Vitals  /76   Pulse 79   Temp 36.6 °C (97.9 °F) (Tympanic)   Resp 17   Ht 5' 4" (1.626 m)   Wt 107 kg (236 lb)   SpO2 100%   Breastfeeding No   BMI 40.51 kg/m²     "

## 2025-05-01 PROBLEM — Z98.84 HISTORY OF LAPAROSCOPIC ADJUSTABLE GASTRIC BANDING: Status: ACTIVE | Noted: 2025-05-01

## 2025-05-02 DIAGNOSIS — M17.0 PRIMARY OSTEOARTHRITIS OF BOTH KNEES: ICD-10-CM

## 2025-05-02 DIAGNOSIS — M54.2 CHRONIC NECK PAIN: ICD-10-CM

## 2025-05-02 DIAGNOSIS — M54.50 CHRONIC MIDLINE LOW BACK PAIN WITHOUT SCIATICA: ICD-10-CM

## 2025-05-02 DIAGNOSIS — G89.29 CHRONIC NECK PAIN: ICD-10-CM

## 2025-05-02 DIAGNOSIS — G89.29 CHRONIC MIDLINE LOW BACK PAIN WITHOUT SCIATICA: ICD-10-CM

## 2025-05-02 DIAGNOSIS — M79.7 FIBROMYALGIA SYNDROME: ICD-10-CM

## 2025-05-02 RX ORDER — OXYCODONE AND ACETAMINOPHEN 10; 325 MG/1; MG/1
1 TABLET ORAL EVERY 12 HOURS PRN
Qty: 60 TABLET | Refills: 0 | Status: SHIPPED | OUTPATIENT
Start: 2025-05-03

## 2025-05-06 ENCOUNTER — INITIAL CONSULT (OUTPATIENT)
Dept: VASCULAR SURGERY | Facility: CLINIC | Age: 50
End: 2025-05-06
Payer: COMMERCIAL

## 2025-05-06 VITALS
HEIGHT: 64 IN | HEART RATE: 85 BPM | WEIGHT: 237.63 LBS | DIASTOLIC BLOOD PRESSURE: 101 MMHG | BODY MASS INDEX: 40.57 KG/M2 | SYSTOLIC BLOOD PRESSURE: 163 MMHG

## 2025-05-06 DIAGNOSIS — L72.9 CUTANEOUS CYST: ICD-10-CM

## 2025-05-06 DIAGNOSIS — I89.0 LYMPHEDEMA: ICD-10-CM

## 2025-05-06 DIAGNOSIS — I82.409 RECURRENT DEEP VEIN THROMBOSIS (DVT): Primary | ICD-10-CM

## 2025-05-06 PROCEDURE — 99999 PR PBB SHADOW E&M-EST. PATIENT-LVL IV: CPT | Mod: PBBFAC,,, | Performed by: SURGERY

## 2025-05-06 NOTE — PROGRESS NOTES
[facility]     05/06/2025    PATIENT ID: Alberto Frye is a 50 y.o. female.    I. HISTORY     Chief Complaint: follow-up    HPI: Alberto Frye is a 50 y.o. female with PMHx significant for OA, Sjogrens, fibromyalgia, and hx of recurrent DVT-- LLE following hysterectomy s/p Mary Grace IVC filter by Yamil (determined to be provoked, managed on acg) and LLE iliofemoral DVT s/p pharmacomechanical thrombectomy and PTA (with Dr. Goldman, 8/2020) who is here for follow up. Symptoms include swelling and achy pain. Denies any claudication, wounds, rashes. Symptoms began years ago. Location is BLE including thighs, calves, and feet (however milder). Symptoms are worse at the end of the day. Improvement with elevation. Of note, patient states BLE pain is in part due to fibromyalgia but managed with medications. Patient is wearing compression stockings daily. Exercising 2x/week. Daily ASA, eliquis, statin.       Past Medical History:   Diagnosis Date    AYDE (acute kidney injury) 11/1/2022    Alcohol abuse     stopped heavy drinking about 10 years ago; was drinking 3 glasses of vodka/tequilla,rum/whiskey per day    Allergy Don't remember    Its been some years.    Anxiety     Arthritis 2010    Blood clotting tendency 2008    After a procedure & 2020.    Congestive heart failure 8/11/2020    Depression     Hallucination     Hx of psychiatric care     Hypertension     Immune disorder 2020    Joint pain 2010    Keloid cicatrix Years ago    From childhood scars    [place]     unplanned trips, energy without sleep for 2 days (reading, cleaning), feelings that she can do multiple tasks at one time, feelings of overconfidence at times    Psychiatric problem     Sleep difficulties     Therapy     Withdrawal symptoms, drug or narcotic     racing heart, restlessness        Past Surgical History:   Procedure Laterality Date    COLONOSCOPY, SCREENING, LOW RISK PATIENT N/A 4/30/2025    Procedure:  COLONOSCOPY, SCREENING, LOW RISK PATIENT;  Surgeon: Corky Blackburn MD;  Location: [facility] ENDO (4TH FLR);  Service: Endoscopy;  Laterality: N/A;  4/1 ref by: Ian Cespedes MD, PEG, PORTAL-st  EliMPORTANT INFORMATION TO KNOW BEFORE YOUR PROCEDURE     [facility] 2nd Floor    4/24 precall complete/will do lovenox bridge/mleone        I    ESOPHAGOGASTRODUODENOSCOPY N/A 06/03/2020    Procedure: EGD (ESOPHAGOGASTRODUODENOSCOPY);  Surgeon: Johan Grover MD;  Location: [facility] ENDO (4TH FLR);  Service: Endoscopy;  Laterality: N/A;  covid 6/2-[facility]-tb    ESOPHAGOGASTRODUODENOSCOPY N/A 4/30/2025    Procedure: EGD (ESOPHAGOGASTRODUODENOSCOPY);  Surgeon: Corky Blackburn MD;  Location: [facility] ENDO (4TH FLR);  Service: Endoscopy;  Laterality: N/A;  per vascular Anamika Castro PA-C-cleared you to hold Eliquis and begin a Lovenox (Enoxaparin) bridge for 48 hours prior to your procedure-see note dated 4/6/25-GT  MPORTANT INFORMATION TO KNOW BEFORE YOUR PROCEDURE     [facility] HYSTERECTOMY LAPBAND  2009    PHLEBOGRAPHY Left 08/12/2020    Procedure: Venogram, pharmacomechanical thrombectomy;  Surgeon: Miguelangel Goldman MD;  Location: [address];  Service: Vascular;  Laterality: Left;  2336.43 mGy  [id number]  17.8 minutes  37 ml of contrast    VENOPLASTY Left 08/12/2020    Procedure: ANGIOPLASTY, VEIN;  Surgeon: Miguelangel Goldman MD;  [facility];  Service: Vascular;  Laterality: Left;       Social History     Occupational History    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[employer]   Tobacco Use    Smoking status: Former     Current packs/day: 0.00     Types: Cigarettes    Smokeless tobacco: Never    Tobacco comments:     quit in october 2016   Substance and Sexual Activity    Alcohol use: Yes     Alcohol/week: 1.0 - 3.0 standard drink of alcohol     Types: 1 - 3 Glasses of wine per week     Comment: social presently, excessive 10  years ago    Drug use: Yes     Types: Marijuana    Sexual activity: Not Currently     Partners: Male     Birth control/protection: Condom, See Surgical Hx       Current Medications[1]    Review of Systems   Constitutional: Positive for malaise/fatigue (2/2 rheum).   Cardiovascular:  Positive for leg swelling. Negative for chest pain and claudication.   Respiratory:  Negative for cough, shortness of breath and wheezing.    Skin:  Negative for itching and rash.   Musculoskeletal:  Positive for myalgias.   Gastrointestinal:  Negative for abdominal pain, diarrhea, nausea and vomiting.   Neurological:  Negative for focal weakness, numbness and weakness.   Psychiatric/Behavioral:  The patient is not nervous/anxious.          II. PHYSICAL EXAM     Physical Exam  Vitals reviewed.   Constitutional:       General: She is not in acute distress.     Appearance: She is well-developed.   HENT:      Head: Normocephalic and atraumatic.   Eyes:      Conjunctiva/sclera: Conjunctivae normal.   Cardiovascular:      Rate and Rhythm: Normal rate.   Pulmonary:      Effort: Pulmonary effort is normal.      Breath sounds: Normal breath sounds.   Abdominal:      Palpations: Abdomen is soft.   Musculoskeletal:         General: No deformity. Normal range of motion.      Cervical back: Neck supple.      Right lower leg: Edema present.      Left lower leg: Edema present.   Skin:     Findings: No rash.   Neurological:      Mental Status: She is alert and oriented to person, place, and time.   Psychiatric:         Mood and Affect: Mood is not anxious.         Imaging Results: (I have personally reviewed the images/studies and provided my interpretation below)    Carotid Duplex: 4/15/2025  R L   CCA: 79 cm/s  ICA PSV: 113 cm/s  ICA EDV: 45 cm/s  Vert: antegrade   ICA/CCA ratio: 1.59  Impression: 20-39% CCA: 91 cm/s  ICA PSV: 118 cm/s  ICA EDV: 48 cm/s  Vert: antegrade  ICA/CCA ratio: 1.30  Impression: 20-39%         III. ASSESSMENT & PLAN (MEDICAL  DECISION MAKING)     1. Lymphedema    2. Cutaneous cyst        Clinical descriptor Absent (0) Mild (1) Moderate (2) Severe (3) Score   Pain none occasional Daily not limiting Daily limiting 2   Varicose veins none few Calf or thigh Calf and thigh 1   Venous edema none Foot and ankle Calf to knee Calf and thigh 2   Pigmentation none perimalleolar Lower 1/3 calf Above 1/3 calf 2   Inflammation none perimalleolar Lower 1/3 calf Above 1/3 calf 0   Induration none perimalleolar Lower 1/3 calf Above 1/3 calf 0   # Active ulcers none 1 2 3 0   Ulcer duration none < 3 months 3-12 months > 1 year 0   Active ulcer size none < 2cm 2-6cm > 6cm 0   Compression  none Intermittent Most days All days 3   VCSS Score ----------------------- ----------------------- ----------------------- ----------------------- Total = 8       Assessment/Diagnosis and Plan:  50 y.o. female with hx of recurrent LLE iliofemoral DVT on Eiquis here for evaluation of BLE swelling and tenderness. CEAP class C4. VCSS 8. 2022 US without evidence of clot; no recent US. Carotid US with 0-19% stenosis bilaterally. Plan for conservative medical treatment at this time. Will repeat BLE DVT US to confirm no new clot. Plan to follow up after US. If negative, will proceed with venous insuff US and lymphedema work up.       - Cont eliquis, ASA, statin  - Recommend compression with 20-30mmHg Rx stockings, new RX sent for thigh highs   - elevation, low sodium diet, exercise regularly  - RTC pending DVT US       Anamika Castro PA-C  Vascular & Endovascular Surgery  [facility]    I spent 30 minutes evaluating this patient and greater than 50% of the time was spent counseling, coordinator care and discussing the plan of care.         [1]   Current Outpatient Medications:     acetaminophen (TYLENOL) 500 MG tablet, Take 1 tablet (500 mg total) by mouth every 6 (six) hours as needed for Pain., Disp: , Rfl:     amitriptyline (ELAVIL) 50 MG tablet,  Take 1 tablet (50 mg total) by mouth every evening., Disp: 90 tablet, Rfl: 3    apixaban (ELIQUIS) 5 mg Tab, Take 1 tablet (5 mg total) by mouth 2 (two) times daily., Disp: 60 tablet, Rfl: 11    atorvastatin (LIPITOR) 20 MG tablet, Take 1 tablet (20 mg total) by mouth once daily., Disp: 90 tablet, Rfl: 3    azelastine (ASTELIN) 137 mcg (0.1 %) nasal spray, 1 spray (137 mcg total) by Nasal route 2 (two) times daily., Disp: 30 mL, Rfl: 0    busPIRone (BUSPAR) 15 MG tablet, Take 1 tablet (15 mg total) by mouth 3 (three) times daily., Disp: 270 tablet, Rfl: 3    cholecalciferol, vitamin D3, (VITAMIN D3) 50 mcg (2,000 unit) Tab, Take 1 tablet (2,000 Units total) by mouth once daily., Disp: 90 tablet, Rfl: 3    diclofenac sodium (VOLTAREN) 1 % Gel, APPLY 4 GRAMS  TOPICALLY 4 TIMES DAILY TO AFFECTED AREA. DO NOT APPLY MORE THAN 16GM DAILY TO ANY ONE AFFECTED JOINT, Disp: 100 g, Rfl: 0    docusate sodium (COLACE) 100 MG capsule, Take 1 capsule (100 mg total) by mouth 2 (two) times daily., Disp: 180 capsule, Rfl: 3    FLUoxetine 40 MG capsule, Take 2 capsules (80 mg total) by mouth once daily., Disp: 180 capsule, Rfl: 3    hydroxychloroquine (PLAQUENIL) 200 mg tablet, Take 1 tablet (200 mg total) by mouth 2 (two) times daily., Disp: 60 tablet, Rfl: 11    hydrOXYzine pamoate (VISTARIL) 25 MG Cap, Take 1 capsule (25 mg total) by mouth 2 (two) times daily as needed (panic attacks)., Disp: 180 capsule, Rfl: 3    oxyCODONE-acetaminophen (PERCOCET)  mg per tablet, Take 1 tablet by mouth every 12 (twelve) hours as needed for Pain., Disp: 60 tablet, Rfl: 0    polyethylene glycol (GLYCOLAX) 17 gram PwPk, Take 17 g by mouth once daily., Disp: 90 each, Rfl: 3    pregabalin (LYRICA) 50 MG capsule, Take 1 capsule (50 mg total) by mouth 2 (two) times daily. In 1-2 weeks, if no relief, may increase dose to 3 times per day., Disp: 90 capsule, Rfl: 2    tiZANidine (ZANAFLEX) 4 MG tablet, Take 2 tablets (8 mg total) by mouth 2 (two)  times daily., Disp: 120 tablet, Rfl: 0    valsartan-hydrochlorothiazide (DIOVAN-HCT) 160-25 mg per tablet, Take 1 tablet by mouth once daily., Disp: 90 tablet, Rfl: 2    pilocarpine (SALAGEN) 5 MG Tab, Take 1 tablet (5 mg total) by mouth 3 (three) times daily., Disp: 90 tablet, Rfl: 6

## 2025-05-09 DIAGNOSIS — M79.7 FIBROMYALGIA SYNDROME: ICD-10-CM

## 2025-05-12 ENCOUNTER — PATIENT MESSAGE (OUTPATIENT)
Dept: FAMILY MEDICINE | Facility: CLINIC | Age: 50
End: 2025-05-12
Payer: COMMERCIAL

## 2025-05-12 RX ORDER — PREGABALIN 50 MG/1
50 CAPSULE ORAL 2 TIMES DAILY
Qty: 90 CAPSULE | Refills: 2 | Status: SHIPPED | OUTPATIENT
Start: 2025-05-12 | End: 2025-08-10

## 2025-05-13 ENCOUNTER — HOSPITAL ENCOUNTER (EMERGENCY)
Facility: HOSPITAL | Age: 50
Discharge: HOME | End: 2025-05-13
Attending: STUDENT IN AN ORGANIZED HEALTH CARE EDUCATION/TRAINING PROGRAM
Payer: COMMERCIAL

## 2025-05-13 VITALS
DIASTOLIC BLOOD PRESSURE: 104 MMHG | TEMPERATURE: 98 F | SYSTOLIC BLOOD PRESSURE: 175 MMHG | WEIGHT: 235 LBS | BODY MASS INDEX: 40.34 KG/M2 | HEART RATE: 85 BPM | RESPIRATION RATE: 18 BRPM | OXYGEN SATURATION: 97 %

## 2025-05-13 DIAGNOSIS — R13.10 DYSPHAGIA, UNSPECIFIED TYPE: Primary | ICD-10-CM
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
## 2025-05-13 DIAGNOSIS — R09.A2 GLOBUS SENSATION: ICD-10-CM

## 2025-05-13 DIAGNOSIS — S09.90XA INJURY OF HEAD, INITIAL ENCOUNTER: ICD-10-CM

## 2025-05-13 DIAGNOSIS — S00.11XA BLACK EYE OF RIGHT SIDE, INITIAL ENCOUNTER: Primary | ICD-10-CM

## 2025-05-13 DIAGNOSIS — Z98.84 HISTORY OF LAPAROSCOPIC ADJUSTABLE GASTRIC BANDING: ICD-10-CM

## 2025-05-13 PROCEDURE — 25000003 PHARM REV CODE 250

## 2025-05-13 PROCEDURE — 99284 EMERGENCY DEPT VISIT MOD MDM: CPT | Mod: 25

## 2025-05-13 RX ORDER — ACETAMINOPHEN 325 MG/1
650 TABLET ORAL
Status: COMPLETED | OUTPATIENT
Start: 2025-05-13 | End: 2025-05-13

## 2025-05-13 RX ORDER — ACETAMINOPHEN 500 MG
500 TABLET ORAL EVERY 6 HOURS PRN
Qty: 30 TABLET | Refills: 0 | Status: SHIPPED | OUTPATIENT
Start: 2025-05-13

## 2025-05-13 RX ADMIN — ACETAMINOPHEN 650 MG: 325 TABLET ORAL at 10:05

## 2025-05-13 NOTE — Clinical Note
"Alberto Lloydblossom Frye was seen and treated in our emergency department on 5/13/2025.  She may return to work on 05/14/2025.       If you have any questions or concerns, please don't hesitate to call.      Andres Schumacher PA-C"

## 2025-05-13 NOTE — ED PROVIDER NOTES
Encounter Date: 5/13/2025    SCRIBE #1 NOTE: I, Nandini Avendaño, am scribing for, and in the presence of,  Andres Schumacher PA-C. I have scribed the following portions of the note - Other sections scribed: HPI, ROS.       History     Chief Complaint   Patient presents with    Fall     Pt tripped and fell on Friday hit head. No LOC or vomiting. Pt reports headache. Taking blood thinners. PERRLA. AAOx4     Alberto Frye is a 50 y.o. female, with a past medical history of AYDE, CHF, HTN, and DVT (on Eliquis), who presents to the ED with a frontal headache that began PTA today. Patient states she tripped on her phone  causing her to fall and hit her head 5 days ago. Patient sustained laceration to the right eyebrow and was able to control the bleeding. Patient contacted her PCP yesterday who advised her to come to ED for evaluation. Denies attempting treatment PTA. Patient denies loss of consciousness, neck pain, or other associated symptoms.     The history is provided by the patient and medical records. No  was used.     Review of patient's allergies indicates:   Allergen Reactions    Morphine Itching and Hallucinations    Pcn [penicillins] Other (See Comments)     Was told from childhood she couldn't take it    Latex, natural rubber Rash    Penicillin v Rash    Sulfa (sulfonamide antibiotics) Nausea And Vomiting and Nausea Only     Past Medical History:   Diagnosis Date    AYDE (acute kidney injury) 11/1/2022    Alcohol abuse     stopped heavy drinking about 10 years ago; was drinking 3 glasses of vodka/tequilla,rum/whiskey per day    Allergy Don't remember    Its been some years.    Anxiety     Arthritis 2010    Blood clotting tendency 2008    After a procedure & 2020.    Congestive heart failure 8/11/2020    Depression     Hallucination     Hx of psychiatric care     Hypertension     Immune disorder 2020    Joint pain 2010    Keloid cicatrix Years ago    From childhood scars     Caro     unplanned trips, energy without sleep for 2 days (reading, cleaning), feelings that she can do multiple tasks at one time, feelings of overconfidence at times    Psychiatric problem     Sleep difficulties     Therapy     Withdrawal symptoms, drug or narcotic     racing heart, restlessness     Past Surgical History:   Procedure Laterality Date    COLONOSCOPY, SCREENING, LOW RISK PATIENT N/A 4/30/2025    Procedure: COLONOSCOPY, SCREENING, LOW RISK PATIENT;  Surgeon: Corky Blackburn MD;  Location: [facility] ENDO (4TH FLR);  Service: Endoscopy;  Laterality: N/A;  4/1 ref by: Ian Cespedes MD, PEG, PORTAL-st  EliMPORTANT INFORMATION TO KNOW BEFORE YOUR PROCEDURE     [facility] 2nd Floor    4/24 precall complete/will do lovenox bridge/mleone        I    ESOPHAGOGASTRODUODENOSCOPY N/A 06/03/2020    Procedure: EGD (ESOPHAGOGASTRODUODENOSCOPY);  Surgeon: Johan Grover MD;  Location: [facility] ENDO (4TH FLR);  Service: Endoscopy;  Laterality: N/A;  covid 6/2[place] [place]    ESOPHAGOGASTRODUODENOSCOPY N/A 4/30/2025    Procedure: EGD (ESOPHAGOGASTRODUODENOSCOPY);  Surgeon: Corky Blackburn MD;  Location: [facility] ENDO (4TH FLR);  Service: Endoscopy;  Laterality: N/A;  per vascular Anamika Castro PA-C-cleared you to hold Eliquis and begin a Lovenox (Enoxaparin) bridge for 48 hours prior to your procedure-see note dated 4/6/25-GT  MPORTANT INFORMATION TO KNOW BEFORE YOUR PROCEDURE     [facility] HYSTERECTOMY LAPBAND  2009    PHLEBOGRAPHY Left 08/12/2020    Procedure: Venogram, pharmacomechanical thrombectomy;  Surgeon: Miguelangel Goldman MD;  [facility];  Service: Vascular;  Laterality: Left;  2336.43 mGy  [id number]  17.8 minutes  37 ml of contrast    VENOPLASTY Left 08/12/2020    Procedure: ANGIOPLASTY, VEIN;  Surgeon: Miguelangel Goldman MD;  [facility];  Service: Vascular;  Laterality: Left;     Family History   Problem  Relation Name Age of Onset    Diabetes Mother      Cancer Mother      Hypertension Mother      No Known Problems Father      No Known Problems Sister      No Known Problems Brother      No Known Problems Maternal Aunt      Cirrhosis Maternal Uncle          ETOH    No Known Problems Paternal Aunt      No Known Problems Paternal Uncle      No Known Problems Maternal Grandmother      No Known Problems Maternal Grandfather      No Known Problems Paternal Grandmother      No Known Problems Paternal Grandfather      No Known Problems Other      Celiac disease Neg Hx      Colon cancer Neg Hx      Colon polyps Neg Hx      Crohn's disease Neg Hx      Esophageal cancer Neg Hx      Inflammatory bowel disease Neg Hx      Liver cancer Neg Hx      Liver disease Neg Hx      Rectal cancer Neg Hx      Stomach cancer Neg Hx      Ulcerative colitis Neg Hx       Social History[1]  Review of Systems   Constitutional:  Negative for chills, diaphoresis, fatigue and fever.   HENT:  Negative for congestion, rhinorrhea and sore throat.    Eyes:  Negative for redness and visual disturbance.   Respiratory:  Negative for cough and shortness of breath.    Cardiovascular:  Negative for chest pain, palpitations and leg swelling.   Gastrointestinal:  Negative for abdominal pain, diarrhea, nausea and vomiting.   Genitourinary:  Negative for difficulty urinating, dysuria, flank pain and frequency.   Musculoskeletal:  Negative for arthralgias, back pain, myalgias, neck pain and neck stiffness.   Skin:  Negative for rash.   Neurological:  Positive for headaches. Negative for dizziness, syncope, weakness and light-headedness.        (+) head injury    Hematological:  Does not bruise/bleed easily.       Physical Exam     Initial Vitals [05/13/25 0859]   BP Pulse Resp Temp SpO2   (!) 175/104 85 18 98.4 °F (36.9 °C) 97 %      MAP       --         Physical Exam    Nursing note and vitals reviewed.  Constitutional: She appears well-developed and  well-nourished. She is not diaphoretic. No distress.   HENT:   Head: Normocephalic. Head is with contusion. Head is without raccoon's eyes, without Bull's sign, without abrasion, without laceration, without right periorbital erythema and without left periorbital erythema.       Right Ear: Tympanic membrane, external ear and ear canal normal.   Left Ear: Tympanic membrane, external ear and ear canal normal.   Nose: Nose normal. No rhinorrhea, sinus tenderness, nasal deformity, septal deviation or nasal septal hematoma. No epistaxis. Mouth/Throat: Uvula is midline and oropharynx is clear and moist.   Eyes: Conjunctivae, EOM and lids are normal. Pupils are equal, round, and reactive to light. Right eye exhibits no discharge. Left eye exhibits no discharge.   Neck: Trachea normal. Neck supple. No tracheal tenderness present. No tracheal deviation present.   Full range of motion of the neck without pain or limitation.  No pain with the passive flexion-extension of the neck.  No midline tenderness.   Normal range of motion.   Full passive range of motion without pain.     Cardiovascular:  Normal rate, regular rhythm, normal heart sounds, intact distal pulses and normal pulses.     Exam reveals no distant heart sounds and no friction rub.       Pulmonary/Chest: Effort normal and breath sounds normal. No respiratory distress.   Abdominal: Abdomen is soft. Bowel sounds are normal. She exhibits no distension, no pulsatile midline mass and no mass. There is no abdominal tenderness.   No right CVA tenderness.  No left CVA tenderness. There is no rebound and no guarding.   Musculoskeletal:         General: Normal range of motion.      Right shoulder: Normal.      Left shoulder: Normal.      Right elbow: Normal.      Left elbow: Normal.      Right wrist: Normal.      Left wrist: Normal.      Right hand: Normal.      Left hand: Normal.      Cervical back: Normal, full passive range of motion without pain, normal range of motion  and neck supple. No edema, erythema or rigidity. No spinous process tenderness or muscular tenderness. Normal range of motion.      Thoracic back: Normal.      Lumbar back: Normal.      Right hip: Normal.      Left hip: Normal.      Right knee: Normal.      Left knee: Normal.      Right lower leg: Normal.      Left lower leg: Normal.      Right ankle: Normal.      Left ankle: Normal.      Right foot: Normal.      Left foot: Normal.      Comments: Full range of motion of both upper and lower extremities bilaterally with 5/5 strength 2+ distal pulses neurovascular intact.     Neurological: She is alert and oriented to person, place, and time. She has normal strength. No cranial nerve deficit or sensory deficit. Coordination and gait normal. GCS eye subscore is 4. GCS verbal subscore is 5. GCS motor subscore is 6.   She is awake, alert, and oriented x3. PERRL. EOM's Intact. Gross visual acuity is intact. No tongue deviation or slurred speech. Bilateral facial movement is symmetrical. Symmetrical shoulder shrug and head moves appropriately side to side. Sensation is intact and symmetric to face, upper, and lower extremities. Patient hears commands and appropriately responds. Strength is 5/5 UE/LE bilaterally. No truncal ataxia. Ambulates with a steady gait.  Normal finger-to-nose.  Cervical Nerve Exam Normal: Equal strength with upper extremities (thumbs up/down/side, fingers spread, wrist and elbow flexion/extension, arms crossed).      Skin: Skin is warm and dry. Capillary refill takes less than 2 seconds. Abrasion noted. No bruising and no ecchymosis noted. No erythema.   Small healing abrasion over contusion site of left lateral eyebrow. No erythema or drainage.    Psychiatric: She has a normal mood and affect. Her speech is normal and behavior is normal. Thought content normal.         ED Course   Procedures  Labs Reviewed - No data to display       Imaging Results              CT Head Without Contrast (Final  result)  Result time 05/13/25 10:33:15      Final result by Jose D Aguilar MD (05/13/25 10:33:15)                   Impression:      No acute intracranial abnormality.      Electronically signed by: Jose D Aguilar MD  Date:    05/13/2025  Time:    10:33               Narrative:    EXAMINATION:  CT HEAD WITHOUT CONTRAST    CLINICAL HISTORY:  Head trauma on blood thinners;    TECHNIQUE:  Low dose axial images were obtained through the head.  Coronal and sagittal reformations were also performed. Contrast was not administered.    FINDINGS:  The brain is significant for mild periventricular hypoattenuation consistent microvascular ischemic change.  The ventricular system is significant for prominence of the sulci overlying the frontal lobes bilaterally consistent with involutional change.  There is no intra or extra-axial mass or hemorrhage identified.  Note is made of bilateral basal ganglial calcification.  The visualized extracranial structures are unremarkable.                                       CT Maxillofacial Without Contrast (Final result)  Result time 05/13/25 10:36:34      Final result by Jose D Aguilar MD (05/13/25 10:36:34)                   Impression:      No evidence of fracture.      Electronically signed by: Jose D Aguilar MD  Date:    05/13/2025  Time:    10:36               Narrative:    EXAMINATION:  CT MAXILLOFACIAL WITHOUT CONTRAST    CLINICAL HISTORY:  Head trauma on blood thinners;    TECHNIQUE:  Low dose axial images, sagittal and coronal reformations were obtained through the face.  Contrast was not administered.    FINDINGS:  The visualized intracranial content is significant for basal ganglial calcification.  The visualized extracranial soft tissues and vascular structures of the head and upper neck including the orbits and orbital content are unremarkable.  The paranasal sinuses are well aerated.  The mastoid air cells are well aerated.                                       Medications    acetaminophen tablet 650 mg (650 mg Oral Given 5/13/25 1040)     Medical Decision Making  Alberto Frye is a 50 y.o. female, with a past medical history of AYDE, CHF, HTN, and DVT (on Eliquis), who presents to the ED with a frontal headache that began PTA today. Patient states she tripped on her phone  causing her to fall and hit her head 5 days ago. Patient sustained laceration to the right eyebrow and was able to control the bleeding. Patient contacted her PCP yesterday who advised her to come to ED for evaluation. Denies attempting treatment PTA. Patient denies loss of consciousness, neck pain, or other associated symptoms.   Patient's chart and medical history reviewed.  Patient's vitals reviewed.  They are afebrile, no respiratory distress, nontoxic-appearing in the ED.  Differential diagnosis is considered   - SAH, Epidural hematoma, Subdural hematoma: considered with head injury, CT head ordered for further evaluation.  - Cervical/neck fracture: considered with complaint, although unlikely with no spinal tenderness, no step-off, no overlying skin changes, neurovascular exam intact, sensation intact, moving all UE/LE bilaterally without limitation.  - skull fracture, facial fracture: considered with complaint considered with physical exam findings, CT maxillofacial ordered for further evaluation  Physical exam as above.  Per my interpretation, there appears to be no acute emergent findings including bleed, mass effect, midline shift, fracture, or other abnormalities. and patient workup is without emergent findings, patient is hemodynamically stable. Plan to dc home with medications as below.    At this time I'll discharge home to follow up with primary care physician in the next 1-2 days for further evaluation.  If the symptom/symptoms continue the pt will need to see neurology for further evaluation.  The patient is comfortable with this plan and comfortable going home at this time. After  taking into careful account the historical factors and physical exam findings of the patient's presentation today, in conjunction with the empirical and objective data obtained on ED workup, no acute emergent medical condition has been identified. The patient appears to be low risk for an emergent medical condition and I feel it is safe and appropriate at this time for the patient to be discharged to follow-up as detailed in their discharge instructions for reevaluation and possible continued outpatient workup and management. I have discussed the specifics of the workup with the patient and the patient has verbalized understanding of the details of the workup, the diagnosis, the treatment plan, and the need for outpatient follow-up.  Although the patient has no emergent etiology today this does not preclude the development of an emergent condition so in addition, I have advised the patient that they can return to the ED and/or activate EMS at any time with worsening of their symptoms, change of their symptoms, or with any other medical complaint.  The patient remained comfortable and stable during their visit in the ED.  Discharge and follow-up instructions discussed with the patient who expressed understanding and willingness to comply with my recommendations. I discussed with the patient/family the diagnosis, treatment plan, indications for return to the emergency department, and for expected follow-up. Please follow up with your primary doctor in 1-2 days and return to the ED in any new, worsening, or continued symptoms. The patient/family verbalized an understanding. The patient/family is asked if there are any questions or concerns. We discuss the case, until all issues are addressed to the patient/family's satisfaction. Patient/family understands and is agreeable to the plan.  STU PASCUAL    DISCLAIMER: [employer], a voice recognition system was utilized in creating the note.      Amount and/or Complexity of  Data Reviewed  Radiology: ordered. Decision-making details documented in ED Course.    Risk  OTC drugs.            Scribe Attestation:   Scribe #1: I performed the above scribed service and the documentation accurately describes the services I performed. I attest to the accuracy of the note.        ED Course as of 05/13/25 1621   Tue May 13, 2025   1042 CT Maxillofacial Without Contrast  No evidence of fracture. [AF]   1042 CT Head Without Contrast  The brain is significant for mild periventricular hypoattenuation consistent microvascular ischemic change.  The ventricular system is significant for prominence of the sulci overlying the frontal lobes bilaterally consistent with involutional change.  There is no intra or extra-axial mass or hemorrhage identified.  Note is made of bilateral basal ganglial calcification.  The visualized extracranial structures are unremarkable. [AF]      ED Course User Index  [AF] Andres Schumacher PA-C I, Alain David Flexer, PA-C, personally performed the services described in this documentation. All medical record entries made by the scribe were at my direction and in my presence. I have reviewed the chart and agree that the record reflects my personal performance and is accurate and complete.      DISCLAIMER: This note was prepared with [employer] voice recognition transcription software. Garbled syntax, mangled pronouns, and other bizarre constructions may be attributed to that software system.    Clinical Impression:  Final diagnoses:  [S00.11XA] Black eye of right side, initial encounter (Primary)  [W19.XXXA] Fall, initial encounter  [S09.90XA] Injury of head, initial encounter          ED Disposition Condition    Discharge Stable          ED Prescriptions       Medication Sig Dispense Start Date End Date Auth. Provider    acetaminophen (TYLENOL) 500 MG tablet Take 1 tablet (500 mg total) by mouth every 6 (six) hours as needed for Pain. 30 tablet 5/13/2025 --  Andres Schumacher PA-C          Follow-up Information       Follow up With Specialties Details Why Contact Info    Ian Cespedes MD Internal Medicine Schedule an appointment as soon as possible for a visit in 1 day for follow up [address] [address]  [phone]      [facility] Emergency Medicine Go to  If you have new or worsening symptoms, or if you have any concerns at all. [address] [facility] [place] [address] [phone]    Your primary care physician  Schedule an appointment as soon as possible for a visit in 1 day for follow up regarding today's visit and for re-evaluation. Please follow up in 1-2 days.                  [1]   Social History  Tobacco Use    Smoking status: Former     Current packs/day: 0.00     Types: Cigarettes    Smokeless tobacco: Never    Tobacco comments:     quit in october 2016   Substance Use Topics    Alcohol use: Yes     Alcohol/week: 1.0 - 3.0 standard drink of alcohol     Types: 1 - 3 Glasses of wine per week     Comment: social presently, excessive 10 years ago    Drug use: Yes     Types: Marijuana        Andres Schumacher PA-C  05/13/25 [id number]

## 2025-05-13 NOTE — DISCHARGE INSTRUCTIONS
Thank you for coming to our Emergency Department today. It is important to remember that some problems or medical conditions are difficult to diagnose and may not be found or addressed during your Emergency Department visit.  These conditions often start with non-specific symptoms and can only be diagnosed on follow up visits with your primary care physician or specialist when the symptoms continue or change. Please remember that all medical conditions can change, and we cannot predict how you will be feeling tomorrow or the next day. Return to the ER with any questions/concerns, new/concerning symptoms including fever, chest pain, shortness of breath, loss of consciousness, dizziness, weakness, worsening symptoms, failure to improve, or any other concerns. Also, please follow up with your Primary Care Physician and/or Pediatrician in the next 1-2 days to review your ED visit in entirety and for re-evaluation.   Be sure to follow up with your primary care doctor and review all labs/imaging/tests that were performed during your ER visit with them. It is very common for us to identify non-emergent incidental findings which must be followed up with your primary care physician.  Some labs/imaging/tests may be outside of the normal range, and require non-emergent follow-up and/or further investigation/treatment/procedures/testing to help diagnose/exclude/prevent complications or other potentially serious medical conditions. Some abnormalities may not have been discussed or addressed during your ER visit. Some lab results may not return during your ER visit but can be accessible by downloading the free [facility] sepideh or by visiting [email or web address] . It is important for you to review all labs/imaging/tests which are outside of the normal range with your physician.  An ER visit does not replace a primary care visit, and many screening tests or follow-up tests cannot be ordered by an ER doctor or performed by  the ER. Some tests may even require pre-approval.  If you do not have a primary care doctor, you may contact the one listed on your discharge paperwork or you may also call the [facility] Appointment Desk at [phone] , or [address] [place] [address] at  [phone] to schedule an appointment, or establish care with a primary care doctor or even a specialist and to obtain information about local resources. It is important to your health that you have a primary care doctor.  Please take all medications as directed. We have done our best to select a medication for you that will treat your condition however, all medications may potentially have side-effects and it is impossible to predict which medications may give you side-effects or what those side-effects (if any) those medications may give you.  If you feel that you are having a negative effect or side-effect of any medication you should stop taking those medications immediately and seek medical attention. If you feel that you are having a life-threatening reaction call 911.  Do not drive, swim, climb to height, take a bath, operate heavy machinery, drink alcohol or take potentially sedating medications, sign any legal documents or make any important decisions for 24 hours if you have received any pain medications, sedatives or mood altering drugs during your ER visit or within 24 hours of taking them if they have been prescribed to you.   You can find additional resources for Dentists, hearing aids, durable medical equipment, low cost pharmacies and other resources at [email or web address]  Patient agrees with this plan. Discussed with her strict return precautions, they verbalized understanding. Patient is stable for discharge.   § Please take all medication as prescribed.

## 2025-05-13 NOTE — FIRST PROVIDER EVALUATION
Emergency Department TeleTriage Encounter Note      CHIEF COMPLAINT    Chief Complaint   Patient presents with    Fall     Pt tripped and fell on Friday hit head. No LOC or vomiting. Pt reports headache. Taking blood thinners. PERRLA. AAOx4       VITAL SIGNS   Initial Vitals [05/13/25 0859]   BP Pulse Resp Temp SpO2   (!) 175/104 85 18 98.4 °F (36.9 °C) 97 %      MAP       --            ALLERGIES    Review of patient's allergies indicates:   Allergen Reactions    Morphine Itching and Hallucinations    Pcn [penicillins] Other (See Comments)     Was told from childhood she couldn't take it    Latex, natural rubber Rash    Penicillin v Rash    Sulfa (sulfonamide antibiotics) Nausea And Vomiting and Nausea Only       PROVIDER TRIAGE NOTE  Verbal consent for the teletriage evaluation was given by the patient at the start of the evaluation.  All efforts will be made to maintain patient's privacy during the evaluation.      This is a teletriage evaluation of a 50 y.o. female presenting to the ED with c/o trip and fall 5 days ago; hit head on TV stand.  On Eliquis.  Limited physical exam via telehealth: The patient is awake, alert, answering questions appropriately and is not in respiratory distress.  As the Teletriage provider, I performed an initial assessment and ordered appropriate labs and imaging studies, if any, to facilitate the patient's care once placed in the ED. Once a room is available, care and a full evaluation will be completed by an alternate ED provider.  Any additional orders and the final disposition will be determined by that provider.  All imaging and labs will not be followed-up by the Teletriage Team, including myself.      ORDERS  Labs Reviewed - No data to display    ED Orders (720h ago, onward)      Start Ordered     Status Ordering Provider    05/13/25 0934 05/13/25 0933  CT Head Without Contrast  1 time imaging         In process PRAMOD EDWARD    05/13/25 0934 05/13/25 0933  CT Maxillofacial  Without Contrast  1 time imaging         In process PRAMODEDWARD              Virtual Visit Note: The provider triage portion of this emergency department evaluation and documentation was performed via Looxiinect, a HIPAA-compliant telemedicine application, in concert with a tele-presenter in the room. A face to face patient evaluation with one of my colleagues will occur once the patient is placed in an emergency department room.      DISCLAIMER: This note was prepared with Servio voice recognition transcription software. Garbled syntax, mangled pronouns, and other bizarre constructions may be attributed to that software system.

## 2025-05-26 ENCOUNTER — TELEPHONE (OUTPATIENT)
Dept: VASCULAR SURGERY | Facility: CLINIC | Age: 50
End: 2025-05-26
Payer: COMMERCIAL

## 2025-05-26 NOTE — TELEPHONE ENCOUNTER
Received message from  regarding pt.needing ultrasound and follow up appt. scheduled. Called pt.and scheduled her ultrasound and follow up appt.with LYNDSEY Willson. Pt.verbalized understanding and aware of location of appts.scheduled.

## 2025-05-29 ENCOUNTER — OFFICE VISIT (OUTPATIENT)
Dept: PHYSICAL MEDICINE AND REHAB | Facility: CLINIC | Age: 50
End: 2025-05-29
Payer: COMMERCIAL

## 2025-05-29 VITALS — BODY MASS INDEX: 40.95 KG/M2 | HEIGHT: 64 IN | WEIGHT: 239.88 LBS

## 2025-05-29 DIAGNOSIS — G89.29 CHRONIC NECK PAIN: ICD-10-CM

## 2025-05-29 DIAGNOSIS — E66.01 MORBID OBESITY WITH BMI OF 40.0-44.9, ADULT: ICD-10-CM

## 2025-05-29 DIAGNOSIS — M79.7 FIBROMYALGIA: ICD-10-CM

## 2025-05-29 DIAGNOSIS — G89.29 CHRONIC MIDLINE LOW BACK PAIN WITHOUT SCIATICA: ICD-10-CM

## 2025-05-29 DIAGNOSIS — M79.7 FIBROMYALGIA SYNDROME: Primary | ICD-10-CM

## 2025-05-29 DIAGNOSIS — Z79.891 CHRONICALLY ON OPIATE THERAPY: ICD-10-CM

## 2025-05-29 DIAGNOSIS — M35.01 SJOGREN'S SYNDROME WITH KERATOCONJUNCTIVITIS SICCA: ICD-10-CM

## 2025-05-29 DIAGNOSIS — M51.360 DEGENERATION OF INTERVERTEBRAL DISC OF LUMBAR REGION WITH DISCOGENIC BACK PAIN: ICD-10-CM

## 2025-05-29 DIAGNOSIS — M54.2 CHRONIC NECK PAIN: ICD-10-CM

## 2025-05-29 DIAGNOSIS — M54.50 CHRONIC MIDLINE LOW BACK PAIN WITHOUT SCIATICA: ICD-10-CM

## 2025-05-29 DIAGNOSIS — M17.0 PRIMARY OSTEOARTHRITIS OF BOTH KNEES: ICD-10-CM

## 2025-05-29 PROCEDURE — 99999 PR PBB SHADOW E&M-EST. PATIENT-LVL III: CPT | Mod: PBBFAC,,, | Performed by: PHYSICAL MEDICINE & REHABILITATION

## 2025-05-29 RX ORDER — TIZANIDINE 4 MG/1
8 TABLET ORAL NIGHTLY
Qty: 180 TABLET | Refills: 1 | Status: SHIPPED | OUTPATIENT
Start: 2025-05-29

## 2025-05-29 RX ORDER — OXYCODONE AND ACETAMINOPHEN 10; 325 MG/1; MG/1
1 TABLET ORAL EVERY 12 HOURS PRN
Qty: 60 TABLET | Refills: 0 | Status: SHIPPED | OUTPATIENT
Start: 2025-06-02

## 2025-05-29 NOTE — PROGRESS NOTES
Subjective:       Patient ID: Alberto Frye is a 50 y.o. female.    Chief Complaint: No chief complaint on file.      HPI      Mrs. Frye is a 50 year-old female with PMH of HTN, CHF, anxiety/depression, osteoarthritis, Sjogren's syndrome, DVT on chronic anticoagulation with Eliquis, fibromyalgia syndrome, morbid obesity.  She presented to the [facility] on 1/28/2025 for help fibromyalgia, chronic low back pain with bilateral radiculopathy, chronic neck pain and bilateral knee pain due to OA.  She was started on pregabalin p.r.n. Tylenol.  She was maintained on p.r.n. oxycodone/APAP. A Pain Clinic Drug Screen was obtained and was positive as expected for oxycodone.    The patient is coming to the clinic for follow-up.  She reports falling 2-3 weeks ago due to tripping of the phone  cord.  She hit her head on the table.  He went to the emergency department.  Evaluation showed no intracerebral bleed.  She also continues to follow-up with Rheumatology.  She was started after her last visit on nabumetone but stopped it because of intolerance.    Her fibromyalgia symptoms including generalized muscle and joint aching, stiffness, fatigue, brain fog and sleep impairment have been stable with occasional flare ups.  Her symptoms are worse with inactivity.    Her low back pain has been stable..  It is an intermittent stabbing in the lower lumbar spine and across her back, more in the right buttock region.  She has occasional shooting pain to both feet.  Her pain is aggravated with bending, heavy lifting, and when she wakes up in the morning.  It is better with lying down and local heat.  Her maximum pain is 10/10 and minimum 4/10.  Today it is 7/10.  She denies any significant lower extremity weakness.  He denies any bowel or bladder incontinence.  She denies any leg claudications.      Her neck pain has been under good control.  It is an intermittent throbbing pain in the low cervical spine and  adjacent paravertebral muscles.  She denies any radiation to her arms.  It is worse with neck movement especially rotation.  It is better with sitting still.  Her maximum pain is 8/10 and minimum 2/10.  Today it is 3/10.  She denies arm weakness.  She denies any numbness in her arms.  She denies any impaired hand coordination.  She denies any impaired gait.      Her bilateral knee pain has been stable.  She had previously x-rays that showed OA.  Her pain is an intermittent aching pain aggravated by weight-bearing.    He is currently on:   Oxycodone/APAP 10/325 p.r.n., usually twice per day.  Tizanidine 4 mg tablets p.r.n., usually 2 tablest at bedtime   She reports that previously gabapentin did not work.  She was restarted on Lyrica last visit but stopped it due to cognitive impairment.  Tylenol and Tramadol do not help.  Hydrocodone caused itching.  She is staying active.  She reports going to the gym 3 times per week.      Past Medical History:   Diagnosis Date    AYDE (acute kidney injury) 11/1/2022    Alcohol abuse     stopped heavy drinking about 10 years ago; was drinking 3 glasses of vodka/tequilla,rum/whiskey per day    Allergy Don't remember    Its been some years.    Anxiety     Arthritis 2010    Blood clotting tendency 2008    After a procedure & 2020.    Congestive heart failure 8/11/2020    Depression     Hallucination     Hx of psychiatric care     Hypertension     Immune disorder 2020    Joint pain 2010    Keloid cicatrix Years ago    From childhood scars    Caro     unplanned trips, energy without sleep for 2 days (reading, cleaning), feelings that she can do multiple tasks at one time, feelings of overconfidence at times    Psychiatric problem     Sleep difficulties     Therapy     Withdrawal symptoms, drug or narcotic     racing heart, restlessness        Review of patient's allergies indicates:   Allergen Reactions    Morphine Itching and Hallucinations    Pcn [penicillins] Other (See Comments)      Was told from childhood she couldn't take it    Latex, natural rubber Rash    Penicillin v Rash    Sulfa (sulfonamide antibiotics) Nausea And Vomiting and Nausea Only        Review of Systems   Constitutional:  Positive for fatigue. Negative for chills and fever.   HENT:  Negative for trouble swallowing.    Eyes:  Negative for visual disturbance.   Respiratory:  Negative for shortness of breath.    Cardiovascular:  Negative for chest pain.   Gastrointestinal:  Negative for abdominal pain, blood in stool, constipation, diarrhea, nausea and vomiting.   Genitourinary:  Negative for difficulty urinating.   Musculoskeletal:  Positive for arthralgias, back pain, myalgias and neck pain. Negative for gait problem and joint swelling.   Neurological:  Negative for dizziness, weakness and headaches.   Psychiatric/Behavioral:  Positive for sleep disturbance. Negative for behavioral problems.              Objective:      Physical Exam  Vitals reviewed.   Constitutional:       Appearance: She is well-developed.   HENT:      Head: Normocephalic and atraumatic.   Eyes:      Extraocular Movements: Extraocular movements intact.   Musculoskeletal:      Cervical back: Normal range of motion. No tenderness.      Comments: BUE:  ROM:   RUE: full.   LUE: full.  Strength:    RUE: 5-/5 at shoulder abduction, 5 elbow flexion, 5 elbow extension, 5 hand .   LUE: 5-/5 at shoulder abduction, 5 elbow flexion, 5 elbow extension, 5 hand .  Sensation to pinprick:   RUE: intact.   LUE: intact.  DTR:    RUE: +1 biceps, +1 triceps.   LUE:  +1 biceps, +1 triceps.  Mills:   RUE: -ve.   LUE: -ve.    BLE:  ROM:   RLE: full.   LLE: full.  Knee crepitus:   RLE: +ve.   LLE: +ve.   Strength:    RLE: 5/5 at hip flexion, 5- knee extension, 5 ankle DF, 5 PF.   LLE: 5/5 at hip flexion, 5 knee extension, 5 ankle DF, 5 PF.  Sensation to pinprick:     RLE: intact.      LLE: intact.   DTR:     RLE: +2 knee, +1 ankle.    LLE: +2 knee, +1 ankle.  Clonus:     Rt ankle: -ve.    Lt ankle: -ve.  SLR (sitting):      RLE: +ve.      LLE: +ve.      +ve tenderness over lumbar spine.  +ve diffuse tender points over the upper & lower back, anterior chest wall, hips, elbows & knees    Gait: WNL       Skin:     General: Skin is warm.   Neurological:      General: No focal deficit present.      Mental Status: She is alert.   Psychiatric:         Mood and Affect: Mood normal.         Behavior: Behavior normal.         Assessment:       1. Fibromyalgia syndrome    2. Chronic midline low back pain without sciatica    3. Degeneration of intervertebral disc of lumbar region with discogenic back pain    4. Chronic neck pain    5. Primary osteoarthritis of both knees    6. Morbid obesity with BMI of 40.0-44.9, adult    7. Chronically on opiate therapy        Plan:         - Continue oxyCODONE-acetaminophen (PERCOCET)  mg per tablet; Take 1 tablet by mouth every 12 (twelve) hours as needed for Pain.  - Continue tiZANidine (ZANAFLEX) 4 MG tablet; Take 2 tablets (8 mg total) by mouth every evening.  - Regular home exercise program was encouraged.  - Weight loss was encouraged.  - Follow up in about 4 months (around 9/29/2025).      This was a 30 minute visit, 50% of which was spent educating the patient about the diagnosis and the treatment plan.    This note was partly generated with Cardback voice recognition software. I apologize for any possible typographical errors.

## 2025-05-29 NOTE — LETTER
May 29, 2025      [id number]  [address]  [facility] [address]  Phone: [phone]       Patient: Alberto Frye   YOB: 1975  Date of Visit: 05/29/2025    To Whom It May Concern:    Trixie Frye  was at [facility] on 05/29/2025. The patient may return to work on 05/29/2025. If you have any questions or concerns, or if I can be of further assistance, please do not hesitate to contact me.    Sincerely,    Aga Culver LPN

## 2025-06-14 ENCOUNTER — HOSPITAL ENCOUNTER (OUTPATIENT)
Dept: RADIOLOGY | Facility: HOSPITAL | Age: 50
Discharge: HOME | End: 2025-06-14
Payer: COMMERCIAL

## 2025-06-14 DIAGNOSIS — I82.409 RECURRENT DEEP VEIN THROMBOSIS (DVT): ICD-10-CM

## 2025-06-14 PROCEDURE — 93970 EXTREMITY STUDY: CPT | Mod: TC

## 2025-06-16 ENCOUNTER — PATIENT MESSAGE (OUTPATIENT)
Dept: RHEUMATOLOGY | Facility: CLINIC | Age: 50
End: 2025-06-16
Payer: COMMERCIAL

## 2025-06-17 ENCOUNTER — PATIENT MESSAGE (OUTPATIENT)
Dept: RHEUMATOLOGY | Facility: CLINIC | Age: 50
End: 2025-06-17
Payer: COMMERCIAL

## 2025-06-17 DIAGNOSIS — K59.09 CONSTIPATION, CHRONIC: ICD-10-CM

## 2025-06-17 DIAGNOSIS — M35.01 SJOGREN'S SYNDROME WITH KERATOCONJUNCTIVITIS SICCA: ICD-10-CM

## 2025-06-17 DIAGNOSIS — M35.01 SJOGREN'S SYNDROME WITH KERATOCONJUNCTIVITIS SICCA: Primary | ICD-10-CM

## 2025-06-17 DIAGNOSIS — M79.7 FIBROMYALGIA SYNDROME: ICD-10-CM

## 2025-06-17 DIAGNOSIS — M79.7 FIBROMYALGIA: ICD-10-CM

## 2025-06-17 RX ORDER — POLYETHYLENE GLYCOL 3350 17 G/17G
17 POWDER, FOR SOLUTION ORAL DAILY
Qty: 90 EACH | Refills: 3 | Status: SHIPPED | OUTPATIENT
Start: 2025-06-17

## 2025-06-17 NOTE — TELEPHONE ENCOUNTER
Care Due:                  Date            Visit Type   Department     Provider  --------------------------------------------------------------------------------                                 -         [facility] FAMILY MED                              PRIMARY      / INTERNAL MED  Last Visit: 01-      CARE ([facility])   / FUNMILAYO Cespedes                              [facility] FAMILY MED                              PRIMARY      / INTERNAL MED  Next Visit: 07-      CARE ([facility])   / FUNMILAYO Cespedes                                                            Last  Test          Frequency    Reason                     Performed    Due Date  --------------------------------------------------------------------------------    Lipid Panel.  12 months..  atorvastatin.............  07- 07-    Vitamin D...  12 months..  cholecalciferol,.........  10-   10-    [employer] Embedded Care Due Messages. Reference number: [id number].   6/17/2025 10:27:15 AM CDT

## 2025-06-17 NOTE — TELEPHONE ENCOUNTER
Refill Routing Note   Medication(s) are not appropriate for processing by [facility] for the following reason(s):        Outside of protocol    [facility] action(s):  Route      Medication Therapy Plan: FLOS      Appointments  past 12m or future 3m with PCP    Date Provider   Last Visit   1/29/2025 Ian Cespedes MD   Next Visit   7/29/2025 Ian Cespedes MD   ED visits in past 90 days: 1        Note composed:11:00 AM 06/17/2025

## 2025-06-18 RX ORDER — PREGABALIN 50 MG/1
50 CAPSULE ORAL 2 TIMES DAILY
Qty: 90 CAPSULE | Refills: 2 | Status: SHIPPED | OUTPATIENT
Start: 2025-06-18 | End: 2025-09-16

## 2025-06-18 RX ORDER — TIZANIDINE 4 MG/1
8 TABLET ORAL NIGHTLY
Qty: 180 TABLET | Refills: 1 | Status: SHIPPED | OUTPATIENT
Start: 2025-06-18

## 2025-06-19 ENCOUNTER — LAB VISIT (OUTPATIENT)
Dept: LAB | Facility: HOSPITAL | Age: 50
End: 2025-06-19
Attending: INTERNAL MEDICINE
Payer: COMMERCIAL

## 2025-06-19 DIAGNOSIS — M35.01 SJOGREN'S SYNDROME WITH KERATOCONJUNCTIVITIS SICCA: ICD-10-CM

## 2025-06-19 LAB
ABSOLUTE EOSINOPHIL (OHS): 0.15 K/UL
ABSOLUTE MONOCYTE (OHS): 0.42 K/UL (ref 0.3–1)
ABSOLUTE NEUTROPHIL COUNT (OHS): 1.9 K/UL (ref 1.8–7.7)
ALBUMIN SERPL BCP-MCNC: 3.7 G/DL (ref 3.5–5.2)
ALDOLASE (OHS): 6.8 U/L (ref 1.2–7.6)
ALP SERPL-CCNC: 80 UNIT/L (ref 40–150)
ALT SERPL W/O P-5'-P-CCNC: 20 UNIT/L (ref 10–44)
ANION GAP (OHS): 10 MMOL/L (ref 8–16)
AST SERPL-CCNC: 30 UNIT/L (ref 11–45)
BASOPHILS # BLD AUTO: 0.03 K/UL
BASOPHILS NFR BLD AUTO: 0.6 %
BILIRUB SERPL-MCNC: 0.3 MG/DL (ref 0.1–1)
BUN SERPL-MCNC: 13 MG/DL (ref 6–20)
CALCIUM SERPL-MCNC: 8.9 MG/DL (ref 8.7–10.5)
CHLORIDE SERPL-SCNC: 108 MMOL/L (ref 95–110)
CK SERPL-CCNC: 372 U/L (ref 20–180)
CO2 SERPL-SCNC: 27 MMOL/L (ref 23–29)
CREAT SERPL-MCNC: 0.8 MG/DL (ref 0.5–1.4)
CREAT UR-MCNC: 156.2 MG/DL (ref 15–325)
CRP SERPL-MCNC: 11.7 MG/L
ERYTHROCYTE [DISTWIDTH] IN BLOOD BY AUTOMATED COUNT: 13.7 % (ref 11.5–14.5)
ERYTHROCYTE [SEDIMENTATION RATE] IN BLOOD BY PHOTOMETRIC METHOD: 21 MM/HR
GFR SERPLBLD CREATININE-BSD FMLA CKD-EPI: >60 ML/MIN/1.73/M2
GLUCOSE SERPL-MCNC: 70 MG/DL (ref 70–110)
HCT VFR BLD AUTO: 36.3 % (ref 37–48.5)
HGB BLD-MCNC: 11.9 GM/DL (ref 12–16)
IMM GRANULOCYTES # BLD AUTO: 0.01 K/UL (ref 0–0.04)
IMM GRANULOCYTES NFR BLD AUTO: 0.2 % (ref 0–0.5)
LYMPHOCYTES # BLD AUTO: 2.18 K/UL (ref 1–4.8)
MCH RBC QN AUTO: 31.2 PG (ref 27–31)
MCHC RBC AUTO-ENTMCNC: 32.8 G/DL (ref 32–36)
MCV RBC AUTO: 95 FL (ref 82–98)
NUCLEATED RBC (/100WBC) (OHS): 0 /100 WBC
PLATELET # BLD AUTO: 182 K/UL (ref 150–450)
PMV BLD AUTO: 10.3 FL (ref 9.2–12.9)
POTASSIUM SERPL-SCNC: 3.6 MMOL/L (ref 3.5–5.1)
PROT SERPL-MCNC: 7.2 GM/DL (ref 6–8.4)
PROT UR-MCNC: 11 MG/DL
PROT/CREAT UR: 0.07 MG/G{CREAT}
RBC # BLD AUTO: 3.81 M/UL (ref 4–5.4)
RELATIVE EOSINOPHIL (OHS): 3.2 %
RELATIVE LYMPHOCYTE (OHS): 46.5 % (ref 18–48)
RELATIVE MONOCYTE (OHS): 9 % (ref 4–15)
RELATIVE NEUTROPHIL (OHS): 40.5 % (ref 38–73)
SODIUM SERPL-SCNC: 145 MMOL/L (ref 136–145)
WBC # BLD AUTO: 4.69 K/UL (ref 3.9–12.7)

## 2025-06-19 PROCEDURE — 85025 COMPLETE CBC W/AUTO DIFF WBC: CPT

## 2025-06-19 PROCEDURE — 84156 ASSAY OF PROTEIN URINE: CPT

## 2025-06-19 PROCEDURE — 80053 COMPREHEN METABOLIC PANEL: CPT

## 2025-06-19 PROCEDURE — 85652 RBC SED RATE AUTOMATED: CPT

## 2025-06-19 PROCEDURE — 82550 ASSAY OF CK (CPK): CPT

## 2025-06-19 PROCEDURE — 36415 COLL VENOUS BLD VENIPUNCTURE: CPT

## 2025-06-19 PROCEDURE — 86140 C-REACTIVE PROTEIN: CPT

## 2025-06-19 PROCEDURE — 82085 ASSAY OF ALDOLASE: CPT

## 2025-06-20 ENCOUNTER — TELEPHONE (OUTPATIENT)
Dept: BARIATRICS | Facility: CLINIC | Age: 50
End: 2025-06-20
Payer: COMMERCIAL

## 2025-06-20 ENCOUNTER — LAB VISIT (OUTPATIENT)
Dept: LAB | Facility: HOSPITAL | Age: 50
End: 2025-06-20
Attending: INTERNAL MEDICINE
Payer: COMMERCIAL

## 2025-06-20 DIAGNOSIS — M35.01 SJOGREN'S SYNDROME WITH KERATOCONJUNCTIVITIS SICCA: Primary | ICD-10-CM

## 2025-06-20 LAB
BILIRUB UR QL STRIP.AUTO: NEGATIVE
CLARITY UR: CLEAR
COLOR UR AUTO: YELLOW
GLUCOSE UR QL STRIP: NEGATIVE
HGB UR QL STRIP: NEGATIVE
KETONES UR QL STRIP: NEGATIVE
LEUKOCYTE ESTERASE UR QL STRIP: NEGATIVE
NITRITE UR QL STRIP: NEGATIVE
PH UR STRIP: 7 [PH]
PROT UR QL STRIP: NEGATIVE
SP GR UR STRIP: 1.01
UROBILINOGEN UR STRIP-ACNC: NEGATIVE EU/DL

## 2025-06-20 PROCEDURE — 81003 URINALYSIS AUTO W/O SCOPE: CPT

## 2025-06-23 ENCOUNTER — TELEPHONE (OUTPATIENT)
Dept: BARIATRICS | Facility: CLINIC | Age: 50
End: 2025-06-23
Payer: COMMERCIAL

## 2025-06-27 ENCOUNTER — RESULTS FOLLOW-UP (OUTPATIENT)
Dept: RHEUMATOLOGY | Facility: CLINIC | Age: 50
End: 2025-06-27

## 2025-06-27 DIAGNOSIS — M35.01 SJOGREN'S SYNDROME WITH KERATOCONJUNCTIVITIS SICCA: Primary | ICD-10-CM

## 2025-07-08 ENCOUNTER — OFFICE VISIT (OUTPATIENT)
Dept: BARIATRICS | Facility: CLINIC | Age: 50
End: 2025-07-08
Payer: COMMERCIAL

## 2025-07-08 ENCOUNTER — CLINICAL SUPPORT (OUTPATIENT)
Dept: BARIATRICS | Facility: CLINIC | Age: 50
End: 2025-07-08
Payer: COMMERCIAL

## 2025-07-08 ENCOUNTER — LAB VISIT (OUTPATIENT)
Dept: LAB | Facility: HOSPITAL | Age: 50
End: 2025-07-08
Payer: COMMERCIAL

## 2025-07-08 VITALS
HEIGHT: 64 IN | BODY MASS INDEX: 40.69 KG/M2 | WEIGHT: 238.31 LBS | DIASTOLIC BLOOD PRESSURE: 80 MMHG | SYSTOLIC BLOOD PRESSURE: 102 MMHG

## 2025-07-08 DIAGNOSIS — I10 ESSENTIAL HYPERTENSION: ICD-10-CM

## 2025-07-08 DIAGNOSIS — Z79.01 CHRONIC ANTICOAGULATION: ICD-10-CM

## 2025-07-08 DIAGNOSIS — M35.01 SJOGREN'S SYNDROME WITH KERATOCONJUNCTIVITIS SICCA: ICD-10-CM

## 2025-07-08 DIAGNOSIS — Z98.84 HISTORY OF LAPAROSCOPIC ADJUSTABLE GASTRIC BANDING: ICD-10-CM

## 2025-07-08 DIAGNOSIS — R13.10 DYSPHAGIA, UNSPECIFIED TYPE: ICD-10-CM

## 2025-07-08 DIAGNOSIS — I82.409 RECURRENT DEEP VEIN THROMBOSIS (DVT): Primary | ICD-10-CM

## 2025-07-08 DIAGNOSIS — R09.A2 GLOBUS SENSATION: ICD-10-CM

## 2025-07-08 DIAGNOSIS — I82.409 RECURRENT DEEP VEIN THROMBOSIS (DVT): ICD-10-CM

## 2025-07-08 DIAGNOSIS — E66.01 MORBID OBESITY: ICD-10-CM

## 2025-07-08 DIAGNOSIS — F31.32 BIPOLAR AFFECTIVE DISORDER, DEPRESSED, MODERATE: ICD-10-CM

## 2025-07-08 DIAGNOSIS — E66.01 MORBID OBESITY WITH BMI OF 40.0-44.9, ADULT: ICD-10-CM

## 2025-07-08 DIAGNOSIS — Z71.3 DIETARY COUNSELING AND SURVEILLANCE: Primary | ICD-10-CM

## 2025-07-08 LAB
25(OH)D3+25(OH)D2 SERPL-MCNC: 14 NG/ML (ref 30–96)
ABSOLUTE EOSINOPHIL (OHS): 0.14 K/UL
ABSOLUTE MONOCYTE (OHS): 0.56 K/UL (ref 0.3–1)
ABSOLUTE NEUTROPHIL COUNT (OHS): 2.26 K/UL (ref 1.8–7.7)
ALDOLASE (OHS): 5.2 U/L (ref 1.2–7.6)
BASOPHILS # BLD AUTO: 0.03 K/UL
BASOPHILS NFR BLD AUTO: 0.6 %
C3 SERPL-MCNC: 120 MG/DL (ref 50–180)
C4 COMPLEMENT (OHS): 39 MG/DL (ref 11–44)
EAG (OHS): 91 MG/DL (ref 68–131)
ERYTHROCYTE [DISTWIDTH] IN BLOOD BY AUTOMATED COUNT: 13.8 % (ref 11.5–14.5)
ERYTHROCYTE [SEDIMENTATION RATE] IN BLOOD BY PHOTOMETRIC METHOD: 18 MM/HR
FOLATE SERPL-MCNC: 11.7 NG/ML (ref 4–24)
HBA1C MFR BLD: 4.8 % (ref 4–5.6)
HCT VFR BLD AUTO: 38.9 % (ref 37–48.5)
HGB BLD-MCNC: 12.9 GM/DL (ref 12–16)
IMM GRANULOCYTES # BLD AUTO: 0.02 K/UL (ref 0–0.04)
IMM GRANULOCYTES NFR BLD AUTO: 0.4 % (ref 0–0.5)
LYMPHOCYTES # BLD AUTO: 2.4 K/UL (ref 1–4.8)
MCH RBC QN AUTO: 31 PG (ref 27–31)
MCHC RBC AUTO-ENTMCNC: 33.2 G/DL (ref 32–36)
MCV RBC AUTO: 94 FL (ref 82–98)
NUCLEATED RBC (/100WBC) (OHS): 0 /100 WBC
PHOSPHATE SERPL-MCNC: 4 MG/DL (ref 2.7–4.5)
PLATELET # BLD AUTO: 198 K/UL (ref 150–450)
PMV BLD AUTO: 10.9 FL (ref 9.2–12.9)
RBC # BLD AUTO: 4.16 M/UL (ref 4–5.4)
RELATIVE EOSINOPHIL (OHS): 2.6 %
RELATIVE LYMPHOCYTE (OHS): 44.4 % (ref 18–48)
RELATIVE MONOCYTE (OHS): 10.4 % (ref 4–15)
RELATIVE NEUTROPHIL (OHS): 41.6 % (ref 38–73)
T3FREE SERPL-MCNC: 95 NG/DL (ref 60–180)
T4 FREE SERPL-MCNC: 1.14 NG/DL (ref 0.71–1.51)
T4 SERPL-MCNC: 7.7 UG/DL (ref 4.5–11.5)
VIT B12 SERPL-MCNC: 659 PG/ML (ref 210–950)
WBC # BLD AUTO: 5.41 K/UL (ref 3.9–12.7)

## 2025-07-08 PROCEDURE — 83735 ASSAY OF MAGNESIUM: CPT

## 2025-07-08 PROCEDURE — 83516 IMMUNOASSAY NONANTIBODY: CPT | Mod: 59

## 2025-07-08 PROCEDURE — 3074F SYST BP LT 130 MM HG: CPT | Mod: CPTII,S$GLB,, | Performed by: NURSE PRACTITIONER

## 2025-07-08 PROCEDURE — 86160 COMPLEMENT ANTIGEN: CPT

## 2025-07-08 PROCEDURE — 82248 BILIRUBIN DIRECT: CPT

## 2025-07-08 PROCEDURE — 84443 ASSAY THYROID STIM HORMONE: CPT

## 2025-07-08 PROCEDURE — 83036 HEMOGLOBIN GLYCOSYLATED A1C: CPT

## 2025-07-08 PROCEDURE — 3079F DIAST BP 80-89 MM HG: CPT | Mod: CPTII,S$GLB,, | Performed by: NURSE PRACTITIONER

## 2025-07-08 PROCEDURE — 86160 COMPLEMENT ANTIGEN: CPT | Mod: 59

## 2025-07-08 PROCEDURE — 80053 COMPREHEN METABOLIC PANEL: CPT

## 2025-07-08 PROCEDURE — 99999 PR PBB SHADOW E&M-EST. PATIENT-LVL V: CPT | Mod: PBBFAC,,, | Performed by: NURSE PRACTITIONER

## 2025-07-08 PROCEDURE — 1159F MED LIST DOCD IN RCRD: CPT | Mod: CPTII,S$GLB,, | Performed by: NURSE PRACTITIONER

## 2025-07-08 PROCEDURE — 86677 HELICOBACTER PYLORI ANTIBODY: CPT

## 2025-07-08 PROCEDURE — 82306 VITAMIN D 25 HYDROXY: CPT

## 2025-07-08 PROCEDURE — 84480 ASSAY TRIIODOTHYRONINE (T3): CPT

## 2025-07-08 PROCEDURE — 36415 COLL VENOUS BLD VENIPUNCTURE: CPT

## 2025-07-08 PROCEDURE — 97802 MEDICAL NUTRITION INDIV IN: CPT | Mod: S$GLB,,, | Performed by: DIETITIAN, REGISTERED

## 2025-07-08 PROCEDURE — 82607 VITAMIN B-12: CPT

## 2025-07-08 PROCEDURE — 3008F BODY MASS INDEX DOCD: CPT | Mod: CPTII,S$GLB,, | Performed by: NURSE PRACTITIONER

## 2025-07-08 PROCEDURE — 82397 CHEMILUMINESCENT ASSAY: CPT

## 2025-07-08 PROCEDURE — 82085 ASSAY OF ALDOLASE: CPT

## 2025-07-08 PROCEDURE — 86225 DNA ANTIBODY NATIVE: CPT

## 2025-07-08 PROCEDURE — 82746 ASSAY OF FOLIC ACID SERUM: CPT

## 2025-07-08 PROCEDURE — 84425 ASSAY OF VITAMIN B-1: CPT

## 2025-07-08 PROCEDURE — 83540 ASSAY OF IRON: CPT

## 2025-07-08 PROCEDURE — 1160F RVW MEDS BY RX/DR IN RCRD: CPT | Mod: CPTII,S$GLB,, | Performed by: NURSE PRACTITIONER

## 2025-07-08 PROCEDURE — 86140 C-REACTIVE PROTEIN: CPT

## 2025-07-08 PROCEDURE — 85652 RBC SED RATE AUTOMATED: CPT

## 2025-07-08 PROCEDURE — 84100 ASSAY OF PHOSPHORUS: CPT

## 2025-07-08 PROCEDURE — 84436 ASSAY OF TOTAL THYROXINE: CPT

## 2025-07-08 PROCEDURE — 99204 OFFICE O/P NEW MOD 45 MIN: CPT | Mod: S$GLB,,, | Performed by: NURSE PRACTITIONER

## 2025-07-08 PROCEDURE — 84439 ASSAY OF FREE THYROXINE: CPT

## 2025-07-08 PROCEDURE — 85025 COMPLETE CBC W/AUTO DIFF WBC: CPT

## 2025-07-08 PROCEDURE — 99999 PR PBB SHADOW E&M-EST. PATIENT-LVL I: CPT | Mod: PBBFAC,,, | Performed by: DIETITIAN, REGISTERED

## 2025-07-08 PROCEDURE — 86235 NUCLEAR ANTIGEN ANTIBODY: CPT

## 2025-07-08 PROCEDURE — 80061 LIPID PANEL: CPT

## 2025-07-08 PROCEDURE — 82550 ASSAY OF CK (CPK): CPT

## 2025-07-08 NOTE — PATIENT INSTRUCTIONS
Prior to surgery you will need to complete:  - Dietitian consult and follow up appointments as needed  - Vascular clearance, Eliquis plan   - Cardiac Clearance  - Labs  - Psychological evaluation, Please call psychiatry [phone] to schedule  - Stress echo   - FL UGI    In preparation for bariatric surgery, please complete the following:   Discuss your current medications with your primary care provider, remember your medications will need to be crushed, chewable, or in liquid form for the first 2-4 weeks after a gastric bypass or sleeve.  For a gastric band, your medications will need to be crushed indefinitely.    If you take a blood thinner such as: Coumadin (warfarin), Pradaxa (dabigatran), or Plavix (clopidogrel), you will need to speak with your prescribing provider on how or if this medication can be stopped before surgery.   If you take a medication for depression or anxiety, remember to discuss this with the psychologist or psychiatrist that you see.   If you take medication for arthritis on a daily basis that is considered a non-steroidal anti-inflammatory (NSAID), please discuss with your prescribing physician an alternative medication.  After having gastric bypass or gastric sleeve, this group of medications is not appropriate to take due to increased risk of bleeding stomach ulcers.      DEFINITIONS  Appointments: Pre-scheduled meetings or consultations with any physician, advanced practice provider, dietitian, or psychologist, and labs, imaging studies, sleep studies, etc.   Late cancellation: Cancelling an appointment 24-48 hours prior to scheduled time.  No-Show appointment:  is when   You do NOT arrive to your appointment at the time its scheduled.  You call to cancel or cancel via [facility] less than 24 hours in advance of your scheduled appointment  You show up 15 minutes AFTER your scheduled appointment time without any notification of being late.     POLICY  You are allowed up to 3  cancellations for appointments.   After 3 cancellations your case will be placed on hold for 2 months and after that time you can resume the program.   You are allowed only 1 no-show for an appointment.   You will be re-scheduled one time and if there is a 2nd no-show at any point, your case will be placed on hold for 3 months.  After 3 months you can resume the program.     Upon resuming the program after being placed on hold for either above mentioned reasons, if you have a late cancel or no show for any appointment, the bariatric team will review if youre an appropriate candidate for surgery at the monthly meeting.

## 2025-07-08 NOTE — PROGRESS NOTES
"NUTRITIONAL CONSULT    Referring Physician: Maverick Pink M.D.   Reason for MNT Referral: Initial assessment for lap band to sleeve work-up    PAST MEDICAL HISTORY:   50 y.o. female    Weight history includes: Highest adult weight was 270 lbs in 2008; Lowest adult weight was 140 lbs at age 16.   Weight loss attempts include: Mediterranean diet, calorie counting and exercise.  The patient was most successful with lap band with a weight loss of 100 lbs.     Past Medical History:   Diagnosis Date    AYDE (acute kidney injury) 11/1/2022    Alcohol abuse     stopped heavy drinking about 10 years ago; was drinking 3 glasses of vodka/tequilla,rum/whiskey per day    Allergy Don't remember    Its been some years.    Anxiety     Arthritis 2010    Blood clotting tendency 2008    After a procedure & 2020.    Congestive heart failure 8/11/2020    Depression     Hallucination     Hx of psychiatric care     Hypertension     Immune disorder 2020    Joint pain 2010    Keloid cicatrix Years ago    From childhood scars    Caro     unplanned trips, energy without sleep for 2 days (reading, cleaning), feelings that she can do multiple tasks at one time, feelings of overconfidence at times    Psychiatric problem     Sleep difficulties     Therapy     Withdrawal symptoms, drug or narcotic     racing heart, restlessness       CLINICAL DATA:  50 y.o.-year-old Black or  female.  Height: 5' 4"  Weight: 238 lbs  IBW: 138 lbs  BMI: 40.91  The patient's goal weight (50% EBW): 188 lbs  Personal goal weight: 160-180 lbs    Goal for Bariatric Surgery: to improve health, to improve quality of life, to lose weight, improve blood pressure    DAILY NUTRITIONAL NEEDS: pre-op nutritional bariatric guidelines to promote weight loss  [id number] Calories    Grams Protein    NUTRITION & HEALTH HISTORY:  Greatest challenge: starchy CHO    Current diet recall:     B: Coffee with milk and 5 Splenda, instant oatmeal or grits or cereal " (Frosted Flakes, Special K, Raisin Bran or Corn Pops) with almond milk  L: Salad with baby spinach, shredded cheese, sometimes bangura bits, sliced turkey, tomatoes, pickles, and avocado ranch dressing  S: If snacks, grapes, cashews  D: Salad or shrimp noemí or red beans and rice or pork and beans    Current Diet:  Meal pattern: Regular  Protein supplements: 4/week [employer] after exercise  Snackin-1 / day. Grapes, cashews, protein bars (Atkins, Quest)   Vegetables: Likes salads, peas, corn. Eats daily.  Fruits: Likes grapes . Eats 2-3 times per week.  Starchy CHO: instant oatmeal, pasta, honey wheat bread, rice  Beverages: water, diet soda, and juice  Dining out/delivery: 2 x week Mostly Chick-zabrina-A james salad with grilled chicken nuggets and avocado ranch.  Cooking at home: Almost daily Mostly prepares salads, Zatarains frozen shrimp noemí, red beans and rice, pork and beans      Food Allergies:   NKFA    Vitamins / Minerals / Herbs:   Women's multivitamin    Labs:   No recent    Exercise:  Current exercise: 2 x week treadmill 1 hour each, walks around [facility] 2 x week    Restrictions to Exercise: None.    Social:  7:30am-4:30 pm M-F  Lives with adult son.  Grocery shopping and food prep: PT.  Patient believes the household will be supportive after surgery.  Alcohol: Beer 1-3 x week.  Smoking: None.    ASSESSMENT:  Patient reports attempts at weight loss, only to regain lost weight.  Patient demonstrated knowledge of healthy eating behaviors and exercise patterns; admits to not eating healthy and not exercising at this point.  Patient states willingness to change lifestyle and make behavior modifications    Barriers to Education: none    Stage of change: action    NUTRITION DIAGNOSIS:    Morbid Obesity related to Excessive carbohydrate intake and Excessive calorie intake as evidence by BMI.    BARIATRIC DIET DISCUSSION/PLAN:  Discussed diet after surgery and related to patient's food  record.  Reviewed nutrition guidelines for before and after surgery.  Answered all questions.  Discussed protein goals  Reviewed bariatric plate method  Discussed tracking - Baritastic sepideh info provided  Reviewed nutrition facts for Chick-zabrina-A james salad and avocado ranch dressing; recommended PT switch to lite/fat-free  Recommended PT include strength training  Work on gradually cutting back on starchy CHO in the diet.    PT Plan  Shake for breakfast  Smaller plate  Switch salad dressing  Cut back on starchy CHO    RECOMMENDATIONS:  Pt is a potential candidate for bariatric surgery and needs additional medically supervised diet counseling and surveillance    Follow up in one month. Needs additional visits with RD.    Patient verbalized understanding.    Communicated nutrition plan with bariatric team.    SESSION TIME:  60 minutes

## 2025-07-08 NOTE — PROGRESS NOTES
BARIATRIC NEW PATIENT EVALUATION    CHIEF COMPLAINT:   Morbid obesity, body mass index is 40.91 kg/m². and inability to lose weight.    S/p lap band 2009 in [place]   Last adjusted was 5 years ago  Spilled 3 year ago     HPI:  Alberto Frye is a 50 y.o. morbidly obese female. Her current body mass index is 40.91 kg/m². She has multiple associated comorbidities including Bipolar, OCD, essential hypertension, hyperlipidemia, history of DVT/PE, depression, and anxiety.  She has struggled with excess weight since 28.  Her highest adult weight was 270 lbs in 2009, and her lowest adult weight was 170 lbs at age 16.  The patient has tried mediatrainin diet, calorie counting and Exercise.  The patient was most successful with lap band with a weight loss of 100 lbs.  Her current exercise includes walking 4 times a week. She denies any history of eating disorder such as anorexia, bulimia, or taking laxatives for weight loss, and denies any addiction including illicit substances, alcohol, or gambling.  Patient states she has a good  support system.  She lives with family.  She is currently employed  .  She  denies a history of GERD.  The patient's goal is 170 lbs.    ESS: Score of 2, reviewed 07/08/2025.  Does not need Sleep Study.    Pre op weight-270  Lowest-130  Current-238  IBW-138    EGD 4/30/25 Impression: - Normal esophagus.                          - Esophagogastric landmarks identified.                          - An adjustable gastric banding was found,                          characterized by healthy appearing mucosa.                          - Normal gastric fundus, gastric body and antrum.                          - Normal examined duodenum.                          - No specimens collected.     EKG 12/11/24 NSR   CXR 12/20/24   No significant change from prior. No new lung opacity. No lung consolidation. No pleural effusion or pneumothorax. Heart size relatively stable within normal limits. Visualized  osseous structures grossly intact. Further evaluation as warranted clinically.     PAST MEDICAL HISTORY:  Past Medical History:   Diagnosis Date    AYDE (acute kidney injury) 11/1/2022    Alcohol abuse     stopped heavy drinking about 10 years ago; was drinking 3 glasses of vodka/tequilla,rum/whiskey per day    Allergy Don't remember    Its been some years.    Anxiety     Arthritis 2010    Blood clotting tendency 2008    After a procedure & 2020.    Congestive heart failure 8/11/2020    Depression     Hallucination     Hx of psychiatric care     Hypertension     Immune disorder 2020    Joint pain 2010    Keloid cicatrix Years ago    From childhood scars    Caro     unplanned trips, energy without sleep for 2 days (reading, cleaning), feelings that she can do multiple tasks at one time, feelings of overconfidence at times    Psychiatric problem     Sleep difficulties     Therapy     Withdrawal symptoms, drug or narcotic     racing heart, restlessness       PAST SURGICAL HISTORY:  Past Surgical History:   Procedure Laterality Date    COLONOSCOPY, SCREENING, LOW RISK PATIENT N/A 4/30/2025    Procedure: COLONOSCOPY, SCREENING, LOW RISK PATIENT;  Surgeon: Corky Blackburn MD;  Location: [facility] (4TH FLR);  Service: Endoscopy;  Laterality: N/A;  4/1 ref by: Ian Cespedes MD, PEG, PORTAL-st  EliMPORTANT INFORMATION TO KNOW BEFORE YOUR PROCEDURE     [facility] 2nd Floor    4/24 precall complete/will do lovenox bridge/mleone        I    ESOPHAGOGASTRODUODENOSCOPY N/A 06/03/2020    Procedure: EGD (ESOPHAGOGASTRODUODENOSCOPY);  Surgeon: Johan Grover MD;  Location: [facility] (4TH FLR);  Service: Endoscopy;  Laterality: N/A;  covid 6/2-[facility]-tb    ESOPHAGOGASTRODUODENOSCOPY N/A 4/30/2025    Procedure: EGD (ESOPHAGOGASTRODUODENOSCOPY);  Surgeon: Corky Blackburn MD;  Location: [facility] (4TH FLR);  Service: Endoscopy;  Laterality: N/A;  per vascular Anamika Castro PA-C-cleared  you to hold Eliquis and begin a Lovenox (Enoxaparin) bridge for 48 hours prior to your procedure-see note dated 4/6/25-GT  MPORTANT INFORMATION TO KNOW BEFORE YOUR PROCEDURE     [facility]    HYSTERECTOMY      LAPBAND  2009    PHLEBOGRAPHY Left 08/12/2020    Procedure: Venogram, pharmacomechanical thrombectomy;  Surgeon: Miguelangel Goldman MD;  Location: [facility] OR [facility] FLR;  Service: Vascular;  Laterality: Left;  2336.43 mGy  [id number]  17.8 minutes  37 ml of contrast    VENOPLASTY Left 08/12/2020    Procedure: ANGIOPLASTY, VEIN;  Surgeon: Miguelangel Goldman MD;  Location: [facility] OR [facility] FLR;  Service: Vascular;  Laterality: Left;       FAMILY HISTORY:  Family History   Problem Relation Name Age of Onset    Diabetes Mother      Cancer Mother      Hypertension Mother      No Known Problems Father      No Known Problems Sister      No Known Problems Brother      No Known Problems Maternal Aunt      Cirrhosis Maternal Uncle          ETOH    No Known Problems Paternal Aunt      No Known Problems Paternal Uncle      No Known Problems Maternal Grandmother      No Known Problems Maternal Grandfather      No Known Problems Paternal Grandmother      No Known Problems Paternal Grandfather      No Known Problems Other      Celiac disease Neg Hx      Colon cancer Neg Hx      Colon polyps Neg Hx      Crohn's disease Neg Hx      Esophageal cancer Neg Hx      Inflammatory bowel disease Neg Hx      Liver cancer Neg Hx      Liver disease Neg Hx      Rectal cancer Neg Hx      Stomach cancer Neg Hx      Ulcerative colitis Neg Hx          SOCIAL HISTORY:  Social History     Socioeconomic History    Marital status:     Number of children: 3   Occupational History    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[employer]   Tobacco Use    Smoking status: Former     Current packs/day: 0.00     Types: Cigarettes    Smokeless tobacco: Never    Tobacco comments:     quit in october 2016   Substance and Sexual  Activity    Alcohol use: Yes     Alcohol/week: 1.0 - 3.0 standard drink of alcohol     Types: 1 - 3 Glasses of wine per week     Comment: social presently, excessive 10 years ago    Drug use: Yes     Types: Marijuana    Sexual activity: Not Currently     Partners: Male     Birth control/protection: Condom, See Surgical Hx   Other Topics Concern    Patient feels they ought to cut down on drinking/drug use No    Patient annoyed by others criticizing their drinking/drug use No    Patient has felt bad or guilty about drinking/drug use No    Patient has had a drink/used drugs as an eye opener in the AM No    Are you pregnant or think you may be? No    Breast-feeding No   Social History Narrative    Has 3 healthy grown sons (1990, 1993, 1998) Lives alone.    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   in 2010.    Has Male partner since 2014 who is currently disabled.     Social Drivers of Health     Financial Resource Strain: Medium Risk (1/30/2025)    Overall Financial Resource Strain (CARDIA)     Difficulty of Paying Living Expenses: Somewhat hard   Food Insecurity: No Food Insecurity (1/30/2025)    Hunger Vital Sign     Worried About Running Out of Food in the Last Year: Never true     Ran Out of Food in the Last Year: Never true   Transportation Needs: No Transportation Needs (1/30/2025)    PRAPARE - Transportation     Lack of Transportation (Medical): No     Lack of Transportation (Non-Medical): No   Physical Activity: Insufficiently Active (1/30/2025)    Exercise Vital Sign     Days of Exercise per Week: 2 days     Minutes of Exercise per Session: 30 min   Stress: Stress Concern Present (1/30/2025)    [employer] [place] [employer] - Occupational Stress Questionnaire     Feeling of Stress : Rather much   Housing Stability: Unknown (1/30/2025)    Housing Stability Vital Sign     Unable to Pay for Housing in the Last Year: No      "Homeless in the Last Year: No       MEDICATIONS:  Current Medications[1]    ALLERGIES:  Review of patient's allergies indicates:   Allergen Reactions    Morphine Itching and Hallucinations    Pcn [penicillins] Other (See Comments)     Was told from childhood she couldn't take it    Latex, natural rubber Rash    Penicillin v Rash    Sulfa (sulfonamide antibiotics) Nausea And Vomiting and Nausea Only       Review of Systems   Constitutional:  Negative for chills and fever.   HENT:  Negative for ear pain, nosebleeds and sore throat.    Eyes:  Negative for blurred vision and double vision.   Respiratory:  Negative for cough and shortness of breath.    Cardiovascular:  Negative for chest pain, palpitations, orthopnea, claudication and leg swelling.   Gastrointestinal:  Negative for abdominal pain, constipation, diarrhea, heartburn, nausea and vomiting.   Genitourinary:  Negative for dysuria and urgency.   Musculoskeletal:  Negative for back pain and joint pain.   Skin:  Negative for rash.   Neurological:  Negative for dizziness, tingling, focal weakness and headaches.   Endo/Heme/Allergies:  Does not bruise/bleed easily.   Psychiatric/Behavioral:  Negative for depression and suicidal ideas.        Vitals:    07/08/25 1238   BP: 102/80   Weight: 108.1 kg (238 lb 5.1 oz)   Height: 5' 4" (1.626 m)   PainSc: 0-No pain       Physical Exam  Vitals and nursing note reviewed.   Constitutional:       Appearance: She is well-developed. She is morbidly obese.   HENT:      Head: Normocephalic.      Nose: Nose normal.      Mouth/Throat:      Mouth: Mucous membranes are moist.   Eyes:      Extraocular Movements: Extraocular movements intact.   Cardiovascular:      Rate and Rhythm: Normal rate and regular rhythm.      Heart sounds: Normal heart sounds.   Pulmonary:      Effort: Pulmonary effort is normal.      Breath sounds: Normal breath sounds.   Abdominal:      General: Bowel sounds are normal.      Palpations: Abdomen is soft. "   Musculoskeletal:         General: Normal range of motion.      Cervical back: Normal range of motion.   Skin:     General: Skin is warm and dry.      Capillary Refill: Capillary refill takes less than 2 seconds.   Neurological:      Mental Status: She is alert and oriented to person, place, and time.   Psychiatric:         Mood and Affect: Mood normal.          DIAGNOSIS:  1. Morbid obesity, body mass index is 40.91 kg/m². and inability to lose weight.  2. Co-morbidities: Bipolar, OCD, essential hypertension, hyperlipidemia, history of DVT/PE, depression, and anxiety.    PLAN:  The patient is a good candidate for Bariatric Surgery. The patient is interested in lap band removal to laparoscopic sleeve gastrectomy with Dr. Pink. The surgery and post-op care was discussed in detail with the patient. All questions were answered.    Revision surgery and post-op care were discussed in detail with the patient. All questions were answered. Discussed at length that additional bariatric surgery is a only tool to aid in weight loss and that she needs to be committed to the diet and exercise for successful weight loss. Discussed expected weight loss outcomes are limited without significant diet and lifestyle modification. Discussed with patient that additional bariatric surgery is not the easy way out and that it will take plenty of dedication on the patient's part to be successful. Also discussed weight regain if the patient strays from the diet guidelines or exercise requirements. Patient verbalized understanding and agrees to begin work-up with RD and psychology. She will also work with bariatrician to aid in wt loss efforts. Plan to proceed with additional work-up when diet is back on track and pt is in agreement.      Estimated Goal weight is 185-190 lbs.    Discussed no NSAIDS post op     ORDERS:  1. Stress Test   2. Psychological Consult and Bariatric Dietician Consult, Vascular Clearance ( already has IVC filter)  Eliquis plan and Cardiac clearance.  3. Bariatric Labs: Per orders.    PCP: Ian Cespedes MD  RTC: As scheduled.    This includes 45 mins ace to face time and non-face to face time preparing to see the patient (eg, review of tests), obtaining and/or reviewing separately obtained history, documenting clinical information in the electronic or other health record, independently interpreting results and communicating results to the patient/family/caregiver, or care coordinator.           [1]   Current Outpatient Medications:     acetaminophen (TYLENOL) 500 MG tablet, Take 1 tablet (500 mg total) by mouth every 6 (six) hours as needed for Pain., Disp: , Rfl:     acetaminophen (TYLENOL) 500 MG tablet, Take 1 tablet (500 mg total) by mouth every 6 (six) hours as needed for Pain., Disp: 30 tablet, Rfl: 0    amitriptyline (ELAVIL) 50 MG tablet, Take 1 tablet (50 mg total) by mouth every evening., Disp: 90 tablet, Rfl: 3    apixaban (ELIQUIS) 5 mg Tab, Take 1 tablet (5 mg total) by mouth 2 (two) times daily., Disp: 60 tablet, Rfl: 11    atorvastatin (LIPITOR) 20 MG tablet, Take 1 tablet (20 mg total) by mouth once daily., Disp: 90 tablet, Rfl: 3    azelastine (ASTELIN) 137 mcg (0.1 %) nasal spray, 1 spray (137 mcg total) by Nasal route 2 (two) times daily., Disp: 30 mL, Rfl: 0    busPIRone (BUSPAR) 15 MG tablet, Take 1 tablet (15 mg total) by mouth 3 (three) times daily., Disp: 270 tablet, Rfl: 3    cholecalciferol, vitamin D3, (VITAMIN D3) 50 mcg (2,000 unit) Tab, Take 1 tablet (2,000 Units total) by mouth once daily., Disp: 90 tablet, Rfl: 3    diclofenac sodium (VOLTAREN) 1 % Gel, APPLY 4 GRAMS  TOPICALLY 4 TIMES DAILY TO AFFECTED AREA. DO NOT APPLY MORE THAN 16GM DAILY TO ANY ONE AFFECTED JOINT, Disp: 100 g, Rfl: 0    docusate sodium (COLACE) 100 MG capsule, Take 1 capsule (100 mg total) by mouth 2 (two) times daily., Disp: 180 capsule, Rfl: 3    FLUoxetine 40 MG capsule, Take 2 capsules (80 mg total) by mouth once  daily., Disp: 180 capsule, Rfl: 3    hydroxychloroquine (PLAQUENIL) 200 mg tablet, Take 1 tablet (200 mg total) by mouth 2 (two) times daily., Disp: 60 tablet, Rfl: 11    hydrOXYzine pamoate (VISTARIL) 25 MG Cap, Take 1 capsule (25 mg total) by mouth 2 (two) times daily as needed (panic attacks)., Disp: 180 capsule, Rfl: 3    oxyCODONE-acetaminophen (PERCOCET)  mg per tablet, Take 1 tablet by mouth every 12 (twelve) hours as needed for Pain., Disp: 60 tablet, Rfl: 0    polyethylene glycol (GLYCOLAX) 17 gram PwPk, Take 17 g by mouth once daily., Disp: 90 each, Rfl: 3    pregabalin (LYRICA) 50 MG capsule, Take 1 capsule (50 mg total) by mouth 2 (two) times daily. In 1-2 weeks, if no relief, may increase dose to 3 times per day., Disp: 90 capsule, Rfl: 2    tiZANidine (ZANAFLEX) 4 MG tablet, Take 2 tablets (8 mg total) by mouth every evening., Disp: 180 tablet, Rfl: 1    valsartan-hydrochlorothiazide (DIOVAN-HCT) 160-25 mg per tablet, Take 1 tablet by mouth once daily., Disp: 90 tablet, Rfl: 2    pilocarpine (SALAGEN) 5 MG Tab, Take 1 tablet (5 mg total) by mouth 3 (three) times daily., Disp: 90 tablet, Rfl: 6

## 2025-07-09 ENCOUNTER — PATIENT MESSAGE (OUTPATIENT)
Dept: BARIATRICS | Facility: CLINIC | Age: 50
End: 2025-07-09
Payer: COMMERCIAL

## 2025-07-09 DIAGNOSIS — M54.2 CHRONIC NECK PAIN: ICD-10-CM

## 2025-07-09 DIAGNOSIS — G89.29 CHRONIC MIDLINE LOW BACK PAIN WITHOUT SCIATICA: ICD-10-CM

## 2025-07-09 DIAGNOSIS — G89.29 CHRONIC NECK PAIN: ICD-10-CM

## 2025-07-09 DIAGNOSIS — M17.0 PRIMARY OSTEOARTHRITIS OF BOTH KNEES: ICD-10-CM

## 2025-07-09 DIAGNOSIS — M79.7 FIBROMYALGIA SYNDROME: ICD-10-CM

## 2025-07-09 DIAGNOSIS — M54.50 CHRONIC MIDLINE LOW BACK PAIN WITHOUT SCIATICA: ICD-10-CM

## 2025-07-09 LAB
ALBUMIN SERPL BCP-MCNC: 4 G/DL (ref 3.5–5.2)
ALP SERPL-CCNC: 77 UNIT/L (ref 40–150)
ALT SERPL W/O P-5'-P-CCNC: 18 UNIT/L (ref 10–44)
ANION GAP (OHS): 10 MMOL/L (ref 8–16)
AST SERPL-CCNC: 30 UNIT/L (ref 11–45)
BILIRUB DIRECT SERPL-MCNC: 0.3 MG/DL (ref 0.1–0.3)
BILIRUB SERPL-MCNC: 0.9 MG/DL (ref 0.1–1)
BUN SERPL-MCNC: 16 MG/DL (ref 6–20)
CALCIUM SERPL-MCNC: 9 MG/DL (ref 8.7–10.5)
CHLORIDE SERPL-SCNC: 105 MMOL/L (ref 95–110)
CHOLEST SERPL-MCNC: 144 MG/DL (ref 120–199)
CHOLEST/HDLC SERPL: 2.2 {RATIO} (ref 2–5)
CK SERPL-CCNC: 287 U/L (ref 20–180)
CO2 SERPL-SCNC: 27 MMOL/L (ref 23–29)
CREAT SERPL-MCNC: 1.1 MG/DL (ref 0.5–1.4)
CRP SERPL-MCNC: 10.4 MG/L
DSDNA ANTIBODY (OHS): NORMAL
DSDNA ANTIBODY TITER (OHS): NORMAL
GFR SERPLBLD CREATININE-BSD FMLA CKD-EPI: >60 ML/MIN/1.73/M2
GLUCOSE SERPL-MCNC: 90 MG/DL (ref 70–110)
H PYLORI IGG SERPL QL IA: NEGATIVE
HDLC SERPL-MCNC: 66 MG/DL (ref 40–75)
HDLC SERPL: 45.8 % (ref 20–50)
IRON SATN MFR SERPL: 24 % (ref 20–50)
IRON SERPL-MCNC: 69 UG/DL (ref 30–160)
LDLC SERPL CALC-MCNC: 65.6 MG/DL (ref 63–159)
MAGNESIUM SERPL-MCNC: 2.2 MG/DL (ref 1.6–2.6)
NONHDLC SERPL-MCNC: 78 MG/DL
POTASSIUM SERPL-SCNC: 3.6 MMOL/L (ref 3.5–5.1)
PROT SERPL-MCNC: 7.4 GM/DL (ref 6–8.4)
SODIUM SERPL-SCNC: 142 MMOL/L (ref 136–145)
TIBC SERPL-MCNC: 290 UG/DL (ref 250–450)
TRANSFERRIN SERPL-MCNC: 196 MG/DL (ref 200–375)
TRIGL SERPL-MCNC: 62 MG/DL (ref 30–150)
TSH SERPL-ACNC: 3.33 UIU/ML (ref 0.4–4)

## 2025-07-09 RX ORDER — OXYCODONE AND ACETAMINOPHEN 10; 325 MG/1; MG/1
1 TABLET ORAL EVERY 12 HOURS PRN
Qty: 60 TABLET | Refills: 0 | Status: SHIPPED | OUTPATIENT
Start: 2025-07-09

## 2025-07-09 RX ORDER — ERGOCALCIFEROL 1.25 MG/1
50000 CAPSULE ORAL
Qty: 24 CAPSULE | Refills: 0 | Status: SHIPPED | OUTPATIENT
Start: 2025-07-10 | End: 2025-09-30

## 2025-07-09 NOTE — TELEPHONE ENCOUNTER
Called patient to schedule her Stress Test and FL UGI. Discussed her diet and exercise habits and the importance of those things in helping her achieve her goals. Patient stated understanding and agreement. Will follow up when testing is complete.

## 2025-07-10 LAB — HMGCR IGG SER IA-ACNC: <20 CU

## 2025-07-11 ENCOUNTER — OFFICE VISIT (OUTPATIENT)
Dept: RHEUMATOLOGY | Facility: CLINIC | Age: 50
End: 2025-07-11
Payer: COMMERCIAL

## 2025-07-11 DIAGNOSIS — M35.01 SJOGREN'S SYNDROME WITH KERATOCONJUNCTIVITIS SICCA: Primary | ICD-10-CM

## 2025-07-11 DIAGNOSIS — D84.821 IMMUNODEFICIENCY DUE TO DRUG THERAPY: ICD-10-CM

## 2025-07-11 DIAGNOSIS — Z79.899 IMMUNODEFICIENCY DUE TO DRUG THERAPY: ICD-10-CM

## 2025-07-11 PROCEDURE — G2211 COMPLEX E/M VISIT ADD ON: HCPCS | Mod: 95,,, | Performed by: INTERNAL MEDICINE

## 2025-07-11 PROCEDURE — 98006 SYNCH AUDIO-VIDEO EST MOD 30: CPT | Mod: 95,,, | Performed by: INTERNAL MEDICINE

## 2025-07-11 PROCEDURE — 3044F HG A1C LEVEL LT 7.0%: CPT | Mod: CPTII,95,, | Performed by: INTERNAL MEDICINE

## 2025-07-11 NOTE — PROGRESS NOTES
Subjective:     Patient ID: Alberto Frye is a 49 y.o. female followed by Dr. Hughes for Sjogren's syndrome w KCS, fibromyalgia & LBP    Chief Complaint: No chief complaint on file.       HPI 49 year old F with PMH of CHF, depression,aidan. LLE DVT here to establish care for Sjogrens and FM.  Patient previously following with Dr. ROME.  She is on plaquenil 200mg po BID for several years. Per Dr. Hughes, it was started for small joint arthritis.  She feels like she has pounding in her ears.She does biotene for dry mouth without significant improvement.  She does over the counter eye drops without improvement.She has pain all over her body.  She has pain in chest and muscles in her legs.      Interval history: she could not tolerate nabumetone.  She is on plaquenil BID. She continues with dry mouth.    Past Medical History:   Diagnosis Date    AYDE (acute kidney injury) 11/1/2022    Alcohol abuse     stopped heavy drinking about 10 years ago; was drinking 3 glasses of vodka/tequilla,rum/whiskey per day    Allergy Don't remember    Its been some years.    Anxiety     Arthritis 2010    Blood clotting tendency 2008    After a procedure & 2020.    Congestive heart failure 8/11/2020    Depression     Hallucination     Hx of psychiatric care     Hypertension     Immune disorder 2020    Joint pain 2010    Keloid cicatrix Years ago    From childhood scars    Aidan     unplanned trips, energy without sleep for 2 days (reading, cleaning), feelings that she can do multiple tasks at one time, feelings of overconfidence at times    Psychiatric problem     Sleep difficulties     Therapy     Withdrawal symptoms, drug or narcotic     racing heart, restlessness           Review of Systems : see HPI      Past Medical History:   Diagnosis Date    AYDE (acute kidney injury) 11/1/2022    Alcohol abuse     stopped heavy drinking about 10 years ago; was drinking 3 glasses of vodka/tequilla,rum/whiskey per day    Allergy Don't remember    Its  been some years.    Anxiety     Arthritis 2010    Blood clotting tendency 2008    After a procedure & 2020.    Congestive heart failure 8/11/2020    Depression     Hallucination     Hx of psychiatric care     Hypertension     Immune disorder 2020    Joint pain 2010    Keloid cicatrix Years ago    From childhood scars    Caro     unplanned trips, energy without sleep for 2 days (reading, cleaning), feelings that she can do multiple tasks at one time, feelings of overconfidence at times    Psychiatric problem     Sleep difficulties     Therapy     Withdrawal symptoms, drug or narcotic     racing heart, restlessness       Past Surgical History:   Procedure Laterality Date    ESOPHAGOGASTRODUODENOSCOPY N/A 06/03/2020    Procedure: EGD (ESOPHAGOGASTRODUODENOSCOPY);  Surgeon: Johan Grover MD;  Location: [facility] (4TH FLR);  Service: Endoscopy;  Laterality: N/A;  [facility]    HYSTERECTOMY      LAPBAND  2009    PHLEBOGRAPHY Left 08/12/2020    Procedure: Venogram, pharmacomechanical thrombectomy;  Surgeon: Miguelangel Goldman MD;  Location: [facility] OR [address] [place] [address];  Service: Vascular;  Laterality: Left;  2336.43 mGy  494.74obzo2  17.8 minutes  37 ml of contrast    VENOPLASTY Left 08/12/2020    Procedure: ANGIOPLASTY, VEIN;  Surgeon: Miguelangel Goldman MD;  [facility];  Service: Vascular;  Laterality: Left;       Family History   Problem Relation Name Age of Onset    Diabetes Mother      Cancer Mother      Hypertension Mother      No Known Problems Father      No Known Problems Sister      No Known Problems Brother      No Known Problems Maternal Aunt      Cirrhosis Maternal Uncle          ETOH    No Known Problems Paternal Aunt      No Known Problems Paternal Uncle      No Known Problems Maternal Grandmother      No Known Problems Maternal Grandfather      No Known Problems Paternal Grandmother      No Known Problems Paternal Grandfather      No Known Problems Other      Celiac  "disease Neg Hx      Colon cancer Neg Hx      Colon polyps Neg Hx      Crohn's disease Neg Hx      Esophageal cancer Neg Hx      Inflammatory bowel disease Neg Hx      Liver cancer Neg Hx      Liver disease Neg Hx      Rectal cancer Neg Hx      Stomach cancer Neg Hx      Ulcerative colitis Neg Hx     3 sons & 1 granddaughter in good health    Social History     Tobacco Use    Smoking status: Former     Current packs/day: 0.00     Types: Cigarettes    Smokeless tobacco: Never    Tobacco comments:     quit in october 2016   Substance Use Topics    Alcohol use: Yes     Alcohol/week: 1.0 - 3.0 standard drink of alcohol     Types: 1 - 3 Glasses of wine per week     Comment: social presently, excessive 10 years ago    Drug use: Not Currently     Types: Marijuana     Comment: uses THCA weekly   Lives alone.  Works for [employer].    Objective:     BP (!) 142/94   Pulse 86   Ht 5' 4" (1.626 m)   Wt 110 kg (242 lb 8.1 oz)   BMI 41.63 kg/m²     Physical Exam   Constitutional: She is oriented to person, place, and time. She appears well-developed and well-nourished. No distress.   HENT:   Head: Normocephalic and atraumatic.   Mouth/Throat: Oropharynx is clear and moist. Mucous membranes are moist. No oropharyngeal exudate. Oropharynx is clear.   No facial rashes  Parotids not enlarged     Mouth/Throat: Teeth in good repair  Some moisture in oropharynx  Eyes: Pupils are equal, round, and reactive to light. Conjunctivae are normal. Right eye exhibits no discharge. Left eye exhibits no discharge. No scleral icterus.   Neck: No JVD present. No tracheal deviation present. No thyromegaly present.   Cardiovascular: Regular rhythm. Exam reveals no gallop and no friction rub.   No murmur heard.  Pulmonary/Chest: Effort normal and breath sounds normal. No respiratory distress. She has no wheezes. She has no rales. She exhibits no tenderness.   Abdominal: Soft. Bowel sounds are normal. She exhibits no distension and no mass. There is no " abdominal tenderness. There is no rebound and no guarding.   Mild diffuse TTP   Musculoskeletal:         General: No deformity. Normal range of motion.      Cervical back: Normal range of motion and neck supple.      Comments: No synovitis anywhere.  FROM all joints  18/18 tender points rated 5/5     Lymphadenopathy:     She has no cervical adenopathy.   Neurological: She is alert and oriented to person, place, and time. She has normal reflexes. No cranial nerve deficit. Gait normal.   Skin: Skin is warm and dry. She is not diaphoretic.   Psychiatric: Mood, memory, affect and thought content normal.   Vitals reviewed.        11/9/23: CBC ok; CMP glu 143;   10/24/23: ESR 15; CRP 33.00; ; C3/C4 ok; Vit D 24  1/5/23: GIOVANNY 1:320H; +SSA; myomarker all neg; RF neg;   3/5/18: APS: neg    Assessment:   50 year old F with PMH of CHF, depression,aidan, LLE DVT here to establish care for Sjogrens and FM.  Patient previously following with Dr. SANCHEZ    Sjogren's syndrome: she has history of GIOVANNY/+SSA,arthralgia and sicca symptoms.  Continue plaquenil 200mg po BID (discussed eye exam)  Start pilocarpine 5 mg po  qday   Labs in 6 months    # FM: following with PMR      Immunodeficiency due to drug-   -monitor carefully for infection and any toxicities associated with immunosuppressants  -advised age appropriate cancer screenings including yearly skin exam and age appropriate vaccinations  -advised to seek immediate care in the setting of infection and hold immunosuppressive medications if there is infection    The patient location is: home   The chief complaint leading to consultation is: joint pain    Visit type: audiovisual    Face to Face time with patient: 30   minutes of total time spent on the encounter, which includes face to face time and non-face to face time preparing to see the patient (eg, review of tests), Obtaining and/or reviewing separately obtained history, Documenting clinical information in the electronic or  other health record, Independently interpreting results (not separately reported) and communicating results to the patient/family/caregiver, or Care coordination (not separately reported).         Each patient to whom he or she provides medical services by telemedicine is:  (1) informed of the relationship between the physician and patient and the respective role of any other health care provider with respect to management of the patient; and (2) notified that he or she may decline to receive medical services by telemedicine and may withdraw from such care at any time.    Notes:   RTC in 6 months

## 2025-07-14 ENCOUNTER — HOSPITAL ENCOUNTER (OUTPATIENT)
Dept: RADIOLOGY | Facility: HOSPITAL | Age: 50
Discharge: HOME | End: 2025-07-14
Attending: NURSE PRACTITIONER
Payer: COMMERCIAL

## 2025-07-14 DIAGNOSIS — Z98.84 HISTORY OF LAPAROSCOPIC ADJUSTABLE GASTRIC BANDING: ICD-10-CM

## 2025-07-14 DIAGNOSIS — R09.A2 GLOBUS SENSATION: ICD-10-CM

## 2025-07-14 DIAGNOSIS — F31.32 BIPOLAR AFFECTIVE DISORDER, DEPRESSED, MODERATE: ICD-10-CM

## 2025-07-14 DIAGNOSIS — E66.01 MORBID OBESITY: ICD-10-CM

## 2025-07-14 DIAGNOSIS — R13.10 DYSPHAGIA, UNSPECIFIED TYPE: ICD-10-CM

## 2025-07-14 DIAGNOSIS — I82.409 RECURRENT DEEP VEIN THROMBOSIS (DVT): ICD-10-CM

## 2025-07-14 DIAGNOSIS — I10 ESSENTIAL HYPERTENSION: ICD-10-CM

## 2025-07-14 DIAGNOSIS — Z79.01 CHRONIC ANTICOAGULATION: ICD-10-CM

## 2025-07-14 PROCEDURE — A9698 NON-RAD CONTRAST MATERIALNOC: HCPCS | Performed by: NURSE PRACTITIONER

## 2025-07-14 PROCEDURE — 74240 X-RAY XM UPR GI TRC 1CNTRST: CPT | Mod: TC,FY

## 2025-07-14 PROCEDURE — 74240 X-RAY XM UPR GI TRC 1CNTRST: CPT | Mod: 26,,, | Performed by: RADIOLOGY

## 2025-07-14 PROCEDURE — 25500020 PHARM REV CODE 255: Performed by: NURSE PRACTITIONER

## 2025-07-14 RX ADMIN — BARIUM SULFATE 355 ML: 0.6 SUSPENSION ORAL at 11:07

## 2025-07-15 LAB — W VITAMIN B1: 53 UG/L

## 2025-07-16 LAB — CN-1A AB SER IA-ACNC: <20 UNITS

## 2025-07-18 ENCOUNTER — HOSPITAL ENCOUNTER (OUTPATIENT)
Dept: CARDIOLOGY | Facility: HOSPITAL | Age: 50
Discharge: HOME | End: 2025-07-18
Attending: NURSE PRACTITIONER
Payer: COMMERCIAL

## 2025-07-18 VITALS — WEIGHT: 238 LBS | HEIGHT: 64 IN | BODY MASS INDEX: 40.63 KG/M2

## 2025-07-18 DIAGNOSIS — F31.32 BIPOLAR AFFECTIVE DISORDER, DEPRESSED, MODERATE: ICD-10-CM

## 2025-07-18 DIAGNOSIS — Z79.01 CHRONIC ANTICOAGULATION: ICD-10-CM

## 2025-07-18 DIAGNOSIS — I10 ESSENTIAL HYPERTENSION: ICD-10-CM

## 2025-07-18 DIAGNOSIS — Z98.84 HISTORY OF LAPAROSCOPIC ADJUSTABLE GASTRIC BANDING: ICD-10-CM

## 2025-07-18 DIAGNOSIS — E66.01 MORBID OBESITY: ICD-10-CM

## 2025-07-18 DIAGNOSIS — I82.409 RECURRENT DEEP VEIN THROMBOSIS (DVT): ICD-10-CM

## 2025-07-18 DIAGNOSIS — R13.10 DYSPHAGIA, UNSPECIFIED TYPE: ICD-10-CM

## 2025-07-18 DIAGNOSIS — R09.A2 GLOBUS SENSATION: ICD-10-CM

## 2025-07-18 LAB
AORTIC SIZE INDEX (SOV): 1.5 CM/M2
AORTIC SIZE INDEX: 1.3 CM/M2
ASCENDING AORTA: 2.7 CM
AV INDEX (PROSTH): 0.54
AV MEAN GRADIENT: 7 MMHG
AV PEAK GRADIENT: 14 MMHG
AV VALVE AREA BY VELOCITY RATIO: 1.8 CM²
AV VALVE AREA: 1.9 CM²
AV VELOCITY RATIO: 0.53
BSA FOR ECHO PROCEDURE: 2.21 M2
CV ECHO LV RWT: 0.4 CM
CV STRESS BASE HR: 96 BPM
DIASTOLIC BLOOD PRESSURE: 73 MMHG
DOP CALC AO PEAK VEL: 1.9 M/S
DOP CALC AO VTI: 39.1 CM
DOP CALC LVOT AREA: 3.5 CM2
DOP CALC LVOT DIAMETER: 2.1 CM
DOP CALC LVOT PEAK VEL: 1 M/S
DOP CALC LVOT STROKE VOLUME: 73.7 CM3
DOP CALCLVOT PEAK VEL VTI: 21.3 CM
E WAVE DECELERATION TIME: 160 MSEC
E/A RATIO: 1.1
E/E' RATIO: 9 M/S
ECHO LV POSTERIOR WALL: 0.9 CM (ref 0.6–1.1)
FRACTIONAL SHORTENING: 33.3 % (ref 28–44)
INTERVENTRICULAR SEPTUM: 1 CM (ref 0.6–1.1)
IVC DIAMETER: 1.78 CM
LA MAJOR: 5.1 CM
LA MINOR: 4.9 CM
LEFT ATRIUM SIZE: 4.1 CM
LEFT INTERNAL DIMENSION IN SYSTOLE: 3 CM (ref 2.1–4)
LEFT VENTRICLE DIASTOLIC VOLUME INDEX: 42.65 ML/M2
LEFT VENTRICLE DIASTOLIC VOLUME: 90 ML
LEFT VENTRICLE MASS INDEX: 67.7 G/M2
LEFT VENTRICLE SYSTOLIC VOLUME INDEX: 16.6 ML/M2
LEFT VENTRICLE SYSTOLIC VOLUME: 35 ML
LEFT VENTRICULAR INTERNAL DIMENSION IN DIASTOLE: 4.5 CM (ref 3.5–6)
LEFT VENTRICULAR MASS: 142.9 G
LV LATERAL E/E' RATIO: 8.3 M/S
LV SEPTAL E/E' RATIO: 11 M/S
LVED V (TEICH): 90.38 ML
LVES V (TEICH): 34.87 ML
LVOT MG: 2.22 MMHG
LVOT MV: 0.71 CM/S
Lab: 1.7 CM/M
Lab: 1.9 CM/M
MV PEAK A VEL: 0.9 M/S
MV PEAK E VEL: 0.99 M/S
MV STENOSIS PRESSURE HALF TIME: 46.42 MS
MV VALVE AREA P 1/2 METHOD: 4.74 CM2
OHS CV CPX 1 MINUTE RECOVERY HEART RATE: 129 BPM
OHS CV CPX 85 PERCENT MAX PREDICTED HEART RATE MALE: 143
OHS CV CPX ESTIMATED METS: 6
OHS CV CPX MAX PREDICTED HEART RATE: 168
OHS CV CPX PATIENT HEIGHT IN: 64
OHS CV CPX PATIENT IS FEMALE: 1
OHS CV CPX PATIENT IS MALE: 0
OHS CV CPX PEAK DIASTOLIC BLOOD PRESSURE: 94 MMHG
OHS CV CPX PEAK HEAR RATE: 150 BPM
OHS CV CPX PEAK RATE PRESSURE PRODUCT: NORMAL
OHS CV CPX PEAK SYSTOLIC BLOOD PRESSURE: 197 MMHG
OHS CV CPX PERCENT MAX PREDICTED HEART RATE ACHIEVED: 94
OHS CV CPX RATE PRESSURE PRODUCT PRESENTING: NORMAL
OHS CV RV/LV RATIO: 0.64 CM
PISA TR MAX VEL: 2.4 M/S
PV PEAK GRADIENT: 5 MMHG
PV PEAK VELOCITY: 1.16 M/S
RA MAJOR: 3.64 CM
RIGHT VENTRICLE DIASTOLIC BASEL DIMENSION: 2.9 CM
RIGHT VENTRICULAR END-DIASTOLIC DIMENSION: 2.85 CM
RV TISSUE DOPPLER FREE WALL SYSTOLIC VELOCITY 1 (APICAL 4 CHAMBER VIEW): 13.3 CM/S
SINUS: 3.1 CM
STJ: 1.7 CM
STRESS ECHO POST EXERCISE DUR MIN: 4 MINUTES
STRESS ECHO POST EXERCISE DUR SEC: 2 SECONDS
SYSTOLIC BLOOD PRESSURE: 130 MMHG
TDI LATERAL: 0.12 M/S
TDI SEPTAL: 0.09 M/S
TDI: 0.11 M/S
TR MAX PG: 24 MMHG
TRICUSPID ANNULAR PLANE SYSTOLIC EXCURSION: 2.7 CM
Z-SCORE OF LEFT VENTRICULAR DIMENSION IN END DIASTOLE: -3.85
Z-SCORE OF LEFT VENTRICULAR DIMENSION IN END SYSTOLE: -2.35

## 2025-07-18 PROCEDURE — 93325 DOPPLER ECHO COLOR FLOW MAPG: CPT

## 2025-07-18 PROCEDURE — 93325 DOPPLER ECHO COLOR FLOW MAPG: CPT | Mod: 26,,, | Performed by: INTERNAL MEDICINE

## 2025-07-18 PROCEDURE — 93320 DOPPLER ECHO COMPLETE: CPT | Mod: 26,,, | Performed by: INTERNAL MEDICINE

## 2025-07-18 PROCEDURE — 93351 STRESS TTE COMPLETE: CPT | Mod: 26,,, | Performed by: INTERNAL MEDICINE

## 2025-07-22 ENCOUNTER — PATIENT OUTREACH (OUTPATIENT)
Dept: ADMINISTRATIVE | Facility: HOSPITAL | Age: 50
End: 2025-07-22
Payer: COMMERCIAL

## 2025-07-23 ENCOUNTER — CLINICAL SUPPORT (OUTPATIENT)
Dept: PSYCHIATRY | Facility: CLINIC | Age: 50
End: 2025-07-23
Payer: COMMERCIAL

## 2025-07-23 DIAGNOSIS — Z00.8 ENCOUNTER FOR PRE-SURGICAL PSYCHOLOGICAL ASSESSMENT: Primary | ICD-10-CM

## 2025-07-23 PROCEDURE — 99999 PR PBB SHADOW E&M-EST. PATIENT-LVL I: CPT | Mod: PBBFAC,,,

## 2025-07-29 ENCOUNTER — OFFICE VISIT (OUTPATIENT)
Dept: PSYCHIATRY | Facility: CLINIC | Age: 50
End: 2025-07-29
Payer: COMMERCIAL

## 2025-07-29 DIAGNOSIS — F60.5 OBSESSIVE COMPULSIVE PERSONALITY DISORDER: ICD-10-CM

## 2025-07-29 DIAGNOSIS — Z98.84 HISTORY OF LAPAROSCOPIC ADJUSTABLE GASTRIC BANDING: ICD-10-CM

## 2025-07-29 DIAGNOSIS — G47.00 INSOMNIA DISORDER, WITH NON-SLEEP DISORDER MENTAL COMORBIDITY: ICD-10-CM

## 2025-07-29 DIAGNOSIS — I82.409 RECURRENT DEEP VEIN THROMBOSIS (DVT): ICD-10-CM

## 2025-07-29 DIAGNOSIS — F06.4 ANXIETY DISORDER DUE TO MEDICAL CONDITION: ICD-10-CM

## 2025-07-29 DIAGNOSIS — Z01.818 OTHER SPECIFIED PRE-OPERATIVE EXAMINATION: Primary | ICD-10-CM

## 2025-07-29 DIAGNOSIS — F31.32 BIPOLAR AFFECTIVE DISORDER, DEPRESSED, MODERATE: ICD-10-CM

## 2025-07-29 DIAGNOSIS — I10 ESSENTIAL HYPERTENSION: ICD-10-CM

## 2025-07-29 DIAGNOSIS — E66.01 MORBID OBESITY WITH BMI OF 40.0-44.9, ADULT: ICD-10-CM

## 2025-07-29 PROCEDURE — 99999 PR PBB SHADOW E&M-EST. PATIENT-LVL I: CPT | Mod: PBBFAC,,, | Performed by: PSYCHOLOGIST
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
## 2025-07-29 NOTE — Clinical Note
There are no overt psychological contraindications for proceeding with bariatric surgery. Overall, Ms. Frye is at MODERATE risk for adverse postsurgical outcomes. Recommendations for psychological intervention: Ms. Frye should continue to meet with medical provider for management of psychotropic medication. She may also benefit from participating in psychotherapy to continue to manage symptoms. She will be contacted by  or bariatric  to discuss psychotherapy resources.

## 2025-08-01 ENCOUNTER — PATIENT MESSAGE (OUTPATIENT)
Dept: BARIATRICS | Facility: CLINIC | Age: 50
End: 2025-08-01
Payer: COMMERCIAL

## 2025-08-04 ENCOUNTER — OFFICE VISIT (OUTPATIENT)
Dept: FAMILY MEDICINE | Facility: CLINIC | Age: 50
End: 2025-08-04
Payer: COMMERCIAL

## 2025-08-04 VITALS
WEIGHT: 247.44 LBS | HEART RATE: 83 BPM | OXYGEN SATURATION: 97 % | HEIGHT: 64 IN | DIASTOLIC BLOOD PRESSURE: 88 MMHG | BODY MASS INDEX: 42.24 KG/M2 | SYSTOLIC BLOOD PRESSURE: 132 MMHG | TEMPERATURE: 98 F

## 2025-08-04 DIAGNOSIS — M54.2 CERVICALGIA: Primary | ICD-10-CM

## 2025-08-04 DIAGNOSIS — E66.01 MORBID OBESITY: ICD-10-CM

## 2025-08-04 DIAGNOSIS — M79.7 FIBROMYALGIA: ICD-10-CM

## 2025-08-04 DIAGNOSIS — M35.05 SJOGREN'S SYNDROME WITH INFLAMMATORY ARTHRITIS: ICD-10-CM

## 2025-08-04 PROCEDURE — 96372 THER/PROPH/DIAG INJ SC/IM: CPT | Mod: S$GLB,,, | Performed by: INTERNAL MEDICINE

## 2025-08-04 PROCEDURE — 99999 PR PBB SHADOW E&M-EST. PATIENT-LVL V: CPT | Mod: PBBFAC,,, | Performed by: INTERNAL MEDICINE

## 2025-08-04 PROCEDURE — 99214 OFFICE O/P EST MOD 30 MIN: CPT | Mod: 25,S$GLB,, | Performed by: INTERNAL MEDICINE

## 2025-08-04 PROCEDURE — 1160F RVW MEDS BY RX/DR IN RCRD: CPT | Mod: CPTII,S$GLB,, | Performed by: INTERNAL MEDICINE

## 2025-08-04 PROCEDURE — 3079F DIAST BP 80-89 MM HG: CPT | Mod: CPTII,S$GLB,, | Performed by: INTERNAL MEDICINE

## 2025-08-04 PROCEDURE — 3075F SYST BP GE 130 - 139MM HG: CPT | Mod: CPTII,S$GLB,, | Performed by: INTERNAL MEDICINE

## 2025-08-04 PROCEDURE — 1159F MED LIST DOCD IN RCRD: CPT | Mod: CPTII,S$GLB,, | Performed by: INTERNAL MEDICINE

## 2025-08-04 PROCEDURE — 3008F BODY MASS INDEX DOCD: CPT | Mod: CPTII,S$GLB,, | Performed by: INTERNAL MEDICINE

## 2025-08-04 PROCEDURE — 3044F HG A1C LEVEL LT 7.0%: CPT | Mod: CPTII,S$GLB,, | Performed by: INTERNAL MEDICINE

## 2025-08-04 RX ORDER — KETOROLAC TROMETHAMINE 30 MG/ML
30 INJECTION, SOLUTION INTRAMUSCULAR; INTRAVENOUS
Status: COMPLETED | OUTPATIENT
Start: 2025-08-04 | End: 2025-08-04

## 2025-08-04 RX ORDER — METHYLPREDNISOLONE ACETATE 80 MG/ML
80 INJECTION, SUSPENSION INTRA-ARTICULAR; INTRALESIONAL; INTRAMUSCULAR; SOFT TISSUE
Status: COMPLETED | OUTPATIENT
Start: 2025-08-04 | End: 2025-08-04

## 2025-08-04 RX ADMIN — KETOROLAC TROMETHAMINE 30 MG: 30 INJECTION, SOLUTION INTRAMUSCULAR; INTRAVENOUS at 03:08

## 2025-08-04 RX ADMIN — METHYLPREDNISOLONE ACETATE 80 MG: 80 INJECTION, SUSPENSION INTRA-ARTICULAR; INTRALESIONAL; INTRAMUSCULAR; SOFT TISSUE at 03:08

## 2025-08-04 NOTE — PROGRESS NOTES
This note was created by combination of typed  and M-Modal dictation.  Transcription errors may be present.   This note was also generated with the assistance of ambient listening technology. Verbal consent was obtained by the patient and accompanying visitor(s) for the recording of patient appointment to facilitate this note. I attest to having reviewed and edited the generated note for accuracy, though some syntax or spelling errors may persist. Please contact the author of this note for any clarification.    Assessment and Plan:   Assessment and Plan    Assessment & Plan  Cervicalgia  Maybe amplified back comorbidities of fibromyalgia and Sjogren's.  Finding it quite painful in his requesting immediate relief   Toradol 30, Depo-Medrol 80.  Avoid oral NSAIDs for the next couple days while on these medications.    Does not take Lyrica on a regular basis because of cognitive dysfunction when taking it.  She is already taking chronic narcotic and muscle relaxer.  Gentle range-of-motion exercises.    If no improvement would start with a plain film x-ray of the neck and the shoulder  Orders:    ketorolac injection 30 mg    methylPREDNISolone acetate injection 80 mg    Fibromyalgia  Sjogren's syndrome with inflammatory arthritis  Managed by Rheumatology and PMR.  On Plaquenil.  On chronic narcotic.  On tizanidine 8 mg.  Lyrica may take it on weekends as it results in cognitive slowing and can not function with this at work.  Intolerant of pilocarpine       Morbid obesity  Is seeing bariatric surgery for evaluation for gastric sleeve.  Undergoing workup           There are no discontinued medications.    meds sent this encounter:         Follow Up:   Future Appointments   Date Time Provider Department Center   8/4/2025  3:00 PM Ian Cespedes MD [facility] EBENEZER Raoz   8/7/2025  3:30 PM Isabela Church RD [facility] JOANNE Shaver   9/29/2025  3:00 PM Raheem Mcconnell MD [facility] GURPREET Shaver         Subjective:    Subjective   Chief Complaint   Patient presents with    Neck Pain     Since yesterday. Radiating to the fingers of left arm.       STEFFANY Dominguez is a 50 y.o. female.    Social History     Socioeconomic History    Marital status:     Number of children: 3   Occupational History    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[employer]   Tobacco Use    Smoking status: Former     Current packs/day: 0.00     Types: Cigarettes    Smokeless tobacco: Never    Tobacco comments:     quit in october 2016   Substance and Sexual Activity    Alcohol use: Yes     Alcohol/week: 1.0 - 3.0 standard drink of alcohol     Types: 1 - 3 Glasses of wine per week     Comment: social presently, excessive 10 years ago    Drug use: Yes     Types: Marijuana    Sexual activity: Not Currently     Partners: Male     Birth control/protection: Condom, See Surgical Hx   Other Topics Concern    Patient feels they ought to cut down on drinking/drug use No    Patient annoyed by others criticizing their drinking/drug use No    Patient has felt bad or guilty about drinking/drug use No    Patient has had a drink/used drugs as an eye opener in the AM No    Are you pregnant or think you may be? No    Breast-feeding No   Social History Narrative    Has 3 healthy grown sons (1990, 1993, 1998) Lives alone.    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   in 2010.    Has Male partner since 2014 who is currently disabled.     Social Drivers of Health     Financial Resource Strain: Medium Risk (1/30/2025)    Overall Financial Resource Strain (CARDIA)     Difficulty of Paying Living Expenses: Somewhat hard   Food Insecurity: No Food Insecurity (1/30/2025)    Hunger Vital Sign     Worried About Running Out of Food in the Last Year: Never true     Ran Out of Food in the Last Year: Never true   Transportation Needs: No Transportation Needs (1/30/2025)    PRAPARE - Transportation      Lack of Transportation (Medical): No     Lack of Transportation (Non-Medical): No   Physical Activity: Insufficiently Active (1/30/2025)    Exercise Vital Sign     Days of Exercise per Week: 2 days     Minutes of Exercise per Session: 30 min   Stress: Stress Concern Present (1/30/2025)    [employer] [place] [employer] - Occupational Stress Questionnaire     Feeling of Stress : Rather much   Housing Stability: Unknown (1/30/2025)    Housing Stability Vital Sign     Unable to Pay for Housing in the Last Year: No     Homeless in the Last Year: No       No LMP recorded. Patient has had a hysterectomy.    Last appointment with this clinic was Visit date not found. Last visit with me 1/29/2025   To summarize last visit and events leading up to today:  Hypertension, diastolic dysfunction  hyperlipidemia, 10 year risk score less than 7.5%.  Elevated CPK baseline. Incidental vascular calcifications on mammogram.  Saw cards for palpitations.  06/14/2024 TTE LV normal size normal wall thickness normal systolic function LVEF 55-60%.  Grade 1 diastolic dysfunction.  6/14/24 holter   The predominant rhythm is sinus.   Heart rates varied between 71 and 129 BPM with an average of 91 BPM.   There were very rare PVCs totalling 4 and averaging 0.04 per hour.   There were very rare PACs totalling 11 and averaging 0.11 per hour.   The diary was not returned.   Dysphagia previously seen by GI January 2023.  Plan was EGD and colonoscopy but that was derailed due to illness and family events.  Hospitalization 11/9 through 11/11/2023 for SBO presenting as abdominal pain  cT showed incidental circumferential thickening esophagus. Would check EGD  Obesity, she had inquired about GLP1 but would evaluate the GI system 1st.   S/p lap banding  Subclinical hypothyroid with negative antibodies.  Hold on treatment  10/24/2023 TSH normal not on meds  Bipolar, anxiety, followed by Psychiatry.  Previously she had self discontinued mood  stabilizer.  On fluoxetine.  Bar.  Amitriptyline.  Chronic back pain, fibromyalgia  Sjogren's syndrome, fibromyalgia followed by Rheumatology on Plaquenil.  Oxycodone for breakthrough pain.  Also on Lyrica and tizanidine and amitriptyline.  History of trial of cevimeline without relief.  Saw Rheumatology in follow-up 01/04/2024.  Fibromyalgia.  Already on Lyrica.  Consider duloxetine though she should discuss this with her psychiatrist.  Offered her [facility] functional restoration program  Recurrent DVT, indefinite anticoagulation, DOAC     Last visit with me 07/29/2024  Dysphagia.  Previously seen by GI and complained of dysphagia and blood in stool.  Plan was EGD and colonoscopy.  Never got set up because of subsequent family issues and health issues but would be interested in pursuing.  Previous CT imaging showing thickening of the esophagus.  Could be transient.  EGD and colonoscopy ordered   Hypertension stable   Hyperlipidemia with incidental calcifications on mammogram.  She is wary of statin therapy.  Plan was check HS CRP and if elevated consider low-dose statin  Chronic anticoagulation for recurrent DVT on DOAC indefinite duration  Vitamin-D out of prescription, changed to over-the-counter 2000  Obesity work on diet and physical activity modification  Sjogren's, fibromyalgia, lumbar spondylosis and neuropathy followed by Rheumatology.  On chronic narcotics muscle relaxers and Plaquenil   Bipolar followed by Psychiatry previously self discontinued and/psychotic currently on BuSpar fluoxetine and amitriptyline  History of abnormal TSH.  Last TSH was normal repeat future labs     Lipid ok but HS CRP elevated  History of incidental vascular calcification seen on mammo  TSH WNL not on meds.  History of abn TSH  A1c 5.0  Results to pt via [facility]. Consider statin with next visit but she is reluctant to consider     11/22/24 saw new rheumatologist to establish  Sjogren's.  Plaquenil started for arthritic symptoms  in the hands but patient did not think it helped.  Trial off of Plaquenil.  Start pilocarpine  Fibromyalgia with flare.  Tizanidine, nabumetone, PMR     12/11/2024 ED for respiratory infection and cough, chest x-ray negative     12/20/2024 saw nurse practitioner in follow-up, patient requesting steroid injection.  Given Zithromax, dexamethasone  Chest x-ray negative     Seemed to improve with stopping pilocarpine     01/28/2025 saw PMR to establish.  Lyrica 50 b.i.d..  Continue Percocet     Needs EGD and colonoscopy  And statin  And repeat labs 3 months.      Last visit me 01/29/2025  Hypertension stable   Lipid with elevated high sensitivity CRP.  Vascular calcifications incidental on mammogram.  Start statin Lipitor   Dysphagia, needs colonoscopy.  Previously attempted to get her set up for both EGD and colonoscopy but health issues and family issues arose.  Reordered   Obesity work on TLC   Recurrent DVT on indefinite anticoagulation   Fibromyalgia.  Chronic back pain.  Sjogren's with the inflammatory arthritis.  Was tried off of Plaquenil, subsequently restarted.  Followed by PMR.  Rechallenge on Lyrica.  Chronic narcotic  Anxiety, bipolar followed by Psychiatry    02/10/2025 saw Rheumatology.  Sjogren's stable continue Plaquenil start pilocarpine  Fibromyalgia stay on Lyrica tizanidine and start nabumetone    03/12/2025 saw General surgery for inframammary lesions cyst versus keloid recurring, plan was surgical removal    Saw ENT 03/12/2025 for pulsatile tinnitus, carotid ultrasound ordered     04/15/2025 carotid ultrasound   There is 20-39% right Internal Carotid Stenosis.   There is 20-39% left Internal Carotid Stenosis.     03/19/2025 cyst excision, pathology epidermal inclusion cyst    Saw Psychiatry 03/21/2025.  No changes.    4/30/25 colonoscopy normal repeat 10 years  4/30/25 EGD nl, gastric band; next cine-esophagram, manometry    Saw vascular 05/06/2025.  History of recurrent DVT with complaints of  bilateral swelling and tenderness.  Repeat lower extremity DVT ultrasound and if negative proceed with venous insufficiency ultrasound and lymphedema workup    05/13/2025 ED fall with head injury frontal headache.  Head CT negative, maxillofacial CT negative    06/14/2025 lower extremity ultrasound no DVT    Saw bariatrics 07/08/2025 for lap band removal and lap sleeve gastrectomy.  07/18/2025 stress echo negative for ischemia.  LVEF 55-60%.    07/14/2025 upper GI stable    Today's visit:    History of Present Illness    HPI:  Alberto reports severe neck pain that began 3 days ago, initially attributed to improper sleeping position. The pain intensified significantly over the next 2 days, reaching a severity that made her consider seeking emergency care. The pain radiates from her neck down her left arm to her fingers, characterized as intermittent pins and needles sensation. Alberto rates the pain as severe.    The pain is exacerbated by movement, particularly when using her left arm. Even light touch, such as a sheet over the affected area, aggravates the pain. Keeping still provides some relief. Looking down (touching chin to chest) is uncomfortable, while looking up is less painful. Alberto reports difficulty placing her brow on surfaces due to pain.    She has attempted several treatments without significant relief. Voltaren gel application to her neck and shoulder area, Percocet (usually taken twice daily for fibromyalgia and shoulder pain), ibuprofen, and muscle relaxers have all been ineffective for this pain.    Alberto expresses significant distress due to the pain, indicating extreme discomfort and a desire for immediate pain relief.    She denies any recent trauma, tripping, or falling that might have triggered the pain.    MEDICATIONS:  - Fluoxetine (Prozac)  - Percocet, twice daily, for fibromyalgia and shoulder pain  - Atorvastatin, for cholesterol  - Valsartan HCTZ, for blood pressure  -  Lyrica, as needed (daily when taken), for pain. Side effects: cognitive issues, stuttering, getting stuck on words  - Amitriptyline, 2 pills at night, to help with sleep  - Plaquenil. Side effects: sinus infection-like feeling  - Vitamin D (prescription), twice weekly (Mondays and Fridays)  - Eliquis, blood thinner  - Voltaren gel, applied to neck and shoulder, for pain (patient reports it is not effective)  - Ibuprofen, taken with morning medication around 7 AM  - Discontinued regular use of Lyrica, now only taken when necessary due to cognitive side effects  - Discontinued regular use of Plaquenil due to side effects (sinus infection-like feeling)    ROS:  ENT: reports sinus pressure  Musculoskeletal: reports limb pain, reports neck pain, reports pain with movement  Neurological: reports confusion or disorientation, reports tingling, reports speech difficulty  Psychiatric: reports thought or thinking problems or concerns         Patient Care Team:  Ian Cespedes MD as PCP - General (Internal Medicine)  Chandler Bates MD as Consulting Physician (Cardiology)  Kari Tuttle MD as Consulting Physician (Hematology and Oncology)  Miguelangel Goldman MD as Consulting Physician (Vascular Surgery)  Johan Grover MD as Consulting Physician (Gastroenterology)  Maverick Pierce MD as Consulting Physician (Urology)  Becca Paredes MD (Obstetrics and Gynecology)  Ross Hughes MD (Inactive) as Resident (Rheumatology)  Yeison Gibson III, NP as Nurse Practitioner (Psychiatry)  Nito Lambert MD (Gastroenterology)  Imaging, Dis Diagnostic (Diagnostic Radiology)      Patient Active Problem List    Diagnosis Date Noted    History of laparoscopic adjustable gastric banding 05/01/2025    Small bowel obstruction 11/09/2023 11/9/23 CT: Cluster of dilated, fluid-filled loops of small bowel within the central mid abdomen.  There is associated mild wall thickening as well as mild  surrounding mesenteric fat stranding and small volume of free fluid within the abdomen and pelvis.  Differential considerations developing small bowel obstruction versus a nonspecific enteritis.  Clinical correlation advised.  Nonspecific circumferential thickening of the distal thoracic esophagus and gastroesophageal junction.  Follow-up endoscopy is advised for further assessment.  Gastric lap band in place with discontinuity of the catheter tubing, similar to prior examination.  Clinical correlation advised.  Decreased attenuation of the hepatic parenchyma suggesting hepatic steatosis.  Infrarenal IVC filter.      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  had had a prior DVT 10 years prior in the setting of surgery for hysterectomy at the time which she had IVC filter placed.      Recurrent UTI 09/21/2020 08/28/2020 cystoscopy  normal  08/13/2020 renal ultrasound normal  06/02/2020 CT abdomen pelvis unremarkable.  IVC present.      Screening for colon cancer 11/2019 colonoscopy hemorrhoids and diverticulosis; [place] 09/21/2020 11/2019 colonoscopy hemorrhoids and diverticulosis.  4/30/25 colonoscopy normal repeat 10 years      Chronic anticoagulation 09/21/2020    Chronic midline low back pain without sciatica 09/21/2020 1/2017 XR L spine: 5 views lumbar spine.  Vena cava filter right common L2-L3, and lap band apparatus left upper quadrant.  Mild levoscoliosis, lordosis lumbar spine.  Elevation of right pelvis.  Facet arthropathy L4-L5 levels.  No fracture subluxation.      Neuropathy 08/11/2020    Abdominal pain 06/03/2020 06/03/2020 EGD normal duodenum.  Erythematous mucosa of the gastric body and antrum and pre-pyloric region.  2 cm hiatal hernia.  Path negative.  No celiac.  4/30/25 EGD nl, gastric band; next cine-esophagram, manometry      Essential hypertension 01/22/2016 08/11/2020 TTE normal LV systolic function LVEF 60%.  Normal diastolic function.  Normal RV systolic function.  PA pressure 23.  History of empiric treatment for diverticulitis 04/22/2020, no imaging done at that time.    7/2020 ER put her on clonidine.  And then placed on amlodipine  But did not find it controlled  So started on diovan hct  And stayed on clonidine throughout.      Seasonal allergies 01/22/2016       PAST MEDICAL PROBLEMS, PAST SURGICAL HISTORY: please see relevant portions of the electronic medical record    ALLERGIES AND MEDICATIONS: updated and reviewed.  Medication List with Changes/Refills   Current Medications    ACETAMINOPHEN (TYLENOL) 500 MG TABLET    Take 1 tablet (500 mg total) by mouth every 6 (six) hours as needed for Pain.    ACETAMINOPHEN (TYLENOL) 500 MG TABLET    Take 1 tablet (500 mg total) by mouth every 6 (six) hours as needed for Pain.    AMITRIPTYLINE (ELAVIL) 50 MG TABLET    Take 1 tablet (50 mg total) by mouth  every evening.    APIXABAN (ELIQUIS) 5 MG TAB    Take 1 tablet (5 mg total) by mouth 2 (two) times daily.    ATORVASTATIN (LIPITOR) 20 MG TABLET    Take 1 tablet (20 mg total) by mouth once daily.    AZELASTINE (ASTELIN) 137 MCG (0.1 %) NASAL SPRAY    1 spray (137 mcg total) by Nasal route 2 (two) times daily.    BUSPIRONE (BUSPAR) 15 MG TABLET    Take 1 tablet (15 mg total) by mouth 3 (three) times daily.    CHOLECALCIFEROL, VITAMIN D3, (VITAMIN D3) 50 MCG (2,000 UNIT) TAB    Take 1 tablet (2,000 Units total) by mouth once daily.    DICLOFENAC SODIUM (VOLTAREN) 1 % GEL    APPLY 4 GRAMS  TOPICALLY 4 TIMES DAILY TO AFFECTED AREA. DO NOT APPLY MORE THAN 16GM DAILY TO ANY ONE AFFECTED JOINT    DOCUSATE SODIUM (COLACE) 100 MG CAPSULE    Take 1 capsule (100 mg total) by mouth 2 (two) times daily.    ERGOCALCIFEROL (ERGOCALCIFEROL) 50,000 UNIT CAP    Take 1 capsule (50,000 Units total) by mouth twice a week. for 24 doses    FLUOXETINE 40 MG CAPSULE    Take 2 capsules (80 mg total) by mouth once daily.    HYDROXYCHLOROQUINE (PLAQUENIL) 200 MG TABLET    Take 1 tablet (200 mg total) by mouth 2 (two) times daily.    HYDROXYZINE PAMOATE (VISTARIL) 25 MG CAP    Take 1 capsule (25 mg total) by mouth 2 (two) times daily as needed (panic attacks).    OXYCODONE-ACETAMINOPHEN (PERCOCET)  MG PER TABLET    Take 1 tablet by mouth every 12 (twelve) hours as needed for Pain.    PILOCARPINE (SALAGEN) 5 MG TAB    Take 1 tablet (5 mg total) by mouth 3 (three) times daily.    POLYETHYLENE GLYCOL (GLYCOLAX) 17 GRAM PWPK    Take 17 g by mouth once daily.    PREGABALIN (LYRICA) 50 MG CAPSULE    Take 1 capsule (50 mg total) by mouth 2 (two) times daily. In 1-2 weeks, if no relief, may increase dose to 3 times per day.    TIZANIDINE (ZANAFLEX) 4 MG TABLET    Take 2 tablets (8 mg total) by mouth every evening.    VALSARTAN-HYDROCHLOROTHIAZIDE (DIOVAN-HCT) 160-25 MG PER TABLET    Take 1 tablet by mouth once daily.         Objective:  "  Objective   Physical Exam   Vitals:    08/04/25 1451   BP: 132/88   Pulse: 83   Temp: 98.3 °F (36.8 °C)   TempSrc: Oral   SpO2: 97%   Weight: 112.2 kg (247 lb 7.5 oz)   Height: 5' 4" (1.626 m)    Body mass index is 42.48 kg/m².            Physical Exam  Constitutional:       General: She is not in acute distress.     Appearance: She is well-developed.      Comments: Nontoxic but uncomfortable   HENT:      Head: Normocephalic and atraumatic.   Eyes:      General: No scleral icterus.  Neck:      Comments: Posterior paraspinal muscles are unremarkable  Cardiovascular:      Rate and Rhythm: Normal rate and regular rhythm.   Pulmonary:      Effort: Pulmonary effort is normal.   Musculoskeletal:         General: Tenderness present.      Comments: The left shoulder is tender with mild-to-moderate palpation, without deformity or crepitus or instability  Able to passively raise arm overhead, active abduction to about 135 degrees   Drop arm negative  External rotation with pain  Neck pain with hyperextension, axial rotation   Skin:     General: Skin is warm and dry.   Neurological:      Mental Status: She is alert and oriented to person, place, and time.   Psychiatric:         Behavior: Behavior normal.         Thought Content: Thought content normal.                "

## 2025-08-04 NOTE — ASSESSMENT & PLAN NOTE
Managed by Rheumatology and PMR.  On Plaquenil.  On chronic narcotic.  On tizanidine 8 mg.  Lyrica may take it on weekends as it results in cognitive slowing and can not function with this at work.  Intolerant of pilocarpine

## 2025-08-04 NOTE — PROGRESS NOTES
Patient given kenalog 30mg via IM injection to R deltoid & depo-medrol 80mg via Im injection to L deltoid per provider order. Tolerated well, 0 complaints of.

## 2025-08-06 DIAGNOSIS — G89.29 CHRONIC NECK PAIN: ICD-10-CM

## 2025-08-06 DIAGNOSIS — M54.50 CHRONIC MIDLINE LOW BACK PAIN WITHOUT SCIATICA: ICD-10-CM

## 2025-08-06 DIAGNOSIS — M79.7 FIBROMYALGIA SYNDROME: ICD-10-CM

## 2025-08-06 DIAGNOSIS — M17.0 PRIMARY OSTEOARTHRITIS OF BOTH KNEES: ICD-10-CM

## 2025-08-06 DIAGNOSIS — G89.29 CHRONIC MIDLINE LOW BACK PAIN WITHOUT SCIATICA: ICD-10-CM

## 2025-08-06 DIAGNOSIS — M54.2 CHRONIC NECK PAIN: ICD-10-CM

## 2025-08-07 ENCOUNTER — CLINICAL SUPPORT (OUTPATIENT)
Dept: BARIATRICS | Facility: CLINIC | Age: 50
End: 2025-08-07
Payer: COMMERCIAL

## 2025-08-07 DIAGNOSIS — I10 ESSENTIAL HYPERTENSION: ICD-10-CM

## 2025-08-07 DIAGNOSIS — E66.01 MORBID OBESITY WITH BMI OF 40.0-44.9, ADULT: ICD-10-CM

## 2025-08-07 DIAGNOSIS — Z71.3 DIETARY COUNSELING AND SURVEILLANCE: Primary | ICD-10-CM

## 2025-08-07 RX ORDER — OXYCODONE AND ACETAMINOPHEN 10; 325 MG/1; MG/1
1 TABLET ORAL EVERY 12 HOURS PRN
Qty: 60 TABLET | Refills: 0 | Status: SHIPPED | OUTPATIENT
Start: 2025-08-09

## 2025-08-08 ENCOUNTER — PATIENT MESSAGE (OUTPATIENT)
Dept: FAMILY MEDICINE | Facility: CLINIC | Age: 50
End: 2025-08-08
Payer: COMMERCIAL

## 2025-08-08 ENCOUNTER — HOSPITAL ENCOUNTER (EMERGENCY)
Facility: HOSPITAL | Age: 50
Discharge: HOME | End: 2025-08-08
Attending: EMERGENCY MEDICINE
Payer: COMMERCIAL

## 2025-08-08 VITALS
OXYGEN SATURATION: 98 % | RESPIRATION RATE: 16 BRPM | HEIGHT: 64 IN | SYSTOLIC BLOOD PRESSURE: 163 MMHG | DIASTOLIC BLOOD PRESSURE: 104 MMHG | WEIGHT: 247 LBS | BODY MASS INDEX: 42.17 KG/M2 | HEART RATE: 76 BPM | TEMPERATURE: 98 F

## 2025-08-08 DIAGNOSIS — M79.602 PAIN OF LEFT UPPER EXTREMITY: Primary | ICD-10-CM

## 2025-08-08 DIAGNOSIS — M54.2 CERVICALGIA: Primary | ICD-10-CM

## 2025-08-08 DIAGNOSIS — R03.0 ELEVATED BLOOD PRESSURE READING: ICD-10-CM

## 2025-08-08 PROCEDURE — 96372 THER/PROPH/DIAG INJ SC/IM: CPT

## 2025-08-08 PROCEDURE — 25000003 PHARM REV CODE 250

## 2025-08-08 PROCEDURE — 99284 EMERGENCY DEPT VISIT MOD MDM: CPT | Mod: 25

## 2025-08-08 PROCEDURE — 63600175 PHARM REV CODE 636 W HCPCS

## 2025-08-08 RX ORDER — ORPHENADRINE CITRATE 30 MG/ML
60 INJECTION INTRAMUSCULAR; INTRAVENOUS
Status: COMPLETED | OUTPATIENT
Start: 2025-08-08 | End: 2025-08-08

## 2025-08-08 RX ORDER — LIDOCAINE 50 MG/G
1 PATCH TOPICAL
Status: DISCONTINUED | OUTPATIENT
Start: 2025-08-08 | End: 2025-08-09 | Stop reason: HOSPADM

## 2025-08-08 RX ORDER — METHYLPREDNISOLONE 4 MG/1
TABLET ORAL
Qty: 1 EACH | Refills: 0 | Status: SHIPPED | OUTPATIENT
Start: 2025-08-08

## 2025-08-08 RX ORDER — NAPROXEN 500 MG/1
500 TABLET ORAL 2 TIMES DAILY
Qty: 10 TABLET | Refills: 0 | Status: SHIPPED | OUTPATIENT
Start: 2025-08-08 | End: 2025-08-13

## 2025-08-08 RX ORDER — METHOCARBAMOL 500 MG/1
500 TABLET, FILM COATED ORAL 4 TIMES DAILY
Qty: 20 TABLET | Refills: 0 | Status: SHIPPED | OUTPATIENT
Start: 2025-08-08 | End: 2025-08-13

## 2025-08-08 RX ORDER — KETOROLAC TROMETHAMINE 30 MG/ML
30 INJECTION, SOLUTION INTRAMUSCULAR; INTRAVENOUS
Status: COMPLETED | OUTPATIENT
Start: 2025-08-08 | End: 2025-08-08

## 2025-08-08 RX ADMIN — ORPHENADRINE CITRATE 60 MG: 30 INJECTION, SOLUTION INTRAMUSCULAR; INTRAVENOUS at 08:08

## 2025-08-08 RX ADMIN — KETOROLAC TROMETHAMINE 30 MG: 30 INJECTION, SOLUTION INTRAMUSCULAR; INTRAVENOUS at 08:08

## 2025-08-08 RX ADMIN — LIDOCAINE 1 PATCH: 50 PATCH TOPICAL at 08:08

## 2025-08-11 ENCOUNTER — DOCUMENTATION ONLY (OUTPATIENT)
Dept: BARIATRICS | Facility: CLINIC | Age: 50
End: 2025-08-11
Payer: COMMERCIAL

## 2025-08-11 ENCOUNTER — PATIENT MESSAGE (OUTPATIENT)
Dept: VASCULAR SURGERY | Facility: CLINIC | Age: 50
End: 2025-08-11
Payer: COMMERCIAL

## 2025-08-11 ENCOUNTER — PATIENT MESSAGE (OUTPATIENT)
Dept: FAMILY MEDICINE | Facility: CLINIC | Age: 50
End: 2025-08-11
Payer: COMMERCIAL

## 2025-08-11 ENCOUNTER — PATIENT MESSAGE (OUTPATIENT)
Dept: CARDIOLOGY | Facility: CLINIC | Age: 50
End: 2025-08-11
Payer: COMMERCIAL

## 2025-08-11 ENCOUNTER — PATIENT MESSAGE (OUTPATIENT)
Dept: BARIATRICS | Facility: CLINIC | Age: 50
End: 2025-08-11
Payer: COMMERCIAL

## 2025-08-11 DIAGNOSIS — M54.2 CERVICALGIA: Primary | ICD-10-CM

## 2025-08-12 ENCOUNTER — PATIENT MESSAGE (OUTPATIENT)
Dept: BARIATRICS | Facility: CLINIC | Age: 50
End: 2025-08-12
Payer: COMMERCIAL

## 2025-08-12 ENCOUNTER — HOSPITAL ENCOUNTER (OUTPATIENT)
Dept: RADIOLOGY | Facility: HOSPITAL | Age: 50
Discharge: HOME | End: 2025-08-12
Attending: INTERNAL MEDICINE
Payer: COMMERCIAL

## 2025-08-12 DIAGNOSIS — M54.2 CERVICALGIA: ICD-10-CM

## 2025-08-12 PROCEDURE — 72040 X-RAY EXAM NECK SPINE 2-3 VW: CPT | Mod: 26,,, | Performed by: RADIOLOGY

## 2025-08-12 PROCEDURE — 72040 X-RAY EXAM NECK SPINE 2-3 VW: CPT | Mod: TC

## 2025-08-13 ENCOUNTER — RESULTS FOLLOW-UP (OUTPATIENT)
Dept: FAMILY MEDICINE | Facility: CLINIC | Age: 50
End: 2025-08-13
Payer: COMMERCIAL

## 2025-08-13 DIAGNOSIS — M54.12 CERVICAL RADICULOPATHY AT C6: Primary | ICD-10-CM

## 2025-08-14 ENCOUNTER — PATIENT MESSAGE (OUTPATIENT)
Dept: BARIATRICS | Facility: CLINIC | Age: 50
End: 2025-08-14
Payer: COMMERCIAL

## 2025-08-18 ENCOUNTER — OFFICE VISIT (OUTPATIENT)
Dept: CARDIOLOGY | Facility: CLINIC | Age: 50
End: 2025-08-18
Payer: COMMERCIAL

## 2025-08-18 VITALS
WEIGHT: 240.94 LBS | RESPIRATION RATE: 15 BRPM | HEART RATE: 94 BPM | SYSTOLIC BLOOD PRESSURE: 144 MMHG | HEIGHT: 64 IN | DIASTOLIC BLOOD PRESSURE: 90 MMHG | OXYGEN SATURATION: 99 % | BODY MASS INDEX: 41.13 KG/M2

## 2025-08-18 DIAGNOSIS — R06.09 DOE (DYSPNEA ON EXERTION): ICD-10-CM

## 2025-08-18 DIAGNOSIS — I10 ESSENTIAL HYPERTENSION: Primary | ICD-10-CM

## 2025-08-18 DIAGNOSIS — E78.5 DYSLIPIDEMIA: ICD-10-CM

## 2025-08-18 DIAGNOSIS — R00.2 PALPITATIONS: ICD-10-CM

## 2025-08-18 LAB
OHS QRS DURATION: 84 MS
OHS QTC CALCULATION: 447 MS

## 2025-08-18 PROCEDURE — 3044F HG A1C LEVEL LT 7.0%: CPT | Mod: CPTII,S$GLB,, | Performed by: INTERNAL MEDICINE

## 2025-08-18 PROCEDURE — 3077F SYST BP >= 140 MM HG: CPT | Mod: CPTII,S$GLB,, | Performed by: INTERNAL MEDICINE

## 2025-08-18 PROCEDURE — 3008F BODY MASS INDEX DOCD: CPT | Mod: CPTII,S$GLB,, | Performed by: INTERNAL MEDICINE

## 2025-08-18 PROCEDURE — 93000 ELECTROCARDIOGRAM COMPLETE: CPT | Mod: S$GLB,,, | Performed by: INTERNAL MEDICINE

## 2025-08-18 PROCEDURE — 1159F MED LIST DOCD IN RCRD: CPT | Mod: CPTII,S$GLB,, | Performed by: INTERNAL MEDICINE

## 2025-08-18 PROCEDURE — 99999 PR PBB SHADOW E&M-EST. PATIENT-LVL V: CPT | Mod: PBBFAC,,, | Performed by: INTERNAL MEDICINE

## 2025-08-18 PROCEDURE — 99214 OFFICE O/P EST MOD 30 MIN: CPT | Mod: S$GLB,,, | Performed by: INTERNAL MEDICINE

## 2025-08-18 PROCEDURE — 3080F DIAST BP >= 90 MM HG: CPT | Mod: CPTII,S$GLB,, | Performed by: INTERNAL MEDICINE

## 2025-08-20 DIAGNOSIS — K59.09 CONSTIPATION, CHRONIC: ICD-10-CM

## 2025-08-21 RX ORDER — DOCUSATE SODIUM 100 MG/1
100 CAPSULE, LIQUID FILLED ORAL 2 TIMES DAILY
Qty: 180 CAPSULE | Refills: 3 | Status: SHIPPED | OUTPATIENT
Start: 2025-08-21

## 2025-08-21 RX ORDER — POLYETHYLENE GLYCOL 3350 17 G/17G
17 POWDER, FOR SOLUTION ORAL DAILY
Qty: 90 EACH | Refills: 3 | Status: SHIPPED | OUTPATIENT
Start: 2025-08-21

## 2025-08-25 ENCOUNTER — PATIENT MESSAGE (OUTPATIENT)
Dept: FAMILY MEDICINE | Facility: CLINIC | Age: 50
End: 2025-08-25
Payer: COMMERCIAL

## (undated) DEVICE — COVER INSTR ELASTIC BAND 40X20

## (undated) DEVICE — SET MICROPUNCTURE

## (undated) DEVICE — INTRODUCER VASC RADPQ 8FRX10CM

## (undated) DEVICE — CATH ZELANTE DVT ANGIOJET

## (undated) DEVICE — GUIDEWIRE STF .035X350CM ANG

## (undated) DEVICE — SOL 9P NACL IRR PIC IL

## (undated) DEVICE — COVERS PROBE NR-48 STERILE

## (undated) DEVICE — CATH GOLD ATLAS DIL14MMX6X80CM

## (undated) DEVICE — Device

## (undated) DEVICE — INFLATOR ADVANTAGE ENCORE 26

## (undated) DEVICE — GUIDEWIRE STF .035X180CM ANG

## (undated) DEVICE — SYS LABEL CORRECT MED

## (undated) DEVICE — SET DECANTER MEDICHOICE

## (undated) DEVICE — KIT POWER PULSE

## (undated) DEVICE — KIT INTRO MICRO NIT VSI 4FR

## (undated) DEVICE — SOL NS 1000CC

## (undated) DEVICE — DRESSING TEGADERM 4.4X5IN